# Patient Record
Sex: FEMALE | Race: WHITE | HISPANIC OR LATINO | Employment: OTHER | URBAN - METROPOLITAN AREA
[De-identification: names, ages, dates, MRNs, and addresses within clinical notes are randomized per-mention and may not be internally consistent; named-entity substitution may affect disease eponyms.]

---

## 2017-01-09 ENCOUNTER — NUTRITION (OUTPATIENT)
Dept: DIABETES | Facility: CLINIC | Age: 62
End: 2017-01-09
Payer: COMMERCIAL

## 2017-01-09 VITALS — HEIGHT: 65 IN | WEIGHT: 161.38 LBS | BODY MASS INDEX: 26.89 KG/M2

## 2017-01-09 PROCEDURE — G0108 DIAB MANAGE TRN  PER INDIV: HCPCS | Mod: S$GLB,,, | Performed by: DIETITIAN, REGISTERED

## 2017-01-09 PROCEDURE — 99999 PR PBB SHADOW E&M-EST. PATIENT-LVL III: CPT | Mod: PBBFAC,,, | Performed by: DIETITIAN, REGISTERED

## 2017-01-09 NOTE — PROGRESS NOTES
"PCP:  Sakina Cooper DO  REFERRING PROVIDER:  Sakina Cooper DO    HISTORY OF PRESENT ILLNESS:  61 y.o. female patient is in clinic today for diabetes fu. Previously seen [facility]. Patient currently takes victoza 1.2 mg daily, levemir 30 units twice daily, amaryl 2 mg 1 tab for diabetes.      Hemoglobin A1C   Date Value Ref Range Status   12/08/2016 7.4 (H) 4.5 - 6.2 % Final     Comment:     According to ADA guidelines, hemoglobin A1C <7.0% represents  optimal control in non-pregnant diabetic patients.  Different  metrics may apply to specific populations.   Standards of Medical Care in Diabetes - 2016.  For the purpose of screening for the presence of diabetes:  <5.7%     Consistent with the absence of diabetes  5.7-6.4%  Consistent with increasing risk for diabetes   (prediabetes)  >or=6.5%  Consistent with diabetes  Currently no consensus exists for use of hemoglobin A1C  for diagnosis of diabetes for children.     , ADA recommends less than 7.0.      Per review glucometer, fasting BGs are 112-203; rare ac testing 101, 146. Patient denies polyuria, polydipsia, polyphagia, blurred vision, nausea, vomiting, diarrhea, hypoglycemia.        VITAL SIGNS:  Height: 5' 5" (165.1 cm)   Weight: 73.2 kg (161 lb 6 oz)   IBW: 110 lbs +/-10%    ALLERGIES & MEDICATIONS: Reviewed and Reconciled  MEDICAL/SURGICAL & FAMILY HISTORY: Reviewed and Reconciled    LABORATORY: Reviewed Diabetes Management Flowsheet and Results Review.    HEALTH MAINTENANCE: Reviewed and Reconciled  Eye exam:  10/16  Dental exam: Recommend regular exams; denies gums bleeding.  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 Patient is working to reduce carbohydrates. She appears to be using lower fat meats. No excess sodium. Inadequate intakes of fruits, " non-starchy vegetables. Improved whole grains.    Bfst: Cheese sandwich (wheat)- coffee (splenda)   Lunch: frequently skips OR Soup (vegetable, noodle) - water  Dinner: Turkey sandwich (wheat), chips - water    PSYCHOSOCIAL: Stage of change - preparation/action. Barriers to change - social stressors.    EDUCATIONAL ASSESSMENT:  Patient is able to verbalize and/or demonstrate appropriate self care management skills:  Being Active   Taking medications  Problem solving  Healthy coping  Reducing risks  Patient needs improvement in self care management skills:    Healthy Eating   Monitoring    MNT ASSESSMENT:  1200 calories, 4-5 ounces protein daily  30 grams carb/meal, 5-15 grams carb/snack  increase fruit 2 serv/d, vegetables 2+ cups/d, whole grains 3+serv/d, lowfat dairy 3+serv/d  low-fat, low-sodium  150 min physical activity per week, moderate intensity, as tolerated      PLAN:  · Reviewed pathophysiology diabetes, complications and importance of annual dilated eye exams, regular dental exams, and daily foot self-exams.  · Encouraged daily self-monitoring of glucose, food and activity patterns, return records to clinic.     Reviewed glucose goals. Discussed prevention, identification and treatment of hyperglycemia and hypoglycemia and when to contact clinic. Pt agrees to increase glucose tests to twice daily - fasting, bedtime.  · Reviewed action of diabetes medications and to continue taking as prescribed. Call clinic 1 week or sooner for review.   Provided meal-planning instruction via foodlists, plate method, food models, food labels and/or ADA booklet. Reviewed micro/macronutrient effect on health management. Discussed carbohydrate counting and spacing techniques with emphasis on cardiovascular wellness, health strategies, functional foods. Reviewed principles of energy metabolism to assist weight and health management. Emphasis on regular meals and using MR shake to assist (Glucerna sample provided) w/  product list and criteria for selection provided.    Discussed importance of daily physical activity with review of benefits, methods, guidelines and precautions. Encouraged.   Discussed behavioral strategies for improving social and environmental support of lifestyle changes.   Discussed community resources for long term diabetes self-management support and progress.    Discussed role of stress on diabetes management. Reviewed stress management techniques and healthy coping strategies.   Reviewed ADA goals, progress.   GOALS: Self monitoring: daily food & activity journal. Meal plan-90% accuracy (eat 3 meals daily, using MR shake to assist). Physical activity-150 minutes per week.   Visit Time Spent:  30 minutes    Thank you for the opportunity to work with your patient.

## 2017-01-09 NOTE — MR AVS SNAPSHOT
[facility]  [address]  [place] [address]  Phone: [phone]  Fax: [phone]                  Dary Chaney   2017 11:00 AM   Nutrition    Description:  Female : 1955   Provider:  Helena Phillips RD, CDE   Department:  [facility] - Diabetes Management           Reason for Visit     Diabetes           Diagnoses this Visit        Comments    Uncontrolled type 2 diabetes mellitus without complication, without long-term current use of insulin    -  Primary            To Do List           Future Appointments        Provider Department Dept Phone    3/20/2017 8:40 AM LABORATORY, [address] [facility] [phone]    3/23/2017 10:40 AM Sakina Cooper DO [facility] [phone]      Goals (5 Years of Data)     None      Follow-Up and Disposition     Return in about 3 months (around 2017), or E11.65 Dinh Culver mid am.      [facility]     [facility] On Call Nurse Care Line -  Assistance  Registered nurses in the [facility] On [facility] Center provide clinical advisement, health education, appointment booking, and other advisory services.  Call for this free service at [phone].             Medications           Message regarding Medications     Verify the changes and/or additions to your medication regime listed below are the same as discussed with your clinician today.  If any of these changes or additions are incorrect, please notify your healthcare provider.             Verify that the below list of medications is an accurate representation of the medications you are currently taking.  If none reported, the list may be blank. If incorrect, please contact your healthcare provider. Carry this list with you in case of emergency.           Current Medications     blood sugar diagnostic Strp 1 strip by Misc.(Non-Drug; Combo Route) route 3 (three) times daily. One Touch Verio Flex Strips    fluticasone (FLONASE) 50  "mcg/actuation nasal spray USE TWO SPRAY(S) IN EACH NOSTRIL ONCE DAILY    glimepiride (AMARYL) 2 MG tablet Take 1 tablet (2 mg total) by mouth before breakfast.    insulin detemir (LEVEMIR FLEXTOUCH) 100 unit/mL (3 mL) SubQ InPn pen Inject 35 Units into the skin 2 (two) times daily.    lancets 33 gauge Misc 1 lancet by Misc.(Non-Drug; Combo Route) route 3 (three) times daily.    lidocaine (LIDODERM) 5 % Place 1 patch onto the skin daily as needed. Remove & Discard patch within 12 hours or as directed by MD    liraglutide 0.6 mg/0.1 mL, 18 mg/3 mL, subq PNIJ (VICTOZA 2-DEEPTI) 0.6 mg/0.1 mL (18 mg/3 mL) PnIj Inject 1.2 mg into the skin once daily.    losartan (COZAAR) 25 MG tablet Take 1 tablet (25 mg total) by mouth once daily.    PEN NEEDLE 31 X 5/16 " Ndle AS DIRECTED    simvastatin (ZOCOR) 40 MG tablet Take 1 tablet (40 mg total) by mouth nightly.    blood-glucose meter kit True Test Meter Use as instructed           Clinical Reference Information           Vital Signs - Last Recorded  Most recent update: 1/9/2017 10:59 AM by Dominique Gordon LPN    Ht Wt BMI          5' 5" (1.651 m) 73.2 kg (161 lb 6 oz) 26.85 kg/m2        Allergies as of 1/9/2017     Aspirin    Latex    Metformin    Valsartan      Immunizations Administered on Date of Encounter - 1/9/2017     None      "

## 2017-01-17 ENCOUNTER — OFFICE VISIT (OUTPATIENT)
Dept: ORTHOPEDICS | Facility: CLINIC | Age: 62
End: 2017-01-17
Payer: COMMERCIAL

## 2017-01-17 VITALS
DIASTOLIC BLOOD PRESSURE: 79 MMHG | BODY MASS INDEX: 28.88 KG/M2 | SYSTOLIC BLOOD PRESSURE: 127 MMHG | WEIGHT: 156.94 LBS | HEIGHT: 62 IN | HEART RATE: 81 BPM

## 2017-01-17 DIAGNOSIS — M23.92 DERANGEMENT, KNEE INTERNAL, LEFT: Primary | ICD-10-CM

## 2017-01-17 PROCEDURE — 1159F MED LIST DOCD IN RCRD: CPT | Mod: S$GLB,,, | Performed by: PHYSICIAN ASSISTANT

## 2017-01-17 PROCEDURE — 3078F DIAST BP <80 MM HG: CPT | Mod: S$GLB,,, | Performed by: PHYSICIAN ASSISTANT

## 2017-01-17 PROCEDURE — 3074F SYST BP LT 130 MM HG: CPT | Mod: S$GLB,,, | Performed by: PHYSICIAN ASSISTANT

## 2017-01-17 PROCEDURE — 99213 OFFICE O/P EST LOW 20 MIN: CPT | Mod: S$GLB,,, | Performed by: PHYSICIAN ASSISTANT

## 2017-01-17 PROCEDURE — 99999 PR PBB SHADOW E&M-EST. PATIENT-LVL III: CPT | Mod: PBBFAC,,, | Performed by: PHYSICIAN ASSISTANT
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
## 2017-01-23 ENCOUNTER — HOSPITAL ENCOUNTER (OUTPATIENT)
Dept: RADIOLOGY | Facility: HOSPITAL | Age: 62
Discharge: HOME | End: 2017-01-23
Attending: ORTHOPAEDIC SURGERY
Payer: COMMERCIAL

## 2017-01-23 DIAGNOSIS — M23.92 DERANGEMENT, KNEE INTERNAL, LEFT: ICD-10-CM

## 2017-01-23 PROCEDURE — 73721 MRI JNT OF LWR EXTRE W/O DYE: CPT | Mod: TC,PO,LT

## 2017-01-23 PROCEDURE — 73721 MRI JNT OF LWR EXTRE W/O DYE: CPT | Mod: 26,LT,, | Performed by: RADIOLOGY

## 2017-01-25 ENCOUNTER — TELEPHONE (OUTPATIENT)
Dept: ORTHOPEDICS | Facility: CLINIC | Age: 62
End: 2017-01-25

## 2017-01-25 NOTE — TELEPHONE ENCOUNTER
I spoke to MsBelinda Dary.  She has a torn Medical meniscus with an osteochondral decfect over the [facility].  Will refer to Dr. Tesfaye to discuss surgical intervention.

## 2017-01-27 ENCOUNTER — TELEPHONE (OUTPATIENT)
Dept: ORTHOPEDICS | Facility: CLINIC | Age: 62
End: 2017-01-27

## 2017-02-02 ENCOUNTER — TELEPHONE (OUTPATIENT)
Dept: ORTHOPEDICS | Facility: CLINIC | Age: 62
End: 2017-02-02

## 2017-02-02 NOTE — TELEPHONE ENCOUNTER
Unable to reach patient to reschedule her appointment for 2-6-2017 due to Dr. Tesfaye being in surgery. Left message requesting call back.

## 2017-02-03 RX ORDER — LIRAGLUTIDE 6 MG/ML
INJECTION SUBCUTANEOUS
Qty: 9 SYRINGE | Refills: 0 | Status: SHIPPED | OUTPATIENT
Start: 2017-02-03 | End: 2017-04-12 | Stop reason: SDUPTHER

## 2017-02-09 ENCOUNTER — TELEPHONE (OUTPATIENT)
Dept: ORTHOPEDICS | Facility: CLINIC | Age: 62
End: 2017-02-09

## 2017-02-09 NOTE — TELEPHONE ENCOUNTER
Pt wants to know if she can be referred to someone else due to pain not getting better. I informed her that I will check with provider and give her a call back.

## 2017-02-09 NOTE — TELEPHONE ENCOUNTER
Spoke to pt informed her I'm calling to schedule an appointment with orthopedic surgeon/his PA on [place]. Pt verbalized she understood. Appointment scheduled.

## 2017-02-09 NOTE — TELEPHONE ENCOUNTER
----- Message from Padma Elizabeth sent at 2/9/2017 10:23 AM CST -----  Contact: pt  Pt calling to speak to nurse...states that has questions/ concerns about recent appts....please adv/call pt back at [phone]///thx jw

## 2017-02-14 ENCOUNTER — OFFICE VISIT (OUTPATIENT)
Dept: ORTHOPEDICS | Facility: CLINIC | Age: 62
End: 2017-02-14
Payer: COMMERCIAL

## 2017-02-14 VITALS
HEART RATE: 81 BPM | DIASTOLIC BLOOD PRESSURE: 77 MMHG | WEIGHT: 156.5 LBS | SYSTOLIC BLOOD PRESSURE: 136 MMHG | BODY MASS INDEX: 28.8 KG/M2 | HEIGHT: 62 IN

## 2017-02-14 DIAGNOSIS — M23.92 KNEE INTERNAL DERANGEMENT, LEFT: Primary | ICD-10-CM

## 2017-02-14 PROCEDURE — 3078F DIAST BP <80 MM HG: CPT | Mod: S$GLB,,, | Performed by: PHYSICIAN ASSISTANT

## 2017-02-14 PROCEDURE — 3075F SYST BP GE 130 - 139MM HG: CPT | Mod: S$GLB,,, | Performed by: PHYSICIAN ASSISTANT

## 2017-02-14 PROCEDURE — 99999 PR PBB SHADOW E&M-EST. PATIENT-LVL IV: CPT | Mod: PBBFAC,,, | Performed by: PHYSICIAN ASSISTANT

## 2017-02-14 PROCEDURE — 99213 OFFICE O/P EST LOW 20 MIN: CPT | Mod: S$GLB,,, | Performed by: PHYSICIAN ASSISTANT

## 2017-02-14 NOTE — LETTER
February 16, 2017      Vira Julien PA-C  [address] [place] [address]           [facility]  [address] [place] [address]  Phone: [phone]  Fax: [phone]          Patient: Dary Chaney   MR Number: [id number]   YOB: 1955   Date of Visit: 2/14/2017       Dear Vira Julien:    Thank you for referring Dary Chaney to me for evaluation. Attached you will find relevant portions of my assessment and plan of care.    If you have questions, please do not hesitate to call me. I look forward to following Dary Chaney along with you.    Sincerely,    Branden Lama PA-C    Enclosure  CC:  No Recipients    If you would like to receive this communication electronically, please contact [email or web address] or [phone] to request more information on [employer] access.    For providers and/or their staff who would like to refer a patient to [facility], please contact us through our one-stop-shop provider referral line, [facility], at [phone].    If you feel you have received this communication in error or would no longer like to receive these types of communications, please e-mail [email or web address]

## 2017-02-14 NOTE — PATIENT INSTRUCTIONS
Knee Arthroscopy  Knee problems can often be diagnosed and treated with a technique called arthroscopy. This type of surgery is done using an instrument called an arthroscope (scope). Only a few small incisions are needed for this surgery. The procedure can be used to diagnose a knee problem. In many cases, treatment can also be done using arthroscopy.     Insertion of fluid, arthroscope, and instruments through small incisions (portals).         Patient undergoes procedure      The arthroscope  The scope allows the doctor to look directly into the knee joint. It is about the size of a pencil and contains a pathway for fluids. It also contains coated glass fibers that beam an intense, cool light into the knee joint. A camera is attached to the scope as well. It provides clear images of most areas in your knee joint. The doctor views these images on a monitor.  Preparing for the procedure  · Have lab or other testing done as advised.  · Tell your doctor about any medicines or supplements you take  · Do not eat or drink anything for 10 hours before the procedure.  · Once you arrive for surgery, you will be given an IV line in your arm or hand. This provides fluids and medicines.  · To keep you free of pain during the surgery, youll receive medicine called anesthesia. You may have:  ¨ General anesthesia. This puts you into a deep sleep during the surgery.  ¨ Regional anesthesia. This numbs the body from the waist down.  ¨ Local anesthesia. This numbs just the knee.  In addition to regional or local anesthesia, you may receive sedation. This medicine makes you relaxed and sleepy during the surgery.  The procedure  · A few small incisions (portals) are made in your knee.  · The scope is inserted through one of the portals.  · Sterile fluid is put into the knee joint. This makes it easier to see and work inside your joint.  · Using the scope, the doctor confirms the type and degree of knee damage. If possible, the  problem is treated at this time. This is done using surgical tools put through the other portals.  · When the surgery is done, all tools are removed. The incisions are closed with sutures, staples, surgical glue, or strips of surgical tape.  Risks and complications of arthroscopy  All surgeries have risks. The risks of arthroscopy include:  · Bleeding  · Infection  · Blood clots  · Swelling and stiffness of the knee  · Injury to normal tissue  · Continuing knee problems   Date Last Reviewed: 9/20/2015 © 2000-2016 [employer] [address] [place] [address], [place] [address]. All rights reserved. This information is not intended as a substitute for professional medical care. Always follow your healthcare professional's instructions.

## 2017-02-15 DIAGNOSIS — M25.562 CHRONIC PAIN OF LEFT KNEE: ICD-10-CM

## 2017-02-15 DIAGNOSIS — G89.29 CHRONIC PAIN OF LEFT KNEE: ICD-10-CM

## 2017-02-15 DIAGNOSIS — Z01.818 PRE-OP TESTING: Primary | ICD-10-CM

## 2017-02-17 NOTE — PROGRESS NOTES
CC:62 y/o female left knee pain    HPI:Pian for 6-7 months, failed treatment with injections, NSAIDS, rest and home therapy. Pain increase with prolonged standing, walking stairs, sharp, popping pain. Now affecting her normal activities.    PMH:    Past Medical History   Diagnosis Date    Arthritis     Cataract     Diabetes mellitus type II     Hyperlipidemia     Hypertension        PSH:    Past Surgical History   Procedure Laterality Date    Cholecystectomy      Spine surgery      Hysterectomy  1993     uterine prolapse       Family Hx:    Family History   Problem Relation Age of Onset    Diabetes Neg Hx     Heart disease Neg Hx        Allergy:    Review of patient's allergies indicates:   Allergen Reactions    Aspirin Nausea Only    Latex      Other reaction(s): Rash  Other reaction(s): whelps  Other reaction(s): Itching    Metformin      Other reaction(s): anxiety    Valsartan      Other reaction(s): disoriented       Medication:    Current Outpatient Prescriptions:     blood sugar diagnostic Strp, 1 strip by Misc.(Non-Drug; Combo Route) route 3 (three) times daily. One Touch Verio Flex Strips, Disp: 100 strip, Rfl: 11    fluticasone (FLONASE) 50 mcg/actuation nasal spray, USE TWO SPRAY(S) IN EACH NOSTRIL ONCE DAILY, Disp: 16 g, Rfl: 1    glimepiride (AMARYL) 2 MG tablet, Take 1 tablet (2 mg total) by mouth before breakfast., Disp: 90 tablet, Rfl: 1    insulin detemir (LEVEMIR FLEXTOUCH) 100 unit/mL (3 mL) SubQ InPn pen, Inject 35 Units into the skin 2 (two) times daily. (Patient taking differently: Inject 30 Units into the skin 2 (two) times daily. ), Disp: 5 Box, Rfl: 1    lancets 33 gauge Misc, 1 lancet by Misc.(Non-Drug; Combo Route) route 3 (three) times daily., Disp: 100 each, Rfl: 11    lidocaine (LIDODERM) 5 %, Place 1 patch onto the skin daily as needed. Remove & Discard patch within 12 hours or as directed by MD, Disp: 6 patch, Rfl: 3    losartan (COZAAR) 25 MG tablet, Take 1 tablet  "(25 mg total) by mouth once daily., Disp: 90 tablet, Rfl: 1    PEN NEEDLE 31 X 5/16 " Ndle, AS DIRECTED, Disp: 100 each, Rfl: 10    simvastatin (ZOCOR) 40 MG tablet, Take 1 tablet (40 mg total) by mouth nightly., Disp: 90 tablet, Rfl: 1    VICTOZA 3-DEEPTI 0.6 mg/0.1 mL (18 mg/3 mL) PnIj, INJECT 0.6 MG INTO THE SKIN ONCE DAILY, Disp: 9 Syringe, Rfl: 0    blood-glucose meter kit, True Test Meter Use as instructed, Disp: 1 each, Rfl: 0    Social History:    Social History     Social History    Marital status:      Spouse name: N/A    Number of children: 3    Years of education: N/A     Occupational History    Stay at Home           Social History Main Topics    Smoking status: Never Smoker    Smokeless tobacco: Never Used    Alcohol use No    Drug use: No    Sexual activity: Yes     Partners: Male     Birth control/ protection: Surgical     Other Topics Concern    Not on file     Social History Narrative    . Housewife.       Vitals:     Visit Vitals    /77    Pulse 81    Ht 5' 2" (1.575 m)    Wt 71 kg (156 lb 8.4 oz)    BMI 28.63 kg/m2        ROS:  GENERAL: No fever, chills, fatigability or weight loss.  SKIN: No rashes, itching or changes in color or texture of skin.  HEAD: No headaches or recent head trauma.  EYES: Visual acuity fine. No photophobia, ocular pain or diplopia.  EARS: Denies ear pain, discharge or vertigo.  NOSE: No loss of smell, no epistaxis or postnasal drip.  MOUTH & THROAT: No hoarseness or change in voice. No excessive gum bleeding.  NODES: Denies swollen glands.  CHEST: Denies IZQUIERDO, cyanosis, wheezing, cough and sputum production.  CARDIOVASCULAR: Denies chest pain, PND, orthopnea or reduced exercise tolerance.  ABDOMEN: Appetite fine. No weight loss. Denies diarrhea, abdominal pain, hematemesis or blood in stool.  URINARY: No flank pain, dysuria or hematuria.  PERIPHERAL VASCULAR: No claudication or cyanosis.  NEUROLOGIC: No history of " seizures, paralysis, alteration of gait or coordination.  MUSCULOSKELETAL: See HPI    PE:  APPEARANCE: Well nourished, well developed, in no acute distress.   HEAD: Normocephalic, atraumatic.  NEUROLOGIC: Cranial Nerves: II-XII grossly intact, also see MUSCULOSKELETAL  MUSCULOSKELETAL: Knee-left  abnormal    Gait-abnormal  Muscle Appearance:normal  Grooming:normal  Spine Alignment-normal  Muscle Atrophy-Positive  Deformities-Negative  Tenderness-Positive  Paresthesias-Negative  Range of Motion         Ext-abnormal         Flex-normal and abnormal  Muscle Strength-abnormal  Sensation-normal  Reflexes-normal  Crepitus-Positive                                Swelling-Negative  Effusion- Negative                                Edema-Negative  Lachman-Negative                               Erythema-Negative  [facility]'s-Negative                            Apley Grind-Positive  Patellar Comp-Positive                        Alignment-normal/symmetric  Patellar Apprehension-Positive            Synovial fullness-Positive  Passive Patellar Tilt-normal  Patellar Tracking-normal   Patellar Glide-normal  Q-Angle at 90 degrees-normal  Patellar Grind-normal  M-Frrr-Nqtrcnoz  Fatigue-Positive                                     HS Tightness-Positive  Tests on Exam-normal  Neurovascular Status-normal+2 DP and PT artery pulses  Skin-normal  Mental Status-normal                 Diagnosis:              1.left knee mensicus tear                   Diagnostic Studies  MRI-Yes, agree with the findings of Tears involving both the medial and lateral menisci ,Moderate-sized joint effusion.  Small focal full-thickness cartilaginous defect involving the weightbearing surface of the medial femoral condyle.  X-Ray-No  EMG/NCV-No  Arthrogram-No  Bone Scan-No  CT Scan-No  Doppler-No  ESR-No  CRP-No  CBC with Diff-No   Rheumatoid/Arthritis Panel-No      Plan:                                                 1. PT-no                                                  2.OT-no                                          3.NSAID-no                                        4. Narcotics-no                                     5. Wound care-N/A                                 6. Rest-no                                           7. Surgery-yes    Left knee ATS                                     8. JEFFRY Hose-no                                    9. Anticoagulation therapy-no               10. Elevation-no                                     11. Crutches-no                                    12. Walker-no             13. Cane no                        14. Referral-no                                     15.Injection-no                            16. Splint   /    Cast   /   Cast Shoe-No              17. RICE            18. Follow up-  All of the patients questions and concerns were answered by myself and Dr Cottrell. Patient wishes to proceed with a left knee ATS to reduces pain, increase function and quality of life.

## 2017-02-21 ENCOUNTER — TELEPHONE (OUTPATIENT)
Dept: ORTHOPEDICS | Facility: CLINIC | Age: 62
End: 2017-02-21

## 2017-02-21 NOTE — TELEPHONE ENCOUNTER
Returned pt phone call. Pt stated she wanted to cancel her surgery scheduled in April. Pt stated it was too far out so she found a different physician to do it earlier. Verified understanding

## 2017-02-21 NOTE — TELEPHONE ENCOUNTER
----- Message from Virgen Burks sent at 2/21/2017 10:04 AM CST -----  Contact: pt  Pt would like to cancel her surgery,please...[phone] (hiyr)

## 2017-02-21 NOTE — TELEPHONE ENCOUNTER
Contacted pt to offer her surgery date of 3/21/17.  Pt reports she has scheduled at another facility.

## 2017-03-14 RX ORDER — INSULIN DETEMIR 100 [IU]/ML
INJECTION, SOLUTION SUBCUTANEOUS
Refills: 0 | Status: CANCELLED | OUTPATIENT
Start: 2017-03-14

## 2017-03-30 ENCOUNTER — PATIENT OUTREACH (OUTPATIENT)
Dept: ADMINISTRATIVE | Facility: HOSPITAL | Age: 62
End: 2017-03-30
Payer: COMMERCIAL

## 2017-04-10 ENCOUNTER — HOSPITAL ENCOUNTER (OUTPATIENT)
Dept: RADIOLOGY | Facility: HOSPITAL | Age: 62
Discharge: HOME | End: 2017-04-10
Attending: INTERNAL MEDICINE
Payer: COMMERCIAL

## 2017-04-10 DIAGNOSIS — Z12.39 BREAST CANCER SCREENING: ICD-10-CM

## 2017-04-10 PROCEDURE — 77067 SCR MAMMO BI INCL CAD: CPT | Mod: TC

## 2017-04-10 PROCEDURE — 77067 SCR MAMMO BI INCL CAD: CPT | Mod: 26,,, | Performed by: RADIOLOGY

## 2017-04-12 ENCOUNTER — OFFICE VISIT (OUTPATIENT)
Dept: INTERNAL MEDICINE | Facility: CLINIC | Age: 62
End: 2017-04-12
Payer: COMMERCIAL

## 2017-04-12 VITALS
SYSTOLIC BLOOD PRESSURE: 130 MMHG | TEMPERATURE: 97 F | BODY MASS INDEX: 29.62 KG/M2 | HEART RATE: 84 BPM | DIASTOLIC BLOOD PRESSURE: 80 MMHG | WEIGHT: 160.94 LBS | HEIGHT: 62 IN | OXYGEN SATURATION: 98 %

## 2017-04-12 DIAGNOSIS — I10 HYPERTENSION GOAL BP (BLOOD PRESSURE) < 130/80: Primary | Chronic | ICD-10-CM

## 2017-04-12 DIAGNOSIS — M25.562 CHRONIC PAIN OF LEFT KNEE: ICD-10-CM

## 2017-04-12 DIAGNOSIS — G89.29 CHRONIC PAIN OF LEFT KNEE: ICD-10-CM

## 2017-04-12 DIAGNOSIS — E78.5 HYPERLIPIDEMIA LDL GOAL <100: Chronic | ICD-10-CM

## 2017-04-12 DIAGNOSIS — Z12.11 COLON CANCER SCREENING: ICD-10-CM

## 2017-04-12 PROCEDURE — 3045F PR MOST RECENT HEMOGLOBIN A1C LEVEL 7.0-9.0%: CPT | Mod: S$GLB,,, | Performed by: INTERNAL MEDICINE

## 2017-04-12 PROCEDURE — 99999 PR PBB SHADOW E&M-EST. PATIENT-LVL III: CPT | Mod: PBBFAC,,, | Performed by: INTERNAL MEDICINE

## 2017-04-12 PROCEDURE — 1160F RVW MEDS BY RX/DR IN RCRD: CPT | Mod: S$GLB,,, | Performed by: INTERNAL MEDICINE

## 2017-04-12 PROCEDURE — 99214 OFFICE O/P EST MOD 30 MIN: CPT | Mod: S$GLB,,, | Performed by: INTERNAL MEDICINE

## 2017-04-12 PROCEDURE — 3075F SYST BP GE 130 - 139MM HG: CPT | Mod: S$GLB,,, | Performed by: INTERNAL MEDICINE

## 2017-04-12 PROCEDURE — 3079F DIAST BP 80-89 MM HG: CPT | Mod: S$GLB,,, | Performed by: INTERNAL MEDICINE

## 2017-04-12 PROCEDURE — 4010F ACE/ARB THERAPY RXD/TAKEN: CPT | Mod: S$GLB,,, | Performed by: INTERNAL MEDICINE

## 2017-04-12 RX ORDER — LIDOCAINE 50 MG/G
1 PATCH TOPICAL DAILY PRN
Qty: 30 PATCH | Refills: 3 | Status: SHIPPED | OUTPATIENT
Start: 2017-04-12 | End: 2018-06-26 | Stop reason: SDUPTHER

## 2017-04-12 RX ORDER — LOSARTAN POTASSIUM 25 MG/1
25 TABLET ORAL DAILY
Qty: 90 TABLET | Refills: 1 | Status: SHIPPED | OUTPATIENT
Start: 2017-04-12 | End: 2017-09-11 | Stop reason: SDUPTHER

## 2017-04-12 RX ORDER — GLIMEPIRIDE 2 MG/1
2 TABLET ORAL
Qty: 90 TABLET | Refills: 1 | Status: SHIPPED | OUTPATIENT
Start: 2017-04-12 | End: 2017-09-11 | Stop reason: SDUPTHER

## 2017-04-12 RX ORDER — HYDROCODONE BITARTRATE AND ACETAMINOPHEN 7.5; 325 MG/1; MG/1
TABLET ORAL
COMMUNITY
Start: 2017-03-02 | End: 2017-05-12

## 2017-04-12 RX ORDER — SIMVASTATIN 40 MG/1
40 TABLET, FILM COATED ORAL NIGHTLY
Qty: 90 TABLET | Refills: 1 | Status: SHIPPED | OUTPATIENT
Start: 2017-04-12 | End: 2017-09-11 | Stop reason: SDUPTHER

## 2017-04-12 NOTE — PROGRESS NOTES
Subjective:       Patient ID: Dary Chaney is a 61 y.o. female.    Chief Complaint: Follow-up    HPI Comments: Dary Chaney  61 y.o. White female    Patient presents with:  Follow-up    HPI: Here today to follow up on chronic conditions.  HTN--stable on losartan.   HLD--compliant with simvastatin.                 CHOL                     192                 12/08/2016                HDL                      55                  12/08/2016                LDLCALC                  98.0                12/08/2016                         TRIG                     195 (H)             12/08/2016            Diabetes--compliant with glimepiride, Victoza and Levemir.                     HGBA1C                   7.4 (H)             12/08/2016            Left knee pain--chronic. She is s/p replacement. She is currently in rehab. She states lidocaine patches help.     Past Medical History:  Arthritis  Cataract  Diabetes mellitus type II  Hyperlipidemia  Hypertension      Current Outpatient Prescriptions:     blood sugar diagnostic Strp, 1 strip by Misc.(Non-Drug; Combo Route) route 3 (three) times daily. One Touch Verio Flex Strips, Disp: 100 strip, Rfl: 11    fluticasone (FLONASE) 50 mcg/actuation nasal spray, USE TWO SPRAY(S) IN EACH NOSTRIL ONCE DAILY, Disp: 16 g, Rfl: 1    glimepiride (AMARYL) 2 MG tablet, Take 1 tablet (2 mg total) by mouth before breakfast., Disp: 90 tablet, Rfl: 1    insulin detemir (LEVEMIR FLEXTOUCH) 100 unit/mL (3 mL) SubQ InPn pen, Inject 30 Units into the skin 2 (two) times daily., Disp: 5 Box, Rfl: 3    lancets 33 gauge Misc, 1 lancet by Misc.(Non-Drug; Combo Route) route 3 (three) times daily., Disp: 100 each, Rfl: 11    lidocaine (LIDODERM) 5 %, Place 1 patch onto the skin daily as needed. Remove & Discard patch within 12 hours or as directed by MD, Disp: 30 patch, Rfl: 3    liraglutide 0.6 mg/0.1 mL, 18 mg/3 mL, subq PNIJ (VICTOZA 3-DEEPTI) 0.6 mg/0.1 mL (18 mg/3 mL) PnIj, Inject 1.2 mg  "into the skin once daily., Disp: 9 Syringe, Rfl: 3    losartan (COZAAR) 25 MG tablet, Take 1 tablet (25 mg total) by mouth once daily., Disp: 90 tablet, Rfl: 1    PEN NEEDLE 31 X 5/16 " Ndle, AS DIRECTED, Disp: 100 each, Rfl: 10    simvastatin (ZOCOR) 40 MG tablet, Take 1 tablet (40 mg total) by mouth nightly., Disp: 90 tablet, Rfl: 1    blood-glucose meter kit, True Test Meter Use as instructed, Disp: 1 each, Rfl: 0    hydrocodone-acetaminophen 7.5-325mg (NORCO) 7.5-325 mg per tablet, , Disp: , Rfl:     Allergies:  Review of patient's allergies indicates:   -- Aspirin -- Nausea Only   -- Latex     --  Other reaction(s): Rash             Other reaction(s): whelps             Other reaction(s): Itching   -- Metformin     --  Other reaction(s): anxiety   -- Valsartan     --  Other reaction(s): disoriented    ROS:  Denies headache, dizziness, chest pain or shortness of breath    PHYSICAL EXAM:  VITAL SIGNS: Reviewed  GENERAL: Alert and oriented, no acute distress  HEART: Normal S1 and S2, regular rate and rhythm  LUNGS: Bilaterally clear to auscultation, respirations unlabored    ASSESSMENT/PLAN:    F/U in 3 months        Review of Systems   Constitutional: Negative for fever and unexpected weight change.   Respiratory: Negative for cough and shortness of breath.    Cardiovascular: Negative for chest pain and leg swelling.   Gastrointestinal: Positive for constipation. Negative for abdominal pain and diarrhea.   Genitourinary: Negative for dysuria.   Musculoskeletal: Positive for arthralgias and gait problem.   Neurological: Negative for dizziness and headaches.       Objective:      Physical Exam   Constitutional: She is oriented to person, place, and time. She appears well-developed and well-nourished. No distress.   Eyes: No scleral icterus.   Cardiovascular: Normal rate, regular rhythm and normal heart sounds.    Pulmonary/Chest: Effort normal and breath sounds normal. No respiratory distress.   Abdominal: " Soft. Bowel sounds are normal.   Musculoskeletal: She exhibits no edema.        Left knee: She exhibits decreased range of motion and swelling. She exhibits no ecchymosis and no erythema.   Left knee surgical incisions healing.    Neurological: She is alert and oriented to person, place, and time.   Skin: Skin is warm and dry.   Psychiatric: She has a normal mood and affect.   Vitals reviewed.      Assessment:       1. Hypertension goal BP (blood pressure) < 130/80    2. Hyperlipidemia LDL goal <100    3. Uncontrolled type 2 diabetes mellitus without complication, with long-term current use of insulin    4. Chronic pain of left knee    5. Colon cancer screening        Plan:       Dary was seen today for follow-up.    Diagnoses and all orders for this visit:    Hypertension goal BP (blood pressure) < 130/80  -     Continue losartan (COZAAR) 25 MG tablet; Take 1 tablet (25 mg total) by mouth once daily.    Hyperlipidemia LDL goal <100  -     Check lipid panel  -     simvastatin (ZOCOR) 40 MG tablet; Take 1 tablet (40 mg total) by mouth nightly.    Uncontrolled type 2 diabetes mellitus without complication, with long-term current use of insulin  -     Check A1C  -     liraglutide 0.6 mg/0.1 mL, 18 mg/3 mL, subq PNIJ (VICTOZA 3-DEEPTI) 0.6 mg/0.1 mL (18 mg/3 mL) PnIj; Inject 1.2 mg into the skin once daily.  -     glimepiride (AMARYL) 2 MG tablet; Take 1 tablet (2 mg total) by mouth before breakfast.  -     insulin detemir (LEVEMIR FLEXTOUCH) 100 unit/mL (3 mL) SubQ InPn pen; Inject 30 Units into the skin 2 (two) times daily.    Chronic pain of left knee  -     lidocaine (LIDODERM) 5 %; Place 1 patch onto the skin daily as needed. Remove & Discard patch within 12 hours or as directed by MD    Colon cancer screening  -     Case request GI: COLONOSCOPY    Recommend shingles vaccine.    Schedule labs.

## 2017-04-12 NOTE — MR AVS SNAPSHOT
[facility]  [address] [facility] [address]  Phone: [phone]  Fax: [phone]                  Dary Chaney   2017 10:20 AM   Office Visit    Description:  Female : 1955   Provider:  Sakina Cooper DO   Department:  [facility]           Reason for Visit     Follow-up           Diagnoses this Visit        Comments    Uncontrolled type 2 diabetes mellitus without complication, with long-term current use of insulin    -  Primary     Hypertension goal BP (blood pressure) < 130/80         Hyperlipidemia LDL goal <100         Chronic pain of left knee         Colon cancer screening                To Do List           Future Appointments        Provider Department Dept Phone    2017 11:50 AM LABORATORY, [facility] [phone]    2017 1:00 PM Crispin Cottrell Sr., MD [facility] [phone]      Your Future Surgeries/Procedures     2017   Surgery with Crispin Cottrell Sr., MD   [facility]    [address] [facility] [address] [phone]              Goals (5 Years of Data)     None       These Medications        Disp Refills Start End    liraglutide 0.6 mg/0.1 mL, 18 mg/3 mL, subq PNIJ (VICTOZA 3-DEEPTI) 0.6 mg/0.1 mL (18 mg/3 mL) PnIj 9 Syringe 3 2017     Inject 1.2 mg into the skin once daily. - Subcutaneous    Pharmacy: [facility] [address] [place] [address] [address] [facility] Ph #: [phone]       simvastatin (ZOCOR) 40 MG tablet 90 tablet 1 2017     Take 1 tablet (40 mg total) by mouth nightly. - Oral    Pharmacy: [facility] [address] [place] [address] [address] [facility] Ph #: [phone]       losartan (COZAAR) 25 MG tablet 90 tablet 1 2017     Take 1 tablet (25 mg total) by mouth once daily. - Oral    Pharmacy: [address] [facility] Ph #: [phone]        glimepiride (AMARYL) 2 MG tablet 90 tablet 1 4/12/2017 4/12/2018    Take 1 tablet (2 mg total) by mouth before breakfast. - Oral    Pharmacy: [facility] [address] [place] [address] [address] [place] [address] Ph #: [phone]       insulin detemir (LEVEMIR FLEXTOUCH) 100 unit/mL (3 mL) SubQ InPn pen 5 Box 3 4/12/2017     Inject 30 Units into the skin 2 (two) times daily. - Subcutaneous    Pharmacy: [address] [place] [address] Ph #: [phone]       lidocaine (LIDODERM) 5 % 30 patch 3 4/12/2017     Place 1 patch onto the skin daily as needed. Remove & Discard patch within 12 hours or as directed by MD - Transdermal    Pharmacy: [address] Ph #: [phone]         [facility]     [facility] On [facility] Nurse Care Line - 24/7 Assistance  Unless otherwise directed by your provider, please contact [facility], our nurse care line that is available for 24/7 assistance.     Registered nurses in the [facility] provide: appointment scheduling, clinical advisement, health education, and other advisory services.  Call: [phone] (toll free)               Medications           Message regarding Medications     Verify the changes and/or additions to your medication regime listed below are the same as discussed with your clinician today.  If any of these changes or additions are incorrect, please notify your healthcare provider.        CHANGE how you are taking these medications     Start Taking Instead of    liraglutide 0.6 mg/0.1 mL, 18 mg/3 mL, subq PNIJ (VICTOZA 3-DEEPTI) 0.6 mg/0.1 mL (18 mg/3 mL) PnIj VICTOZA 3-DEEPTI 0.6 mg/0.1 mL (18 mg/3 mL) PnIj    Dosage:  Inject 1.2 mg into the skin once daily. Dosage:  INJECT 0.6 MG INTO THE SKIN ONCE DAILY    Reason for Change:  Reorder     insulin detemir (LEVEMIR FLEXTOUCH) 100 unit/mL (3 mL) SubQ InPn pen insulin detemir (LEVEMIR FLEXTOUCH) 100 unit/mL (3 mL) SubQ InPn pen  "   Dosage:  Inject 30 Units into the skin 2 (two) times daily. Dosage:  Inject 35 Units into the skin 2 (two) times daily.    Reason for Change:  Reorder            Verify that the below list of medications is an accurate representation of the medications you are currently taking.  If none reported, the list may be blank. If incorrect, please contact your healthcare provider. Carry this list with you in case of emergency.           Current Medications     blood sugar diagnostic Strp 1 strip by Misc.(Non-Drug; Combo Route) route 3 (three) times daily. One Touch Verio Flex Strips    fluticasone (FLONASE) 50 mcg/actuation nasal spray USE TWO SPRAY(S) IN EACH NOSTRIL ONCE DAILY    glimepiride (AMARYL) 2 MG tablet Take 1 tablet (2 mg total) by mouth before breakfast.    insulin detemir (LEVEMIR FLEXTOUCH) 100 unit/mL (3 mL) SubQ InPn pen Inject 30 Units into the skin 2 (two) times daily.    lancets 33 gauge Misc 1 lancet by Misc.(Non-Drug; Combo Route) route 3 (three) times daily.    lidocaine (LIDODERM) 5 % Place 1 patch onto the skin daily as needed. Remove & Discard patch within 12 hours or as directed by MD    liraglutide 0.6 mg/0.1 mL, 18 mg/3 mL, subq PNIJ (VICTOZA 3-DEEPTI) 0.6 mg/0.1 mL (18 mg/3 mL) PnIj Inject 1.2 mg into the skin once daily.    losartan (COZAAR) 25 MG tablet Take 1 tablet (25 mg total) by mouth once daily.    PEN NEEDLE 31 X 5/16 " Ndle AS DIRECTED    simvastatin (ZOCOR) 40 MG tablet Take 1 tablet (40 mg total) by mouth nightly.    blood-glucose meter kit True Test Meter Use as instructed    hydrocodone-acetaminophen 7.5-325mg (NORCO) 7.5-325 mg per tablet            Clinical Reference Information           Your Vitals Were     BP Pulse Temp Height Weight SpO2    130/80 84 96.9 °F (36.1 °C) (Tympanic) 5' 2" (1.575 m) 73 kg (160 lb 15 oz) 98%    BMI                29.44 kg/m2          Blood Pressure          Most Recent Value    BP  130/80      Allergies as of 4/12/2017     Aspirin    Latex    " Metformin    Valsartan      Immunizations Administered on Date of Encounter - 4/12/2017     None      Orders Placed During Today's Visit      Normal Orders This Visit    Case request GI: COLONOSCOPY       Language Assistance Services     ATTENTION: Language assistance services are available, free of charge. Please call [phone].      ATENCIÓN: Si habla elma, tiene a martin disposición servicios gratuitos de asistencia lingüística. Llame al [phone].     CHÚ Ý: N?u b?n nói Ti?ng Vi?t, có các d?ch v? h? tr? ngôn ng? mi?n phí dành cho b?n. G?i s? [phone].         [facility] complies with applicable Federal civil rights laws and does not discriminate on the basis of race, color, national origin, age, disability, or sex.

## 2017-04-13 ENCOUNTER — LAB VISIT (OUTPATIENT)
Dept: LAB | Facility: HOSPITAL | Age: 62
End: 2017-04-13
Attending: INTERNAL MEDICINE
Payer: COMMERCIAL

## 2017-04-13 DIAGNOSIS — E78.5 HYPERLIPIDEMIA LDL GOAL <100: ICD-10-CM

## 2017-04-13 DIAGNOSIS — I10 HYPERTENSION GOAL BP (BLOOD PRESSURE) < 130/80: ICD-10-CM

## 2017-04-13 LAB
ALBUMIN SERPL BCP-MCNC: 3.7 G/DL
ALP SERPL-CCNC: 79 U/L
ALT SERPL W/O P-5'-P-CCNC: 23 U/L
ANION GAP SERPL CALC-SCNC: 9 MMOL/L
AST SERPL-CCNC: 15 U/L
BILIRUB SERPL-MCNC: 0.5 MG/DL
BUN SERPL-MCNC: 15 MG/DL
CALCIUM SERPL-MCNC: 9 MG/DL
CHLORIDE SERPL-SCNC: 105 MMOL/L
CHOLEST/HDLC SERPL: 3.2 {RATIO}
CO2 SERPL-SCNC: 27 MMOL/L
CREAT SERPL-MCNC: 0.7 MG/DL
EST. GFR  (AFRICAN AMERICAN): >60 ML/MIN/1.73 M^2
EST. GFR  (NON AFRICAN AMERICAN): >60 ML/MIN/1.73 M^2
GLUCOSE SERPL-MCNC: 204 MG/DL
HDL/CHOLESTEROL RATIO: 31.3 %
HDLC SERPL-MCNC: 182 MG/DL
HDLC SERPL-MCNC: 57 MG/DL
LDLC SERPL CALC-MCNC: 90.8 MG/DL
NONHDLC SERPL-MCNC: 125 MG/DL
POTASSIUM SERPL-SCNC: 4.3 MMOL/L
PROT SERPL-MCNC: 6.6 G/DL
SODIUM SERPL-SCNC: 141 MMOL/L
TRIGL SERPL-MCNC: 171 MG/DL

## 2017-04-13 PROCEDURE — 83036 HEMOGLOBIN GLYCOSYLATED A1C: CPT

## 2017-04-13 PROCEDURE — 80053 COMPREHEN METABOLIC PANEL: CPT

## 2017-04-13 PROCEDURE — 36415 COLL VENOUS BLD VENIPUNCTURE: CPT | Mod: PO

## 2017-04-13 PROCEDURE — 80061 LIPID PANEL: CPT

## 2017-04-16 LAB
ESTIMATED AVG GLUCOSE: 180 MG/DL
HBA1C MFR BLD HPLC: 7.9 %

## 2017-05-09 ENCOUNTER — OFFICE VISIT (OUTPATIENT)
Dept: URGENT CARE | Facility: CLINIC | Age: 62
End: 2017-05-09
Payer: COMMERCIAL

## 2017-05-09 VITALS
SYSTOLIC BLOOD PRESSURE: 132 MMHG | HEIGHT: 62 IN | WEIGHT: 161.63 LBS | HEART RATE: 88 BPM | DIASTOLIC BLOOD PRESSURE: 82 MMHG | OXYGEN SATURATION: 97 % | TEMPERATURE: 98 F | BODY MASS INDEX: 29.74 KG/M2

## 2017-05-09 DIAGNOSIS — J32.9 SINOBRONCHITIS: Primary | ICD-10-CM

## 2017-05-09 DIAGNOSIS — R05.9 COUGH: ICD-10-CM

## 2017-05-09 DIAGNOSIS — J40 SINOBRONCHITIS: Primary | ICD-10-CM

## 2017-05-09 DIAGNOSIS — R09.81 SINUS CONGESTION: ICD-10-CM

## 2017-05-09 PROCEDURE — 3079F DIAST BP 80-89 MM HG: CPT | Mod: S$GLB,,, | Performed by: NURSE PRACTITIONER

## 2017-05-09 PROCEDURE — 3075F SYST BP GE 130 - 139MM HG: CPT | Mod: S$GLB,,, | Performed by: NURSE PRACTITIONER

## 2017-05-09 PROCEDURE — 1160F RVW MEDS BY RX/DR IN RCRD: CPT | Mod: S$GLB,,, | Performed by: NURSE PRACTITIONER

## 2017-05-09 PROCEDURE — 99999 PR PBB SHADOW E&M-EST. PATIENT-LVL IV: CPT | Mod: PBBFAC,,, | Performed by: NURSE PRACTITIONER

## 2017-05-09 PROCEDURE — 99213 OFFICE O/P EST LOW 20 MIN: CPT | Mod: S$GLB,,, | Performed by: NURSE PRACTITIONER

## 2017-05-09 RX ORDER — AZITHROMYCIN 250 MG/1
250 TABLET, FILM COATED ORAL DAILY
Qty: 6 TABLET | Refills: 0 | Status: SHIPPED | OUTPATIENT
Start: 2017-05-09 | End: 2017-05-14

## 2017-05-09 RX ORDER — PROMETHAZINE HYDROCHLORIDE AND DEXTROMETHORPHAN HYDROBROMIDE 6.25; 15 MG/5ML; MG/5ML
5 SYRUP ORAL NIGHTLY PRN
Qty: 118 ML | Refills: 0 | Status: SHIPPED | OUTPATIENT
Start: 2017-05-09 | End: 2017-05-12

## 2017-05-09 RX ORDER — LORATADINE 10 MG/1
10 TABLET ORAL DAILY
Qty: 30 TABLET | Refills: 0 | Status: SHIPPED | OUTPATIENT
Start: 2017-05-09

## 2017-05-09 NOTE — PATIENT INSTRUCTIONS
Sinusitis (Antibiotic Treatment)    The sinuses are air-filled spaces within the bones of the face. They connect to the inside of the nose. Sinusitis is an inflammation of the tissue lining the sinus cavity. Sinus inflammation can occur during a cold. It can also be due to allergies to pollens and other particles in the air. Sinusitis can cause symptoms of sinus congestion and fullness. A sinus infection causes fever, headache and facial pain. There is often green or yellow drainage from the nose or into the back of the throat (post-nasal drip). You have been given antibiotics to treat this condition.  Home care:  · Take the full course of antibiotics as instructed. Do not stop taking them, even if you feel better.  · Drink plenty of water, hot tea, and other liquids. This may help thin mucus. It also may promote sinus drainage.  · Heat may help soothe painful areas of the face. Use a towel soaked in hot water. Or,  the shower and direct the hot spray onto your face. Using a vaporizer along with a menthol rub at night may also help.   · An expectorant containing guaifenesin may help thin the mucus and promote drainage from the sinuses.  · Over-the-counter decongestants may be used unless a similar medicine was prescribed. Nasal sprays work the fastest. Use one that contains phenylephrine or oxymetazoline. First blow the nose gently. Then use the spray. Do not use these medicines more often than directed on the label or symptoms may get worse. You may also use tablets containing pseudoephedrine. Avoid products that combine ingredients, because side effects may be increased. Read labels. You can also ask the pharmacist for help. (NOTE: Persons with high blood pressure should not use decongestants. They can raise blood pressure.)  · Over-the-counter antihistamines may help if allergies contributed to your sinusitis.    · Do not use nasal rinses or irrigation during an acute sinus infection, unless told to by  your health care provider. Rinsing may spread the infection to other sinuses.  · Use acetaminophen or ibuprofen to control pain, unless another pain medicine was prescribed. (If you have chronic liver or kidney disease or ever had a stomach ulcer, talk with your doctor before using these medicines. Aspirin should never be used in anyone under 18 years of age who is ill with a fever. It may cause severe liver damage.)  · Don't smoke. This can worsen symptoms.  Follow-up care  Follow up with your healthcare provider or our staff if you are not improving within the next week.  When to seek medical advice  Call your healthcare provider if any of these occur:  · Facial pain or headache becoming more severe  · Stiff neck  · Unusual drowsiness or confusion  · Swelling of the forehead or eyelids  · Vision problems, including blurred or double vision  · Fever of 100.4ºF (38ºC) or higher, or as directed by your healthcare provider  · Seizure  · Breathing problems  · Symptoms not resolving within 10 days  Date Last Reviewed: 4/13/2015  © 6476-8927 The "[employer]. [address] [place] [address], [place] [address]. All rights reserved. This information is not intended as a substitute for professional medical care. Always follow your healthcare professional's instructions.

## 2017-05-09 NOTE — PROGRESS NOTES
Subjective:       Patient ID: Dary Chaney is a 61 y.o. female.    Chief Complaint: No chief complaint on file.    HPI Comments: Pt is a 61 year old female to clinic today with complaints of cough, congestion, ST, and PND that began Friday.     Sinus Problem   This is a new problem. The current episode started in the past 7 days (Friday). The problem has been gradually worsening since onset. Maximum temperature: feels warm. Her pain is at a severity of 4/10. The pain is mild. Associated symptoms include congestion, coughing, diaphoresis, ear pain, headaches, sinus pressure and a sore throat. Pertinent negatives include no chills, hoarse voice, neck pain, shortness of breath, sneezing or swollen glands. Treatments tried: Diabetic tussin and flonase. The treatment provided no relief.     Review of Systems   Constitutional: Positive for diaphoresis and fever. Negative for chills and fatigue.   HENT: Positive for congestion, ear pain, postnasal drip, rhinorrhea, sinus pressure and sore throat. Negative for ear discharge, hoarse voice, sneezing and trouble swallowing.    Eyes: Negative for pain.   Respiratory: Positive for cough. Negative for chest tightness, shortness of breath and wheezing.    Cardiovascular: Negative for chest pain and palpitations.   Gastrointestinal: Negative for abdominal pain, diarrhea, nausea and vomiting.   Genitourinary: Negative for dysuria.   Musculoskeletal: Negative for back pain, myalgias and neck pain.   Skin: Negative for rash.   Neurological: Positive for headaches. Negative for dizziness and light-headedness.       Objective:      Physical Exam   Constitutional: She is oriented to person, place, and time. She appears well-developed and well-nourished. No distress.   HENT:   Head: Normocephalic.   Right Ear: External ear and ear canal normal. No tenderness. Tympanic membrane is not bulging. A middle ear effusion is present.   Left Ear: Tympanic membrane, external ear and ear canal  normal. No tenderness. Tympanic membrane is not bulging.   Nose: Mucosal edema present. No rhinorrhea. Right sinus exhibits maxillary sinus tenderness and frontal sinus tenderness. Left sinus exhibits maxillary sinus tenderness and frontal sinus tenderness.   Mouth/Throat: Uvula is midline, oropharynx is clear and moist and mucous membranes are normal. No oropharyngeal exudate, posterior oropharyngeal edema or posterior oropharyngeal erythema.   Eyes: Conjunctivae and EOM are normal. Pupils are equal, round, and reactive to light.   Neck: Normal range of motion. Neck supple.   Cardiovascular: Normal rate, regular rhythm, S1 normal, S2 normal and normal heart sounds.  Exam reveals no gallop and no friction rub.    No murmur heard.  Pulmonary/Chest: Effort normal and breath sounds normal. No accessory muscle usage. No apnea, no tachypnea and no bradypnea. No respiratory distress. She has no decreased breath sounds. She has no wheezes. She has no rhonchi. She has no rales.   Lymphadenopathy:        Head (right side): No submental, no submandibular and no tonsillar adenopathy present.        Head (left side): No submental, no submandibular and no tonsillar adenopathy present.     She has no cervical adenopathy.   Neurological: She is alert and oriented to person, place, and time.   Skin: Skin is warm and dry. No rash noted. She is not diaphoretic.   Psychiatric: She has a normal mood and affect. Her speech is normal and behavior is normal.   Nursing note and vitals reviewed.      Assessment:       1. Sinobronchitis    2. Cough    3. Sinus congestion        Plan:   Sinobronchitis  -     loratadine (CLARITIN) 10 mg tablet; Take 1 tablet (10 mg total) by mouth once daily.  Dispense: 30 tablet; Refill: 0  -     promethazine-dextromethorphan (PROMETHAZINE-DM) 6.25-15 mg/5 mL Syrp; Take 5 mLs by mouth nightly as needed.  Dispense: 118 mL; Refill: 0  -     azithromycin (Z-DEEPTI) 250 MG tablet; Take 1 tablet (250 mg total) by  mouth once daily. Take 2 tablets by mouth on day 1, then one tablet daily on days 2-5.  Dispense: 6 tablet; Refill: 0    Cough  -     promethazine-dextromethorphan (PROMETHAZINE-DM) 6.25-15 mg/5 mL Syrp; Take 5 mLs by mouth nightly as needed.  Dispense: 118 mL; Refill: 0    Sinus congestion  -     loratadine (CLARITIN) 10 mg tablet; Take 1 tablet (10 mg total) by mouth once daily.  Dispense: 30 tablet; Refill: 0        · Rest and increase fluids.   · May apply warm compresses as needed.   · Saline nasal spray or saline irrigation (Neti pot) to loosen nasal congestion.  · Flonase or Nasacort to reduce inflammation in the sinus cavities.  · Take antibiotics exactly as prescribed. Make sure to complete the entire course of antibiotics even if you start feeling better. This will prevent recurrence of your infection and bacterial resistance.   · Do not drive, drink alcohol, or take any other sedating medications or substances while taking cough syrup.   · Follow up with your primary care provider or with ENT if not improved within a few days or sooner for any new or worsening symptoms.   · Go to the ER for any fever that does not improve with Tylenol/Ibuprofen, neck stiffness, rash, severe headache, vision changes, shortness of breath, chest pain, severe facial pain or swelling, or for any other new and concerning symptoms.

## 2017-05-09 NOTE — MR AVS SNAPSHOT
[facility]  [address] [place] [address]  Phone: [phone]  Fax: [phone]                  Dary Chaney   2017 12:30 PM   Office Visit    Description:  Female : 1955   Provider:  Ki Hart NP   Department:  [facility]           Diagnoses this Visit        Comments    Sinobronchitis    -  Primary     Cough         Sinus congestion                To Do List           Future Appointments        Provider Department Dept Phone    2017 8:50 AM GULSHAN, [address] [facility] [phone]    8/3/2017 10:20 AM Sakina Cooper DO [facility] [phone]      Goals (5 Years of Data)     None      Follow-Up and Disposition     Return if symptoms worsen or fail to improve.       These Medications        Disp Refills Start End    loratadine (CLARITIN) 10 mg tablet 30 tablet 0 2017    Take 1 tablet (10 mg total) by mouth once daily. - Oral    Pharmacy: [facility] [address] [place] [address] Ph #: [phone]       promethazine-dextromethorphan (PROMETHAZINE-DM) 6.25-15 mg/5 mL Syrp 118 mL 0 2017    Take 5 mLs by mouth nightly as needed. - Oral    Pharmacy: [facility] [address] [place] [address] [address] [facility] Ph #: [phone]       azithromycin (Z-DEEPTI) 250 MG tablet 6 tablet 0 2017    Take 1 tablet (250 mg total) by mouth once daily. Take 2 tablets by mouth on day 1, then one tablet daily on days 2-5. - Oral    Pharmacy: [facility] [address] [place] [address] [address] [facility] Ph #: [phone]         [facility]     [facility] -  Assistance  Unless otherwise directed by your provider, please contact [facility], our nurse care line that is available for  assistance.     Registered nurses in the [facility] provide: appointment scheduling, clinical advisement, health education, and  other advisory services.  Call: [phone] (toll free)               Medications           Message regarding Medications     Verify the changes and/or additions to your medication regime listed below are the same as discussed with your clinician today.  If any of these changes or additions are incorrect, please notify your healthcare provider.        START taking these NEW medications        Refills    loratadine (CLARITIN) 10 mg tablet 0    Sig: Take 1 tablet (10 mg total) by mouth once daily.    Class: Normal    Route: Oral    promethazine-dextromethorphan (PROMETHAZINE-DM) 6.25-15 mg/5 mL Syrp 0    Sig: Take 5 mLs by mouth nightly as needed.    Class: Normal    Route: Oral    azithromycin (Z-DEEPTI) 250 MG tablet 0    Sig: Take 1 tablet (250 mg total) by mouth once daily. Take 2 tablets by mouth on day 1, then one tablet daily on days 2-5.    Class: Normal    Route: Oral           Verify that the below list of medications is an accurate representation of the medications you are currently taking.  If none reported, the list may be blank. If incorrect, please contact your healthcare provider. Carry this list with you in case of emergency.           Current Medications     blood sugar diagnostic Strp 1 strip by Misc.(Non-Drug; Combo Route) route 3 (three) times daily. One Touch Verio Flex Strips    fluticasone (FLONASE) 50 mcg/actuation nasal spray USE TWO SPRAY(S) IN EACH NOSTRIL ONCE DAILY    glimepiride (AMARYL) 2 MG tablet Take 1 tablet (2 mg total) by mouth before breakfast.    hydrocodone-acetaminophen 7.5-325mg (NORCO) 7.5-325 mg per tablet     insulin detemir (LEVEMIR FLEXTOUCH) 100 unit/mL (3 mL) SubQ InPn pen Inject 30 Units into the skin 2 (two) times daily.    lancets 33 gauge Misc 1 lancet by Misc.(Non-Drug; Combo Route) route 3 (three) times daily.    lidocaine (LIDODERM) 5 % Place 1 patch onto the skin daily as needed. Remove & Discard patch within 12 hours or as directed by MD    liraglutide 0.6  "mg/0.1 mL, 18 mg/3 mL, subq PNIJ (VICTOZA 3-DEEPTI) 0.6 mg/0.1 mL (18 mg/3 mL) PnIj Inject 1.2 mg into the skin once daily.    losartan (COZAAR) 25 MG tablet Take 1 tablet (25 mg total) by mouth once daily.    PEN NEEDLE 31 X 5/16 " Ndle AS DIRECTED    simvastatin (ZOCOR) 40 MG tablet Take 1 tablet (40 mg total) by mouth nightly.    azithromycin (Z-DEEPTI) 250 MG tablet Take 1 tablet (250 mg total) by mouth once daily. Take 2 tablets by mouth on day 1, then one tablet daily on days 2-5.    blood-glucose meter kit True Test Meter Use as instructed    loratadine (CLARITIN) 10 mg tablet Take 1 tablet (10 mg total) by mouth once daily.    promethazine-dextromethorphan (PROMETHAZINE-DM) 6.25-15 mg/5 mL Syrp Take 5 mLs by mouth nightly as needed.           Clinical Reference Information           Your Vitals Were     BP Pulse Temp Height    132/82 (BP Location: Right arm, Patient Position: Sitting, BP Method: Manual) 88 97.9 °F (36.6 °C) (Tympanic) 5' 2" (1.575 m)    Weight SpO2 BMI    73.3 kg (161 lb 9.6 oz) 97% 29.56 kg/m2      Blood Pressure          Most Recent Value    BP  132/82      Allergies as of 5/9/2017     Aspirin    Citrus And Derivatives    Latex    Metformin    Valsartan      Immunizations Administered on Date of Encounter - 5/9/2017     None      Instructions      Sinusitis (Antibiotic Treatment)    The sinuses are air-filled spaces within the bones of the face. They connect to the inside of the nose. Sinusitis is an inflammation of the tissue lining the sinus cavity. Sinus inflammation can occur during a cold. It can also be due to allergies to pollens and other particles in the air. Sinusitis can cause symptoms of sinus congestion and fullness. A sinus infection causes fever, headache and facial pain. There is often green or yellow drainage from the nose or into the back of the throat (post-nasal drip). You have been given antibiotics to treat this condition.  Home care:  · Take the full course of antibiotics " as instructed. Do not stop taking them, even if you feel better.  · Drink plenty of water, hot tea, and other liquids. This may help thin mucus. It also may promote sinus drainage.  · Heat may help soothe painful areas of the face. Use a towel soaked in hot water. Or,  the shower and direct the hot spray onto your face. Using a vaporizer along with a menthol rub at night may also help.   · An expectorant containing guaifenesin may help thin the mucus and promote drainage from the sinuses.  · Over-the-counter decongestants may be used unless a similar medicine was prescribed. Nasal sprays work the fastest. Use one that contains phenylephrine or oxymetazoline. First blow the nose gently. Then use the spray. Do not use these medicines more often than directed on the label or symptoms may get worse. You may also use tablets containing pseudoephedrine. Avoid products that combine ingredients, because side effects may be increased. Read labels. You can also ask the pharmacist for help. (NOTE: Persons with high blood pressure should not use decongestants. They can raise blood pressure.)  · Over-the-counter antihistamines may help if allergies contributed to your sinusitis.    · Do not use nasal rinses or irrigation during an acute sinus infection, unless told to by your health care provider. Rinsing may spread the infection to other sinuses.  · Use acetaminophen or ibuprofen to control pain, unless another pain medicine was prescribed. (If you have chronic liver or kidney disease or ever had a stomach ulcer, talk with your doctor before using these medicines. Aspirin should never be used in anyone under 18 years of age who is ill with a fever. It may cause severe liver damage.)  · Don't smoke. This can worsen symptoms.  Follow-up care  Follow up with your healthcare provider or our staff if you are not improving within the next week.  When to seek medical advice  Call your healthcare provider if any of these  occur:  · Facial pain or headache becoming more severe  · Stiff neck  · Unusual drowsiness or confusion  · Swelling of the forehead or eyelids  · Vision problems, including blurred or double vision  · Fever of 100.4ºF (38ºC) or higher, or as directed by your healthcare provider  · Seizure  · Breathing problems  · Symptoms not resolving within 10 days  Date Last Reviewed: 4/13/2015  © 1273-1472 [employer]. [address] [place] [address], [place] [address]. All rights reserved. This information is not intended as a substitute for professional medical care. Always follow your healthcare professional's instructions.             Language Assistance Services     ATTENTION: Language assistance services are available, free of charge. Please call [phone].      ATENCIÓN: Si federicola elma, tiene a martin disposición servicios gratuitos de asistencia lingüística. Llame al [phone].     CHÚ Ý: N?u b?n nói Ti?ng Vi?t, có các d?ch v? h? tr? ngôn ng? mi?n phí dành cho b?n. G?i s? [phone].         [facility] complies with applicable Federal civil rights laws and does not discriminate on the basis of race, color, national origin, age, disability, or sex.

## 2017-05-12 ENCOUNTER — OFFICE VISIT (OUTPATIENT)
Dept: INTERNAL MEDICINE | Facility: CLINIC | Age: 62
End: 2017-05-12
Payer: COMMERCIAL

## 2017-05-12 VITALS
DIASTOLIC BLOOD PRESSURE: 78 MMHG | BODY MASS INDEX: 29.7 KG/M2 | WEIGHT: 161.38 LBS | HEIGHT: 62 IN | TEMPERATURE: 96 F | SYSTOLIC BLOOD PRESSURE: 132 MMHG | HEART RATE: 68 BPM

## 2017-05-12 DIAGNOSIS — J01.90 ACUTE BACTERIAL SINUSITIS: Primary | ICD-10-CM

## 2017-05-12 DIAGNOSIS — B96.89 ACUTE BACTERIAL SINUSITIS: Primary | ICD-10-CM

## 2017-05-12 DIAGNOSIS — R06.2 WHEEZING: ICD-10-CM

## 2017-05-12 PROCEDURE — 3078F DIAST BP <80 MM HG: CPT | Mod: S$GLB,,, | Performed by: INTERNAL MEDICINE

## 2017-05-12 PROCEDURE — 3075F SYST BP GE 130 - 139MM HG: CPT | Mod: S$GLB,,, | Performed by: INTERNAL MEDICINE

## 2017-05-12 PROCEDURE — 99213 OFFICE O/P EST LOW 20 MIN: CPT | Mod: S$GLB,,, | Performed by: INTERNAL MEDICINE

## 2017-05-12 PROCEDURE — 1160F RVW MEDS BY RX/DR IN RCRD: CPT | Mod: S$GLB,,, | Performed by: INTERNAL MEDICINE

## 2017-05-12 PROCEDURE — 99999 PR PBB SHADOW E&M-EST. PATIENT-LVL III: CPT | Mod: PBBFAC,,, | Performed by: INTERNAL MEDICINE

## 2017-05-12 RX ORDER — LEVOFLOXACIN 500 MG/1
500 TABLET, FILM COATED ORAL DAILY
Qty: 10 TABLET | Refills: 0 | Status: SHIPPED | OUTPATIENT
Start: 2017-05-12 | End: 2017-05-22

## 2017-05-12 RX ORDER — HYDROCODONE POLISTIREX AND CHLORPHENIRAMINE POLISTIREX 10; 8 MG/5ML; MG/5ML
5 SUSPENSION, EXTENDED RELEASE ORAL EVERY 12 HOURS PRN
Qty: 115 ML | Refills: 0 | Status: SHIPPED | OUTPATIENT
Start: 2017-05-12 | End: 2017-09-21

## 2017-05-12 RX ORDER — ALBUTEROL SULFATE 0.83 MG/ML
2.5 SOLUTION RESPIRATORY (INHALATION) EVERY 6 HOURS PRN
Qty: 120 ML | Refills: 0 | Status: SHIPPED | OUTPATIENT
Start: 2017-05-12 | End: 2018-06-26 | Stop reason: SDUPTHER

## 2017-05-12 NOTE — MR AVS SNAPSHOT
[facility]  [address]  [place] [address]  Phone: [phone]  Fax: [phone]                  Dary Chaney   2017 1:20 PM   Office Visit    Description:  Female : 1955   Provider:  Sakina Cooper DO   [facility]           Reason for Visit     Nasal Congestion           Diagnoses this Visit        Comments    Acute bacterial sinusitis    -  Primary     Wheezing                To Do List           Future Appointments        Provider Department Dept Phone    2017 8:50 AM LABORATORY, [address] [facility] [phone]    8/3/2017 10:20 AM Sakina Cooper DO [facility] [phone]      Goals (5 Years of Data)     None       These Medications        Disp Refills Start End    hydrocodone-chlorpheniramine (TUSSIONEX) 10-8 mg/5 mL suspension 115 mL 0 2017     Take 5 mLs by mouth every 12 (twelve) hours as needed. - Oral    Pharmacy: [facility] [address] [place] [address] Ph #: [phone]       levoFLOXacin (LEVAQUIN) 500 MG tablet 10 tablet 0 2017    Take 1 tablet (500 mg total) by mouth once daily. - Oral    Pharmacy: [facility] [address] [place] [address] [address] [facility] Ph #: [phone]       albuterol (PROVENTIL) 2.5 mg /3 mL (0.083 %) nebulizer solution 120 mL 0 2017    Take 3 mLs (2.5 mg total) by nebulization every 6 (six) hours as needed for Wheezing. - Nebulization    Pharmacy: [facility] [address] [place] [address] Ph #: [phone]         [facility]     [facility] Care [facility] -  Assistance  Unless otherwise directed by your provider, please contact [facility], our nurse care line that is available for  assistance.     Registered nurses in the [facility] provide: appointment scheduling, clinical advisement, health education, and other  advisory services.  Call: [phone] (toll free)               Medications           Message regarding Medications     Verify the changes and/or additions to your medication regime listed below are the same as discussed with your clinician today.  If any of these changes or additions are incorrect, please notify your healthcare provider.        START taking these NEW medications        Refills    hydrocodone-chlorpheniramine (TUSSIONEX) 10-8 mg/5 mL suspension 0    Sig: Take 5 mLs by mouth every 12 (twelve) hours as needed.    Class: Normal    Route: Oral    levoFLOXacin (LEVAQUIN) 500 MG tablet 0    Sig: Take 1 tablet (500 mg total) by mouth once daily.    Class: Normal    Route: Oral    albuterol (PROVENTIL) 2.5 mg /3 mL (0.083 %) nebulizer solution 0    Sig: Take 3 mLs (2.5 mg total) by nebulization every 6 (six) hours as needed for Wheezing.    Class: Normal    Route: Nebulization      STOP taking these medications     albuterol (PROAIR HFA) 90 mcg/actuation inhaler Inhale 2 puffs into the lungs every 6 (six) hours as needed for Wheezing.    hydrocodone-acetaminophen 7.5-325mg (NORCO) 7.5-325 mg per tablet     promethazine-dextromethorphan (PROMETHAZINE-DM) 6.25-15 mg/5 mL Syrp Take 5 mLs by mouth nightly as needed.           Verify that the below list of medications is an accurate representation of the medications you are currently taking.  If none reported, the list may be blank. If incorrect, please contact your healthcare provider. Carry this list with you in case of emergency.           Current Medications     azithromycin (Z-DEEPTI) 250 MG tablet Take 1 tablet (250 mg total) by mouth once daily. Take 2 tablets by mouth on day 1, then one tablet daily on days 2-5.    blood sugar diagnostic Strp 1 strip by Misc.(Non-Drug; Combo Route) route 3 (three) times daily. One Touch Verio Flex Strips    fluticasone (FLONASE) 50 mcg/actuation nasal spray USE TWO SPRAY(S) IN EACH NOSTRIL ONCE DAILY    glimepiride  "(AMARYL) 2 MG tablet Take 1 tablet (2 mg total) by mouth before breakfast.    insulin detemir (LEVEMIR FLEXTOUCH) 100 unit/mL (3 mL) SubQ InPn pen Inject 30 Units into the skin 2 (two) times daily.    lancets 33 gauge Misc 1 lancet by Misc.(Non-Drug; Combo Route) route 3 (three) times daily.    lidocaine (LIDODERM) 5 % Place 1 patch onto the skin daily as needed. Remove & Discard patch within 12 hours or as directed by MD    liraglutide 0.6 mg/0.1 mL, 18 mg/3 mL, subq PNIJ (VICTOZA 3-DEEPTI) 0.6 mg/0.1 mL (18 mg/3 mL) PnIj Inject 1.2 mg into the skin once daily.    losartan (COZAAR) 25 MG tablet Take 1 tablet (25 mg total) by mouth once daily.    PEN NEEDLE 31 X 5/16 " Ndle AS DIRECTED    simvastatin (ZOCOR) 40 MG tablet Take 1 tablet (40 mg total) by mouth nightly.    albuterol (PROVENTIL) 2.5 mg /3 mL (0.083 %) nebulizer solution Take 3 mLs (2.5 mg total) by nebulization every 6 (six) hours as needed for Wheezing.    blood-glucose meter kit True Test Meter Use as instructed    hydrocodone-chlorpheniramine (TUSSIONEX) 10-8 mg/5 mL suspension Take 5 mLs by mouth every 12 (twelve) hours as needed.    levoFLOXacin (LEVAQUIN) 500 MG tablet Take 1 tablet (500 mg total) by mouth once daily.    loratadine (CLARITIN) 10 mg tablet Take 1 tablet (10 mg total) by mouth once daily.           Clinical Reference Information           Your Vitals Were     BP Pulse Temp Height Weight BMI    132/78 (BP Location: Left leg, Patient Position: Sitting, BP Method: Manual) 68 96.2 °F (35.7 °C) (Tympanic) 5' 2" (1.575 m) 73.2 kg (161 lb 6 oz) 29.52 kg/m2      Blood Pressure          Most Recent Value    BP  132/78      Allergies as of 5/12/2017     Aspirin    Citrus And Derivatives    Latex    Metformin    Valsartan      Immunizations Administered on Date of Encounter - 5/12/2017     None      Orders Placed During Today's Visit      Normal Orders This Visit    NEBULIZER FOR HOME USE       Instructions      Sinusitis (Antibiotic " Treatment)    The sinuses are air-filled spaces within the bones of the face. They connect to the inside of the nose. Sinusitis is an inflammation of the tissue lining the sinus cavity. Sinus inflammation can occur during a cold. It can also be due to allergies to pollens and other particles in the air. Sinusitis can cause symptoms of sinus congestion and fullness. A sinus infection causes fever, headache and facial pain. There is often green or yellow drainage from the nose or into the back of the throat (post-nasal drip). You have been given antibiotics to treat this condition.  Home care:  · Take the full course of antibiotics as instructed. Do not stop taking them, even if you feel better.  · Drink plenty of water, hot tea, and other liquids. This may help thin mucus. It also may promote sinus drainage.  · Heat may help soothe painful areas of the face. Use a towel soaked in hot water. Or,  the shower and direct the hot spray onto your face. Using a vaporizer along with a menthol rub at night may also help.   · An expectorant containing guaifenesin may help thin the mucus and promote drainage from the sinuses.  · Over-the-counter decongestants may be used unless a similar medicine was prescribed. Nasal sprays work the fastest. Use one that contains phenylephrine or oxymetazoline. First blow the nose gently. Then use the spray. Do not use these medicines more often than directed on the label or symptoms may get worse. You may also use tablets containing pseudoephedrine. Avoid products that combine ingredients, because side effects may be increased. Read labels. You can also ask the pharmacist for help. (NOTE: Persons with high blood pressure should not use decongestants. They can raise blood pressure.)  · Over-the-counter antihistamines may help if allergies contributed to your sinusitis.    · Do not use nasal rinses or irrigation during an acute sinus infection, unless told to by your health care  provider. Rinsing may spread the infection to other sinuses.  · Use acetaminophen or ibuprofen to control pain, unless another pain medicine was prescribed. (If you have chronic liver or kidney disease or ever had a stomach ulcer, talk with your doctor before using these medicines. Aspirin should never be used in anyone under 18 years of age who is ill with a fever. It may cause severe liver damage.)  · Don't smoke. This can worsen symptoms.  Follow-up care  Follow up with your healthcare provider or our staff if you are not improving within the next week.  When to seek medical advice  Call your healthcare provider if any of these occur:  · Facial pain or headache becoming more severe  · Stiff neck  · Unusual drowsiness or confusion  · Swelling of the forehead or eyelids  · Vision problems, including blurred or double vision  · Fever of 100.4ºF (38ºC) or higher, or as directed by your healthcare provider  · Seizure  · Breathing problems  · Symptoms not resolving within 10 days  Date Last Reviewed: 4/13/2015  © 4908-8272 [employer]. [address] [place] [address]. All rights reserved. This information is not intended as a substitute for professional medical care. Always follow your healthcare professional's instructions.             Language Assistance Services     ATTENTION: Language assistance services are available, free of charge. Please call [phone].      ATENCIÓN: Si charlie elma, tiene a martin disposición servicios gratuitos de asistencia lingüística. Llame al [phone].     [facility] Ý: N?u b?n nói Ti?ng Vi?t, có các d?ch v? h? tr? ngôn ng? mi?n phí dành cho b?n. G?i s? [phone].         [facility] complies with applicable Federal civil rights laws and does not discriminate on the basis of race, color, national origin, age, disability, or sex.

## 2017-05-12 NOTE — PATIENT INSTRUCTIONS
Sinusitis (Antibiotic Treatment)    The sinuses are air-filled spaces within the bones of the face. They connect to the inside of the nose. Sinusitis is an inflammation of the tissue lining the sinus cavity. Sinus inflammation can occur during a cold. It can also be due to allergies to pollens and other particles in the air. Sinusitis can cause symptoms of sinus congestion and fullness. A sinus infection causes fever, headache and facial pain. There is often green or yellow drainage from the nose or into the back of the throat (post-nasal drip). You have been given antibiotics to treat this condition.  Home care:  · Take the full course of antibiotics as instructed. Do not stop taking them, even if you feel better.  · Drink plenty of water, hot tea, and other liquids. This may help thin mucus. It also may promote sinus drainage.  · Heat may help soothe painful areas of the face. Use a towel soaked in hot water. Or,  the shower and direct the hot spray onto your face. Using a vaporizer along with a menthol rub at night may also help.   · An expectorant containing guaifenesin may help thin the mucus and promote drainage from the sinuses.  · Over-the-counter decongestants may be used unless a similar medicine was prescribed. Nasal sprays work the fastest. Use one that contains phenylephrine or oxymetazoline. First blow the nose gently. Then use the spray. Do not use these medicines more often than directed on the label or symptoms may get worse. You may also use tablets containing pseudoephedrine. Avoid products that combine ingredients, because side effects may be increased. Read labels. You can also ask the pharmacist for help. (NOTE: Persons with high blood pressure should not use decongestants. They can raise blood pressure.)  · Over-the-counter antihistamines may help if allergies contributed to your sinusitis.    · Do not use nasal rinses or irrigation during an acute sinus infection, unless told to by  your health care provider. Rinsing may spread the infection to other sinuses.  · Use acetaminophen or ibuprofen to control pain, unless another pain medicine was prescribed. (If you have chronic liver or kidney disease or ever had a stomach ulcer, talk with your doctor before using these medicines. Aspirin should never be used in anyone under 18 years of age who is ill with a fever. It may cause severe liver damage.)  · Don't smoke. This can worsen symptoms.  Follow-up care  Follow up with your healthcare provider or our staff if you are not improving within the next week.  When to seek medical advice  Call your healthcare provider if any of these occur:  · Facial pain or headache becoming more severe  · Stiff neck  · Unusual drowsiness or confusion  · Swelling of the forehead or eyelids  · Vision problems, including blurred or double vision  · Fever of 100.4ºF (38ºC) or higher, or as directed by your healthcare provider  · Seizure  · Breathing problems  · Symptoms not resolving within 10 days  Date Last Reviewed: 4/13/2015  © 7984-0455 [employer], [employer]. [address] [place] [address], [place] [address]. All rights reserved. This information is not intended as a substitute for professional medical care. Always follow your healthcare professional's instructions.
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
## 2017-07-25 ENCOUNTER — OFFICE VISIT (OUTPATIENT)
Dept: URGENT CARE | Facility: CLINIC | Age: 62
End: 2017-07-25
Payer: COMMERCIAL

## 2017-07-25 VITALS
HEIGHT: 62 IN | HEART RATE: 72 BPM | DIASTOLIC BLOOD PRESSURE: 72 MMHG | OXYGEN SATURATION: 98 % | BODY MASS INDEX: 29.43 KG/M2 | TEMPERATURE: 98 F | SYSTOLIC BLOOD PRESSURE: 124 MMHG | WEIGHT: 159.94 LBS

## 2017-07-25 DIAGNOSIS — N39.0 URINARY TRACT INFECTION WITHOUT HEMATURIA, SITE UNSPECIFIED: Primary | ICD-10-CM

## 2017-07-25 LAB
BILIRUB SERPL-MCNC: ABNORMAL MG/DL
BLOOD URINE, POC: ABNORMAL
COLOR, POC UA: YELLOW
GLUCOSE UR QL STRIP: ABNORMAL
KETONES UR QL STRIP: ABNORMAL
LEUKOCYTE ESTERASE URINE, POC: ABNORMAL
NITRITE, POC UA: ABNORMAL
PH, POC UA: 5
PROTEIN, POC: ABNORMAL
SPECIFIC GRAVITY, POC UA: 1.01
UROBILINOGEN, POC UA: ABNORMAL

## 2017-07-25 PROCEDURE — 87086 URINE CULTURE/COLONY COUNT: CPT

## 2017-07-25 PROCEDURE — 99213 OFFICE O/P EST LOW 20 MIN: CPT | Mod: S$GLB,,, | Performed by: NURSE PRACTITIONER

## 2017-07-25 PROCEDURE — 87077 CULTURE AEROBIC IDENTIFY: CPT

## 2017-07-25 PROCEDURE — 87088 URINE BACTERIA CULTURE: CPT

## 2017-07-25 PROCEDURE — 87186 SC STD MICRODIL/AGAR DIL: CPT

## 2017-07-25 PROCEDURE — 99999 PR PBB SHADOW E&M-EST. PATIENT-LVL V: CPT | Mod: PBBFAC,,, | Performed by: NURSE PRACTITIONER

## 2017-07-25 RX ORDER — PHENAZOPYRIDINE HYDROCHLORIDE 100 MG/1
100 TABLET, FILM COATED ORAL 3 TIMES DAILY PRN
Qty: 6 TABLET | Refills: 0 | Status: SHIPPED | OUTPATIENT
Start: 2017-07-25 | End: 2017-07-27

## 2017-07-25 RX ORDER — NITROFURANTOIN 25; 75 MG/1; MG/1
100 CAPSULE ORAL 2 TIMES DAILY
Qty: 14 CAPSULE | Refills: 0 | Status: SHIPPED | OUTPATIENT
Start: 2017-07-25 | End: 2017-07-31

## 2017-07-25 NOTE — PROGRESS NOTES
"Subjective:      Patient ID: Dary Chaney is a 61 y.o. female.    Chief Complaint: Urinary Tract Infection ("lower abd pain, burning when urinating")    Urinary Tract Infection    This is a new problem. The current episode started yesterday. The problem occurs every urination. The problem has been gradually worsening. The quality of the pain is described as burning. The pain is moderate. There has been no fever. Associated symptoms include flank pain (mild left side pain), frequency and nausea (mild). Pertinent negatives include no chills, discharge, hematuria, hesitancy, vomiting, constipation or rash. She has tried increased fluids for the symptoms. The treatment provided no relief. Her past medical history is significant for diabetes mellitus and kidney stones.     Review of Systems   Constitutional: Negative.  Negative for chills and fever.   Respiratory: Negative.    Cardiovascular: Negative.    Gastrointestinal: Positive for nausea (mild). Negative for constipation and vomiting.   Genitourinary: Positive for flank pain (mild left side pain) and frequency. Negative for hematuria and hesitancy.   Skin: Negative for rash.   Neurological: Negative.    Hematological: Negative.        Objective:   /72 (BP Location: Right arm, Patient Position: Sitting, BP Method: Manual)   Pulse 72   Temp 97.8 °F (36.6 °C) (Tympanic)   Ht 5' 2" (1.575 m)   Wt 72.6 kg (159 lb 15.1 oz)   SpO2 98%   BMI 29.25 kg/m²   Physical Exam   Constitutional: She is oriented to person, place, and time. She appears well-developed and well-nourished. No distress.   HENT:   Head: Normocephalic and atraumatic.   Neck: Normal range of motion. Neck supple.   Cardiovascular: Normal rate.    Pulmonary/Chest: Effort normal. No respiratory distress.   Abdominal: Soft. Normal appearance and bowel sounds are normal. She exhibits no distension. There is no hepatosplenomegaly. There is no tenderness. There is no rebound, no guarding, no CVA " tenderness, no tenderness at McBurney's point and negative Brink's sign.   Musculoskeletal: Normal range of motion.   Neurological: She is alert and oriented to person, place, and time.   Skin: Skin is warm and dry. No rash noted. She is not diaphoretic.   Nursing note and vitals reviewed.    Assessment:      1. Urinary tract infection without hematuria, site unspecified       Plan:   Urinary tract infection without hematuria, site unspecified  -     nitrofurantoin, macrocrystal-monohydrate, (MACROBID) 100 MG capsule; Take 1 capsule (100 mg total) by mouth 2 (two) times daily.  Dispense: 14 capsule; Refill: 0  -     POCT urinalysis, dipstick or tablet reag  -     Urine culture  -     phenazopyridine (PYRIDIUM) 100 MG tablet; Take 1 tablet (100 mg total) by mouth 3 (three) times daily as needed for Pain.  Dispense: 6 tablet; Refill: 0    Instructions, follow up, and supportive care as per AVS.  Follow up with PCP if not improved or for any new or worsening symptoms.

## 2017-07-25 NOTE — PATIENT INSTRUCTIONS
· Increase fluids (avoid caffeine or sugary beverages as these will irritate the bladder).   · Take your antibiotics exactly as prescribed and make sure to complete entire course even if you begin to feel better. This will prevent recurrence of infection and antibiotic resistance.   · We have prescribed an antibiotic that covers most bacteria that cause urinary tract infections, however, if your urine culture shows that you have any bacterial resistance to the antibiotic you were prescribed or an unusual bacterial strain, we will notify you and make any necessary changes. Urine cultures usually result in about three days.   · Please follow up with your primary care provider if your symptoms do not improve within 2-3 days or sooner for any new or worsening symptoms.  · For any severe pain, bright red blood in urine, difficulty urinating, fever that does not improve with Tylenol or Ibuprofen, inability to urinate, incontinence of urine or bowels, or for any other urgent concerns, please go to the ER for immediate evaluation.     Urinary Tract Infections in Women    Urinary tract infections (UTIs) are most often caused by bacteria (germs). These bacteria enter the urinary tract. The bacteria may come from outside the body. Or they may travel from the skin outside the rectum or vagina into the urethra. Female anatomy makes it easier for bacteria from the bowel to enter a womans urinary tract, which is the most common source of UTI. This means women develop UTIs more often than men. Pain in or around the urinary tract is a common UTI symptom. But the only way to know for sure if you have a UTI for the health care provider to test your urine. The two tests that may be done are the urinalysis and urine culture.  Types of UTIs  · Cystitis: A bladder infection (cystitis) is the most common UTI in women. You may have urgent or frequent urination. You may also have pain, burning when you urinate, and bloody  urine.  · Urethritis: This is an inflamed urethra, which is the tube that carries urine from the bladder to outside the body. You may have lower stomach or back pain. You may also have urgent or frequent urination.  · Pyelonephritis: This is a kidney infection. If not treated, it can be serious and damage your kidneys. In severe cases, you may be hospitalized. You may have a fever and lower back pain.  Medications to treat a UTI  Most UTIs are treated with antibiotics. These kill the bacteria. The length of time you need to take them depends on the type of infection. It may be as short as 3 days. If you have repeated UTIs, a low-dose antibiotic may be needed for several months. Take antibiotics exactly as directed. Dont stop taking them until all of the medication is gone. If you stop taking the antibiotic too soon, the infection may not go away, and you may develop a resistance to the antibiotic. This can make it much harder to treat.  Lifestyle changes to treat and prevent UTIs  The lifestyle changes below will help get rid of your UTI. They may also help prevent future UTIs.  · Drink plenty of fluids. This includes water, juice, or other caffeine-free drinks. Fluids help flush bacteria out of your body.  · Empty your bladder. Always empty your bladder when you feel the urge to urinate. And always urinate before going to sleep. Urine that stays in your bladder can lead to infection. Try to urinate before and after sex as well.  · Practice good personal hygiene. Wipe yourself from front to back after using the toilet. This helps keep bacteria from getting into the urethra.  · Use condoms during sex. These help prevent UTIs caused by sexually transmitted bacteria. Also, avoid using spermicides during sex. These can increase the risk of UTIs. Choose other forms of birth control instead. For women who tend to get UTIs after sex, a low-dose of a preventive antibiotic may be used. Be sure to discuss this option with  your health care provider.  · Follow up with your health care provider as directed. He or she may test to make sure the infection has cleared. If necessary, additional treatment may be started.  Date Last Reviewed: 9/8/2014  © 3231-2846 [employer]. [address] [place] [address], [place] [address]. All rights reserved. This information is not intended as a substitute for professional medical care. Always follow your healthcare professional's instructions.

## 2017-07-29 LAB — BACTERIA UR CULT: NORMAL

## 2017-07-31 ENCOUNTER — TELEPHONE (OUTPATIENT)
Dept: URGENT CARE | Facility: CLINIC | Age: 62
End: 2017-07-31

## 2017-07-31 RX ORDER — CIPROFLOXACIN 500 MG/1
500 TABLET ORAL 2 TIMES DAILY
Qty: 14 TABLET | Refills: 0 | Status: SHIPPED | OUTPATIENT
Start: 2017-07-31 | End: 2017-08-07

## 2017-07-31 NOTE — TELEPHONE ENCOUNTER
Thank you for letting me know. I sent a new antibiotic to the [facility]. She should take this twice daily for 7 days. Follow up with PCP in about one week, sooner if not improving or any new/worsening symptoms.

## 2017-08-10 ENCOUNTER — LAB VISIT (OUTPATIENT)
Dept: LAB | Facility: HOSPITAL | Age: 62
End: 2017-08-10
Attending: INTERNAL MEDICINE
Payer: COMMERCIAL

## 2017-08-10 DIAGNOSIS — E78.5 HYPERLIPIDEMIA LDL GOAL <100: ICD-10-CM

## 2017-08-10 DIAGNOSIS — I10 HYPERTENSION GOAL BP (BLOOD PRESSURE) < 130/80: ICD-10-CM

## 2017-08-10 LAB
ALBUMIN SERPL BCP-MCNC: 3.8 G/DL
ALP SERPL-CCNC: 99 U/L
ALT SERPL W/O P-5'-P-CCNC: 28 U/L
ANION GAP SERPL CALC-SCNC: 8 MMOL/L
AST SERPL-CCNC: 15 U/L
BILIRUB SERPL-MCNC: 0.4 MG/DL
BUN SERPL-MCNC: 21 MG/DL
CALCIUM SERPL-MCNC: 9.5 MG/DL
CHLORIDE SERPL-SCNC: 104 MMOL/L
CHOLEST/HDLC SERPL: 2.9 {RATIO}
CO2 SERPL-SCNC: 29 MMOL/L
CREAT SERPL-MCNC: 0.8 MG/DL
EST. GFR  (AFRICAN AMERICAN): >60 ML/MIN/1.73 M^2
EST. GFR  (NON AFRICAN AMERICAN): >60 ML/MIN/1.73 M^2
GLUCOSE SERPL-MCNC: 190 MG/DL
HDL/CHOLESTEROL RATIO: 34.7 %
HDLC SERPL-MCNC: 173 MG/DL
HDLC SERPL-MCNC: 60 MG/DL
LDLC SERPL CALC-MCNC: 80 MG/DL
NONHDLC SERPL-MCNC: 113 MG/DL
POTASSIUM SERPL-SCNC: 4.6 MMOL/L
PROT SERPL-MCNC: 7.2 G/DL
SODIUM SERPL-SCNC: 141 MMOL/L
TRIGL SERPL-MCNC: 165 MG/DL

## 2017-08-10 PROCEDURE — 83036 HEMOGLOBIN GLYCOSYLATED A1C: CPT

## 2017-08-10 PROCEDURE — 36415 COLL VENOUS BLD VENIPUNCTURE: CPT | Mod: PO

## 2017-08-10 PROCEDURE — 80061 LIPID PANEL: CPT

## 2017-08-10 PROCEDURE — 80053 COMPREHEN METABOLIC PANEL: CPT

## 2017-08-11 LAB
ESTIMATED AVG GLUCOSE: 197 MG/DL
HBA1C MFR BLD HPLC: 8.5 %

## 2017-09-06 RX ORDER — INSULIN DETEMIR 100 [IU]/ML
INJECTION, SOLUTION SUBCUTANEOUS
Qty: 1 BOX | Refills: 3 | Status: SHIPPED | OUTPATIENT
Start: 2017-09-06 | End: 2017-09-11 | Stop reason: SDUPTHER

## 2017-09-11 ENCOUNTER — OFFICE VISIT (OUTPATIENT)
Dept: INTERNAL MEDICINE | Facility: CLINIC | Age: 62
End: 2017-09-11
Payer: COMMERCIAL

## 2017-09-11 VITALS
HEIGHT: 62 IN | WEIGHT: 141.75 LBS | BODY MASS INDEX: 26.09 KG/M2 | HEART RATE: 91 BPM | TEMPERATURE: 96 F | DIASTOLIC BLOOD PRESSURE: 80 MMHG | OXYGEN SATURATION: 96 % | SYSTOLIC BLOOD PRESSURE: 120 MMHG

## 2017-09-11 DIAGNOSIS — G89.29 CHRONIC PAIN OF LEFT KNEE: ICD-10-CM

## 2017-09-11 DIAGNOSIS — E78.5 HYPERLIPIDEMIA LDL GOAL <70: Chronic | ICD-10-CM

## 2017-09-11 DIAGNOSIS — M25.562 CHRONIC PAIN OF LEFT KNEE: ICD-10-CM

## 2017-09-11 DIAGNOSIS — I10 HYPERTENSION GOAL BP (BLOOD PRESSURE) < 130/80: Chronic | ICD-10-CM

## 2017-09-11 PROCEDURE — 3074F SYST BP LT 130 MM HG: CPT | Mod: S$GLB,,, | Performed by: INTERNAL MEDICINE

## 2017-09-11 PROCEDURE — 99999 PR PBB SHADOW E&M-EST. PATIENT-LVL III: CPT | Mod: PBBFAC,,, | Performed by: INTERNAL MEDICINE

## 2017-09-11 PROCEDURE — 3079F DIAST BP 80-89 MM HG: CPT | Mod: S$GLB,,, | Performed by: INTERNAL MEDICINE

## 2017-09-11 PROCEDURE — 99214 OFFICE O/P EST MOD 30 MIN: CPT | Mod: S$GLB,,, | Performed by: INTERNAL MEDICINE

## 2017-09-11 PROCEDURE — 3008F BODY MASS INDEX DOCD: CPT | Mod: S$GLB,,, | Performed by: INTERNAL MEDICINE

## 2017-09-11 PROCEDURE — 4010F ACE/ARB THERAPY RXD/TAKEN: CPT | Mod: S$GLB,,, | Performed by: INTERNAL MEDICINE

## 2017-09-11 PROCEDURE — 3045F PR MOST RECENT HEMOGLOBIN A1C LEVEL 7.0-9.0%: CPT | Mod: S$GLB,,, | Performed by: INTERNAL MEDICINE

## 2017-09-11 RX ORDER — SIMVASTATIN 40 MG/1
40 TABLET, FILM COATED ORAL NIGHTLY
Qty: 90 TABLET | Refills: 1 | Status: SHIPPED | OUTPATIENT
Start: 2017-09-11 | End: 2018-03-19 | Stop reason: SDUPTHER

## 2017-09-11 RX ORDER — GLIMEPIRIDE 2 MG/1
2 TABLET ORAL
Qty: 90 TABLET | Refills: 1 | Status: SHIPPED | OUTPATIENT
Start: 2017-09-11 | End: 2017-12-11 | Stop reason: SDUPTHER

## 2017-09-11 RX ORDER — LOSARTAN POTASSIUM 25 MG/1
25 TABLET ORAL DAILY
Qty: 90 TABLET | Refills: 1 | Status: SHIPPED | OUTPATIENT
Start: 2017-09-11 | End: 2018-03-19 | Stop reason: SDUPTHER

## 2017-09-12 NOTE — PROGRESS NOTES
Subjective:       Patient ID: Dary Chaney is a 61 y.o. female.    Chief Complaint: Follow-up    Dary Chaney  61 y.o. White female    Patient presents with:  Follow-up    HPI: Here today to follow up on chronic conditions.  Diabetes--uncontrolled. She has been under a lot of stress lately. She is also having a lot of pain in her left knee. She has not seen the dietician in a while. She has been compliant with Levemir 30 units BID, Victoza and glimepiride.               HGBA1C                   8.5 (H)             08/10/2017            HTN--stable on losartan. She denies symptoms.   HLD--compliant with simvastatin.                        CHOL                     173                 08/10/2017                 HDL                      60                  08/10/2017                 LDLCALC                  80.0                08/10/2017                 TRIG                     165 (H)             08/10/2017            Left knee pain--chronic. She is under the care of orthopedics. She made need a knee replacement.        Past Medical History:  Arthritis  Cataract  Diabetes mellitus type II  Hyperlipidemia  Hypertension      Current Outpatient Prescriptions:     albuterol (PROVENTIL) 2.5 mg /3 mL (0.083 %) nebulizer solution, Take 3 mLs (2.5 mg total) by nebulization every 6 (six) hours as needed for Wheezing., Disp: 120 mL, Rfl: 0    blood sugar diagnostic Strp, 1 strip by Misc.(Non-Drug; Combo Route) route 3 (three) times daily. One Touch Verio Flex Strips, Disp: 100 strip, Rfl: 11    fluticasone (FLONASE) 50 mcg/actuation nasal spray, USE TWO SPRAY(S) IN EACH NOSTRIL ONCE DAILY, Disp: 16 g, Rfl: 1    glimepiride (AMARYL) 2 MG tablet, Take 1 tablet (2 mg total) by mouth before breakfast., Disp: 90 tablet, Rfl: 1    hydrocodone-chlorpheniramine (TUSSIONEX) 10-8 mg/5 mL suspension, Take 5 mLs by mouth every 12 (twelve) hours as needed., Disp: 115 mL, Rfl: 0    insulin detemir (LEVEMIR FLEXTOUCH) 100 unit/mL  "(3 mL) SubQ InPn pen, Inject 30 units SQ twice daily., Disp: 1 Box, Rfl: 3    lancets 33 gauge Misc, 1 lancet by Misc.(Non-Drug; Combo Route) route 3 (three) times daily., Disp: 100 each, Rfl: 11    lidocaine (LIDODERM) 5 %, Place 1 patch onto the skin daily as needed. Remove & Discard patch within 12 hours or as directed by MD, Disp: 30 patch, Rfl: 3    liraglutide 0.6 mg/0.1 mL, 18 mg/3 mL, subq PNIJ (VICTOZA 3-DEEPTI) 0.6 mg/0.1 mL (18 mg/3 mL) PnIj, Inject 1.2 mg into the skin once daily., Disp: 9 Syringe, Rfl: 3    losartan (COZAAR) 25 MG tablet, Take 1 tablet (25 mg total) by mouth once daily., Disp: 90 tablet, Rfl: 1    PEN NEEDLE 31 X 5/16 " Ndle, AS DIRECTED, Disp: 100 each, Rfl: 10    simvastatin (ZOCOR) 40 MG tablet, Take 1 tablet (40 mg total) by mouth nightly., Disp: 90 tablet, Rfl: 1    blood-glucose meter kit, True Test Meter Use as instructed, Disp: 1 each, Rfl: 0    loratadine (CLARITIN) 10 mg tablet, Take 1 tablet (10 mg total) by mouth once daily., Disp: 30 tablet, Rfl: 0    Allergies:  Review of patient's allergies indicates:   -- Aspirin -- Nausea Only   -- Citrus and derivatives    -- Latex     --  Other reaction(s): Rash             Other reaction(s): whelps             Other reaction(s): Itching   -- Metformin     --  Other reaction(s): anxiety   -- Valsartan     --  Other reaction(s): disoriented        Review of Systems   Constitutional: Negative for fever and unexpected weight change.   Respiratory: Negative for cough and shortness of breath.    Cardiovascular: Negative for chest pain.   Gastrointestinal: Negative for abdominal pain.   Genitourinary: Negative for dysuria.   Musculoskeletal: Positive for arthralgias and gait problem.   Neurological: Negative for dizziness, numbness and headaches.       Objective:      Physical Exam   Constitutional: She is oriented to person, place, and time. She appears well-developed and well-nourished. No distress.   Eyes: No scleral icterus. "   Neck: No tracheal deviation present.   Cardiovascular: Normal rate, regular rhythm, normal heart sounds and intact distal pulses.    Pulses:       Dorsalis pedis pulses are 2+ on the right side, and 2+ on the left side.   Pulmonary/Chest: Effort normal and breath sounds normal. No respiratory distress.   Abdominal: Soft. Bowel sounds are normal.   Musculoskeletal: She exhibits no edema.   Feet:   Right Foot:   Protective Sensation: 4 sites tested. 4 sites sensed.   Skin Integrity: Positive for callus. Negative for ulcer.   Left Foot:   Protective Sensation: 4 sites tested. 4 sites sensed.   Skin Integrity: Positive for callus. Negative for ulcer.   Neurological: She is alert and oriented to person, place, and time.   Skin: Skin is warm and dry.   Psychiatric: She has a normal mood and affect.   Vitals reviewed.      Assessment:       1. Uncontrolled type 2 diabetes mellitus without complication, with long-term current use of insulin    2. Hypertension goal BP (blood pressure) < 130/80    3. Hyperlipidemia LDL goal <70    4. Chronic pain of left knee        Plan:       Dary was seen today for follow-up.    Diagnoses and all orders for this visit:    Uncontrolled type 2 diabetes mellitus without complication, with long-term current use of insulin  -     Microalbumin/creatinine urine ratio; Future  -     Increase insulin detemir (LEVEMIR FLEXTOUCH) 100 unit/mL (3 mL) SubQ InPn pen; Inject 35 units SQ twice daily.  -     Continue liraglutide 0.6 mg/0.1 mL, 18 mg/3 mL, subq PNIJ (VICTOZA 3-DEEPTI) 0.6 mg/0.1 mL (18 mg/3 mL) PnIj; Inject 1.2 mg into the skin once daily.  -     Continue glimepiride (AMARYL) 2 MG tablet; Take 1 tablet (2 mg total) by mouth before breakfast.  -     Recommend follow up with the dietician     Hypertension goal BP (blood pressure) < 130/80  -     Continue losartan (COZAAR) 25 MG tablet; Take 1 tablet (25 mg total) by mouth once daily.    Hyperlipidemia LDL goal <70  -     Continue simvastatin  (ZOCOR) 40 MG tablet; Take 1 tablet (40 mg total) by mouth nightly.    Chronic pain of left knee  -     F/U with orthopedics    Labs and f/u in 3 months

## 2017-09-21 ENCOUNTER — OFFICE VISIT (OUTPATIENT)
Dept: OBSTETRICS AND GYNECOLOGY | Facility: CLINIC | Age: 62
End: 2017-09-21
Payer: COMMERCIAL

## 2017-09-21 VITALS — BODY MASS INDEX: 29.53 KG/M2 | WEIGHT: 160.5 LBS | HEIGHT: 62 IN

## 2017-09-21 DIAGNOSIS — R10.9 ABDOMINAL PRESSURE: ICD-10-CM

## 2017-09-21 DIAGNOSIS — Z01.419 WELL WOMAN EXAM WITH ROUTINE GYNECOLOGICAL EXAM: Primary | ICD-10-CM

## 2017-09-21 PROCEDURE — 99999 PR PBB SHADOW E&M-EST. PATIENT-LVL II: CPT | Mod: PBBFAC,,, | Performed by: OBSTETRICS & GYNECOLOGY

## 2017-09-21 PROCEDURE — 81002 URINALYSIS NONAUTO W/O SCOPE: CPT | Mod: S$GLB,,, | Performed by: OBSTETRICS & GYNECOLOGY

## 2017-09-21 PROCEDURE — 99386 PREV VISIT NEW AGE 40-64: CPT | Mod: 25,S$GLB,, | Performed by: OBSTETRICS & GYNECOLOGY

## 2017-09-21 NOTE — PROGRESS NOTES
Subjective:       Patient ID: Dary Chaney is a 61 y.o. female.    Chief Complaint:  Well Woman      History of Present Illness  HPI  Annual Exam-Postmenopausal  Patient presents for annual exam. The patient complains of lower abdominal pressure/discomfort that comes and goes.  Symptoms have been present for many years, but have worsened in past year.  Pt reports having been diagnosed with UTI twice this year and believes it may be same. The patient is not sexually active. GYN screening history: last pap: patient does not recall when last pap was and last mammogram: approximate date  and was normal. The patient is not taking hormone replacement therapy. Patient denies post-menopausal vaginal bleeding. The patient wears seatbelts: yes. The patient participates in regular exercise: yes. Has the patient ever been transfused or tattooed?: no. The patient reports that there is not domestic violence in her life.  Pt had hysterectomy with removal of one ovary (does not recall side) for benign indications.  Pt also had vault suspension surgery at the time.  Denies history of Gyn malignancy.          GYN & OB HistoryNo LMP recorded. Patient has had a hysterectomy.   Date of Last Pap: No result found    OB History    Para Term  AB Living   3 3       3   SAB TAB Ectopic Multiple Live Births                  # Outcome Date GA Lbr John/2nd Weight Sex Delivery Anes PTL Lv   3 Para            2 Para            1 Para                   Review of Systems  Review of Systems   Constitutional: Negative for activity change, appetite change, fatigue, fever and unexpected weight change.   Respiratory: Negative for shortness of breath.    Cardiovascular: Negative for chest pain, palpitations and leg swelling.   Gastrointestinal: Positive for abdominal pain. Negative for constipation, diarrhea, nausea and vomiting.   Genitourinary: Negative for dysuria, genital sores, hematuria, pelvic pain, vaginal bleeding, vaginal  discharge, vaginal pain, urinary incontinence and vaginal odor.   Musculoskeletal: Positive for back pain.   Neurological: Negative for syncope and headaches.   Breast: Negative for breast mass, breast pain, nipple discharge and skin changes          Objective:    Physical Exam:   Constitutional: She is oriented to person, place, and time. She appears well-developed and well-nourished. No distress.    HENT:   Head: Normocephalic and atraumatic.    Eyes: EOM are normal. Pupils are equal, round, and reactive to light.    Neck: Normal range of motion. Neck supple.    Cardiovascular: Normal rate, regular rhythm and normal heart sounds.     Pulmonary/Chest: Effort normal and breath sounds normal.        Abdominal: Soft. Bowel sounds are normal. She exhibits no distension. There is no tenderness.     Genitourinary: Vagina normal. Pelvic exam was performed with patient supine. There is no rash, tenderness, lesion or injury on the right labia. There is no rash, tenderness, lesion or injury on the left labia. Uterus is absent. Right adnexum displays no mass, no tenderness and no fullness. Left adnexum displays no mass, no tenderness and no fullness. No erythema, tenderness, bleeding or unspecified prolapse of vaginal walls in the vagina. No foreign body in the vagina. No signs of injury around the vagina. No vaginal discharge found. Vaginal cuff normal.Cervix exhibits absence.   Genitourinary Comments: UA today Negative           Musculoskeletal: Normal range of motion and moves all extremeties. She exhibits no edema or tenderness.       Neurological: She is alert and oriented to person, place, and time.    Skin: Skin is warm and dry.    Psychiatric: She has a normal mood and affect. Her behavior is normal. Thought content normal.          Assessment:        1. Well woman exam with routine gynecological exam    2. Abdominal pressure             Plan:      Well woman exam with routine gynecological exam  -     Pt was  counseled on cervical/vaginal screening guidelines and recommendations.  As per current recommendations, pt no longer requires routine pap screening or routine screening pelvic examinations.  If pt still desires screening Gyn exams, would recommend minimum 2 year interval.  Pt may follow with PCP for routine health maintenance needs.  -     Pt was advised on current breast cancer screening recommendations.  Pt states she has screening MMG through PCP.    Abdominal pressure  -     POCT URINE DIPSTICK WITHOUT MICROSCOPE  -     Clinical presentation is non-specific and exam today is unremarkable.  No Gyn etiology identified.  Suspect GI etiology.  Pt is due for colonoscopy and was advised to proceed with this study (pt recently rescheduled due to conflict with her knee surgery).  Follow up with PCP/GI.        Return in about 2 years (around 9/21/2019).

## 2017-10-09 RX ORDER — BLOOD-GLUCOSE METER
EACH MISCELLANEOUS
Qty: 100 STRIP | Refills: 11 | Status: SHIPPED | OUTPATIENT
Start: 2017-10-09 | End: 2017-10-17 | Stop reason: SDUPTHER

## 2017-10-17 ENCOUNTER — OFFICE VISIT (OUTPATIENT)
Dept: OPHTHALMOLOGY | Facility: CLINIC | Age: 62
End: 2017-10-17
Payer: COMMERCIAL

## 2017-10-17 DIAGNOSIS — Z13.5 GLAUCOMA SCREENING: ICD-10-CM

## 2017-10-17 DIAGNOSIS — H25.013 CORTICAL SENILE CATARACT OF BOTH EYES: ICD-10-CM

## 2017-10-17 DIAGNOSIS — E11.9 TYPE 2 DIABETES MELLITUS WITHOUT RETINOPATHY: Primary | ICD-10-CM

## 2017-10-17 PROCEDURE — 99999 PR PBB SHADOW E&M-EST. PATIENT-LVL III: CPT | Mod: PBBFAC,,, | Performed by: OPTOMETRIST

## 2017-10-17 PROCEDURE — 92014 COMPRE OPH EXAM EST PT 1/>: CPT | Mod: S$GLB,,, | Performed by: OPTOMETRIST

## 2017-10-17 NOTE — PROGRESS NOTES
HPI     Diabetic Eye Exam    Additional comments: pt states here for yearly DFE no changes BS has been   up and down only need otc readers           Comments   [facility] 10/17/2016  No vision complaints.  Using over the counter readers.  Not using distance   prescription.  Reports problems with blood sugar since her knee surgery in April.       Last edited by Sara Carlos, OD on 10/17/2017  3:57 PM. (History)            Assessment /Plan     For exam results, see Encounter Report.    Type 2 diabetes mellitus without retinopathy    Cortical senile cataract of both eyes    Glaucoma screening      Cataract(s) not yet affecting activities of daily living, therefore, surgery consult is not yet indicated.  No diabetic retinopathy both eyes.  Glaucoma screening negative both eyes.  Updated glasses prescription.  Return to clinic 1 yr.

## 2017-10-25 NOTE — TELEPHONE ENCOUNTER
Called and spoke with pt's pharmacy. They stated that pt's insurance will not cover her one touch strips. They also stated that pt needs to call insurance company to see what meds they will cover.    Called and spoke with pt. Informed pt that they need to call their insurance company to see what they will cover. Pt stated ok.

## 2017-10-30 NOTE — TELEPHONE ENCOUNTER
----- Message from Ayla Chaney sent at 10/30/2017  3:44 PM CDT -----  Contact: Patient   Patient stated that her insurance will cover Contour, and Breeze 2 for testing strips and meter. Please call her at [phone].    Thanks  td

## 2017-10-30 NOTE — TELEPHONE ENCOUNTER
Called and spoke with pt, states that her insurance will only cover the breeze 2 meter and strips or contour, please advise

## 2017-10-31 RX ORDER — INSULIN PUMP SYRINGE, 3 ML
EACH MISCELLANEOUS
Qty: 1 EACH | Refills: 0 | Status: SHIPPED | OUTPATIENT
Start: 2017-10-31 | End: 2017-12-07 | Stop reason: SDUPTHER

## 2017-12-06 ENCOUNTER — TELEPHONE (OUTPATIENT)
Dept: INTERNAL MEDICINE | Facility: CLINIC | Age: 62
End: 2017-12-06

## 2017-12-06 NOTE — TELEPHONE ENCOUNTER
----- Message from Kathi Lama sent at 12/5/2017  9:24 AM CST -----  Contact: pt  Please call pt @ [phone] regarding orders for Lab/Urine specimen, pt would like to come in tomorrow, and [facility].

## 2017-12-06 NOTE — TELEPHONE ENCOUNTER
Called and spoke with pt. Pt stated she wanted labs and urine done at [facility]. She also stated that can she do them in the morning. I stated yes ma'am you can I have them scheduled, and she stated she had called the insurance company about a glucose meter she said they gave her one, but she needs the strips. She states that she will call back and let us know because she is not at home.

## 2017-12-07 ENCOUNTER — LAB VISIT (OUTPATIENT)
Dept: LAB | Facility: HOSPITAL | Age: 62
End: 2017-12-07
Attending: INTERNAL MEDICINE
Payer: COMMERCIAL

## 2017-12-07 DIAGNOSIS — I10 HYPERTENSION GOAL BP (BLOOD PRESSURE) < 130/80: ICD-10-CM

## 2017-12-07 DIAGNOSIS — E78.5 HYPERLIPIDEMIA LDL GOAL <100: ICD-10-CM

## 2017-12-07 LAB
ALBUMIN SERPL BCP-MCNC: 3.7 G/DL
ALP SERPL-CCNC: 96 U/L
ALT SERPL W/O P-5'-P-CCNC: 25 U/L
ANION GAP SERPL CALC-SCNC: 7 MMOL/L
AST SERPL-CCNC: 14 U/L
BILIRUB SERPL-MCNC: 0.5 MG/DL
BUN SERPL-MCNC: 16 MG/DL
CALCIUM SERPL-MCNC: 9.3 MG/DL
CHLORIDE SERPL-SCNC: 106 MMOL/L
CHOLEST SERPL-MCNC: 190 MG/DL
CHOLEST/HDLC SERPL: 3.3 {RATIO}
CO2 SERPL-SCNC: 29 MMOL/L
CREAT SERPL-MCNC: 0.8 MG/DL
EST. GFR  (AFRICAN AMERICAN): >60 ML/MIN/1.73 M^2
EST. GFR  (NON AFRICAN AMERICAN): >60 ML/MIN/1.73 M^2
ESTIMATED AVG GLUCOSE: 206 MG/DL
GLUCOSE SERPL-MCNC: 184 MG/DL
HBA1C MFR BLD HPLC: 8.8 %
HDLC SERPL-MCNC: 58 MG/DL
HDLC SERPL: 30.5 %
LDLC SERPL CALC-MCNC: 98.6 MG/DL
NONHDLC SERPL-MCNC: 132 MG/DL
POTASSIUM SERPL-SCNC: 4.3 MMOL/L
PROT SERPL-MCNC: 6.9 G/DL
SODIUM SERPL-SCNC: 142 MMOL/L
TRIGL SERPL-MCNC: 167 MG/DL

## 2017-12-07 PROCEDURE — 80061 LIPID PANEL: CPT

## 2017-12-07 PROCEDURE — 83036 HEMOGLOBIN GLYCOSYLATED A1C: CPT

## 2017-12-07 PROCEDURE — 80053 COMPREHEN METABOLIC PANEL: CPT

## 2017-12-07 PROCEDURE — 36415 COLL VENOUS BLD VENIPUNCTURE: CPT | Mod: PO

## 2017-12-07 RX ORDER — INSULIN PUMP SYRINGE, 3 ML
EACH MISCELLANEOUS
Qty: 1 EACH | Refills: 0 | Status: SHIPPED | OUTPATIENT
Start: 2017-12-07 | End: 2019-11-14 | Stop reason: SDUPTHER

## 2017-12-07 NOTE — TELEPHONE ENCOUNTER
----- Message from Caroline Chaney sent at 12/6/2017  2:10 PM CST -----  Contact: pt   Call pt regarding the name of the script that the doctor was suppose to call in for her. Pt  states its called [facility].   [phone] (home)     ..    [facility] - [place], [place] - [address] [place] [id number]  Phone: [phone] Fax: [phone]

## 2017-12-11 ENCOUNTER — OFFICE VISIT (OUTPATIENT)
Dept: INTERNAL MEDICINE | Facility: CLINIC | Age: 62
End: 2017-12-11
Payer: COMMERCIAL

## 2017-12-11 VITALS
WEIGHT: 162.69 LBS | OXYGEN SATURATION: 97 % | SYSTOLIC BLOOD PRESSURE: 118 MMHG | DIASTOLIC BLOOD PRESSURE: 80 MMHG | HEIGHT: 62 IN | BODY MASS INDEX: 29.94 KG/M2 | HEART RATE: 72 BPM | TEMPERATURE: 97 F

## 2017-12-11 DIAGNOSIS — Z23 IMMUNIZATION DUE: ICD-10-CM

## 2017-12-11 DIAGNOSIS — G89.29 CHRONIC PAIN OF LEFT KNEE: ICD-10-CM

## 2017-12-11 DIAGNOSIS — M25.562 CHRONIC PAIN OF LEFT KNEE: ICD-10-CM

## 2017-12-11 DIAGNOSIS — I10 HYPERTENSION GOAL BP (BLOOD PRESSURE) < 130/80: Chronic | ICD-10-CM

## 2017-12-11 DIAGNOSIS — Z12.11 COLON CANCER SCREENING: ICD-10-CM

## 2017-12-11 DIAGNOSIS — E78.5 HYPERLIPIDEMIA LDL GOAL <70: Chronic | ICD-10-CM

## 2017-12-11 DIAGNOSIS — M17.12 ARTHRITIS OF LEFT KNEE: ICD-10-CM

## 2017-12-11 PROCEDURE — 99999 PR PBB SHADOW E&M-EST. PATIENT-LVL IV: CPT | Mod: PBBFAC,,, | Performed by: INTERNAL MEDICINE

## 2017-12-11 PROCEDURE — 90686 IIV4 VACC NO PRSV 0.5 ML IM: CPT | Mod: S$GLB,,, | Performed by: INTERNAL MEDICINE

## 2017-12-11 PROCEDURE — 90471 IMMUNIZATION ADMIN: CPT | Mod: S$GLB,,, | Performed by: INTERNAL MEDICINE

## 2017-12-11 PROCEDURE — 99214 OFFICE O/P EST MOD 30 MIN: CPT | Mod: 25,S$GLB,, | Performed by: INTERNAL MEDICINE

## 2017-12-11 RX ORDER — GLIMEPIRIDE 4 MG/1
4 TABLET ORAL
Qty: 90 TABLET | Refills: 1 | Status: SHIPPED | OUTPATIENT
Start: 2017-12-11 | End: 2018-03-12 | Stop reason: SDUPTHER

## 2017-12-11 RX ORDER — GLIMEPIRIDE 2 MG/1
4 TABLET ORAL
Qty: 90 TABLET | Refills: 1 | Status: SHIPPED | OUTPATIENT
Start: 2017-12-11 | End: 2017-12-11 | Stop reason: SDUPTHER

## 2017-12-11 NOTE — PROGRESS NOTES
Subjective:       Patient ID: Dary Chaney is a 62 y.o. female.    Chief Complaint: Follow-up    Dary Chaney  62 y.o. White female    Patient presents with:  Follow-up    HPI: Here today to follow up on chronic conditions.  Diabetes--uncontrolled. She has been compliant with glimepiride, Victoza and Levemir. She has not been able to check her glucoses lately because she is out of strips. She has an upcoming appointment with the dietician.                      HGBA1C                   8.8 (H)             12/07/2017            HTN--stable on losartan.   HLD--compliant with simvastatin.                   CHOL                     190                 12/07/2017                 HDL                      58                  12/07/2017                 LDLCALC                  98.6                12/07/2017                 TRIG                     167 (H)             12/07/2017            Left knee pain--chronic due to arthritis. She has been getting steroid joint injections and continues to have a lot of pain. She is seeing her orthopedist. She may need repeat surgery.       Past Medical History:  Arthritis  Cataract  Diabetes mellitus type II  Hyperlipidemia  Hypertension    Current Outpatient Prescriptions on File Prior to Visit:  albuterol (PROVENTIL) 2.5 mg /3 mL (0.083 %) nebulizer solution, Take 3 mLs (2.5 mg total) by nebulization every 6 (six) hours as needed for Wheezing., Disp: 120 mL, Rfl: 0  blood-glucose meter kit, Contour Next. Use as instructed, Disp: 1 each, Rfl: 0  fluticasone (FLONASE) 50 mcg/actuation nasal spray, USE TWO SPRAY(S) IN EACH NOSTRIL ONCE DAILY, Disp: 16 g, Rfl: 1  insulin detemir (LEVEMIR FLEXTOUCH) 100 unit/mL (3 mL) SubQ InPn pen, Inject 35 units SQ twice daily., Disp: 1 Box, Rfl: 3  lancets 33 gauge Misc, 1 lancet by Misc.(Non-Drug; Combo Route) route 3 (three) times daily., Disp: 100 each, Rfl: 11  lidocaine (LIDODERM) 5 %, Place 1 patch onto the skin daily as needed. Remove &  "Discard patch within 12 hours or as directed by MD, Disp: 30 patch, Rfl: 3  liraglutide 0.6 mg/0.1 mL, 18 mg/3 mL, subq PNIJ (VICTOZA 3-DEEPTI) 0.6 mg/0.1 mL (18 mg/3 mL) PnIj, Inject 1.2 mg into the skin once daily., Disp: 9 Syringe, Rfl: 3  loratadine (CLARITIN) 10 mg tablet, Take 1 tablet (10 mg total) by mouth once daily., Disp: 30 tablet, Rfl: 0  losartan (COZAAR) 25 MG tablet, Take 1 tablet (25 mg total) by mouth once daily., Disp: 90 tablet, Rfl: 1  PEN NEEDLE 31 X 5/16 " Ndle, AS DIRECTED, Disp: 100 each, Rfl: 10  simvastatin (ZOCOR) 40 MG tablet, Take 1 tablet (40 mg total) by mouth nightly., Disp: 90 tablet, Rfl: 1  blood sugar diagnostic (ONETOUCH VERIO) Strp, CONTOUR NEXT STRIPS. USE ONE STRIP TO CHECK GLUCOSE THREE TIMES DAILY, Disp: 100 strip, Rfl: 11  glimepiride (AMARYL) 2 MG tablet, Take 1 tablet (2 mg total) by mouth before breakfast., Disp: 90 tablet, Rfl: 1    Allergies:  Review of patient's allergies indicates:   -- Aspirin -- Nausea Only   -- Citrus and derivatives    -- Latex     --  Other reaction(s): Rash             Other reaction(s): whelps             Other reaction(s): Itching   -- Metformin     --  Other reaction(s): anxiety   -- Valsartan     --  Other reaction(s): disoriented          Review of Systems   Constitutional: Negative for fever and unexpected weight change.   Respiratory: Negative for cough and shortness of breath.    Cardiovascular: Negative for chest pain and leg swelling.   Gastrointestinal: Positive for constipation. Negative for abdominal pain and diarrhea.   Genitourinary: Negative for difficulty urinating.   Musculoskeletal: Positive for arthralgias.   Neurological: Negative for dizziness, numbness and headaches.       Objective:      Physical Exam   Constitutional: She is oriented to person, place, and time. She appears well-developed and well-nourished. No distress.   Eyes: No scleral icterus.   Neck: No tracheal deviation present.   Cardiovascular: Normal rate, " regular rhythm and normal heart sounds.    Pulmonary/Chest: Effort normal and breath sounds normal. No respiratory distress.   Abdominal: Soft. Bowel sounds are normal.   Musculoskeletal: She exhibits no edema.   Neurological: She is alert and oriented to person, place, and time.   Skin: Skin is warm and dry.   Psychiatric: She has a normal mood and affect.   Vitals reviewed.      Assessment:       1. Uncontrolled type 2 diabetes mellitus without complication, with long-term current use of insulin    2. Hypertension goal BP (blood pressure) < 130/80    3. Hyperlipidemia LDL goal <70    4. Chronic pain of left knee    5. Arthritis of left knee    6. Colon cancer screening    7. Immunization due        Plan:       Dary was seen today for follow-up.    Diagnoses and all orders for this visit:    Uncontrolled type 2 diabetes mellitus without complication, with long-term current use of insulin  -     glimepiride (AMARYL) 4 MG tablet; Take 1 tablets (4 mg total) by mouth daily with breakfast.  -     blood sugar diagnostic Strp; Contour Next. Glucose testing three times daily.  -     Ambulatory consult to Diabetic Education    Hypertension goal BP (blood pressure) < 130/80  -     Continue current management    Hyperlipidemia LDL goal <70  -     Continue current management    Chronic pain of left knee  -     F/U with orthopedics    Arthritis of left knee  -     F/U with orthopedics    Colon cancer screening  -     Case request GI: COLONOSCOPY    Immunization due  -     Influenza - Quadrivalent (3 years & older) (PF)    Labs and F/U in 3 months and as needed.

## 2017-12-12 ENCOUNTER — TELEPHONE (OUTPATIENT)
Dept: DIABETES | Facility: CLINIC | Age: 62
End: 2017-12-12

## 2017-12-13 ENCOUNTER — OFFICE VISIT (OUTPATIENT)
Dept: DIABETES | Facility: CLINIC | Age: 62
End: 2017-12-13
Payer: COMMERCIAL

## 2017-12-13 VITALS
SYSTOLIC BLOOD PRESSURE: 136 MMHG | HEIGHT: 62 IN | DIASTOLIC BLOOD PRESSURE: 76 MMHG | BODY MASS INDEX: 29.78 KG/M2 | WEIGHT: 161.81 LBS

## 2017-12-13 LAB — GLUCOSE SERPL-MCNC: 81 MG/DL (ref 70–110)

## 2017-12-13 PROCEDURE — 82948 REAGENT STRIP/BLOOD GLUCOSE: CPT | Mod: S$GLB,,, | Performed by: NURSE PRACTITIONER

## 2017-12-13 PROCEDURE — 99999 PR PBB SHADOW E&M-EST. PATIENT-LVL III: CPT | Mod: PBBFAC,,, | Performed by: NURSE PRACTITIONER

## 2017-12-13 PROCEDURE — 99215 OFFICE O/P EST HI 40 MIN: CPT | Mod: S$GLB,,, | Performed by: NURSE PRACTITIONER

## 2017-12-13 RX ORDER — INSULIN DEGLUDEC 200 U/ML
70 INJECTION, SOLUTION SUBCUTANEOUS DAILY
Qty: 4 SYRINGE | Refills: 1 | Status: SHIPPED | OUTPATIENT
Start: 2017-12-13 | End: 2018-02-08 | Stop reason: SDUPTHER

## 2017-12-13 NOTE — PROGRESS NOTES
"Subjective:         Patient ID: Dary Chaney is a 62 y.o. female.  Patient's current PCP is Sakina Cooper DO.   Social History     Social History    Marital status:      Spouse name: N/A    Number of children: 3    Years of education: N/A     Occupational History    Stay at Home           Social History Main Topics    Smoking status: Never Smoker    Smokeless tobacco: Never Used    Alcohol use No    Drug use: No    Sexual activity: Yes     Partners: Male     Birth control/ protection: Surgical     Other Topics Concern    Not on file     Social History Narrative    . Housewife.       Chief Complaint: Diabetes    HPI  Dary Chaney is a 62 y.o. White female presenting for new consultation for diabetes. Patient has been diagnosed with diabetes for approximately 10 years and has the following complications from diabetes: none. Blood glucose testing is not performed. She has her meter but has not received her strips yet.     She denies any recent hospital admissions, emergency room visits, hypoglycemia.      Height: 5' 2" (157.5 cm)  //  Weight: 73.4 kg (161 lb 13.1 oz), Body mass index is 29.6 kg/m².  Home Blood Glucose reading this AM: Unknown mg/dl fasting.  Her blood sugar in clinic today is:   Lab Results   Component Value Date    POCGLU 81 12/13/2017       Labs reviewed and are noted below.    Her most recent A1C is:   Lab Results   Component Value Date    HGBA1C 8.8 (H) 12/07/2017     No results found for: CPEPTIDE  No results found for: GLUTAMICACID  Lab Results   Component Value Date    WBC 5.05 05/13/2014    HGB 13.3 05/13/2014    HCT 40.0 05/13/2014     05/13/2014    CHOL 190 12/07/2017    TRIG 167 (H) 12/07/2017    HDL 58 12/07/2017    ALT 25 12/07/2017    AST 14 12/07/2017     12/07/2017    K 4.3 12/07/2017     12/07/2017    CREATININE 0.8 12/07/2017    BUN 16 12/07/2017    CO2 29 12/07/2017    TSH 0.855 04/30/2013    INR 1.0 06/21/2010    " GLUF 117 (H) 04/27/2010    HGBA1C 8.8 (H) 12/07/2017       CURRENT DM MEDICATIONS:   Current Outpatient Prescriptions   Medication Sig Dispense Refill    glimepiride (AMARYL) 4 MG tablet Take 1 tablet (4 mg total) by mouth daily with breakfast. (Patient taking differently: Take 4 mg by mouth 2 (two) times daily with meals. ) 90 tablet 1    insulin detemir (LEVEMIR FLEXTOUCH) 100 unit/mL (3 mL) SubQ InPn pen Inject 35 units SQ twice daily. 1 Box 3    liraglutide 0.6 mg/0.1 mL, 18 mg/3 mL, subq PNIJ (VICTOZA 3-DEEPTI) 0.6 mg/0.1 mL (18 mg/3 mL) PnIj Inject 1.2 mg into the skin once daily. 9 Syringe 3     No current facility-administered medications for this visit.        Health Maintenance   Topic Date Due    Zoster Vaccine  12/05/2014    Colonoscopy  03/13/2017    Hemoglobin A1c  06/07/2018    Foot Exam  09/11/2018    Eye Exam  10/17/2018    Lipid Panel  12/07/2018    Mammogram  04/11/2019    Pneumococcal PPSV23 (Medium Risk) (2) 08/10/2021    TETANUS VACCINE  08/26/2021    Hepatitis C Screening  Addressed    Influenza Vaccine  Addressed       STANDARDS OF CARE:  Current Ophthalmologist/Optometrist: Dr. Calros. Last exam 2017  Current Dentist: Yes. Last exam unknown  Current Podiatrist: No  ACE/ARB: Yes  Statin: Yes  She  has attended diabetes education in the past twice.     LIFESTYLE:  ACTIVITY LEVEL: Moderately Active  EXERCISE:  none  MEAL PLANNING: Patient reports number of meals per day to be 3 and number of snacks per day to be 2    BLOOD GLUCOSE TESTING: Patient reports testing on average a total of 0 times per day.      Review of Systems   Constitutional: Negative.  Negative for activity change, appetite change, chills, diaphoresis, fatigue, fever and unexpected weight change.   Eyes: Negative for visual disturbance.   Respiratory: Negative for apnea and shortness of breath.    Cardiovascular: Negative.  Negative for chest pain, palpitations and leg swelling.   Gastrointestinal: Negative for  abdominal distention, constipation, diarrhea, nausea and vomiting.   Endocrine: Positive for polydipsia. Negative for cold intolerance, heat intolerance, polyphagia and polyuria.   Genitourinary: Negative.  Negative for frequency.   Musculoskeletal: Positive for gait problem.        Knee pain   Skin: Negative.  Negative for color change, pallor, rash and wound.   Neurological: Negative for dizziness, seizures, syncope, light-headedness, numbness and headaches.   Psychiatric/Behavioral: Negative for confusion, hallucinations and suicidal ideas.         Objective:      Physical Exam   Constitutional: She is oriented to person, place, and time. She appears well-developed and well-nourished.   HENT:   Head: Normocephalic and atraumatic.   Eyes: EOM are normal. Pupils are equal, round, and reactive to light.   Neck: Normal range of motion. Neck supple.   Cardiovascular: Normal rate, regular rhythm and normal heart sounds.    Pulmonary/Chest: Effort normal and breath sounds normal.   Abdominal: Soft. Bowel sounds are normal.   Neurological: She is alert and oriented to person, place, and time.   Skin: Skin is warm and dry.   Psychiatric: She has a normal mood and affect. Her behavior is normal. Judgment and thought content normal.   Nursing note and vitals reviewed.      Assessment:       1. Uncontrolled type 2 diabetes mellitus without complication, with long-term current use of insulin        Plan:   Uncontrolled type 2 diabetes mellitus without complication, with long-term current use of insulin  -     C-peptide; Future; Expected date: 12/13/2017  -     Glutamic acid decarboxylase; Future; Expected date: 12/13/2017  -     POCT glucose  -     TSH; Future; Expected date: 12/13/2017  -     insulin degludec (TRESIBA FLEXTOUCH U-200) 200 unit/mL (3 mL) InPn; Inject 70 Units into the skin once daily.  Dispense: 4 Syringe; Refill: 1  -     dulaglutide (TRULICITY) 1.5 mg/0.5 mL PnIj; Inject 1.5 mg into the skin every 7 days.   Dispense: 4 Syringe; Refill: 2      - Condition uncontrolled on current regimen. DC Levemir and Victoza. Start Tresiba and Trulicity as noted. Decrease Glimepiride to once daily temporarily. DM education reviewed. Patient encouraged to carb count and exercise per recommendations. Labs and Referrals as noted. RV scheduled in 4 weeks. Patient instructed to send in log once weekly for my review.     Additional Plan Details:    1.) Patient was instructed to monitor blood glucose 2 - 3 x daily, fasting and ac dinner or at bedtime. Discussed ADA goal for fasting blood sugar, 80 - 130mg/dL; pp blood sugars below 180 mg/dl. Also, discussed prevention of hypoglycemia and the need to adjust goals to higher levels if persistent hypoglycemia.  Reminded to bring BG records or meter to each visit for review.  2.) Reviewed pathophysiology of Type 2 diabetes, complications related to the disease, importance of annual dilated eye exam and daily foot examination.  3.) We discussed the ADA recommendations, which are as follows:  Hemoglobin A1c below 7.0 %. All patients with diabetes should be on statins unless contraindicated.  ACE or ARB therapy if not contraindicated.    4.) Continue medications as prescribed.  My [facility] e-mail or phone review in one week with BG records for adjustment of medication.  5.) Meal planning teaching: Reviewed carb counting, portion control, importance of spacing meals throughout the day to prevent post prandial elevations.  6.) Discussed activity with related benefits, methods, and precautions. Recommended patient start or continue some form of exercise and increase as tolerated to 30 minutes per day to aid in control of BGs.  7.) A1C, TSH, Lipid Panel, CMP with eGFR and Micro/Creatinine are utd or were ordered per ADA protocol.  8.) Return to clinic in 1 month for follow up. The patient was explained the above plan and given opportunity to ask questions.  She understands, chooses and consents to this  plan and accepts all the risks, which include but are not limited to the risks mentioned above. She understands the alternative of having no testing, interventions or treatments at this time. She left content and without further questions.     A total of 60 minutes was spent in face to face time, of which over 50% was spent in counseling patient on disease process, complications, treatment, and side effects of medications.        Isha Boyer, NIKKI-C

## 2017-12-13 NOTE — PATIENT INSTRUCTIONS
PATIENT INSTRUCTIONS  - Follow up as scheduled.   - Carb Count: 30-45G/meal and 15G/snack  - Exercise: Goal is 150 minutes or more per week  - Bring meter and blood sugar log to each appointment.   STOP LEVEMIR, evening dose before starting tresiba take 17 units instead of 35.   STOP VICTOZA  Start Trulicity once a week  Start Tresiba at 70 units every day.   -Decrease your Glimepiride to once a day.     - You will take your blood sugar two times daily. Once will always be in the morning before you have any food, (known as your fasting blood sugar), and once should be two hours after EITHER your breakfast, lunch, or dinner, (known as your post-prandial blood sugar). Each day you should check a different meal, (ie. Monday-breakfast; Tuesday- lunch; Wednesday- supper, then repeat).     - Send me your blood sugars weekly if directed to do so. The easiest way to do this is through [facility]. Send in logs on Tuesdays, Wednesdays, or Thursdays.    - Blood Sugar Goals:  1. The goal for fasting blood sugars is 80 -130 mg/dl.   2. The goal for the 2 hour after meal blood sugars is below 180 mg/dl.  3. Blood sugars below 70 are considered LOW and you must eat or drink 15G of carbohydrates immediately, then recheck blood sugar after 15 minutes to ensure blood sugar has returned to normal range, (70 or above)                                                              Diabetes (General Information)  Diabetes is a long-term health problem. It means your body does not make enough insulin. Or it may mean that your body cannot use the insulin it makes. Insulin is a hormone in your body. It lets blood sugar (glucose) reach the cells in your body. All of your cells need glucose for fuel.  When you have diabetes, the glucose in your blood builds up because it cannot get into the cells. This buildup is called high blood sugar (hyperglycemia).  Your blood sugar level depends on several things. It depends on what kind of food you eat  and how much of it you eat. It also depends on how much exercise you get, and how much insulin you have in your body. Eating too much of the wrong kinds of food or not taking diabetes medicine on time can cause high blood sugar. Infections can cause high blood sugar even if you are taking medicines correctly.  These things can also cause low blood sugar:  · Missing meals  · Not eating enough food  · Taking too much diabetes medicine  Diabetes can cause serious problems over time if you do not get treated. These problems include heart disease, stroke, kidney failure, and blindness. They also include nerve pain or loss of feeling in your legs and feet, and gangrene of the feet. By keeping your blood sugar under control you can prevent or delay these problems.  Normal blood sugar levels are 80 to 100 before a meal and less than 180 in the 1 to 2 hours after a meal.  Home care  Follow these guidelines when caring for yourself at home:  · Follow the diet your healthcare provider gives you. Take insulin or other diabetes medicine exactly as told to.  · Watch your blood sugar as you are told to. Keep a log of your results. This will help your provider change your medicines to keep your blood sugar under control.  · Try to reach your ideal weight. You may be able to cut back on or not have to take diabetes medicine if you eat the right foods and get exercise.  · Do not smoke. Smoking worsens the effects of diabetes on your circulation. You are much more likely to have a heart attack if you have diabetes and you smoke.  · Take good care of your feet. If you have lost feeling in your feet, you may not see an injury or infection. Check your feet and between your toes at least once a week.  · Wear a medical alert bracelet or necklace, or carry a card in your wallet that says you have diabetes. This will help healthcare providers give you the right care if you get very ill and cannot tell them that you have diabetes.  Sick day  plan  If you get a cold, the flu, or a bacterial or viral infection, take these steps:  · Look at your diabetes sick plan and call your healthcare provider as you were told to. You may need to call your provider right away if:  ¨ Your blood sugar is above 240 while taking your diabetes medicine  ¨ Your urine ketone levels are above normal or high  ¨ You have been vomiting more than 6 hours  ¨ You have trouble breathing or your breath ha s a fruity smell  ¨ You have a high fever  ¨ You have a fever for several days and you are not getting better  ¨ You get light-headed and are sleepier than usual  · Keep taking your diabetes pills (oral medicine) even if you have been vomiting and are feeling sick. Call your provider right away because you may need insulin to lower your blood sugar until you recover from your illness.  · Keep taking your insulin even if you have been vomiting and are feeling sick. Call your provider right away to ask if you need to change your insulin dose. This will depend on your blood sugar results.  · Check your blood sugar every 2 to 4 hours, or at least 4 times a day.  · Check your ketones often. If you are vomiting and having diarrhea, watch them more often.  · Do not skip meals. Try to eat small meals on a regular schedule. Do this even if you do not feel like eating.  · Drink water or other liquids that do not have caffeine or calories. This will keep you from getting dehydrated. If you are nauseated or vomiting, takes small sips every 5 minutes. To prevent dehydration try to drink a cup (8 ounces) of fluids every hour while you are awake.  General care  Always bring a source of fast-acting sugar with you in case you have symptoms of low blood sugar (below 70). At the first sign of low blood sugar, eat or drink 15 to 20 grams of fast-acting sugar to raise your blood sugar. Examples are:  · 3 to 4 glucose tablets. You can buy these at most drugstores.  · 4 ounces (1/2 cup) of regular (not  diet) soft drinks  · 4 ounces (1/2 cup) of any fruit juice  · 8 ounces (1 cup) of milk  · 5 to 6 pieces of hard candy  · 1 tablespoon of honey  Check your blood sugar 15 minutes after treating yourself. If it is still below 70, take 15 to 20 more grams of fast-acting sugar. Test again in 15 minutes. If it returns to normal (70 or above), eat a snack or meal to keep your blood sugar in a safe range. If it stays low, call your doctor or go to an emergency room.  Follow-up care  Follow-up with your healthcare provider, or as advised. For more information about diabetes, visit the American Diabetes Association website at www.diabetes.org or call 633-044-2284.  When to seek medical advice  Call your healthcare provider right away if you have any of these symptoms of high blood sugar:  · Frequent urination  · Dizziness  · Drowsiness  · Thirst  · Headache  · Nausea or vomiting  · Abdominal pain  · Eyesight changes  · Fast breathing  · Confusion or loss of consciousness  Also call your provider right away if you have any of these signs of low blood sugar:  · Fatigue  · Headache  · Shakes  · Excess sweating  · Hunger  · Feeling anxious or restless  · Eyesight changes  · Drowsiness  · Weakness  · Confusion or loss of consciousness  Call 911  Call for emergency help right away if any of these occur:  · Chest pain or shortness of breath  · Dizziness or fainting  · Weakness of an arm or leg or one side of the face  · Trouble speaking or seeing   Date Last Reviewed: 6/1/2016  © 5283-2600 [employer]. [address] [place] [address], [place] [address]. All rights reserved. This information is not intended as a substitute for professional medical care. Always follow your healthcare professional's instructions.                                                                     Understanding Type 2 Diabetes  When your body is working normally, the food you eat is digested and used as fuel. This fuel supplies energy to the  bodys cells. When you have diabetes, the fuel cant enter the cells. Without treatment, diabetes can cause serious long-term health problems.     Your body breaks down the food you eat into glucose.          How the body gets energy  The digestive system breaks down food, resulting in a sugar called glucose. Some of this glucose is stored in the liver. But most of it enters the bloodstream and travels to the cells to be used as fuel. Glucose needs the help of a hormone called insulin to enter the cells. Insulin is made in the pancreas. It is released into the bloodstream in response to the presence of glucose in the blood. Think of insulin as a key. When insulin reaches a cell, it attaches to the cell wall. This signals the cell to create an opening that allows glucose to enter the cell.  When you have type 2 diabetes  Early in type 2 diabetes, your cells dont respond properly to insulin. Because of this, less glucose than normal moves into cells. This is called insulin resistance. In response, the pancreas makes more insulin. But eventually, the pancreas cant produce enough insulin to overcome insulin resistance. As less and less glucose enters cells, it builds up to a harmful level in the bloodstream. This is known as high blood sugar or hyperglycemia. The result is type 2 diabetes. The cells become starved for energy, which can leave you feeling tired and rundown.  Why high blood sugar is a problem  If high blood sugar is not controlled, blood vessels throughout the body become damaged. Prolonged high blood sugar affects organs, blood vessels, and nerves. As a result, the risks of damage to the heart, kidneys, eyes, and limbs increase. Diabetes also makes other problems, such as high blood pressure and high cholesterol, more dangerous. Over time, people with uncontrolled high blood sugar have an increase in risk of dying of, or being disabled by, heart attack or stroke.  Date Last Reviewed: 7/1/2016  ©  0127-8602 [employer]. [address] [place] [address], [place] [address]. All rights reserved. This information is not intended as a substitute for professional medical care. Always follow your healthcare professional's instructions.                                                            Using a Blood Sugar Log    You have diabetes. This means your body has trouble regulating a sugar called glucose. To help manage your diabetes, youll need to check your blood sugar level as directed by your healthcare provider. Keeping a log of your blood sugar levels will help you track your blood sugar readings. Its a simple and easy way to see how well you are controlling your diabetes.  Checking your blood sugar level  You can check your blood sugar level with a blood glucose meter. Youll first prick the side of your finger with a tiny lancet to draw a tiny drop of blood onto the test strip. Some glucose meters let you use another place on your body to test. But these other places should not be used in some cases as they may be inaccurate. Follow the instructions for your glucose meter. And talk with your healthcare provider before doing the test on other places.  The strip goes into the meter first, then a drop of blood is placed on the tip of the strip. The meter then shows a reading that tells you the level of your blood sugar. Your readings should be in your target range as often as possible. This means not too high or too low. Staying in this range helps lower your risk for complications. Your healthcare provider will help you figure out the target range that is best for you.  Tracking your readings  Every time you check your blood sugar, use your log to keep track of your readings. Your meter will also probably have a memory feature that your healthcare provider can check at your next visit. You may be advised by your healthcare provider to check your blood sugar in the morning, at bedtime, and before and  after meals. Be sure to write down all of your numbers. Also use your log to record things that might have affected your blood sugar. Some examples include being sick, certain medicines, being physically active, feeling stressed, or skipping meals.   Lessons learned from your readings  Tracking your blood sugar readings helps you see patterns. These patterns tell you how your actions affect your blood sugar. For instance, you may have higher numbers after eating certain foods or lower numbers after exercise. They just help you understand how to stay in your target range more often, so that your diabetes remains in good control.  Sharing your log with your healthcare team  Bring your blood sugar log and glucose meter with you to all of your healthcare appointments. This can help your healthcare team make changes to your treatment plan, if needed. This may involve making changes in what you eat, what medicines you take, or how much you exercise.  To learn more  The resources below can help you learn more:  · American Diabetes Association 481-073-4530 www.diabetes.org  · [employer] 572-430-2513 www.lighthouse.org  · [employer] 648-931-4610 www.nei.nih.gov  · H[employer] 578-773-5675 www.hormone.org  Date Last Reviewed: 5/1/2016  © 0090-8840 [employer]. [address] [place] [address], [place] [address]. All rights reserved. This information is not intended as a substitute for professional medical care. Always follow your healthcare professional's instructions.                                                                                            Diabetes: Exams and Tests    For your diabetes care, you may see your primary care provider or a specialist 2 to 4 times a year, or as directed. This page lists some of the regular exams and tests recommended for people with diabetes. To learn more, contact the American Diabetes Association (005-848-1727, www.diabetes.org).  Tests  and immunizations  These should be done at least as often as stated below:  · Blood pressure check: every healthcare provider visit  · A1C: at first, every 3 months; if controlled, then every 3 to 6 months   · Cholesterol and blood lipid tests: at least every 12 months.  · Urine tests for kidney function: every 12 months  · Flu shots: once a year  · Pneumonia shots: talk with your healthcare provider about which pneumonia vaccines are right for you  · Hepatitis B shots: as soon as possible if youre under 60, or as advised by your healthcare provider if youre older than 60  · Shingles vaccine after age 60, even if you have already had shingles   · Other tests or vaccines: as advised by your healthcare provider  · Individualized medical nutrition therapy: at least once, then as needed  · Stop smoking counseling, if you still smoke, at each visit   Regular exams  The following exams help keep you healthy:  · Foot exams. Nerve and blood vessel problems can affect your feet sooner than other parts of your body. Make sure that your healthcare provider checks your feet at every office visit.  · Eye exams. You can have problems with your eyes even if you dont have trouble seeing. An ophthalmologist (eye healthcare provider) or specially trained optometrist will give you a dilated eye exam at least once a year. If you see dark spots, see poorly in dim light, have eye pain or pressure, or notice any other problems, tell your healthcare provider right away.  · Dental exams. Gum disease (also called periodontal disease) and other mouth problems are common in people with diabetes. To help prevent these problems, see your dentist two or more times a year.  Ask your healthcare provider what other exams youll need on a regular basis.  Date Last Reviewed: 6/1/2016  © 1548-2040 [employer]. [address] [place] [address], [place] [address]. All rights reserved. This information is not intended as a substitute for  professional medical care. Always follow your healthcare professional's instructions.

## 2017-12-13 NOTE — LETTER
December 13, 2017      Sakina Cooper DO  [address] [place] [address] [address] [place] [id number]           [facility]  [address] [place] [address] [address] [place] [address]  Phone: [phone]  Fax: [phone]          Patient: Dary Chaney   MR Number: [id number]   YOB: 1955   Date of Visit: 12/13/2017       Dear Dr. Sakina Cooper:    Thank you for referring Dary Chaney to me for evaluation. Attached you will find relevant portions of my assessment and plan of care.    If you have questions, please do not hesitate to call me. I look forward to following Dary Chaney along with you.    Sincerely,    Isha Goff, NIKKI    Enclosure  CC:  No Recipients    If you would like to receive this communication electronically, please contact [email or web address] or [phone] to request more information on [facility] access.    For providers and/or their staff who would like to refer a patient to [facility], please contact us through our one-stop-shop provider referral line, Mamie Helton, at [phone].    If you feel you have received this communication in error or would no longer like to receive these types of communications, please e-mail [email or web address]

## 2017-12-14 ENCOUNTER — LAB VISIT (OUTPATIENT)
Dept: LAB | Facility: HOSPITAL | Age: 62
End: 2017-12-14
Attending: NURSE PRACTITIONER
Payer: COMMERCIAL

## 2017-12-14 ENCOUNTER — TELEPHONE (OUTPATIENT)
Dept: DIABETES | Facility: CLINIC | Age: 62
End: 2017-12-14

## 2017-12-14 LAB
C PEPTIDE SERPL-MCNC: 1.1 NG/ML
TSH SERPL DL<=0.005 MIU/L-ACNC: 0.87 UIU/ML

## 2017-12-14 PROCEDURE — 86341 ISLET CELL ANTIBODY: CPT

## 2017-12-14 PROCEDURE — 36415 COLL VENOUS BLD VENIPUNCTURE: CPT | Mod: PO

## 2017-12-14 PROCEDURE — 84681 ASSAY OF C-PEPTIDE: CPT

## 2017-12-14 PROCEDURE — 84443 ASSAY THYROID STIM HORMONE: CPT

## 2017-12-14 NOTE — TELEPHONE ENCOUNTER
----- Message from Erica Dias sent at 12/14/2017 10:38 AM CST -----  Rosendo with [facility] at [phone]//states is calling regarding script for med//Tresiba//can they change quantity for 4 to 9 so they can give 1 box//please call//tammy/elvie

## 2017-12-14 NOTE — TELEPHONE ENCOUNTER
Returned call to pharmacist. Informed her to fill for whatever her discount card would approve;it may only be for 3 pens. She stated she will run it through the discount card.

## 2017-12-19 LAB — GAD65 AB SER-SCNC: 0 NMOL/L

## 2018-01-15 ENCOUNTER — OFFICE VISIT (OUTPATIENT)
Dept: OBSTETRICS AND GYNECOLOGY | Facility: CLINIC | Age: 63
End: 2018-01-15
Payer: COMMERCIAL

## 2018-01-15 VITALS
SYSTOLIC BLOOD PRESSURE: 130 MMHG | DIASTOLIC BLOOD PRESSURE: 72 MMHG | BODY MASS INDEX: 30.18 KG/M2 | HEIGHT: 62 IN | WEIGHT: 164 LBS

## 2018-01-15 DIAGNOSIS — R10.10 PAIN OF UPPER ABDOMEN: Primary | ICD-10-CM

## 2018-01-15 DIAGNOSIS — R10.2 PELVIC PAIN: ICD-10-CM

## 2018-01-15 PROCEDURE — 87086 URINE CULTURE/COLONY COUNT: CPT

## 2018-01-15 PROCEDURE — 99213 OFFICE O/P EST LOW 20 MIN: CPT | Mod: S$GLB,,, | Performed by: NURSE PRACTITIONER

## 2018-01-15 PROCEDURE — 99999 PR PBB SHADOW E&M-EST. PATIENT-LVL III: CPT | Mod: PBBFAC,,, | Performed by: NURSE PRACTITIONER

## 2018-01-15 RX ORDER — NITROFURANTOIN 25; 75 MG/1; MG/1
1 CAPSULE ORAL 2 TIMES DAILY
COMMUNITY
Start: 2018-01-13 | End: 2018-01-22 | Stop reason: ALTCHOICE

## 2018-01-15 NOTE — PROGRESS NOTES
"CC: Abdominal pain    Dary Chaney is a 62 y.o. female  presents for a abdominal pain. Patient states that she has had abdominal pain and urinary frequency for 2 weeks. Was given Macrobid at [facility], no resolve. S/P Hysterectomy. Urine in clinic glucose 1000.No N/V/D.     Past Medical History:   Diagnosis Date    Arthritis     Cataract     Diabetes mellitus type II     Hyperlipidemia     Hypertension     UTI (urinary tract infection)      Past Surgical History:   Procedure Laterality Date    CHOLECYSTECTOMY      CHOLECYSTECTOMY      HYSTERECTOMY      uterine prolapse    KNEE SURGERY  2017    SPINE SURGERY       Family History   Problem Relation Age of Onset    Diabetes Brother     Heart disease Neg Hx      Social History   Substance Use Topics    Smoking status: Never Smoker    Smokeless tobacco: Never Used    Alcohol use No     OB History      Para Term  AB Living    3 3 3     3    SAB TAB Ectopic Multiple Live Births            3          /72 (BP Location: Left arm, Patient Position: Sitting, BP Method: Medium (Manual))   Ht 5' 2" (1.575 m)   Wt 74.4 kg (164 lb 0.4 oz)   BMI 30.00 kg/m²     ROS:  GENERAL: Denies weight gain or weight loss. Feeling well overall.   ABDOMEN:HPI  URINARY: HPI  REPRODUCTIVE: See HPI.     PE:   APPEARANCE: Well nourished, well developed, in no acute distress.  AFFECT: WNL, alert and oriented x 3.  CHEST: Good respiratory effort.   ABDOMEN: Soft. No tenderness or masses.  PELVIC: Vagina atrophic without lesions or discharge. No significant cystocele or rectocele. Bimanual exam shows uterus and cervix to be surgically absent. Adnexa without masses or tenderness.      1. Pain of upper abdomen  Urine culture    US Abdomen Complete    PLAN:  Exam unremarkable  Urine sent for cx  Abdominal ultrasound  Patient was counseled today on A.C.S. Pap guidelines and recommendations for yearly pelvic exams, mammograms and monthly self " breast exams; to see her PCP for other health maintenance.

## 2018-01-16 ENCOUNTER — HOSPITAL ENCOUNTER (OUTPATIENT)
Dept: RADIOLOGY | Facility: HOSPITAL | Age: 63
Discharge: HOME | End: 2018-01-16
Attending: NURSE PRACTITIONER
Payer: COMMERCIAL

## 2018-01-16 DIAGNOSIS — R10.10 PAIN OF UPPER ABDOMEN: ICD-10-CM

## 2018-01-16 DIAGNOSIS — R10.2 PELVIC PAIN: Primary | ICD-10-CM

## 2018-01-16 DIAGNOSIS — R10.2 PELVIC PAIN: ICD-10-CM

## 2018-01-16 PROCEDURE — 76700 US EXAM ABDOM COMPLETE: CPT | Mod: TC

## 2018-01-16 PROCEDURE — 76700 US EXAM ABDOM COMPLETE: CPT | Mod: 26,,, | Performed by: RADIOLOGY

## 2018-01-17 LAB — BACTERIA UR CULT: NO GROWTH

## 2018-01-18 ENCOUNTER — TELEPHONE (OUTPATIENT)
Dept: OBSTETRICS AND GYNECOLOGY | Facility: CLINIC | Age: 63
End: 2018-01-18

## 2018-01-18 DIAGNOSIS — R16.0 HEPATOMEGALY: Primary | ICD-10-CM

## 2018-01-18 DIAGNOSIS — R93.429 ABNORMAL ULTRASOUND OF KIDNEY: ICD-10-CM

## 2018-01-18 NOTE — TELEPHONE ENCOUNTER
----- Message from Preethi Sheridan NP sent at 1/18/2018  1:29 PM CST -----  Please call patient and inform her that urine culture was negative.

## 2018-01-19 ENCOUNTER — TELEPHONE (OUTPATIENT)
Dept: OBSTETRICS AND GYNECOLOGY | Facility: CLINIC | Age: 63
End: 2018-01-19

## 2018-01-19 NOTE — TELEPHONE ENCOUNTER
----- Message from Preethi Sheridan NP sent at 1/18/2018  3:11 PM CST -----  Please call patient and set up appt for urology and GI. I explained the ultrasound to her.

## 2018-01-19 NOTE — TELEPHONE ENCOUNTER
Spoke with pt  Scheduled for Urology appt 2/6/18 3 PM and GI on 01/22/18 10 AM. Patient verbalized understanding

## 2018-01-22 ENCOUNTER — PATIENT MESSAGE (OUTPATIENT)
Dept: GASTROENTEROLOGY | Facility: CLINIC | Age: 63
End: 2018-01-22

## 2018-01-22 ENCOUNTER — INITIAL CONSULT (OUTPATIENT)
Dept: GASTROENTEROLOGY | Facility: CLINIC | Age: 63
End: 2018-01-22
Payer: COMMERCIAL

## 2018-01-22 VITALS
DIASTOLIC BLOOD PRESSURE: 70 MMHG | WEIGHT: 165.13 LBS | HEIGHT: 62 IN | SYSTOLIC BLOOD PRESSURE: 140 MMHG | BODY MASS INDEX: 30.39 KG/M2 | HEART RATE: 62 BPM

## 2018-01-22 DIAGNOSIS — Z86.010 HISTORY OF COLON POLYPS: ICD-10-CM

## 2018-01-22 DIAGNOSIS — K76.0 FATTY LIVER: Primary | ICD-10-CM

## 2018-01-22 PROCEDURE — 99999 PR PBB SHADOW E&M-EST. PATIENT-LVL III: CPT | Mod: PBBFAC,,, | Performed by: INTERNAL MEDICINE

## 2018-01-22 PROCEDURE — 99204 OFFICE O/P NEW MOD 45 MIN: CPT | Mod: S$GLB,,, | Performed by: INTERNAL MEDICINE

## 2018-01-22 RX ORDER — SODIUM, POTASSIUM,MAG SULFATES 17.5-3.13G
SOLUTION, RECONSTITUTED, ORAL ORAL
Qty: 1 BOTTLE | Refills: 0 | Status: ON HOLD | OUTPATIENT
Start: 2018-01-22 | End: 2018-03-01 | Stop reason: HOSPADM

## 2018-01-22 NOTE — PROGRESS NOTES
[facility]  Gastroenterology Note    Patient referred at the request of:  Preethi Sheridan, NIKKI  [address] [address] [place] [address]    Subjective:       Patient ID: Dary Chaney is a 62 y.o. female.    Chief Complaint: Hepatomegaly    Liver Dysfunction/Hepatomegaly  Patient comes to see us after she had an ultrasound for lower abdominal pain and it was described that her liver was enlarged. Patient is a diabetic and her hepatomegaly has not caused any other problems and her liver enzymes are normal. Her findings has been associated with no obvious evidence for illness. The problem was first noted  several months ago. A positive history was noted for: metabolic syndrome, obesity, and exposure to multiple medications. Patient denies blood transfusion, use of illicit drugs, unprotected  sex, family history  of chronic liver disease, en utero exposure to hepatotrophic virus and family history of genetic liver disease (hemochromatosis, CF, galactosemia), alpha 1 antitrypsin deficiency, autoimmune liver disease, CMV, congenital disorders, cystic fibrosis, EBV (Jayshree Barr Virus), glycogen or lipid storage disease, hemolytic anemia, hepatitis A, hepatitis B, hepatitis C, mitochondrial diseases,  jaundice, Marcello's disease, or exposure to carbamazepine, phenytoin, valproate, ethanol, street drugs. Mrs. Chaney is originally from [place].         Past Medical History:   Diagnosis Date    Arthritis     Cataract     Colon polyp     Diabetes mellitus type II     Hyperlipidemia     Hypertension     UTI (urinary tract infection)      Past Surgical History:   Procedure Laterality Date    CHOLECYSTECTOMY      CHOLECYSTECTOMY      COLONOSCOPY      HYSTERECTOMY      uterine prolapse    KNEE SURGERY  2017    SPINE SURGERY       Current Outpatient Prescriptions on File Prior to Visit   Medication Sig Dispense Refill    albuterol (PROVENTIL) 2.5 mg /3 mL (0.083 %) nebulizer solution Take  "3 mLs (2.5 mg total) by nebulization every 6 (six) hours as needed for Wheezing. 120 mL 0    blood sugar diagnostic Strp Contour Next. Glucose testing three times daily. 100 strip 3    blood-glucose meter kit Contour Next. Use as instructed 1 each 0    dulaglutide (TRULICITY) 1.5 mg/0.5 mL PnIj Inject 1.5 mg into the skin every 7 days. 4 Syringe 2    fluticasone (FLONASE) 50 mcg/actuation nasal spray USE TWO SPRAY(S) IN EACH NOSTRIL ONCE DAILY 16 g 1    glimepiride (AMARYL) 4 MG tablet Take 1 tablet (4 mg total) by mouth daily with breakfast. (Patient taking differently: Take 4 mg by mouth 2 (two) times daily with meals. ) 90 tablet 1    insulin degludec (TRESIBA FLEXTOUCH U-200) 200 unit/mL (3 mL) InPn Inject 70 Units into the skin once daily. 4 Syringe 1    lancets 33 gauge Misc 1 lancet by Misc.(Non-Drug; Combo Route) route 3 (three) times daily. 100 each 11    lidocaine (LIDODERM) 5 % Place 1 patch onto the skin daily as needed. Remove & Discard patch within 12 hours or as directed by MD 30 patch 3    losartan (COZAAR) 25 MG tablet Take 1 tablet (25 mg total) by mouth once daily. 90 tablet 1    PEN NEEDLE 31 X 5/16 " Ndle AS DIRECTED 100 each 10    simvastatin (ZOCOR) 40 MG tablet Take 1 tablet (40 mg total) by mouth nightly. 90 tablet 1    loratadine (CLARITIN) 10 mg tablet Take 1 tablet (10 mg total) by mouth once daily. 30 tablet 0    [DISCONTINUED] nitrofurantoin, macrocrystal-monohydrate, (MACROBID) 100 MG capsule Take 1 capsule by mouth 2 (two) times daily.       No current facility-administered medications on file prior to visit.      Review of patient's allergies indicates:   Allergen Reactions    Citrus and derivatives Swelling, Other (See Comments) and Edema     Redness      Latex Other (See Comments)     Other reaction(s): Rash  Other reaction(s): welts  Other reaction(s): Itching    Valsartan Other (See Comments)     Other reaction(s): disoriented    Aspirin Nausea Only    Metformin " Anxiety     Other reaction(s): anxiety     Social History     Social History    Marital status:      Spouse name: N/A    Number of children: 3    Years of education: N/A     Occupational History    Stay at Home           Social History Main Topics    Smoking status: Never Smoker    Smokeless tobacco: Never Used    Alcohol use No    Drug use: No    Sexual activity: Yes     Partners: Male     Birth control/ protection: Surgical     Other Topics Concern    Not on file     Social History Narrative    . Housewife.     Family History   Problem Relation Age of Onset    Diabetes Brother     Heart disease Neg Hx        Review of Systems   Constitutional: Negative for activity change, chills, diaphoresis, fatigue and fever.   HENT: Negative for ear pain, facial swelling, nosebleeds, rhinorrhea, sneezing, sore throat and trouble swallowing.    Eyes: Negative for photophobia, pain and visual disturbance.   Respiratory: Negative for apnea, cough, chest tightness, shortness of breath and wheezing.    Gastrointestinal: Positive for abdominal pain. Negative for blood in stool, constipation, nausea, rectal pain and vomiting.   Genitourinary: Negative for decreased urine volume, difficulty urinating, dysuria, flank pain and hematuria.   Musculoskeletal: Negative for arthralgias, back pain, gait problem, neck pain and neck stiffness.   Skin: Negative for pallor and rash.   Neurological: Negative for seizures, syncope, speech difficulty, weakness and headaches.   Hematological: Negative for adenopathy. Does not bruise/bleed easily.   Psychiatric/Behavioral: Negative for behavioral problems, confusion and hallucinations.       Objective:       Vitals:    01/22/18 0956   BP: (!) 140/70   Pulse: 62       Physical Exam   Constitutional: She is oriented to person, place, and time. She appears well-developed and well-nourished.   HENT:   Head: Normocephalic and atraumatic.   Eyes: EOM are normal.  Pupils are equal, round, and reactive to light.   Neck: Normal range of motion. Neck supple. No thyromegaly present.   Cardiovascular: Normal rate, regular rhythm, normal heart sounds and intact distal pulses.    No murmur heard.  Pulmonary/Chest: Effort normal and breath sounds normal. No respiratory distress. She has no wheezes. She has no rales.   Abdominal: Soft. Bowel sounds are normal. She exhibits no distension and no mass. There is no tenderness.   Musculoskeletal: Normal range of motion. She exhibits no edema or tenderness.   Lymphadenopathy:     She has no cervical adenopathy.   Neurological: She is alert and oriented to person, place, and time. She has normal reflexes. No cranial nerve deficit.   Skin: Skin is warm and dry. No erythema.   Psychiatric: She has a normal mood and affect. Her behavior is normal.       Lab Summary Latest Ref Rng & Units 12/7/2017   Hemoglobin - (None)   Hematocrit - (None)   White count - (None)   Platelet count - (None)   Sodium 136 - 145 mmol/L 142   Potassium 3.5 - 5.1 mmol/L 4.3   Calcium 8.7 - 10.5 mg/dL 9.3   Phosphorus - (None)   Creatinine 0.5 - 1.4 mg/dL 0.8   AST 10 - 40 U/L 14   Alk Phos 55 - 135 U/L 96   Bilirubin 0.1 - 1.0 mg/dL 0.5   Glucose 70 - 110 mg/dL 184(H)   Cholesterol 120 - 199 mg/dL 190   HDL cholesterol 40 - 75 mg/dL 58   Triglycerides 30 - 150 mg/dL 167(H)   LDL calc - (None)   Total protein 6.0 - 8.4 g/dL 6.9   Albumin 3.5 - 5.2 g/dL 3.7   A1C - (None)   PSA Screen - (None)   Some recent data might be hidden       Assessment:       1. Fatty liver    2. History of colon polyps        Plan:       Patients with NAFLD are at increased risk for cardiovascular disease and often have multiple cardiovascular disease risk factors. Management of patients with NAFLD includes optimization of blood glucose control in patients with diabetes and treatment of hyperlipidemia. Statin therapy has been shown to be safe in patients with NAFLD.  In general, we do not  suggest using pharmacologic agents, like Vitamin E or Pioglitazone, solely for the treatment of NAFLD. However, we do suggest vitamin E at a dose of 400 international units/day for the subset of patients with advanced fibrosis on biopsy who do not have diabetes or coronary artery disease.    The management of Fatty Liver consists of:  -- Gradual Weight loss. A 20% weight reduction translates to improved symptoms.  -- Exercise 4 times a week for 30 minutes will mobilize some of the fat away from your liver and make your insulin more effective.  -- A low carb high protein diet, rich in vegetables and increased water consumption, staying away from carbonated, sugary drinks.   -- Avoid processed foods.   These are the only effective measures to manage fatty liver. If after a year of adhering to these measures and effectively reducing your Body Mass Index plus decreasing the insulin resistance, you continue to have symptoms, we may need to start medications to treat Fatty liver. These medications are used to treat diabetes and work well for insulin resistance. Unfortunately, these medications can have side effects.       Goals      Reduce calorie intake to 2000 calories per day              Metabolic Syndrome: Losing Excess Weight    Metabolic syndrome is a set of five health factors that can lead to serious health problems. The factors greatly increase your risk for diabetes, heart attack, or stroke. Extra weight with a large waist is one of the factors for metabolic syndrome. Being overweight or obese means that you weigh too much for what is healthy for your height. A large waist size is 40 inches or more for men, and 35 inches or more for women.  But you can take steps to lose weight and lower your risk for serious health problems.  Benefits of weight loss  Even with a small weight loss, you may have more energy and feel better. Losing even a small amount of weight can affect your blood pressure, triglycerides, HDL  cholesterol, and blood sugar. You may be able to take less medicine for blood pressure, cholesterol, or blood sugar. Or you may be able to stop taking medicine. As you lose weight, your risk for diabetes, heart attack, and stroke will get lower.  Getting started  The best way to lose weight is to do it gradually. For example, lose 1/2 to 1 pound a week. You will need to be more active and eat healthier foods. Make sure to:  · Exercise every day. Talk with your healthcare provider to make sure it is safe for you to exercise. Make sure you start slowly. Begin with 10 to 15 minutes of activity. Try to exercise or be active for at least 30 minutes most days of the week. You can exercise all at once or break it up into 10- or 15-minute sessions. And think about other ways you can be more active throughout the day.  · Eat healthy foods. Most successful dieters make changes in what, when, and how much they eat. The best way to lose weight is to eat fewer calories. You should make sure you check your portion sizes, eat breakfast, plan your meals and snacks, and eat slowly.  Working with your healthcare provider  Talk with your healthcare provider. He or she can guide you through the process of losing weight. As you begin to make changes, your healthcare provider will:  · Check your weight loss progress  · Check your blood pressure and blood test results  · Talk with you about your results  · Make suggestions about diet and exercise  · Recommend other experts or programs  · Make changes to your medicines and help with any side effects  Getting additional support  It can be hard to make healthy lifestyle changes. It may take some time to create new habits.  Your healthcare provider may suggest other experts or programs to help you, such as:  · Health . A health  gives ongoing support and makes suggestions to help you with healthy lifestyle changes, like weight loss.  · Weight loss programs. There are many safe  weight loss programs. Some are free or low-cost.  · Dietitian. He or she can help you make changes to your diet.  · Exercise specialist. He or she can help you with an exercise plan.  · Occupational therapist. He or she can help you make lifestyle changes to help you lose weight more effectively, particularly if you already have health issues or complications.   · Counselor. A counselor can help you deal with your feelings and emotions. There are psychiatrists, psychologists, and social workers who specialize in weight problems.  · Bariatric or obesity specialist. These healthcare providers are experts in obesity. They can help with diet, exercise, behavioral therapy or counseling, medicines for weight loss, and very low-calorie diets.  · Bariatric surgeon. Weight loss surgery may be a choice. But it is only advised for people who are over a certain weight, who have health problems because of their weight, and who have not been able to lose weight with other treatments.  Keeping the weight off  After losing weight, keeping it off can be even harder. Dont give up. Make sure to:  · Keep exercising. That means at least 40 to 60 minutes most days of the week.  · Keep eating healthy foods. Continue eating foods that are healthy and avoiding those that arent.  · Stay motivated. Watch your health improve. If you eat something unhealthy or skip exercising, dont give up. Simply make your next choice a healthy one.  Date Last Reviewed: 7/1/2016  © 0974-9745 [employer]. [address] [place] [address]. All rights reserved. This information is not intended as a substitute for professional medical care. Always follow your healthcare professional's instructions.                Fatty liver    History of colon polyps  -     sodium,potassium,mag sulfates (SUPREP BOWEL PREP KIT) 17.5-3.13-1.6 gram SolR; As directed  Dispense: 1 Bottle; Refill: 0    Patient has an order for a repeat colonoscopy. Her last  exam was in 2012.         Risks, benefits and alternative options were discussed with the patient. Risks including but not limited to infection, bleeding, heart or respiratory problems and perforation that may require surgery were all explained to the patient. The patient had a chance for questions if there were doubts or concerns about the test. The referring provider will be notified that our consultation is complete and available through the patient's records.    Thanks for letting us participate in the care of this nice patient,      Ronal Napoles

## 2018-01-22 NOTE — PATIENT INSTRUCTIONS
Goals      Reduce calorie intake to 2000 calories per day              Metabolic Syndrome: Losing Excess Weight    Metabolic syndrome is a set of five health factors that can lead to serious health problems. The factors greatly increase your risk for diabetes, heart attack, or stroke. Extra weight with a large waist is one of the factors for metabolic syndrome. Being overweight or obese means that you weigh too much for what is healthy for your height. A large waist size is 40 inches or more for men, and 35 inches or more for women.  But you can take steps to lose weight and lower your risk for serious health problems.  Benefits of weight loss  Even with a small weight loss, you may have more energy and feel better. Losing even a small amount of weight can affect your blood pressure, triglycerides, HDL cholesterol, and blood sugar. You may be able to take less medicine for blood pressure, cholesterol, or blood sugar. Or you may be able to stop taking medicine. As you lose weight, your risk for diabetes, heart attack, and stroke will get lower.  Getting started  The best way to lose weight is to do it gradually. For example, lose 1/2 to 1 pound a week. You will need to be more active and eat healthier foods. Make sure to:  · Exercise every day. Talk with your healthcare provider to make sure it is safe for you to exercise. Make sure you start slowly. Begin with 10 to 15 minutes of activity. Try to exercise or be active for at least 30 minutes most days of the week. You can exercise all at once or break it up into 10- or 15-minute sessions. And think about other ways you can be more active throughout the day.  · Eat healthy foods. Most successful dieters make changes in what, when, and how much they eat. The best way to lose weight is to eat fewer calories. You should make sure you check your portion sizes, eat breakfast, plan your meals and snacks, and eat slowly.  Working with your healthcare provider  Talk with  your healthcare provider. He or she can guide you through the process of losing weight. As you begin to make changes, your healthcare provider will:  · Check your weight loss progress  · Check your blood pressure and blood test results  · Talk with you about your results  · Make suggestions about diet and exercise  · Recommend other experts or programs  · Make changes to your medicines and help with any side effects  Getting additional support  It can be hard to make healthy lifestyle changes. It may take some time to create new habits.  Your healthcare provider may suggest other experts or programs to help you, such as:  · Health . A health  gives ongoing support and makes suggestions to help you with healthy lifestyle changes, like weight loss.  · Weight loss programs. There are many safe weight loss programs. Some are free or low-cost.  · Dietitian. He or she can help you make changes to your diet.  · Exercise specialist. He or she can help you with an exercise plan.  · Occupational therapist. He or she can help you make lifestyle changes to help you lose weight more effectively, particularly if you already have health issues or complications.   · Counselor. A counselor can help you deal with your feelings and emotions. There are psychiatrists, psychologists, and social workers who specialize in weight problems.  · Bariatric or obesity specialist. These healthcare providers are experts in obesity. They can help with diet, exercise, behavioral therapy or counseling, medicines for weight loss, and very low-calorie diets.  · Bariatric surgeon. Weight loss surgery may be a choice. But it is only advised for people who are over a certain weight, who have health problems because of their weight, and who have not been able to lose weight with other treatments.  Keeping the weight off  After losing weight, keeping it off can be even harder. Dont give up. Make sure to:  · Keep exercising. That means at least 40  to 60 minutes most days of the week.  · Keep eating healthy foods. Continue eating foods that are healthy and avoiding those that arent.  · Stay motivated. Watch your health improve. If you eat something unhealthy or skip exercising, dont give up. Simply make your next choice a healthy one.  Date Last Reviewed: 7/1/2016 © 2000-2017 [employer]. [address] [place] [address], [place] [address]. All rights reserved. This information is not intended as a substitute for professional medical care. Always follow your healthcare professional's instructions.                Fatty liver    History of colon polyps  -     sodium,potassium,mag sulfates (SUPREP BOWEL PREP KIT) 17.5-3.13-1.6 gram SolR; As directed  Dispense: 1 Bottle; Refill: 0      Thanks for trusting us with your healthcare needs and using [facility]. If you want to ask us a question, you can do so by replying to this message or by calling [phone].    Sincerely,    Ronal Napoles M.D.      To rate your experience with Dr. Napoles please click on the link below:    [email or web address]          Nonalcoholic Fatty Liver Disease (NAFLD)  Nonalcoholic fatty liver disease (NAFLD) is a common disease of the liver. It occurs when you have too much fat in the liver. If NAFLD is severe, it can cause liver damage that seems like the damage caused by drinking too much alcohol. But NAFLD is not caused by drinking alcohol. This sheet tells you more about NAFLD and how it can be managed.    How the liver works   The liver is an organ in the upper right side of the belly (abdomen). It has many important jobs. These include:  · Breaking down (metabolizing) proteins, carbohydrates, and fats  · Making a substance called bile that helps break down fats  · Storing and releasing sugar (glucose) into the blood to give the body energy  · Removing toxins from the blood  · Helping with blood clotting  Understanding NAFLD  A  healthy liver may contain some fat. But if too much fat builds up in the liver, this causes NAFLD. NAFLD can be mild, causing fatty liver. Or it can be more severe and show inflammation, as well as the fat. This can cause non-alcoholic steatohepatitis (MONZON).  · Fatty liver. With fatty liver, the liver simply has more fat than normal. This extra fat usually does not harm the liver.  · MONZON. With MONZON, the fatty liver becomes inflamed over time. MONZON is serious because it can lead to scarring of the liver (fibrosis). Over time, the scarring may lead to cirrhosis of the liver. This can eventually cause liver failure or liver cancer.  Causes and risk factors of NAFLD  Doctors don't know what causes NAFLD. But certain things make the problem more likely to happen. These include:  · Obesity  · Prediabetes or diabetes  · High levels of fat found in the blood (cholesterol and triglycerides)  · Being exposed to certain medicines   Symptoms of NAFLD  Most people with NAFLD have no symptoms. If symptoms do occur, they can include:  · Tiredness  · Weakness  · Weight loss  · Loss of appetite  · Nausea and vomiting  · Belly pain and cramping  · Yellowing of the skin and eyes (jaundice), as well as dark urine, or light-colored stools  · Swelling in the belly or legs  Diagnosing NAFLD  Your healthcare provider may think you have NAFLD if routine blood tests show high levels of liver enzymes. This may mean you have a liver problem. You may need one or more imaging tests, such as an ultrasound, CT, or MRI. You may need more blood tests to look for other causes of liver disease. You may also need a liver biopsy. During this test, a hollow needle is used to remove a tiny tissue sample from your liver. This tissue is then checked in a lab. This test can find signs of damage to liver tissue. It can also help figure out the cause of the damage and tell the difference between fatty liver and MONZON.  Treating NAFLD  Treatment for NAFLD  varies for each person. The best early treatment is to treat any underlying conditions causing metabolic syndrome. This is the name for a group of conditions that includes:  · High blood pressure  · High levels of cholesterol and triglycerides  · Being overweight or obese  · Diabetes  Your healthcare provider will monitor your health and treat any symptoms or underlying health problems you have. Your provider will also work with you to control your risk factors. This will make liver damage less likely. In fact, treating those underlying conditions can often improve liver disease. You may need to take certain medicines, but no medicine will cure NAFLD. This is why treating the underlying conditions is most important. Your plan may include:  · Losing extra weight  · Getting regular exercise  · Controlling diabetes and high cholesterol or triglyceride levels  · Taking medicines and vitamins as prescribed by your provider  · Quitting smoking  · Not drinking alcohol  · Eating a healthy and balanced diet  Living with NAFLD  If NAFLD is caught early, it can be managed with treatment. Your healthcare provider will discuss further treatment choices with you as needed.  Be sure to ask your provider about recommended vaccines. These include vaccines for viruses that can cause liver disease.  Date Last Reviewed: 12/1/2016  © 0454-5765 [employer]. [address] [place] [address]. All rights reserved. This information is not intended as a substitute for professional medical care. Always follow your healthcare professional's instructions.

## 2018-01-22 NOTE — LETTER
January 22, 2018      Preethi Sheridan NP  [address] [place] [address] [address] [place] [address]           [facility]  [address] [place] [address] [place] [address]  Phone: [phone]  Fax: [phone]          Patient: Dary Chaney   MR Number: [id number]   YOB: 1955   Date of Visit: 1/22/2018       Dear Preethi Sheridan:    Thank you for referring Dary Chaney to me for evaluation. Attached you will find relevant portions of my assessment and plan of care.    If you have questions, please do not hesitate to call me. I look forward to following Dary Chaney along with you.    Sincerely,    Ronal Napoles MD    Enclosure  CC:  No Recipients    If you would like to receive this communication electronically, please contact [email or web address] or [phone] to request more information on "[email or web address] Link access.    For providers and/or their staff who would like to refer a patient to [facility], please contact us through our one-stop-shop provider referral line, [facility] , at [phone].    If you feel you have received this communication in error or would no longer like to receive these types of communications, please e-mail [email or web address]

## 2018-02-08 RX ORDER — INSULIN DEGLUDEC 200 U/ML
70 INJECTION, SOLUTION SUBCUTANEOUS DAILY
Qty: 10.5 SYRINGE | Refills: 1 | Status: SHIPPED | OUTPATIENT
Start: 2018-02-08 | End: 2018-03-12 | Stop reason: SDUPTHER

## 2018-02-14 ENCOUNTER — TELEPHONE (OUTPATIENT)
Dept: DIABETES | Facility: CLINIC | Age: 63
End: 2018-02-14

## 2018-03-01 ENCOUNTER — HOSPITAL ENCOUNTER (OUTPATIENT)
Facility: HOSPITAL | Age: 63
Discharge: HOME | End: 2018-03-01
Attending: INTERNAL MEDICINE | Admitting: INTERNAL MEDICINE
Payer: COMMERCIAL

## 2018-03-01 ENCOUNTER — SURGERY (OUTPATIENT)
Age: 63
End: 2018-03-01

## 2018-03-01 ENCOUNTER — ANESTHESIA EVENT (OUTPATIENT)
Dept: ENDOSCOPY | Facility: HOSPITAL | Age: 63
End: 2018-03-01
Payer: COMMERCIAL

## 2018-03-01 ENCOUNTER — ANESTHESIA (OUTPATIENT)
Dept: ENDOSCOPY | Facility: HOSPITAL | Age: 63
End: 2018-03-01
Payer: COMMERCIAL

## 2018-03-01 DIAGNOSIS — Z12.11 SCREEN FOR COLON CANCER: ICD-10-CM

## 2018-03-01 LAB — POCT GLUCOSE: 77 MG/DL (ref 70–110)

## 2018-03-01 PROCEDURE — 88305 TISSUE EXAM BY PATHOLOGIST: CPT | Performed by: PATHOLOGY

## 2018-03-01 PROCEDURE — 37000009 HC ANESTHESIA EA ADD 15 MINS: Performed by: INTERNAL MEDICINE

## 2018-03-01 PROCEDURE — 37000008 HC ANESTHESIA 1ST 15 MINUTES: Performed by: INTERNAL MEDICINE

## 2018-03-01 PROCEDURE — 25000003 PHARM REV CODE 250: Performed by: INTERNAL MEDICINE

## 2018-03-01 PROCEDURE — 25000003 PHARM REV CODE 250: Performed by: NURSE ANESTHETIST, CERTIFIED REGISTERED

## 2018-03-01 PROCEDURE — 88305 TISSUE EXAM BY PATHOLOGIST: CPT | Mod: 26,,, | Performed by: PATHOLOGY

## 2018-03-01 PROCEDURE — 27201012 HC FORCEPS, HOT/COLD, DISP: Performed by: INTERNAL MEDICINE

## 2018-03-01 PROCEDURE — 45380 COLONOSCOPY AND BIOPSY: CPT | Performed by: INTERNAL MEDICINE

## 2018-03-01 PROCEDURE — 63600175 PHARM REV CODE 636 W HCPCS: Performed by: INTERNAL MEDICINE

## 2018-03-01 PROCEDURE — 45380 COLONOSCOPY AND BIOPSY: CPT | Mod: 33,,, | Performed by: INTERNAL MEDICINE

## 2018-03-01 PROCEDURE — 63600175 PHARM REV CODE 636 W HCPCS: Performed by: NURSE ANESTHETIST, CERTIFIED REGISTERED

## 2018-03-01 PROCEDURE — 82962 GLUCOSE BLOOD TEST: CPT | Performed by: INTERNAL MEDICINE

## 2018-03-01 RX ORDER — SODIUM CHLORIDE, SODIUM LACTATE, POTASSIUM CHLORIDE, CALCIUM CHLORIDE 600; 310; 30; 20 MG/100ML; MG/100ML; MG/100ML; MG/100ML
INJECTION, SOLUTION INTRAVENOUS CONTINUOUS PRN
Status: DISCONTINUED | OUTPATIENT
Start: 2018-03-01 | End: 2018-03-01

## 2018-03-01 RX ORDER — LIDOCAINE HCL/PF 100 MG/5ML
SYRINGE (ML) INTRAVENOUS
Status: DISCONTINUED | OUTPATIENT
Start: 2018-03-01 | End: 2018-03-01

## 2018-03-01 RX ORDER — PROPOFOL 10 MG/ML
INJECTION, EMULSION INTRAVENOUS
Status: DISCONTINUED | OUTPATIENT
Start: 2018-03-01 | End: 2018-03-01

## 2018-03-01 RX ORDER — SODIUM CHLORIDE, SODIUM LACTATE, POTASSIUM CHLORIDE, CALCIUM CHLORIDE 600; 310; 30; 20 MG/100ML; MG/100ML; MG/100ML; MG/100ML
INJECTION, SOLUTION INTRAVENOUS CONTINUOUS
Status: DISCONTINUED | OUTPATIENT
Start: 2018-03-01 | End: 2018-03-01 | Stop reason: HOSPADM

## 2018-03-01 RX ORDER — ONDANSETRON 2 MG/ML
4 INJECTION INTRAMUSCULAR; INTRAVENOUS ONCE
Status: COMPLETED | OUTPATIENT
Start: 2018-03-01 | End: 2018-03-01

## 2018-03-01 RX ADMIN — PROPOFOL 30 MG: 10 INJECTION, EMULSION INTRAVENOUS at 08:03

## 2018-03-01 RX ADMIN — PROPOFOL 30 MG: 10 INJECTION, EMULSION INTRAVENOUS at 09:03

## 2018-03-01 RX ADMIN — ONDANSETRON 4 MG: 2 INJECTION INTRAMUSCULAR; INTRAVENOUS at 08:03

## 2018-03-01 RX ADMIN — LIDOCAINE HYDROCHLORIDE 100 MG: 20 INJECTION, SOLUTION INTRAVENOUS at 08:03

## 2018-03-01 RX ADMIN — PROPOFOL 140 MG: 10 INJECTION, EMULSION INTRAVENOUS at 08:03

## 2018-03-01 RX ADMIN — SODIUM CHLORIDE, SODIUM LACTATE, POTASSIUM CHLORIDE, AND CALCIUM CHLORIDE: .6; .31; .03; .02 INJECTION, SOLUTION INTRAVENOUS at 08:03

## 2018-03-01 RX ADMIN — SODIUM CHLORIDE, SODIUM LACTATE, POTASSIUM CHLORIDE, AND CALCIUM CHLORIDE: 600; 310; 30; 20 INJECTION, SOLUTION INTRAVENOUS at 08:03

## 2018-03-01 NOTE — ANESTHESIA PREPROCEDURE EVALUATION
03/01/2018  Dary Chaney is a 62 y.o., female.    Anesthesia Evaluation    I have reviewed the Patient Summary Reports.    I have reviewed the Nursing Notes.   I have reviewed the Medications.     Review of Systems  Anesthesia Hx:  No problems with previous Anesthesia    Social:  Non-Smoker, Social Alcohol Use    Hematology/Oncology:  Hematology Normal   Oncology Normal     EENT/Dental:   chronic allergic rhinitis   Cardiovascular:   Hypertension, well controlled hyperlipidemia    Pulmonary:   snore   Renal/:   Chronic Renal Disease    Hepatic/GI:   Bowel Prep. 0330 last drink of fluid.   Musculoskeletal:   Arthritis     Neurological:  Neurology Normal    Endocrine:   Diabetes, well controlled, type 2    Dermatological:  Skin Normal    Psych:  Psychiatric Normal           Physical Exam  General:  Well nourished, Obesity    Airway/Jaw/Neck:  Airway Findings: Mallampati: III                Anesthesia Plan  Type of Anesthesia, risks & benefits discussed:  Anesthesia Type:  MAC  Patient's Preference:   Intra-op Monitoring Plan:   Intra-op Monitoring Plan Comments:   Post Op Pain Control Plan:   Post Op Pain Control Plan Comments:   Induction:   IV  Beta Blocker:  Patient is not currently on a Beta-Blocker (No further documentation required).       Informed Consent: Patient understands risks and agrees with Anesthesia plan.  Questions answered. Anesthesia consent signed with patient.  ASA Score: 3     Day of Surgery Review of History & Physical: I have interviewed and examined the patient. I have reviewed the patient's H&P dated: 03/01/18. There are no significant changes.  H&P update referred to the provider.         Ready For Surgery From Anesthesia Perspective.

## 2018-03-01 NOTE — ANESTHESIA POSTPROCEDURE EVALUATION
"Anesthesia Post Evaluation    Patient: Dary Chaney    Procedure(s) Performed: Procedure(s) (LRB):  COLONOSCOPY (N/A)    Final Anesthesia Type: MAC  Patient location during evaluation: PACU  Patient participation: Yes- Able to Participate  Level of consciousness: awake and alert and oriented  Post-procedure vital signs: reviewed and stable  Pain management: adequate  Airway patency: patent  PONV status at discharge: No PONV  Anesthetic complications: no      Cardiovascular status: blood pressure returned to baseline  Respiratory status: unassisted, room air and spontaneous ventilation  Hydration status: euvolemic  Follow-up not needed.        Visit Vitals  BP (!) 113/53 (BP Location: Left arm, Patient Position: Lying)   Pulse 70   Temp 36.9 °C (98.4 °F) (Oral)   Resp 16   Ht 5' 2" (1.575 m)   Wt 74.4 kg (164 lb)   SpO2 96%   BMI 30.00 kg/m²       Pain/Marla Score: Marla Score: 9 (3/1/2018  9:19 AM)      "

## 2018-03-01 NOTE — H&P
Short Stay Endoscopy History and Physical    PCP - Sakina Cooper, DO    Procedure - Colonoscopy  ASA - II  Mallampati - per anesthesia  History of Anesthesia problems - no  Family history Anesthesia problems -  no     HPI:  This is a 62 y.o. female here for evaluation of :  Screening for colon cancer    Average Risk Screening:Yes  Family history of colon cancer: No  History of polyps: No  Anemia: No  Blood in stools: No  Diarrhea: No  Abdominal Pain: No    Review of Systems:  CONSTITUTIONAL: Denies weight change,  fatigue, fevers, chills, night sweats.  CARDIOVASCULAR: Denies chest pain, shortness of breath, orthopnea and edema.  RESPIRATORY: Denies cough, hemoptysis, dyspnea, and wheezing.  GI: See HPI.    Medical History:  Past Medical History:   Diagnosis Date    Arthritis     Cataract     Colon polyp     Diabetes mellitus type II     Hyperlipidemia     Hypertension     UTI (urinary tract infection)        Surgical History:   Past Surgical History:   Procedure Laterality Date    CHOLECYSTECTOMY      CHOLECYSTECTOMY      COLONOSCOPY  2012    HYSTERECTOMY  1993    uterine prolapse    KNEE SURGERY  03/2017    SPINE SURGERY         Family History:   Family History   Problem Relation Age of Onset    Diabetes Brother     Heart disease Neg Hx        Social History:   Social History   Substance Use Topics    Smoking status: Never Smoker    Smokeless tobacco: Never Used    Alcohol use No       Allergies: Reviewed.    Medications:  No current facility-administered medications on file prior to encounter.      Current Outpatient Prescriptions on File Prior to Encounter   Medication Sig Dispense Refill    lidocaine (LIDODERM) 5 % Place 1 patch onto the skin daily as needed. Remove & Discard patch within 12 hours or as directed by MD 30 patch 3    losartan (COZAAR) 25 MG tablet Take 1 tablet (25 mg total) by mouth once daily. 90 tablet 1    simvastatin (ZOCOR) 40 MG tablet Take 1 tablet (40 mg total)  "by mouth nightly. 90 tablet 1    albuterol (PROVENTIL) 2.5 mg /3 mL (0.083 %) nebulizer solution Take 3 mLs (2.5 mg total) by nebulization every 6 (six) hours as needed for Wheezing. 120 mL 0    blood sugar diagnostic Strp Contour Next. Glucose testing three times daily. 100 strip 3    blood-glucose meter kit Contour Next. Use as instructed 1 each 0    fluticasone (FLONASE) 50 mcg/actuation nasal spray USE TWO SPRAY(S) IN EACH NOSTRIL ONCE DAILY 16 g 1    glimepiride (AMARYL) 4 MG tablet Take 1 tablet (4 mg total) by mouth daily with breakfast. (Patient taking differently: Take 4 mg by mouth 2 (two) times daily with meals. ) 90 tablet 1    lancets 33 gauge Misc 1 lancet by Misc.(Non-Drug; Combo Route) route 3 (three) times daily. 100 each 11    loratadine (CLARITIN) 10 mg tablet Take 1 tablet (10 mg total) by mouth once daily. 30 tablet 0    PEN NEEDLE 31 X 5/16 " Ndle AS DIRECTED 100 each 10       Physical Exam:  Vital Signs:   Vitals:    03/01/18 0809   BP: 137/78   Pulse: 73   Resp: 18   Temp: 98.5 °F (36.9 °C)     General Appearance: Well appearing in no acute distress  ENT: OP clear  Chest: CTA B  CV: RRR, no m/r/g  Abd: s/nt/nd/nabs  Ext: no edema    Labs:  Lab Results   Component Value Date    WBC 5.05 05/13/2014    HGB 13.3 05/13/2014    HCT 40.0 05/13/2014    MCV 80 (L) 05/13/2014     05/13/2014     Lab Results   Component Value Date    INR 1.0 06/21/2010     No results found for: IRON, TIBC, FERRITIN, SATURATEDIRO      IMPRESSION:  Patient Active Problem List   Diagnosis    Central obesity    Hypertension goal BP (blood pressure) < 130/80    Hyperlipidemia LDL goal <70    Seasonal allergic rhinitis    Acquired cyst of kidney    Intervertebral lumbar disc disorder with myelopathy, lumbar region    Cataract    Uncontrolled diabetes mellitus type 2 without complications    Type 2 diabetes mellitus without retinopathy    Screen for colon cancer       Colon cancer screening    Plan:  I " have explained the risks and benefits of colonoscopy to the patient including but not limited to bleeding, perforation, infection, and death. The patient wishes to proceed with colonoscopy.

## 2018-03-01 NOTE — DISCHARGE INSTRUCTIONS
Diverticulosis    Diverticulosis means that small pouches have formed in the wall of your large intestine (colon). Most often, this problem causes no symptoms and is common as people age. But the pouches in the colon are at risk of becoming infected. When this happens, the condition is called diverticulitis. Although most people with diverticulosis never develop diverticulitis, it is still not uncommon. Rectal bleeding can also occur and in less common situations, a type of colon inflammation called colitis.  While most people do not have symptoms, some people with diverticulosis may have:  · Abdominal cramps and pain  · Bloating  · Constipation  · Change in bowel habits  Causes  The exact cause of diverticulosis (and diverticulitis) has not been proved, but a few things are associated with the condition:  · Low-fiber diet  · Constipation  · Lack of exercise  Your healthcare provider will talk with you about how to manage your condition. Diet changes may be all that are needed to help control diverticulosis and prevent progression to diverticulitis. If you develop diverticulitis, you will likely need other treatments.  Home care  You may be told to take fiber supplements daily. Fiber adds bulk to the stool so that it passes through the colon more easily. Stool softeners may be recommended. You may also be given medications for pain relief. Be sure to take all medications as directed.  In the past, people were told to avoid corn, nuts, and seeds. This is no longer necessary.  Follow these guidelines when caring for yourself at home:  · Eat unprocessed foods that are high in fiber. Whole grains, fruits, and vegetables are good choices.  · Drink 6 to 8 glasses of water every day unless your healthcare provider has you limit how much fluid you should have.  · Watch for changes in your bowel movements. Tell your provider if you notice any changes.  · Begin an exercise program. Ask your provider how to get started.  Generally, walking is the best.  · Get plenty of rest and sleep.  Follow-up care  Follow up with your healthcare provider, or as advised. Regular visits may be needed to check on your health. Sometimes special procedures such as colonoscopy, are needed after an episode of diverticulitis or blooding. Be sure to keep all your appointments.  If a stool sample was taken, or cultures were done, you should be told if they are positive, or if your treatment needs to be changed. You can call as directed for the results.  If X-rays were done, a radiologist will look at them. You will be told if there is a change in your treatment.  If antibiotics were prescribed, be sure to finish them all.  When to seek medical advice  Call your healthcare provider right away if any of these occur:  · Fever of 100.4°F (38°C) or higher, or as directed by your healthcare provider  · Severe cramps in the lower left side of the abdomen or pain that is getting worse  · Tenderness in the lower left side of the abdomen or worsening pain throughout the abdomen  · Diarrhea or constipation that doesn't get better within 24 hours  · Nausea and vomiting  · Bleeding from the rectum  Call 911  Call emergency services if any of the following occur:  · Trouble breathing  · Confusion  · Very drowsy or trouble awakening  · Fainting or loss of consciousness  · Rapid heart rate  · Chest pain  Date Last Reviewed: 12/30/2015 © 2000-2017 [employer]. [address] [place] [address]. All rights reserved. This information is not intended as a substitute for professional medical care. Always follow your healthcare professional's instructions.        Understanding Colon and Rectal Polyps    The colon (also called the large intestine) is a muscular tube that forms the last part of the digestive tract. It absorbs water and stores food waste. The colon is about 4 to 6 feet long. The rectum is the last 6 inches of the colon. The colon and rectum  have a smooth lining composed of millions of cells. Changes in these cells can lead to growths in the colon that can become cancerous and should be removed. Multiple tests are available to screen for colon cancer, but the colonoscopy is the most recommended test. During colonoscopy, these polyps can be removed. How often you need this test depends on many things including your condition, your family history, symptoms, and what the findings were at the previous colonoscopy.   When the colon lining changes  Changes that happen in the cells that line the colon or rectum can lead to growths called polyps. Over a period of years, polyps can turn cancerous. Removing polyps early may prevent cancer from ever forming.  Polyps  Polyps are fleshy clumps of tissue that form on the lining of the colon or rectum. Small polyps are usually benign (not cancerous). However, over time, cells in a polyp can change and become cancerous. Certain types of polyps known as adenomatous polyps are premalignant. The risk for invasive cancer increases with the size of the polyp and certain cell and gene features. This means that they can become cancerous if they're not removed. Hyperplastic polyps are benign. They can grow quite large and not turn cancerous.   Cancer  Almost all colorectal cancers start when polyp cells begin growing abnormally. As a cancerous tumor grows, it may involve more and more of the colon or rectum. In time, cancer can also grow beyond the colon or rectum and spread to nearby organs or to glands called lymph nodes. The cells can also travel to other parts of the body. This is known as metastasis. The earlier a cancerous tumor is removed, the better the chance of preventing its spread.    Date Last Reviewed: 8/1/2016  © 8090-9912 [employer]. [address] [place] [address], [place] [address]. All rights reserved. This information is not intended as a substitute for professional medical care. Always follow your  healthcare professional's instructions.

## 2018-03-01 NOTE — TRANSFER OF CARE
"Anesthesia Transfer of Care Note    Patient: Dary Chaney    Procedure(s) Performed: Procedure(s) (LRB):  COLONOSCOPY (N/A)    Patient location: PACU    Anesthesia Type: MAC    Transport from OR: Transported from OR on room air with adequate spontaneous ventilation    Post pain: adequate analgesia    Post assessment: no apparent anesthetic complications    Post vital signs: stable    Level of consciousness: sedated    Nausea/Vomiting: no nausea/vomiting    Complications: none    Transfer of care protocol was followed      Last vitals:   Visit Vitals  BP (!) 113/53 (BP Location: Left arm, Patient Position: Lying)   Pulse 70   Temp 36.9 °C (98.4 °F) (Oral)   Resp 16   Ht 5' 2" (1.575 m)   Wt 74.4 kg (164 lb)   SpO2 96%   BMI 30.00 kg/m²     "

## 2018-03-01 NOTE — ANESTHESIA RELEASE NOTE
"Anesthesia Release from PACU Note    Patient: Dray Chaney    Procedure(s) Performed: Procedure(s) (LRB):  COLONOSCOPY (N/A)    Anesthesia type: MAC    Post pain: Adequate analgesia    Post assessment: no apparent anesthetic complications, tolerated procedure well and no evidence of recall    Last Vitals:   Visit Vitals  BP (!) 113/53 (BP Location: Left arm, Patient Position: Lying)   Pulse 70   Temp 36.9 °C (98.4 °F) (Oral)   Resp 16   Ht 5' 2" (1.575 m)   Wt 74.4 kg (164 lb)   SpO2 96%   BMI 30.00 kg/m²       Post vital signs: stable    Level of consciousness: awake    Nausea/Vomiting: no nausea/no vomiting    Complications: none    Airway Patency: patent    Respiratory: unassisted, spontaneous ventilation, room air    Cardiovascular: stable    Hydration: euvolemic  "

## 2018-03-02 VITALS
WEIGHT: 164 LBS | RESPIRATION RATE: 18 BRPM | TEMPERATURE: 98 F | BODY MASS INDEX: 30.18 KG/M2 | OXYGEN SATURATION: 90 % | DIASTOLIC BLOOD PRESSURE: 73 MMHG | HEART RATE: 70 BPM | HEIGHT: 62 IN | SYSTOLIC BLOOD PRESSURE: 115 MMHG

## 2018-03-09 ENCOUNTER — TELEPHONE (OUTPATIENT)
Dept: DIABETES | Facility: CLINIC | Age: 63
End: 2018-03-09

## 2018-03-12 ENCOUNTER — OFFICE VISIT (OUTPATIENT)
Dept: DIABETES | Facility: CLINIC | Age: 63
End: 2018-03-12
Payer: COMMERCIAL

## 2018-03-12 ENCOUNTER — LAB VISIT (OUTPATIENT)
Dept: LAB | Facility: HOSPITAL | Age: 63
End: 2018-03-12
Attending: INTERNAL MEDICINE
Payer: COMMERCIAL

## 2018-03-12 VITALS
HEIGHT: 62 IN | WEIGHT: 171.06 LBS | DIASTOLIC BLOOD PRESSURE: 80 MMHG | BODY MASS INDEX: 31.48 KG/M2 | SYSTOLIC BLOOD PRESSURE: 136 MMHG

## 2018-03-12 DIAGNOSIS — E11.69 TYPE 2 DIABETES MELLITUS WITH OTHER SPECIFIED COMPLICATION, WITH LONG-TERM CURRENT USE OF INSULIN: Primary | ICD-10-CM

## 2018-03-12 DIAGNOSIS — E78.5 HYPERLIPIDEMIA LDL GOAL <100: ICD-10-CM

## 2018-03-12 DIAGNOSIS — I10 HYPERTENSION GOAL BP (BLOOD PRESSURE) < 130/80: ICD-10-CM

## 2018-03-12 DIAGNOSIS — Z79.4 TYPE 2 DIABETES MELLITUS WITH OTHER SPECIFIED COMPLICATION, WITH LONG-TERM CURRENT USE OF INSULIN: Primary | ICD-10-CM

## 2018-03-12 LAB
ALBUMIN SERPL BCP-MCNC: 3.9 G/DL
ALP SERPL-CCNC: 76 U/L
ALT SERPL W/O P-5'-P-CCNC: 23 U/L
ANION GAP SERPL CALC-SCNC: 4 MMOL/L
AST SERPL-CCNC: 18 U/L
BILIRUB SERPL-MCNC: 0.4 MG/DL
BUN SERPL-MCNC: 20 MG/DL
CALCIUM SERPL-MCNC: 9.3 MG/DL
CHLORIDE SERPL-SCNC: 108 MMOL/L
CHOLEST SERPL-MCNC: 176 MG/DL
CHOLEST/HDLC SERPL: 2.5 {RATIO}
CO2 SERPL-SCNC: 31 MMOL/L
CREAT SERPL-MCNC: 0.7 MG/DL
EST. GFR  (AFRICAN AMERICAN): >60 ML/MIN/1.73 M^2
EST. GFR  (NON AFRICAN AMERICAN): >60 ML/MIN/1.73 M^2
ESTIMATED AVG GLUCOSE: 154 MG/DL
GLUCOSE SERPL-MCNC: 182 MG/DL (ref 70–110)
GLUCOSE SERPL-MCNC: 86 MG/DL
HBA1C MFR BLD HPLC: 7 %
HDLC SERPL-MCNC: 70 MG/DL
HDLC SERPL: 39.8 %
LDLC SERPL CALC-MCNC: 77.6 MG/DL
NONHDLC SERPL-MCNC: 106 MG/DL
POTASSIUM SERPL-SCNC: 4.5 MMOL/L
PROT SERPL-MCNC: 6.7 G/DL
SODIUM SERPL-SCNC: 143 MMOL/L
TRIGL SERPL-MCNC: 142 MG/DL

## 2018-03-12 PROCEDURE — 3079F DIAST BP 80-89 MM HG: CPT | Mod: CPTII,S$GLB,, | Performed by: NURSE PRACTITIONER

## 2018-03-12 PROCEDURE — 83036 HEMOGLOBIN GLYCOSYLATED A1C: CPT

## 2018-03-12 PROCEDURE — 99215 OFFICE O/P EST HI 40 MIN: CPT | Mod: S$GLB,,, | Performed by: NURSE PRACTITIONER

## 2018-03-12 PROCEDURE — 82948 REAGENT STRIP/BLOOD GLUCOSE: CPT | Mod: S$GLB,,, | Performed by: NURSE PRACTITIONER

## 2018-03-12 PROCEDURE — 80053 COMPREHEN METABOLIC PANEL: CPT

## 2018-03-12 PROCEDURE — 36415 COLL VENOUS BLD VENIPUNCTURE: CPT | Mod: PO

## 2018-03-12 PROCEDURE — 3075F SYST BP GE 130 - 139MM HG: CPT | Mod: CPTII,S$GLB,, | Performed by: NURSE PRACTITIONER

## 2018-03-12 PROCEDURE — 80061 LIPID PANEL: CPT

## 2018-03-12 PROCEDURE — 99999 PR PBB SHADOW E&M-EST. PATIENT-LVL III: CPT | Mod: PBBFAC,,, | Performed by: NURSE PRACTITIONER

## 2018-03-12 RX ORDER — INSULIN DEGLUDEC 200 U/ML
70 INJECTION, SOLUTION SUBCUTANEOUS DAILY
Qty: 10.5 SYRINGE | Refills: 1 | Status: SHIPPED | OUTPATIENT
Start: 2018-03-12 | End: 2018-05-16 | Stop reason: SDUPTHER

## 2018-03-12 RX ORDER — LANCETS 33 GAUGE
1 EACH MISCELLANEOUS 3 TIMES DAILY
Qty: 300 EACH | Refills: 3 | Status: SHIPPED | OUTPATIENT
Start: 2018-03-12 | End: 2020-03-26 | Stop reason: SDUPTHER

## 2018-03-12 RX ORDER — GLIMEPIRIDE 4 MG/1
4 TABLET ORAL
Qty: 90 TABLET | Refills: 1 | Status: SHIPPED | OUTPATIENT
Start: 2018-03-12 | End: 2018-06-26 | Stop reason: SDUPTHER

## 2018-03-12 NOTE — PROGRESS NOTES
"Subjective:         Patient ID: Dary Chaney is a 62 y.o. female.  Patient's current PCP is Sakina Cooper DO.   Social History     Social History    Marital status:      Spouse name: N/A    Number of children: 3    Years of education: N/A     Occupational History    Stay at Home           Social History Main Topics    Smoking status: Never Smoker    Smokeless tobacco: Never Used    Alcohol use No    Drug use: No    Sexual activity: Yes     Partners: Male     Birth control/ protection: Surgical     Other Topics Concern    Not on file     Social History Narrative    . Housewife.       Chief Complaint: Diabetes Mellitus    HPI  Dary Chaney is a 62 y.o. White female presenting for follow up of diabetes. Patient has been diagnosed with diabetes for approximately 10 years and has the following complications from diabetes: none. Patient did not follow up as directed nor send in blood glucose levels as requested. Blood glucose testing is performed regularly. Patient reports fastings , PP less than 140.   She denies any recent hospital admissions, emergency room visits, hypoglycemia.      Height: 5' 2" (157.5 cm)  //  Weight: 77.6 kg (171 lb 1.2 oz), Body mass index is 31.29 kg/m².  Home Blood Glucose reading this AM: Unknown mg/dl fasting.  Her blood sugar in clinic today is:   Lab Results   Component Value Date    POCGLU 182 (A) 03/12/2018       Labs reviewed and are noted below.    Her most recent A1C is:   Lab Results   Component Value Date    HGBA1C 8.8 (H) 12/07/2017     Lab Results   Component Value Date    CPEPTIDE 1.10 12/14/2017     Lab Results   Component Value Date    GLUTAMICACID 0.00 12/14/2017     Lab Results   Component Value Date    WBC 5.05 05/13/2014    HGB 13.3 05/13/2014    HCT 40.0 05/13/2014     05/13/2014    CHOL 190 12/07/2017    TRIG 167 (H) 12/07/2017    HDL 58 12/07/2017    ALT 25 12/07/2017    AST 14 12/07/2017     12/07/2017 "    K 4.3 12/07/2017     12/07/2017    CREATININE 0.8 12/07/2017    BUN 16 12/07/2017    CO2 29 12/07/2017    TSH 0.870 12/14/2017    INR 1.0 06/21/2010    GLUF 117 (H) 04/27/2010    HGBA1C 8.8 (H) 12/07/2017       CURRENT DM MEDICATIONS:   Current Outpatient Prescriptions   Medication Sig Dispense Refill    glimepiride (AMARYL) 4 MG tablet Take 1 tablet (4 mg total) by mouth daily with breakfast. (Patient taking differently: Take 4 mg by mouth 2 (two) times daily with meals. ) 90 tablet 1    Tresiba Inject 70 units SQ  1 Box 3    Trulicity 1.5mg q week 9 Syringe 3     No current facility-administered medications for this visit.        Health Maintenance   Topic Date Due    Zoster Vaccine  12/05/2014    Hemoglobin A1c  06/07/2018    Foot Exam  09/11/2018    Eye Exam  10/17/2018    Lipid Panel  12/07/2018    Mammogram  04/11/2019    Pneumococcal PPSV23 (Medium Risk) (2) 08/10/2021    TETANUS VACCINE  08/26/2021    Colonoscopy  03/01/2028    Hepatitis C Screening  Addressed    Influenza Vaccine  Addressed       STANDARDS OF CARE:  Current Ophthalmologist/Optometrist: Dr. Carlos. Last exam 2017  Current Dentist: Yes. Last exam unknown  Current Podiatrist: No  ACE/ARB: Yes  Statin: Yes  She  has attended diabetes education in the past twice.     LIFESTYLE:  ACTIVITY LEVEL: Moderately Active  EXERCISE:  none  MEAL PLANNING: Patient reports number of meals per day to be 3 and number of snacks per day to be 2    BLOOD GLUCOSE TESTING: Patient reports testing on average a total of 0 times per day.      Review of Systems   Constitutional: Negative.  Negative for activity change, appetite change, chills, diaphoresis, fatigue, fever and unexpected weight change.   Eyes: Negative for visual disturbance.   Respiratory: Negative for apnea and shortness of breath.    Cardiovascular: Negative.  Negative for chest pain, palpitations and leg swelling.   Gastrointestinal: Negative for abdominal distention,  constipation, diarrhea, nausea and vomiting.   Endocrine: Negative for cold intolerance, heat intolerance, polydipsia, polyphagia and polyuria.   Genitourinary: Negative.  Negative for frequency.   Musculoskeletal: Positive for gait problem.        Knee pain   Skin: Negative.  Negative for color change, pallor, rash and wound.   Neurological: Negative for dizziness, seizures, syncope, light-headedness, numbness and headaches.   Psychiatric/Behavioral: Negative for confusion, hallucinations and suicidal ideas.         Objective:      Physical Exam   Constitutional: She is oriented to person, place, and time. She appears well-developed and well-nourished.   HENT:   Head: Normocephalic and atraumatic.   Eyes: EOM are normal. Pupils are equal, round, and reactive to light.   Neck: Normal range of motion. Neck supple.   Cardiovascular: Normal rate, regular rhythm and normal heart sounds.    Pulmonary/Chest: Effort normal and breath sounds normal.   Abdominal: Soft. Bowel sounds are normal.   Neurological: She is alert and oriented to person, place, and time.   Skin: Skin is warm and dry.   Psychiatric: She has a normal mood and affect. Her behavior is normal. Judgment and thought content normal.   Nursing note and vitals reviewed.      Assessment:       1. Type 2 diabetes mellitus with other specified complication, with long-term current use of insulin        Plan:   Type 2 diabetes mellitus with other specified complication, with long-term current use of insulin  -     POCT glucose  -     dulaglutide (TRULICITY) 1.5 mg/0.5 mL PnIj; Inject 1.5 mg into the skin every 7 days.  Dispense: 12 Syringe; Refill: 1  -     insulin degludec (TRESIBA FLEXTOUCH U-200) 200 unit/mL (3 mL) InPn; Inject 70 Units into the skin once daily.  Dispense: 10.5 Syringe; Refill: 1  -     lancets 33 gauge Misc; 1 lancet by Misc.(Non-Drug; Combo Route) route 3 (three) times daily.  Dispense: 300 each; Refill: 3  -     glimepiride (AMARYL) 4 MG  tablet; Take 1 tablet (4 mg total) by mouth daily with breakfast.  Dispense: 90 tablet; Refill: 1  -     blood sugar diagnostic Strp; Contour Next. Glucose testing three times daily.  Dispense: 300 strip; Refill: 3    - Condition controlled, with some fasting hypoglycemia. Patient should decrease Glimeperide, as she has been taking double prescribed dosage. DM education reviewed. Patient encouraged to carb count and exercise per recommendations. Labs and Referrals as noted. RV scheduled in 12 weeks. Patient instructed to send in log once weekly for my review.     Additional Plan Details:    1.) Patient was instructed to monitor blood glucose 2 - 3 x daily, fasting and ac dinner or at bedtime. Discussed ADA goal for fasting blood sugar, 80 - 130mg/dL; pp blood sugars below 180 mg/dl. Also, discussed prevention of hypoglycemia and the need to adjust goals to higher levels if persistent hypoglycemia.  Reminded to bring BG records or meter to each visit for review.  2.) Reviewed pathophysiology of Type 2 diabetes, complications related to the disease, importance of annual dilated eye exam and daily foot examination.  3.) We discussed the ADA recommendations, which are as follows:  Hemoglobin A1c below 7.0 %. All patients with diabetes should be on statins unless contraindicated.  ACE or ARB therapy if not contraindicated.    4.) Continue medications as prescribed.  My [facility] e-mail or phone review in one week with BG records for adjustment of medication.  5.) Meal planning teaching: Reviewed carb counting, portion control, importance of spacing meals throughout the day to prevent post prandial elevations.  6.) Discussed activity with related benefits, methods, and precautions. Recommended patient start or continue some form of exercise and increase as tolerated to 30 minutes per day to aid in control of BGs.  7.) A1C, TSH, Lipid Panel, CMP with eGFR and Micro/Creatinine are utd or were ordered per ADA protocol.  8.)  Return to clinic in 3 months for follow up. The patient was explained the above plan and given opportunity to ask questions.  She understands, chooses and consents to this plan and accepts all the risks, which include but are not limited to the risks mentioned above. She understands the alternative of having no testing, interventions or treatments at this time. She left content and without further questions.     A total of 45 minutes was spent in face to face time, of which over 50% was spent in counseling patient on disease process, complications, treatment, and side effects of medications.        Isha Boyer NP-C

## 2018-03-12 NOTE — PATIENT INSTRUCTIONS
PATIENT INSTRUCTIONS  - Follow up as scheduled.   - Carb Count: 30-45G/meal and 15G/snack  - Exercise: Goal is 150 minutes or more per week  - Bring meter and blood sugar log to each appointment.   - Decrease Glimepiride to 4mg with breakfast.     - You will take your blood sugar two times daily. Once will always be in the morning before you have any food, (known as your fasting blood sugar), and once should be two hours after EITHER your breakfast, lunch, or dinner, (known as your post-prandial blood sugar). Each day you should check a different meal, (ie. Monday-breakfast; Tuesday- lunch; Wednesday- supper, then repeat).     - Send me your blood sugars weekly if directed to do so. The easiest way to do this is through [facility]. Send in logs on Tuesdays, Wednesdays, or Thursdays.    - Blood Sugar Goals:  1. The goal for fasting blood sugars is 80 -130 mg/dl.   2. The goal for the 2 hour after meal blood sugars is below 180 mg/dl.  3. Blood sugars below 70 are considered LOW and you must eat or drink 15G of carbohydrates immediately, then recheck blood sugar after 15 minutes to ensure blood sugar has returned to normal range, (70 or above)                                                              Diabetes (General Information)  Diabetes is a long-term health problem. It means your body does not make enough insulin. Or it may mean that your body cannot use the insulin it makes. Insulin is a hormone in your body. It lets blood sugar (glucose) reach the cells in your body. All of your cells need glucose for fuel.  When you have diabetes, the glucose in your blood builds up because it cannot get into the cells. This buildup is called high blood sugar (hyperglycemia).  Your blood sugar level depends on several things. It depends on what kind of food you eat and how much of it you eat. It also depends on how much exercise you get, and how much insulin you have in your body. Eating too much of the wrong kinds of food  or not taking diabetes medicine on time can cause high blood sugar. Infections can cause high blood sugar even if you are taking medicines correctly.  These things can also cause low blood sugar:  · Missing meals  · Not eating enough food  · Taking too much diabetes medicine  Diabetes can cause serious problems over time if you do not get treated. These problems include heart disease, stroke, kidney failure, and blindness. They also include nerve pain or loss of feeling in your legs and feet, and gangrene of the feet. By keeping your blood sugar under control you can prevent or delay these problems.  Normal blood sugar levels are 80 to 100 before a meal and less than 180 in the 1 to 2 hours after a meal.  Home care  Follow these guidelines when caring for yourself at home:  · Follow the diet your healthcare provider gives you. Take insulin or other diabetes medicine exactly as told to.  · Watch your blood sugar as you are told to. Keep a log of your results. This will help your provider change your medicines to keep your blood sugar under control.  · Try to reach your ideal weight. You may be able to cut back on or not have to take diabetes medicine if you eat the right foods and get exercise.  · Do not smoke. Smoking worsens the effects of diabetes on your circulation. You are much more likely to have a heart attack if you have diabetes and you smoke.  · Take good care of your feet. If you have lost feeling in your feet, you may not see an injury or infection. Check your feet and between your toes at least once a week.  · Wear a medical alert bracelet or necklace, or carry a card in your wallet that says you have diabetes. This will help healthcare providers give you the right care if you get very ill and cannot tell them that you have diabetes.  Sick day plan  If you get a cold, the flu, or a bacterial or viral infection, take these steps:  · Look at your diabetes sick plan and call your healthcare provider as you  were told to. You may need to call your provider right away if:  ¨ Your blood sugar is above 240 while taking your diabetes medicine  ¨ Your urine ketone levels are above normal or high  ¨ You have been vomiting more than 6 hours  ¨ You have trouble breathing or your breath ha s a fruity smell  ¨ You have a high fever  ¨ You have a fever for several days and you are not getting better  ¨ You get light-headed and are sleepier than usual  · Keep taking your diabetes pills (oral medicine) even if you have been vomiting and are feeling sick. Call your provider right away because you may need insulin to lower your blood sugar until you recover from your illness.  · Keep taking your insulin even if you have been vomiting and are feeling sick. Call your provider right away to ask if you need to change your insulin dose. This will depend on your blood sugar results.  · Check your blood sugar every 2 to 4 hours, or at least 4 times a day.  · Check your ketones often. If you are vomiting and having diarrhea, watch them more often.  · Do not skip meals. Try to eat small meals on a regular schedule. Do this even if you do not feel like eating.  · Drink water or other liquids that do not have caffeine or calories. This will keep you from getting dehydrated. If you are nauseated or vomiting, takes small sips every 5 minutes. To prevent dehydration try to drink a cup (8 ounces) of fluids every hour while you are awake.  General care  Always bring a source of fast-acting sugar with you in case you have symptoms of low blood sugar (below 70). At the first sign of low blood sugar, eat or drink 15 to 20 grams of fast-acting sugar to raise your blood sugar. Examples are:  · 3 to 4 glucose tablets. You can buy these at most drugstores.  · 4 ounces (1/2 cup) of regular (not diet) soft drinks  · 4 ounces (1/2 cup) of any fruit juice  · 8 ounces (1 cup) of milk  · 5 to 6 pieces of hard candy  · 1 tablespoon of honey  Check your blood sugar  15 minutes after treating yourself. If it is still below 70, take 15 to 20 more grams of fast-acting sugar. Test again in 15 minutes. If it returns to normal (70 or above), eat a snack or meal to keep your blood sugar in a safe range. If it stays low, call your doctor or go to an emergency room.  Follow-up care  Follow-up with your healthcare provider, or as advised. For more information about diabetes, visit the American Diabetes Association website at www.diabetes.org or call 084-613-6428.  When to seek medical advice  Call your healthcare provider right away if you have any of these symptoms of high blood sugar:  · Frequent urination  · Dizziness  · Drowsiness  · Thirst  · Headache  · Nausea or vomiting  · Abdominal pain  · Eyesight changes  · Fast breathing  · Confusion or loss of consciousness  Also call your provider right away if you have any of these signs of low blood sugar:  · Fatigue  · Headache  · Shakes  · Excess sweating  · Hunger  · Feeling anxious or restless  · Eyesight changes  · Drowsiness  · Weakness  · Confusion or loss of consciousness  Call 911  Call for emergency help right away if any of these occur:  · Chest pain or shortness of breath  · Dizziness or fainting  · Weakness of an arm or leg or one side of the face  · Trouble speaking or seeing   Date Last Reviewed: 6/1/2016  © 1468-6685 [employer]. [address] [place] [address], [place] [address]. All rights reserved. This information is not intended as a substitute for professional medical care. Always follow your healthcare professional's instructions.                                                                     Understanding Type 2 Diabetes  When your body is working normally, the food you eat is digested and used as fuel. This fuel supplies energy to the bodys cells. When you have diabetes, the fuel cant enter the cells. Without treatment, diabetes can cause serious long-term health problems.     Your body breaks down the  food you eat into glucose.          How the body gets energy  The digestive system breaks down food, resulting in a sugar called glucose. Some of this glucose is stored in the liver. But most of it enters the bloodstream and travels to the cells to be used as fuel. Glucose needs the help of a hormone called insulin to enter the cells. Insulin is made in the pancreas. It is released into the bloodstream in response to the presence of glucose in the blood. Think of insulin as a key. When insulin reaches a cell, it attaches to the cell wall. This signals the cell to create an opening that allows glucose to enter the cell.  When you have type 2 diabetes  Early in type 2 diabetes, your cells dont respond properly to insulin. Because of this, less glucose than normal moves into cells. This is called insulin resistance. In response, the pancreas makes more insulin. But eventually, the pancreas cant produce enough insulin to overcome insulin resistance. As less and less glucose enters cells, it builds up to a harmful level in the bloodstream. This is known as high blood sugar or hyperglycemia. The result is type 2 diabetes. The cells become starved for energy, which can leave you feeling tired and rundown.  Why high blood sugar is a problem  If high blood sugar is not controlled, blood vessels throughout the body become damaged. Prolonged high blood sugar affects organs, blood vessels, and nerves. As a result, the risks of damage to the heart, kidneys, eyes, and limbs increase. Diabetes also makes other problems, such as high blood pressure and high cholesterol, more dangerous. Over time, people with uncontrolled high blood sugar have an increase in risk of dying of, or being disabled by, heart attack or stroke.  Date Last Reviewed: 7/1/2016 © 2000-2016 [employer]. [address] [place] [address], [place] [address]. All rights reserved. This information is not intended as a substitute for professional medical care.  Always follow your healthcare professional's instructions.                                                            Using a Blood Sugar Log    You have diabetes. This means your body has trouble regulating a sugar called glucose. To help manage your diabetes, youll need to check your blood sugar level as directed by your healthcare provider. Keeping a log of your blood sugar levels will help you track your blood sugar readings. Its a simple and easy way to see how well you are controlling your diabetes.  Checking your blood sugar level  You can check your blood sugar level with a blood glucose meter. Youll first prick the side of your finger with a tiny lancet to draw a tiny drop of blood onto the test strip. Some glucose meters let you use another place on your body to test. But these other places should not be used in some cases as they may be inaccurate. Follow the instructions for your glucose meter. And talk with your healthcare provider before doing the test on other places.  The strip goes into the meter first, then a drop of blood is placed on the tip of the strip. The meter then shows a reading that tells you the level of your blood sugar. Your readings should be in your target range as often as possible. This means not too high or too low. Staying in this range helps lower your risk for complications. Your healthcare provider will help you figure out the target range that is best for you.  Tracking your readings  Every time you check your blood sugar, use your log to keep track of your readings. Your meter will also probably have a memory feature that your healthcare provider can check at your next visit. You may be advised by your healthcare provider to check your blood sugar in the morning, at bedtime, and before and after meals. Be sure to write down all of your numbers. Also use your log to record things that might have affected your blood sugar. Some examples include being sick, certain  medicines, being physically active, feeling stressed, or skipping meals.   Lessons learned from your readings  Tracking your blood sugar readings helps you see patterns. These patterns tell you how your actions affect your blood sugar. For instance, you may have higher numbers after eating certain foods or lower numbers after exercise. They just help you understand how to stay in your target range more often, so that your diabetes remains in good control.  Sharing your log with your healthcare team  Bring your blood sugar log and glucose meter with you to all of your healthcare appointments. This can help your healthcare team make changes to your treatment plan, if needed. This may involve making changes in what you eat, what medicines you take, or how much you exercise.  To learn more  The resources below can help you learn more:  · American Diabetes Association 565-763-5429 www.diabetes.org  · [employer] 759-386-8924 www.lighthouse.org  · [employer] [place] 300-735-5418 www.nei.nih.gov  · [employer] 641-226-9050 www.hormone.org  Date Last Reviewed: 5/1/2016  © 1679-8699 [employer]. [address] [place] [address]. All rights reserved. This information is not intended as a substitute for professional medical care. Always follow your healthcare professional's instructions.                                                                                            Diabetes: Exams and Tests    For your diabetes care, you may see your primary care provider or a specialist 2 to 4 times a year, or as directed. This page lists some of the regular exams and tests recommended for people with diabetes. To learn more, contact the American Diabetes Association (364-969-6990, www.diabetes.org).  Tests and immunizations  These should be done at least as often as stated below:  · Blood pressure check: every healthcare provider visit  · A1C: at first, every 3 months; if  controlled, then every 3 to 6 months   · Cholesterol and blood lipid tests: at least every 12 months.  · Urine tests for kidney function: every 12 months  · Flu shots: once a year  · Pneumonia shots: talk with your healthcare provider about which pneumonia vaccines are right for you  · Hepatitis B shots: as soon as possible if youre under 60, or as advised by your healthcare provider if youre older than 60  · Shingles vaccine after age 60, even if you have already had shingles   · Other tests or vaccines: as advised by your healthcare provider  · Individualized medical nutrition therapy: at least once, then as needed  · Stop smoking counseling, if you still smoke, at each visit   Regular exams  The following exams help keep you healthy:  · Foot exams. Nerve and blood vessel problems can affect your feet sooner than other parts of your body. Make sure that your healthcare provider checks your feet at every office visit.  · Eye exams. You can have problems with your eyes even if you dont have trouble seeing. An ophthalmologist (eye healthcare provider) or specially trained optometrist will give you a dilated eye exam at least once a year. If you see dark spots, see poorly in dim light, have eye pain or pressure, or notice any other problems, tell your healthcare provider right away.  · Dental exams. Gum disease (also called periodontal disease) and other mouth problems are common in people with diabetes. To help prevent these problems, see your dentist two or more times a year.  Ask your healthcare provider what other exams youll need on a regular basis.  Date Last Reviewed: 6/1/2016  © 2623-0671 [employer]. [address] [place] [address], [place] [address]. All rights reserved. This information is not intended as a substitute for professional medical care. Always follow your healthcare professional's instructions.

## 2018-03-19 ENCOUNTER — OFFICE VISIT (OUTPATIENT)
Dept: INTERNAL MEDICINE | Facility: CLINIC | Age: 63
End: 2018-03-19
Payer: COMMERCIAL

## 2018-03-19 VITALS
OXYGEN SATURATION: 92 % | BODY MASS INDEX: 31.03 KG/M2 | DIASTOLIC BLOOD PRESSURE: 80 MMHG | WEIGHT: 168.63 LBS | HEIGHT: 62 IN | SYSTOLIC BLOOD PRESSURE: 142 MMHG | TEMPERATURE: 97 F | HEART RATE: 70 BPM

## 2018-03-19 DIAGNOSIS — N28.9 KIDNEY LESION: ICD-10-CM

## 2018-03-19 DIAGNOSIS — R93.429 ABNORMAL ULTRASOUND OF KIDNEY: ICD-10-CM

## 2018-03-19 DIAGNOSIS — I10 HYPERTENSION GOAL BP (BLOOD PRESSURE) < 130/80: Primary | Chronic | ICD-10-CM

## 2018-03-19 DIAGNOSIS — E78.5 HYPERLIPIDEMIA LDL GOAL <70: Chronic | ICD-10-CM

## 2018-03-19 DIAGNOSIS — E66.9 OBESITY (BMI 30.0-34.9): ICD-10-CM

## 2018-03-19 PROCEDURE — 3045F PR MOST RECENT HEMOGLOBIN A1C LEVEL 7.0-9.0%: CPT | Mod: CPTII,S$GLB,, | Performed by: INTERNAL MEDICINE

## 2018-03-19 PROCEDURE — 99999 PR PBB SHADOW E&M-EST. PATIENT-LVL IV: CPT | Mod: PBBFAC,,, | Performed by: INTERNAL MEDICINE

## 2018-03-19 PROCEDURE — 99214 OFFICE O/P EST MOD 30 MIN: CPT | Mod: S$GLB,,, | Performed by: INTERNAL MEDICINE

## 2018-03-19 PROCEDURE — 3079F DIAST BP 80-89 MM HG: CPT | Mod: CPTII,S$GLB,, | Performed by: INTERNAL MEDICINE

## 2018-03-19 PROCEDURE — 3077F SYST BP >= 140 MM HG: CPT | Mod: CPTII,S$GLB,, | Performed by: INTERNAL MEDICINE

## 2018-03-19 RX ORDER — LOSARTAN POTASSIUM 25 MG/1
25 TABLET ORAL DAILY
Qty: 90 TABLET | Refills: 1 | Status: SHIPPED | OUTPATIENT
Start: 2018-03-19 | End: 2018-06-26 | Stop reason: SDUPTHER

## 2018-03-19 RX ORDER — SIMVASTATIN 40 MG/1
40 TABLET, FILM COATED ORAL NIGHTLY
Qty: 90 TABLET | Refills: 1 | Status: SHIPPED | OUTPATIENT
Start: 2018-03-19 | End: 2018-06-26 | Stop reason: SDUPTHER

## 2018-03-19 NOTE — PROGRESS NOTES
Subjective:       Patient ID: Dary Chaney is a 62 y.o. female.    Chief Complaint: Follow-up (3 month)    Dary Chaney  62 y.o. White female    Patient presents with:  Follow-up: 3 month    HPI: Here today to follow up on chronic conditions.  HTN--she is out of her b/p medication. Her b/p is slightly elevated and usually controlled. She denies symptoms.   Diabetes--improved. She has been seeing diabetes management. She reports compliance with her medication.                       HGBA1C                   7.0 (H)             03/12/2018            HLD--compliant with simvastatin.                     CHOL                     176                 03/12/2018                 HDL                      70                  03/12/2018                 LDLCALC                  77.6                03/12/2018                TRIG                     142                 03/12/2018            She recently had an abdominal ultrasound which showed bilateral kidney lesions. At the time of the ultrasound she reports having back pain and lower abdominal pain. She has not seen urology.      Past Medical History:  Arthritis  Cataract  Colon polyp  Diabetes mellitus type II  Hyperlipidemia  Hypertension  UTI (urinary tract infection)    Current Outpatient Prescriptions on File Prior to Visit:  albuterol (PROVENTIL) 2.5 mg /3 mL (0.083 %) nebulizer solution, Take 3 mLs (2.5 mg total) by nebulization every 6 (six) hours as needed for Wheezing., Disp: 120 mL, Rfl: 0  blood sugar diagnostic Strp, Contour Next. Glucose testing three times daily., Disp: 300 strip, Rfl: 3  blood-glucose meter kit, Contour Next. Use as instructed, Disp: 1 each, Rfl: 0  dulaglutide (TRULICITY) 1.5 mg/0.5 mL PnIj, Inject 1.5 mg into the skin every 7 days., Disp: 12 Syringe, Rfl: 1  fluticasone (FLONASE) 50 mcg/actuation nasal spray, USE TWO SPRAY(S) IN EACH NOSTRIL ONCE DAILY, Disp: 16 g, Rfl: 1  glimepiride (AMARYL) 4 MG tablet, Take 1 tablet (4 mg total) by  "mouth daily with breakfast., Disp: 90 tablet, Rfl: 1  insulin degludec (TRESIBA FLEXTOUCH U-200) 200 unit/mL (3 mL) InPn, Inject 70 Units into the skin once daily., Disp: 10.5 Syringe, Rfl: 1  lancets 33 gauge Misc, 1 lancet by Misc.(Non-Drug; Combo Route) route 3 (three) times daily., Disp: 300 each, Rfl: 3  lidocaine (LIDODERM) 5 %, Place 1 patch onto the skin daily as needed. Remove & Discard patch within 12 hours or as directed by MD, Disp: 30 patch, Rfl: 3  PEN NEEDLE 31 X 5/16 " Ndle, AS DIRECTED, Disp: 100 each, Rfl: 10  losartan (COZAAR) 25 MG tablet, Take 1 tablet (25 mg total) by mouth once daily., Disp: 90 tablet, Rfl: 1  simvastatin (ZOCOR) 40 MG tablet, Take 1 tablet (40 mg total) by mouth nightly., Disp: 90 tablet, Rfl: 1  loratadine (CLARITIN) 10 mg tablet, Take 1 tablet (10 mg total) by mouth once daily., Disp: 30 tablet, Rfl: 0    Allergies:  Review of patient's allergies indicates:   -- Citrus and derivatives -- Swelling, Other (See Comments) and                            Edema    --  Redness   -- Latex -- Other (See Comments)    --  Other reaction(s): Rash             Other reaction(s): welts             Other reaction(s): Itching   -- Valsartan -- Other (See Comments)    --  Other reaction(s): disoriented   -- Aspirin -- Nausea Only    --  Other reaction(s): Nausea Only   -- Metformin -- Anxiety    --  Other reaction(s): anxiety          Review of Systems   Constitutional: Negative for fever and unexpected weight change.   Respiratory: Negative for shortness of breath.    Cardiovascular: Negative for chest pain and leg swelling.   Gastrointestinal: Positive for abdominal pain (improved). Negative for constipation and diarrhea.   Genitourinary: Negative for difficulty urinating.   Musculoskeletal: Positive for arthralgias and back pain (improved).   Neurological: Negative for dizziness, syncope and headaches.       Objective:      Physical Exam   Constitutional: She is oriented to person, place, " and time. She appears well-developed and well-nourished. No distress.   Eyes: No scleral icterus.   Cardiovascular: Normal rate, regular rhythm and normal heart sounds.    Pulmonary/Chest: Effort normal and breath sounds normal. No respiratory distress.   Abdominal: Soft. Bowel sounds are normal.   Neurological: She is alert and oriented to person, place, and time.   Skin: Skin is warm and dry.   Psychiatric: She has a normal mood and affect.   Vitals reviewed.      Assessment:       1. Hypertension goal BP (blood pressure) < 130/80    2. Uncontrolled type 2 diabetes mellitus without complication, with long-term current use of insulin    3. Hyperlipidemia LDL goal <70    4. Abnormal ultrasound of kidney    5. Kidney lesion    6. Obesity (BMI 30.0-34.9)        Plan:       Dary was seen today for follow-up.    Diagnoses and all orders for this visit:    Hypertension goal BP (blood pressure) < 130/80  -     Refill losartan (COZAAR) 25 MG tablet; Take 1 tablet (25 mg total) by mouth once daily.    Uncontrolled type 2 diabetes mellitus without complication, with long-term current use of insulin  -     Continue current management    Hyperlipidemia LDL goal <70  -     Refill simvastatin (ZOCOR) 40 MG tablet; Take 1 tablet (40 mg total) by mouth nightly.    Abnormal ultrasound of kidney  -     Ambulatory consult to Urology    Kidney lesion  -     Ambulatory consult to Urology    Obesity (BMI 30.0-34.9)  -     Lifestyle modifications discussed    Labs and F/U in 3 months

## 2018-03-22 NOTE — PROVATION PATIENT INSTRUCTIONS
Discharge Summary/Instructions after an Endoscopic Procedure  Patient Name: Dary Chaney  Patient MRN: [id number]  Patient YOB: 1955 Thursday, March 01, 2018 Phillip Brower MD  RESTRICTIONS:  During your procedure today, you received medications for sedation.  These   medications may affect your judgment, balance and coordination.  Therefore,   for 24 hours, you have the following restrictions:   - DO NOT drive a car, operate machinery, make legal/financial decisions,   sign important papers or drink alcohol.    ACTIVITY:  The following day: return to full activity including work, except no heavy   lifting, straining or running for 3 days if polyps were removed.  DIET:  Eat and drink normally unless instructed otherwise.     TREATMENT FOR COMMON SIDE EFFECTS:  - Mild abdominal pain, nausea, belching, bloating or excessive gas:  rest,   eat lightly and use a heating pad.  - Sore Throat: treat with throat lozenges and/or gargle with warm salt   water.  - Because air was used during the procedure, expelling large amounts of air   from your rectum or belching is normal.  - If a bowel prep was taken, you may not have a bowel movement for 1-3 days.    This is normal.  SYMPTOMS TO WATCH FOR AND REPORT TO YOUR PHYSICIAN:  1. Abdominal pain or bloating, other than gas cramps.  2. Chest pain.  3. Back pain.  4. Signs of infection such as: chills or fever occurring within 24 hours   after the procedure.  5. Rectal bleeding, which would show as bright red, maroon, or black stools.   (A tablespoon of blood from the rectum is not serious, especially if   hemorrhoids are present.)  6. Vomiting.  7. Weakness or dizziness.  GO DIRECTLY TO THE NEAREST EMERGENCY ROOM IF YOU HAVE ANY OF THE FOLLOWING:      Difficulty breathing              Chills and/or fever over 101 F   Persistent vomiting and/or vomiting blood   Severe abdominal pain   Severe chest pain   Black, tarry stools   Bleeding- more than one  tablespoon   Any other symptom or condition that you feel may need urgent attention  Your doctor recommends these additional instructions:  If any biopsies were taken, your doctors clinic will contact you in 1 to 2   weeks with any results.  You have a contact number available for emergencies.  The signs and symptoms   of potential delayed complications were discussed with you.  You may return   to normal activities tomorrow.  Written discharge instructions were   provided to you.   Resume your previous diet.   Continue your present medications.   We are waiting for your pathology results.   Your physician has recommended a repeat colonoscopy in five to 10 years for   surveillance based on pathology results.   Return to your primary care physician as previously scheduled.  For questions, problems or results please call your physician Phillip Brower MD at Work:  [phone]  If you have any questions about the above instructions, call the GI   department at [phone] or call the endoscopy unit at [phone]   from 7am until 3 pm.  [facility] [place], EMERGENCY ROOM PHONE NUMBER:   [phone]  IF A COMPLICATION OR EMERGENCY SITUATION ARISES AND YOU ARE UNABLE TO REACH   YOUR PHYSICIAN - GO DIRECTLY TO THE EMERGENCY ROOM.  I have read or have had read to me these discharge instructions for my   procedure and have received a written copy.  I understand these   instructions and will follow-up with my physician if I have any questions.     __________________________________       _____________________________________  Nurse Signature                                          Patient/Designated   Responsible Party Signature  Phillip Brower MD  3/1/2018 9:16:41 AM  This report has been verified and signed electronically.

## 2018-05-16 DIAGNOSIS — E11.69 TYPE 2 DIABETES MELLITUS WITH OTHER SPECIFIED COMPLICATION, WITH LONG-TERM CURRENT USE OF INSULIN: ICD-10-CM

## 2018-05-16 DIAGNOSIS — Z79.4 TYPE 2 DIABETES MELLITUS WITH OTHER SPECIFIED COMPLICATION, WITH LONG-TERM CURRENT USE OF INSULIN: ICD-10-CM

## 2018-05-16 RX ORDER — INSULIN DEGLUDEC 200 U/ML
70 INJECTION, SOLUTION SUBCUTANEOUS DAILY
Qty: 10 SYRINGE | Refills: 0 | Status: SHIPPED | OUTPATIENT
Start: 2018-05-16 | End: 2018-06-14 | Stop reason: SDUPTHER

## 2018-06-12 ENCOUNTER — LAB VISIT (OUTPATIENT)
Dept: LAB | Facility: HOSPITAL | Age: 63
End: 2018-06-12
Attending: INTERNAL MEDICINE
Payer: COMMERCIAL

## 2018-06-12 DIAGNOSIS — E78.5 HYPERLIPIDEMIA LDL GOAL <100: ICD-10-CM

## 2018-06-12 DIAGNOSIS — I10 HYPERTENSION GOAL BP (BLOOD PRESSURE) < 130/80: ICD-10-CM

## 2018-06-12 LAB
ALBUMIN SERPL BCP-MCNC: 3.7 G/DL
ALP SERPL-CCNC: 92 U/L
ALT SERPL W/O P-5'-P-CCNC: 21 U/L
ANION GAP SERPL CALC-SCNC: 7 MMOL/L
AST SERPL-CCNC: 12 U/L
BILIRUB SERPL-MCNC: 0.3 MG/DL
BUN SERPL-MCNC: 22 MG/DL
CALCIUM SERPL-MCNC: 9.2 MG/DL
CHLORIDE SERPL-SCNC: 106 MMOL/L
CHOLEST SERPL-MCNC: 179 MG/DL
CHOLEST/HDLC SERPL: 3 {RATIO}
CO2 SERPL-SCNC: 29 MMOL/L
CREAT SERPL-MCNC: 0.8 MG/DL
EST. GFR  (AFRICAN AMERICAN): >60 ML/MIN/1.73 M^2
EST. GFR  (NON AFRICAN AMERICAN): >60 ML/MIN/1.73 M^2
ESTIMATED AVG GLUCOSE: 163 MG/DL
GLUCOSE SERPL-MCNC: 144 MG/DL
HBA1C MFR BLD HPLC: 7.3 %
HDLC SERPL-MCNC: 60 MG/DL
HDLC SERPL: 33.5 %
LDLC SERPL CALC-MCNC: 84 MG/DL
NONHDLC SERPL-MCNC: 119 MG/DL
POTASSIUM SERPL-SCNC: 4.7 MMOL/L
PROT SERPL-MCNC: 6.8 G/DL
SODIUM SERPL-SCNC: 142 MMOL/L
TRIGL SERPL-MCNC: 175 MG/DL

## 2018-06-12 PROCEDURE — 80061 LIPID PANEL: CPT

## 2018-06-12 PROCEDURE — 83036 HEMOGLOBIN GLYCOSYLATED A1C: CPT

## 2018-06-12 PROCEDURE — 36415 COLL VENOUS BLD VENIPUNCTURE: CPT | Mod: PO

## 2018-06-12 PROCEDURE — 80053 COMPREHEN METABOLIC PANEL: CPT

## 2018-06-14 DIAGNOSIS — Z79.4 TYPE 2 DIABETES MELLITUS WITH OTHER SPECIFIED COMPLICATION, WITH LONG-TERM CURRENT USE OF INSULIN: ICD-10-CM

## 2018-06-14 DIAGNOSIS — E11.69 TYPE 2 DIABETES MELLITUS WITH OTHER SPECIFIED COMPLICATION, WITH LONG-TERM CURRENT USE OF INSULIN: ICD-10-CM

## 2018-06-14 RX ORDER — INSULIN DEGLUDEC 200 U/ML
INJECTION, SOLUTION SUBCUTANEOUS
Qty: 9 SYRINGE | Refills: 0 | Status: SHIPPED | OUTPATIENT
Start: 2018-06-14 | End: 2018-07-03 | Stop reason: DRUGHIGH

## 2018-06-18 ENCOUNTER — PATIENT OUTREACH (OUTPATIENT)
Dept: ADMINISTRATIVE | Facility: HOSPITAL | Age: 63
End: 2018-06-18

## 2018-06-19 ENCOUNTER — OFFICE VISIT (OUTPATIENT)
Dept: DIABETES | Facility: CLINIC | Age: 63
End: 2018-06-19
Payer: COMMERCIAL

## 2018-06-19 VITALS
HEIGHT: 62 IN | SYSTOLIC BLOOD PRESSURE: 128 MMHG | WEIGHT: 169.75 LBS | DIASTOLIC BLOOD PRESSURE: 82 MMHG | BODY MASS INDEX: 31.24 KG/M2

## 2018-06-19 DIAGNOSIS — E11.69 TYPE 2 DIABETES MELLITUS WITH OTHER SPECIFIED COMPLICATION, WITH LONG-TERM CURRENT USE OF INSULIN: Primary | ICD-10-CM

## 2018-06-19 DIAGNOSIS — Z79.4 TYPE 2 DIABETES MELLITUS WITH OTHER SPECIFIED COMPLICATION, WITH LONG-TERM CURRENT USE OF INSULIN: Primary | ICD-10-CM

## 2018-06-19 LAB — GLUCOSE SERPL-MCNC: 141 MG/DL (ref 70–110)

## 2018-06-19 PROCEDURE — 3074F SYST BP LT 130 MM HG: CPT | Mod: CPTII,S$GLB,, | Performed by: NURSE PRACTITIONER

## 2018-06-19 PROCEDURE — 95251 CONT GLUC MNTR ANALYSIS I&R: CPT | Mod: S$GLB,,, | Performed by: NURSE PRACTITIONER

## 2018-06-19 PROCEDURE — 99999 PR PBB SHADOW E&M-EST. PATIENT-LVL III: CPT | Mod: PBBFAC,,, | Performed by: NURSE PRACTITIONER

## 2018-06-19 PROCEDURE — 3045F PR MOST RECENT HEMOGLOBIN A1C LEVEL 7.0-9.0%: CPT | Mod: CPTII,S$GLB,, | Performed by: NURSE PRACTITIONER

## 2018-06-19 PROCEDURE — 3008F BODY MASS INDEX DOCD: CPT | Mod: CPTII,S$GLB,, | Performed by: NURSE PRACTITIONER

## 2018-06-19 PROCEDURE — 99215 OFFICE O/P EST HI 40 MIN: CPT | Mod: S$GLB,,, | Performed by: NURSE PRACTITIONER

## 2018-06-19 PROCEDURE — 3079F DIAST BP 80-89 MM HG: CPT | Mod: CPTII,S$GLB,, | Performed by: NURSE PRACTITIONER

## 2018-06-19 NOTE — PROGRESS NOTES
"Subjective:         Patient ID: Dary Chaney is a 62 y.o. female.  Patient's current PCP is Sakina Cooper DO.   Social History     Social History    Marital status:      Spouse name: N/A    Number of children: 3    Years of education: N/A     Occupational History    Stay at Home           Social History Main Topics    Smoking status: Never Smoker    Smokeless tobacco: Never Used    Alcohol use No    Drug use: No    Sexual activity: Yes     Partners: Male     Birth control/ protection: Surgical     Other Topics Concern    Not on file     Social History Narrative    . Housewife.       Chief Complaint: Follow-up    HPI  Dary Chnaey is a 62 y.o. White female presenting for follow up of diabetes. Patient has been diagnosed with diabetes for approximately 10 years and has the following complications from diabetes: none. Patient did not follow up as directed nor send in blood glucose levels as requested. Blood glucose testing is performed regularly. Patient reports fastings , PP less than 140. Condition is controlled, but A1C did slightly increase.    She denies any recent hospital admissions, emergency room visits, hypoglycemia.      Height: 5' 2" (157.5 cm)  //  Weight: 77 kg (169 lb 12.1 oz), Body mass index is 31.05 kg/m².  Home Blood Glucose reading this AM: 91 mg/dl fasting.  Her blood sugar in clinic today is:   Lab Results   Component Value Date    POCGLU 141 (A) 06/19/2018       Labs reviewed and are noted below.    Her most recent A1C is:   Lab Results   Component Value Date    HGBA1C 7.3 (H) 06/12/2018     Lab Results   Component Value Date    CPEPTIDE 1.10 12/14/2017     Lab Results   Component Value Date    GLUTAMICACID 0.00 12/14/2017     Lab Results   Component Value Date    WBC 5.05 05/13/2014    HGB 13.3 05/13/2014    HCT 40.0 05/13/2014     05/13/2014    CHOL 179 06/12/2018    TRIG 175 (H) 06/12/2018    HDL 60 06/12/2018    ALT 21 06/12/2018 "    AST 12 06/12/2018     06/12/2018    K 4.7 06/12/2018     06/12/2018    CREATININE 0.8 06/12/2018    BUN 22 06/12/2018    CO2 29 06/12/2018    TSH 0.870 12/14/2017    INR 1.0 06/21/2010    GLUF 117 (H) 04/27/2010    HGBA1C 7.3 (H) 06/12/2018       CURRENT DM MEDICATIONS:   Current Outpatient Prescriptions   Medication Sig Dispense Refill    glimepiride (AMARYL) 4 MG tablet Take 1 tablet (4 mg total) by mouth daily with breakfast.  90 tablet 1    Tresiba Inject 70 units SQ  1 Box 3    Trulicity 1.5mg q week 9 Syringe 3     No current facility-administered medications for this visit.        Health Maintenance   Topic Date Due    Zoster Vaccine  12/05/2014    Influenza Vaccine  08/01/2018    Foot Exam  09/11/2018    Eye Exam  10/17/2018    Hemoglobin A1c  12/12/2018    Mammogram  04/11/2019    Lipid Panel  06/12/2019    Pneumococcal PPSV23 (Medium Risk) (2) 08/10/2021    TETANUS VACCINE  08/26/2021    Colonoscopy  03/01/2028    Hepatitis C Screening  Completed       STANDARDS OF CARE:  Current Ophthalmologist/Optometrist: Dr. Carlos. Last exam 2017  Current Dentist: Yes. Last exam unknown  Current Podiatrist: No  ACE/ARB: Yes  Statin: Yes  She  has attended diabetes education in the past twice.     LIFESTYLE:  ACTIVITY LEVEL: Moderately Active  EXERCISE:  none  MEAL PLANNING: Patient reports number of meals per day to be 3 and number of snacks per day to be 2    BLOOD GLUCOSE TESTING: Patient reports testing on average a total of 0 times per day.      Review of Systems   Constitutional: Negative.  Negative for activity change, appetite change, chills, diaphoresis, fatigue, fever and unexpected weight change.   Eyes: Negative for visual disturbance.   Respiratory: Negative for apnea and shortness of breath.    Cardiovascular: Negative.  Negative for chest pain, palpitations and leg swelling.   Gastrointestinal: Negative for abdominal distention, constipation, diarrhea, nausea and vomiting.    Endocrine: Negative for cold intolerance, heat intolerance, polydipsia, polyphagia and polyuria.   Genitourinary: Negative.  Negative for frequency.   Musculoskeletal: Positive for gait problem.        Knee pain   Skin: Negative.  Negative for color change, pallor, rash and wound.   Neurological: Negative for dizziness, seizures, syncope, light-headedness, numbness and headaches.   Psychiatric/Behavioral: Negative for confusion, hallucinations and suicidal ideas.         Objective:      Physical Exam   Constitutional: She is oriented to person, place, and time. She appears well-developed and well-nourished.   HENT:   Head: Normocephalic and atraumatic.   Eyes: EOM are normal. Pupils are equal, round, and reactive to light.   Neck: Normal range of motion. Neck supple.   Cardiovascular: Normal rate, regular rhythm and normal heart sounds.    Pulmonary/Chest: Effort normal and breath sounds normal.   Abdominal: Soft. Bowel sounds are normal.   Neurological: She is alert and oriented to person, place, and time.   Skin: Skin is warm and dry.   Psychiatric: She has a normal mood and affect. Her behavior is normal. Judgment and thought content normal.   Nursing note and vitals reviewed.      Assessment:       1. Type 2 diabetes mellitus with other specified complication, with long-term current use of insulin        Plan:   Type 2 diabetes mellitus with other specified complication, with long-term current use of insulin  -     POCT glucose  -     GLUCOSE MONITORING CONTINUOUS MIN 72 HOURS; Future    - Condition controlled based on log. It is curious that the A1C increased. CGMS for further review to make sure there is nothing that we are missing. DM education reviewed. Patient encouraged to carb count and exercise per recommendations. Labs and Referrals as noted. RV scheduled in 3 months with me, 2 week nurse visit for cgms removal. It does not appear that we need any medication changes, but I will verify with CGMS.  Patient instructed to send in log once weekly for my review.     Additional Plan Details:    1.) Patient was instructed to monitor blood glucose 2 - 3 x daily, fasting and ac dinner or at bedtime. Discussed ADA goal for fasting blood sugar, 80 - 130mg/dL; pp blood sugars below 180 mg/dl. Also, discussed prevention of hypoglycemia and the need to adjust goals to higher levels if persistent hypoglycemia.  Reminded to bring BG records or meter to each visit for review.  2.) Reviewed pathophysiology of Type 2 diabetes, complications related to the disease, importance of annual dilated eye exam and daily foot examination.  3.) We discussed the ADA recommendations, which are as follows:  Hemoglobin A1c below 7.0 %. All patients with diabetes should be on statins unless contraindicated.  ACE or ARB therapy if not contraindicated.    4.) Continue medications as prescribed.  My [facility] e-mail or phone review in one week with BG records for adjustment of medication.  5.) Meal planning teaching: Reviewed carb counting, portion control, importance of spacing meals throughout the day to prevent post prandial elevations.  6.) Discussed activity with related benefits, methods, and precautions. Recommended patient start or continue some form of exercise and increase as tolerated to 30 minutes per day to aid in control of BGs.  7.) A1C, TSH, Lipid Panel, CMP with eGFR and Micro/Creatinine are utd or were ordered per ADA protocol.  8.) Return to clinic in 3 months for follow up. The patient was explained the above plan and given opportunity to ask questions.  She understands, chooses and consents to this plan and accepts all the risks, which include but are not limited to the risks mentioned above. She understands the alternative of having no testing, interventions or treatments at this time. She left content and without further questions.     A total of 45 minutes was spent in face to face time, of which over 50% was spent in  counseling patient on disease process, complications, treatment, and side effects of medications.        JAIME SinghC

## 2018-06-19 NOTE — PATIENT INSTRUCTIONS
PATIENT INSTRUCTIONS  - Follow up as scheduled.   - Carb Count: 30-45G/meal and 15G/snack  - Exercise: Goal is 150 minutes or more per week  - Bring meter and blood sugar log to each appointment.   - Continuous Glucose Monitor for 14 days    - You will take your blood sugar two times daily. Once will always be in the morning before you have any food, (known as your fasting blood sugar), and once should be two hours after EITHER your breakfast, lunch, or dinner, (known as your post-prandial blood sugar). Each day you should check a different meal, (ie. Monday-breakfast; Tuesday- lunch; Wednesday- supper, then repeat).     - Send me your blood sugars weekly if directed to do so. The easiest way to do this is through [facility]. Send in logs on Tuesdays, Wednesdays, or Thursdays.    - Blood Sugar Goals:  1. The goal for fasting blood sugars is 80 -130 mg/dl.   2. The goal for the 2 hour after meal blood sugars is below 180 mg/dl.  3. Blood sugars below 70 are considered LOW and you must eat or drink 15G of carbohydrates immediately, then recheck blood sugar after 15 minutes to ensure blood sugar has returned to normal range, (70 or above)                                                              Diabetes (General Information)  Diabetes is a long-term health problem. It means your body does not make enough insulin. Or it may mean that your body cannot use the insulin it makes. Insulin is a hormone in your body. It lets blood sugar (glucose) reach the cells in your body. All of your cells need glucose for fuel.  When you have diabetes, the glucose in your blood builds up because it cannot get into the cells. This buildup is called high blood sugar (hyperglycemia).  Your blood sugar level depends on several things. It depends on what kind of food you eat and how much of it you eat. It also depends on how much exercise you get, and how much insulin you have in your body. Eating too much of the wrong kinds of food or not  taking diabetes medicine on time can cause high blood sugar. Infections can cause high blood sugar even if you are taking medicines correctly.  These things can also cause low blood sugar:  · Missing meals  · Not eating enough food  · Taking too much diabetes medicine  Diabetes can cause serious problems over time if you do not get treated. These problems include heart disease, stroke, kidney failure, and blindness. They also include nerve pain or loss of feeling in your legs and feet, and gangrene of the feet. By keeping your blood sugar under control you can prevent or delay these problems.  Normal blood sugar levels are 80 to 100 before a meal and less than 180 in the 1 to 2 hours after a meal.  Home care  Follow these guidelines when caring for yourself at home:  · Follow the diet your healthcare provider gives you. Take insulin or other diabetes medicine exactly as told to.  · Watch your blood sugar as you are told to. Keep a log of your results. This will help your provider change your medicines to keep your blood sugar under control.  · Try to reach your ideal weight. You may be able to cut back on or not have to take diabetes medicine if you eat the right foods and get exercise.  · Do not smoke. Smoking worsens the effects of diabetes on your circulation. You are much more likely to have a heart attack if you have diabetes and you smoke.  · Take good care of your feet. If you have lost feeling in your feet, you may not see an injury or infection. Check your feet and between your toes at least once a week.  · Wear a medical alert bracelet or necklace, or carry a card in your wallet that says you have diabetes. This will help healthcare providers give you the right care if you get very ill and cannot tell them that you have diabetes.  Sick day plan  If you get a cold, the flu, or a bacterial or viral infection, take these steps:  · Look at your diabetes sick plan and call your healthcare provider as you were  told to. You may need to call your provider right away if:  ¨ Your blood sugar is above 240 while taking your diabetes medicine  ¨ Your urine ketone levels are above normal or high  ¨ You have been vomiting more than 6 hours  ¨ You have trouble breathing or your breath ha s a fruity smell  ¨ You have a high fever  ¨ You have a fever for several days and you are not getting better  ¨ You get light-headed and are sleepier than usual  · Keep taking your diabetes pills (oral medicine) even if you have been vomiting and are feeling sick. Call your provider right away because you may need insulin to lower your blood sugar until you recover from your illness.  · Keep taking your insulin even if you have been vomiting and are feeling sick. Call your provider right away to ask if you need to change your insulin dose. This will depend on your blood sugar results.  · Check your blood sugar every 2 to 4 hours, or at least 4 times a day.  · Check your ketones often. If you are vomiting and having diarrhea, watch them more often.  · Do not skip meals. Try to eat small meals on a regular schedule. Do this even if you do not feel like eating.  · Drink water or other liquids that do not have caffeine or calories. This will keep you from getting dehydrated. If you are nauseated or vomiting, takes small sips every 5 minutes. To prevent dehydration try to drink a cup (8 ounces) of fluids every hour while you are awake.  General care  Always bring a source of fast-acting sugar with you in case you have symptoms of low blood sugar (below 70). At the first sign of low blood sugar, eat or drink 15 to 20 grams of fast-acting sugar to raise your blood sugar. Examples are:  · 3 to 4 glucose tablets. You can buy these at most drugstores.  · 4 ounces (1/2 cup) of regular (not diet) soft drinks  · 4 ounces (1/2 cup) of any fruit juice  · 8 ounces (1 cup) of milk  · 5 to 6 pieces of hard candy  · 1 tablespoon of honey  Check your blood sugar 15  minutes after treating yourself. If it is still below 70, take 15 to 20 more grams of fast-acting sugar. Test again in 15 minutes. If it returns to normal (70 or above), eat a snack or meal to keep your blood sugar in a safe range. If it stays low, call your doctor or go to an emergency room.  Follow-up care  Follow-up with your healthcare provider, or as advised. For more information about diabetes, visit the American Diabetes Association website at www.diabetes.org or call 756-947-3256.  When to seek medical advice  Call your healthcare provider right away if you have any of these symptoms of high blood sugar:  · Frequent urination  · Dizziness  · Drowsiness  · Thirst  · Headache  · Nausea or vomiting  · Abdominal pain  · Eyesight changes  · Fast breathing  · Confusion or loss of consciousness  Also call your provider right away if you have any of these signs of low blood sugar:  · Fatigue  · Headache  · Shakes  · Excess sweating  · Hunger  · Feeling anxious or restless  · Eyesight changes  · Drowsiness  · Weakness  · Confusion or loss of consciousness  Call 911  Call for emergency help right away if any of these occur:  · Chest pain or shortness of breath  · Dizziness or fainting  · Weakness of an arm or leg or one side of the face  · Trouble speaking or seeing   Date Last Reviewed: 6/1/2016 © 2000-2016 [employer]. [address] [place] [address], [place] [address]. All rights reserved. This information is not intended as a substitute for professional medical care. Always follow your healthcare professional's instructions.                                                                     Understanding Type 2 Diabetes  When your body is working normally, the food you eat is digested and used as fuel. This fuel supplies energy to the bodys cells. When you have diabetes, the fuel cant enter the cells. Without treatment, diabetes can cause serious long-term health problems.     Your body breaks down the  food you eat into glucose.          How the body gets energy  The digestive system breaks down food, resulting in a sugar called glucose. Some of this glucose is stored in the liver. But most of it enters the bloodstream and travels to the cells to be used as fuel. Glucose needs the help of a hormone called insulin to enter the cells. Insulin is made in the pancreas. It is released into the bloodstream in response to the presence of glucose in the blood. Think of insulin as a key. When insulin reaches a cell, it attaches to the cell wall. This signals the cell to create an opening that allows glucose to enter the cell.  When you have type 2 diabetes  Early in type 2 diabetes, your cells dont respond properly to insulin. Because of this, less glucose than normal moves into cells. This is called insulin resistance. In response, the pancreas makes more insulin. But eventually, the pancreas cant produce enough insulin to overcome insulin resistance. As less and less glucose enters cells, it builds up to a harmful level in the bloodstream. This is known as high blood sugar or hyperglycemia. The result is type 2 diabetes. The cells become starved for energy, which can leave you feeling tired and rundown.  Why high blood sugar is a problem  If high blood sugar is not controlled, blood vessels throughout the body become damaged. Prolonged high blood sugar affects organs, blood vessels, and nerves. As a result, the risks of damage to the heart, kidneys, eyes, and limbs increase. Diabetes also makes other problems, such as high blood pressure and high cholesterol, more dangerous. Over time, people with uncontrolled high blood sugar have an increase in risk of dying of, or being disabled by, heart attack or stroke.  Date Last Reviewed: 7/1/2016 © 2000-2016 [employer]. [address] [place] [address], [place] [address]. All rights reserved. This information is not intended as a substitute for professional medical care.  Always follow your healthcare professional's instructions.                                                            Using a Blood Sugar Log    You have diabetes. This means your body has trouble regulating a sugar called glucose. To help manage your diabetes, youll need to check your blood sugar level as directed by your healthcare provider. Keeping a log of your blood sugar levels will help you track your blood sugar readings. Its a simple and easy way to see how well you are controlling your diabetes.  Checking your blood sugar level  You can check your blood sugar level with a blood glucose meter. Youll first prick the side of your finger with a tiny lancet to draw a tiny drop of blood onto the test strip. Some glucose meters let you use another place on your body to test. But these other places should not be used in some cases as they may be inaccurate. Follow the instructions for your glucose meter. And talk with your healthcare provider before doing the test on other places.  The strip goes into the meter first, then a drop of blood is placed on the tip of the strip. The meter then shows a reading that tells you the level of your blood sugar. Your readings should be in your target range as often as possible. This means not too high or too low. Staying in this range helps lower your risk for complications. Your healthcare provider will help you figure out the target range that is best for you.  Tracking your readings  Every time you check your blood sugar, use your log to keep track of your readings. Your meter will also probably have a memory feature that your healthcare provider can check at your next visit. You may be advised by your healthcare provider to check your blood sugar in the morning, at bedtime, and before and after meals. Be sure to write down all of your numbers. Also use your log to record things that might have affected your blood sugar. Some examples include being sick, certain  medicines, being physically active, feeling stressed, or skipping meals.   Lessons learned from your readings  Tracking your blood sugar readings helps you see patterns. These patterns tell you how your actions affect your blood sugar. For instance, you may have higher numbers after eating certain foods or lower numbers after exercise. They just help you understand how to stay in your target range more often, so that your diabetes remains in good control.  Sharing your log with your healthcare team  Bring your blood sugar log and glucose meter with you to all of your healthcare appointments. This can help your healthcare team make changes to your treatment plan, if needed. This may involve making changes in what you eat, what medicines you take, or how much you exercise.  To learn more  The resources below can help you learn more:  · American Diabetes Association 250-291-1545 www.diabetes.org  · [employer] 930-279-3133 www.lighthouse.org  · [employer] 466-179-6696 www.nei.nih.gov  · [employer] 082-259-9157 www.hormone.org  Date Last Reviewed: 5/1/2016  © 4071-0444 [employer]. [address] [place] [address]. All rights reserved. This information is not intended as a substitute for professional medical care. Always follow your healthcare professional's instructions.                                                                                            Diabetes: Exams and Tests    For your diabetes care, you may see your primary care provider or a specialist 2 to 4 times a year, or as directed. This page lists some of the regular exams and tests recommended for people with diabetes. To learn more, contact the American Diabetes Association (421-508-9827, www.diabetes.org).  Tests and immunizations  These should be done at least as often as stated below:  · Blood pressure check: every healthcare provider visit  · A1C: at first, every 3 months; if  controlled, then every 3 to 6 months   · Cholesterol and blood lipid tests: at least every 12 months.  · Urine tests for kidney function: every 12 months  · Flu shots: once a year  · Pneumonia shots: talk with your healthcare provider about which pneumonia vaccines are right for you  · Hepatitis B shots: as soon as possible if youre under 60, or as advised by your healthcare provider if youre older than 60  · Shingles vaccine after age 60, even if you have already had shingles   · Other tests or vaccines: as advised by your healthcare provider  · Individualized medical nutrition therapy: at least once, then as needed  · Stop smoking counseling, if you still smoke, at each visit   Regular exams  The following exams help keep you healthy:  · Foot exams. Nerve and blood vessel problems can affect your feet sooner than other parts of your body. Make sure that your healthcare provider checks your feet at every office visit.  · Eye exams. You can have problems with your eyes even if you dont have trouble seeing. An ophthalmologist (eye healthcare provider) or specially trained optometrist will give you a dilated eye exam at least once a year. If you see dark spots, see poorly in dim light, have eye pain or pressure, or notice any other problems, tell your healthcare provider right away.  · Dental exams. Gum disease (also called periodontal disease) and other mouth problems are common in people with diabetes. To help prevent these problems, see your dentist two or more times a year.  Ask your healthcare provider what other exams youll need on a regular basis.  Date Last Reviewed: 6/1/2016  © 0658-6364 [employer]. [address] [place] [address], [place] [address]. All rights reserved. This information is not intended as a substitute for professional medical care. Always follow your healthcare professional's instructions.

## 2018-06-26 ENCOUNTER — OFFICE VISIT (OUTPATIENT)
Dept: INTERNAL MEDICINE | Facility: CLINIC | Age: 63
End: 2018-06-26
Payer: COMMERCIAL

## 2018-06-26 VITALS
HEART RATE: 86 BPM | HEIGHT: 62 IN | OXYGEN SATURATION: 97 % | SYSTOLIC BLOOD PRESSURE: 138 MMHG | DIASTOLIC BLOOD PRESSURE: 76 MMHG | BODY MASS INDEX: 31.48 KG/M2 | TEMPERATURE: 97 F | WEIGHT: 171.06 LBS

## 2018-06-26 DIAGNOSIS — R13.10 PAIN WITH SWALLOWING: ICD-10-CM

## 2018-06-26 DIAGNOSIS — I10 HYPERTENSION GOAL BP (BLOOD PRESSURE) < 130/80: Primary | ICD-10-CM

## 2018-06-26 DIAGNOSIS — E78.5 HYPERLIPIDEMIA LDL GOAL <70: ICD-10-CM

## 2018-06-26 DIAGNOSIS — Z12.31 ENCOUNTER FOR SCREENING MAMMOGRAM FOR MALIGNANT NEOPLASM OF BREAST: ICD-10-CM

## 2018-06-26 DIAGNOSIS — M25.562 CHRONIC PAIN OF LEFT KNEE: ICD-10-CM

## 2018-06-26 DIAGNOSIS — G89.29 CHRONIC PAIN OF LEFT KNEE: ICD-10-CM

## 2018-06-26 DIAGNOSIS — R06.2 WHEEZING: ICD-10-CM

## 2018-06-26 DIAGNOSIS — R13.19 ESOPHAGEAL DYSPHAGIA: ICD-10-CM

## 2018-06-26 PROCEDURE — 99999 PR PBB SHADOW E&M-EST. PATIENT-LVL III: CPT | Mod: PBBFAC,,, | Performed by: INTERNAL MEDICINE

## 2018-06-26 PROCEDURE — 3075F SYST BP GE 130 - 139MM HG: CPT | Mod: CPTII,S$GLB,, | Performed by: INTERNAL MEDICINE

## 2018-06-26 PROCEDURE — 99214 OFFICE O/P EST MOD 30 MIN: CPT | Mod: S$GLB,,, | Performed by: INTERNAL MEDICINE

## 2018-06-26 PROCEDURE — 3078F DIAST BP <80 MM HG: CPT | Mod: CPTII,S$GLB,, | Performed by: INTERNAL MEDICINE

## 2018-06-26 PROCEDURE — 3045F PR MOST RECENT HEMOGLOBIN A1C LEVEL 7.0-9.0%: CPT | Mod: CPTII,S$GLB,, | Performed by: INTERNAL MEDICINE

## 2018-06-26 PROCEDURE — 3008F BODY MASS INDEX DOCD: CPT | Mod: CPTII,S$GLB,, | Performed by: INTERNAL MEDICINE

## 2018-06-26 RX ORDER — GLIMEPIRIDE 4 MG/1
4 TABLET ORAL
Qty: 90 TABLET | Refills: 1 | Status: SHIPPED | OUTPATIENT
Start: 2018-06-26 | End: 2018-07-03 | Stop reason: DRUGHIGH

## 2018-06-26 RX ORDER — LIDOCAINE 50 MG/G
1 PATCH TOPICAL DAILY PRN
Qty: 30 PATCH | Refills: 3 | Status: SHIPPED | OUTPATIENT
Start: 2018-06-26 | End: 2018-07-17 | Stop reason: SDUPTHER

## 2018-06-26 RX ORDER — SIMVASTATIN 40 MG/1
40 TABLET, FILM COATED ORAL NIGHTLY
Qty: 90 TABLET | Refills: 1 | Status: SHIPPED | OUTPATIENT
Start: 2018-06-26 | End: 2018-11-30 | Stop reason: SDUPTHER

## 2018-06-26 RX ORDER — LOSARTAN POTASSIUM 25 MG/1
25 TABLET ORAL DAILY
Qty: 90 TABLET | Refills: 1 | Status: SHIPPED | OUTPATIENT
Start: 2018-06-26 | End: 2018-11-30 | Stop reason: SDUPTHER

## 2018-06-26 RX ORDER — ALBUTEROL SULFATE 0.83 MG/ML
2.5 SOLUTION RESPIRATORY (INHALATION) EVERY 6 HOURS PRN
Qty: 120 ML | Refills: 0 | Status: SHIPPED | OUTPATIENT
Start: 2018-06-26 | End: 2018-11-30 | Stop reason: SDUPTHER

## 2018-06-26 NOTE — PROGRESS NOTES
"Subjective:       Patient ID: Dary Chaney is a 62 y.o. female.    Chief Complaint: Follow-up    Dary Chaney  62 y.o. White female    Patient presents with:  Follow-up    HPI: Here today to follow up on chronic conditions.  HTN--stable on losartan.   Diabetes--not at goal. She is being managed by diabetes management. She is wearing a continuous glucose monitor.                 HGBA1C                   7.3 (H)             06/12/2018            HLD--compliant with simvastatin.                       CHOL                     179                 06/12/2018                 HDL                      60                  06/12/2018                       LDLCALC                  84.0                06/12/2018                 TRIG                     175 (H)             06/12/2018            She has not been able to exercise much due to chronic left knee pain. She needs surgery but it has not been scheduled. She is using lidocaine patches for now. She needs refills.   She has been having difficulty and pain with swallowing for the past few months. She denies weight loss.   She needs a refill on albuterol. She wheezes on occasion. She denies having a fever, cough or shortness of breath.     Past Medical History:  Arthritis  Cataract  Colon polyp  Diabetes mellitus type II  Hyperlipidemia  Hypertension  UTI (urinary tract infection)    Current Outpatient Prescriptions on File Prior to Visit:  blood sugar diagnostic Strp, Contour Next. Glucose testing three times daily., Disp: 300 strip, Rfl: 3  blood-glucose meter kit, Contour Next. Use as instructed, Disp: 1 each, Rfl: 0  fluticasone (FLONASE) 50 mcg/actuation nasal spray, USE TWO SPRAY(S) IN EACH NOSTRIL ONCE DAILY, Disp: 16 g, Rfl: 1  lancets 33 gauge Misc, 1 lancet by Misc.(Non-Drug; Combo Route) route 3 (three) times daily., Disp: 300 each, Rfl: 3  PEN NEEDLE 31 X 5/16 " Ndle, AS DIRECTED, Disp: 100 each, Rfl: 10  TRESIBA FLEXTOUCH U-200 200 unit/mL (3 mL) InPn, " INJECT 70 UNITS SUBCUTANEOUSLY ONCE DAILY, Disp: 9 Syringe, Rfl: 0  dulaglutide (TRULICITY) 1.5 mg/0.5 mL PnIj, Inject 1.5 mg into the skin every 7 days., Disp: 12 Syringe, Rfl: 1  glimepiride (AMARYL) 4 MG tablet, Take 1 tablet (4 mg total) by mouth daily with breakfast., Disp: 90 tablet, Rfl: 1  lidocaine (LIDODERM) 5 %, Place 1 patch onto the skin daily as needed. Remove & Discard patch within 12 hours or as directed by MD, Disp: 30 patch, Rfl: 3  losartan (COZAAR) 25 MG tablet, Take 1 tablet (25 mg total) by mouth once daily., Disp: 90 tablet, Rfl: 1  simvastatin (ZOCOR) 40 MG tablet, Take 1 tablet (40 mg total) by mouth nightly., Disp: 90 tablet, Rfl: 1  loratadine (CLARITIN) 10 mg tablet, Take 1 tablet (10 mg total) by mouth once daily., Disp: 30 tablet, Rfl: 0  albuterol (PROVENTIL) 2.5 mg /3 mL (0.083 %) nebulizer solution, Take 3 mLs (2.5 mg total) by nebulization every 6 (six) hours as needed for Wheezing., Disp: 120 mL, Rfl: 0    Allergies:  Review of patient's allergies indicates:   -- Citrus and derivatives -- Swelling, Other (See Comments) and                            Edema    --  Redness   -- Latex -- Other (See Comments)    --  Other reaction(s): Rash             Other reaction(s): welts             Other reaction(s): Itching   -- Valsartan -- Other (See Comments)    --  Other reaction(s): disoriented   -- Aspirin -- Nausea Only    --  Other reaction(s): Nausea Only   -- Metformin -- Anxiety    --  Other reaction(s): anxiety        Review of Systems   Constitutional: Negative for fever and unexpected weight change.   HENT: Positive for trouble swallowing.    Respiratory: Positive for wheezing. Negative for cough and shortness of breath.    Cardiovascular: Negative for chest pain and leg swelling.   Gastrointestinal: Negative for abdominal pain.   Genitourinary: Negative for difficulty urinating.   Musculoskeletal: Positive for arthralgias and gait problem.   Neurological: Negative for dizziness and  headaches.       Objective:      Physical Exam   Constitutional: She is oriented to person, place, and time. She appears well-developed and well-nourished. No distress.   Eyes: No scleral icterus.   Neck: No tracheal deviation present.   Cardiovascular: Normal rate, regular rhythm and normal heart sounds.    Pulmonary/Chest: Effort normal and breath sounds normal. No respiratory distress. She has no wheezes. She has no rales.   Abdominal: Soft. Bowel sounds are normal.   Musculoskeletal: She exhibits no edema.   Neurological: She is alert and oriented to person, place, and time. Gait abnormal.   Skin: Skin is warm and dry.   Psychiatric: She has a normal mood and affect.   Vitals reviewed.      Assessment:       1. Hypertension goal BP (blood pressure) < 130/80    2. Uncontrolled type 2 diabetes mellitus without complication, with long-term current use of insulin    3. Hyperlipidemia LDL goal <70    4. Chronic pain of left knee    5. Esophageal dysphagia    6. Pain with swallowing    7. Wheezing    8. Encounter for screening mammogram for malignant neoplasm of breast        Plan:       Dary was seen today for follow-up.    Diagnoses and all orders for this visit:    Hypertension goal BP (blood pressure) < 130/80  -     Refill losartan (COZAAR) 25 MG tablet; Take 1 tablet (25 mg total) by mouth once daily.    Uncontrolled type 2 diabetes mellitus without complication, with long-term current use of insulin  -     Refill dulaglutide (TRULICITY) 1.5 mg/0.5 mL PnIj; Inject 1.5 mg into the skin every 7 days.  -     Refill glimepiride (AMARYL) 4 MG tablet; Take 1 tablet (4 mg total) by mouth daily with breakfast  -     F/U with diabetes management     Hyperlipidemia LDL goal <70  -     Refill simvastatin (ZOCOR) 40 MG tablet; Take 1 tablet (40 mg total) by mouth nightly.    Chronic pain of left knee  -     lidocaine (LIDODERM) 5 %; Place 1 patch onto the skin daily as needed. Remove & Discard patch within 12 hours or as  directed by MD    Esophageal dysphagia  -     Case request GI: ESOPHAGOGASTRODUODENOSCOPY (EGD)    Pain with swallowing  -     Case request GI: ESOPHAGOGASTRODUODENOSCOPY (EGD)    Wheezing  -     albuterol (PROVENTIL) 2.5 mg /3 mL (0.083 %) nebulizer solution; Take 3 mLs (2.5 mg total) by nebulization every 6 (six) hours as needed for Wheezing.    Encounter for screening mammogram for malignant neoplasm of breast  -     Mammo Digital Screening Bilat with CAD; Future    Labs and F/U with JOE Dumont in 3 months.

## 2018-07-03 ENCOUNTER — CLINICAL SUPPORT (OUTPATIENT)
Dept: DIABETES | Facility: CLINIC | Age: 63
End: 2018-07-03
Payer: COMMERCIAL

## 2018-07-03 DIAGNOSIS — E11.69 TYPE 2 DIABETES MELLITUS WITH OTHER SPECIFIED COMPLICATION, WITH LONG-TERM CURRENT USE OF INSULIN: ICD-10-CM

## 2018-07-03 DIAGNOSIS — Z79.4 TYPE 2 DIABETES MELLITUS WITH OTHER SPECIFIED COMPLICATION, WITH LONG-TERM CURRENT USE OF INSULIN: ICD-10-CM

## 2018-07-03 PROCEDURE — 95251 CONT GLUC MNTR ANALYSIS I&R: CPT | Mod: S$GLB,,, | Performed by: NURSE PRACTITIONER

## 2018-07-03 RX ORDER — GLIMEPIRIDE 4 MG/1
6 TABLET ORAL
Qty: 180 TABLET | Refills: 1
Start: 2018-07-03 | End: 2018-11-29 | Stop reason: SDUPTHER

## 2018-07-03 RX ORDER — INSULIN DEGLUDEC 200 U/ML
74 INJECTION, SOLUTION SUBCUTANEOUS DAILY
Qty: 9 SYRINGE | Refills: 0
Start: 2018-07-03 | End: 2018-08-23

## 2018-07-03 NOTE — PATIENT INSTRUCTIONS
Increase Glimepiride to 6mg in am with breakfast (1.5 tablets)  Increase Tresiba to 74 units/day  Follow up in 1 month

## 2018-07-03 NOTE — PROGRESS NOTES
Return of Freestyle Nelli Pro Sensor  Patient returned to clinic today to return Glucose Sensor  used in CM[facility] procedure.   The CGMS Sensor will be scanned and downloaded. All reports will be imported into the patient's electronic medical record.   Endocrine Nurse Practitioner will complete data interpretation and make recommendations.

## 2018-07-03 NOTE — PROGRESS NOTES
Interpretation of CGMS as follows:  Standard Deviation: 65  Average Blood Glucose: 162  Time in Glucose Target Range: 60%  Time Above Glucose Target Range: 36%  Time Below Glucose Target Range: 4%        Blood glucose levels are uncontrolled. Changes to regimen are recommended.     Results discussed with patient.

## 2018-07-11 ENCOUNTER — OFFICE VISIT (OUTPATIENT)
Dept: UROLOGY | Facility: CLINIC | Age: 63
End: 2018-07-11
Payer: COMMERCIAL

## 2018-07-11 VITALS
TEMPERATURE: 98 F | HEART RATE: 79 BPM | SYSTOLIC BLOOD PRESSURE: 145 MMHG | BODY MASS INDEX: 31.4 KG/M2 | HEIGHT: 62 IN | OXYGEN SATURATION: 93 % | DIASTOLIC BLOOD PRESSURE: 84 MMHG | WEIGHT: 170.63 LBS

## 2018-07-11 DIAGNOSIS — N28.89 BILATERAL RENAL MASSES: Primary | ICD-10-CM

## 2018-07-11 DIAGNOSIS — N28.1 BILATERAL RENAL CYSTS: ICD-10-CM

## 2018-07-11 PROCEDURE — 99205 OFFICE O/P NEW HI 60 MIN: CPT | Mod: S$GLB,,, | Performed by: UROLOGY

## 2018-07-11 PROCEDURE — 3079F DIAST BP 80-89 MM HG: CPT | Mod: CPTII,S$GLB,, | Performed by: UROLOGY

## 2018-07-11 PROCEDURE — 3008F BODY MASS INDEX DOCD: CPT | Mod: CPTII,S$GLB,, | Performed by: UROLOGY

## 2018-07-11 PROCEDURE — 99999 PR PBB SHADOW E&M-EST. PATIENT-LVL III: CPT | Mod: PBBFAC,,, | Performed by: UROLOGY

## 2018-07-11 PROCEDURE — 3077F SYST BP >= 140 MM HG: CPT | Mod: CPTII,S$GLB,, | Performed by: UROLOGY

## 2018-07-11 NOTE — PATIENT INSTRUCTIONS
What Is Kidney (Renal) Cancer?    Cancer occurs when cells in the body mutate or change and start multiplying out of control. These cells can form lumps of tissue called tumors. Cancer that starts in the cells that make up the kidney is called kidney or renal cancer.  Understanding the kidneys  The kidneys are bean-shaped organs about the size of a bar of soap. They are found in the low back area, one on each side of the spine. The kidneys help keep the body alive by filtering waste and excess fluid from the blood. The kidneys send this liquid and waste (urine) to the bladder through the ureters. Urine then leaves the body through the urethra.  When kidney cancer forms  Kidney cancer forms when cells in the kidney change and multiply abnormally. The cancer can interfere with the working of the kidneys. Kidney cancer may spread beyond the kidneys to other parts of the body. This spread is called metastasis. The more cancer spreads, the harder it is to treat.  Treatment choices for kidney cancer  You and your healthcare provider will discuss a treatment plan that's best for your needs. Treatment choices may include the following.  Surgery  Surgery is the most common treatment for kidney cancer. Your healthcare provider may advise surgery to treat your kidney cancer if any of these apply to you:  · You are healthy enough to have surgery. Your healthcare provider will only advise surgery if he or she expects you to be able to recover from it.  · The tumor is small. In this case, your healthcare provider may do a partial nephrectomy. This surgery allows you to keep some kidney function. Only the part of the kidney that contains the tumor is taken out. In some cases, your provider may advise this surgery if the tumor is larger but you have cancer in both kidneys or if you have only one kidney. The benefit is that you keep part of the kidney. The risk is that there is a chance some cancer cells will be left  behind.  · The tumor is larger, but is only in your kidney. Your healthcare provider will advise the type of surgery you need based on the size of the tumor and where it is. Your provider may advise a simple nephrectomy. This is surgery to take out the entire kidney. Or your provider may advise a radical nephrectomy. This is surgery to take out the whole kidney and the adrenal gland. The adrenal gland is attached to the top of the kidney. Much of the nearby fatty tissue is also taken out. Nearby lymph nodes will also likely be removed. That's because cancer may travel to the nodes first. Taking out the lymph nodes may help prevent the spread of cancer to other parts of your body. And looking at these lymph nodes helps your provider figure out the stage of the cancer. This is important in deciding whether you need other treatments after surgery.  · You have kidney cancer that has spread to other areas and your doctors believe that removing the original kidney tumor will be a useful and effective addition to treatment. Your healthcare provider may suggest a radical nephrectomy and removal of nearby lymph nodes. The tumor or tumors in other parts of the body may also be removed or it may be recommended that you get anti-cancer drug therapy. Even in cases where surgery wont cure the cancer, surgery can sometimes help ease symptoms such as pressure, pain, or bleeding.  · You have symptoms. You may have pain, pressure, or bleeding from tumors that have spread. Your healthcare provider may suggest surgery to remove the tumors. This is done to ease symptoms. Because it doesn't cure the cancer, it is called palliative therapy.  Biologic therapy (immunotherapy)  This treatment strengthens your immune system to help fight cancer. It uses medicines that work like the chemicals that your bodys immune system makes. They help your immune system fight the cancer.  Chemotherapy  Chemotherapy uses medicines to kill cancer cells.  Regular chemotherapy medicines don't usually work well against kidney cancer. Chemotherapy has mostly been replaced by biologic therapy in kidney cancer treatment. But chemotherapy medicines are still sometimes used in rare cases where biologic therapy has not worked.  Radiation therapy  Radiation therapy uses directed rays of energy to kill cancer cells. Radiation therapy doesn't work as well as other methods for treating kidney cancer. Still, it may be used to treat someone who's not healthy enough to have surgery, or a person with kidney cancer that's not responding to other treatments. It may also be used to treat kidney cancer that has spread to the brain or bones. It may be used to treat pain caused by cancer that has spread to the bone (bone metastasis). It can also help stabilize a bone that has been weakened by metastasis and may be at risk for breaking.  Targeted therapies  These therapies use medicines to focus on or prevent changes in the cancer cells. The changes may be either in the cell's molecules or genes. These medicines help keep the cells from growing out of control and spreading to other parts of the body.  Date Last Reviewed: 2/1/2017 © 2000-2017 [employer]. [address] [place] [address], [place] [address]. All rights reserved. This information is not intended as a substitute for professional medical care. Always follow your healthcare professional's instructions.

## 2018-07-11 NOTE — PROGRESS NOTES
Subjective:       Patient ID: Dary Chaney is a 62 y.o. female.    Chief Complaint: No chief complaint on file.      HPI: Dary Chaney is a 62 y.o. White female who presents today for evaluation and management of bilateral renal masses.    The masses were found incidentally during evaluation for non-specific abdominal pain and bloating. The masses were seen on an abdominal ultrasound.    The patient denies gross hematuria and/or constitutional symptoms attributable to her recently discovered renal mass. The patient denies a personal and family history of kidney cancer. The patient denies any personal history of other cancers.    The patient does describe some lower abdominal pressure and pain. She has no voiding complaints at present. The patient has never smoked. Her eGFR is greater than 60 ml/min.    The patient presents today to discuss how best to evaluate her bilateral renal masses.    Review of patient's allergies indicates:   Allergen Reactions    Citrus and derivatives Swelling, Other (See Comments) and Edema     Redness      Latex Other (See Comments)     Other reaction(s): Rash  Other reaction(s): welts  Other reaction(s): Itching    Valsartan Other (See Comments)     Other reaction(s): disoriented    Aspirin Nausea Only     Other reaction(s): Nausea Only    Metformin Anxiety     Other reaction(s): anxiety       Current Outpatient Prescriptions   Medication Sig Dispense Refill    dulaglutide (TRULICITY) 1.5 mg/0.5 mL PnIj Inject 1.5 mg into the skin every 7 days. 12 Syringe 1    glimepiride (AMARYL) 4 MG tablet Take 1.5 tablets (6 mg total) by mouth daily with breakfast. 180 tablet 1    insulin degludec (TRESIBA FLEXTOUCH U-200) 200 unit/mL (3 mL) InPn Inject 74 Units into the skin once daily. 9 Syringe 0    losartan (COZAAR) 25 MG tablet Take 1 tablet (25 mg total) by mouth once daily. 90 tablet 1    simvastatin (ZOCOR) 40 MG tablet Take 1 tablet (40 mg total) by mouth nightly. 90 tablet 1  "   albuterol (PROVENTIL) 2.5 mg /3 mL (0.083 %) nebulizer solution Take 3 mLs (2.5 mg total) by nebulization every 6 (six) hours as needed for Wheezing. 120 mL 0    blood sugar diagnostic Strp Contour Next. Glucose testing three times daily. 300 strip 3    blood-glucose meter kit Contour Next. Use as instructed 1 each 0    fluticasone (FLONASE) 50 mcg/actuation nasal spray USE TWO SPRAY(S) IN EACH NOSTRIL ONCE DAILY 16 g 1    lancets 33 gauge Misc 1 lancet by Misc.(Non-Drug; Combo Route) route 3 (three) times daily. 300 each 3    lidocaine (LIDODERM) 5 % Place 1 patch onto the skin daily as needed. Remove & Discard patch within 12 hours or as directed by MD 30 patch 3    loratadine (CLARITIN) 10 mg tablet Take 1 tablet (10 mg total) by mouth once daily. 30 tablet 0    PEN NEEDLE 31 X 5/16 " Ndle AS DIRECTED 100 each 10     No current facility-administered medications for this visit.        Past Medical History:   Diagnosis Date    Arthritis     Cataract     Colon polyp     Diabetes mellitus type II     Hyperlipidemia     Hypertension     UTI (urinary tract infection)        Past Surgical History:   Procedure Laterality Date    CHOLECYSTECTOMY      CHOLECYSTECTOMY      COLONOSCOPY  2012    COLONOSCOPY N/A 3/1/2018    Procedure: COLONOSCOPY;  Surgeon: Phillip Brower MD;  [facility];  Service: Endoscopy;  Laterality: N/A;    HYSTERECTOMY  1993    uterine prolapse    KNEE SURGERY  03/2017    SPINE SURGERY         Family History   Problem Relation Age of Onset    Diabetes Brother     Heart disease Neg Hx        Review of Systems    Review of Systems   Constitutional: Negative for fever, chills, activity change, appetite change and unexpected weight change.   HENT: Negative for congestion, nosebleeds, sneezing, sore throat and trouble swallowing.    Eyes: Negative for pain, discharge, redness and visual disturbance.   Respiratory: Negative for cough, choking, chest tightness and " shortness of breath.    Cardiovascular: Negative for chest pain, palpitations and leg swelling.   Gastrointestinal: Negative for nausea, vomiting, abdominal pain, diarrhea, blood in stool, abdominal distention and anal bleeding.  Genitourinary: As documented per HPI   Endocrine: Negative for cold intolerance, heat intolerance, polydipsia, polyphagia and polyuria.   Musculoskeletal: Negative for myalgias, gait problem, neck pain and neck stiffness.   Skin: Negative for color change, pallor, rash and wound.   Allergic/Immunologic: Negative for immunocompromised state.   Neurological: Negative for seizures, facial asymmetry, speech difficulty, weakness and light-headedness.   Hematological: Negative for adenopathy. Does not bruise/bleed easily.   Psychiatric/Behavioral: Negative for hallucinations, behavioral problems, self-injury and agitation. The patient is not hyperactive.      All other systems were reviewed and were negative.    Objective:     Vitals:    07/11/18 0859   BP: (!) 145/84   Pulse: 79   Temp: 97.8 °F (36.6 °C)     Physical Exam   Vitals reviewed.  Constitutional: She is oriented to person, place, and time. She appears well-developed and well-nourished. No distress.   HENT:   Head: Normocephalic and atraumatic.   Right Ear: External ear normal.   Left Ear: External ear normal.   Nose: Nose normal.   Eyes: EOM are normal. Pupils are equal, round, and reactive to light. Right eye exhibits no discharge. Left eye exhibits no discharge.   Neck: Normal range of motion. Neck supple. No tracheal deviation present. No thyroid enlargement or tenderness.  Cardiovascular: Normal heart sounds and intact distal pulses. No signs of peripheral edema.   Pulmonary/Chest: Effort normal and breath sounds normal. No respiratory distress. She has no wheezes.   Abdominal: Soft. Bowel sounds are normal. She exhibits no distension. There is no tenderness. There is no rebound. No hepatic or splenic enlargement or  tenderness.  Musculoskeletal: Normal range of motion. She exhibits no edema.   Neurological: She is alert and oriented to person, place, and time. Coordination normal.   Skin: Skin is warm and dry. She is not diaphoretic.   Lymphatic: No palpable lymph nodes.  Psychiatric: She has a normal mood and affect. Her behavior is normal. Judgment and thought content normal.     Lab Results   Component Value Date    CREATININE 0.8 06/12/2018     Lab Results   Component Value Date    EGFRNONAA >60.0 06/12/2018     Lab Results   Component Value Date    ESTGFRAFRICA >60.0 06/12/2018     I have personally reviewed all the patient's imaging studies.    Abdominal ultrasound (1/16/18): There is a rounded echogenic cortical based lesion at the mid to lower pole of the right kidney measuring up to 1.4 cm without definite correlate seen on prior exams.  Exophytic heterogeneous lesion noted at the superior pole of the left kidney measuring up to 18 mm with possible internal calcification which was not definitely seen on prior ultrasound.  Simple appearing left renal cyst do not appear significantly changed measuring up to 2.8 cm at the interpolar region and 1.3 cm at the upper pole.    Assessment:       1. Bilateral renal masses    2. Bilateral renal cysts        Plan:     Diagnoses and all orders for this visit:    Bilateral renal masses  -     CT Urogram Abd Pelvis W WO; Future    Bilateral renal cysts  -     CT Urogram Abd Pelvis W WO; Future    The patient has bilateral cT1a renal masses that are poorly characterized on present imaging.    I spent a long time discussing with the patient and her family the nature and natural history of renal masses. >80% of enhancing renal masses represent a renal cell carcinoma, and surgical excision of these masses is the primary and preferred treatment strategy. Fortunately, for small renal masses (< 4 cm), surgical excision is curative with no need for further treatments. When amenable, a  nephron-sparing procedure is optimal followed by a minimally-invasive approach. These treatment principles result in outstanding oncologic outcomes for patients with T1 renal masses (> 90%). I did mention to the patient that although partial nephrectomies do maximize long-term renal function, they are associated with a slightly increased risk of henry-procedural complications (bleeding and urinary fistulas). However, these short-term complications are far outweighed by the benefits of renal preservation. In general, active surveillance is usually recommended for older and/or more co-morbid patients who have a higher surgical risk. Fortunately, there is no long-term data to suggest that the risk of metastasis with a mass less than 4 cm while on active surveillance approaches 0%.    For this patient, I have recommended a dedicated CT urogram to better evaluate her bilateral lesions. This study will determine if the masses require intervention.    I answered all her and her family's questions.    At the conclusion of our visit, the patient was amenable to proceeding as outlined above. I will see the patient back after her imaging study.    I encouraged her and/or any of her family members to call and/or email me with questions/concerns.    I spent 60 minutes with the patient of which more than half was spent in coordinating the patient's care as well as in direct consultation with the patient in regards to our treatment and plan.

## 2018-07-11 NOTE — LETTER
July 11, 2018      Sakina Cooper DO  [address] [place] [address]           [facility] - Urology Oncology  [address] [place] [address]  Phone: [phone]  Fax: [phone]          Patient: Dary Chaney   MR Number: [id number]   YOB: 1955   Date of Visit: 7/11/2018       Dear Provider Notinsystem:    Thank you for referring Dary Chaney to me for evaluation. Attached you will find relevant portions of my assessment and plan of care.    If you have questions, please do not hesitate to call me. I look forward to following Dary Chaney along with you.    Sincerely,    Tommy Bradshaw MD    Enclosure  CC:  No Recipients    If you would like to receive this communication electronically, please contact [email or web address] or [phone] to request more information on [facility] access.    For providers and/or their staff who would like to refer a patient to [facility], please contact us through our one-stop-shop provider referral line, Mamie Helton, at [phone].    If you feel you have received this communication in error or would no longer like to receive these types of communications, please e-mail [email or web address]

## 2018-07-12 ENCOUNTER — TELEPHONE (OUTPATIENT)
Dept: UROLOGY | Facility: CLINIC | Age: 63
End: 2018-07-12

## 2018-07-13 ENCOUNTER — TELEPHONE (OUTPATIENT)
Dept: INTERNAL MEDICINE | Facility: CLINIC | Age: 63
End: 2018-07-13

## 2018-07-13 NOTE — TELEPHONE ENCOUNTER
I spoke with pharmacy [facility], she stated she wanted to know the status on a prior authorization for a prescription lidocaine patches, I told her we didn't receive it, I asked her can she fax one over and she stated yes.

## 2018-07-13 NOTE — TELEPHONE ENCOUNTER
----- Message from Ayla Chanye sent at 7/13/2018 10:06 AM CDT -----  Contact: [facility]  Pharmacy is calling to check on an prior authorization for Rx lidocaine patches, Please call her to [phone].    [address] [place] - [address] [place]  [address] [address] [place] [address]  Phone: [phone] Fax: [phone]    Thanks  Td

## 2018-07-16 ENCOUNTER — TELEPHONE (OUTPATIENT)
Dept: UROLOGY | Facility: CLINIC | Age: 63
End: 2018-07-16

## 2018-07-16 NOTE — TELEPHONE ENCOUNTER
Called pt and cancelled her CT appt at [facility] due to her request because pt said that her insurance company is requiring that she get it done at a [facility] facility. I then faxed her CT order to the number she gave me and mailed the copy of the order to her home address. Pt was very thankful and verbalized understanding to all.

## 2018-07-16 NOTE — TELEPHONE ENCOUNTER
----- Message from Narendra Bonilla sent at 7/16/2018  9:57 AM CDT -----  Pt is requesting a call form nurse to discuss an apt.          Please call pt back at [phone]

## 2018-07-17 DIAGNOSIS — G89.29 CHRONIC PAIN OF LEFT KNEE: ICD-10-CM

## 2018-07-17 DIAGNOSIS — M25.562 CHRONIC PAIN OF LEFT KNEE: ICD-10-CM

## 2018-07-17 NOTE — TELEPHONE ENCOUNTER
Received fax from [facility] # [id number]  Requesting refill on Lidocaine patch 5% qut 30- use 1 patch externally once daily as needed, remove and discard patch within 12 hours or as directed by doctor.

## 2018-07-18 RX ORDER — LIDOCAINE 50 MG/G
1 PATCH TOPICAL DAILY PRN
Qty: 30 PATCH | Refills: 3 | Status: SHIPPED | OUTPATIENT
Start: 2018-07-18 | End: 2018-07-24 | Stop reason: SDUPTHER

## 2018-07-19 ENCOUNTER — HOSPITAL ENCOUNTER (OUTPATIENT)
Dept: RADIOLOGY | Facility: HOSPITAL | Age: 63
Discharge: HOME | End: 2018-07-19
Attending: INTERNAL MEDICINE
Payer: COMMERCIAL

## 2018-07-19 VITALS — WEIGHT: 170 LBS | BODY MASS INDEX: 31.28 KG/M2 | HEIGHT: 62 IN

## 2018-07-19 DIAGNOSIS — Z12.31 ENCOUNTER FOR SCREENING MAMMOGRAM FOR MALIGNANT NEOPLASM OF BREAST: ICD-10-CM

## 2018-07-19 PROCEDURE — 77067 SCR MAMMO BI INCL CAD: CPT | Mod: 26,,, | Performed by: RADIOLOGY

## 2018-07-19 PROCEDURE — 77067 SCR MAMMO BI INCL CAD: CPT | Mod: TC

## 2018-07-24 DIAGNOSIS — M25.562 CHRONIC PAIN OF LEFT KNEE: ICD-10-CM

## 2018-07-24 DIAGNOSIS — G89.29 CHRONIC PAIN OF LEFT KNEE: ICD-10-CM

## 2018-07-24 RX ORDER — LIDOCAINE 50 MG/G
1 PATCH TOPICAL DAILY PRN
Qty: 30 PATCH | Refills: 3 | Status: SHIPPED | OUTPATIENT
Start: 2018-07-24 | End: 2020-12-23

## 2018-07-25 ENCOUNTER — OFFICE VISIT (OUTPATIENT)
Dept: UROLOGY | Facility: CLINIC | Age: 63
End: 2018-07-25
Payer: COMMERCIAL

## 2018-07-25 VITALS
WEIGHT: 169.75 LBS | SYSTOLIC BLOOD PRESSURE: 155 MMHG | HEIGHT: 62 IN | BODY MASS INDEX: 31.24 KG/M2 | HEART RATE: 76 BPM | DIASTOLIC BLOOD PRESSURE: 86 MMHG

## 2018-07-25 DIAGNOSIS — N28.89 RIGHT RENAL MASS: ICD-10-CM

## 2018-07-25 DIAGNOSIS — N28.1 RENAL CYST, RIGHT: ICD-10-CM

## 2018-07-25 DIAGNOSIS — N28.89 LEFT RENAL MASS: Primary | ICD-10-CM

## 2018-07-25 PROCEDURE — 3077F SYST BP >= 140 MM HG: CPT | Mod: CPTII,S$GLB,, | Performed by: UROLOGY

## 2018-07-25 PROCEDURE — 99999 PR PBB SHADOW E&M-EST. PATIENT-LVL III: CPT | Mod: PBBFAC,,, | Performed by: UROLOGY

## 2018-07-25 PROCEDURE — 3079F DIAST BP 80-89 MM HG: CPT | Mod: CPTII,S$GLB,, | Performed by: UROLOGY

## 2018-07-25 PROCEDURE — 99215 OFFICE O/P EST HI 40 MIN: CPT | Mod: S$GLB,,, | Performed by: UROLOGY

## 2018-07-25 PROCEDURE — 3008F BODY MASS INDEX DOCD: CPT | Mod: CPTII,S$GLB,, | Performed by: UROLOGY

## 2018-07-25 NOTE — PROGRESS NOTES
Subjective:       Patient ID: Dary Chaney is a 62 y.o. female.    Chief Complaint: No chief complaint on file.      HPI: Dary Chaney is a 62 y.o. White female who returns today in follow-up for evaluation and management of bilateral renal masses.    The masses were found incidentally during evaluation for non-specific abdominal pain and bloating. The masses were seen on an abdominal ultrasound.    The patient denies gross hematuria and/or constitutional symptoms attributable to her recently discovered renal mass. The patient denies a personal and family history of kidney cancer. The patient denies any personal history of other cancers.    The patient does describe some lower abdominal pressure and pain. She has no voiding complaints at present. The patient has never smoked. Her eGFR is greater than 60 ml/min.    The patient returns today to review dedicated imaging of her kidneys to definitively characterize her bilateral renal masses.    Review of patient's allergies indicates:   Allergen Reactions    Citrus and derivatives Swelling, Other (See Comments) and Edema     Redness      Latex Other (See Comments)     Other reaction(s): Rash  Other reaction(s): welts  Other reaction(s): Itching    Valsartan Other (See Comments)     Other reaction(s): disoriented    Aspirin Nausea Only     Other reaction(s): Nausea Only    Metformin Anxiety     Other reaction(s): anxiety       Current Outpatient Prescriptions   Medication Sig Dispense Refill    albuterol (PROVENTIL) 2.5 mg /3 mL (0.083 %) nebulizer solution Take 3 mLs (2.5 mg total) by nebulization every 6 (six) hours as needed for Wheezing. 120 mL 0    blood sugar diagnostic Strp Contour Next. Glucose testing three times daily. 300 strip 3    blood-glucose meter kit Contour Next. Use as instructed 1 each 0    dulaglutide (TRULICITY) 1.5 mg/0.5 mL PnIj Inject 1.5 mg into the skin every 7 days. 12 Syringe 1    fluticasone (FLONASE) 50 mcg/actuation nasal  "spray USE TWO SPRAY(S) IN EACH NOSTRIL ONCE DAILY 16 g 1    glimepiride (AMARYL) 4 MG tablet Take 1.5 tablets (6 mg total) by mouth daily with breakfast. 180 tablet 1    insulin degludec (TRESIBA FLEXTOUCH U-200) 200 unit/mL (3 mL) InPn Inject 74 Units into the skin once daily. 9 Syringe 0    lancets 33 gauge Misc 1 lancet by Misc.(Non-Drug; Combo Route) route 3 (three) times daily. 300 each 3    lidocaine (LIDODERM) 5 % Place 1 patch onto the skin daily as needed. Remove & Discard patch within 12 hours or as directed by MD 30 patch 3    loratadine (CLARITIN) 10 mg tablet Take 1 tablet (10 mg total) by mouth once daily. 30 tablet 0    losartan (COZAAR) 25 MG tablet Take 1 tablet (25 mg total) by mouth once daily. 90 tablet 1    PEN NEEDLE 31 X 5/16 " Ndle AS DIRECTED 100 each 10    simvastatin (ZOCOR) 40 MG tablet Take 1 tablet (40 mg total) by mouth nightly. 90 tablet 1     No current facility-administered medications for this visit.        Past Medical History:   Diagnosis Date    Arthritis     Cataract     Colon polyp     Diabetes mellitus type II     Hyperlipidemia     Hypertension     UTI (urinary tract infection)        Past Surgical History:   Procedure Laterality Date    CHOLECYSTECTOMY      CHOLECYSTECTOMY      COLONOSCOPY  2012    COLONOSCOPY N/A 3/1/2018    Procedure: COLONOSCOPY;  Surgeon: Phillip Brower MD;  Location: [facility];  Service: Endoscopy;  Laterality: N/A;    HYSTERECTOMY  1993    uterine prolapse    KNEE SURGERY  03/2017    SPINE SURGERY         Family History   Problem Relation Age of Onset    Diabetes Brother     Heart disease Neg Hx        Review of Systems    Review of Systems   Constitutional: Negative for fever, chills, activity change, appetite change and unexpected weight change.   HENT: Negative for congestion, nosebleeds, sneezing, sore throat and trouble swallowing.    Eyes: Negative for pain, discharge, redness and visual disturbance.   Respiratory: " Negative for cough, choking, chest tightness and shortness of breath.    Cardiovascular: Negative for chest pain, palpitations and leg swelling.   Gastrointestinal: Negative for nausea, vomiting, abdominal pain, diarrhea, blood in stool, abdominal distention and anal bleeding.  Genitourinary: As documented per HPI   Endocrine: Negative for cold intolerance, heat intolerance, polydipsia, polyphagia and polyuria.   Musculoskeletal: Negative for myalgias, gait problem, neck pain and neck stiffness.   Skin: Negative for color change, pallor, rash and wound.   Allergic/Immunologic: Negative for immunocompromised state.   Neurological: Negative for seizures, facial asymmetry, speech difficulty, weakness and light-headedness.   Hematological: Negative for adenopathy. Does not bruise/bleed easily.   Psychiatric/Behavioral: Negative for hallucinations, behavioral problems, self-injury and agitation. The patient is not hyperactive.      All other systems were reviewed and were negative.    Objective:     Vitals:    07/25/18 0903   BP: (!) 155/86   Pulse: 76     Physical Exam   Vitals reviewed.  Constitutional: She is oriented to person, place, and time. She appears well-developed and well-nourished. No distress.   HENT:   Head: Normocephalic and atraumatic.   Right Ear: External ear normal.   Left Ear: External ear normal.   Nose: Nose normal.   Eyes: EOM are normal. Pupils are equal, round, and reactive to light. Right eye exhibits no discharge. Left eye exhibits no discharge.   Neck: Normal range of motion. Neck supple. No tracheal deviation present. No thyroid enlargement or tenderness.  Cardiovascular: Normal heart sounds and intact distal pulses. No signs of peripheral edema.   Pulmonary/Chest: Effort normal and breath sounds normal. No respiratory distress. She has no wheezes.   Abdominal: Soft. Bowel sounds are normal. She exhibits no distension. There is no tenderness. There is no rebound. No hepatic or splenic  enlargement or tenderness.  Musculoskeletal: Normal range of motion. She exhibits no edema.   Neurological: She is alert and oriented to person, place, and time. Coordination normal.   Skin: Skin is warm and dry. She is not diaphoretic.   Lymphatic: No palpable lymph nodes.  Psychiatric: She has a normal mood and affect. Her behavior is normal. Judgment and thought content normal.     Lab Results   Component Value Date    CREATININE 0.8 06/12/2018     Lab Results   Component Value Date    EGFRNONAA >60.0 06/12/2018     Lab Results   Component Value Date    ESTGFRAFRICA >60.0 06/12/2018     I have personally reviewed all the patient's imaging studies.    Abdominal ultrasound (1/16/18): There is a rounded echogenic cortical based lesion at the mid to lower pole of the right kidney measuring up to 1.4 cm without definite correlate seen on prior exams.  Exophytic heterogeneous lesion noted at the superior pole of the left kidney measuring up to 18 mm with possible internal calcification which was not definitely seen on prior ultrasound.  Simple appearing left renal cyst do not appear significantly changed measuring up to 2.8 cm at the interpolar region and 1.3 cm at the upper pole.    CT abdomen/pelvis without and with contrast (7/23/18): Approximately 2.0 cm enhancing right renal mass consistent with a renal cell carcinoma. Two left-sided lesions consistent with a hyperdense cyst and a Bosniak IIF lesion.    Assessment:       1. Left renal mass    2. Right renal mass    3. Renal cyst, right        Plan:     Diagnoses and all orders for this visit:    Left renal mass    Right renal mass    Renal cyst, right    The patient has an enhancing cT1a right renal mass and two left-sided lesions that are likely a hyperdense cyst and a minimally complex cyst.    I spent a long time discussing again with the patient and her family the nature and natural history of renal masses. >80% of enhancing renal masses represent a renal cell  carcinoma, and surgical excision of these masses is the primary and preferred treatment strategy. Fortunately, for small renal masses (< 4 cm), surgical excision is curative with no need for further treatments. When amenable, a nephron-sparing procedure is optimal followed by a minimally-invasive approach. These treatment principles result in outstanding oncologic outcomes for patients with T1 renal masses (> 90%). I did mention to the patient that although partial nephrectomies do maximize long-term renal function, they are associated with a slightly increased risk of henry-procedural complications (bleeding and urinary fistulas). However, these short-term complications are far outweighed by the benefits of renal preservation. In general, active surveillance is usually recommended for older and/or more co-morbid patients who have a higher surgical risk. Fortunately, there is no long-term data to suggest that the risk of metastasis with a mass less than 4 cm while on active surveillance approaches 0%.    For this patient, we discussed active surveillance versus excision of her right renal mass. The patien would like to think about is some more and talk to her family. She will return in a couple of weeks to talk some more.    I answered all her questions.    I encouraged her and/or any of her family members to call and/or email me with questions/concerns.    I spent 45 minutes with the patient of which more than half was spent in coordinating the patient's care as well as in direct consultation with the patient in regards to our treatment and plan.

## 2018-07-25 NOTE — PATIENT INSTRUCTIONS
What Is Kidney (Renal) Cancer?    Cancer occurs when cells in the body mutate or change and start multiplying out of control. These cells can form lumps of tissue called tumors. Cancer that starts in the cells that make up the kidney is called kidney or renal cancer.  Understanding the kidneys  The kidneys are bean-shaped organs about the size of a bar of soap. They are found in the low back area, one on each side of the spine. The kidneys help keep the body alive by filtering waste and excess fluid from the blood. The kidneys send this liquid and waste (urine) to the bladder through the ureters. Urine then leaves the body through the urethra.  When kidney cancer forms  Kidney cancer forms when cells in the kidney change and multiply abnormally. The cancer can interfere with the working of the kidneys. Kidney cancer may spread beyond the kidneys to other parts of the body. This spread is called metastasis. The more cancer spreads, the harder it is to treat.  Treatment choices for kidney cancer  You and your healthcare provider will discuss a treatment plan that's best for your needs. Treatment choices may include the following.  Surgery  Surgery is the most common treatment for kidney cancer. Your healthcare provider may advise surgery to treat your kidney cancer if any of these apply to you:  · You are healthy enough to have surgery. Your healthcare provider will only advise surgery if he or she expects you to be able to recover from it.  · The tumor is small. In this case, your healthcare provider may do a partial nephrectomy. This surgery allows you to keep some kidney function. Only the part of the kidney that contains the tumor is taken out. In some cases, your provider may advise this surgery if the tumor is larger but you have cancer in both kidneys or if you have only one kidney. The benefit is that you keep part of the kidney. The risk is that there is a chance some cancer cells will be left  behind.  · The tumor is larger, but is only in your kidney. Your healthcare provider will advise the type of surgery you need based on the size of the tumor and where it is. Your provider may advise a simple nephrectomy. This is surgery to take out the entire kidney. Or your provider may advise a radical nephrectomy. This is surgery to take out the whole kidney and the adrenal gland. The adrenal gland is attached to the top of the kidney. Much of the nearby fatty tissue is also taken out. Nearby lymph nodes will also likely be removed. That's because cancer may travel to the nodes first. Taking out the lymph nodes may help prevent the spread of cancer to other parts of your body. And looking at these lymph nodes helps your provider figure out the stage of the cancer. This is important in deciding whether you need other treatments after surgery.  · You have kidney cancer that has spread to other areas and your doctors believe that removing the original kidney tumor will be a useful and effective addition to treatment. Your healthcare provider may suggest a radical nephrectomy and removal of nearby lymph nodes. The tumor or tumors in other parts of the body may also be removed or it may be recommended that you get anti-cancer drug therapy. Even in cases where surgery wont cure the cancer, surgery can sometimes help ease symptoms such as pressure, pain, or bleeding.  · You have symptoms. You may have pain, pressure, or bleeding from tumors that have spread. Your healthcare provider may suggest surgery to remove the tumors. This is done to ease symptoms. Because it doesn't cure the cancer, it is called palliative therapy.  Biologic therapy (immunotherapy)  This treatment strengthens your immune system to help fight cancer. It uses medicines that work like the chemicals that your bodys immune system makes. They help your immune system fight the cancer.  Chemotherapy  Chemotherapy uses medicines to kill cancer cells.  Regular chemotherapy medicines don't usually work well against kidney cancer. Chemotherapy has mostly been replaced by biologic therapy in kidney cancer treatment. But chemotherapy medicines are still sometimes used in rare cases where biologic therapy has not worked.  Radiation therapy  Radiation therapy uses directed rays of energy to kill cancer cells. Radiation therapy doesn't work as well as other methods for treating kidney cancer. Still, it may be used to treat someone who's not healthy enough to have surgery, or a person with kidney cancer that's not responding to other treatments. It may also be used to treat kidney cancer that has spread to the brain or bones. It may be used to treat pain caused by cancer that has spread to the bone (bone metastasis). It can also help stabilize a bone that has been weakened by metastasis and may be at risk for breaking.  Targeted therapies  These therapies use medicines to focus on or prevent changes in the cancer cells. The changes may be either in the cell's molecules or genes. These medicines help keep the cells from growing out of control and spreading to other parts of the body.  Date Last Reviewed: 2/1/2017 © 2000-2017 [employer]. [address] [place] [address], [place] [address]. All rights reserved. This information is not intended as a substitute for professional medical care. Always follow your healthcare professional's instructions.

## 2018-08-07 ENCOUNTER — TELEPHONE (OUTPATIENT)
Dept: DIABETES | Facility: CLINIC | Age: 63
End: 2018-08-07

## 2018-08-07 NOTE — TELEPHONE ENCOUNTER
----- Message from Camila Wilson sent at 8/7/2018  8:11 AM CDT -----  Contact: pt   Pt is returning the nurse call.                                                                 [phone] (home)

## 2018-08-08 ENCOUNTER — OFFICE VISIT (OUTPATIENT)
Dept: UROLOGY | Facility: CLINIC | Age: 63
End: 2018-08-08
Payer: COMMERCIAL

## 2018-08-08 VITALS
HEART RATE: 80 BPM | HEIGHT: 62 IN | BODY MASS INDEX: 31.6 KG/M2 | WEIGHT: 171.75 LBS | SYSTOLIC BLOOD PRESSURE: 163 MMHG | TEMPERATURE: 97 F | DIASTOLIC BLOOD PRESSURE: 90 MMHG

## 2018-08-08 DIAGNOSIS — N28.89 RIGHT RENAL MASS: Primary | ICD-10-CM

## 2018-08-08 DIAGNOSIS — N28.1 RENAL CYST, LEFT: ICD-10-CM

## 2018-08-08 PROCEDURE — 3008F BODY MASS INDEX DOCD: CPT | Mod: CPTII,S$GLB,, | Performed by: UROLOGY

## 2018-08-08 PROCEDURE — 3077F SYST BP >= 140 MM HG: CPT | Mod: CPTII,S$GLB,, | Performed by: UROLOGY

## 2018-08-08 PROCEDURE — 3080F DIAST BP >= 90 MM HG: CPT | Mod: CPTII,S$GLB,, | Performed by: UROLOGY

## 2018-08-08 PROCEDURE — 99999 PR PBB SHADOW E&M-EST. PATIENT-LVL IV: CPT | Mod: PBBFAC,,, | Performed by: UROLOGY

## 2018-08-08 PROCEDURE — 99215 OFFICE O/P EST HI 40 MIN: CPT | Mod: S$GLB,,, | Performed by: UROLOGY

## 2018-08-08 NOTE — PROGRESS NOTES
Subjective:       Patient ID: Dary Chaney is a 62 y.o. female.    Chief Complaint: No chief complaint on file.      HPI: Dary Chaney is a 62 y.o. White female who returns today in follow-up for evaluation and management of bilateral renal masses.    The masses were found incidentally during evaluation for non-specific abdominal pain and bloating. The masses were seen on an abdominal ultrasound.    The patient denies gross hematuria and/or constitutional symptoms attributable to her recently discovered renal mass. The patient denies a personal and family history of kidney cancer. The patient denies any personal history of other cancers.    The patient does describe some lower abdominal pressure and pain. She has no voiding complaints at present. The patient has never smoked. Her eGFR is greater than 60 ml/min.    The patient returns today with her family to discuss management of her enhancing small right renal mass.    She is doing well with no complaints.    Review of patient's allergies indicates:   Allergen Reactions    Citrus and derivatives Swelling, Other (See Comments) and Edema     Redness      Latex Other (See Comments)     Other reaction(s): Rash  Other reaction(s): welts  Other reaction(s): Itching    Valsartan Other (See Comments)     Other reaction(s): disoriented    Aspirin Nausea Only     Other reaction(s): Nausea Only    Metformin Anxiety     Other reaction(s): anxiety       Current Outpatient Prescriptions   Medication Sig Dispense Refill    albuterol (PROVENTIL) 2.5 mg /3 mL (0.083 %) nebulizer solution Take 3 mLs (2.5 mg total) by nebulization every 6 (six) hours as needed for Wheezing. 120 mL 0    blood sugar diagnostic Strp Contour Next. Glucose testing three times daily. 300 strip 3    blood-glucose meter kit Contour Next. Use as instructed 1 each 0    dulaglutide (TRULICITY) 1.5 mg/0.5 mL PnIj Inject 1.5 mg into the skin every 7 days. 12 Syringe 1    fluticasone (FLONASE) 50  "mcg/actuation nasal spray USE TWO SPRAY(S) IN EACH NOSTRIL ONCE DAILY 16 g 1    glimepiride (AMARYL) 4 MG tablet Take 1.5 tablets (6 mg total) by mouth daily with breakfast. 180 tablet 1    insulin degludec (TRESIBA FLEXTOUCH U-200) 200 unit/mL (3 mL) InPn Inject 74 Units into the skin once daily. 9 Syringe 0    lancets 33 gauge Misc 1 lancet by Misc.(Non-Drug; Combo Route) route 3 (three) times daily. 300 each 3    lidocaine (LIDODERM) 5 % Place 1 patch onto the skin daily as needed. Remove & Discard patch within 12 hours or as directed by MD 30 patch 3    losartan (COZAAR) 25 MG tablet Take 1 tablet (25 mg total) by mouth once daily. 90 tablet 1    PEN NEEDLE 31 X 5/16 " Ndle AS DIRECTED 100 each 10    simvastatin (ZOCOR) 40 MG tablet Take 1 tablet (40 mg total) by mouth nightly. 90 tablet 1    loratadine (CLARITIN) 10 mg tablet Take 1 tablet (10 mg total) by mouth once daily. 30 tablet 0     No current facility-administered medications for this visit.        Past Medical History:   Diagnosis Date    Arthritis     Cataract     Colon polyp     Diabetes mellitus type II     Hyperlipidemia     Hypertension     UTI (urinary tract infection)        Past Surgical History:   Procedure Laterality Date    CHOLECYSTECTOMY      CHOLECYSTECTOMY      COLONOSCOPY  2012    COLONOSCOPY N/A 3/1/2018    Procedure: COLONOSCOPY;  Surgeon: Phillip Brower MD;  Location: [facility];  Service: Endoscopy;  Laterality: N/A;    HYSTERECTOMY  1993    uterine prolapse    KNEE SURGERY  03/2017    SPINE SURGERY         Family History   Problem Relation Age of Onset    Diabetes Brother     Heart disease Neg Hx        Review of Systems    Review of Systems   Constitutional: Negative for fever, chills, activity change, appetite change and unexpected weight change.   HENT: Negative for congestion, nosebleeds, sneezing, sore throat and trouble swallowing.    Eyes: Negative for pain, discharge, redness and visual " disturbance.   Respiratory: Negative for cough, choking, chest tightness and shortness of breath.    Cardiovascular: Negative for chest pain, palpitations and leg swelling.   Gastrointestinal: Negative for nausea, vomiting, abdominal pain, diarrhea, blood in stool, abdominal distention and anal bleeding.  Genitourinary: As documented per HPI   Endocrine: Negative for cold intolerance, heat intolerance, polydipsia, polyphagia and polyuria.   Musculoskeletal: Negative for myalgias, gait problem, neck pain and neck stiffness.   Skin: Negative for color change, pallor, rash and wound.   Allergic/Immunologic: Negative for immunocompromised state.   Neurological: Negative for seizures, facial asymmetry, speech difficulty, weakness and light-headedness.   Hematological: Negative for adenopathy. Does not bruise/bleed easily.   Psychiatric/Behavioral: Negative for hallucinations, behavioral problems, self-injury and agitation. The patient is not hyperactive.      All other systems were reviewed and were negative.    Objective:     Vitals:    08/08/18 0910   BP: (!) 163/90   Pulse: 80   Temp: 97 °F (36.1 °C)     Physical Exam   Vitals reviewed.  Constitutional: She is oriented to person, place, and time. She appears well-developed and well-nourished. No distress.   HENT:   Head: Normocephalic and atraumatic.   Right Ear: External ear normal.   Left Ear: External ear normal.   Nose: Nose normal.   Eyes: EOM are normal. Pupils are equal, round, and reactive to light. Right eye exhibits no discharge. Left eye exhibits no discharge.   Neck: Normal range of motion. Neck supple. No tracheal deviation present. No thyroid enlargement or tenderness.  Cardiovascular: Normal heart sounds and intact distal pulses. No signs of peripheral edema.   Pulmonary/Chest: Effort normal and breath sounds normal. No respiratory distress. She has no wheezes.   Abdominal: Soft. Bowel sounds are normal. She exhibits no distension. There is no  tenderness. There is no rebound. No hepatic or splenic enlargement or tenderness.  Musculoskeletal: Normal range of motion. She exhibits no edema.   Neurological: She is alert and oriented to person, place, and time. Coordination normal.   Skin: Skin is warm and dry. She is not diaphoretic.   Lymphatic: No palpable lymph nodes.  Psychiatric: She has a normal mood and affect. Her behavior is normal. Judgment and thought content normal.     Lab Results   Component Value Date    CREATININE 0.8 06/12/2018     Lab Results   Component Value Date    EGFRNONAA >60.0 06/12/2018     Lab Results   Component Value Date    ESTGFRAFRICA >60.0 06/12/2018     I have personally reviewed all the patient's imaging studies.    Abdominal ultrasound (1/16/18): There is a rounded echogenic cortical based lesion at the mid to lower pole of the right kidney measuring up to 1.4 cm without definite correlate seen on prior exams.  Exophytic heterogeneous lesion noted at the superior pole of the left kidney measuring up to 18 mm with possible internal calcification which was not definitely seen on prior ultrasound.  Simple appearing left renal cyst do not appear significantly changed measuring up to 2.8 cm at the interpolar region and 1.3 cm at the upper pole.    CT abdomen/pelvis without and with contrast (7/23/18): Approximately 2.0 cm enhancing right renal mass consistent with a renal cell carcinoma. Two left-sided lesions consistent with a hyperdense cyst and a Bosniak IIF lesion.    Assessment:       1. Right renal mass    2. Renal cyst, left        Plan:     Diagnoses and all orders for this visit:    Right renal mass  -     Diet NPO; Standing  -     Case Request Operating Room: ROBOTIC NEPHRECTOMY, PARTIAL  -     Place in Outpatient; Standing    Renal cyst, left    Other orders  -     IP VTE HIGH RISK PATIENT; Standing    The patient has an enhancing cT1a right renal mass and two left-sided lesions that are likely a hyperdense cyst and a  minimally complex cyst.    I spent a long time discussing again with the patient and her family the nature and natural history of renal masses. >80% of enhancing renal masses represent a renal cell carcinoma, and surgical excision of these masses is the primary and preferred treatment strategy. Fortunately, for small renal masses (< 4 cm), surgical excision is curative with no need for further treatments. When amenable, a nephron-sparing procedure is optimal followed by a minimally-invasive approach. These treatment principles result in outstanding oncologic outcomes for patients with T1 renal masses (> 90%). I did mention to the patient that although partial nephrectomies do maximize long-term renal function, they are associated with a slightly increased risk of henry-procedural complications (bleeding and urinary fistulas). However, these short-term complications are far outweighed by the benefits of renal preservation. In general, active surveillance is usually recommended for older and/or more co-morbid patients who have a higher surgical risk. Fortunately, there is no long-term data to suggest that the risk of metastasis with a mass less than 4 cm while on active surveillance approaches 0%.    For this patient, we have decided to proceed with a robotic right partial nephrectomy. I described the surgery, associated hospital stay, and overall recovery.    I answered all her questions.    At the conclusion of our visit, she and her family were amenable to proceeding as outlined above.    I encouraged her and/or any of her family members to call and/or email me with questions/concerns.    I spent 40 minutes with the patient of which more than half was spent in coordinating the patient's care as well as in direct consultation with the patient in regards to our treatment and plan.

## 2018-08-08 NOTE — PATIENT INSTRUCTIONS
What Is Kidney (Renal) Cancer?    Cancer occurs when cells in the body mutate or change and start multiplying out of control. These cells can form lumps of tissue called tumors. Cancer that starts in the cells that make up the kidney is called kidney or renal cancer.  Understanding the kidneys  The kidneys are bean-shaped organs about the size of a bar of soap. They are found in the low back area, one on each side of the spine. The kidneys help keep the body alive by filtering waste and excess fluid from the blood. The kidneys send this liquid and waste (urine) to the bladder through the ureters. Urine then leaves the body through the urethra.  When kidney cancer forms  Kidney cancer forms when cells in the kidney change and multiply abnormally. The cancer can interfere with the working of the kidneys. Kidney cancer may spread beyond the kidneys to other parts of the body. This spread is called metastasis. The more cancer spreads, the harder it is to treat.  Treatment choices for kidney cancer  You and your healthcare provider will discuss a treatment plan that's best for your needs. Treatment choices may include the following.  Surgery  Surgery is the most common treatment for kidney cancer. Your healthcare provider may advise surgery to treat your kidney cancer if any of these apply to you:  · You are healthy enough to have surgery. Your healthcare provider will only advise surgery if he or she expects you to be able to recover from it.  · The tumor is small. In this case, your healthcare provider may do a partial nephrectomy. This surgery allows you to keep some kidney function. Only the part of the kidney that contains the tumor is taken out. In some cases, your provider may advise this surgery if the tumor is larger but you have cancer in both kidneys or if you have only one kidney. The benefit is that you keep part of the kidney. The risk is that there is a chance some cancer cells will be left  behind.  · The tumor is larger, but is only in your kidney. Your healthcare provider will advise the type of surgery you need based on the size of the tumor and where it is. Your provider may advise a simple nephrectomy. This is surgery to take out the entire kidney. Or your provider may advise a radical nephrectomy. This is surgery to take out the whole kidney and the adrenal gland. The adrenal gland is attached to the top of the kidney. Much of the nearby fatty tissue is also taken out. Nearby lymph nodes will also likely be removed. That's because cancer may travel to the nodes first. Taking out the lymph nodes may help prevent the spread of cancer to other parts of your body. And looking at these lymph nodes helps your provider figure out the stage of the cancer. This is important in deciding whether you need other treatments after surgery.  · You have kidney cancer that has spread to other areas and your doctors believe that removing the original kidney tumor will be a useful and effective addition to treatment. Your healthcare provider may suggest a radical nephrectomy and removal of nearby lymph nodes. The tumor or tumors in other parts of the body may also be removed or it may be recommended that you get anti-cancer drug therapy. Even in cases where surgery wont cure the cancer, surgery can sometimes help ease symptoms such as pressure, pain, or bleeding.  · You have symptoms. You may have pain, pressure, or bleeding from tumors that have spread. Your healthcare provider may suggest surgery to remove the tumors. This is done to ease symptoms. Because it doesn't cure the cancer, it is called palliative therapy.  Biologic therapy (immunotherapy)  This treatment strengthens your immune system to help fight cancer. It uses medicines that work like the chemicals that your bodys immune system makes. They help your immune system fight the cancer.  Chemotherapy  Chemotherapy uses medicines to kill cancer cells.  Regular chemotherapy medicines don't usually work well against kidney cancer. Chemotherapy has mostly been replaced by biologic therapy in kidney cancer treatment. But chemotherapy medicines are still sometimes used in rare cases where biologic therapy has not worked.  Radiation therapy  Radiation therapy uses directed rays of energy to kill cancer cells. Radiation therapy doesn't work as well as other methods for treating kidney cancer. Still, it may be used to treat someone who's not healthy enough to have surgery, or a person with kidney cancer that's not responding to other treatments. It may also be used to treat kidney cancer that has spread to the brain or bones. It may be used to treat pain caused by cancer that has spread to the bone (bone metastasis). It can also help stabilize a bone that has been weakened by metastasis and may be at risk for breaking.  Targeted therapies  These therapies use medicines to focus on or prevent changes in the cancer cells. The changes may be either in the cell's molecules or genes. These medicines help keep the cells from growing out of control and spreading to other parts of the body.  Date Last Reviewed: 2/1/2017 © 2000-2017 [employer]. [address] [place] [address], [place] [address]. All rights reserved. This information is not intended as a substitute for professional medical care. Always follow your healthcare professional's instructions.

## 2018-08-09 DIAGNOSIS — Z79.4 TYPE 2 DIABETES MELLITUS WITH OTHER SPECIFIED COMPLICATION, WITH LONG-TERM CURRENT USE OF INSULIN: ICD-10-CM

## 2018-08-09 DIAGNOSIS — E11.69 TYPE 2 DIABETES MELLITUS WITH OTHER SPECIFIED COMPLICATION, WITH LONG-TERM CURRENT USE OF INSULIN: ICD-10-CM

## 2018-08-09 RX ORDER — INSULIN DEGLUDEC 200 U/ML
INJECTION, SOLUTION SUBCUTANEOUS
Qty: 9 SYRINGE | Refills: 1 | Status: SHIPPED | OUTPATIENT
Start: 2018-08-09 | End: 2018-08-23

## 2018-08-10 RX ORDER — INSULIN DEGLUDEC 200 U/ML
74 INJECTION, SOLUTION SUBCUTANEOUS DAILY
Qty: 9 SYRINGE | Refills: 0 | Status: CANCELLED
Start: 2018-08-10

## 2018-08-21 ENCOUNTER — ANESTHESIA EVENT (OUTPATIENT)
Dept: ENDOSCOPY | Facility: HOSPITAL | Age: 63
End: 2018-08-21
Payer: COMMERCIAL

## 2018-08-21 ENCOUNTER — ANESTHESIA (OUTPATIENT)
Dept: ENDOSCOPY | Facility: HOSPITAL | Age: 63
End: 2018-08-21
Payer: COMMERCIAL

## 2018-08-21 ENCOUNTER — HOSPITAL ENCOUNTER (OUTPATIENT)
Facility: HOSPITAL | Age: 63
Discharge: HOME | End: 2018-08-21
Attending: FAMILY MEDICINE | Admitting: FAMILY MEDICINE
Payer: COMMERCIAL

## 2018-08-21 DIAGNOSIS — R13.10 DYSPHAGIA: ICD-10-CM

## 2018-08-21 DIAGNOSIS — R13.19 ESOPHAGEAL DYSPHAGIA: Primary | ICD-10-CM

## 2018-08-21 DIAGNOSIS — K22.10 ULCER OF ESOPHAGUS WITHOUT BLEEDING: ICD-10-CM

## 2018-08-21 DIAGNOSIS — K22.2 ESOPHAGEAL RING, ACQUIRED: ICD-10-CM

## 2018-08-21 DIAGNOSIS — K44.9 HIATAL HERNIA: ICD-10-CM

## 2018-08-21 LAB — POCT GLUCOSE: 90 MG/DL (ref 70–110)

## 2018-08-21 PROCEDURE — 25000003 PHARM REV CODE 250: Performed by: FAMILY MEDICINE

## 2018-08-21 PROCEDURE — 37000008 HC ANESTHESIA 1ST 15 MINUTES: Performed by: FAMILY MEDICINE

## 2018-08-21 PROCEDURE — 88305 TISSUE EXAM BY PATHOLOGIST: CPT | Performed by: PATHOLOGY

## 2018-08-21 PROCEDURE — 63600175 PHARM REV CODE 636 W HCPCS: Performed by: NURSE ANESTHETIST, CERTIFIED REGISTERED

## 2018-08-21 PROCEDURE — 37000009 HC ANESTHESIA EA ADD 15 MINS: Performed by: FAMILY MEDICINE

## 2018-08-21 PROCEDURE — 82962 GLUCOSE BLOOD TEST: CPT | Performed by: FAMILY MEDICINE

## 2018-08-21 PROCEDURE — 43239 EGD BIOPSY SINGLE/MULTIPLE: CPT | Performed by: FAMILY MEDICINE

## 2018-08-21 PROCEDURE — 88305 TISSUE EXAM BY PATHOLOGIST: CPT | Mod: 26,,, | Performed by: PATHOLOGY

## 2018-08-21 PROCEDURE — 27201012 HC FORCEPS, HOT/COLD, DISP: Performed by: FAMILY MEDICINE

## 2018-08-21 PROCEDURE — 43239 EGD BIOPSY SINGLE/MULTIPLE: CPT | Mod: ,,, | Performed by: FAMILY MEDICINE

## 2018-08-21 RX ORDER — PROPOFOL 10 MG/ML
INJECTION, EMULSION INTRAVENOUS
Status: DISCONTINUED | OUTPATIENT
Start: 2018-08-21 | End: 2018-08-21

## 2018-08-21 RX ORDER — LIDOCAINE HCL/PF 100 MG/5ML
SYRINGE (ML) INTRAVENOUS
Status: DISCONTINUED | OUTPATIENT
Start: 2018-08-21 | End: 2018-08-21

## 2018-08-21 RX ORDER — OMEPRAZOLE 40 MG/1
40 CAPSULE, DELAYED RELEASE ORAL DAILY
Qty: 30 CAPSULE | Refills: 1 | Status: SHIPPED | OUTPATIENT
Start: 2018-08-21 | End: 2018-10-20

## 2018-08-21 RX ORDER — SODIUM CHLORIDE, SODIUM LACTATE, POTASSIUM CHLORIDE, CALCIUM CHLORIDE 600; 310; 30; 20 MG/100ML; MG/100ML; MG/100ML; MG/100ML
INJECTION, SOLUTION INTRAVENOUS CONTINUOUS
Status: DISCONTINUED | OUTPATIENT
Start: 2018-08-21 | End: 2018-08-21 | Stop reason: HOSPADM

## 2018-08-21 RX ORDER — OMEPRAZOLE 40 MG/1
40 CAPSULE, DELAYED RELEASE ORAL DAILY
Qty: 30 CAPSULE | Refills: 1 | Status: SHIPPED | OUTPATIENT
Start: 2018-08-21 | End: 2018-12-31 | Stop reason: SDUPTHER

## 2018-08-21 RX ORDER — SODIUM CHLORIDE 0.9 % (FLUSH) 0.9 %
3 SYRINGE (ML) INJECTION
Status: CANCELLED | OUTPATIENT
Start: 2018-08-21

## 2018-08-21 RX ADMIN — SODIUM CHLORIDE, SODIUM LACTATE, POTASSIUM CHLORIDE, AND CALCIUM CHLORIDE: 600; 310; 30; 20 INJECTION, SOLUTION INTRAVENOUS at 09:08

## 2018-08-21 RX ADMIN — SODIUM CHLORIDE, SODIUM LACTATE, POTASSIUM CHLORIDE, AND CALCIUM CHLORIDE: 600; 310; 30; 20 INJECTION, SOLUTION INTRAVENOUS at 11:08

## 2018-08-21 RX ADMIN — PROPOFOL 140 MG: 10 INJECTION, EMULSION INTRAVENOUS at 11:08

## 2018-08-21 RX ADMIN — LIDOCAINE HYDROCHLORIDE 100 MG: 20 INJECTION, SOLUTION INTRAVENOUS at 11:08

## 2018-08-21 NOTE — ANESTHESIA PREPROCEDURE EVALUATION
08/21/2018  Dary Chaney is a 62 y.o., female.    Anesthesia Evaluation    I have reviewed the Patient Summary Reports.     I have reviewed the Medications.     Review of Systems  Anesthesia Hx:  No problems with previous Anesthesia  History of prior surgery of interest to airway management or planning: Previous anesthesia: General Denies Family Hx of Anesthesia complications.   Denies Personal Hx of Anesthesia complications.   Social:  Non-Smoker    Hematology/Oncology:  Hematology Normal      Current/Recent Cancer. right Oncology Comments: Right renal mass that will be addressed surgically.    EENT/Dental:   Reports feels like when eating or drinking it is scraping throat   Cardiovascular:   Hypertension States recently has been having bp.    Pulmonary:  Pulmonary Normal    Renal/:   Chronic Renal Disease Right renal mass and left cysts   Hepatic/GI:   Bowel Prep.    Musculoskeletal:  Musculoskeletal Normal    Neurological:  Neurology Normal    Endocrine:   Diabetes, type 2, using insulin    Dermatological:  Skin Normal    Psych:  Psychiatric Normal           Physical Exam  General:  Well nourished    Airway/Jaw/Neck:  Airway Findings: Mouth Opening: Normal Tongue: Normal  General Airway Assessment: Adult  Mallampati: II  Improves to I with phonation.  TM Distance: Normal, at least 6 cm  Jaw/Neck Findings:  Neck ROM: Normal ROM      Dental:  Dental Findings: In tact, molar caps        Mental Status:  Mental Status Findings:  Cooperative, Alert and Oriented         Anesthesia Plan  Type of Anesthesia, risks & benefits discussed:  Anesthesia Type:  MAC  Patient's Preference:   Intra-op Monitoring Plan: standard ASA monitors  Intra-op Monitoring Plan Comments:   Post Op Pain Control Plan:   Post Op Pain Control Plan Comments:   Induction:   IV  Beta Blocker:  Patient is not currently on a Beta-Blocker (No  further documentation required).       Informed Consent: Patient understands risks and agrees with Anesthesia plan.  Questions answered. Anesthesia consent signed with patient.  ASA Score: 3     Day of Surgery Review of History & Physical: I have interviewed and examined the patient. I have reviewed the patient's H&P dated: 8/21/18. There are no significant changes.  H&P update referred to the provider.         Ready For Surgery From Anesthesia Perspective.

## 2018-08-21 NOTE — ANESTHESIA POSTPROCEDURE EVALUATION
"Anesthesia Post Evaluation    Patient: Dary Chaney    Procedure(s) Performed: Procedure(s) (LRB):  ESOPHAGOGASTRODUODENOSCOPY (EGD) (N/A)    Final Anesthesia Type: MAC  Patient location during evaluation: PACU  Patient participation: Yes- Able to Participate  Level of consciousness: awake and alert and oriented  Post-procedure vital signs: reviewed and stable  Pain management: adequate  Airway patency: patent  PONV status at discharge: No PONV  Anesthetic complications: no      Cardiovascular status: blood pressure returned to baseline  Respiratory status: unassisted, room air and spontaneous ventilation  Hydration status: euvolemic  Follow-up not needed.        Visit Vitals  /72 (BP Location: Right leg, Patient Position: Lying)   Pulse 68   Temp 36.7 °C (98.1 °F) (Oral)   Resp 16   Ht 5' 2" (1.575 m)   Wt 77.6 kg (171 lb)   SpO2 96%   Breastfeeding? No   BMI 31.28 kg/m²       Pain/Marla Score: Pain Assessment Performed: Yes (8/21/2018 11:47 AM)  Presence of Pain: denies (8/21/2018 11:47 AM)  Marla Score: 10 (8/21/2018 11:47 AM)        "

## 2018-08-21 NOTE — TRANSFER OF CARE
"Anesthesia Transfer of Care Note    Patient: Dary Chaney    Procedure(s) Performed: Procedure(s) (LRB):  ESOPHAGOGASTRODUODENOSCOPY (EGD) (N/A)    Patient location: PACU    Anesthesia Type: MAC    Transport from OR: Transported from OR on room air with adequate spontaneous ventilation    Post pain: adequate analgesia    Post assessment: no apparent anesthetic complications    Post vital signs: stable    Level of consciousness: awake    Nausea/Vomiting: no nausea/vomiting    Complications: none    Transfer of care protocol was followed      Last vitals:   Visit Vitals  /72 (BP Location: Right leg, Patient Position: Lying)   Pulse 68   Temp 36.7 °C (98.1 °F) (Oral)   Resp 16   Ht 5' 2" (1.575 m)   Wt 77.6 kg (171 lb)   SpO2 96%   Breastfeeding? No   BMI 31.28 kg/m²     "

## 2018-08-21 NOTE — H&P
Short Stay Endoscopy History and Physical    PCP - Sakina Cooper, DO    Procedure - EGD  ASA - 2  Mallampati - per anesthesia  History of Anesthesia problems - no  Family history Anesthesia problems -  no     HPI:  This is a 62 y.o. female here for evaluation of :   Active Hospital Problems    Diagnosis  POA    *Esophageal dysphagia [R13.10]  Yes    Dysphagia [R13.10]  Yes      Resolved Hospital Problems   No resolved problems to display.         Reflux - no  Dysphagia - yes-she is not having food stick, she states.  It just hurts going down. She has not had emesis.  Abdominal pain - no  Diarrhea - no  Anemia - no  GI bleeding - no    ROS:  CONSTITUTIONAL: Denies weight change,  fatigue, fevers, chills, night sweats.  CARDIOVASCULAR: Denies chest pain, shortness of breath, orthopnea and edema.  RESPIRATORY: Denies cough, hemoptysis, dyspnea, and wheezing.  GI: See HPI.    Medical History:   Past Medical History:   Diagnosis Date    Arthritis     Cataract     Colon polyp     Diabetes mellitus type II     Hyperlipidemia     Hypertension     UTI (urinary tract infection)        Surgical History:   Past Surgical History:   Procedure Laterality Date    CHOLECYSTECTOMY      CHOLECYSTECTOMY      COLONOSCOPY  2012    HYSTERECTOMY  1993    uterine prolapse    KNEE SURGERY  03/2017    SPINE SURGERY         Family History:  Family History   Problem Relation Age of Onset    Diabetes Brother     Heart disease Neg Hx        Social History:   Social History     Tobacco Use    Smoking status: Never Smoker    Smokeless tobacco: Never Used   Substance Use Topics    Alcohol use: No    Drug use: No       Allergy  Review of patient's allergies indicates:   Allergen Reactions    Citrus and derivatives Swelling, Other (See Comments) and Edema     Redness      Latex Other (See Comments)     Other reaction(s): Rash  Other reaction(s): welts  Other reaction(s): Itching    Valsartan Other (See Comments)     Other  "reaction(s): disoriented    Aspirin Nausea Only     Other reaction(s): Nausea Only    Metformin Anxiety     Other reaction(s): anxiety       Medications:   No current facility-administered medications on file prior to encounter.      Current Outpatient Medications on File Prior to Encounter   Medication Sig Dispense Refill    albuterol (PROVENTIL) 2.5 mg /3 mL (0.083 %) nebulizer solution Take 3 mLs (2.5 mg total) by nebulization every 6 (six) hours as needed for Wheezing. 120 mL 0    blood sugar diagnostic Strp Contour Next. Glucose testing three times daily. 300 strip 3    blood-glucose meter kit Contour Next. Use as instructed 1 each 0    dulaglutide (TRULICITY) 1.5 mg/0.5 mL PnIj Inject 1.5 mg into the skin every 7 days. 12 Syringe 1    fluticasone (FLONASE) 50 mcg/actuation nasal spray USE TWO SPRAY(S) IN EACH NOSTRIL ONCE DAILY 16 g 1    lancets 33 gauge Misc 1 lancet by Misc.(Non-Drug; Combo Route) route 3 (three) times daily. 300 each 3    losartan (COZAAR) 25 MG tablet Take 1 tablet (25 mg total) by mouth once daily. 90 tablet 1    PEN NEEDLE 31 X 5/16 " Ndle AS DIRECTED 100 each 10    simvastatin (ZOCOR) 40 MG tablet Take 1 tablet (40 mg total) by mouth nightly. 90 tablet 1    loratadine (CLARITIN) 10 mg tablet Take 1 tablet (10 mg total) by mouth once daily. 30 tablet 0       Physical Exam:  Vital Signs:   Vitals:    08/21/18 0916   BP: (!) 147/82   Pulse: 74   Resp: (!) 21   Temp: 98 °F (36.7 °C)     General Appearance: Well appearing in no acute distress  ENT: OP clear  Chest: CTA B  CV: RRR, no m/r/g  Abd: s/nt/nd/nabs  Ext: no edema    Labs:  Reviewed    IMP:  Active Hospital Problems    Diagnosis  POA    *Esophageal dysphagia [R13.10]  Yes    Dysphagia [R13.10]  Yes      Resolved Hospital Problems   No resolved problems to display.         Plan:  I have explained the risks and benefits of endoscopy procedures to the patient including but not limited to bleeding, perforation, infection, " and death. The patient wishes to proceed.

## 2018-08-21 NOTE — DISCHARGE INSTRUCTIONS
What Is a Hiatal Hernia?    Hiatal hernia is when the area where the stomach and esophagus meet bulges up through the diaphragm into the chest cavity. In some cases, part of the stomach may bulge above the diaphragm. Stomach acid may move up into the esophagus and cause symptoms. The symptoms are often blamed on gastroesophageal reflux disease (GERD). You may only know about the hernia when it shows up on an X-ray taken for other reasons.   What you may feel  The hiatus is a normal hole in the diaphragm. The esophagus passes through this hole and leads to the stomach. In some cases, part of the stomach may bulge above the diaphragm. This bulge is called a hernia. Stomach acid may move up into the esophagus and cause symptoms.  When you eat, the muscle at the hiatus relaxes to allow food to pass into the stomach. It tightens again to keep food and digestive acids in the stomach.  Many people with hiatal hernias have mild symptoms. You may notice the following GERD symptoms:  · Heartburn or other chest discomfort  · A feeling of chest fullness after a meal  · Frequent burping  · Acid taste in the mouth  · Trouble swallowing  Treating symptoms  If you have been diagnosed with hiatal hernia, these suggestions may help improve symptoms:  · Lose excess weight. Extra weight puts pressure on the stomach and esophagus.  · Dont lie down after eating. Sit up for at least an hour after eating. Lying down after eating can increase symptoms.  · Avoid certain foods and drinks. These include fatty foods, chocolate, coffee, mint, and other foods that cause symptoms for you.  · Dont smoke or drink alcohol. These can worsen symptoms.  · Look at your medicines. Discuss your medicines with your healthcare provider. Many medicines can cause symptoms.  · Consider an antacid medicine. Ask your healthcare provider about over-the-counter and prescription medicines that may help.  · Ask about surgery, if needed. Surgery is a treatment  choice for some people. Your healthcare provider can determine if surgery is an option for you.    Date Last Reviewed: 10/1/2016  © 1514-6798 [employer]. [address] [place] [address], [place] [address]. All rights reserved. This information is not intended as a substitute for professional medical care. Always follow your healthcare professional's instructions.

## 2018-08-21 NOTE — ANESTHESIA RELEASE NOTE
"Anesthesia Release from PACU Note    Patient: Dary Chaney    Procedure(s) Performed: Procedure(s) (LRB):  ESOPHAGOGASTRODUODENOSCOPY (EGD) (N/A)    Anesthesia type: MAC    Post pain: Adequate analgesia    Post assessment: no apparent anesthetic complications, tolerated procedure well and no evidence of recall    Last Vitals:   Visit Vitals  /72 (BP Location: Right leg, Patient Position: Lying)   Pulse 68   Temp 36.7 °C (98.1 °F) (Oral)   Resp 16   Ht 5' 2" (1.575 m)   Wt 77.6 kg (171 lb)   SpO2 96%   Breastfeeding? No   BMI 31.28 kg/m²       Post vital signs: stable    Level of consciousness: awake, alert  and oriented    Nausea/Vomiting: no nausea/no vomiting    Complications: none    Airway Patency: patent    Respiratory: unassisted, spontaneous ventilation, room air    Cardiovascular: stable    Hydration: euvolemic  "

## 2018-08-21 NOTE — [employer] PATIENT INSTRUCTIONS
Discharge Summary/Instructions after an Endoscopic Procedure  Patient Name: Dary Chaney  Patient MRN: [id number]  Patient YOB: 1955 Tuesday, August 21, 2018 Maco Wynn MD  RESTRICTIONS:  During your procedure today, you received medications for sedation.  These   medications may affect your judgment, balance and coordination.  Therefore,   for 24 hours, you have the following restrictions:   - DO NOT drive a car, operate machinery, make legal/financial decisions,   sign important papers or drink alcohol.    ACTIVITY:  Today: no heavy lifting, straining or running due to procedural   sedation/anesthesia.  The following day: return to full activity including work.  DIET:  Eat and drink normally unless instructed otherwise.     TREATMENT FOR COMMON SIDE EFFECTS:  - Mild abdominal pain, nausea, belching, bloating or excessive gas:  rest,   eat lightly and use a heating pad.  - Sore Throat: treat with throat lozenges and/or gargle with warm salt   water.  - Because air was used during the procedure, expelling large amounts of air   from your rectum or belching is normal.  - If a bowel prep was taken, you may not have a bowel movement for 1-3 days.    This is normal.  SYMPTOMS TO WATCH FOR AND REPORT TO YOUR PHYSICIAN:  1. Abdominal pain or bloating, other than gas cramps.  2. Chest pain.  3. Back pain.  4. Signs of infection such as: chills or fever occurring within 24 hours   after the procedure.  5. Rectal bleeding, which would show as bright red, maroon, or black stools.   (A tablespoon of blood from the rectum is not serious, especially if   hemorrhoids are present.)  6. Vomiting.  7. Weakness or dizziness.  GO DIRECTLY TO THE NEAREST EMERGENCY ROOM IF YOU HAVE ANY OF THE FOLLOWING:      Difficulty breathing              Chills and/or fever over 101 F   Persistent vomiting and/or vomiting blood   Severe abdominal pain   Severe chest pain   Black, tarry stools   Bleeding- more than one  tablespoon   Any other symptom or condition that you feel may need urgent attention  Your doctor recommends these additional instructions:  If any biopsies were taken, your doctors clinic will contact you in 1 to 2   weeks with any results.  - Patient has a contact number available for emergencies.  The signs and   symptoms of potential delayed complications were discussed with the   patient.  Return to normal activities tomorrow.  Written discharge   instructions were provided to the patient.   - The patient should eat a bland diet and avoid caffeine, carbonation,   alcohol, nicotine, aspirin and NSAID's including ibuprofen and advil.    - Use Prilosec (omeprazole) 40 mg PO daily for 2 months.   - Repeat upper endoscopy in 2 months for surveillance.   - Telephone my office for pathology results in 1 week.   - Discharge patient to home (via wheelchair).  For questions, problems or results please call your physician Maco Wynn MD at Work:  [phone]  If you have any questions about the above instructions, call the GI   department at [phone] or call the endoscopy unit at [phone]   from 7am until 3 pm.  [facility], EMERGENCY ROOM PHONE NUMBER:   [phone]  IF A COMPLICATION OR EMERGENCY SITUATION ARISES AND YOU ARE UNABLE TO REACH   YOUR PHYSICIAN - GO DIRECTLY TO THE EMERGENCY ROOM.  I have read or have had read to me these discharge instructions for my   procedure and have received a written copy.  I understand these   instructions and will follow-up with my physician if I have any questions.     __________________________________       _____________________________________  Nurse Signature                                          Patient/Designated   Responsible Party Signature  Maco Wynn MD  8/21/2018 11:48:07 AM  This report has been verified and signed electronically.  [employer]

## 2018-08-22 ENCOUNTER — TELEPHONE (OUTPATIENT)
Dept: INTERNAL MEDICINE | Facility: CLINIC | Age: 63
End: 2018-08-22

## 2018-08-22 VITALS
HEART RATE: 60 BPM | HEIGHT: 62 IN | WEIGHT: 171 LBS | TEMPERATURE: 98 F | DIASTOLIC BLOOD PRESSURE: 75 MMHG | BODY MASS INDEX: 31.47 KG/M2 | RESPIRATION RATE: 17 BRPM | SYSTOLIC BLOOD PRESSURE: 166 MMHG | OXYGEN SATURATION: 96 %

## 2018-08-22 NOTE — TELEPHONE ENCOUNTER
[facility]. Pt to call [facility] and opt out of mail order so she can pickup at [facility]

## 2018-08-22 NOTE — TELEPHONE ENCOUNTER
----- Message from Erica Dias sent at 8/22/2018 11:41 AM CDT -----  Pt at [phone]//states she is having a problem getting her meds refilled//possibly 2 meds//her scripts have [facility] to [facility]//she has never used that pharmacy//she uses//[facility] [address] in [place]//please call to discuss//bryon/elvie

## 2018-08-23 ENCOUNTER — OFFICE VISIT (OUTPATIENT)
Dept: DIABETES | Facility: CLINIC | Age: 63
End: 2018-08-23
Payer: COMMERCIAL

## 2018-08-23 VITALS
BODY MASS INDEX: 31.32 KG/M2 | HEIGHT: 62 IN | SYSTOLIC BLOOD PRESSURE: 152 MMHG | DIASTOLIC BLOOD PRESSURE: 84 MMHG | WEIGHT: 170.19 LBS

## 2018-08-23 DIAGNOSIS — E66.9 OBESITY (BMI 30-39.9): ICD-10-CM

## 2018-08-23 DIAGNOSIS — I10 HYPERTENSION GOAL BP (BLOOD PRESSURE) < 130/80: Chronic | ICD-10-CM

## 2018-08-23 DIAGNOSIS — E78.5 HYPERLIPIDEMIA LDL GOAL <70: Chronic | ICD-10-CM

## 2018-08-23 DIAGNOSIS — Z79.4 TYPE 2 DIABETES MELLITUS WITH OTHER SPECIFIED COMPLICATION, WITH LONG-TERM CURRENT USE OF INSULIN: Primary | ICD-10-CM

## 2018-08-23 DIAGNOSIS — E11.69 TYPE 2 DIABETES MELLITUS WITH OTHER SPECIFIED COMPLICATION, WITH LONG-TERM CURRENT USE OF INSULIN: Primary | ICD-10-CM

## 2018-08-23 LAB — GLUCOSE SERPL-MCNC: 123 MG/DL (ref 70–110)

## 2018-08-23 PROCEDURE — 3045F PR MOST RECENT HEMOGLOBIN A1C LEVEL 7.0-9.0%: CPT | Mod: CPTII,S$GLB,, | Performed by: NURSE PRACTITIONER

## 2018-08-23 PROCEDURE — 99999 PR PBB SHADOW E&M-EST. PATIENT-LVL III: CPT | Mod: PBBFAC,,, | Performed by: NURSE PRACTITIONER

## 2018-08-23 PROCEDURE — 82948 REAGENT STRIP/BLOOD GLUCOSE: CPT | Mod: S$GLB,,, | Performed by: NURSE PRACTITIONER

## 2018-08-23 PROCEDURE — 3079F DIAST BP 80-89 MM HG: CPT | Mod: CPTII,S$GLB,, | Performed by: NURSE PRACTITIONER

## 2018-08-23 PROCEDURE — 3008F BODY MASS INDEX DOCD: CPT | Mod: CPTII,S$GLB,, | Performed by: NURSE PRACTITIONER

## 2018-08-23 PROCEDURE — 99215 OFFICE O/P EST HI 40 MIN: CPT | Mod: S$GLB,,, | Performed by: NURSE PRACTITIONER

## 2018-08-23 PROCEDURE — 3077F SYST BP >= 140 MM HG: CPT | Mod: CPTII,S$GLB,, | Performed by: NURSE PRACTITIONER

## 2018-08-23 RX ORDER — INSULIN DEGLUDEC 200 U/ML
76 INJECTION, SOLUTION SUBCUTANEOUS DAILY
Qty: 9 SYRINGE | Refills: 1 | Status: SHIPPED | OUTPATIENT
Start: 2018-08-23 | End: 2018-10-22 | Stop reason: SDUPTHER

## 2018-08-23 NOTE — PROGRESS NOTES
Subjective:         Patient ID: Dary Chaney is a 62 y.o. female.  Patient's current PCP is Sakina Cooper DO.   Social History     Socioeconomic History    Marital status:      Spouse name: Not on file    Number of children: 3    Years of education: Not on file    Highest education level: Not on file   Social Needs    Financial resource strain: Not on file    Food insecurity - worry: Not on file    Food insecurity - inability: Not on file    Transportation needs - medical: Not on file    Transportation needs - non-medical: Not on file   Occupational History    Occupation: Stay at Home    Occupation:    Tobacco Use    Smoking status: Never Smoker    Smokeless tobacco: Never Used   Substance and Sexual Activity    Alcohol use: No    Drug use: No    Sexual activity: Yes     Partners: Male     Birth control/protection: Surgical   Other Topics Concern    Not on file   Social History Narrative    . Housewife.       Chief Complaint: No chief complaint on file.    HPI  Dary Chaney is a 62 y.o. White female presenting for 1 month follow up of diabetes. Patient has been diagnosed with diabetes for approximately 10 years and has the following complications from diabetes: hypertension, hyperlipidemia, obesity. At last visit, medications were adjusted for uncontrolled blood glucoses. Blood glucose testing is performed regularly. Patient reports fastings 120-150, PP less than 200. Patient reports that she has recently been diagnosed with renal cell carcinoma, for which surgery is scheduled. Her diet has not been as good since the diagnosis due to stress. Condition is not currently controlled.      She denies any recent hospital admissions, emergency room visits, hypoglycemia.         //   , Body mass index is 31.13 kg/m².  Home Blood Glucose reading this AM: 91 mg/dl fasting.  Her blood sugar in clinic today is:   Lab Results   Component Value Date    POCGLU 123 (A)  08/23/2018       Labs reviewed and are noted below.    Her most recent A1C is:   Lab Results   Component Value Date    HGBA1C 7.3 (H) 06/12/2018     Lab Results   Component Value Date    CPEPTIDE 1.10 12/14/2017     Lab Results   Component Value Date    GLUTAMICACID 0.00 12/14/2017     Lab Results   Component Value Date    WBC 5.05 05/13/2014    HGB 13.3 05/13/2014    HCT 40.0 05/13/2014     05/13/2014    CHOL 179 06/12/2018    TRIG 175 (H) 06/12/2018    HDL 60 06/12/2018    ALT 21 06/12/2018    AST 12 06/12/2018     06/12/2018    K 4.7 06/12/2018     06/12/2018    CREATININE 0.8 06/12/2018    BUN 22 06/12/2018    CO2 29 06/12/2018    TSH 0.870 12/14/2017    INR 1.0 06/21/2010    GLUF 117 (H) 04/27/2010    HGBA1C 7.3 (H) 06/12/2018       CURRENT DM MEDICATIONS:   Current Outpatient Prescriptions   Medication Sig Dispense Refill    glimepiride (AMARYL) 4 MG tablet Take 1.5 tablet (6 mg total) by mouth daily with breakfast.  90 tablet 1    Tresiba Inject 74 units SQ     PATIENT ONLY TAKING 72 UNITS 1 Box 3    Trulicity 1.5mg q week 9 Syringe 3     No current facility-administered medications for this visit.        Health Maintenance   Topic Date Due    Zoster Vaccine  12/05/2014    Influenza Vaccine  08/01/2018    Foot Exam  09/11/2018    Eye Exam  10/17/2018    Hemoglobin A1c  12/12/2018    Lipid Panel  06/12/2019    Mammogram  07/19/2020    Pneumococcal PPSV23 (Medium Risk) (2) 08/10/2021    TETANUS VACCINE  08/26/2021    Colonoscopy  03/01/2028    Hepatitis C Screening  Completed       STANDARDS OF CARE:  Current Ophthalmologist/Optometrist: Dr. Carlos. Last exam 2017  Current Podiatrist: No  ACE/ARB: Yes  Statin: Yes  She  has attended diabetes education in the past     LIFESTYLE:  ACTIVITY LEVEL: Moderately Active  EXERCISE:  none  MEAL PLANNING: Patient reports number of meals per day to be 3 and number of snacks per day to be 2    BLOOD GLUCOSE TESTING: Patient reports testing  on average a total of 0 times per day.      Review of Systems   Constitutional: Negative.  Negative for activity change, appetite change, chills, diaphoresis, fatigue, fever and unexpected weight change.   Eyes: Negative for visual disturbance.   Respiratory: Negative for apnea and shortness of breath.    Cardiovascular: Negative.  Negative for chest pain, palpitations and leg swelling.   Gastrointestinal: Negative for abdominal distention, constipation, diarrhea, nausea and vomiting.   Endocrine: Negative for cold intolerance, heat intolerance, polydipsia, polyphagia and polyuria.   Genitourinary: Negative.  Negative for frequency.   Skin: Negative.  Negative for color change, pallor, rash and wound.   Neurological: Negative for dizziness, seizures, syncope, light-headedness, numbness and headaches.   Psychiatric/Behavioral: Negative for confusion, hallucinations and suicidal ideas.         Objective:      Physical Exam   Constitutional: She is oriented to person, place, and time. She appears well-developed and well-nourished.   HENT:   Head: Normocephalic and atraumatic.   Neck: Normal range of motion. Neck supple.   Cardiovascular: Normal rate.   Pulmonary/Chest: Effort normal.   Neurological: She is alert and oriented to person, place, and time.   Skin: Skin is warm and dry.   Psychiatric: She has a normal mood and affect. Her behavior is normal. Judgment and thought content normal.   Nursing note and vitals reviewed.      Assessment:       1. Type 2 diabetes mellitus with other specified complication, with long-term current use of insulin    2. Obesity (BMI 30-39.9)    3. Hyperlipidemia LDL goal <70    4. Hypertension goal BP (blood pressure) < 130/80        Plan:   Type 2 diabetes mellitus with other specified complication, with long-term current use of insulin  Obesity (BMI 30-39.9)    -     POCT glucose  -     TSH; Future; Expected date: 09/23/2018  -     Microalbumin/creatinine urine ratio; Future; Expected  date: 09/23/2018  -     Hemoglobin A1c; Future; Expected date: 09/23/2018  -     insulin degludec (TRESIBA FLEXTOUCH U-200) 200 unit/mL (3 mL) InPn; Inject 76 Units into the skin once daily.  Dispense: 9 Syringe; Refill: 1    - Condition UNControlled. Increase Tresiba as noted above. Otherwise, continue current regimen.  DM education reviewed. Patient encouraged to carb count and exercise per recommendations. Labs and Referrals as noted. Patient instructed to send in log once weekly for my review. RV scheduled in 3 months    Hyperlipidemia LDL goal <70    - LDL not at goal, consider intensifying statin therapy      Hypertension goal BP (blood pressure) < 130/80  -     Hypertension Digital Medicine (HDMP) Enrollment Order    - BP mildly elevated today in clinic, patient advised to begin testing daily and return to pcp if elevation continues    Additional Plan Details:    1.) Patient was instructed to monitor blood glucose 2 - 3 x daily, fasting and ac dinner or at bedtime. Discussed ADA goal for fasting blood sugar, 80 - 130mg/dL; pp blood sugars below 180 mg/dl. Also, discussed prevention of hypoglycemia and the need to adjust goals to higher levels if persistent hypoglycemia.  Reminded to bring BG records or meter to each visit for review.  2.) Reviewed pathophysiology of Type 2 diabetes, complications related to the disease, importance of annual dilated eye exam and daily foot examination.  3.) We discussed the ADA recommendations, which are as follows:  Hemoglobin A1c below 7.0 %. All patients with diabetes should be on statins unless contraindicated.  ACE or ARB therapy if not contraindicated.    4.) Continue medications as prescribed.  My [facility] e-mail or phone review in one week with BG records for adjustment of medication.  5.) Meal planning teaching: Reviewed carb counting, portion control, importance of spacing meals throughout the day to prevent post prandial elevations.  6.) Discussed activity with  related benefits, methods, and precautions. Recommended patient start or continue some form of exercise and increase as tolerated to 30 minutes per day to aid in control of BGs.  7.) A1C, TSH, Lipid Panel, CMP with eGFR and Micro/Creatinine are utd or were ordered per ADA protocol.  8.) Return to clinic in 3 months for follow up. The patient was explained the above plan and given opportunity to ask questions.  She understands, chooses and consents to this plan and accepts all the risks, which include but are not limited to the risks mentioned above. She understands the alternative of having no testing, interventions or treatments at this time. She left content and without further questions.     A total of 45 minutes was spent in face to face time, of which over 50% was spent in counseling patient on disease process, complications, treatment, and side effects of medications.        Isha Boyer, NIKKI-C

## 2018-09-05 ENCOUNTER — OFFICE VISIT (OUTPATIENT)
Dept: UROLOGY | Facility: CLINIC | Age: 63
End: 2018-09-05
Payer: COMMERCIAL

## 2018-09-05 DIAGNOSIS — N28.89 RIGHT RENAL MASS: Primary | ICD-10-CM

## 2018-09-05 PROCEDURE — 99499 UNLISTED E&M SERVICE: CPT | Mod: S$GLB,,, | Performed by: UROLOGY

## 2018-09-05 PROCEDURE — 99999 PR PBB SHADOW E&M-EST. PATIENT-LVL III: CPT | Mod: PBBFAC,,,

## 2018-09-05 RX ORDER — ACETAMINOPHEN 10 MG/ML
1000 INJECTION, SOLUTION INTRAVENOUS
Status: CANCELLED | OUTPATIENT
Start: 2018-09-05 | End: 2018-09-06

## 2018-09-05 RX ORDER — SODIUM CHLORIDE 9 MG/ML
INJECTION, SOLUTION INTRAVENOUS CONTINUOUS
Status: CANCELLED | OUTPATIENT
Start: 2018-09-05

## 2018-09-05 RX ORDER — LIDOCAINE HYDROCHLORIDE 10 MG/ML
1 INJECTION, SOLUTION EPIDURAL; INFILTRATION; INTRACAUDAL; PERINEURAL ONCE
Status: CANCELLED | OUTPATIENT
Start: 2018-09-05 | End: 2018-09-05

## 2018-09-05 NOTE — PROGRESS NOTES
Urology ([facility]) H&P  Staff: Tommy Bardshaw MD    Is this patient in a research study?  Yes    CC: Right renal mass    HPI:  Dary Chaney is a 62 y.o. female with pmh HTN, HLD, DM2, who presents with a cT1a 2cm right renal mass.    She is a Jehova's witness and is not ok with receiving blood or blood products. She is ok with using a cell saver during surgery      This was found incidentally on abdominal ultrasound during a workup for nonspecific abdominal pain.     She denies gross hematuria, night sweats, unintended weight loss or other constitutional symptoms.  No FH of kidney cancer.    She is a never smoker. She has no significant exposure history.   He baseline eGFR is >60, Cr is 0.8    RENAL nephrometry score:  R=1 E=2 N=1 A=x L= 1  5x    She has had a previous [place] saúl and a partial hysterectomy    ROS:  Neg except per HPI    Past Medical History:   Diagnosis Date    Arthritis     Cataract     Colon polyp     Diabetes mellitus type II     Hyperlipidemia     Hypertension     UTI (urinary tract infection)        Past Surgical History:   Procedure Laterality Date    CHOLECYSTECTOMY      CHOLECYSTECTOMY      COLONOSCOPY  2012    HYSTERECTOMY  1993    uterine prolapse    KNEE SURGERY  03/2017    SPINE SURGERY         Social History     Socioeconomic History    Marital status:      Spouse name: Not on file    Number of children: 3    Years of education: Not on file    Highest education level: Not on file   Social Needs    Financial resource strain: Not on file    Food insecurity - worry: Not on file    Food insecurity - inability: Not on file    Transportation needs - medical: Not on file    Transportation needs - non-medical: Not on file   Occupational History    Occupation: Stay at Home    Occupation:    Tobacco Use    Smoking status: Never Smoker    Smokeless tobacco: Never Used   Substance and Sexual Activity    Alcohol use: No    Drug use: No     Sexual activity: Yes     Partners: Male     Birth control/protection: Surgical   Other Topics Concern    Not on file   Social History Narrative    . Housewife.       Family History   Problem Relation Age of Onset    Diabetes Brother     Heart disease Neg Hx        Review of patient's allergies indicates:   Allergen Reactions    Citrus and derivatives Swelling, Other (See Comments) and Edema     Redness      Latex Other (See Comments)     Other reaction(s): Rash  Other reaction(s): welts  Other reaction(s): Itching    Valsartan Other (See Comments)     Other reaction(s): disoriented    Aspirin Nausea Only     Other reaction(s): Nausea Only    Metformin Anxiety     Other reaction(s): anxiety       Current Outpatient Medications on File Prior to Visit   Medication Sig Dispense Refill    albuterol (PROVENTIL) 2.5 mg /3 mL (0.083 %) nebulizer solution Take 3 mLs (2.5 mg total) by nebulization every 6 (six) hours as needed for Wheezing. 120 mL 0    blood sugar diagnostic Strp Contour Next. Glucose testing three times daily. 300 strip 3    blood-glucose meter kit Contour Next. Use as instructed 1 each 0    dulaglutide (TRULICITY) 1.5 mg/0.5 mL PnIj Inject 1.5 mg into the skin every 7 days. 12 Syringe 1    fluticasone (FLONASE) 50 mcg/actuation nasal spray USE TWO SPRAY(S) IN EACH NOSTRIL ONCE DAILY 16 g 1    glimepiride (AMARYL) 4 MG tablet Take 1.5 tablets (6 mg total) by mouth daily with breakfast. 180 tablet 1    insulin degludec (TRESIBA FLEXTOUCH U-200) 200 unit/mL (3 mL) InPn Inject 76 Units into the skin once daily. 9 Syringe 1    lancets 33 gauge Misc 1 lancet by Misc.(Non-Drug; Combo Route) route 3 (three) times daily. 300 each 3    lidocaine (LIDODERM) 5 % Place 1 patch onto the skin daily as needed. Remove & Discard patch within 12 hours or as directed by MD 30 patch 3    loratadine (CLARITIN) 10 mg tablet Take 1 tablet (10 mg total) by mouth once daily. 30 tablet 0    losartan (COZAAR)  "25 MG tablet Take 1 tablet (25 mg total) by mouth once daily. 90 tablet 1    omeprazole (PRILOSEC) 40 MG capsule Take 1 capsule (40 mg total) by mouth once daily. 30 capsule 1    omeprazole (PRILOSEC) 40 MG capsule Take 1 capsule (40 mg total) by mouth once daily. 30 capsule 1    PEN NEEDLE 31 X 5/16 " Ndle AS DIRECTED 100 each 10    simvastatin (ZOCOR) 40 MG tablet Take 1 tablet (40 mg total) by mouth nightly. 90 tablet 1     No current facility-administered medications on file prior to visit.        Anticoagulation:  No    Physical Exam:  General: No acute distress, well developed. AAOx3  Head: Normocephalic, Atraumatic  Eyes: Extra-occular movements intact, No discharge  Neck: supple, symmetrical, trachea midline  Lungs: normal respiratory effort, no respiratory distress, no wheezes  CV: regular rate, 2+ pulses  Abdomen: soft, non-tender, non-distended, no organomegaly small port sites from prior lap saúl, large pfannenstiel incision well healed  MSK: no edema, no deformities, normal ROM  Skin: skin color, texture, turgor normal.  Neurologic: no focal deficits, sensation intact    Labs:    Urine dipstick today - negative for all components    Lab Results   Component Value Date    WBC 5.05 05/13/2014    HGB 13.3 05/13/2014    HCT 40.0 05/13/2014    MCV 80 (L) 05/13/2014     05/13/2014       BMP  Lab Results   Component Value Date     06/12/2018    K 4.7 06/12/2018     06/12/2018    CO2 29 06/12/2018    BUN 22 06/12/2018    CREATININE 0.8 06/12/2018    CALCIUM 9.2 06/12/2018    ANIONGAP 7 (L) 06/12/2018    ESTGFRAFRICA >60.0 06/12/2018    EGFRNONAA >60.0 06/12/2018       Imaging:    CT: 2cm right enhancing renal mass, 2 left sided hyperdense renal cysts      Assessment: Dary Chaney is a 62 y.o. female with cT1a 2cm Right renal mass.     Plan:     1. To OR on 9/13/18 for robotic right partial nephrectomy  2. Consents signed   3. I have explained the risk, benefits, and alternatives of the " procedure in detail. The patient voices understanding and all questions have been answered. The patient agrees to proceed as planned.   4. The risks and benefits of participating in our clinical trial have been discussed and the patient has consented for the research study here at [facility].     Shant Douglas MD

## 2018-09-06 ENCOUNTER — ANESTHESIA EVENT (OUTPATIENT)
Dept: SURGERY | Facility: HOSPITAL | Age: 63
DRG: 657 | End: 2018-09-06
Payer: COMMERCIAL

## 2018-09-06 ENCOUNTER — TELEPHONE (OUTPATIENT)
Dept: PREADMISSION TESTING | Facility: HOSPITAL | Age: 63
End: 2018-09-06

## 2018-09-06 DIAGNOSIS — Z79.4 DIABETES MELLITUS DUE TO UNDERLYING CONDITION, CONTROLLED, WITHOUT COMPLICATION, WITH LONG-TERM CURRENT USE OF INSULIN: Primary | ICD-10-CM

## 2018-09-06 DIAGNOSIS — Z01.818 PREOPERATIVE TESTING: ICD-10-CM

## 2018-09-06 DIAGNOSIS — E08.9 DIABETES MELLITUS DUE TO UNDERLYING CONDITION, CONTROLLED, WITHOUT COMPLICATION, WITH LONG-TERM CURRENT USE OF INSULIN: Primary | ICD-10-CM

## 2018-09-06 NOTE — TELEPHONE ENCOUNTER
----- Message from Timothy Nevarez RN sent at 9/6/2018  9:37 AM CDT -----  Please call pt and schedule POC visit, Ravipati visit and lab appt.  Thanks,  timothy

## 2018-09-06 NOTE — PRE ADMISSION SCREENING
Anesthesia Assessment: Preoperative EQUATION    Planned Procedure: Procedure(s) (LRB):  ROBOTIC NEPHRECTOMY, PARTIAL (Right)  Requested Anesthesia Type:General  Surgeon: Tommy Bradshaw MD  Service: Urology  Known or anticipated Date of Surgery:9/13/2018    Surgeon notes: reviewed      Previous anesthesia records:MAC  8/2018 EGD    Last PCP note: within 3 months   Subspecialty notes: Endocrinology    Other important co-morbidities: per Epic: HTN/HLP, DM2, renal mass     Tests already available:  Available tests,  within 3 months . Care everywhere: 7/2/18 CBC, EKG. Epic: 6/12 A1c, CMP.             Instructions given. (See in Nurse's note)    Optimization:  Anesthesia Preop Clinic Assessment  Indicated-Dr Bradshaw pt, lives out of town-will call to schedule POC    Medical Opinion Indicated- Dr Bradshaw pt to see Alexis         Plan:    Testing:  BMP, A1C and T&S   Pre-anesthesia  visit       Visit focus: concerns in complex and/or prolonged anesthesia     Consultation:IM Perioperative Hospitalist     Patient  has previously scheduled Medical Appointment: none    Navigation: Tests Scheduled.              Consults scheduled.             Results will be tracked by Preop Clinic.

## 2018-09-06 NOTE — ANESTHESIA PREPROCEDURE EVALUATION
Mary Anne Nevarez RN   Registered Nurse      Pre Admission Screening   Signed                             []Hide copied text    []Hover for details      Anesthesia Assessment: Preoperative EQUATION     Planned Procedure: Procedure(s) (LRB):  ROBOTIC NEPHRECTOMY, PARTIAL (Right)  Requested Anesthesia Type:General  Surgeon: Tommy Bradshaw MD  Service: Urology  Known or anticipated Date of Surgery:9/13/2018     Surgeon notes: reviewed        Previous anesthesia records:MAC  8/2018 EGD     Last PCP note: within 3 months   Subspecialty notes: Endocrinology     Other important co-morbidities: per Epic: HTN/HLP, DM2, renal mass     Tests already available:  Available tests,  within 3 months . Care everywhere: 7/2/18 CBC, EKG. Epic: 6/12 A1c, CMP.                             Instructions given. (See in Nurse's note)     Optimization:  Anesthesia Preop Clinic Assessment  Indicated-Dr Bradshaw pt, lives out of town-will call to schedule POC    Medical Opinion Indicated- Dr Bradshaw pt to see Alexis                                        Plan:    Testing:  BMP, A1C and T&S (T & S not ordered-poss Jehovahs witness)   Pre-anesthesia  visit                                        Visit focus: concerns in complex and/or prolonged anesthesia, Jehovahs witness per chart (surgeon note)-please verify.                           Consultation:IM Perioperative Hospitalist                           Patient  has previously scheduled Medical Appointment: none     Navigation: Tests Scheduled.                         Consults scheduled.                        Results will be tracked by Preop Clinic.                                   Electronically signed by Mary Anne Nevarez RN at 9/6/2018  9:32 AM       Pre-admit on 9/13/2018            Detailed Report                                                                                                                         09/06/2018  Dary Chaney is a 62 y.o., female   with a pre-operative diagnosis  of Right renal mass [N28.89] who is scheduled for Procedure(s) (LRB):  XI ROBOTIC NEPHRECTOMY, PARTIAL (Right).     Requested anesthesia type: General  Surgeon: Tommy Bradshaw MD  Allergies:   Review of patient's allergies indicates:   Allergen Reactions    Citrus and derivatives Swelling, Other (See Comments) and Edema     Redness  Lip swelling   Itching       Latex Other (See Comments)     Other reaction(s): Rash  Other reaction(s): welts  Other reaction(s): Itching    Valsartan Other (See Comments)     Other reaction(s): disoriented    Aspirin Nausea Only     Other reaction(s): Nausea Only    Metformin Anxiety     Other reaction(s): anxiety     Vital Sign Range: Temp:  [36.7 °C (98.1 °F)] 36.7 °C (98.1 °F)  Pulse:  [80-85] 80  Resp:  [16-19] 19  SpO2:  [95 %-96 %] 96 %  BP: (131-149)/(69-72) 149/69  Chronic Medications:   Medications Prior to Admission   Medication Sig Dispense Refill Last Dose    dulaglutide (TRULICITY) 1.5 mg/0.5 mL PnIj Inject 1.5 mg into the skin every 7 days. (Patient taking differently: Inject 1.5 mg into the skin every 7 days. Takes on Monday) 12 Syringe 1 Past Week at Unknown time    glimepiride (AMARYL) 4 MG tablet Take 1.5 tablets (6 mg total) by mouth daily with breakfast. 180 tablet 1 Past Week at 0800    insulin degludec (TRESIBA FLEXTOUCH U-200) 200 unit/mL (3 mL) InPn Inject 76 Units into the skin once daily. (Patient taking differently: Inject 76 Units into the skin once daily. Pt. reports taking 72 units in the AM) 9 Syringe 1 9/13/2018 at 0430    losartan (COZAAR) 25 MG tablet Take 1 tablet (25 mg total) by mouth once daily. (Patient taking differently: Take 25 mg by mouth every morning. ) 90 tablet 1 Past Week at Unknown time    simvastatin (ZOCOR) 40 MG tablet Take 1 tablet (40 mg total) by mouth nightly. 90 tablet 1 Past Week at Unknown time    albuterol (PROVENTIL) 2.5 mg /3 mL (0.083 %) nebulizer solution Take 3 mLs (2.5 mg total) by nebulization every 6 (six)  "hours as needed for Wheezing. 120 mL 0 More than a month at Unknown time    blood sugar diagnostic Strp Contour Next. Glucose testing three times daily. 300 strip 3 Unknown at Unknown time    blood-glucose meter kit Contour Next. Use as instructed 1 each 0 Unknown at Unknown time    fluticasone (FLONASE) 50 mcg/actuation nasal spray USE TWO SPRAY(S) IN EACH NOSTRIL ONCE DAILY (Patient taking differently: USE TWO SPRAY(S) IN EACH NOSTRIL ONCE DAILY  prn) 16 g 1 More than a month at Unknown time    lancets 33 gauge Misc 1 lancet by Misc.(Non-Drug; Combo Route) route 3 (three) times daily. 300 each 3 Unknown at Unknown time    lidocaine (LIDODERM) 5 % Place 1 patch onto the skin daily as needed. Remove & Discard patch within 12 hours or as directed by MD 30 patch 3 More than a month at Unknown time    loratadine (CLARITIN) 10 mg tablet Take 1 tablet (10 mg total) by mouth once daily. (Patient taking differently: Take 10 mg by mouth daily as needed. ) 30 tablet 0 Taking    omeprazole (PRILOSEC) 40 MG capsule Take 1 capsule (40 mg total) by mouth once daily. 30 capsule 1 More than a month at Unknown time    omeprazole (PRILOSEC) 40 MG capsule Take 1 capsule (40 mg total) by mouth once daily. 30 capsule 1 More than a month at Unknown time    PEN NEEDLE 31 X 5/16 " Ndle AS DIRECTED 100 each 10 More than a month at Unknown time     Current Medications:   Current Facility-Administered Medications   Medication Dose Route Frequency Provider Last Rate Last Dose    0.9%  NaCl infusion   Intravenous Continuous Shant Douglas MD 10 mL/hr at 09/13/18 0552      ceFAZolin injection 2 g  2 g Intravenous On Call Procedure Shant Douglas MD        lidocaine (PF) 10 mg/ml (1%) injection 10 mg  1 mL Intradermal Once Yonis Huggins MD        sodium chloride 0.9% bolus 500 mL  500 mL Intravenous Once Yonis Huggins MD         Medical History:   Past Medical History:   Diagnosis Date    Arthritis     Cataract     Colon " polyp     Diabetes mellitus type II     Hyperlipidemia     Hypertension     UTI (urinary tract infection)      .    Anesthesia Evaluation    I have reviewed the Patient Summary Reports.    I have reviewed the Nursing Notes.   I have reviewed the Medications.   Steroids Taken In Past Year:     Review of Systems  Anesthesia Hx:  U[place]din History of prior surgery of interest to airway management or planning: Previous anesthesia: General EGD: 8/21/18 with general anesthesia.  Procedure performed at an [facility] Facility. Denies Family Hx of Anesthesia complications.   Denies Personal Hx of Anesthesia complications.   Social:  Denies Tobacco Use. Alcohol Use: Pt consumes occasional,    EENT/Dental:   Seasonal Allergic Rhinitis Throat Symptoms include Swallowing difficulty or pain  Denies Jaw Problems   Cardiovascular:   Hypertension, well controlled Past MI CAD   Denies CABG/stent.  Denies Dysrhythmias.   Denies Angina.          Functional Capacity good / => 4 METS, was able to walk from garage to POC: denies CP/SOB  Denies Coronary Artery Disease.  Varicose Veins  Hypertension , Recent typical clinic B/P of 131/72 @ POC visit    Pulmonary:  Chronic Obstructive Pulmonary Disease (COPD): Chronic Bronchitis  Possible Obstructive Sleep Apnea , (STOP/BANG) Symptoms S - Snoring (loud), P - Pressure being treated for high BP and A - Age > 50    Renal/:   Chronic Renal Disease renal calculi right renal mass Renal Symptoms/Infections/Stones:  Urinary Tract Infection (UTI) (Recurrent )   Hepatic/GI:  Esophageal / Stomach Disorders Esophageal Disorder (Esophageal ulcer)  Hernia, Hiatal Hernia Liver Disease, Fatty Liver    Musculoskeletal:  Joint Disease:  Arthritis bilateral hand   Neurological:   Neuromuscular Disease,  Denies CVA - Cerebrovasular Accident  Denies TIA - Transient Ischemic Attack    Endocrine:   Diabetes, type 2  Diabetes for 10 years , controlled by oral hypoglycemics, non-insulin injectables.  Typical AM glucose range:  , most recent HgA1c value was 7.6 on 9/12/18.  Denies Thyroid Disease  Metabolic Disorders, Hyperlipoproteinemia, Obesity / BMI > 30      Physical Exam  General:  Obesity    Airway/Jaw/Neck:  Airway Findings: Mouth Opening: Normal Tongue: Normal  General Airway Assessment: Adult, Good  Mallampati: II  Improves to II with phonation.  Jaw/Neck Findings:     Neck ROM: Normal ROM      Dental:  Dental Findings: In tact   Chest/Lungs:  Chest/Lungs Findings: Clear to auscultation, Normal Respiratory Rate     Heart/Vascular:  Heart Findings: Rate: Normal  Rhythm: Regular Rhythm  Sounds: Normal     Abdomen:  Abdomen Findings:  Normal, Soft, Nontender       Mental Status:  Mental Status Findings:  Cooperative, Alert and Oriented         Anesthesia Plan  Type of Anesthesia, risks & benefits discussed:  Anesthesia Type:  general  Patient's Preference: as indicated  Intra-op Monitoring Plan: standard ASA monitors  Intra-op Monitoring Plan Comments:   Post Op Pain Control Plan: multimodal analgesia and per primary service following discharge from PACU  Post Op Pain Control Plan Comments:   Induction:   IV  Beta Blocker:  Patient is not currently on a Beta-Blocker (No further documentation required).       Informed Consent: Patient understands risks and agrees with Anesthesia plan.  Questions answered. Anesthesia consent signed with patient.  ASA Score: 3     Day of Surgery Review of History & Physical:  There are no significant changes.  H&P update referred to the provider.     Anesthesia Plan Notes: Risks of dental and eye injury reviewed with patient, agrees to proceed. Reassurance given.        Ready For Surgery From Anesthesia Perspective.     The patient was seen by Perioperative Internal Medicine physician Dr. Piedra on 9/12/18 , please see recommendations.    Kylah Armendariz RN

## 2018-09-12 ENCOUNTER — TELEPHONE (OUTPATIENT)
Dept: UROLOGY | Facility: CLINIC | Age: 63
End: 2018-09-12

## 2018-09-12 ENCOUNTER — HOSPITAL ENCOUNTER (OUTPATIENT)
Dept: PREADMISSION TESTING | Facility: HOSPITAL | Age: 63
Discharge: HOME | DRG: 657 | End: 2018-09-12
Attending: ANESTHESIOLOGY
Payer: COMMERCIAL

## 2018-09-12 ENCOUNTER — INITIAL CONSULT (OUTPATIENT)
Dept: INTERNAL MEDICINE | Facility: CLINIC | Age: 63
End: 2018-09-12
Payer: COMMERCIAL

## 2018-09-12 VITALS
RESPIRATION RATE: 16 BRPM | TEMPERATURE: 98 F | BODY MASS INDEX: 31.65 KG/M2 | HEIGHT: 62 IN | HEART RATE: 85 BPM | SYSTOLIC BLOOD PRESSURE: 131 MMHG | DIASTOLIC BLOOD PRESSURE: 72 MMHG | WEIGHT: 172 LBS

## 2018-09-12 VITALS
BODY MASS INDEX: 31.65 KG/M2 | SYSTOLIC BLOOD PRESSURE: 131 MMHG | HEART RATE: 85 BPM | DIASTOLIC BLOOD PRESSURE: 72 MMHG | HEIGHT: 62 IN | WEIGHT: 172 LBS | OXYGEN SATURATION: 95 % | TEMPERATURE: 98 F

## 2018-09-12 DIAGNOSIS — I10 HYPERTENSION GOAL BP (BLOOD PRESSURE) < 130/80: Chronic | ICD-10-CM

## 2018-09-12 DIAGNOSIS — K22.10 ULCER OF ESOPHAGUS WITHOUT BLEEDING: ICD-10-CM

## 2018-09-12 DIAGNOSIS — M51.06 INTERVERTEBRAL LUMBAR DISC DISORDER WITH MYELOPATHY, LUMBAR REGION: ICD-10-CM

## 2018-09-12 DIAGNOSIS — Z98.890 HISTORY OF BACK SURGERY: ICD-10-CM

## 2018-09-12 DIAGNOSIS — R13.10 DYSPHAGIA, UNSPECIFIED TYPE: ICD-10-CM

## 2018-09-12 DIAGNOSIS — Z01.818 PREOP EXAMINATION: Primary | ICD-10-CM

## 2018-09-12 DIAGNOSIS — I83.93 VARICOSE VEINS OF BOTH LOWER EXTREMITIES: ICD-10-CM

## 2018-09-12 DIAGNOSIS — J30.2 SEASONAL ALLERGIC RHINITIS, UNSPECIFIED TRIGGER: ICD-10-CM

## 2018-09-12 DIAGNOSIS — K76.0 FATTY LIVER: ICD-10-CM

## 2018-09-12 DIAGNOSIS — N28.89 RIGHT RENAL MASS: ICD-10-CM

## 2018-09-12 DIAGNOSIS — E78.5 HYPERLIPIDEMIA LDL GOAL <70: Chronic | ICD-10-CM

## 2018-09-12 DIAGNOSIS — E66.9 OBESITY (BMI 30-39.9): ICD-10-CM

## 2018-09-12 DIAGNOSIS — R94.31 ABNORMAL EKG: ICD-10-CM

## 2018-09-12 DIAGNOSIS — E11.9 TYPE 2 DIABETES MELLITUS WITHOUT RETINOPATHY: ICD-10-CM

## 2018-09-12 PROCEDURE — 99244 OFF/OP CNSLTJ NEW/EST MOD 40: CPT | Mod: S$GLB,,, | Performed by: HOSPITALIST

## 2018-09-12 PROCEDURE — 99999 PR PBB SHADOW E&M-EST. PATIENT-LVL IV: CPT | Mod: PBBFAC,,, | Performed by: HOSPITALIST

## 2018-09-12 NOTE — OUTPATIENT SUBJECTIVE & OBJECTIVE
Outpatient Subjective & Objective     Chief complaint-Preoperative evaluation, Perioperative Medical management, complication reduction plan     Active cardiac conditions- none    Revised cardiac risk index predictors- insulin requiring diabetes mellitus    Functional capacity -Examples of physical activity, does catering,  can take 1 flight of stairs and dancing----- She can undertake all the above activities without  chest pain,chest tightness, Shortness of breath ,dizziness,lightheadedness making her exercise tolerance more  than 4 Mets.       Review of Systems   Constitutional: Negative for chills and fever.            Weight gain from reduced activity   HENT:        STOPBANG score  3/ 8    Occasional Loud Snoring   HTN  Age over 50     Suggested follow up       Eyes:        No new visual changes   Respiratory:        No cough , phlegm  No Hemoptysis   Cardiovascular:        As noted   Gastrointestinal:        No overt GI/ blood losses  Bowel movements- Regular    Endocrine:        Prednisone use > 20 mg daily for 3 weeks-None   Genitourinary: Negative for dysuria.        No urinary hesitancy   Known ,when has UTI   Does not feel like she has UTI   Musculoskeletal:        As above      Skin: Negative for rash.   Neurological: Negative for syncope.        No unilateral weakness   Hematological:        Current use of Anticoagulants  Current use of Antiplatelet agents  None   Psychiatric/Behavioral:        No Depression,Anxiety       No vascular stenting       Past Surgical History:   Procedure Laterality Date    CHOLECYSTECTOMY      CHOLECYSTECTOMY      COLONOSCOPY  2012    COLONOSCOPY N/A 3/1/2018    Procedure: COLONOSCOPY;  Surgeon: Phillip Brower MD;  Location: [facility];  Service: Endoscopy;  Laterality: N/A;    COLONOSCOPY N/A 3/1/2018    Performed by Phillip [facility]er MD at [facility] ENDO    ESOPHAGOGASTRODUODENOSCOPY N/A 8/21/2018    Procedure: ESOPHAGOGASTRODUODENOSCOPY (EGD);  Surgeon: Maco MEJIA  MD Tianna;  Location: [facility] ENDO;  Service: Endoscopy;  Laterality: N/A;    ESOPHAGOGASTRODUODENOSCOPY (EGD) N/A 8/21/2018    Performed by Maco Wynn MD at [facility] ENDO    HYSTERECTOMY  1993    uterine prolapse    KNEE SURGERY  03/2017    SPINE SURGERY         No anesthesia, bleeding, cardiac problems , PONV with previous surgeries/procedures.  Medications and Allergies reviewed in epic.   FH- No anesthesia,bleeding / venous thrombosis , early onset heart disease in family   Lives with    , who can help     Physical Exam   HENT:   Head: Normocephalic.       Physical Exam   HENT:   Head: Normocephalic.   .lrre  Constitutional- Vitals - Body mass index is 31.46 kg/m².,   Vitals:    09/12/18 1056   BP: 131/72   Pulse: 85   Temp: 98.1 °F (36.7 °C)     General appearance-Conscious,Coherent  Eyes- No conjunctival icterus,pupils  round  and reactive to light   ENT-Oral cavity- moist  , Hearing grossly normal   Neck- No thyromegaly ,Trachea -central, No jugular venous distension,   No Carotid Bruit   Cardiovascular -Heart Sounds- Normal  and  no murmur   , No gallop rhythm   Respiratory - Normal Respiratory Effort, Normal breath sounds, crepitations bases,  no wheeze  and  no forced expiratory wheeze    Peripheral pitting pedal edema-- none ,  varicose veins right lower extremity  and  varicose veins left lower extremity, no calf pain   Gastrointestinal -Soft abdomen, No palpable masses, Non Tender,Liver,Spleen not palpable. No-- free fluid and shifting dullness  Musculoskeletal- No finger Clubbing. Strength grossly normal   Lymphatic-No Palpable cervical, axillary,Inguinal lymphadenopathy   Psychiatric - normal effect,Orientation  Rt Dorsalis pedis pulses-palpable    Lt Dorsalis pedis pulses- palpable   Rt Posterior tibial pulses -palpable   Left posterior tibial pulses -palpable   Miscellaneous -  no renal bruit  Blood pressure 131/72, pulse 85, temperature 98.1 °F (36.7 °C), temperature source Oral,  "height 5' 2" (1.575 m), weight 78 kg (172 lb), SpO2 95 %.      Investigations  Lab and Imaging have been reviewed in epic.    Review of Medicine tests    EKG- I had independently reviewed the EKG from--7/2/2018   It was reported to be showing     Normal sinus rhythm  Inferior infarct, age undetermined  Abnormal ECG  No previous ECG available for comparison    2010 - Normal myocardial perfusion     Review of clinical lab tests:  Lab Results   Component Value Date    CREATININE 0.7 09/12/2018    HGB 13.3 05/13/2014     05/13/2014 7/2/2018 Hb 13.2, HCT 39.4            Review of old records- Was done and information gathered regards to events leading to surgery and health conditions of significance in the perioperative period.    Outpatient Subjective & Objective   "

## 2018-09-12 NOTE — LETTER
September 12, 2018      Sakina Cooper DO  [address] [place] [address]           [address] - Pre Op Consult  [address] [address]  Phone: [phone]          Patient: Dary Chaney   MR Number: [id number]   YOB: 1955   Date of Visit: 9/12/2018       Dear Dr. Rhonda G Leopold:    Thank you for referring Dary Chaney to me for evaluation. Attached you will find relevant portions of my assessment and plan of care.    If you have questions, please do not hesitate to call me. I look forward to following Dary Chaney along with you.    Sincerely,    Lisset Piedra MD    Enclosure  CC:  Rhonda G Leopold, MD Lisa Rushing, NP Daniel J. Canter, MD    If you would like to receive this communication electronically, please contact [email or web address] or [phone] to request more information on [employer] access.    For providers and/or their staff who would like to refer a patient to [facility], please contact us through our one-stop-shop provider referral line, [facility], at [phone].    If you feel you have received this communication in error or would no longer like to receive these types of communications, please e-mail [email or web address]

## 2018-09-12 NOTE — ASSESSMENT & PLAN NOTE
Type 2 Diabetes Mellitus  On treatment with oral agents, Insulin    Hemoglobin A1c- 7.6 - from 9/12/2018   Capillary glucose check-yes  Pre breakfast -  Pre lunch -101  I had educated that uncontrolled DM can cause post op complications,risk of infection, wound healing problem,increased length of stay in hospital and its associated complications.I suggest exercise as much as possible and follow diabetic diet    Diabetes Mellitus-I suggest monitoring the glucose in the perioperative period ( Before meals and bed time,if the patient is on oral feeds or every 6 hourly ,if the patient is NPO )  Blood glucose target in hospitalized patients is 140-180. Oral Hypoglycemic agents are generally avoided during the hospital stay . If glucose is consistently elevated ,I suggest using basal ,prandial Insulin regimen to control the glucose , as elevated glucose can be associated with adverse surgical out comes. Please consider involving Hospital Medicine or Endocrinology ,if any help is needed with Glucose control. Patient will be instructed based on the pre op clinic guidelines  about adjustment of diabetic treatment  considering the NPO status for Surgery

## 2018-09-12 NOTE — ASSESSMENT & PLAN NOTE
Not known to  have sleep apnea   reports loud  snoring , but no apnea  Encouraged weight loss  Less active due to Left knee pain . To Her understanding needs Left knee replacement   Gained 25-30 pounds

## 2018-09-12 NOTE — PROGRESS NOTES
[facility] - Pre Op Consult  Progress Note    Patient Name: Dary Chaney  MRN: [id number]  Date of Evaluation- 09/12/2018  PCP- Sakina Cooper, DO    Future cases for Dary Chaney [[id number]]     Case ID Status Date Time John Procedure Provider Location    [id number] [facility] 9/13/2018  7:00  XI ROBOTIC NEPHRECTOMY, PARTIAL Tommy Bradshaw MD [[id number]] [facility] OR 2ND FLR      Rt    HPI:  History of present illness- I had the pleasure of meeting this pleasant 62 y.o. lady in the pre op clinic prior to her elective Urological surgery. The patient is new to me . Dary was accompanied by audrey Diaz.    I have obtained the history by speaking to the patient and by reviewing the electronic health records.    Events leading up to surgery / History of presenting illness -    Right renal mass  Has a history of mid abdominal pains since Feb/ March 2018  ,on occassions  Random onset ( could not relate the onset on pain with food ) . No constipation  Was evaluated by Gynecologist   Had a CT scan leading to diagnosis of Rt renal mass   Have been having Recurrent UTI's almost on a monthly basis  UTI treatment helpspain  She has been troubled with moderate abdominal  pain sinceFeb/ March 2018  . Pain  and decreases with UTI treatment .    Relevant health conditions of significance for the perioperative period/ History of presenting illness -    Subjectively describes health as good     Patient Active Problem List    Diagnosis Date Noted    Right renal mass 09/12/2018    Patient is Mormonism 09/12/2018         Dysphagia 08/21/2018                        Type 2 diabetes mellitus without retinopathy 10/17/2017    Obesity (BMI 30-39.9) 01/21/2016              Hypertension goal BP (blood pressure) < 130/80 10/10/2013    Hyperlipidemia LDL goal <70 10/10/2013    Seasonal allergic rhinitis 10/10/2013    Type 2 diabetes mellitus with other specified complication 10/10/2013               Not known to have heart  disease       Subjective/ Objective:          Chief complaint-Preoperative evaluation, Perioperative Medical management, complication reduction plan     Active cardiac conditions- none    Revised cardiac risk index predictors- insulin requiring diabetes mellitus    Functional capacity -Examples of physical activity, does catering,  can take 1 flight of stairs and dancing----- She can undertake all the above activities without  chest pain,chest tightness, Shortness of breath ,dizziness,lightheadedness making her exercise tolerance more  than 4 Mets.       Review of Systems   Constitutional: Negative for chills and fever.            Weight gain from reduced activity   HENT:        STOPBANG score  3/ 8    Occasional Loud Snoring   HTN  Age over 50     Suggested follow up       Eyes:        No new visual changes   Respiratory:        No cough , phlegm  No Hemoptysis   Cardiovascular:        As noted   Gastrointestinal:        No overt GI/ blood losses  Bowel movements- Regular    Endocrine:        Prednisone use > 20 mg daily for 3 weeks-None   Genitourinary: Negative for dysuria.        No urinary hesitancy   Known ,when has UTI   Does not feel like she has UTI   Musculoskeletal:        As above      Skin: Negative for rash.   Neurological: Negative for syncope.        No unilateral weakness   Hematological:        Current use of Anticoagulants  Current use of Antiplatelet agents  None   Psychiatric/Behavioral:        No Depression,Anxiety       No vascular stenting       Past Surgical History:   Procedure Laterality Date    CHOLECYSTECTOMY      CHOLECYSTECTOMY      COLONOSCOPY  2012    COLONOSCOPY N/A 3/1/2018    Procedure: COLONOSCOPY;  Surgeon: Phillip Brower MD;  Location: [facility];  Service: Endoscopy;  Laterality: N/A;    COLONOSCOPY N/A 3/1/2018    Performed by Phillip Brower MD at [facility] ENDO    ESOPHAGOGASTRODUODENOSCOPY N/A 8/21/2018    Procedure: ESOPHAGOGASTRODUODENOSCOPY (EGD);  Surgeon:  Maco Wynn MD;  Location: [facility];  Service: Endoscopy;  Laterality: N/A;    ESOPHAGOGASTRODUODENOSCOPY (EGD) N/A 8/21/2018    Performed by Maco Wynn MD at [facility] ENDO    HYSTERECTOMY  1993    uterine prolapse    KNEE SURGERY  03/2017    SPINE SURGERY         No anesthesia, bleeding, cardiac problems , PONV with previous surgeries/procedures.  Medications and Allergies reviewed in epic.   FH- No anesthesia,bleeding / venous thrombosis , early onset heart disease in family   Lives with    , who can help     Physical Exam   HENT:   Head: Normocephalic.       Physical Exam   HENT:   Head: Normocephalic.   .lrre  Constitutional- Vitals - Body mass index is 31.46 kg/m².,   Vitals:    09/12/18 1056   BP: 131/72   Pulse: 85   Temp: 98.1 °F (36.7 °C)     General appearance-Conscious,Coherent  Eyes- No conjunctival icterus,pupils  round  and reactive to light   ENT-Oral cavity- moist  , Hearing grossly normal   Neck- No thyromegaly ,Trachea -central, No jugular venous distension,   No Carotid Bruit   Cardiovascular -Heart Sounds- Normal  and  no murmur   , No gallop rhythm   Respiratory - Normal Respiratory Effort, Normal breath sounds, crepitations bases,  no wheeze  and  no forced expiratory wheeze    Peripheral pitting pedal edema-- none ,  varicose veins right lower extremity  and  varicose veins left lower extremity, no calf pain   Gastrointestinal -Soft abdomen, No palpable masses, Non Tender,Liver,Spleen not palpable. No-- free fluid and shifting dullness  Musculoskeletal- No finger Clubbing. Strength grossly normal   Lymphatic-No Palpable cervical, axillary,Inguinal lymphadenopathy   Psychiatric - normal effect,Orientation  Rt Dorsalis pedis pulses-palpable    Lt Dorsalis pedis pulses- palpable   Rt Posterior tibial pulses -palpable   Left posterior tibial pulses -palpable   Miscellaneous -  no renal bruit  Blood pressure 131/72, pulse 85, temperature 98.1 °F (36.7 °C), temperature source  "Oral, height 5' 2" (1.575 m), weight 78 kg (172 lb), SpO2 95 %.      Investigations  Lab and Imaging have been reviewed in [facility].    Review of Medicine tests    EKG- I had independently reviewed the EKG from--7/2/2018   It was reported to be showing     Normal sinus rhythm  Inferior infarct, age undetermined  Abnormal ECG  No previous ECG available for comparison    2010 - Normal myocardial perfusion     Review of clinical lab tests:  Lab Results   Component Value Date    CREATININE 0.7 09/12/2018    HGB 13.3 05/13/2014     05/13/2014 7/2/2018 Hb 13.2, HCT 39.4            Review of old records- Was done and information gathered regards to events leading to surgery and health conditions of significance in the perioperative period.        Preoperative cardiac risk assessment-  The patient does not have any active cardiac conditions . Revised cardiac risk index predictors-1 ---.Functional capacity is more than 4 Mets. She will be undergoing a Urological procedure that carries a intermediate risk     The estimated risk of the rate of adverse cardiac outcomes  0.9%    No further cardiac work up is indicated prior to proceeding with the surgery     Orders Placed This Encounter    Ambulatory consult to ENT       American Society of Anesthesiologists Physical status classification ( ASA ) class: 3     Postoperative pulmonary complication risk assessment:      ARISCAT ( [place]et) risk index- risk class -  Low, if duration of surgery is under 2 hours, intermediate, if duration of surgery is over 2 hours          Assessment/Plan:     History of back surgery  Had lumbar spine surgery in [place], Right after [place]  Subjectively not troubled with back    Seasonal allergic rhinitis  Winter months   Uses Albuterol , when has bronchitis from allergies   No recent problem     Dysphagia  Has a sensation certain food scratching the throat area as she swallows  Started about  2-3 months ago   Problem with certain vegetables, " meats   Chews food well   No choking or aspiration pneumonia  Had EGD Aug 2018 - Normal upper third of esophagus, middle third of                         esophagus and lower third of esophagus.             Tried Omeprazole with no relief   Suggest ENT evaluation   on regular consistency diet and on thin liquids    Dysphagia- I suggest providing soft diet (Or unless other wise advised by speech evaluation ) in view of the preexisting dysphagia as medication used in the perioperative period can possibly increase the dysphagia. I suggest aspiration precautions       Type 2 diabetes mellitus without retinopathy  Type 2 Diabetes Mellitus  On treatment with oral agents, Insulin    Hemoglobin A1c- 7.6 - from 9/12/2018   Capillary glucose check-yes  Pre breakfast -  Pre lunch -101  I had educated that uncontrolled DM can cause post op complications,risk of infection, wound healing problem,increased length of stay in hospital and its associated complications.I suggest exercise as much as possible and follow diabetic diet    Diabetes Mellitus-I suggest monitoring the glucose in the perioperative period ( Before meals and bed time,if the patient is on oral feeds or every 6 hourly ,if the patient is NPO )  Blood glucose target in hospitalized patients is 140-180. Oral Hypoglycemic agents are generally avoided during the hospital stay . If glucose is consistently elevated ,I suggest using basal ,prandial Insulin regimen to control the glucose , as elevated glucose can be associated with adverse surgical out comes. Please consider involving Hospital Medicine or Endocrinology ,if any help is needed with Glucose control. Patient will be instructed based on the pre op clinic guidelines  about adjustment of diabetic treatment  considering the NPO status for Surgery           Obesity (BMI 30-39.9)  Not known to  have sleep apnea   reports loud  snoring , but no apnea  Encouraged weight loss  Less active due to Left knee  pain . To Her understanding needs Left knee replacement   Gained 25-30 pounds     Hyperlipidemia LDL goal <70  HLD-I  suggest continuation of statin during the entire perioperative period.    Hypertension goal BP (blood pressure) < 130/80  Hypertension-  Blood pressure is acceptable .  I suggest holding -losartan - on the morning of the surgery and can continue that  post operatively under blood pressure, electrolyte and renal function monitoring as long as they are acceptable.I suggest addressing pain control as uncontrolled pain can increased blood pressure     Patient is Ep[employer]  I suggest that the perioperative team be aware of this      Ulcer of esophagus without bleeding  Had black stool in June 2018  ( Hb July 2018 - N)   Was taking Aleve for long time for Left knee pain   No longer having black stool   NSAID effects discussed     Abnormal EKG  July 2018   No suggestion of symptomatic CAD  Normal myocardial perfusion, LV wall motion in 2010    Fatty liver    No suggestion of  hepatic decompensation   Weight loss encouraged    Varicose veins of both lower extremities  Increased risk of thrombosis in the henry operative period , compression stocking use discussed        Preventive perioperative care    Thromboembolic prophylaxis:  Her risk factors for thrombosis include possible neoplasia  obesity, surgical procedure and age.I suggest  thromboembolic prophylaxis ( mechanical/pharmacological, weighing the risk benefits of pharmacological agent use considering henry procedural bleeding )  during the perioperative period.I suggested being active in the post operative period.      Postoperative pulmonary complication prophylaxis-Risk factors for post operative pulmonary complications include surgery lasting over 3 hours, ASA class >2 and proximity of the surgical site to the lungs- I suggest tobacco smoking cessation, incentive spirometry use, early ambulation, end tidal carbon dioxide monitoring and pain  control so as to avoid diaphragmatic splinting , oral care , Head end of bed elevation      Renal complication prophylaxis-Risk factors for renal complications include diabetes mellitus and hypertension . I suggest keeping her well hydrated .I suggested drinking 2 litre's of water a day      Surgical site Infection Prophylaxis-I  suggest appropriate antibiotic for Prophylaxis against Surgical site infections      In view of urological procedure the patient  is at risk of postoperative urinary retention.  I suggest avoidance / minimizing the of  Benzodiazepines,Anticholinergic medication,antihistamines ( Benadryl) , if possible in the perioperative period. I suggest using the minimum possible use of opioids for the minimum period of time in the perioperative period. Benadryl avoidance suggested      This visit was focused on Preoperative evaluation, Perioperative Medical management, complication reduction plans. I suggest that the patient follows up with primary care or relevant sub specialists for ongoing health care.    I appreciate the opportunity to be involved in this patients care. Please feel free to contact me if there were any questions about this consultation.    Patient is optimized     Patient was instructed to call and update me about any changes to health,  medication, office visits ,testing out side of the [facility] operative care center , hospitalizations between now and surgery     Lisset Piedra MD  Perioperative Medicine  [facility]   Pager [phone]  ----  9/12- 17 50     Spoke to her   She had ENT evaluation in the past   She can either follow up with local ENT or come to [facility]   To keep me updated  She has no questions on the Medication instructions given to her

## 2018-09-12 NOTE — ASSESSMENT & PLAN NOTE
Had black stool in June 2018  ( Hb July 2018 - N)   Was taking Aleve for long time for Left knee pain   No longer having black stool   NSAID effects discussed

## 2018-09-12 NOTE — ASSESSMENT & PLAN NOTE
Has a sensation certain food scratching the throat area as she swallows  Started about  2-3 months ago   Problem with certain vegetables, meats   Chews food well   No choking or aspiration pneumonia  Had EGD Aug 2018 - Normal upper third of esophagus, middle third of                         esophagus and lower third of esophagus.             Tried Omeprazole with no relief   Suggest ENT evaluation   on regular consistency diet and on thin liquids    Dysphagia- I suggest providing soft diet (Or unless other wise advised by speech evaluation ) in view of the preexisting dysphagia as medication used in the perioperative period can possibly increase the dysphagia. I suggest aspiration precautions

## 2018-09-12 NOTE — ASSESSMENT & PLAN NOTE
Hypertension-  Blood pressure is acceptable .  I suggest holding -losartan - on the morning of the surgery and can continue that  post operatively under blood pressure, electrolyte and renal function monitoring as long as they are acceptable.I suggest addressing pain control as uncontrolled pain can increased blood pressure

## 2018-09-12 NOTE — HPI
History of present illness- I had the pleasure of meeting this pleasant 62 y.o. lady in the pre op clinic prior to her elective Urological surgery. The patient is new to me . Dary was accompanied by audrey Diaz.    I have obtained the history by speaking to the patient and by reviewing the electronic health records.    Events leading up to surgery / History of presenting illness -    Right renal mass  Has a history of mid abdominal pains since Feb/ March 2018  ,on occassions  Random onset ( could not relate the onset on pain with food ) . No constipation  Was evaluated by Gynecologist   Had a CT scan leading to diagnosis of Rt renal mass   Have been having Recurrent UTI's almost on a monthly basis  UTI treatment helpspain  She has been troubled with moderate abdominal  pain sinceFeb/ March 2018  . Pain  and decreases with UTI treatment .    Relevant health conditions of significance for the perioperative period/ History of presenting illness -    Subjectively describes health as good     Patient Active Problem List    Diagnosis Date Noted    Right renal mass 09/12/2018    Patient is Voodoo 09/12/2018         Dysphagia 08/21/2018                        Type 2 diabetes mellitus without retinopathy 10/17/2017    Obesity (BMI 30-39.9) 01/21/2016              Hypertension goal BP (blood pressure) < 130/80 10/10/2013    Hyperlipidemia LDL goal <70 10/10/2013    Seasonal allergic rhinitis 10/10/2013    Type 2 diabetes mellitus with other specified complication 10/10/2013               Not known to have heart disease

## 2018-09-12 NOTE — DISCHARGE INSTRUCTIONS
Your surgery has been scheduled for:__________________________________________    You should report to:  ____[facility], located on the [place] side of the first floor of the           [facility] ([phone])  ____[facility], located on the [facility] side of the            Second floor of the [facility] ([phone])  ____3rd Floor [facility] located on the [facility] side of the [facility] [phone]  Please Note   - Tell your doctor if you take Aspirin, products containing Aspirin, herbal medications  or blood thinners, such as Coumadin, Ticlid, or Plavix.  (Consult your provider regarding holding or stopping before surgery).  - Arrange for someone to drive you home following surgery.  You will not be allowed to leave the surgical facility alone or drive yourself home following sedation and anesthesia.  Before Surgery  - Stop taking all herbal medications 14days prior to surgery  - No Motrin/Advil (Ibuprofen) 7 days before surgery  - No Aleve (Naproxen) 7 days before surgery  - Stop Taking Asprin, products containing Asprin _____days before surgery  - Stop taking blood thinners_______days before surgery  - No Goody's/BC  Powder 7 days before surgery  - Refrain from drinking alcoholic beverages for 24hours before and after surgery  - Stop or limit smoking _________days before surgery  - You may take Tylenol for pain  Night before Surgery  Stop ALL solid food, gum, candy (including vitamins) 8 hours before arrival time.  (Please note: If your surgeon gives you different eating and drinking instructions, please follow surgeon's directions.)  Stop all CLOUDY liquids: coffee with creamer, formula, tube feeds, cloudy juices, non-human milk and breast milk with additives, 6 hours prior to arrival time.  Stop plain breast milk 4 hours prior to arrival time.  The patient should be ENCOURAGED to drink carbohydrate-rich  clear liquids (sports drinks, clear juices) until 2 hours prior to arrival time.  CLEAR liquids include only water, black coffee NO creamer, clear oral rehydration drinks, clear sports drinks or clear fruit juices (no orange juice, no pulpy juices, no apple cider). Advise patients if they can read newsprint through the liquid, it qualifies as clear liquid.   IF IN DOUBT, drink water instead.   - Take a shower or bath (shower is recommended).  Bathe with Hibiclens soap or an antibacterial soap from the neck down.  If not supplied by your surgeon, hibiclens soap will need to be purchased over the counter in pharmacy.  Rinse soap off thoroughly.  - Shampoo your hair with your regular shampoo  The Day of Surgery  · NOTHING TO  DRINK 2 hours before arrival time. If you are told to take medication on the morning of surgery, it may be taken with a sip of water.   - Take another bath or shower with hibiclens or any antibacterial soap, to reduce the chance of infection.  - Take heart and blood pressure medications with a small sip of water, as advised by the perioperative team.  - Do not take fluid pills  - You may brush your teeth and rinse your mouth, but do not swall any additional water.   - Do not apply perfumes, powder, body lotions or deodorant on the day of surgery.  - Nail polish should be removed.  - Do not wear makeup or moisturizer  - Wear comfortable clothes, such as a button front shirt and loose fitting pants.  - Leave all jewelry, including body piercings, and valuables at home.    - Bring any devices you will neeed after surgery such as crutches or canes.  - If you have sleep apnea, please bring your CPAP machine  In the event that your physical condition changes including the onset of a cold or respiratory illness, or if you have to delay or cancel your surgery, please notify your surgeon.  Anesthesia: General Anesthesia     You are watched continuously during your procedure by your anesthesia provider.      Youre due to have surgery. During surgery, youll be given medicine called anesthesia or anesthetic. This will keep you comfortable and pain-free. Your anesthesia provider will use general anesthesia.  What is general anesthesia?  General anesthesia puts you into a state like deep sleep. It goes into the bloodstream (IV anesthetics), into the lungs (gas anesthetics), or both. You feel nothing during the procedure. You will not remember it. During the procedure, the anesthesia provider monitors you continuously. He or she checks your heart rate and rhythm, blood pressure, breathing, and blood oxygen.  · IV anesthetics. IV anesthetics are given through an IV line in your arm. Theyre often given first. This is so you are asleep before a gas anesthetic is started. Some kinds of IV anesthetics relieve pain. Others relax you. Your doctor will decide which kind is best in your case.  · Gas anesthetics. Gas anesthetics are breathed into the lungs. They are often used to keep you asleep. They can be given through a facemask or a tube placed in your larynx or trachea (breathing tube).  ? If you have a facemask, your anesthesia provider will most likely place it over your nose and mouth while youre still awake. Youll breathe oxygen through the mask as your IV anesthetic is started. Gas anesthetic may be added through the mask.  ? If you have a tube in the larynx or trachea, it will be inserted into your throat after youre asleep.  Anesthesia tools and medicines  You will likely have:  · IV anesthetics. These are put into an IV line into your bloodstream.  · Gas anesthetics. You breathe these anesthetics into your lungs, where they pass into your bloodstream.  · Pulse oximeter. This is a small clip that is attached to the end of your finger. This measures your blood oxygen level.  · Electrocardiography leads (electrodes). These are small sticky pads that are placed on your chest. They record your heart rate and  rhythm.  · Blood pressure cuff. This reads your blood pressure.  Risks and possible complications  General anesthesia has some risks. These include:  · Breathing problems  · Nausea and vomiting  · Sore throat or hoarseness (usually temporary)  · Allergic reaction to the anesthetic  · Irregular heartbeat (rare)  · Cardiac arrest (rare)   Anesthesia safety  · Follow all instructions you are given for how long not to eat or drink before your procedure.  · Be sure your doctor knows what medicines and drugs you take. This includes over-the-counter medicines, herbs, supplements, alcohol or other drugs. You will be asked when those were last taken.  · Have an adult family member or friend drive you home after the procedure.  · For the first 24 hours after your surgery:  ? Do not drive or use heavy equipment.  ? Do not make important decisions or sign legal documents. If important decisions or signing legal documents is necessary during the first 24 hours after surgery, have a trusted family member or spouse act on your behalf.  ? Avoid alcohol.  ? Have a responsible adult stay with you. He or she can watch for problems and help keep you safe.  Date Last Reviewed: 12/1/2016  © 1437-8449 [employer]. [address] [place] [address], [place] [address]. All rights reserved. This information is not intended as a substitute for professional medical care. Always follow your healthcare professional's instructions

## 2018-09-12 NOTE — H&P (VIEW-ONLY)
[facility] - Pre Op Consult  Progress Note    Patient Name: Dary Chaney  MRN: [id number]  Date of Evaluation- 09/12/2018  PCP- Sakina Cooper, DO    Future cases for Dary Chaney [[id number]]     Case ID Status Date Time John Procedure Provider Location    [id number] [facility] 9/13/2018  7:00  XI ROBOTIC NEPHRECTOMY, PARTIAL Tommy Bradshaw MD [[id number]] [facility] OR 2ND FLR      Rt    HPI:  History of present illness- I had the pleasure of meeting this pleasant 62 y.o. lady in the pre op clinic prior to her elective Urological surgery. The patient is new to me . Dary was accompanied by audrey Diaz.    I have obtained the history by speaking to the patient and by reviewing the electronic health records.    Events leading up to surgery / History of presenting illness -    Right renal mass  Has a history of mid abdominal pains since Feb/ March 2018  ,on occassions  Random onset ( could not relate the onset on pain with food ) . No constipation  Was evaluated by Gynecologist   Had a CT scan leading to diagnosis of Rt renal mass   Have been having Recurrent UTI's almost on a monthly basis  UTI treatment helpspain  She has been troubled with moderate abdominal  pain sinceFeb/ March 2018  . Pain  and decreases with UTI treatment .    Relevant health conditions of significance for the perioperative period/ History of presenting illness -    Subjectively describes health as good     Patient Active Problem List    Diagnosis Date Noted    Right renal mass 09/12/2018    Patient is Caodaism 09/12/2018         Dysphagia 08/21/2018                        Type 2 diabetes mellitus without retinopathy 10/17/2017    Obesity (BMI 30-39.9) 01/21/2016              Hypertension goal BP (blood pressure) < 130/80 10/10/2013    Hyperlipidemia LDL goal <70 10/10/2013    Seasonal allergic rhinitis 10/10/2013    Type 2 diabetes mellitus with other specified complication 10/10/2013               Not known to have heart  disease       Subjective/ Objective:          Chief complaint-Preoperative evaluation, Perioperative Medical management, complication reduction plan     Active cardiac conditions- none    Revised cardiac risk index predictors- insulin requiring diabetes mellitus    Functional capacity -Examples of physical activity, does catering,  can take 1 flight of stairs and dancing----- She can undertake all the above activities without  chest pain,chest tightness, Shortness of breath ,dizziness,lightheadedness making her exercise tolerance more  than 4 Mets.       Review of Systems   Constitutional: Negative for chills and fever.            Weight gain from reduced activity   HENT:        STOPBANG score  3/ 8    Occasional Loud Snoring   HTN  Age over 50     Suggested follow up       Eyes:        No new visual changes   Respiratory:        No cough , phlegm  No Hemoptysis   Cardiovascular:        As noted   Gastrointestinal:        No overt GI/ blood losses  Bowel movements- Regular    Endocrine:        Prednisone use > 20 mg daily for 3 weeks-None   Genitourinary: Negative for dysuria.        No urinary hesitancy   Known ,when has UTI   Does not feel like she has UTI   Musculoskeletal:        As above      Skin: Negative for rash.   Neurological: Negative for syncope.        No unilateral weakness   Hematological:        Current use of Anticoagulants  Current use of Antiplatelet agents  None   Psychiatric/Behavioral:        No Depression,Anxiety       No vascular stenting       Past Surgical History:   Procedure Laterality Date    CHOLECYSTECTOMY      CHOLECYSTECTOMY      COLONOSCOPY  2012    COLONOSCOPY N/A 3/1/2018    Procedure: COLONOSCOPY;  Surgeon: Phillip Brower MD;  Location: [facility];  Service: Endoscopy;  Laterality: N/A;    COLONOSCOPY N/A 3/1/2018    Performed by Phillip [facility]er [facility] at [facility] ENDO    ESOPHAGOGASTRODUODENOSCOPY N/A 8/21/2018    Procedure: ESOPHAGOGASTRODUODENOSCOPY (EGD);  Surgeon:  Maco Wynn MD;  Location: [facility] ENDO;  Service: Endoscopy;  Laterality: N/A;    ESOPHAGOGASTRODUODENOSCOPY (EGD) N/A 8/21/2018    Performed by Maco Wynn MD at [facility] ENDO    HYSTERECTOMY  1993    uterine prolapse    KNEE SURGERY  03/2017    SPINE SURGERY         No anesthesia, bleeding, cardiac problems , PONV with previous surgeries/procedures.  Medications and Allergies reviewed in epic.   FH- No anesthesia,bleeding / venous thrombosis , early onset heart disease in family   Lives with    , who can help     Physical Exam   HENT:   Head: Normocephalic.       Physical Exam   HENT:   Head: Normocephalic.   .lrre  Constitutional- Vitals - Body mass index is 31.46 kg/m².,   Vitals:    09/12/18 1056   BP: 131/72   Pulse: 85   Temp: 98.1 °F (36.7 °C)     General appearance-Conscious,Coherent  Eyes- No conjunctival icterus,pupils  round  and reactive to light   ENT-Oral cavity- moist  , Hearing grossly normal   Neck- No thyromegaly ,Trachea -central, No jugular venous distension,   No Carotid Bruit   Cardiovascular -Heart Sounds- Normal  and  no murmur   , No gallop rhythm   Respiratory - Normal Respiratory Effort, Normal breath sounds, crepitations bases,  no wheeze  and  no forced expiratory wheeze    Peripheral pitting pedal edema-- none ,  varicose veins right lower extremity  and  varicose veins left lower extremity, no calf pain   Gastrointestinal -Soft abdomen, No palpable masses, Non Tender,Liver,Spleen not palpable. No-- free fluid and shifting dullness  Musculoskeletal- No finger Clubbing. Strength grossly normal   Lymphatic-No Palpable cervical, axillary,Inguinal lymphadenopathy   Psychiatric - normal effect,Orientation  Rt Dorsalis pedis pulses-palpable    Lt Dorsalis pedis pulses- palpable   Rt Posterior tibial pulses -palpable   Left posterior tibial pulses -palpable   Miscellaneous -  no renal bruit  Blood pressure 131/72, pulse 85, temperature 98.1 °F (36.7 °C), temperature source  "Oral, height 5' 2" (1.575 m), weight 78 kg (172 lb), SpO2 95 %.      Investigations  Lab and Imaging have been reviewed in [facility].    Review of Medicine tests    EKG- I had independently reviewed the EKG from--7/2/2018   It was reported to be showing     Normal sinus rhythm  Inferior infarct, age undetermined  Abnormal ECG  No previous ECG available for comparison    2010 - Normal myocardial perfusion     Review of clinical lab tests:  Lab Results   Component Value Date    CREATININE 0.7 09/12/2018    HGB 13.3 05/13/2014     05/13/2014 7/2/2018 Hb 13.2, HCT 39.4            Review of old records- Was done and information gathered regards to events leading to surgery and health conditions of significance in the perioperative period.        Preoperative cardiac risk assessment-  The patient does not have any active cardiac conditions . Revised cardiac risk index predictors-1 ---.Functional capacity is more than 4 Mets. She will be undergoing a Urological procedure that carries a intermediate risk     The estimated risk of the rate of adverse cardiac outcomes  0.9%    No further cardiac work up is indicated prior to proceeding with the surgery     Orders Placed This Encounter    Ambulatory consult to ENT       American Society of Anesthesiologists Physical status classification ( ASA ) class: 3     Postoperative pulmonary complication risk assessment:      ARISCAT ( [place]et) risk index- risk class -  Low, if duration of surgery is under 2 hours, intermediate, if duration of surgery is over 2 hours          Assessment/Plan:     History of back surgery  Had lumbar spine surgery in [place], Right after [place]  Subjectively not troubled with back    Seasonal allergic rhinitis  Winter months   Uses Albuterol , when has bronchitis from allergies   No recent problem     Dysphagia  Has a sensation certain food scratching the throat area as she swallows  Started about  2-3 months ago   Problem with certain vegetables, " meats   Chews food well   No choking or aspiration pneumonia  Had EGD Aug 2018 - Normal upper third of esophagus, middle third of                         esophagus and lower third of esophagus.             Tried Omeprazole with no relief   Suggest ENT evaluation   on regular consistency diet and on thin liquids    Dysphagia- I suggest providing soft diet (Or unless other wise advised by speech evaluation ) in view of the preexisting dysphagia as medication used in the perioperative period can possibly increase the dysphagia. I suggest aspiration precautions       Type 2 diabetes mellitus without retinopathy  Type 2 Diabetes Mellitus  On treatment with oral agents, Insulin    Hemoglobin A1c- 7.6 - from 9/12/2018   Capillary glucose check-yes  Pre breakfast -  Pre lunch -101  I had educated that uncontrolled DM can cause post op complications,risk of infection, wound healing problem,increased length of stay in hospital and its associated complications.I suggest exercise as much as possible and follow diabetic diet    Diabetes Mellitus-I suggest monitoring the glucose in the perioperative period ( Before meals and bed time,if the patient is on oral feeds or every 6 hourly ,if the patient is NPO )  Blood glucose target in hospitalized patients is 140-180. Oral Hypoglycemic agents are generally avoided during the hospital stay . If glucose is consistently elevated ,I suggest using basal ,prandial Insulin regimen to control the glucose , as elevated glucose can be associated with adverse surgical out comes. Please consider involving Hospital Medicine or Endocrinology ,if any help is needed with Glucose control. Patient will be instructed based on the pre op clinic guidelines  about adjustment of diabetic treatment  considering the NPO status for Surgery           Obesity (BMI 30-39.9)  Not known to  have sleep apnea   reports loud  snoring , but no apnea  Encouraged weight loss  Less active due to Left knee  pain . To Her understanding needs Left knee replacement   Gained 25-30 pounds     Hyperlipidemia LDL goal <70  HLD-I  suggest continuation of statin during the entire perioperative period.    Hypertension goal BP (blood pressure) < 130/80  Hypertension-  Blood pressure is acceptable .  I suggest holding -losartan - on the morning of the surgery and can continue that  post operatively under blood pressure, electrolyte and renal function monitoring as long as they are acceptable.I suggest addressing pain control as uncontrolled pain can increased blood pressure     Patient is Jewish  I suggest that the perioperative team be aware of this      Ulcer of esophagus without bleeding  Had black stool in June 2018  ( Hb July 2018 - N)   Was taking Aleve for long time for Left knee pain   No longer having black stool   NSAID effects discussed     Abnormal EKG  July 2018   No suggestion of symptomatic CAD  Normal myocardial perfusion, LV wall motion in 2010    Fatty liver    No suggestion of  hepatic decompensation   Weight loss encouraged    Varicose veins of both lower extremities  Increased risk of thrombosis in the henry operative period , compression stocking use discussed        Preventive perioperative care    Thromboembolic prophylaxis:  Her risk factors for thrombosis include possible neoplasia  obesity, surgical procedure and age.I suggest  thromboembolic prophylaxis ( mechanical/pharmacological, weighing the risk benefits of pharmacological agent use considering henry procedural bleeding )  during the perioperative period.I suggested being active in the post operative period.      Postoperative pulmonary complication prophylaxis-Risk factors for post operative pulmonary complications include surgery lasting over 3 hours, ASA class >2 and proximity of the surgical site to the lungs- I suggest tobacco smoking cessation, incentive spirometry use, early ambulation, end tidal carbon dioxide monitoring and pain  control so as to avoid diaphragmatic splinting , oral care , Head end of bed elevation      Renal complication prophylaxis-Risk factors for renal complications include diabetes mellitus and hypertension . I suggest keeping her well hydrated .I suggested drinking 2 litre's of water a day      Surgical site Infection Prophylaxis-I  suggest appropriate antibiotic for Prophylaxis against Surgical site infections      In view of urological procedure the patient  is at risk of postoperative urinary retention.  I suggest avoidance / minimizing the of  Benzodiazepines,Anticholinergic medication,antihistamines ( Benadryl) , if possible in the perioperative period. I suggest using the minimum possible use of opioids for the minimum period of time in the perioperative period. Benadryl avoidance suggested      This visit was focused on Preoperative evaluation, Perioperative Medical management, complication reduction plans. I suggest that the patient follows up with primary care or relevant sub specialists for ongoing health care.    I appreciate the opportunity to be involved in this patients care. Please feel free to contact me if there were any questions about this consultation.    Patient is optimized     Patient was instructed to call and update me about any changes to health,  medication, office visits ,testing out side of the [facility] operative care center , hospitalizations between now and surgery     Lisset Piedra MD  Perioperative Medicine  [facility]   Pager [phone]  ----  9/12- 17 51     Spoke to her   She had ENT evaluation in the past   She can either follow up with local ENT [place] [facility] [place]   To keep me updated  She has no questions on the Medication instructions given to her

## 2018-09-13 ENCOUNTER — HOSPITAL ENCOUNTER (INPATIENT)
Facility: HOSPITAL | Age: 63
LOS: 1 days | Discharge: HOME | DRG: 657 | End: 2018-09-14
Attending: UROLOGY | Admitting: UROLOGY
Payer: COMMERCIAL

## 2018-09-13 ENCOUNTER — ANESTHESIA (OUTPATIENT)
Dept: SURGERY | Facility: HOSPITAL | Age: 63
DRG: 657 | End: 2018-09-13
Payer: COMMERCIAL

## 2018-09-13 DIAGNOSIS — N28.89 RIGHT RENAL MASS: ICD-10-CM

## 2018-09-13 DIAGNOSIS — I10 HYPERTENSION GOAL BP (BLOOD PRESSURE) < 130/80: Primary | Chronic | ICD-10-CM

## 2018-09-13 LAB
POCT GLUCOSE: 129 MG/DL (ref 70–110)
POCT GLUCOSE: 147 MG/DL (ref 70–110)
POCT GLUCOSE: 175 MG/DL (ref 70–110)
POCT GLUCOSE: 93 MG/DL (ref 70–110)

## 2018-09-13 PROCEDURE — 63600175 PHARM REV CODE 636 W HCPCS: Performed by: UROLOGY

## 2018-09-13 PROCEDURE — 36000713 HC OR TIME LEV V EA ADD 15 MIN: Performed by: UROLOGY

## 2018-09-13 PROCEDURE — 27201423 OPTIME MED/SURG SUP & DEVICES STERILE SUPPLY: Performed by: UROLOGY

## 2018-09-13 PROCEDURE — 37000009 HC ANESTHESIA EA ADD 15 MINS: Performed by: UROLOGY

## 2018-09-13 PROCEDURE — 27100088 HC CELL SAVER

## 2018-09-13 PROCEDURE — D9220A PRA ANESTHESIA: Mod: CRNA,,, | Performed by: NURSE ANESTHETIST, CERTIFIED REGISTERED

## 2018-09-13 PROCEDURE — 63600175 PHARM REV CODE 636 W HCPCS: Performed by: STUDENT IN AN ORGANIZED HEALTH CARE EDUCATION/TRAINING PROGRAM

## 2018-09-13 PROCEDURE — 88307 TISSUE EXAM BY PATHOLOGIST: CPT | Mod: 26,,, | Performed by: PATHOLOGY

## 2018-09-13 PROCEDURE — 27200677 HC TRANSDUCER MONITOR KIT SINGLE: Performed by: NURSE ANESTHETIST, CERTIFIED REGISTERED

## 2018-09-13 PROCEDURE — 25000003 PHARM REV CODE 250: Performed by: NURSE ANESTHETIST, CERTIFIED REGISTERED

## 2018-09-13 PROCEDURE — 25000003 PHARM REV CODE 250: Performed by: UROLOGY

## 2018-09-13 PROCEDURE — 76998 US GUIDE INTRAOP: CPT | Mod: 26,,, | Performed by: UROLOGY

## 2018-09-13 PROCEDURE — 94761 N-INVAS EAR/PLS OXIMETRY MLT: CPT

## 2018-09-13 PROCEDURE — 25000003 PHARM REV CODE 250: Performed by: ANESTHESIOLOGY

## 2018-09-13 PROCEDURE — D9220A PRA ANESTHESIA: Mod: ANES,,, | Performed by: ANESTHESIOLOGY

## 2018-09-13 PROCEDURE — 11000001 HC ACUTE MED/SURG PRIVATE ROOM

## 2018-09-13 PROCEDURE — 63600175 PHARM REV CODE 636 W HCPCS: Performed by: NURSE ANESTHETIST, CERTIFIED REGISTERED

## 2018-09-13 PROCEDURE — 71000033 HC RECOVERY, INTIAL HOUR: Performed by: UROLOGY

## 2018-09-13 PROCEDURE — 37000008 HC ANESTHESIA 1ST 15 MINUTES: Performed by: UROLOGY

## 2018-09-13 PROCEDURE — 88307 TISSUE EXAM BY PATHOLOGIST: CPT | Performed by: PATHOLOGY

## 2018-09-13 PROCEDURE — 27201037 HC PRESSURE MONITORING SET UP

## 2018-09-13 PROCEDURE — 8E0W4CZ ROBOTIC ASSISTED PROCEDURE OF TRUNK REGION, PERCUTANEOUS ENDOSCOPIC APPROACH: ICD-10-PCS | Performed by: UROLOGY

## 2018-09-13 PROCEDURE — 27000221 HC OXYGEN, UP TO 24 HOURS

## 2018-09-13 PROCEDURE — 0TB04ZZ EXCISION OF RIGHT KIDNEY, PERCUTANEOUS ENDOSCOPIC APPROACH: ICD-10-PCS | Performed by: UROLOGY

## 2018-09-13 PROCEDURE — 27800505 HC CATH, RADIAL ARTERY KIT: Performed by: NURSE ANESTHETIST, CERTIFIED REGISTERED

## 2018-09-13 PROCEDURE — 50543 LAPARO PARTIAL NEPHRECTOMY: CPT | Mod: 22,RT,, | Performed by: UROLOGY

## 2018-09-13 PROCEDURE — 63600175 PHARM REV CODE 636 W HCPCS: Performed by: ANESTHESIOLOGY

## 2018-09-13 PROCEDURE — 25000003 PHARM REV CODE 250: Performed by: STUDENT IN AN ORGANIZED HEALTH CARE EDUCATION/TRAINING PROGRAM

## 2018-09-13 PROCEDURE — 27100025 HC TUBING, SET FLUID WARMER: Performed by: NURSE ANESTHETIST, CERTIFIED REGISTERED

## 2018-09-13 PROCEDURE — 36000712 HC OR TIME LEV V 1ST 15 MIN: Performed by: UROLOGY

## 2018-09-13 PROCEDURE — S0028 INJECTION, FAMOTIDINE, 20 MG: HCPCS | Performed by: ANESTHESIOLOGY

## 2018-09-13 PROCEDURE — 82962 GLUCOSE BLOOD TEST: CPT | Performed by: UROLOGY

## 2018-09-13 PROCEDURE — 71000039 HC RECOVERY, EACH ADD'L HOUR: Performed by: UROLOGY

## 2018-09-13 RX ORDER — ONDANSETRON 2 MG/ML
4 INJECTION INTRAMUSCULAR; INTRAVENOUS EVERY 8 HOURS PRN
Status: DISCONTINUED | OUTPATIENT
Start: 2018-09-13 | End: 2018-09-14 | Stop reason: HOSPADM

## 2018-09-13 RX ORDER — KETAMINE HYDROCHLORIDE 10 MG/ML
INJECTION, SOLUTION INTRAMUSCULAR; INTRAVENOUS
Status: DISCONTINUED | OUTPATIENT
Start: 2018-09-13 | End: 2018-09-13

## 2018-09-13 RX ORDER — OXYCODONE HYDROCHLORIDE 5 MG/1
5 TABLET ORAL EVERY 4 HOURS PRN
Status: DISCONTINUED | OUTPATIENT
Start: 2018-09-13 | End: 2018-09-14 | Stop reason: HOSPADM

## 2018-09-13 RX ORDER — ACETAMINOPHEN 10 MG/ML
1000 INJECTION, SOLUTION INTRAVENOUS
Status: COMPLETED | OUTPATIENT
Start: 2018-09-13 | End: 2018-09-13

## 2018-09-13 RX ORDER — PROPOFOL 10 MG/ML
VIAL (ML) INTRAVENOUS
Status: DISCONTINUED | OUTPATIENT
Start: 2018-09-13 | End: 2018-09-13

## 2018-09-13 RX ORDER — ROCURONIUM BROMIDE 10 MG/ML
INJECTION, SOLUTION INTRAVENOUS
Status: DISCONTINUED | OUTPATIENT
Start: 2018-09-13 | End: 2018-09-13

## 2018-09-13 RX ORDER — ONDANSETRON 2 MG/ML
INJECTION INTRAMUSCULAR; INTRAVENOUS
Status: DISCONTINUED | OUTPATIENT
Start: 2018-09-13 | End: 2018-09-13

## 2018-09-13 RX ORDER — IBUPROFEN 200 MG
24 TABLET ORAL
Status: DISCONTINUED | OUTPATIENT
Start: 2018-09-13 | End: 2018-09-14 | Stop reason: HOSPADM

## 2018-09-13 RX ORDER — DIPHENHYDRAMINE HCL 25 MG
25 CAPSULE ORAL EVERY 6 HOURS PRN
Status: DISCONTINUED | OUTPATIENT
Start: 2018-09-13 | End: 2018-09-14 | Stop reason: HOSPADM

## 2018-09-13 RX ORDER — GLYCOPYRROLATE 0.2 MG/ML
INJECTION INTRAMUSCULAR; INTRAVENOUS
Status: DISCONTINUED | OUTPATIENT
Start: 2018-09-13 | End: 2018-09-13

## 2018-09-13 RX ORDER — LIDOCAINE HCL/PF 100 MG/5ML
SYRINGE (ML) INTRAVENOUS
Status: DISCONTINUED | OUTPATIENT
Start: 2018-09-13 | End: 2018-09-13

## 2018-09-13 RX ORDER — POLYETHYLENE GLYCOL 3350 17 G/17G
17 POWDER, FOR SOLUTION ORAL DAILY
Status: DISCONTINUED | OUTPATIENT
Start: 2018-09-13 | End: 2018-09-14 | Stop reason: HOSPADM

## 2018-09-13 RX ORDER — SODIUM CHLORIDE 9 MG/ML
INJECTION, SOLUTION INTRAVENOUS CONTINUOUS
Status: DISCONTINUED | OUTPATIENT
Start: 2018-09-13 | End: 2018-09-14

## 2018-09-13 RX ORDER — LIDOCAINE HYDROCHLORIDE 10 MG/ML
1 INJECTION, SOLUTION EPIDURAL; INFILTRATION; INTRACAUDAL; PERINEURAL ONCE
Status: COMPLETED | OUTPATIENT
Start: 2018-09-13 | End: 2018-09-13

## 2018-09-13 RX ORDER — OXYCODONE HYDROCHLORIDE 5 MG/1
10 TABLET ORAL EVERY 4 HOURS PRN
Status: DISCONTINUED | OUTPATIENT
Start: 2018-09-13 | End: 2018-09-14 | Stop reason: HOSPADM

## 2018-09-13 RX ORDER — LIDOCAINE HYDROCHLORIDE 10 MG/ML
1 INJECTION, SOLUTION EPIDURAL; INFILTRATION; INTRACAUDAL; PERINEURAL ONCE
Status: DISCONTINUED | OUTPATIENT
Start: 2018-09-13 | End: 2018-09-13 | Stop reason: HOSPADM

## 2018-09-13 RX ORDER — ACETAMINOPHEN 10 MG/ML
1000 INJECTION, SOLUTION INTRAVENOUS EVERY 6 HOURS
Status: COMPLETED | OUTPATIENT
Start: 2018-09-13 | End: 2018-09-14

## 2018-09-13 RX ORDER — FENTANYL CITRATE 50 UG/ML
INJECTION, SOLUTION INTRAMUSCULAR; INTRAVENOUS
Status: DISCONTINUED | OUTPATIENT
Start: 2018-09-13 | End: 2018-09-13

## 2018-09-13 RX ORDER — HYDROMORPHONE HYDROCHLORIDE 1 MG/ML
0.2 INJECTION, SOLUTION INTRAMUSCULAR; INTRAVENOUS; SUBCUTANEOUS EVERY 5 MIN PRN
Status: COMPLETED | OUTPATIENT
Start: 2018-09-13 | End: 2018-09-13

## 2018-09-13 RX ORDER — MIDAZOLAM HYDROCHLORIDE 1 MG/ML
INJECTION, SOLUTION INTRAMUSCULAR; INTRAVENOUS
Status: DISCONTINUED | OUTPATIENT
Start: 2018-09-13 | End: 2018-09-13

## 2018-09-13 RX ORDER — NEOSTIGMINE METHYLSULFATE 1 MG/ML
INJECTION, SOLUTION INTRAVENOUS
Status: DISCONTINUED | OUTPATIENT
Start: 2018-09-13 | End: 2018-09-13

## 2018-09-13 RX ORDER — INSULIN ASPART 100 [IU]/ML
1-10 INJECTION, SOLUTION INTRAVENOUS; SUBCUTANEOUS
Status: DISCONTINUED | OUTPATIENT
Start: 2018-09-13 | End: 2018-09-14 | Stop reason: HOSPADM

## 2018-09-13 RX ORDER — FAMOTIDINE 10 MG/ML
20 INJECTION INTRAVENOUS ONCE
Status: COMPLETED | OUTPATIENT
Start: 2018-09-13 | End: 2018-09-13

## 2018-09-13 RX ORDER — IBUPROFEN 200 MG
16 TABLET ORAL
Status: DISCONTINUED | OUTPATIENT
Start: 2018-09-13 | End: 2018-09-14 | Stop reason: HOSPADM

## 2018-09-13 RX ORDER — CEFAZOLIN SODIUM 1 G/3ML
2 INJECTION, POWDER, FOR SOLUTION INTRAMUSCULAR; INTRAVENOUS
Status: COMPLETED | OUTPATIENT
Start: 2018-09-13 | End: 2018-09-13

## 2018-09-13 RX ORDER — HYDROMORPHONE HYDROCHLORIDE 1 MG/ML
1 INJECTION, SOLUTION INTRAMUSCULAR; INTRAVENOUS; SUBCUTANEOUS
Status: DISCONTINUED | OUTPATIENT
Start: 2018-09-13 | End: 2018-09-14 | Stop reason: HOSPADM

## 2018-09-13 RX ORDER — METOCLOPRAMIDE HYDROCHLORIDE 5 MG/ML
10 INJECTION INTRAMUSCULAR; INTRAVENOUS EVERY 10 MIN PRN
Status: DISCONTINUED | OUTPATIENT
Start: 2018-09-13 | End: 2018-09-13 | Stop reason: HOSPADM

## 2018-09-13 RX ADMIN — Medication 0.2 MG: at 11:09

## 2018-09-13 RX ADMIN — ACETAMINOPHEN 1000 MG: 10 INJECTION, SOLUTION INTRAVENOUS at 11:09

## 2018-09-13 RX ADMIN — SODIUM CHLORIDE, SODIUM GLUCONATE, SODIUM ACETATE, POTASSIUM CHLORIDE, MAGNESIUM CHLORIDE, SODIUM PHOSPHATE, DIBASIC, AND POTASSIUM PHOSPHATE: .53; .5; .37; .037; .03; .012; .00082 INJECTION, SOLUTION INTRAVENOUS at 09:09

## 2018-09-13 RX ADMIN — SODIUM CHLORIDE: 0.9 INJECTION, SOLUTION INTRAVENOUS at 05:09

## 2018-09-13 RX ADMIN — FAMOTIDINE 20 MG: 10 INJECTION, SOLUTION INTRAVENOUS at 05:09

## 2018-09-13 RX ADMIN — FENTANYL CITRATE 100 MCG: 50 INJECTION, SOLUTION INTRAMUSCULAR; INTRAVENOUS at 07:09

## 2018-09-13 RX ADMIN — ROCURONIUM BROMIDE 50 MG: 10 INJECTION, SOLUTION INTRAVENOUS at 07:09

## 2018-09-13 RX ADMIN — ONDANSETRON 4 MG: 2 INJECTION INTRAMUSCULAR; INTRAVENOUS at 04:09

## 2018-09-13 RX ADMIN — SODIUM CHLORIDE, SODIUM GLUCONATE, SODIUM ACETATE, POTASSIUM CHLORIDE, MAGNESIUM CHLORIDE, SODIUM PHOSPHATE, DIBASIC, AND POTASSIUM PHOSPHATE: .53; .5; .37; .037; .03; .012; .00082 INJECTION, SOLUTION INTRAVENOUS at 07:09

## 2018-09-13 RX ADMIN — ROCURONIUM BROMIDE 20 MG: 10 INJECTION, SOLUTION INTRAVENOUS at 07:09

## 2018-09-13 RX ADMIN — KETAMINE HYDROCHLORIDE 30 MG: 10 INJECTION, SOLUTION INTRAMUSCULAR; INTRAVENOUS at 08:09

## 2018-09-13 RX ADMIN — ACETAMINOPHEN 1000 MG: 10 INJECTION, SOLUTION INTRAVENOUS at 05:09

## 2018-09-13 RX ADMIN — Medication 0.2 MG: at 12:09

## 2018-09-13 RX ADMIN — NEOSTIGMINE METHYLSULFATE 5 MG: 1 INJECTION INTRAVENOUS at 10:09

## 2018-09-13 RX ADMIN — POLYETHYLENE GLYCOL 3350 17 G: 17 POWDER, FOR SOLUTION ORAL at 11:09

## 2018-09-13 RX ADMIN — LIDOCAINE HYDROCHLORIDE 10 MG: 10 INJECTION, SOLUTION EPIDURAL; INFILTRATION; INTRACAUDAL; PERINEURAL at 05:09

## 2018-09-13 RX ADMIN — SODIUM CHLORIDE: 0.9 INJECTION, SOLUTION INTRAVENOUS at 11:09

## 2018-09-13 RX ADMIN — MIDAZOLAM HYDROCHLORIDE 2 MG: 1 INJECTION, SOLUTION INTRAMUSCULAR; INTRAVENOUS at 06:09

## 2018-09-13 RX ADMIN — OXYCODONE HYDROCHLORIDE 10 MG: 5 TABLET ORAL at 03:09

## 2018-09-13 RX ADMIN — INSULIN ASPART 1 UNITS: 100 INJECTION, SOLUTION INTRAVENOUS; SUBCUTANEOUS at 11:09

## 2018-09-13 RX ADMIN — CEFAZOLIN 2 G: 330 INJECTION, POWDER, FOR SOLUTION INTRAMUSCULAR; INTRAVENOUS at 07:09

## 2018-09-13 RX ADMIN — PROPOFOL 175 MG: 10 INJECTION, EMULSION INTRAVENOUS at 07:09

## 2018-09-13 RX ADMIN — ACETAMINOPHEN 1000 MG: 10 INJECTION, SOLUTION INTRAVENOUS at 06:09

## 2018-09-13 RX ADMIN — OXYCODONE HYDROCHLORIDE 10 MG: 5 TABLET ORAL at 11:09

## 2018-09-13 RX ADMIN — GLYCOPYRROLATE 0.6 MG: 0.2 INJECTION, SOLUTION INTRAMUSCULAR; INTRAVENOUS at 10:09

## 2018-09-13 RX ADMIN — OXYCODONE HYDROCHLORIDE 5 MG: 5 TABLET ORAL at 10:09

## 2018-09-13 RX ADMIN — ROCURONIUM BROMIDE 30 MG: 10 INJECTION, SOLUTION INTRAVENOUS at 08:09

## 2018-09-13 RX ADMIN — ONDANSETRON 4 MG: 2 INJECTION INTRAMUSCULAR; INTRAVENOUS at 10:09

## 2018-09-13 RX ADMIN — LIDOCAINE HYDROCHLORIDE 100 MG: 20 INJECTION, SOLUTION INTRAVENOUS at 07:09

## 2018-09-13 RX ADMIN — SODIUM CHLORIDE: 0.9 INJECTION, SOLUTION INTRAVENOUS at 06:09

## 2018-09-13 NOTE — ANESTHESIA RELEASE NOTE
"Anesthesia Release from PACU Note    Patient: Dary Chaney    Procedure(s) Performed: Procedure(s) (LRB):  XI ROBOTIC NEPHRECTOMY, PARTIAL (Right)    Anesthesia type: GEN    Post pain: Adequate analgesia reported    Post assessment: no apparent anesthetic complications, tolerated procedure well and no evidence of recall    Post vital signs: BP (!) 146/71   Pulse (!) 59   Temp 36.4 °C (97.6 °F) (Oral)   Resp 11   Ht 5' 2" (1.575 m)   Wt 77.1 kg (170 lb)   SpO2 (!) 94%   Breastfeeding? No   BMI 31.09 kg/m²     Level of consciousness: awake, alert and oriented    Nausea/Vomiting: no nausea/no vomiting    Complications: none    Airway Patency: patent    Respiratory: unassisted, spontaneous ventilation, room air    Cardiovascular: stable and blood pressure at baseline    Hydration: euvolemic    "

## 2018-09-13 NOTE — TRANSFER OF CARE
"Anesthesia Transfer of Care Note    Patient: Dary Chaney    Procedure(s) Performed: Procedure(s) (LRB):  XI ROBOTIC NEPHRECTOMY, PARTIAL (Right)    Patient location: PACU    Anesthesia Type: general    Transport from OR: Transported from OR on 6-10 L/min O2 by face mask with adequate spontaneous ventilation    Post pain: adequate analgesia    Post assessment: no apparent anesthetic complications    Post vital signs: stable    Level of consciousness: sedated and responds to stimulation    Nausea/Vomiting: no nausea/vomiting    Complications: none    Transfer of care protocol was followed      Last vitals:   Visit Vitals  BP (!) 145/76 (BP Location: Left arm, Patient Position: Lying)   Pulse (!) 54   Temp 36.1 °C (97 °F) (Temporal)   Resp 20   Ht 5' 2" (1.575 m)   Wt 77.1 kg (170 lb)   SpO2 100%   Breastfeeding? No   BMI 31.09 kg/m²     "

## 2018-09-13 NOTE — NURSING TRANSFER
Nursing Transfer Note      9/13/2018     Transfer To: 527-B    Transfer via stretcher    Transfer with O2    Transported by Guillermina Cruz    Medicines sent: yes    Chart send with patient: Yes    Notified: spouse

## 2018-09-13 NOTE — OP NOTE
[facility]  Operative Note    Date: 09/13/2018    Pre-Op Diagnosis:   1.  Right renal mass suspicious for renal cell carcinoma  Patient Active Problem List   Diagnosis    Central obesity    Hypertension goal BP (blood pressure) < 130/80    Hyperlipidemia LDL goal <70    Seasonal allergic rhinitis    Acquired cyst of kidney    Intervertebral lumbar disc disorder with myelopathy, lumbar region    Cataract    Obesity (BMI 30-39.9)    Type 2 diabetes mellitus with other specified complication    Type 2 diabetes mellitus without retinopathy    Screen for colon cancer    Esophageal dysphagia    Dysphagia    Ulcer of esophagus without bleeding    Hiatal hernia    Esophageal ring, acquired    Right renal mass    Patient is Restoration    History of back surgery    Abnormal EKG    Fatty liver    Varicose veins of both lower extremities         Post-Op Diagnosis: same    Procedure(s) Performed:   1.  Robotically-assisted laparoscopic right partial nephrectomy--22 Modifier  2.  Intraoperative US for localization of renal tumor    Specimen(s):   1.  Right renal mass    Reason for -22 Modifier: The patient's renal mass was entirely endophytic which required more time and effort to localize. Further, in order to perform the mass excision, a linear nephrotomy in the kidney was made and dissection was carried down to expose the mass and enucleate it cleanly. A complex reconstruction was then performed. All these factors resulted in 65% more time and effort than is usual for a robotic partial nephrectomy.      Surgeon: Tommy Bradshaw MD    Assistant: Sonya Giron MD    Anesthesia: General endotracheal anesthesia    Indications: Dary Chaney is a 62 y.o. female with right renal mass suspicious for renal cell carcinoma.  After discussion of management options and risks and benefits associated with each the patient has elected to pursue surgical management.      Findings:   - Right  renal mass identified with ultrasound and enucleated  - Minimal adhesions from previous cholecystectomy  - warm ischemia time 17 minutes    EBL: 100 mL    Drains:   1.  16 Fr santos catheter    Anesthesia: General endotracheal anesthesia    Complications:  none    Procedure in Detail: After discussion of risks and benefits of the procedure, informed consent was obtained. The patient was brought to the operating room and placed supine on the operating table. SCDs were applied and working prior to induction of anesthesia. General anesthesia was administered. A 16 Fr Santos catheter was placed in the standard fashion with 10 ml sterile water used to inflate the balloon. An OG tube was placed. The patient was then moved into the modified flank position with the right side up. The patient was appropriately padded and secured to the table. The patient was then prepped and draped in the usual sterile fashion. Timeout was performed and preoperative antibiotics were confirmed.     A Veress needle was introduced into the abdomen via the umbilicus. Entry into the peritoneal cavity was confirmed with the drop test and an initial insufflation pressure of <10mmHg. Aspiration during our drop test revealed no blood or succus. The peritoneal cavity was insufflated up to 15 mmHg and the Veress was removed. The location of the 8 mm camera port was marked with a marking pen, 7 cm cranial tot he umbilicus.  This was incised with bovie electrocautery. The 8 mm camera port was then introduced. The camera was passed through the port and the abdomen was inspected and found to be free of bowel or vascular injury. Using direct vision the other 3 robotic ports were placed, just below the right costal margin and lower on the right abdomen, ensuring at least 8 cm between ports to allow robotic mobility. A 12 mm assistant port was placed just lateral to the umbilicus. Each trocar was introduced under direct vision ensuring no injury to the  underlying abdominal contents. A 5 mm port was placed and the liver was retracted with locking graspers.    Dissection began along the white line of Toldt, reflecting the colon medially away from the kidney. The hepatic reflection was released. The psoas muscle was identified. Care was taken to avoid injury to the duodenum. Once the colon was reflected, dissection was carried out just below the kidney, and the ureter was identified. The ureter was elevated and we continued our dissection toward the lower pole of the kidney proximally until we identified the renal hilum. The ureter remained away from our dissection throughout the case.     Careful dissection of the hilum was performed. There was 1 renal vein and 1 renal artery identified.  All vessels were isolated circumferentially. Hemostasis was excellent.     The fat overlying the kidney was opened and freed from the underlying capsule along the kidney's length.  The kidney was freed from its surrounding attachments to allow adequate mobility to visualize the entire lower pole.  This revealed the lower pole tumor.  The US probe was used to delineate the margins of the kidney, which were scored using hot scissors to ensure the entire tumor was identified.     A bulldog clamp was placed on the artery.  The tumor was then resected using cold scissors.  Margins were grossly negative, as confirmed with backbench visualization.  There was not entry into the collecting system.  The tumor was passed into the RUQ.      Electrocautery was used to control the edges of the tumor bed and shrink the defect. 4-0 vicryl was used to control bleeding. The capsule was then closed using 2-0 monocryl V-Lock over a Surgicel bolster. Remaining Surgicel was placed over the closed defect. The bulldog clamp was released for a total warm ischemia time of 17 minutes. Hemostasis of the renorrhaphy was excellent and the kidney was noted to have good color and turgor. The ureter was again  noted to be well away from the resection site and clamped renal artery.     The tumor was placed into an EndoCatch bag via the 12mm assistant port.  A 10 mm Jayy drain was placed into the peritoneal cavity around the resection bed via the RLQ robotic port.  This was placed under direct vision.      The robot was undocked and all trocars were removed.  The 12 mm assistant port incision was extended from skin down to fascia to allow removal of the specimen.  This was inspected and found to be in tact.  This was passed off the field for pathologic analysis.      The extraction site fascia was closed using 0 vicryl in a simple interrupted fashion.  All skin incisions were closed using 4-0 monocryl.  Marcaine was injected for local anesthesia.  Mastisol, steri strips, a telfa and tegaderms  were applied to the incisions.     The patient tolerated the procedure well and was transferred to the recovery room in stable condition.    Disposition:  The patient will be admitted to the Urology service for post-operative monitoring, pain control, and observation due to risk of bleeding post-operatively.    Sonya Giron MD    As the attending surgeon, Dr. Bradshaw was present for the entire procedure and performed all key aspects of the operation.

## 2018-09-13 NOTE — ANESTHESIA PROCEDURE NOTES
Arterial    Diagnosis: renal mass    Patient location during procedure: done in OR  Procedure start time: 9/13/2018 8:16 AM  Timeout: 9/13/2018 8:16 AM  Procedure end time: 9/13/2018 8:16 AM  Staffing  Anesthesiologist: Yonis Huggins MD  Resident/CRNA: Ursula Gutierrez CRNA  Performed: resident/CRNA   Preanesthetic Checklist  Completed: patient identified, site marked, surgical consent, pre-op evaluation, timeout performed, IV checked, risks and benefits discussed, monitors and equipment checked and anesthesia consent givenArterial  Skin Prep: chlorhexidine gluconate  Local Infiltration: none  Orientation: left  Location: radial  Catheter Size: 20 G  Catheter placement by Anatomical landmarks. Heme positive aspiration all ports.Insertion Attempts: 1  Assessment  Dressing: secured with tape and tegaderm  Patient: Tolerated well

## 2018-09-13 NOTE — ANESTHESIA POSTPROCEDURE EVALUATION
"Anesthesia Post Evaluation    Patient: Dary Chaney    Procedure(s) Performed: Procedure(s) (LRB):  XI ROBOTIC NEPHRECTOMY, PARTIAL (Right)    Final Anesthesia Type: general  Patient location during evaluation: PACU  Patient participation: Yes- Able to Participate  Level of consciousness: awake and alert and oriented  Post-procedure vital signs: reviewed and stable  Pain management: adequate  Airway patency: patent  PONV status at discharge: No PONV  Anesthetic complications: no      Cardiovascular status: stable  Respiratory status: unassisted, spontaneous ventilation and room air  Hydration status: euvolemic  Follow-up not needed.        Visit Vitals  BP (!) 146/71   Pulse (!) 59   Temp 36.4 °C (97.6 °F) (Oral)   Resp 11   Ht 5' 2" (1.575 m)   Wt 77.1 kg (170 lb)   SpO2 (!) 94%   Breastfeeding? No   BMI 31.09 kg/m²       Pain/Marla Score: Pain Assessment Performed: Yes (9/13/2018  2:00 PM)  Presence of Pain: complains of pain/discomfort (9/13/2018  2:00 PM)  Pain Rating Prior to Med Admin: 9 (9/13/2018 12:20 PM)  Pain Rating Post Med Admin: 7 (9/13/2018  1:00 PM)  Marla Score: 9 (9/13/2018  2:00 PM)        "

## 2018-09-13 NOTE — INTERVAL H&P NOTE
The patient has been examined and the H&P has been reviewed:    I concur with the findings and no changes have occurred since H&P was written.    Anesthesia/Surgery risks, benefits and alternative options discussed and understood by patient/family.          Active Hospital Problems    Diagnosis  POA    Right renal mass [N28.89]  Yes      Resolved Hospital Problems   No resolved problems to display.

## 2018-09-13 NOTE — PLAN OF CARE
All DaVinci instruments inspected after case by Adria Cooks.  All Instruments appear intact.  Lives checked by Bri Lan

## 2018-09-14 VITALS
SYSTOLIC BLOOD PRESSURE: 157 MMHG | OXYGEN SATURATION: 92 % | BODY MASS INDEX: 31.28 KG/M2 | HEIGHT: 62 IN | RESPIRATION RATE: 18 BRPM | HEART RATE: 96 BPM | TEMPERATURE: 98 F | DIASTOLIC BLOOD PRESSURE: 72 MMHG | WEIGHT: 170 LBS

## 2018-09-14 LAB
ANION GAP SERPL CALC-SCNC: 7 MMOL/L
BASOPHILS # BLD AUTO: 0.04 K/UL
BASOPHILS NFR BLD: 0.5 %
BUN SERPL-MCNC: 10 MG/DL
CALCIUM SERPL-MCNC: 7.4 MG/DL
CHLORIDE SERPL-SCNC: 109 MMOL/L
CO2 SERPL-SCNC: 21 MMOL/L
CREAT SERPL-MCNC: 0.6 MG/DL
DIFFERENTIAL METHOD: ABNORMAL
EOSINOPHIL # BLD AUTO: 0 K/UL
EOSINOPHIL NFR BLD: 0.4 %
ERYTHROCYTE [DISTWIDTH] IN BLOOD BY AUTOMATED COUNT: 15.2 %
EST. GFR  (AFRICAN AMERICAN): >60 ML/MIN/1.73 M^2
EST. GFR  (NON AFRICAN AMERICAN): >60 ML/MIN/1.73 M^2
GLUCOSE SERPL-MCNC: 104 MG/DL
HCT VFR BLD AUTO: 36.9 %
HGB BLD-MCNC: 12.1 G/DL
IMM GRANULOCYTES # BLD AUTO: 0.02 K/UL
IMM GRANULOCYTES NFR BLD AUTO: 0.3 %
LYMPHOCYTES # BLD AUTO: 1.6 K/UL
LYMPHOCYTES NFR BLD: 19.8 %
MAGNESIUM SERPL-MCNC: 2.4 MG/DL
MCH RBC QN AUTO: 27.1 PG
MCHC RBC AUTO-ENTMCNC: 32.8 G/DL
MCV RBC AUTO: 83 FL
MONOCYTES # BLD AUTO: 0.7 K/UL
MONOCYTES NFR BLD: 9.3 %
NEUTROPHILS # BLD AUTO: 5.5 K/UL
NEUTROPHILS NFR BLD: 69.7 %
NRBC BLD-RTO: 0 /100 WBC
PHOSPHATE SERPL-MCNC: 2.8 MG/DL
PLATELET # BLD AUTO: 208 K/UL
PMV BLD AUTO: 11.9 FL
POCT GLUCOSE: 127 MG/DL (ref 70–110)
POCT GLUCOSE: 243 MG/DL (ref 70–110)
POTASSIUM SERPL-SCNC: 4.3 MMOL/L
RBC # BLD AUTO: 4.46 M/UL
SODIUM SERPL-SCNC: 137 MMOL/L
WBC # BLD AUTO: 7.88 K/UL

## 2018-09-14 PROCEDURE — 80048 BASIC METABOLIC PNL TOTAL CA: CPT

## 2018-09-14 PROCEDURE — 84100 ASSAY OF PHOSPHORUS: CPT

## 2018-09-14 PROCEDURE — 25000003 PHARM REV CODE 250: Performed by: UROLOGY

## 2018-09-14 PROCEDURE — 83735 ASSAY OF MAGNESIUM: CPT

## 2018-09-14 PROCEDURE — 63600175 PHARM REV CODE 636 W HCPCS: Performed by: UROLOGY

## 2018-09-14 PROCEDURE — 94761 N-INVAS EAR/PLS OXIMETRY MLT: CPT

## 2018-09-14 PROCEDURE — 85025 COMPLETE CBC W/AUTO DIFF WBC: CPT

## 2018-09-14 PROCEDURE — 36415 COLL VENOUS BLD VENIPUNCTURE: CPT

## 2018-09-14 RX ORDER — POLYETHYLENE GLYCOL 3350 17 G/17G
17 POWDER, FOR SOLUTION ORAL DAILY
Qty: 30 PACKET | Refills: 0 | Status: SHIPPED | OUTPATIENT
Start: 2018-09-14 | End: 2018-11-12

## 2018-09-14 RX ORDER — OXYCODONE AND ACETAMINOPHEN 5; 325 MG/1; MG/1
1 TABLET ORAL EVERY 4 HOURS PRN
Qty: 11 TABLET | Refills: 0 | Status: SHIPPED | OUTPATIENT
Start: 2018-09-14 | End: 2018-11-12

## 2018-09-14 RX ADMIN — ACETAMINOPHEN 1000 MG: 10 INJECTION, SOLUTION INTRAVENOUS at 05:09

## 2018-09-14 RX ADMIN — OXYCODONE HYDROCHLORIDE 10 MG: 5 TABLET ORAL at 10:09

## 2018-09-14 RX ADMIN — SODIUM CHLORIDE: 0.9 INJECTION, SOLUTION INTRAVENOUS at 03:09

## 2018-09-14 RX ADMIN — INSULIN ASPART 4 UNITS: 100 INJECTION, SOLUTION INTRAVENOUS; SUBCUTANEOUS at 11:09

## 2018-09-14 RX ADMIN — POLYETHYLENE GLYCOL 3350 17 G: 17 POWDER, FOR SOLUTION ORAL at 08:09

## 2018-09-14 RX ADMIN — OXYCODONE HYDROCHLORIDE 10 MG: 5 TABLET ORAL at 05:09

## 2018-09-14 NOTE — SUBJECTIVE & OBJECTIVE
Interval History:   No acute events overnight  Tolerated small amount of PO  Good UOP  Pain well controlled  Ambulating    Review of Systems  Objective:     Temp:  [97 °F (36.1 °C)-98.3 °F (36.8 °C)] 98.1 °F (36.7 °C)  Pulse:  [48-89] 85  Resp:  [10-21] 18  SpO2:  [91 %-100 %] 95 %  BP: (121-162)/(65-85) 143/65  Arterial Line BP: (112-163)/() 146/75     Body mass index is 31.09 kg/m².            Drains     Drain                 Urethral Catheter 09/13/18 0720 Non-latex;Straight-tip 16 Fr. less than 1 day                Physical Exam   Nursing note and vitals reviewed.  Constitutional: She is oriented to person, place, and time. She appears well-developed and well-nourished. No distress.   HENT:   Head: Normocephalic and atraumatic.   Eyes: Pupils are equal, round, and reactive to light. Right eye exhibits no discharge. Left eye exhibits no discharge.   Neck: Normal range of motion.   Cardiovascular: Normal rate and intact distal pulses.    Pulmonary/Chest: Effort normal. No respiratory distress.   Abdominal: Soft. She exhibits no distension. There is tenderness. There is no rebound and no guarding.   Incisions CDI   Genitourinary:   Genitourinary Comments: Harvey with clear urine removed on rounds   Musculoskeletal: Normal range of motion.   Neurological: She is alert and oriented to person, place, and time. No cranial nerve deficit.   Skin: Skin is warm and dry. She is not diaphoretic. No erythema.     Psychiatric: She has a normal mood and affect. Her behavior is normal. Judgment and thought content normal.       Significant Labs:    BMP:  Recent Labs   Lab  09/12/18   1015  09/14/18   0515   NA  141  137   K  4.4  4.3   CL  105  109   CO2  27  21*   BUN  18  10   CREATININE  0.7  0.6   CALCIUM  9.5  7.4*       CBC:   Recent Labs   Lab  09/14/18   0515   WBC  7.88   HGB  12.1   HCT  36.9*       All pertinent labs results from the past 24 hours have been reviewed.    Significant Imaging:  All pertinent imaging  results/findings from the past 24 hours have been reviewed.

## 2018-09-14 NOTE — PLAN OF CARE
09/14/18 1038   Discharge Assessment   Assessment Type Discharge Planning Assessment   Confirmed/corrected address and phone number on facesheet? Yes   Assessment information obtained from? Patient;Medical Record   Expected Length of Stay (days) 2   Communicated expected length of stay with patient/caregiver yes   Prior to hospitilization cognitive status: Alert/Oriented   Prior to hospitalization functional status: Independent   Current cognitive status: Alert/Oriented   Current Functional Status: Independent   Lives With spouse   Able to Return to Prior Arrangements yes   Is patient able to care for self after discharge? Yes   Patient's perception of discharge disposition home or selfcare   Readmission Within The Last 30 Days no previous admission in last 30 days   Patient currently being followed by outpatient case management? No   Patient currently receives any other outside agency services? No   Equipment Currently Used at Home cane, straight   Do you have any problems affording any of your prescribed medications? No   Is the patient taking medications as prescribed? yes   Does the patient have transportation home? Yes   Transportation Available family or friend will provide   Does the patient receive services at the [facility]? No   Discharge Plan A Home with family   Patient/Family In Agreement With Plan yes   Admitted with Renal Mass post Nephrectomy. Lives with spouse and is independent in her ADLs. Plan is to DC home. No DC needs identified.

## 2018-09-14 NOTE — NURSING
A,oox, pt in bed resting, no complaints of N/V after eating breakfast. Pain regulated with PRN meds. In NAD. Ambulated in halls. Tolerated well. Regular diet tolerated. Will continue to monitor.

## 2018-09-14 NOTE — PLAN OF CARE
09/14/18 1133   Final Note   Assessment Type Final Discharge Note   Discharge Disposition Home   Hospital Follow Up  Appt(s) scheduled? Yes

## 2018-09-14 NOTE — PROGRESS NOTES
[facility]  Urology  Progress Note    Patient Name: Dary Chaney  MRN: [id number]  Admission Date: 9/13/2018  Hospital Length of Stay: 1 days  Code Status: Full Code   Attending Provider: Tommy Bradshaw MD   Primary Care Physician: Sakina Cooper DO    Subjective:     HPI:  No notes on file    Interval History:   No acute events overnight  Tolerated small amount of PO  Good UOP  Pain well controlled  Ambulating    Review of Systems  Objective:     Temp:  [97 °F (36.1 °C)-98.3 °F (36.8 °C)] 98.1 °F (36.7 °C)  Pulse:  [48-89] 85  Resp:  [10-21] 18  SpO2:  [91 %-100 %] 95 %  BP: (121-162)/(65-85) 143/65  Arterial Line BP: (112-163)/() 146/75     Body mass index is 31.09 kg/m².            Drains     Drain                 Urethral Catheter 09/13/18 0720 Non-latex;Straight-tip 16 Fr. less than 1 day                Physical Exam   Nursing note and vitals reviewed.  Constitutional: She is oriented to person, place, and time. She appears well-developed and well-nourished. No distress.   HENT:   Head: Normocephalic and atraumatic.   Eyes: Pupils are equal, round, and reactive to light. Right eye exhibits no discharge. Left eye exhibits no discharge.   Neck: Normal range of motion.   Cardiovascular: Normal rate and intact distal pulses.    Pulmonary/Chest: Effort normal. No respiratory distress.   Abdominal: Soft. She exhibits no distension. There is tenderness. There is no rebound and no guarding.   Incisions CDI   Genitourinary:   Genitourinary Comments: Harvey with clear urine removed on rounds   Musculoskeletal: Normal range of motion.   Neurological: She is alert and oriented to person, place, and time. No cranial nerve deficit.   Skin: Skin is warm and dry. She is not diaphoretic. No erythema.     Psychiatric: She has a normal mood and affect. Her behavior is normal. Judgment and thought content normal.       Significant Labs:    BMP:  Recent Labs   Lab  09/12/18   1015  09/14/18   0515   NA   141  137   K  4.4  4.3   CL  105  109   CO2  27  21*   BUN  18  10   CREATININE  0.7  0.6   CALCIUM  9.5  7.4*       CBC:   Recent Labs   Lab  09/14/18   0515   WBC  7.88   HGB  12.1   HCT  36.9*       All pertinent labs results from the past 24 hours have been reviewed.    Significant Imaging:  All pertinent imaging results/findings from the past 24 hours have been reviewed.                  Assessment/Plan:     * Right renal mass    61 yo F 1 Day Post-Op Right partial nephrectomy    -IVF Hep lock  -PO pain meds  -Regular diet  -Harvey removed, voiding trial today  -DVT ppx: SCDs, TEDs, subQ heparin q8  -GI ppx: protonix  -Likely D/C this morning after voiding and tolerating breakfast              VTE Risk Mitigation (From admission, onward)        Ordered     IP VTE HIGH RISK PATIENT  Once      09/13/18 1046     Place sequential compression device  Until discontinued      09/13/18 1046     Place JEFFRY hose  Until discontinued      09/13/18 1046     Place sequential compression device  Until discontinued      09/13/18 0526     Place JEFFRY hose  Until discontinued      09/13/18 0526          Shant Douglas MD  Urology  [facility]

## 2018-09-14 NOTE — ASSESSMENT & PLAN NOTE
61 yo F 1 Day Post-Op Right partial nephrectomy    -IVF Hep lock  -PO pain meds  -Regular diet  -Harvey removed, voiding trial today  -DVT ppx: SCDs, TEDs, subQ heparin q8  -GI ppx: protonix  -Likely D/C this morning after voiding and tolerating breakfast

## 2018-09-15 NOTE — DISCHARGE SUMMARY
[facility]  Urology  Discharge Summary      Patient Name: Dary Chaney  MRN: [id number]  Admission Date: 9/13/2018  Hospital Length of Stay: 1 days  Discharge Date and Time: 9/14/2018 11:29 AM  Attending Physician: Tommy Bradshaw  Discharging Provider: Shant Douglas MD  Primary Care Physician: Sakina Cooper DO    HPI:   Dary Chaney is a 62 y.o. female with pmh HTN, HLD, DM2, who presents with a cT1a 2cm right renal mass.     She is a Jehova's witness and is not ok with receiving blood or blood products. She is ok with using a cell saver during surgery       This was found incidentally on abdominal ultrasound during a workup for nonspecific abdominal pain.      She denies gross hematuria, night sweats, unintended weight loss or other constitutional symptoms.  No FH of kidney cancer.     She is a never smoker. She has no significant exposure history.   He baseline eGFR is >60, Cr is 0.8     RENAL nephrometry score:  R=1 E=2 N=1 A=x L= 1  5x     She has had a previous [place] saúl and a partial hysterectomy      Procedure(s) (LRB):  XI ROBOTIC NEPHRECTOMY, PARTIAL (Right)     Indwelling Lines/Drains at time of discharge:   Lines/Drains/Airways     Drain                 Urethral Catheter 09/13/18 0720 Non-latex;Straight-tip 16 Fr. 1 day                Hospital Course     Patient underwent the above described procedures (robotic assisted laparoscopic right partial nephrectomy) and tolerated the surgery well. She ambulated on POD 0 and tolerated a regular diet. Her santos catheter was removed on POD1 and she passed a voiding trial. She was stable for discharge on POD 1 ambulating in the halls, tolerating regular diet, with good urine output and pain well controlled on oral pain medications. She was discharged home with oral pain medications, and stool softeners. She will follow up with Dr. Bradshaw in 2 weeks to review pathology.      Consults: none    Significant Diagnostic Studies: none    Pending  Diagnostic Studies:     None          Final Active Diagnoses:    Diagnosis Date Noted POA    PRINCIPAL PROBLEM:  Right renal mass [N28.89] 09/12/2018 Yes      Problems Resolved During this Admission:       Discharged Condition: good    Disposition: Home or Self Care    Follow Up:  Follow-up Information     Tommy Bradshaw MD In 2 weeks.    Specialty:  Urology  Contact information:  [address]  [facility] [address]  [phone]                 Patient Instructions:      Lifting restrictions     Notify your health care provider if you experience any of the following:  temperature >100.4     Notify your health care provider if you experience any of the following:  persistent nausea and vomiting or diarrhea     Notify your health care provider if you experience any of the following:  severe uncontrolled pain     Notify your health care provider if you experience any of the following:  redness, tenderness, or signs of infection (pain, swelling, redness, odor or green/yellow discharge around incision site)     Notify your health care provider if you experience any of the following:  difficulty breathing or increased cough     Notify your health care provider if you experience any of the following:  severe persistent headache     Notify your health care provider if you experience any of the following:  worsening rash     Notify your health care provider if you experience any of the following:  persistent dizziness, light-headedness, or visual disturbances     Notify your health care provider if you experience any of the following:  increased confusion or weakness     Activity as tolerated     Weight bearing restrictions (specify):     Medications:  Reconciled Home Medications:      Medication List      START taking these medications    oxyCODONE-acetaminophen 5-325 mg per tablet  Commonly known as:  PERCOCET  Take 1 tablet by mouth every 4 (four) hours as needed.     polyethylene glycol 17 gram Pwpk  Commonly  "known as:  GLYCOLAX  Take 17 g by mouth once daily.        CHANGE how you take these medications    dulaglutide 1.5 mg/0.5 mL Pnij  Commonly known as:  TRULICITY  Inject 1.5 mg into the skin every 7 days.  What changed:  additional instructions     fluticasone 50 mcg/actuation nasal spray  Commonly known as:  FLONASE  USE TWO SPRAY(S) IN EACH NOSTRIL ONCE DAILY  What changed:  See the new instructions.     insulin degludec 200 unit/mL (3 mL) Inpn  Commonly known as:  TRESIBA FLEXTOUCH U-200  Inject 76 Units into the skin once daily.  What changed:  additional instructions     loratadine 10 mg tablet  Commonly known as:  CLARITIN  Take 1 tablet (10 mg total) by mouth once daily.  What changed:    · when to take this  · reasons to take this     losartan 25 MG tablet  Commonly known as:  COZAAR  Take 1 tablet (25 mg total) by mouth once daily.  What changed:  when to take this        CONTINUE taking these medications    albuterol 2.5 mg /3 mL (0.083 %) nebulizer solution  Commonly known as:  PROVENTIL  Take 3 mLs (2.5 mg total) by nebulization every 6 (six) hours as needed for Wheezing.     blood sugar diagnostic Strp  Contour Next. Glucose testing three times daily.     blood-glucose meter kit  Contour Next. Use as instructed     glimepiride 4 MG tablet  Commonly known as:  AMARYL  Take 1.5 tablets (6 mg total) by mouth daily with breakfast.     lancets 33 gauge Misc  1 lancet by Misc.(Non-Drug; Combo Route) route 3 (three) times daily.     lidocaine 5 %  Commonly known as:  LIDODERM  Place 1 patch onto the skin daily as needed. Remove & Discard patch within 12 hours or as directed by MD     * omeprazole 40 MG capsule  Commonly known as:  PRILOSEC  Take 1 capsule (40 mg total) by mouth once daily.     * omeprazole 40 MG capsule  Commonly known as:  PRILOSEC  Take 1 capsule (40 mg total) by mouth once daily.     PEN NEEDLE 31 gauge x 5/16" Ndle  Generic drug:  pen needle, diabetic  AS DIRECTED     simvastatin 40 MG " tablet  Commonly known as:  ZOCOR  Take 1 tablet (40 mg total) by mouth nightly.         * This list has 2 medication(s) that are the same as other medications prescribed for you. Read the directions carefully, and ask your doctor or other care provider to review them with you.                Time spent on the discharge of patient: 25 minutes    Shant Douglas MD  Urology  [facility]

## 2018-09-17 LAB
GLUCOSE SERPL-MCNC: 102 MG/DL (ref 70–110)
HCO3 UR-SCNC: 24 MMOL/L (ref 24–28)
HCT VFR BLD CALC: 35 %PCV (ref 36–54)
PCO2 BLDA: 37.5 MMHG (ref 35–45)
PH SMN: 7.41 [PH] (ref 7.35–7.45)
PO2 BLDA: 143 MMHG (ref 80–100)
POC BE: -1 MMOL/L
POC IONIZED CALCIUM: 1.06 MMOL/L (ref 1.06–1.42)
POC SATURATED O2: 99 % (ref 95–100)
POC TCO2: 25 MMOL/L (ref 23–27)
POTASSIUM BLD-SCNC: 3.6 MMOL/L (ref 3.5–5.1)
SAMPLE: ABNORMAL
SODIUM BLD-SCNC: 141 MMOL/L (ref 136–145)

## 2018-09-25 NOTE — PROGRESS NOTES
Subjective:       Patient ID: Dary Chaney is a 62 y.o. female.    Chief Complaint: No chief complaint on file.      HPI: Dary Chaney is a 62 y.o. White female who returns today in follow-up for bilateral renal masses s/p right robotic partial nephrectomy on 9/13/18.    The masses were found incidentally during evaluation for non-specific abdominal pain and bloating. The masses were seen on an abdominal ultrasound. A CT scan with and without contrast was performed which showed the right renal mass to be enhancing and the left renal mass to be likely a cyst.    The patient denied gross hematuria and/or constitutional symptoms attributable to her recently discovered renal mass. The patient denied a personal and family history of kidney cancer. The patient denied any personal history of other cancers. She did describe some lower abdominal pressure and pain. She has no voiding complaints at present. The patient has never smoked. Her eGFR is greater than 60 ml/min.    The patient underwent surgery and did very well. 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 Her final pathologic stage is [id number].    She returns today for her routine post-operative visit.    She is doing well with no complaints.    Review of patient's allergies indicates:   Allergen Reactions    Citrus and derivatives Swelling, Other (See Comments) and Edema     Redness      Latex Other (See Comments)     Other reaction(s): Rash  Other reaction(s): welts  Other reaction(s): Itching    Valsartan Other (See Comments)     Other reaction(s): disoriented    Aspirin Nausea Only     Other reaction(s): Nausea Only    Metformin Anxiety     Other reaction(s): anxiety       Current Outpatient Medications   Medication Sig Dispense Refill    albuterol (PROVENTIL) 2.5 mg /3 mL (0.083 %) nebulizer solution Take 3 mLs (2.5 mg total) by nebulization every 6 (six) hours as needed for Wheezing. 120 mL 0    blood sugar  "diagnostic Strp Contour Next. Glucose testing three times daily. 300 strip 3    blood-glucose meter kit Contour Next. Use as instructed 1 each 0    dulaglutide (TRULICITY) 1.5 mg/0.5 mL PnIj Inject 1.5 mg into the skin every 7 days. (Patient taking differently: Inject 1.5 mg into the skin every 7 days. Takes on Monday) 12 Syringe 1    fluticasone (FLONASE) 50 mcg/actuation nasal spray USE TWO SPRAY(S) IN EACH NOSTRIL ONCE DAILY (Patient taking differently: USE TWO SPRAY(S) IN EACH NOSTRIL ONCE DAILY  prn) 16 g 1    glimepiride (AMARYL) 4 MG tablet Take 1.5 tablets (6 mg total) by mouth daily with breakfast. 180 tablet 1    insulin degludec (TRESIBA FLEXTOUCH U-200) 200 unit/mL (3 mL) InPn Inject 76 Units into the skin once daily. (Patient taking differently: Inject 76 Units into the skin once daily. Pt. reports taking 72 units in the AM) 9 Syringe 1    lancets 33 gauge Misc 1 lancet by Misc.(Non-Drug; Combo Route) route 3 (three) times daily. 300 each 3    lidocaine (LIDODERM) 5 % Place 1 patch onto the skin daily as needed. Remove & Discard patch within 12 hours or as directed by MD 30 patch 3    loratadine (CLARITIN) 10 mg tablet Take 1 tablet (10 mg total) by mouth once daily. (Patient taking differently: Take 10 mg by mouth daily as needed. ) 30 tablet 0    losartan (COZAAR) 25 MG tablet Take 1 tablet (25 mg total) by mouth once daily. (Patient taking differently: Take 25 mg by mouth every morning. ) 90 tablet 1    omeprazole (PRILOSEC) 40 MG capsule Take 1 capsule (40 mg total) by mouth once daily. 30 capsule 1    omeprazole (PRILOSEC) 40 MG capsule Take 1 capsule (40 mg total) by mouth once daily. 30 capsule 1    oxyCODONE-acetaminophen (PERCOCET) 5-325 mg per tablet Take 1 tablet by mouth every 4 (four) hours as needed. 11 tablet 0    PEN NEEDLE 31 X 5/16 " Ndle AS DIRECTED 100 each 10    polyethylene glycol (GLYCOLAX) 17 gram PwPk Take 17 g by mouth once daily. 30 packet 0    simvastatin " (ZOCOR) 40 MG tablet Take 1 tablet (40 mg total) by mouth nightly. 90 tablet 1     No current facility-administered medications for this visit.        Past Medical History:   Diagnosis Date    Arthritis     Cataract     Colon polyp     Diabetes mellitus type II     Hyperlipidemia     Hypertension     UTI (urinary tract infection)        Past Surgical History:   Procedure Laterality Date    CHOLECYSTECTOMY      CHOLECYSTECTOMY      COLONOSCOPY  2012    COLONOSCOPY N/A 3/1/2018    Procedure: COLONOSCOPY;  Surgeon: Phillip Brower MD;  Location: [facility];  Service: Endoscopy;  Laterality: N/A;    COLONOSCOPY N/A 3/1/2018    Performed by Phillip Brower MD at [facility]    ESOPHAGOGASTRODUODENOSCOPY N/A 8/21/2018    Procedure: ESOPHAGOGASTRODUODENOSCOPY (EGD);  Surgeon: Maco Wynn MD;  Location: [facility];  Service: Endoscopy;  Laterality: N/A;    ESOPHAGOGASTRODUODENOSCOPY (EGD) N/A 8/21/2018    Performed by Maco Wynn MD at [facility]    HYSTERECTOMY  1993    uterine prolapse    KNEE SURGERY  03/2017    ROBOT-ASSISTED LAPAROSCOPIC PARTIAL NEPHRECTOMY USING DA DENZEL XI Right 9/13/2018    Procedure: XI ROBOTIC NEPHRECTOMY, PARTIAL;  Surgeon: Tommy Bradshaw MD;  Location: [facility] OR [address] [place] [address];  Service: Urology;  Laterality: Right;  [place] u/s confirmed 9/13 0700 lb  conformation#[id number]    SPINE SURGERY      thumb surgery Rt- July 2018 - cyst       XI ROBOTIC NEPHRECTOMY, PARTIAL Right 9/13/2018    Performed by Tommy Bradshaw MD at [facility] OR [address] [place] [address]       Family History   Problem Relation Age of Onset    Diabetes Brother     Heart disease Neg Hx        Review of Systems    Review of Systems   Constitutional: Negative for fever, chills, activity change, appetite change and unexpected weight change.   HENT: Negative for congestion, nosebleeds, sneezing, sore throat and trouble swallowing.    Eyes: Negative for pain, discharge, redness and visual disturbance.   Respiratory:  Negative for cough, choking, chest tightness and shortness of breath.    Cardiovascular: Negative for chest pain, palpitations and leg swelling.   Gastrointestinal: Negative for nausea, vomiting, abdominal pain, diarrhea, blood in stool, abdominal distention and anal bleeding.  Genitourinary: As documented per HPI   Endocrine: Negative for cold intolerance, heat intolerance, polydipsia, polyphagia and polyuria.   Musculoskeletal: Negative for myalgias, gait problem, neck pain and neck stiffness.   Skin: Negative for color change, pallor, rash and wound.   Allergic/Immunologic: Negative for immunocompromised state.   Neurological: Negative for seizures, facial asymmetry, speech difficulty, weakness and light-headedness.   Hematological: Negative for adenopathy. Does not bruise/bleed easily.   Psychiatric/Behavioral: Negative for hallucinations, behavioral problems, self-injury and agitation. The patient is not hyperactive.      All other systems were reviewed and were negative.    Objective:     Vitals:    09/26/18 1003   BP: (!) 141/81   Pulse: 79     Physical Exam   Vitals reviewed.  Constitutional: She is oriented to person, place, and time. She appears well-developed and well-nourished. No distress.   HENT:   Head: Normocephalic and atraumatic.   Right Ear: External ear normal.   Left Ear: External ear normal.   Nose: Nose normal.   Eyes: EOM are normal. Pupils are equal, round, and reactive to light. Right eye exhibits no discharge. Left eye exhibits no discharge.   Neck: Normal range of motion. Neck supple. No tracheal deviation present. No thyroid enlargement or tenderness.  Cardiovascular: Normal heart sounds and intact distal pulses. No signs of peripheral edema.   Pulmonary/Chest: Effort normal and breath sounds normal. No respiratory distress. She has no wheezes.   Abdominal: Soft. Bowel sounds are normal. She exhibits no distension. There is no tenderness. There is no rebound. No hepatic or splenic  enlargement or tenderness. Well-healing robotic port sites.  Musculoskeletal: Normal range of motion. She exhibits no edema.   Neurological: She is alert and oriented to person, place, and time. Coordination normal.   Skin: Skin is warm and dry. She is not diaphoretic.   Lymphatic: No palpable lymph nodes.  Psychiatric: She has a normal mood and affect. Her behavior is normal. Judgment and thought content normal.     Lab Results   Component Value Date    CREATININE 0.6 09/14/2018     Lab Results   Component Value Date    EGFRNONAA >60.0 09/14/2018     Lab Results   Component Value Date    ESTGFRAFRICA >60.0 09/14/2018     I have personally reviewed all the patient's imaging studies.    Abdominal ultrasound (1/16/18): There is a rounded echogenic cortical based lesion at the mid to lower pole of the right kidney measuring up to 1.4 cm without definite correlate seen on prior exams.  Exophytic heterogeneous lesion noted at the superior pole of the left kidney measuring up to 18 mm with possible internal calcification which was not definitely seen on prior ultrasound.  Simple appearing left renal cyst do not appear significantly changed measuring up to 2.8 cm at the interpolar region and 1.3 cm at the upper pole.    CT abdomen/pelvis without and with contrast (7/23/18): Approximately 2.0 cm enhancing right renal mass consistent with a renal cell carcinoma. Two left-sided lesions consistent with a hyperdense cyst and a Bosniak IIF lesion.    Assessment:       1. Malignant neoplasm of right kidney        Plan:     Diagnoses and all orders for this visit:    Malignant neoplasm of right kidney  -     US Kidney; Future    The patient is doing very well since her procedure.    Her incisions are healing well.    We reviewed her pathology which is very favorable.    I will see her back in 6 months with a renal ultrasound.    I answered all her questions.    I encouraged her and/or any of her family members to call and/or email  me with questions/concerns.    I spent 20 minutes with the patient of which more than half was spent in coordinating the patient's care as well as in direct consultation with the patient in regards to our treatment and plan.

## 2018-09-26 ENCOUNTER — OFFICE VISIT (OUTPATIENT)
Dept: UROLOGY | Facility: CLINIC | Age: 63
End: 2018-09-26
Payer: COMMERCIAL

## 2018-09-26 VITALS
HEIGHT: 62 IN | WEIGHT: 170 LBS | DIASTOLIC BLOOD PRESSURE: 81 MMHG | SYSTOLIC BLOOD PRESSURE: 141 MMHG | HEART RATE: 79 BPM | BODY MASS INDEX: 31.28 KG/M2

## 2018-09-26 DIAGNOSIS — C64.1 MALIGNANT NEOPLASM OF RIGHT KIDNEY: Primary | ICD-10-CM

## 2018-09-26 PROCEDURE — 99024 POSTOP FOLLOW-UP VISIT: CPT | Mod: S$GLB,,, | Performed by: UROLOGY

## 2018-09-26 PROCEDURE — 99999 PR PBB SHADOW E&M-EST. PATIENT-LVL III: CPT | Mod: PBBFAC,,, | Performed by: UROLOGY

## 2018-09-26 NOTE — PATIENT INSTRUCTIONS
What Is Kidney (Renal) Cancer?    Cancer occurs when cells in the body mutate or change and start multiplying out of control. These cells can form lumps of tissue called tumors. Cancer that starts in the cells that make up the kidney is called kidney or renal cancer.  Understanding the kidneys  The kidneys are bean-shaped organs about the size of a bar of soap. They are found in the low back area, one on each side of the spine. The kidneys help keep the body alive by filtering waste and excess fluid from the blood. The kidneys send this liquid and waste (urine) to the bladder through the ureters. Urine then leaves the body through the urethra.  When kidney cancer forms  Kidney cancer forms when cells in the kidney change and multiply abnormally. The cancer can interfere with the working of the kidneys. Kidney cancer may spread beyond the kidneys to other parts of the body. This spread is called metastasis. The more cancer spreads, the harder it is to treat.  Treatment choices for kidney cancer  You and your healthcare provider will discuss a treatment plan that's best for your needs. Treatment choices may include the following.  Surgery  Surgery is the most common treatment for kidney cancer. Your healthcare provider may advise surgery to treat your kidney cancer if any of these apply to you:  · You are healthy enough to have surgery. Your healthcare provider will only advise surgery if he or she expects you to be able to recover from it.  · The tumor is small. In this case, your healthcare provider may do a partial nephrectomy. This surgery allows you to keep some kidney function. Only the part of the kidney that contains the tumor is taken out. In some cases, your provider may advise this surgery if the tumor is larger but you have cancer in both kidneys or if you have only one kidney. The benefit is that you keep part of the kidney. The risk is that there is a chance some cancer cells will be left  behind.  · The tumor is larger, but is only in your kidney. Your healthcare provider will advise the type of surgery you need based on the size of the tumor and where it is. Your provider may advise a simple nephrectomy. This is surgery to take out the entire kidney. Or your provider may advise a radical nephrectomy. This is surgery to take out the whole kidney and the adrenal gland. The adrenal gland is attached to the top of the kidney. Much of the nearby fatty tissue is also taken out. Nearby lymph nodes will also likely be removed. That's because cancer may travel to the nodes first. Taking out the lymph nodes may help prevent the spread of cancer to other parts of your body. And looking at these lymph nodes helps your provider figure out the stage of the cancer. This is important in deciding whether you need other treatments after surgery.  · You have kidney cancer that has spread to other areas and your doctors believe that removing the original kidney tumor will be a useful and effective addition to treatment. Your healthcare provider may suggest a radical nephrectomy and removal of nearby lymph nodes. The tumor or tumors in other parts of the body may also be removed or it may be recommended that you get anti-cancer drug therapy. Even in cases where surgery wont cure the cancer, surgery can sometimes help ease symptoms such as pressure, pain, or bleeding.  · You have symptoms. You may have pain, pressure, or bleeding from tumors that have spread. Your healthcare provider may suggest surgery to remove the tumors. This is done to ease symptoms. Because it doesn't cure the cancer, it is called palliative therapy.  Biologic therapy (immunotherapy)  This treatment strengthens your immune system to help fight cancer. It uses medicines that work like the chemicals that your bodys immune system makes. They help your immune system fight the cancer.  Chemotherapy  Chemotherapy uses medicines to kill cancer cells.  Regular chemotherapy medicines don't usually work well against kidney cancer. Chemotherapy has mostly been replaced by biologic therapy in kidney cancer treatment. But chemotherapy medicines are still sometimes used in rare cases where biologic therapy has not worked.  Radiation therapy  Radiation therapy uses directed rays of energy to kill cancer cells. Radiation therapy doesn't work as well as other methods for treating kidney cancer. Still, it may be used to treat someone who's not healthy enough to have surgery, or a person with kidney cancer that's not responding to other treatments. It may also be used to treat kidney cancer that has spread to the brain or bones. It may be used to treat pain caused by cancer that has spread to the bone (bone metastasis). It can also help stabilize a bone that has been weakened by metastasis and may be at risk for breaking.  Targeted therapies  These therapies use medicines to focus on or prevent changes in the cancer cells. The changes may be either in the cell's molecules or genes. These medicines help keep the cells from growing out of control and spreading to other parts of the body.  Date Last Reviewed: 2/1/2017 © 2000-2017 [employer]. [address] [place] [address], [place] [address]. All rights reserved. This information is not intended as a substitute for professional medical care. Always follow your healthcare professional's instructions.

## 2018-10-15 ENCOUNTER — OFFICE VISIT (OUTPATIENT)
Dept: OTOLARYNGOLOGY | Facility: CLINIC | Age: 63
End: 2018-10-15
Payer: COMMERCIAL

## 2018-10-15 VITALS
DIASTOLIC BLOOD PRESSURE: 84 MMHG | HEIGHT: 62 IN | WEIGHT: 170.19 LBS | SYSTOLIC BLOOD PRESSURE: 146 MMHG | BODY MASS INDEX: 31.32 KG/M2 | TEMPERATURE: 98 F | HEART RATE: 81 BPM

## 2018-10-15 DIAGNOSIS — J30.89 NON-SEASONAL ALLERGIC RHINITIS, UNSPECIFIED TRIGGER: Primary | ICD-10-CM

## 2018-10-15 DIAGNOSIS — J32.9 CHRONIC SINUSITIS, UNSPECIFIED LOCATION: ICD-10-CM

## 2018-10-15 PROCEDURE — 99214 OFFICE O/P EST MOD 30 MIN: CPT | Mod: S$GLB,,, | Performed by: PHYSICIAN ASSISTANT

## 2018-10-15 PROCEDURE — 99999 PR PBB SHADOW E&M-EST. PATIENT-LVL IV: CPT | Mod: PBBFAC,,, | Performed by: PHYSICIAN ASSISTANT

## 2018-10-15 PROCEDURE — 3077F SYST BP >= 140 MM HG: CPT | Mod: CPTII,S$GLB,, | Performed by: PHYSICIAN ASSISTANT

## 2018-10-15 PROCEDURE — 3008F BODY MASS INDEX DOCD: CPT | Mod: CPTII,S$GLB,, | Performed by: PHYSICIAN ASSISTANT

## 2018-10-15 PROCEDURE — 3079F DIAST BP 80-89 MM HG: CPT | Mod: CPTII,S$GLB,, | Performed by: PHYSICIAN ASSISTANT

## 2018-10-15 RX ORDER — LEVOCETIRIZINE DIHYDROCHLORIDE 5 MG/1
5 TABLET, FILM COATED ORAL NIGHTLY
Qty: 30 TABLET | Refills: 11 | Status: SHIPPED | OUTPATIENT
Start: 2018-10-15 | End: 2018-10-29

## 2018-10-15 RX ORDER — METHYLPREDNISOLONE 4 MG/1
TABLET ORAL
Qty: 1 PACKAGE | Refills: 0 | Status: SHIPPED | OUTPATIENT
Start: 2018-10-15 | End: 2018-10-29 | Stop reason: ALTCHOICE

## 2018-10-15 RX ORDER — AMOXICILLIN AND CLAVULANATE POTASSIUM 875; 125 MG/1; MG/1
1 TABLET, FILM COATED ORAL EVERY 12 HOURS
Qty: 20 TABLET | Refills: 0 | Status: SHIPPED | OUTPATIENT
Start: 2018-10-15 | End: 2018-10-25

## 2018-10-15 NOTE — PROGRESS NOTES
Subjective:   Patient: Dary Chaney [id number], :1955   Visit date:10/15/2018 9:48 AM    Chief Complaint:  Ear Fullness and Sinus Problem    HPI:  Dary is a 62 y.o. female who is here for evaluation of facial pain/pressure, otalgia, and increased nasal congestion x 2 weeks. Patient c/o persistent and increased pressure over bilateral frontal and maxillary sinuses, greater on the left side, and with HA's. She c/o increased nasal congestion with persistent thick, clear mucus and a constant post-nasal drip. She also c/o otalgia worse in L ear, denies drainage from either ear. She denies fever or cough. She denies a hx of significant allergies. She is currently using Flonase with some relief in symptoms. She has used Claritin QPM w/o relief. No other relieving factors.     Review of Systems:  -     Allergic/Immunologic: is allergic to citrus and derivatives; latex; valsartan; aspirin; and metformin..  -     Constitutional: Current temp: 97.6 °F (36.4 °C) (Tympanic)    Her meds, allergies, medical, surgical, social & family histories were reviewed & updated:  -     She has a current medication list which includes the following prescription(s): albuterol, blood sugar diagnostic, blood-glucose meter, dulaglutide, fluticasone, glimepiride, insulin degludec, lancets, lidocaine, losartan, omeprazole, omeprazole, oxycodone-acetaminophen, pen needle, polyethylene glycol, simvastatin, amoxicillin-clavulanate 875-125mg, levocetirizine, loratadine, and methylprednisolone.  -     She  has a past medical history of Arthritis, Cataract, Colon polyp, Diabetes mellitus type II, Hyperlipidemia, Hypertension, and UTI (urinary tract infection).   -     She does not have any pertinent problems on file.   -     She  has a past surgical history that includes Cholecystectomy; Spine surgery; Hysterectomy (); Knee surgery (2017); Cholecystectomy; Colonoscopy (); thumb surgery Rt- 2018 - cyst ; XI ROBOTIC NEPHRECTOMY,  "PARTIAL (Right, 9/13/2018); ESOPHAGOGASTRODUODENOSCOPY (EGD) (N/A, 8/21/2018); and COLONOSCOPY (N/A, 3/1/2018).  -     She  reports that  has never smoked. she has never used smokeless tobacco. She reports that she drinks alcohol. She reports that she does not use drugs.  -     Her family history includes Diabetes in her brother.  -     She is allergic to citrus and derivatives; latex; valsartan; aspirin; and metformin.    Objective:     Physical Exam:  Vitals:  BP (!) 146/84   Pulse 81   Temp 97.6 °F (36.4 °C) (Tympanic)   Ht 5' 2" (1.575 m)   Wt 77.2 kg (170 lb 3.1 oz)   BMI 31.13 kg/m²   Appearance:  Well-developed, well-nourished.  Communication:  Able to communicate, no hoarseness.  Head & Face:  Normocephalic, atraumatic, no sinus tenderness, normal facial strength.  Eyes:  Extraocular motions intact.  Ears:  L TM bulging with clear fluid present. R TM WNL. Bilateral EAC's WNL.   Nose:  No masses/lesions of external nose, nasal mucosa, septum, and turbinates. Diffuse erythema and edema in bilateral inferior turbinates greater on left side.   Mouth:  No mass/lesion of lips, teeth, gums, hard/soft palate, tongue, tonsils, or oropharynx.  Neck & Lymphatics:  No cervical lymphadenopathy, no neck mass/crepitus/ asymmetry, trachea is midline, no thyroid enlargement/tenderness/mass.  Neuro/Psych: Alert with normal mood and affect.   Abdominal: Normal appearance.   Respiration/Chest:  Symmetric expansion during respiration, normal respiratory effort.  Skin:  Warm and intact  Cardiovascular:  No peripheral vascular edema or varicosities.    Assessment & Plan:   Dary was seen today for ear fullness and sinus problem.    Diagnoses and all orders for this visit:    Non-seasonal allergic rhinitis, unspecified trigger    Chronic sinusitis, unspecified location    Other orders  -     amoxicillin-clavulanate 875-125mg (AUGMENTIN) 875-125 mg per tablet; Take 1 tablet by mouth every 12 (twelve) hours. for 10 days  -     " methylPREDNISolone (MEDROL DOSEPACK) 4 mg tablet; use as directed  -     levocetirizine (XYZAL) 5 MG tablet; Take 1 tablet (5 mg total) by mouth every evening.      -SINUSITIS/RHINITIS  Dary presents today for initial evaluation.  They have multiple sinonasal complaints and determination of the underlying etiology is a problem of moderate to high complexity.  Patients may present with sinonasal symptoms such as nasal obstruction as a primary anatomic disorder.  Patients may also present with recurrent or chronic inflammatory sinonasal symptoms.  Generally, patients can be stratified into one of several groups.      The first group represents patients who have frequent or recurrent upper respiratory infections, most frequently viral.  These patients most frequently have normally functioning immune system's but have high levels of exposure.  This is commonly seen in nurses and schoolteachers as well as other groups who spend large amounts of time around sick patients and children.      Other patients may have isolated rhinitis without evidence of sinusitis.  The majority of these patients have ALLERGIC rhinitis however other groups may have more rare forms of rhinitis such as nonallergic rhinitis with eosinophilia syndrome.  As a screening evaluation, if the patient has had a normal endoscopy, from time to time a simple x-ray of the sinuses may be performed in combination with antigen specific ALLERGY testing.    The third group of patients present with significant evidence of chronic sinusitis.  Nasal endoscopy may reveal polyps or purulent drainage.  CT scan is an important aspect to determining the extent of disease.  Some patients with chronic sinusitis have an underlying etiology of ALLERGY leading to obstruction of the ostiomeatal units with furthering of the infection.  Others may have an acute infection that leads to stenosis of the sinonasal openings followed by a long, progressive course of infection.   Patients with unilateral disease who do not have inverting papilloma typically fall into this latter group.    CT scan of the sinuses has not been performed.      Antigen specific allergy testing  has not been performed.    Allergy treatment with topical steroids and/or antihistamines has been used with good compliance with symptoms unchanged.      By review of all data, history and exam, there does seem to be a clear distinction between allergic rhinitis versus rhinitis with a component of chronic sinusitis.   My impression is that Dary presents with chronic sinusitis.      My recommendation is:    Augmentin x 10days  Medrol Dosepak  Continue Flonase  Added Xyzal QHS  Recheck in 2 wks     Over 25 minutes were spent in face to face contact with the patient with greater than 50% spent discussing their diagnosis and coordination of care.     Milena Velasquez, PAC

## 2018-10-15 NOTE — LETTER
October 15, 2018      Lisset Piedra MD  [address] [place] [address]           [facility]  [address] [facility] [address] [place] [address]  Phone: [phone]  Fax: [phone]          Patient: Dary Chaney   MR Number: [id number]   YOB: 1955   Date of Visit: 10/15/2018       Dear Dr. Lisset Piedra:    Thank you for referring Dary Chaney to me for evaluation. Attached you will find relevant portions of my assessment and plan of care.    If you have questions, please do not hesitate to call me. I look forward to following Dary Chaney along with you.    Sincerely,    Milena Velasquez PA-C    Enclosure  CC:  No Recipients    If you would like to receive this communication electronically, please contact [email or web address] or [phone] to request more information on [facility] Link access.    For providers and/or their staff who would like to refer a patient to [facility], please contact us through our one-stop-shop provider referral line, [facility], at [phone].    If you feel you have received this communication in error or would no longer like to receive these types of communications, please e-mail [email or web address]

## 2018-10-22 DIAGNOSIS — E11.69 TYPE 2 DIABETES MELLITUS WITH OTHER SPECIFIED COMPLICATION, WITH LONG-TERM CURRENT USE OF INSULIN: ICD-10-CM

## 2018-10-22 DIAGNOSIS — Z79.4 TYPE 2 DIABETES MELLITUS WITH OTHER SPECIFIED COMPLICATION, WITH LONG-TERM CURRENT USE OF INSULIN: ICD-10-CM

## 2018-10-22 RX ORDER — INSULIN DEGLUDEC 200 U/ML
INJECTION, SOLUTION SUBCUTANEOUS
Qty: 4 SYRINGE | Refills: 1 | Status: SHIPPED | OUTPATIENT
Start: 2018-10-22 | End: 2018-11-29 | Stop reason: SDUPTHER

## 2018-10-29 ENCOUNTER — OFFICE VISIT (OUTPATIENT)
Dept: OTOLARYNGOLOGY | Facility: CLINIC | Age: 63
End: 2018-10-29
Payer: COMMERCIAL

## 2018-10-29 VITALS
DIASTOLIC BLOOD PRESSURE: 86 MMHG | HEART RATE: 83 BPM | SYSTOLIC BLOOD PRESSURE: 152 MMHG | BODY MASS INDEX: 31.13 KG/M2 | WEIGHT: 170.19 LBS

## 2018-10-29 DIAGNOSIS — J32.9 CHRONIC SINUSITIS, UNSPECIFIED LOCATION: Primary | ICD-10-CM

## 2018-10-29 PROCEDURE — 99213 OFFICE O/P EST LOW 20 MIN: CPT | Mod: S$GLB,,, | Performed by: PHYSICIAN ASSISTANT

## 2018-10-29 PROCEDURE — 99999 PR PBB SHADOW E&M-EST. PATIENT-LVL IV: CPT | Mod: PBBFAC,,, | Performed by: PHYSICIAN ASSISTANT

## 2018-10-29 PROCEDURE — 3079F DIAST BP 80-89 MM HG: CPT | Mod: CPTII,S$GLB,, | Performed by: PHYSICIAN ASSISTANT

## 2018-10-29 PROCEDURE — 3008F BODY MASS INDEX DOCD: CPT | Mod: CPTII,S$GLB,, | Performed by: PHYSICIAN ASSISTANT

## 2018-10-29 PROCEDURE — 3077F SYST BP >= 140 MM HG: CPT | Mod: CPTII,S$GLB,, | Performed by: PHYSICIAN ASSISTANT

## 2018-10-29 RX ORDER — AZELASTINE 1 MG/ML
1 SPRAY, METERED NASAL 2 TIMES DAILY
Qty: 30 ML | Refills: 11 | Status: SHIPPED | OUTPATIENT
Start: 2018-10-29 | End: 2018-11-12 | Stop reason: ALTCHOICE

## 2018-10-29 NOTE — PROGRESS NOTES
Subjective:   Patient: Dary Chaney [id number], :1955   Visit date:10/29/2018 10:30 AM    Chief Complaint:  Follow-up (2 week post therapy with no change )     HPI:  Dary is a 62 y.o. female who is here for follow-up. She was last here on 10/15/18 for AR/Sinusitis, tx w/ Augmentin x 10 days/ Medrol Dosepak/ Xyzal/ Flonase. She returns to clinic on 10/29/18 and reports little to no relief. She continues to have severe facial pressure and pain over her frontal and maxillary sinuses equal on both sides. She c/o bilateral ear pressure, greater on left. She c/o persistent, thick clear drainage from her nose and in her throat. She continues to use Flonase and Claritin, she d/c Xyzal due to being ineffective. Denies fever(s). Patient has a hx of DM Type II insulin dependant, reports she monitors BG very closely at home and did well with recent Medrol Dosepak.     Review of Systems:  -     Allergic/Immunologic: is allergic to citrus and derivatives; latex; valsartan; aspirin; and metformin..  -     Constitutional: Current temp:      Her meds, allergies, medical, surgical, social & family histories were reviewed & updated:  -     She has a current medication list which includes the following prescription(s): albuterol, blood sugar diagnostic, blood-glucose meter, dulaglutide, fluticasone, glimepiride, lancets, lidocaine, loratadine, losartan, omeprazole, oxycodone-acetaminophen, pen needle, polyethylene glycol, simvastatin, tresiba flextouch u-200, and azelastine.  -     She  has a past medical history of Arthritis, Cataract, Colon polyp, Diabetes mellitus type II, Hyperlipidemia, Hypertension, and UTI (urinary tract infection).   -     She does not have any pertinent problems on file.   -     She  has a past surgical history that includes Cholecystectomy; Spine surgery; Hysterectomy (); Knee surgery (2017); Cholecystectomy; Colonoscopy (); thumb surgery Rt- 2018 - cyst ; XI ROBOTIC NEPHRECTOMY,  PARTIAL (Right, 9/13/2018); ESOPHAGOGASTRODUODENOSCOPY (EGD) (N/A, 8/21/2018); and COLONOSCOPY (N/A, 3/1/2018).  -     She  reports that  has never smoked. she has never used smokeless tobacco. She reports that she drinks alcohol. She reports that she does not use drugs.  -     Her family history includes Diabetes in her brother.  -     She is allergic to citrus and derivatives; latex; valsartan; aspirin; and metformin.    Objective:     Physical Exam:  Vitals:  BP (!) 152/86   Pulse 83   Wt 77.2 kg (170 lb 3.1 oz)   BMI 31.13 kg/m²   Appearance:  Well-developed, well-nourished.  Communication:  Able to communicate, no hoarseness.  Head & Face:  Normocephalic, atraumatic, no sinus tenderness, normal facial strength.  Eyes:  Extraocular motions intact.  Ears:  R TM and EAC are WNL. L TM is retracted, clear, intact. EAC WNL.   Nose:  No masses/lesions of external nose, nasal mucosa, septum, and turbinates were within normal limits.  Mouth:  No mass/lesion of lips, teeth, gums, hard/soft palate, tongue, tonsils, or oropharynx.  Neck & Lymphatics:  No cervical lymphadenopathy, no neck mass/crepitus/ asymmetry, trachea is midline, no thyroid enlargement/tenderness/mass.  Neuro/Psych: Alert with normal mood and affect.   Abdominal: Normal appearance.   Respiration/Chest:  Symmetric expansion during respiration, normal respiratory effort.  Skin:  Warm and intact  Cardiovascular:  No peripheral vascular edema or varicosities.    Unable to obtain tymps on L ear.     Assessment & Plan:   Dary was seen today for follow-up.    Diagnoses and all orders for this visit:    Chronic sinusitis, unspecified location  -     CT Sinuses without Contrast; Future    Other orders  -     azelastine (ASTELIN) 137 mcg (0.1 %) nasal spray; 1 spray (137 mcg total) by Nasal route 2 (two) times daily.      CT sinuses- I will contact pt with results  Continue Flonase and Claritin, added Astelin.     Milena Velasquez, PAC

## 2018-11-05 ENCOUNTER — HOSPITAL ENCOUNTER (OUTPATIENT)
Dept: RADIOLOGY | Facility: HOSPITAL | Age: 63
Discharge: HOME | End: 2018-11-05
Attending: PHYSICIAN ASSISTANT
Payer: COMMERCIAL

## 2018-11-05 DIAGNOSIS — J32.9 CHRONIC SINUSITIS, UNSPECIFIED LOCATION: ICD-10-CM

## 2018-11-05 PROCEDURE — 70486 CT MAXILLOFACIAL W/O DYE: CPT | Mod: 26,,, | Performed by: RADIOLOGY

## 2018-11-05 PROCEDURE — 70486 CT MAXILLOFACIAL W/O DYE: CPT | Mod: TC,PO

## 2018-11-05 RX ORDER — LEVOFLOXACIN 500 MG/1
500 TABLET, FILM COATED ORAL DAILY
Qty: 10 TABLET | Refills: 0 | Status: SHIPPED | OUTPATIENT
Start: 2018-11-05 | End: 2018-11-12

## 2018-11-05 RX ORDER — METHYLPREDNISOLONE 4 MG/1
TABLET ORAL
Qty: 1 PACKAGE | Refills: 0 | Status: SHIPPED | OUTPATIENT
Start: 2018-11-05 | End: 2018-11-12

## 2018-11-05 NOTE — PROGRESS NOTES
I called the pt with her CT results of her sinuses. Overall, does not look bad at all, some inflammation and anatomy we may address in the future to improve breathing. Pt states she is still having frontal HA's and pressure in her ears and over cheeks still worse on the left side. This improved with previous Augmentin but symptoms returned once she completed it. Advised we can try Levaquin x 10 days and Medrol Dosepak, advised pt again to monitor BG on steroids, she voiced agreeing. Continue allergy medications. If residual HA's after medications, consider consult with neurologist.

## 2018-11-12 ENCOUNTER — OFFICE VISIT (OUTPATIENT)
Dept: OPHTHALMOLOGY | Facility: CLINIC | Age: 63
End: 2018-11-12
Payer: COMMERCIAL

## 2018-11-12 DIAGNOSIS — H04.123 DRY EYES: ICD-10-CM

## 2018-11-12 DIAGNOSIS — I10 HYPERTENSION, UNSPECIFIED TYPE: ICD-10-CM

## 2018-11-12 DIAGNOSIS — Z13.5 GLAUCOMA SCREENING: ICD-10-CM

## 2018-11-12 DIAGNOSIS — H25.013 CORTICAL SENILE CATARACT OF BOTH EYES: ICD-10-CM

## 2018-11-12 DIAGNOSIS — E11.9 TYPE 2 DIABETES MELLITUS WITHOUT RETINOPATHY: Primary | ICD-10-CM

## 2018-11-12 PROCEDURE — 92014 COMPRE OPH EXAM EST PT 1/>: CPT | Mod: S$GLB,,, | Performed by: OPTOMETRIST

## 2018-11-12 PROCEDURE — 99999 PR PBB SHADOW E&M-EST. PATIENT-LVL II: CPT | Mod: PBBFAC,,, | Performed by: OPTOMETRIST

## 2018-11-12 NOTE — PROGRESS NOTES
HPI     Yearly diabetic eye exam and hypertensive eye exam.  VA stable, night driving difficult  OS gritty feeling, not using drops  No pain   BS under control    [facility] patient    Last edited by Sara Carlos, OD on 11/12/2018  1:31 PM. (History)            Assessment /Plan     For exam results, see Encounter Report.    Type 2 diabetes mellitus without retinopathy    Cortical senile cataract of both eyes    Dry eyes    Hypertension, unspecified type    Glaucoma screening      No diabetic or hypertensive retinopathy both eyes.  Cataract(s) not yet affecting activities of daily living, therefore, surgery consult is not yet indicated.  Glaucoma screening negative both eyes.  Dry eye symptoms left eye.  Artificial tears as needed..  Continue over the counter readers.  Return to clinic 1 yr.

## 2018-11-20 ENCOUNTER — LAB VISIT (OUTPATIENT)
Dept: LAB | Facility: HOSPITAL | Age: 63
End: 2018-11-20
Attending: INTERNAL MEDICINE
Payer: COMMERCIAL

## 2018-11-20 DIAGNOSIS — Z79.4 TYPE 2 DIABETES MELLITUS WITH OTHER SPECIFIED COMPLICATION, WITH LONG-TERM CURRENT USE OF INSULIN: ICD-10-CM

## 2018-11-20 DIAGNOSIS — E11.69 TYPE 2 DIABETES MELLITUS WITH OTHER SPECIFIED COMPLICATION, WITH LONG-TERM CURRENT USE OF INSULIN: ICD-10-CM

## 2018-11-20 LAB
ESTIMATED AVG GLUCOSE: 146 MG/DL
HBA1C MFR BLD HPLC: 6.7 %
TSH SERPL DL<=0.005 MIU/L-ACNC: 0.98 UIU/ML

## 2018-11-20 PROCEDURE — 84443 ASSAY THYROID STIM HORMONE: CPT

## 2018-11-20 PROCEDURE — 36415 COLL VENOUS BLD VENIPUNCTURE: CPT | Mod: PO

## 2018-11-20 PROCEDURE — 83036 HEMOGLOBIN GLYCOSYLATED A1C: CPT

## 2018-11-29 ENCOUNTER — OFFICE VISIT (OUTPATIENT)
Dept: DIABETES | Facility: CLINIC | Age: 63
End: 2018-11-29
Payer: COMMERCIAL

## 2018-11-29 VITALS
SYSTOLIC BLOOD PRESSURE: 156 MMHG | HEIGHT: 62 IN | WEIGHT: 171.94 LBS | BODY MASS INDEX: 31.64 KG/M2 | DIASTOLIC BLOOD PRESSURE: 92 MMHG

## 2018-11-29 DIAGNOSIS — Z79.4 TYPE 2 DIABETES MELLITUS WITH OTHER SPECIFIED COMPLICATION, WITH LONG-TERM CURRENT USE OF INSULIN: Primary | ICD-10-CM

## 2018-11-29 DIAGNOSIS — E78.5 HYPERLIPIDEMIA LDL GOAL <70: Chronic | ICD-10-CM

## 2018-11-29 DIAGNOSIS — E11.69 TYPE 2 DIABETES MELLITUS WITH OTHER SPECIFIED COMPLICATION, WITH LONG-TERM CURRENT USE OF INSULIN: Primary | ICD-10-CM

## 2018-11-29 DIAGNOSIS — E78.5 HYPERLIPIDEMIA LDL GOAL <100: ICD-10-CM

## 2018-11-29 DIAGNOSIS — I10 HYPERTENSION GOAL BP (BLOOD PRESSURE) < 130/80: Chronic | ICD-10-CM

## 2018-11-29 LAB — GLUCOSE SERPL-MCNC: 99 MG/DL (ref 70–110)

## 2018-11-29 PROCEDURE — 99214 OFFICE O/P EST MOD 30 MIN: CPT | Mod: S$GLB,,, | Performed by: NURSE PRACTITIONER

## 2018-11-29 PROCEDURE — 3044F HG A1C LEVEL LT 7.0%: CPT | Mod: CPTII,S$GLB,, | Performed by: NURSE PRACTITIONER

## 2018-11-29 PROCEDURE — 99999 PR PBB SHADOW E&M-EST. PATIENT-LVL III: CPT | Mod: PBBFAC,,, | Performed by: NURSE PRACTITIONER

## 2018-11-29 PROCEDURE — 82962 GLUCOSE BLOOD TEST: CPT | Mod: S$GLB,,, | Performed by: NURSE PRACTITIONER

## 2018-11-29 PROCEDURE — 3080F DIAST BP >= 90 MM HG: CPT | Mod: CPTII,S$GLB,, | Performed by: NURSE PRACTITIONER

## 2018-11-29 PROCEDURE — 3077F SYST BP >= 140 MM HG: CPT | Mod: CPTII,S$GLB,, | Performed by: NURSE PRACTITIONER

## 2018-11-29 PROCEDURE — 3008F BODY MASS INDEX DOCD: CPT | Mod: CPTII,S$GLB,, | Performed by: NURSE PRACTITIONER

## 2018-11-29 RX ORDER — GLIMEPIRIDE 4 MG/1
6 TABLET ORAL
Qty: 180 TABLET | Refills: 1 | Status: SHIPPED | OUTPATIENT
Start: 2018-11-29 | End: 2019-04-30 | Stop reason: SDUPTHER

## 2018-11-29 RX ORDER — PEN NEEDLE, DIABETIC 30 GX3/16"
1 NEEDLE, DISPOSABLE MISCELLANEOUS DAILY
Qty: 100 EACH | Refills: 11 | Status: SHIPPED | OUTPATIENT
Start: 2018-11-29 | End: 2019-04-30 | Stop reason: SDUPTHER

## 2018-11-29 RX ORDER — GLIMEPIRIDE 4 MG/1
6 TABLET ORAL
Qty: 180 TABLET | Refills: 1 | Status: SHIPPED | OUTPATIENT
Start: 2018-11-29 | End: 2018-11-29 | Stop reason: SDUPTHER

## 2018-11-29 RX ORDER — INSULIN DEGLUDEC 200 U/ML
INJECTION, SOLUTION SUBCUTANEOUS
Qty: 12 SYRINGE | Refills: 1 | Status: SHIPPED | OUTPATIENT
Start: 2018-11-29 | End: 2019-02-04 | Stop reason: SDUPTHER

## 2018-11-29 NOTE — PROGRESS NOTES
"Subjective:         Patient ID: Dary Chaney is a 62 y.o. female.  Patient's current PCP is Sakina Cooper DO.   Social History     Socioeconomic History    Marital status:      Spouse name: Not on file    Number of children: 3    Years of education: Not on file    Highest education level: Not on file   Social Needs    Financial resource strain: Not on file    Food insecurity - worry: Not on file    Food insecurity - inability: Not on file    Transportation needs - medical: Not on file    Transportation needs - non-medical: Not on file   Occupational History    Occupation: Stay at Home    Occupation:    Tobacco Use    Smoking status: Never Smoker    Smokeless tobacco: Never Used   Substance and Sexual Activity    Alcohol use: Yes     Comment: occasional wine     Drug use: No    Sexual activity: Yes     Partners: Male     Birth control/protection: Surgical   Other Topics Concern    Not on file   Social History Narrative    . Housewife.       Chief Complaint: Diabetes      Dary Chaney is a 62 y.o. White female presenting for 3 month follow up of diabetes. Patient has been diagnosed with diabetes for approximately 10 years and has the following complications associated with diabetes: hypertension, hyperlipidemia, obesity. At last visit, medications were adjusted for uncontrolled blood glucoses. Blood glucose testing is performed regularly. Patient reports fastings 120-130, PP less than 180. Patient reports that she is s/p right robotic partial nephrectomy on 9/13/18 due to renal cell carcinoma. Condition is controlled.     She denies any recent hospital admissions, emergency room visits, hypoglycemia.      Height: 5' 2" (157.5 cm)  //  Weight: 78 kg (171 lb 15.3 oz), Body mass index is 31.45 kg/m².    Her blood sugar in clinic today is:   Lab Results   Component Value Date    POCGLU 99 11/29/2018       Labs reviewed and are noted below.    Her most recent A1C is: "   Lab Results   Component Value Date    HGBA1C 6.7 (H) 11/20/2018     Lab Results   Component Value Date    CPEPTIDE 1.10 12/14/2017     Lab Results   Component Value Date    GLUTAMICACID 0.00 12/14/2017     Lab Results   Component Value Date    WBC 7.88 09/14/2018    HGB 12.1 09/14/2018    HCT 36.9 (L) 09/14/2018     09/14/2018    CHOL 179 06/12/2018    TRIG 175 (H) 06/12/2018    HDL 60 06/12/2018    ALT 21 06/12/2018    AST 12 06/12/2018     09/14/2018    K 4.3 09/14/2018     09/14/2018    CREATININE 0.6 09/14/2018    BUN 10 09/14/2018    CO2 21 (L) 09/14/2018    TSH 0.980 11/20/2018    INR 1.0 06/21/2010    GLUF 117 (H) 04/27/2010    HGBA1C 6.7 (H) 11/20/2018       CURRENT DM MEDICATIONS:   Current Outpatient Prescriptions   Medication Sig Dispense Refill    glimepiride (AMARYL) 4 MG tablet Take 1.5 tablet (6 mg total) by mouth daily with breakfast.  90 tablet 1    Tresiba Inject 74 units SQ     PATIENT ONLY TAKING 72 UNITS 1 Box 3    Trulicity 1.5mg q week 9 Syringe 3     No current facility-administered medications for this visit.        Health Maintenance   Topic Date Due    Zoster Vaccine  12/05/2014    Influenza Vaccine  08/01/2018    Foot Exam  09/11/2018    Hemoglobin A1c  05/20/2019    Lipid Panel  06/12/2019    Eye Exam  11/12/2019    Mammogram  07/19/2020    Pneumococcal PPSV23 (Medium Risk) (2) 08/10/2021    TETANUS VACCINE  08/26/2021    Colonoscopy  03/01/2028    Hepatitis C Screening  Completed       STANDARDS OF CARE:  Current Ophthalmologist/Optometrist: Dr. Carlos. Last exam 2018  Current Podiatrist: No  ACE/ARB: Yes  Statin: Yes  She  has attended diabetes education in the past     LIFESTYLE:  ACTIVITY LEVEL: Moderately Active  EXERCISE:  none  MEAL PLANNING: Patient reports number of meals per day to be 3 and number of snacks per day to be 2    BLOOD GLUCOSE TESTING: Patient reports testing on average a total of 2 times per day.      Review of Systems    Constitutional: Negative.  Negative for activity change, appetite change, chills, diaphoresis, fatigue, fever and unexpected weight change.   Eyes: Negative for visual disturbance.   Respiratory: Negative for apnea and shortness of breath.    Cardiovascular: Negative.  Negative for chest pain, palpitations and leg swelling.   Gastrointestinal: Negative for abdominal distention, constipation, diarrhea, nausea and vomiting.   Endocrine: Negative for cold intolerance, heat intolerance, polydipsia, polyphagia and polyuria.   Genitourinary: Negative.  Negative for frequency.   Skin: Negative.  Negative for color change, pallor, rash and wound.   Neurological: Negative for dizziness, seizures, syncope, light-headedness, numbness and headaches.   Psychiatric/Behavioral: Negative for confusion, hallucinations and suicidal ideas.         Objective:      Physical Exam   Constitutional: She is oriented to person, place, and time. She appears well-developed and well-nourished.   HENT:   Head: Normocephalic and atraumatic.   Neck: Normal range of motion. Neck supple.   Cardiovascular: Normal rate.   Pulses:       Dorsalis pedis pulses are 2+ on the right side, and 2+ on the left side.        Posterior tibial pulses are 2+ on the right side, and 2+ on the left side.   Pulmonary/Chest: Effort normal.   Musculoskeletal:        Right foot: There is no deformity.        Left foot: There is no deformity.   Feet:   Right Foot:   Protective Sensation: 10 sites tested. 10 sites sensed.   Skin Integrity: Negative for ulcer, blister, skin breakdown, erythema, warmth, callus or dry skin.   Left Foot:   Protective Sensation: 10 sites tested. 10 sites sensed.   Skin Integrity: Negative for ulcer, blister, skin breakdown, erythema, warmth, callus or dry skin.   Neurological: She is alert and oriented to person, place, and time.   Skin: Skin is warm and dry.   Psychiatric: She has a normal mood and affect. Her behavior is normal. Judgment and  "thought content normal.   Nursing note and vitals reviewed.      Assessment:       1. Type 2 diabetes mellitus with other specified complication, with long-term current use of insulin    2. Hypertension goal BP (blood pressure) < 130/80    3. Hyperlipidemia LDL goal <100        Plan:   Type 2 diabetes mellitus with other specified complication, with long-term current use of insulin  -     POCT Glucose, Hand-Held Device  -     insulin degludec (TRESIBA FLEXTOUCH U-200) 200 unit/mL (3 mL) InPn; INJECT 76 UNITS SUBCUTANEOUSLY ONCE DAILY  Dispense: 12 Syringe; Refill: 1  -     pen needle, diabetic (PEN NEEDLE) 31 gauge x 5/16" Ndle; Inject 1 each into the skin once daily.  Dispense: 100 each; Refill: 11  -     glimepiride (AMARYL) 4 MG tablet; Take 1.5 tablets (6 mg total) by mouth daily with breakfast.  Dispense: 180 tablet; Refill: 1  -     dulaglutide (TRULICITY) 1.5 mg/0.5 mL PnIj; Inject 1.5 mg into the skin every 7 days.  Dispense: 12 Syringe; Refill: 1    - Condition Controlled. Continue current regimen.  DM education reviewed. Patient encouraged to carb count and exercise per recommendations. Labs and Referrals as noted. Patient instructed to send in log once weekly for my review. RV scheduled in 3 months    Hyperlipidemia LDL goal <100    - LDL at goal.      Hypertension goal BP (blood pressure) < 130/80  -     Hypertension Digital Medicine (HDMP) Enrollment Order    - BP  elevated today in clinic, patient advised to begin testing daily and return to pcp if elevation continues.    Additional Plan Details:    1.) Patient was instructed to monitor blood glucose 2 - 3 x daily, fasting and ac dinner or at bedtime. Discussed ADA goal for fasting blood sugar, 80 - 130mg/dL; pp blood sugars below 180 mg/dl. Also, discussed prevention of hypoglycemia and the need to adjust goals to higher levels if persistent hypoglycemia.  Reminded to bring BG records or meter to each visit for review.  2.) Reviewed pathophysiology of " Type 2 diabetes, complications related to the disease, importance of annual dilated eye exam and daily foot examination.  3.) We discussed the ADA recommendations, which are as follows:  Hemoglobin A1c below 7.0 %. All patients with diabetes should be on statins unless contraindicated.  ACE or ARB therapy if not contraindicated.    4.) Continue medications as prescribed.  My Tuner e-mail or phone review in one week with BG records for adjustment of medication.  5.) Meal planning teaching: Reviewed carb counting, portion control, importance of spacing meals throughout the day to prevent post prandial elevations.  6.) Discussed activity with related benefits, methods, and precautions. Recommended patient start or continue some form of exercise and increase as tolerated to 30 minutes per day to aid in control of BGs.  7.) A1C, TSH, Lipid Panel, CMP with eGFR and Micro/Creatinine are utd or were ordered per ADA protocol.  8.) Return to clinic in 3 months for follow up. The patient was explained the above plan and given opportunity to ask questions.  She understands, chooses and consents to this plan and accepts all the risks, which include but are not limited to the risks mentioned above. She understands the alternative of having no testing, interventions or treatments at this time. She left content and without further questions.     A total of 25 minutes was spent in face to face time, of which over 50% was spent in counseling patient on disease process, complications, treatment, and side effects of medications.        HADLEY Singh

## 2018-11-29 NOTE — PATIENT INSTRUCTIONS
CONTINUE CURRENT REGIMEN.     IF YOU ARE HAPPY WITH YOUR VISIT TODAY, PLEASE FILL OUT THE SURVEY THAT MAY BE SENT TO YOUR EMAIL ADDRESS FOLLOWING THIS VISIT. WE LOVE TO HEAR YOUR COMMENTS! HAVE A WONDERFUL DAY!

## 2018-11-30 ENCOUNTER — OFFICE VISIT (OUTPATIENT)
Dept: INTERNAL MEDICINE | Facility: CLINIC | Age: 63
End: 2018-11-30
Payer: COMMERCIAL

## 2018-11-30 VITALS
HEIGHT: 62 IN | WEIGHT: 171.06 LBS | TEMPERATURE: 96 F | BODY MASS INDEX: 31.48 KG/M2 | OXYGEN SATURATION: 95 % | SYSTOLIC BLOOD PRESSURE: 136 MMHG | HEART RATE: 78 BPM | DIASTOLIC BLOOD PRESSURE: 88 MMHG

## 2018-11-30 DIAGNOSIS — E11.69 COMBINED HYPERLIPIDEMIA ASSOCIATED WITH TYPE 2 DIABETES MELLITUS: ICD-10-CM

## 2018-11-30 DIAGNOSIS — R06.2 WHEEZING: ICD-10-CM

## 2018-11-30 DIAGNOSIS — I10 HYPERTENSION GOAL BP (BLOOD PRESSURE) < 130/80: Primary | Chronic | ICD-10-CM

## 2018-11-30 DIAGNOSIS — E78.2 COMBINED HYPERLIPIDEMIA ASSOCIATED WITH TYPE 2 DIABETES MELLITUS: ICD-10-CM

## 2018-11-30 DIAGNOSIS — Z23 IMMUNIZATION DUE: ICD-10-CM

## 2018-11-30 PROBLEM — Z12.11 SCREEN FOR COLON CANCER: Status: RESOLVED | Noted: 2018-03-01 | Resolved: 2018-11-30

## 2018-11-30 PROCEDURE — 90686 IIV4 VACC NO PRSV 0.5 ML IM: CPT | Mod: S$GLB,,, | Performed by: INTERNAL MEDICINE

## 2018-11-30 PROCEDURE — 3044F HG A1C LEVEL LT 7.0%: CPT | Mod: CPTII,S$GLB,, | Performed by: INTERNAL MEDICINE

## 2018-11-30 PROCEDURE — 99999 PR PBB SHADOW E&M-EST. PATIENT-LVL III: CPT | Mod: PBBFAC,,, | Performed by: INTERNAL MEDICINE

## 2018-11-30 PROCEDURE — 3008F BODY MASS INDEX DOCD: CPT | Mod: CPTII,S$GLB,, | Performed by: INTERNAL MEDICINE

## 2018-11-30 PROCEDURE — 90471 IMMUNIZATION ADMIN: CPT | Mod: S$GLB,,, | Performed by: INTERNAL MEDICINE

## 2018-11-30 PROCEDURE — 3075F SYST BP GE 130 - 139MM HG: CPT | Mod: CPTII,S$GLB,, | Performed by: INTERNAL MEDICINE

## 2018-11-30 PROCEDURE — 99214 OFFICE O/P EST MOD 30 MIN: CPT | Mod: 25,S$GLB,, | Performed by: INTERNAL MEDICINE

## 2018-11-30 PROCEDURE — 3079F DIAST BP 80-89 MM HG: CPT | Mod: CPTII,S$GLB,, | Performed by: INTERNAL MEDICINE

## 2018-11-30 RX ORDER — METHYLPREDNISOLONE 4 MG/1
4 TABLET ORAL DAILY
COMMUNITY
Start: 2018-11-05 | End: 2019-04-30 | Stop reason: ALTCHOICE

## 2018-11-30 RX ORDER — ALBUTEROL SULFATE 0.83 MG/ML
2.5 SOLUTION RESPIRATORY (INHALATION) EVERY 6 HOURS PRN
Qty: 120 ML | Refills: 1 | Status: SHIPPED | OUTPATIENT
Start: 2018-11-30 | End: 2021-10-04 | Stop reason: SDUPTHER

## 2018-11-30 RX ORDER — LOSARTAN POTASSIUM 25 MG/1
25 TABLET ORAL DAILY
Qty: 90 TABLET | Refills: 1 | Status: SHIPPED | OUTPATIENT
Start: 2018-11-30 | End: 2019-05-03 | Stop reason: ALTCHOICE

## 2018-11-30 RX ORDER — SIMVASTATIN 40 MG/1
40 TABLET, FILM COATED ORAL NIGHTLY
Qty: 90 TABLET | Refills: 1 | Status: SHIPPED | OUTPATIENT
Start: 2018-11-30 | End: 2019-12-16

## 2018-11-30 RX ORDER — SIMVASTATIN 40 MG/1
TABLET, FILM COATED ORAL
Qty: 90 TABLET | Refills: 1 | OUTPATIENT
Start: 2018-11-30

## 2018-11-30 RX ORDER — SIMVASTATIN 40 MG/1
40 TABLET, FILM COATED ORAL NIGHTLY
Qty: 90 TABLET | Refills: 1 | Status: CANCELLED | OUTPATIENT
Start: 2018-11-30

## 2018-11-30 RX ORDER — LEVOFLOXACIN 500 MG/1
500 TABLET, FILM COATED ORAL DAILY
COMMUNITY
Start: 2018-11-05 | End: 2019-04-30 | Stop reason: ALTCHOICE

## 2018-11-30 NOTE — PROGRESS NOTES
Subjective:       Patient ID: Dary Chaney is a 62 y.o. female.    Chief Complaint: Follow-up (HTN)    Dary Chaney  62 y.o. White female    Patient presents with:  Follow-up: HTN    HPI: Presents to the clinic to follow up on HTN. At her recent visit with diabetes management her b/p was elevated. She states she had taken an OTC cold medication the night before. It is better today. She is compliant with losartan and needs refills.   Diabetes--improved.                      HGBA1C                   6.7 (H)             11/20/2018            HLD--stable on simvastatin.                      CHOL                     179                 06/12/2018                 HDL                      60                  06/12/2018                 LDLCALC                  84.0                06/12/2018                    TRIG                     175 (H)             06/12/2018            She has been having trouble with her allergies/sinuses lately. She has been wheezing. She uses her nebulizer machine and needs refills on the solution.        Past Medical History:  Arthritis  Cataract  Colon polyp  Diabetes mellitus type II  Hyperlipidemia  Hypertension  UTI (urinary tract infection)    Current Outpatient Medications on File Prior to Visit:  blood sugar diagnostic Strp, Contour Next. Glucose testing three times daily., Disp: 300 strip, Rfl: 3  blood-glucose meter kit, Contour Next. Use as instructed, Disp: 1 each, Rfl: 0  dulaglutide (TRULICITY) 1.5 mg/0.5 mL PnIj, Inject 1.5 mg into the skin every 7 days., Disp: 12 Syringe, Rfl: 1  fluticasone (FLONASE) 50 mcg/actuation nasal spray, USE TWO SPRAY(S) IN EACH NOSTRIL ONCE DAILY (Patient taking differently: USE TWO SPRAY(S) IN EACH NOSTRIL ONCE DAILY  prn), Disp: 16 g, Rfl: 1  glimepiride (AMARYL) 4 MG tablet, Take 1.5 tablets (6 mg total) by mouth daily with breakfast., Disp: 180 tablet, Rfl: 1  insulin degludec (TRESIBA FLEXTOUCH U-200) 200 unit/mL (3 mL) InPn, INJECT 76 UNITS  "SUBCUTANEOUSLY ONCE DAILY, Disp: 12 Syringe, Rfl: 1  lancets 33 gauge Misc, 1 lancet by Misc.(Non-Drug; Combo Route) route 3 (three) times daily., Disp: 300 each, Rfl: 3  levoFLOXacin (LEVAQUIN) 500 MG tablet, Take 500 mg by mouth once daily., Disp: , Rfl:   lidocaine (LIDODERM) 5 %, Place 1 patch onto the skin daily as needed. Remove & Discard patch within 12 hours or as directed by MD, Disp: 30 patch, Rfl: 3  methylPREDNISolone (MEDROL) 4 MG Tab, Take 4 mg by mouth once daily., Disp: , Rfl:   pen needle, diabetic (PEN NEEDLE) 31 gauge x 5/16" Ndle, Inject 1 each into the skin once daily., Disp: 100 each, Rfl: 11  albuterol (PROVENTIL) 2.5 mg /3 mL (0.083 %) nebulizer solution, Take 3 mLs (2.5 mg total) by nebulization every 6 (six) hours as needed for Wheezing., Disp: 120 mL, Rfl: 0  losartan (COZAAR) 25 MG tablet, Take 1 tablet (25 mg total) by mouth once daily. (Patient taking differently: Take 25 mg by mouth every morning. ), Disp: 90 tablet, Rfl: 1  simvastatin (ZOCOR) 40 MG tablet, Take 1 tablet (40 mg total) by mouth nightly., Disp: 90 tablet, Rfl: 1  loratadine (CLARITIN) 10 mg tablet, Take 1 tablet (10 mg total) by mouth once daily. (Patient taking differently: Take 10 mg by mouth daily as needed. ), Disp: 30 tablet, Rfl: 0  omeprazole (PRILOSEC) 40 MG capsule, Take 1 capsule (40 mg total) by mouth once daily., Disp: 30 capsule, Rfl: 1    Allergies:  Review of patient's allergies indicates:   -- Citrus and derivatives -- Swelling, Other (See Comments) and                            Edema    --  Redness             Lip swelling             Itching   -- Latex -- Other (See Comments)    --  Other reaction(s): Rash             Other reaction(s): welts             Other reaction(s): Itching   -- Valsartan -- Other (See Comments)    --  Other reaction(s): disoriented   -- Aspirin -- Nausea Only    --  Other reaction(s): Nausea Only   -- Metformin -- Anxiety    --  Other reaction(s): anxiety          Review of " Systems   Constitutional: Negative for fever and unexpected weight change.   HENT: Positive for congestion and postnasal drip.    Respiratory: Positive for cough and wheezing. Negative for shortness of breath.    Cardiovascular: Negative for chest pain.   Gastrointestinal: Negative for abdominal pain.   Genitourinary: Negative for difficulty urinating.   Musculoskeletal: Negative for gait problem.   Neurological: Negative for dizziness and headaches.       Objective:      Physical Exam   Constitutional: She is oriented to person, place, and time. She appears well-developed and well-nourished. No distress.   Eyes: No scleral icterus.   Neck: No tracheal deviation present.   Cardiovascular: Normal rate, regular rhythm and normal heart sounds.   Pulmonary/Chest: Effort normal and breath sounds normal. No respiratory distress. She has no wheezes. She has no rales.   Abdominal: Soft. Bowel sounds are normal.   Neurological: She is alert and oriented to person, place, and time.   Skin: Skin is warm and dry.   Psychiatric: She has a normal mood and affect.   Vitals reviewed.      Assessment:       1. Hypertension goal BP (blood pressure) < 130/80    2. Combined hyperlipidemia associated with type 2 diabetes mellitus    3. Wheezing    4. Immunization due        Plan:       Dary was seen today for follow-up.    Diagnoses and all orders for this visit:    Hypertension goal BP (blood pressure) < 130/80  -     Refill losartan (COZAAR) 25 MG tablet; Take 1 tablet (25 mg total) by mouth once daily.    Combined hyperlipidemia associated with type 2 diabetes mellitus  -     Continue current management  -     Refill simvastatin (ZOCOR) 40 MG tablet; Take 1 tablet (40 mg total) by mouth nightly.    Wheezing  -     Refill albuterol (PROVENTIL) 2.5 mg /3 mL (0.083 %) nebulizer solution; Take 3 mLs (2.5 mg total) by nebulization every 6 (six) hours as needed for Wheezing.    Immunization due  -     Influenza - Quadrivalent (3 years &  older) (PF)    Labs and f/u in 3 months and as needed.

## 2018-12-16 NOTE — TELEPHONE ENCOUNTER
Spoke with pt informed her that ms vega sent a new antibiotic to her local pharmacy and if not better at the complete of this medication to follow up with her primary care doctor. Pt stated understanding and thanks.    Metabolic encephalopathy due to sepsis 2/2 E. Coli bacteremia likely from UTI  Mental status improving with abx, continue to monitor Metabolic encephalopathy due to sepsis 2/2 E. Coli bacteremia likely from UTI  Mental status improving with abx, continue to monitor, likely at baseline

## 2018-12-31 RX ORDER — OMEPRAZOLE 40 MG/1
CAPSULE, DELAYED RELEASE ORAL
Qty: 30 CAPSULE | Refills: 1 | Status: SHIPPED | OUTPATIENT
Start: 2018-12-31 | End: 2019-03-13 | Stop reason: SDUPTHER

## 2019-02-01 DIAGNOSIS — E11.69 TYPE 2 DIABETES MELLITUS WITH OTHER SPECIFIED COMPLICATION, WITH LONG-TERM CURRENT USE OF INSULIN: ICD-10-CM

## 2019-02-01 DIAGNOSIS — Z79.4 TYPE 2 DIABETES MELLITUS WITH OTHER SPECIFIED COMPLICATION, WITH LONG-TERM CURRENT USE OF INSULIN: ICD-10-CM

## 2019-02-04 RX ORDER — INSULIN DEGLUDEC 200 U/ML
INJECTION, SOLUTION SUBCUTANEOUS
Qty: 4 SYRINGE | Refills: 1 | Status: SHIPPED | OUTPATIENT
Start: 2019-02-04 | End: 2019-04-22 | Stop reason: SDUPTHER

## 2019-02-12 ENCOUNTER — TELEPHONE (OUTPATIENT)
Dept: INTERNAL MEDICINE | Facility: CLINIC | Age: 64
End: 2019-02-12

## 2019-02-12 NOTE — TELEPHONE ENCOUNTER
----- Message from Lianne Millan sent at 2/12/2019 12:42 PM CST -----  Contact: pt  Type:  Patient Returning Call    Who Called: pt   Who Left Message for Patient: Dr Cooper office  Does the patient know what this is regarding?:no  Would the patient rather a call back or a response via [employer]? Call back  Best Call Back Number:[phone]  Additional Information:

## 2019-02-12 NOTE — TELEPHONE ENCOUNTER
----- Message from Umu Tyson sent at 2/12/2019 10:07 AM CST -----  Patient states nurse michael told her to give her a call so she can get her scheduled with doctor leighann..[phone]

## 2019-02-14 ENCOUNTER — OFFICE VISIT (OUTPATIENT)
Dept: INTERNAL MEDICINE | Facility: CLINIC | Age: 64
End: 2019-02-14
Payer: COMMERCIAL

## 2019-02-14 VITALS
HEIGHT: 62 IN | BODY MASS INDEX: 32.09 KG/M2 | TEMPERATURE: 97 F | HEART RATE: 93 BPM | DIASTOLIC BLOOD PRESSURE: 82 MMHG | WEIGHT: 174.38 LBS | OXYGEN SATURATION: 93 % | SYSTOLIC BLOOD PRESSURE: 134 MMHG

## 2019-02-14 DIAGNOSIS — E11.59 HYPERTENSION ASSOCIATED WITH DIABETES: ICD-10-CM

## 2019-02-14 DIAGNOSIS — M17.12 ARTHRITIS OF LEFT KNEE: ICD-10-CM

## 2019-02-14 DIAGNOSIS — I15.2 HYPERTENSION ASSOCIATED WITH DIABETES: ICD-10-CM

## 2019-02-14 DIAGNOSIS — R94.31 ABNORMAL EKG: ICD-10-CM

## 2019-02-14 DIAGNOSIS — M25.562 CHRONIC PAIN OF LEFT KNEE: ICD-10-CM

## 2019-02-14 DIAGNOSIS — G89.29 CHRONIC PAIN OF LEFT KNEE: ICD-10-CM

## 2019-02-14 DIAGNOSIS — Z01.818 PREOPERATIVE EXAMINATION: Primary | ICD-10-CM

## 2019-02-14 PROBLEM — Z01.810 PREOP CARDIOVASCULAR EXAM: Status: ACTIVE | Noted: 2019-02-14

## 2019-02-14 PROCEDURE — 3008F BODY MASS INDEX DOCD: CPT | Mod: CPTII,S$GLB,, | Performed by: INTERNAL MEDICINE

## 2019-02-14 PROCEDURE — 3008F PR BODY MASS INDEX (BMI) DOCUMENTED: ICD-10-PCS | Mod: CPTII,S$GLB,, | Performed by: INTERNAL MEDICINE

## 2019-02-14 PROCEDURE — 99999 PR PBB SHADOW E&M-EST. PATIENT-LVL IV: CPT | Mod: PBBFAC,,, | Performed by: INTERNAL MEDICINE

## 2019-02-14 PROCEDURE — 99499 RISK ADDL DX/OHS AUDIT: ICD-10-PCS | Mod: S$GLB,,, | Performed by: INTERNAL MEDICINE

## 2019-02-14 PROCEDURE — 99499 UNLISTED E&M SERVICE: CPT | Mod: S$GLB,,, | Performed by: INTERNAL MEDICINE

## 2019-02-14 PROCEDURE — 3079F DIAST BP 80-89 MM HG: CPT | Mod: CPTII,S$GLB,, | Performed by: INTERNAL MEDICINE

## 2019-02-14 PROCEDURE — 3075F PR MOST RECENT SYSTOLIC BLOOD PRESS GE 130-139MM HG: ICD-10-PCS | Mod: CPTII,S$GLB,, | Performed by: INTERNAL MEDICINE

## 2019-02-14 PROCEDURE — 99214 OFFICE O/P EST MOD 30 MIN: CPT | Mod: S$GLB,,, | Performed by: INTERNAL MEDICINE

## 2019-02-14 PROCEDURE — 3044F PR MOST RECENT HEMOGLOBIN A1C LEVEL <7.0%: ICD-10-PCS | Mod: CPTII,S$GLB,, | Performed by: INTERNAL MEDICINE

## 2019-02-14 PROCEDURE — 99999 PR PBB SHADOW E&M-EST. PATIENT-LVL IV: ICD-10-PCS | Mod: PBBFAC,,, | Performed by: INTERNAL MEDICINE

## 2019-02-14 PROCEDURE — 3075F SYST BP GE 130 - 139MM HG: CPT | Mod: CPTII,S$GLB,, | Performed by: INTERNAL MEDICINE

## 2019-02-14 PROCEDURE — 3044F HG A1C LEVEL LT 7.0%: CPT | Mod: CPTII,S$GLB,, | Performed by: INTERNAL MEDICINE

## 2019-02-14 PROCEDURE — 3079F PR MOST RECENT DIASTOLIC BLOOD PRESSURE 80-89 MM HG: ICD-10-PCS | Mod: CPTII,S$GLB,, | Performed by: INTERNAL MEDICINE

## 2019-02-14 PROCEDURE — 99214 PR OFFICE/OUTPT VISIT, EST, LEVL IV, 30-39 MIN: ICD-10-PCS | Mod: S$GLB,,, | Performed by: INTERNAL MEDICINE

## 2019-02-15 ENCOUNTER — CLINICAL SUPPORT (OUTPATIENT)
Dept: CARDIOLOGY | Facility: CLINIC | Age: 64
End: 2019-02-15
Payer: COMMERCIAL

## 2019-02-15 ENCOUNTER — OFFICE VISIT (OUTPATIENT)
Dept: CARDIOLOGY | Facility: CLINIC | Age: 64
End: 2019-02-15
Payer: COMMERCIAL

## 2019-02-15 VITALS
WEIGHT: 174.25 LBS | HEART RATE: 100 BPM | HEIGHT: 62 IN | DIASTOLIC BLOOD PRESSURE: 84 MMHG | BODY MASS INDEX: 32.07 KG/M2 | SYSTOLIC BLOOD PRESSURE: 136 MMHG

## 2019-02-15 DIAGNOSIS — I10 HYPERTENSION GOAL BP (BLOOD PRESSURE) < 130/80: Chronic | ICD-10-CM

## 2019-02-15 DIAGNOSIS — E78.5 HYPERLIPIDEMIA LDL GOAL <70: Chronic | ICD-10-CM

## 2019-02-15 DIAGNOSIS — I10 ESSENTIAL HYPERTENSION: Primary | ICD-10-CM

## 2019-02-15 DIAGNOSIS — I10 ESSENTIAL HYPERTENSION: ICD-10-CM

## 2019-02-15 DIAGNOSIS — E11.69 TYPE 2 DIABETES MELLITUS WITH OTHER SPECIFIED COMPLICATION, WITH LONG-TERM CURRENT USE OF INSULIN: ICD-10-CM

## 2019-02-15 DIAGNOSIS — Z79.4 TYPE 2 DIABETES MELLITUS WITH OTHER SPECIFIED COMPLICATION, WITH LONG-TERM CURRENT USE OF INSULIN: ICD-10-CM

## 2019-02-15 DIAGNOSIS — Z01.810 PREOP CARDIOVASCULAR EXAM: Primary | ICD-10-CM

## 2019-02-15 DIAGNOSIS — R94.31 ABNORMAL EKG: ICD-10-CM

## 2019-02-15 PROCEDURE — 99999 PR PBB SHADOW E&M-EST. PATIENT-LVL III: ICD-10-PCS | Mod: PBBFAC,,, | Performed by: INTERNAL MEDICINE

## 2019-02-15 PROCEDURE — 99244 PR OFFICE CONSULTATION,LEVEL IV: ICD-10-PCS | Mod: S$GLB,,, | Performed by: INTERNAL MEDICINE

## 2019-02-15 PROCEDURE — 93000 ELECTROCARDIOGRAM COMPLETE: CPT | Mod: S$GLB,,, | Performed by: INTERNAL MEDICINE

## 2019-02-15 PROCEDURE — 99999 PR PBB SHADOW E&M-EST. PATIENT-LVL III: CPT | Mod: PBBFAC,,, | Performed by: INTERNAL MEDICINE

## 2019-02-15 PROCEDURE — 93000 EKG 12-LEAD: ICD-10-PCS | Mod: S$GLB,,, | Performed by: INTERNAL MEDICINE

## 2019-02-15 PROCEDURE — 99244 OFF/OP CNSLTJ NEW/EST MOD 40: CPT | Mod: S$GLB,,, | Performed by: INTERNAL MEDICINE

## 2019-02-15 PROCEDURE — 99499 RISK ADDL DX/OHS AUDIT: ICD-10-PCS | Mod: S$GLB,,, | Performed by: INTERNAL MEDICINE

## 2019-02-15 PROCEDURE — 99499 UNLISTED E&M SERVICE: CPT | Mod: S$GLB,,, | Performed by: INTERNAL MEDICINE

## 2019-02-15 RX ORDER — MUPIROCIN 20 MG/G
OINTMENT TOPICAL
COMMUNITY
Start: 2019-02-12 | End: 2020-09-23

## 2019-02-15 NOTE — PROGRESS NOTES
Subjective:    Patient ID:  Dary Chaney is a 63 y.o. female who presents for evaluation of Abnormal ECG      Pt referred by Dr. Sakina Cooper      HPI  Pt presents for preop eval.  Due for knee surgery later this month with Dr. Cook, 2/20/19.  Her current med conditions include DM, HTN, hyperlipidemia.  Nonsmoker.   She was noted to have abnl preop ECG.  Old ecgs in epic system reviewed and have shown possible inferior infarct at times and nonspecific st-t abnl.  Lipids, DM controlled on current therapy.  She denies cp sxs. No dyspnea.  No exercise due to knee pain issues.  BP stable.  DM x 15 years.  - stress test 2010.  S/p 2 surgeries last year, thumb surgery and renal cell cancer surgery without cardiac complications.  Has never seen a cardiologist though in past.    Past Medical History:   Diagnosis Date    Arthritis     Cataract     Colon polyp     Diabetes mellitus type II     Hyperlipidemia     Hypertension     Renal cell carcinoma 09/2018    UTI (urinary tract infection)        Current Outpatient Medications:     albuterol (PROVENTIL) 2.5 mg /3 mL (0.083 %) nebulizer solution, Take 3 mLs (2.5 mg total) by nebulization every 6 (six) hours as needed for Wheezing., Disp: 120 mL, Rfl: 1    blood sugar diagnostic Strp, Contour Next. Glucose testing three times daily., Disp: 300 strip, Rfl: 3    blood-glucose meter kit, Contour Next. Use as instructed, Disp: 1 each, Rfl: 0    dulaglutide (TRULICITY) 1.5 mg/0.5 mL PnIj, Inject 1.5 mg into the skin every 7 days., Disp: 12 Syringe, Rfl: 1    fluticasone (FLONASE) 50 mcg/actuation nasal spray, USE TWO SPRAY(S) IN EACH NOSTRIL ONCE DAILY (Patient taking differently: USE TWO SPRAY(S) IN EACH NOSTRIL ONCE DAILY  prn), Disp: 16 g, Rfl: 1    glimepiride (AMARYL) 4 MG tablet, Take 1.5 tablets (6 mg total) by mouth daily with breakfast., Disp: 180 tablet, Rfl: 1    lancets 33 gauge Misc, 1 lancet by Misc.(Non-Drug; Combo Route) route 3 (three) times  "daily., Disp: 300 each, Rfl: 3    levoFLOXacin (LEVAQUIN) 500 MG tablet, Take 500 mg by mouth once daily., Disp: , Rfl:     loratadine (CLARITIN) 10 mg tablet, Take 1 tablet (10 mg total) by mouth once daily. (Patient taking differently: Take 10 mg by mouth daily as needed. ), Disp: 30 tablet, Rfl: 0    losartan (COZAAR) 25 MG tablet, Take 1 tablet (25 mg total) by mouth once daily., Disp: 90 tablet, Rfl: 1    omeprazole (PRILOSEC) 40 MG capsule, TAKE 1 CAPSULE BY MOUTH ONCE DAILY, Disp: 30 capsule, Rfl: 1    pen needle, diabetic (PEN NEEDLE) 31 gauge x 5/16" Ndle, Inject 1 each into the skin once daily., Disp: 100 each, Rfl: 11    simvastatin (ZOCOR) 40 MG tablet, Take 1 tablet (40 mg total) by mouth nightly., Disp: 90 tablet, Rfl: 1    TRESIBA FLEXTOUCH U-200 200 unit/mL (3 mL) InPn, INJECT 76 UNITS SUBCUTANEOUSLY ONCE DAILY, Disp: 4 Syringe, Rfl: 1    lidocaine (LIDODERM) 5 %, Place 1 patch onto the skin daily as needed. Remove & Discard patch within 12 hours or as directed by MD, Disp: 30 patch, Rfl: 3    methylPREDNISolone (MEDROL) 4 MG Tab, Take 4 mg by mouth once daily., Disp: , Rfl:     mupirocin (BACTROBAN) 2 % ointment, , Disp: , Rfl:       Review of Systems   Constitution: Negative.   HENT: Negative.    Eyes: Negative.    Cardiovascular: Negative.    Respiratory: Negative.    Endocrine: Negative.    Hematologic/Lymphatic: Negative.    Skin: Negative.    Musculoskeletal: Positive for arthritis and joint pain.   Gastrointestinal: Negative.    Genitourinary: Negative.    Neurological: Negative.    Psychiatric/Behavioral: Negative.    Allergic/Immunologic: Negative.        /84 (BP Location: Right arm, Patient Position: Sitting, BP Method: Medium (Manual))   Pulse 100   Ht 5' 2" (1.575 m)   Wt 79.1 kg (174 lb 4.4 oz)   BMI 31.88 kg/m²     Wt Readings from Last 3 Encounters:   02/15/19 79.1 kg (174 lb 4.4 oz)   02/14/19 79.1 kg (174 lb 6.1 oz)   11/30/18 77.6 kg (171 lb 1.2 oz)     Temp " Readings from Last 3 Encounters:   02/14/19 96.6 °F (35.9 °C) (Tympanic)   11/30/18 96.3 °F (35.7 °C) (Tympanic)   10/15/18 97.6 °F (36.4 °C) (Tympanic)     BP Readings from Last 3 Encounters:   02/15/19 136/84   02/14/19 134/82   11/30/18 136/88     Pulse Readings from Last 3 Encounters:   02/15/19 100   02/14/19 93   11/30/18 78          Objective:    Physical Exam   Constitutional: She is oriented to person, place, and time. Vital signs are normal. She appears well-developed and well-nourished. She is active and cooperative. She does not have a sickly appearance. She does not appear ill. No distress.   HENT:   Head: Normocephalic.   Neck: Neck supple. Normal carotid pulses, no hepatojugular reflux and no JVD present. Carotid bruit is not present. No thyromegaly present.   Cardiovascular: Normal rate, regular rhythm, S1 normal, S2 normal, normal heart sounds and normal pulses. PMI is not displaced. Exam reveals no gallop and no friction rub.   No murmur heard.  Pulses:       Radial pulses are 2+ on the right side, and 2+ on the left side.   Pulmonary/Chest: Effort normal and breath sounds normal. She has no wheezes. She has no rales.   Abdominal: Soft. Normal appearance, normal aorta and bowel sounds are normal. She exhibits no pulsatile liver, no abdominal bruit, no ascites and no mass. There is no splenomegaly or hepatomegaly. There is no tenderness.   Musculoskeletal: She exhibits no edema.   Lymphadenopathy:     She has no cervical adenopathy.   Neurological: She is alert and oriented to person, place, and time.   Skin: Skin is warm. She is not diaphoretic.   Psychiatric: She has a normal mood and affect. Her behavior is normal.   Nursing note and vitals reviewed.      I have reviewed all pertinent labs and cardiac studies.      Chemistry        Component Value Date/Time     09/14/2018 0515    K 4.3 09/14/2018 0515     09/14/2018 0515    CO2 21 (L) 09/14/2018 0515    BUN 10 09/14/2018 0515     CREATININE 0.6 09/14/2018 0515     09/14/2018 0515        Component Value Date/Time    CALCIUM 7.4 (L) 09/14/2018 0515    ALKPHOS 92 06/12/2018 0909    AST 12 06/12/2018 0909    ALT 21 06/12/2018 0909    BILITOT 0.3 06/12/2018 0909    ESTGFRAFRICA >60.0 09/14/2018 0515    EGFRNONAA >60.0 09/14/2018 0515        Lab Results   Component Value Date    WBC 7.88 09/14/2018    HGB 12.1 09/14/2018    HCT 36.9 (L) 09/14/2018    MCV 83 09/14/2018     09/14/2018     Lab Results   Component Value Date    HGBA1C 6.7 (H) 11/20/2018     Lab Results   Component Value Date    CHOL 179 06/12/2018    CHOL 176 03/12/2018    CHOL 190 12/07/2017     Lab Results   Component Value Date    HDL 60 06/12/2018    HDL 70 03/12/2018    HDL 58 12/07/2017     Lab Results   Component Value Date    LDLCALC 84.0 06/12/2018    LDLCALC 77.6 03/12/2018    LDLCALC 98.6 12/07/2017     Lab Results   Component Value Date    TRIG 175 (H) 06/12/2018    TRIG 142 03/12/2018    TRIG 167 (H) 12/07/2017     Lab Results   Component Value Date    CHOLHDL 33.5 06/12/2018    CHOLHDL 39.8 03/12/2018    CHOLHDL 30.5 12/07/2017           Assessment:       1. Preop cardiovascular exam    2. Abnormal EKG    3. Type 2 diabetes mellitus with other specified complication, with long-term current use of insulin    4. Hypertension goal BP (blood pressure) < 130/80    5. Hyperlipidemia LDL goal <70         Plan:             Limited exercise capacity due to knee issues; however, she has had 2 surgeries within last year without cardiac complications which would be good predictor that she should do well on upcoming surgery.   Abnormal ecg, inferior infarct noted on prior ecgs and on today's ecg.  Multiple risk factors for CAD.  Cannot rule out silent MI in past with her DM.   Stress MPI  Echo  Continue current meds  Continue risk factor modification with PCP  Phone review for test results.

## 2019-02-15 NOTE — PROGRESS NOTES
Subjective:       Patient ID: Dary Chaney is a 63 y.o. female.    Chief Complaint: Pre-op Exam    Dary Chaney  63 y.o. White female     Patient presents with:  Pre-op Exam    HPI: Here today for a preoperative evaluation. She will be having a left total knee replacement by Dr. Cook on 2/20/19.   She has chronic left knee pain due to arthritis. Her pain is getting progressively worse and interfering with her life.   She denies prior history of surgical or anesthetic complications.  HTN--stable on losartan. She denies symptoms.   Diabetes--compliant with medication. Labs done recently at [facility] show an A1C increase (7.4) and a glucose of 111.   Her EKG is abnormal. She denies symptoms. She states she has never been evaluated by cardiology.      Past Medical History:  Arthritis  Cataract  Colon polyp  Diabetes mellitus type II  Hyperlipidemia  Hypertension  09/2018: Renal cell carcinoma  UTI (urinary tract infection)    Past Surgical History:  CHOLECYSTECTOMY  CHOLECYSTECTOMY  2012: COLONOSCOPY  3/1/2018: COLONOSCOPY; N/A      Comment:  Performed by Phillip Brower MD at [facility] ENDO  8/21/2018: ESOPHAGOGASTRODUODENOSCOPY (EGD); N/A      Comment:  Performed by Maco Wynn MD at [facility] ENDO  1993: HYSTERECTOMY      Comment:  uterine prolapse  03/2017: KNEE SURGERY  SPINE SURGERY  thumb surgery Rt- July 2018 - cyst   9/13/2018: XI ROBOTIC NEPHRECTOMY, PARTIAL; Right      Comment:  Performed by Tommy Bradshaw MD at [facility] OR [address] [place] [address]    Review of patient's family history indicates:  Problem: Diabetes      Relation: Brother          Age of Onset: (Not Specified)  Problem: Heart disease      Relation: Neg Hx          Age of Onset: (Not Specified)      Current Outpatient Medications on File Prior to Visit:  albuterol (PROVENTIL) 2.5 mg /3 mL (0.083 %) nebulizer solution, Take 3 mLs (2.5 mg total) by nebulization every 6 (six) hours as needed for Wheezing., Disp: 120 mL, Rfl: 1  blood sugar diagnostic Strp,  "Contour Next. Glucose testing three times daily., Disp: 300 strip, Rfl: 3  dulaglutide (TRULICITY) 1.5 mg/0.5 mL PnIj, Inject 1.5 mg into the skin every 7 days., Disp: 12 Syringe, Rfl: 1  glimepiride (AMARYL) 4 MG tablet, Take 1.5 tablets (6 mg total) by mouth daily with breakfast., Disp: 180 tablet, Rfl: 1  lancets 33 gauge Misc, 1 lancet by Misc.(Non-Drug; Combo Route) route 3 (three) times daily., Disp: 300 each, Rfl: 3  levoFLOXacin (LEVAQUIN) 500 MG tablet, Take 500 mg by mouth once daily., Disp: , Rfl:   lidocaine (LIDODERM) 5 %, Place 1 patch onto the skin daily as needed. Remove & Discard patch within 12 hours or as directed by MD, Disp: 30 patch, Rfl: 3  losartan (COZAAR) 25 MG tablet, Take 1 tablet (25 mg total) by mouth once daily., Disp: 90 tablet, Rfl: 1  methylPREDNISolone (MEDROL) 4 MG Tab, Take 4 mg by mouth once daily., Disp: , Rfl:   omeprazole (PRILOSEC) 40 MG capsule, TAKE 1 CAPSULE BY MOUTH ONCE DAILY, Disp: 30 capsule, Rfl: 1  pen needle, diabetic (PEN NEEDLE) 31 gauge x 5/16" Ndle, Inject 1 each into the skin once daily., Disp: 100 each, Rfl: 11  simvastatin (ZOCOR) 40 MG tablet, Take 1 tablet (40 mg total) by mouth nightly., Disp: 90 tablet, Rfl: 1  TRESIBA FLEXTOUCH U-200 200 unit/mL (3 mL) InPn, INJECT 76 UNITS SUBCUTANEOUSLY ONCE DAILY, Disp: 4 Syringe, Rfl: 1  blood-glucose meter kit, Contour Next. Use as instructed, Disp: 1 each, Rfl: 0  fluticasone (FLONASE) 50 mcg/actuation nasal spray, USE TWO SPRAY(S) IN EACH NOSTRIL ONCE DAILY (Patient taking differently: USE TWO SPRAY(S) IN EACH NOSTRIL ONCE DAILY  prn), Disp: 16 g, Rfl: 1  loratadine (CLARITIN) 10 mg tablet, Take 1 tablet (10 mg total) by mouth once daily. (Patient taking differently: Take 10 mg by mouth daily as needed. ), Disp: 30 tablet, Rfl: 0    Allergies:  Review of patient's allergies indicates:   -- Citrus and derivatives -- Swelling, Other (See Comments) and                            Edema    --  Redness             Lip " swelling             Itching   -- Latex -- Other (See Comments)    --  Other reaction(s): Rash             Other reaction(s): welts             Other reaction(s): Itching   -- Valsartan -- Other (See Comments)    --  Other reaction(s): disoriented   -- Aspirin -- Nausea Only    --  Other reaction(s): Nausea Only   -- Metformin -- Anxiety    --  Other reaction(s): anxiety        Review of Systems   Constitutional: Negative for fever and unexpected weight change.   HENT: Negative for dental problem, sinus pain and sore throat.    Respiratory: Negative for cough and shortness of breath.    Cardiovascular: Negative for chest pain and leg swelling.   Gastrointestinal: Negative for abdominal pain and diarrhea.   Genitourinary: Negative for dysuria.   Musculoskeletal: Positive for arthralgias and gait problem.   Neurological: Negative for dizziness and headaches.       Objective:      Physical Exam   Constitutional: She is oriented to person, place, and time. She appears well-developed and well-nourished. No distress.   HENT:   Mouth/Throat: No oropharyngeal exudate.   Eyes: No scleral icterus.   Neck: No tracheal deviation present. No thyromegaly present.   Cardiovascular: Normal rate, regular rhythm and normal heart sounds.   Pulmonary/Chest: Effort normal and breath sounds normal. No respiratory distress. She has no wheezes. She has no rales.   Abdominal: Soft. Bowel sounds are normal.   Musculoskeletal: She exhibits no edema.   Lymphadenopathy:     She has no cervical adenopathy.   Neurological: She is alert and oriented to person, place, and time.   Skin: Skin is warm and dry.   Psychiatric: She has a normal mood and affect.   Vitals reviewed.      Assessment:       1. Preoperative examination    2. Chronic pain of left knee    3. Arthritis of left knee    4. Hypertension associated with diabetes    5. Abnormal EKG        Plan:       Dary was seen today for pre-op exam.    Diagnoses and all orders for this  visit:    Preoperative examination  -    Asymptomatic    Chronic pain of left knee    Arthritis of left knee    Hypertension associated with diabetes  -     Worsened diabetes--advised compliance with medication and follow up with diabetes management    Abnormal EKG  -     Ambulatory consult to Cardiology    Okay to have surgery once evaluated by cardiology.     Paperwork completed.

## 2019-02-18 ENCOUNTER — HOSPITAL ENCOUNTER (OUTPATIENT)
Dept: CARDIOLOGY | Facility: HOSPITAL | Age: 64
Discharge: HOME | End: 2019-02-18
Attending: INTERNAL MEDICINE
Payer: COMMERCIAL

## 2019-02-18 ENCOUNTER — HOSPITAL ENCOUNTER (OUTPATIENT)
Dept: RADIOLOGY | Facility: HOSPITAL | Age: 64
Discharge: HOME | End: 2019-02-18
Attending: INTERNAL MEDICINE
Payer: COMMERCIAL

## 2019-02-18 ENCOUNTER — HOSPITAL ENCOUNTER (OUTPATIENT)
Dept: PULMONOLOGY | Facility: HOSPITAL | Age: 64
Discharge: HOME | End: 2019-02-18
Attending: INTERNAL MEDICINE
Payer: COMMERCIAL

## 2019-02-18 ENCOUNTER — TELEPHONE (OUTPATIENT)
Dept: CARDIOLOGY | Facility: CLINIC | Age: 64
End: 2019-02-18

## 2019-02-18 DIAGNOSIS — I10 HYPERTENSION GOAL BP (BLOOD PRESSURE) < 130/80: Chronic | ICD-10-CM

## 2019-02-18 DIAGNOSIS — Z01.810 PREOP CARDIOVASCULAR EXAM: ICD-10-CM

## 2019-02-18 DIAGNOSIS — R94.31 ABNORMAL EKG: ICD-10-CM

## 2019-02-18 DIAGNOSIS — Z79.4 TYPE 2 DIABETES MELLITUS WITH OTHER SPECIFIED COMPLICATION, WITH LONG-TERM CURRENT USE OF INSULIN: ICD-10-CM

## 2019-02-18 DIAGNOSIS — E11.69 TYPE 2 DIABETES MELLITUS WITH OTHER SPECIFIED COMPLICATION, WITH LONG-TERM CURRENT USE OF INSULIN: ICD-10-CM

## 2019-02-18 LAB
DIASTOLIC DYSFUNCTION: NO
DIASTOLIC DYSFUNCTION: NO
ESTIMATED PA SYSTOLIC PRESSURE: 15.39
RETIRED EF AND QEF - SEE NOTES: 60 (ref 55–65)

## 2019-02-18 PROCEDURE — 93306 TTE W/DOPPLER COMPLETE: CPT | Mod: 26,,, | Performed by: INTERNAL MEDICINE

## 2019-02-18 PROCEDURE — 93017 CV STRESS TEST TRACING ONLY: CPT

## 2019-02-18 PROCEDURE — C8929 TTE W OR WO FOL WCON,DOPPLER: HCPCS

## 2019-02-18 PROCEDURE — 93016 CV STRESS TEST SUPVJ ONLY: CPT | Mod: ,,, | Performed by: INTERNAL MEDICINE

## 2019-02-18 PROCEDURE — A9502 TC99M TETROFOSMIN: HCPCS

## 2019-02-18 PROCEDURE — 93018 CV STRESS TEST I&R ONLY: CPT | Mod: ,,, | Performed by: INTERNAL MEDICINE

## 2019-02-18 PROCEDURE — 63600175 PHARM REV CODE 636 W HCPCS: Performed by: PHYSICIAN ASSISTANT

## 2019-02-18 PROCEDURE — 93016 NM MULTI PHARM STRESS CARDIAC COMPONENT: ICD-10-PCS | Mod: ,,, | Performed by: INTERNAL MEDICINE

## 2019-02-18 PROCEDURE — 78452 HT MUSCLE IMAGE SPECT MULT: CPT | Mod: 26,,, | Performed by: INTERNAL MEDICINE

## 2019-02-18 PROCEDURE — 93018 NM MULTI PHARM STRESS CARDIAC COMPONENT: ICD-10-PCS | Mod: ,,, | Performed by: INTERNAL MEDICINE

## 2019-02-18 PROCEDURE — 93306 2D ECHO WITH COLOR FLOW DOPPLER: ICD-10-PCS | Mod: 26,,, | Performed by: INTERNAL MEDICINE

## 2019-02-18 PROCEDURE — 78452 NM MULTI PHARM STRESS CARDIAC COMPONENT: ICD-10-PCS | Mod: 26,,, | Performed by: INTERNAL MEDICINE

## 2019-02-18 RX ORDER — DULAGLUTIDE 1.5 MG/.5ML
INJECTION, SOLUTION SUBCUTANEOUS
Qty: 12 SYRINGE | Refills: 1 | Status: SHIPPED | OUTPATIENT
Start: 2019-02-18 | End: 2019-10-23 | Stop reason: SDUPTHER

## 2019-02-18 RX ORDER — REGADENOSON 0.08 MG/ML
0.4 INJECTION, SOLUTION INTRAVENOUS ONCE
Status: COMPLETED | OUTPATIENT
Start: 2019-02-18 | End: 2019-02-18

## 2019-02-18 RX ADMIN — REGADENOSON 0.4 MG: 0.08 INJECTION, SOLUTION INTRAVENOUS at 04:02

## 2019-02-19 ENCOUNTER — TELEPHONE (OUTPATIENT)
Dept: CARDIOLOGY | Facility: CLINIC | Age: 64
End: 2019-02-19

## 2019-02-19 NOTE — TELEPHONE ENCOUNTER
Please call pt.  She passed her nuclear stress test.  No blockages noted.  Echo shows normal heart strength/function.  Please fax clearance and call her surgeon's office, Dr. Cook.  Surgery is Feb 20th.    Dr Bland

## 2019-02-19 NOTE — TELEPHONE ENCOUNTER
Faxed copy of all needed paperwork    ----- Message from Maria A Baker sent at 2/19/2019 12:08 PM CST -----  Contact: CRISS Owens)  Please give Bev a call at [phone] regarding the pt clearance. It has not been faxed and surgery is tomorrow. Fax to [phone] ATTN: Bev

## 2019-02-21 ENCOUNTER — TELEPHONE (OUTPATIENT)
Dept: INTERNAL MEDICINE | Facility: CLINIC | Age: 64
End: 2019-02-21

## 2019-02-21 DIAGNOSIS — E78.5 HYPERLIPIDEMIA LDL GOAL <70: Primary | ICD-10-CM

## 2019-02-21 NOTE — TELEPHONE ENCOUNTER
Pt has a lab appt tomorrow with no labs attach to them what labs did you want her to get and can you put a order in for them.

## 2019-03-14 RX ORDER — OMEPRAZOLE 40 MG/1
CAPSULE, DELAYED RELEASE ORAL
Qty: 30 CAPSULE | Refills: 3 | Status: SHIPPED | OUTPATIENT
Start: 2019-03-14 | End: 2019-07-11 | Stop reason: SDUPTHER

## 2019-03-15 ENCOUNTER — TELEPHONE (OUTPATIENT)
Dept: INTERNAL MEDICINE | Facility: CLINIC | Age: 64
End: 2019-03-15

## 2019-03-15 NOTE — TELEPHONE ENCOUNTER
Spoke to pt about calling the pharmacist she said she already talk to them and it was on recall because they sent her a letter

## 2019-03-15 NOTE — TELEPHONE ENCOUNTER
----- Message from Maria Elena Rivers sent at 3/15/2019 11:55 AM CDT -----  Contact: Patient  Type:  Needs Medical Advice    Who Called: Dary  Symptoms (please be specific): n/a  How long has patient had these symptoms:  n/a  Pharmacy name and phone #:  n/a  Would the patient rather a call back or a response via [facility]?call back   Best Call Back Number: [phone]  Additional Information: The patient called to speak with the doctor (she doesn't want to speak with the nurse). She stated it's about recalled medication.

## 2019-03-15 NOTE — TELEPHONE ENCOUNTER
Spoke to pt she states a letter was sent from the pharmacy about her Losartan being on recall she would like another medication.

## 2019-03-21 ENCOUNTER — HOSPITAL ENCOUNTER (OUTPATIENT)
Dept: RADIOLOGY | Facility: HOSPITAL | Age: 64
Discharge: HOME | End: 2019-03-21
Attending: UROLOGY
Payer: COMMERCIAL

## 2019-03-21 DIAGNOSIS — C64.1 MALIGNANT NEOPLASM OF RIGHT KIDNEY: ICD-10-CM

## 2019-03-21 PROCEDURE — 76770 US EXAM ABDO BACK WALL COMP: CPT | Mod: TC

## 2019-03-21 PROCEDURE — 76770 US RETROPERITONEAL COMPLETE: ICD-10-PCS | Mod: 26,,, | Performed by: RADIOLOGY

## 2019-03-21 PROCEDURE — 76770 US EXAM ABDO BACK WALL COMP: CPT | Mod: 26,,, | Performed by: RADIOLOGY

## 2019-04-22 DIAGNOSIS — E11.69 TYPE 2 DIABETES MELLITUS WITH OTHER SPECIFIED COMPLICATION, WITH LONG-TERM CURRENT USE OF INSULIN: Primary | ICD-10-CM

## 2019-04-22 DIAGNOSIS — Z79.4 TYPE 2 DIABETES MELLITUS WITH OTHER SPECIFIED COMPLICATION, WITH LONG-TERM CURRENT USE OF INSULIN: ICD-10-CM

## 2019-04-22 DIAGNOSIS — Z79.4 TYPE 2 DIABETES MELLITUS WITH OTHER SPECIFIED COMPLICATION, WITH LONG-TERM CURRENT USE OF INSULIN: Primary | ICD-10-CM

## 2019-04-22 DIAGNOSIS — E11.69 TYPE 2 DIABETES MELLITUS WITH OTHER SPECIFIED COMPLICATION, WITH LONG-TERM CURRENT USE OF INSULIN: ICD-10-CM

## 2019-04-22 RX ORDER — INSULIN DEGLUDEC 200 U/ML
INJECTION, SOLUTION SUBCUTANEOUS
Qty: 12 SYRINGE | Refills: 0 | Status: SHIPPED | OUTPATIENT
Start: 2019-04-22 | End: 2019-04-30 | Stop reason: SDUPTHER

## 2019-04-23 ENCOUNTER — LAB VISIT (OUTPATIENT)
Dept: LAB | Facility: HOSPITAL | Age: 64
End: 2019-04-23
Attending: NURSE PRACTITIONER
Payer: COMMERCIAL

## 2019-04-23 DIAGNOSIS — E11.69 TYPE 2 DIABETES MELLITUS WITH OTHER SPECIFIED COMPLICATION, WITH LONG-TERM CURRENT USE OF INSULIN: ICD-10-CM

## 2019-04-23 DIAGNOSIS — Z79.4 TYPE 2 DIABETES MELLITUS WITH OTHER SPECIFIED COMPLICATION, WITH LONG-TERM CURRENT USE OF INSULIN: ICD-10-CM

## 2019-04-23 LAB
ALBUMIN SERPL BCP-MCNC: 3.9 G/DL (ref 3.5–5.2)
ALP SERPL-CCNC: 107 U/L (ref 55–135)
ALT SERPL W/O P-5'-P-CCNC: 15 U/L (ref 10–44)
ANION GAP SERPL CALC-SCNC: 6 MMOL/L (ref 8–16)
AST SERPL-CCNC: 12 U/L (ref 10–40)
BASOPHILS # BLD AUTO: 0.02 K/UL (ref 0–0.2)
BASOPHILS NFR BLD: 0.3 % (ref 0–1.9)
BILIRUB SERPL-MCNC: 0.3 MG/DL (ref 0.1–1)
BUN SERPL-MCNC: 25 MG/DL (ref 8–23)
CALCIUM SERPL-MCNC: 9.6 MG/DL (ref 8.7–10.5)
CHLORIDE SERPL-SCNC: 105 MMOL/L (ref 95–110)
CHOLEST SERPL-MCNC: 177 MG/DL (ref 120–199)
CHOLEST/HDLC SERPL: 3.3 {RATIO} (ref 2–5)
CO2 SERPL-SCNC: 30 MMOL/L (ref 23–29)
CREAT SERPL-MCNC: 0.8 MG/DL (ref 0.5–1.4)
DIFFERENTIAL METHOD: ABNORMAL
EOSINOPHIL # BLD AUTO: 0.2 K/UL (ref 0–0.5)
EOSINOPHIL NFR BLD: 2.7 % (ref 0–8)
ERYTHROCYTE [DISTWIDTH] IN BLOOD BY AUTOMATED COUNT: 15.2 % (ref 11.5–14.5)
EST. GFR  (AFRICAN AMERICAN): >60 ML/MIN/1.73 M^2
EST. GFR  (NON AFRICAN AMERICAN): >60 ML/MIN/1.73 M^2
ESTIMATED AVG GLUCOSE: 137 MG/DL (ref 68–131)
GLUCOSE SERPL-MCNC: 135 MG/DL (ref 70–110)
HBA1C MFR BLD HPLC: 6.4 % (ref 4–5.6)
HCT VFR BLD AUTO: 41.5 % (ref 37–48.5)
HDLC SERPL-MCNC: 54 MG/DL (ref 40–75)
HDLC SERPL: 30.5 % (ref 20–50)
HGB BLD-MCNC: 12.8 G/DL (ref 12–16)
IMM GRANULOCYTES # BLD AUTO: 0.01 K/UL (ref 0–0.04)
IMM GRANULOCYTES NFR BLD AUTO: 0.2 % (ref 0–0.5)
LDLC SERPL CALC-MCNC: 92 MG/DL (ref 63–159)
LYMPHOCYTES # BLD AUTO: 2.1 K/UL (ref 1–4.8)
LYMPHOCYTES NFR BLD: 32.3 % (ref 18–48)
MCH RBC QN AUTO: 24.6 PG (ref 27–31)
MCHC RBC AUTO-ENTMCNC: 30.8 G/DL (ref 32–36)
MCV RBC AUTO: 80 FL (ref 82–98)
MONOCYTES # BLD AUTO: 0.4 K/UL (ref 0.3–1)
MONOCYTES NFR BLD: 6.2 % (ref 4–15)
NEUTROPHILS # BLD AUTO: 3.8 K/UL (ref 1.8–7.7)
NEUTROPHILS NFR BLD: 58.3 % (ref 38–73)
NONHDLC SERPL-MCNC: 123 MG/DL
NRBC BLD-RTO: 0 /100 WBC
PLATELET # BLD AUTO: 326 K/UL (ref 150–350)
PMV BLD AUTO: 10.6 FL (ref 9.2–12.9)
POTASSIUM SERPL-SCNC: 5.1 MMOL/L (ref 3.5–5.1)
PROT SERPL-MCNC: 7.1 G/DL (ref 6–8.4)
RBC # BLD AUTO: 5.21 M/UL (ref 4–5.4)
SODIUM SERPL-SCNC: 141 MMOL/L (ref 136–145)
TRIGL SERPL-MCNC: 155 MG/DL (ref 30–150)
TSH SERPL DL<=0.005 MIU/L-ACNC: 0.76 UIU/ML (ref 0.4–4)
WBC # BLD AUTO: 6.57 K/UL (ref 3.9–12.7)

## 2019-04-23 PROCEDURE — 85025 COMPLETE CBC W/AUTO DIFF WBC: CPT

## 2019-04-23 PROCEDURE — 84443 ASSAY THYROID STIM HORMONE: CPT

## 2019-04-23 PROCEDURE — 36415 COLL VENOUS BLD VENIPUNCTURE: CPT | Mod: PO

## 2019-04-23 PROCEDURE — 80053 COMPREHEN METABOLIC PANEL: CPT

## 2019-04-23 PROCEDURE — 83036 HEMOGLOBIN GLYCOSYLATED A1C: CPT

## 2019-04-23 PROCEDURE — 80061 LIPID PANEL: CPT

## 2019-04-24 ENCOUNTER — OFFICE VISIT (OUTPATIENT)
Dept: UROLOGY | Facility: CLINIC | Age: 64
End: 2019-04-24
Payer: COMMERCIAL

## 2019-04-24 VITALS
SYSTOLIC BLOOD PRESSURE: 140 MMHG | HEART RATE: 85 BPM | WEIGHT: 171.94 LBS | HEIGHT: 62 IN | DIASTOLIC BLOOD PRESSURE: 78 MMHG | BODY MASS INDEX: 31.64 KG/M2

## 2019-04-24 DIAGNOSIS — C64.1 MALIGNANT NEOPLASM OF RIGHT KIDNEY: Primary | ICD-10-CM

## 2019-04-24 DIAGNOSIS — N28.1 RENAL CYST, LEFT: ICD-10-CM

## 2019-04-24 PROCEDURE — 99999 PR PBB SHADOW E&M-EST. PATIENT-LVL III: ICD-10-PCS | Mod: PBBFAC,,, | Performed by: UROLOGY

## 2019-04-24 PROCEDURE — 3078F DIAST BP <80 MM HG: CPT | Mod: CPTII,S$GLB,, | Performed by: UROLOGY

## 2019-04-24 PROCEDURE — 3008F PR BODY MASS INDEX (BMI) DOCUMENTED: ICD-10-PCS | Mod: CPTII,S$GLB,, | Performed by: UROLOGY

## 2019-04-24 PROCEDURE — 3077F PR MOST RECENT SYSTOLIC BLOOD PRESSURE >= 140 MM HG: ICD-10-PCS | Mod: CPTII,S$GLB,, | Performed by: UROLOGY

## 2019-04-24 PROCEDURE — 3008F BODY MASS INDEX DOCD: CPT | Mod: CPTII,S$GLB,, | Performed by: UROLOGY

## 2019-04-24 PROCEDURE — 99214 OFFICE O/P EST MOD 30 MIN: CPT | Mod: S$GLB,,, | Performed by: UROLOGY

## 2019-04-24 PROCEDURE — 99999 PR PBB SHADOW E&M-EST. PATIENT-LVL III: CPT | Mod: PBBFAC,,, | Performed by: UROLOGY

## 2019-04-24 PROCEDURE — 99214 PR OFFICE/OUTPT VISIT, EST, LEVL IV, 30-39 MIN: ICD-10-PCS | Mod: S$GLB,,, | Performed by: UROLOGY

## 2019-04-24 PROCEDURE — 3078F PR MOST RECENT DIASTOLIC BLOOD PRESSURE < 80 MM HG: ICD-10-PCS | Mod: CPTII,S$GLB,, | Performed by: UROLOGY

## 2019-04-24 PROCEDURE — 3077F SYST BP >= 140 MM HG: CPT | Mod: CPTII,S$GLB,, | Performed by: UROLOGY

## 2019-04-24 RX ORDER — INSULIN DEGLUDEC 100 U/ML
72 INJECTION, SOLUTION SUBCUTANEOUS DAILY
COMMUNITY
End: 2019-04-30 | Stop reason: SDUPTHER

## 2019-04-24 RX ORDER — TRAMADOL HYDROCHLORIDE 50 MG/1
TABLET ORAL
COMMUNITY
Start: 2019-03-06 | End: 2021-10-04

## 2019-04-24 RX ORDER — OXYCODONE AND ACETAMINOPHEN 10; 325 MG/1; MG/1
1 TABLET ORAL
Refills: 0 | COMMUNITY
Start: 2019-03-06 | End: 2019-12-16

## 2019-04-24 RX ORDER — GABAPENTIN 300 MG/1
300 CAPSULE ORAL
COMMUNITY
Start: 2019-02-22 | End: 2019-05-03

## 2019-04-24 NOTE — PROGRESS NOTES
Subjective:       Patient ID: Dary Chaney is a 63 y.o. female.    Chief Complaint: No chief complaint on file.      HPI: Dary Chaney is a 63 y.o. White female who returns today in follow-up for bilateral renal masses s/p right robotic partial nephrectomy on 9/13/18.    The masses were found incidentally during evaluation for non-specific abdominal pain and bloating. The masses were seen on an abdominal ultrasound. A CT scan with and without contrast was performed which showed the right renal mass to be enhancing and the left renal mass to be likely a cyst.    The patient denied gross hematuria and/or constitutional symptoms attributable to her recently discovered renal mass. The patient denied a personal and family history of kidney cancer. The patient denied any personal history of other cancers. She did describe some lower abdominal pressure and pain. She has no voiding complaints at present. The patient has never smoked. Her eGFR is greater than 60 ml/min.    The patient underwent surgery and did very well. 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 Her final pathologic stage is [id number].    She returns today for a routine follow-up visit with surveillance imaging.    She is doing well without complaints.    Review of patient's allergies indicates:   Allergen Reactions    Citrus and derivatives Swelling, Other (See Comments) and Edema     Redness      Latex Other (See Comments)     Other reaction(s): Rash  Other reaction(s): welts  Other reaction(s): Itching    Valsartan Other (See Comments)     Other reaction(s): disoriented    Aspirin Nausea Only     Other reaction(s): Nausea Only    Metformin Anxiety     Other reaction(s): anxiety       Current Outpatient Medications   Medication Sig Dispense Refill    gabapentin (NEURONTIN) 300 MG capsule Take 300 mg by mouth.      albuterol (PROVENTIL) 2.5 mg /3 mL (0.083 %) nebulizer solution Take 3 mLs (2.5 mg total) by  "nebulization every 6 (six) hours as needed for Wheezing. 120 mL 1    blood sugar diagnostic Strp Contour Next. Glucose testing three times daily. 300 strip 3    blood-glucose meter kit Contour Next. Use as instructed 1 each 0    dulaglutide (TRULICITY) 0.75 mg/0.5 mL PnIj Inject 70 Units into the skin.      fluticasone (FLONASE) 50 mcg/actuation nasal spray USE TWO SPRAY(S) IN EACH NOSTRIL ONCE DAILY (Patient taking differently: USE TWO SPRAY(S) IN EACH NOSTRIL ONCE DAILY  prn) 16 g 1    glimepiride (AMARYL) 4 MG tablet Take 1.5 tablets (6 mg total) by mouth daily with breakfast. 180 tablet 1    insulin degludec (TRESIBA FLEXTOUCH U-100) 100 unit/mL (3 mL) InPn Inject into the skin.      lancets 33 gauge Misc 1 lancet by Misc.(Non-Drug; Combo Route) route 3 (three) times daily. 300 each 3    levoFLOXacin (LEVAQUIN) 500 MG tablet Take 500 mg by mouth once daily.      lidocaine (LIDODERM) 5 % Place 1 patch onto the skin daily as needed. Remove & Discard patch within 12 hours or as directed by MD 30 patch 3    loratadine (CLARITIN) 10 mg tablet Take 1 tablet (10 mg total) by mouth once daily. (Patient taking differently: Take 10 mg by mouth daily as needed. ) 30 tablet 0    losartan (COZAAR) 25 MG tablet Take 1 tablet (25 mg total) by mouth once daily. 90 tablet 1    methylPREDNISolone (MEDROL) 4 MG Tab Take 4 mg by mouth once daily.      mupirocin (BACTROBAN) 2 % ointment       omeprazole (PRILOSEC) 40 MG capsule TAKE 1 CAPSULE BY MOUTH ONCE DAILY 30 capsule 3    oxyCODONE-acetaminophen (PERCOCET)  mg per tablet Take 1 tablet by mouth every 4 to 6 hours as needed.  0    pen needle, diabetic (PEN NEEDLE) 31 gauge x 5/16" Ndle Inject 1 each into the skin once daily. 100 each 11    simvastatin (ZOCOR) 40 MG tablet Take 1 tablet (40 mg total) by mouth nightly. 90 tablet 1    traMADol (ULTRAM) 50 mg tablet       TRESIBA FLEXTOUCH U-200 200 unit/mL (3 mL) InPn INJECT 76 UNITS SUBCUTANEOUSLY ONCE " DAILY 12 Syringe 0    TRULICITY 1.5 mg/0.5 mL PnIj INJECT 1.5 MG INTO THE SKIN EVERY 7 DAYS 12 Syringe 1     No current facility-administered medications for this visit.        Past Medical History:   Diagnosis Date    Arthritis     Cataract     Colon polyp     Diabetes mellitus type II     Hyperlipidemia     Hypertension     Renal cell carcinoma 09/2018    UTI (urinary tract infection)        Past Surgical History:   Procedure Laterality Date    CHOLECYSTECTOMY      CHOLECYSTECTOMY      COLONOSCOPY  2012    COLONOSCOPY N/A 3/1/2018    [facility]    ESOPHAGOGASTRODUODENOSCOPY (EGD) N/A 8/21/2018    Performed by Maco Wynn [facility] at [facility] ENDO    HYSTERECTOMY  1993    uterine prolapse    KNEE SURGERY  03/2017    SPINE SURGERY      thumb surgery Rt- July 2018 - cyst       XI ROBOTIC NEPHRECTOMY, PARTIAL Right 9/13/2018    Performed by [facility]shaw [facility] at [facility] OR [address] [place] [address]       Family History   Problem Relation Age of Onset    Diabetes Brother     Heart disease Neg Hx        Review of Systems    Review of Systems   Constitutional: Negative for fever, chills, activity change, appetite change and unexpected weight change.   HENT: Negative for congestion, nosebleeds, sneezing, sore throat and trouble swallowing.    Eyes: Negative for pain, discharge, redness and visual disturbance.   Respiratory: Negative for cough, choking, chest tightness and shortness of breath.    Cardiovascular: Negative for chest pain, palpitations and leg swelling.   Gastrointestinal: Negative for nausea, vomiting, abdominal pain, diarrhea, blood in stool, abdominal distention and anal bleeding.  Genitourinary: As documented per HPI   Endocrine: Negative for cold intolerance, heat intolerance, polydipsia, polyphagia and polyuria.   Musculoskeletal: Negative for myalgias, gait problem, neck pain and neck stiffness.   Skin: Negative for color change, pallor, rash and wound.    Allergic/Immunologic: Negative for immunocompromised state.   Neurological: Negative for seizures, facial asymmetry, speech difficulty, weakness and light-headedness.   Hematological: Negative for adenopathy. Does not bruise/bleed easily.   Psychiatric/Behavioral: Negative for hallucinations, behavioral problems, self-injury and agitation. The patient is not hyperactive.      All other systems were reviewed and were negative.    Objective:     Vitals:    04/24/19 0907   BP: (!) 140/78   Pulse: 85     Physical Exam   Vitals reviewed.  Constitutional: She is oriented to person, place, and time. She appears well-developed and well-nourished. No distress.   HENT:   Head: Normocephalic and atraumatic.   Right Ear: External ear normal.   Left Ear: External ear normal.   Nose: Nose normal.   Eyes: EOM are normal. Pupils are equal, round, and reactive to light. Right eye exhibits no discharge. Left eye exhibits no discharge.   Neck: Normal range of motion. Neck supple. No tracheal deviation present. No thyroid enlargement or tenderness.  Cardiovascular: Normal heart sounds and intact distal pulses. No signs of peripheral edema.   Pulmonary/Chest: Effort normal and breath sounds normal. No respiratory distress. She has no wheezes.   Abdominal: Soft. Bowel sounds are normal. She exhibits no distension. There is no tenderness. There is no rebound. No hepatic or splenic enlargement or tenderness. Well-healing robotic port sites.  Musculoskeletal: Normal range of motion. She exhibits no edema.   Neurological: She is alert and oriented to person, place, and time. Coordination normal.   Skin: Skin is warm and dry. She is not diaphoretic.   Lymphatic: No palpable lymph nodes.  Psychiatric: She has a normal mood and affect. Her behavior is normal. Judgment and thought content normal.     Lab Results   Component Value Date    CREATININE 0.8 04/23/2019     Lab Results   Component Value Date    EGFRNONAA >60.0 04/23/2019     Lab  Results   Component Value Date    ESTGFRAFRICA >60.0 04/23/2019     I have personally reviewed all the patient's imaging studies.    Abdominal ultrasound (1/16/18): There is a rounded echogenic cortical based lesion at the mid to lower pole of the right kidney measuring up to 1.4 cm without definite correlate seen on prior exams.  Exophytic heterogeneous lesion noted at the superior pole of the left kidney measuring up to 18 mm with possible internal calcification which was not definitely seen on prior ultrasound.  Simple appearing left renal cyst do not appear significantly changed measuring up to 2.8 cm at the interpolar region and 1.3 cm at the upper pole.    CT abdomen/pelvis without and with contrast (7/23/18): Approximately 2.0 cm enhancing right renal mass consistent with a renal cell carcinoma. Two left-sided lesions consistent with a hyperdense cyst and a Bosniak IIF lesion.    Renal ultrasound (3/21/19): No discrete right renal lesions are identified.  Solid upper pole exophytic left renal lesion with coarse calcification stable in appearance when compared to the prior exam.  Simple interpolar left renal cyst measuring up to 2.4 cm.    Assessment:       1. Malignant neoplasm of right kidney    2. Renal cyst, left        Plan:     Diagnoses and all orders for this visit:    Malignant neoplasm of right kidney  -     CT Abdomen With Without Contrast; Future    Renal cyst, left    The patient continues to do very well since her surgery in September 2018.    Her follow-up imaging shows no evidence of disease recurrence.    I will see her back in 6 months with interval imaging.    I answered all her questions.    I encouraged her and/or any of her family members to call and/or email me with questions/concerns.    I spent 30 minutes with the patient of which more than half was spent in coordinating the patient's care as well as in direct consultation with the patient in regards to our treatment and plan.

## 2019-04-24 NOTE — PATIENT INSTRUCTIONS
What Is Kidney (Renal) Cancer?    Cancer occurs when cells in the body mutate or change and start multiplying out of control. These cells can form lumps of tissue called tumors. Cancer that starts in the cells that make up the kidney is called kidney or renal cancer.  Understanding the kidneys  The kidneys are bean-shaped organs about the size of a bar of soap. They are found in the low back area, one on each side of the spine. The kidneys help keep the body alive by filtering waste and excess fluid from the blood. The kidneys send this liquid and waste (urine) to the bladder through the ureters. Urine then leaves the body through the urethra.  When kidney cancer forms  Kidney cancer forms when cells in the kidney change and multiply abnormally. The cancer can interfere with the working of the kidneys. Kidney cancer may spread beyond the kidneys to other parts of the body. This spread is called metastasis. The more cancer spreads, the harder it is to treat.  Treatment choices for kidney cancer  You and your healthcare provider will discuss a treatment plan that's best for your needs. Treatment choices may include the following.  Surgery  Surgery is the most common treatment for kidney cancer. Your healthcare provider may advise surgery to treat your kidney cancer if any of these apply to you:  · You are healthy enough to have surgery. Your healthcare provider will only advise surgery if he or she expects you to be able to recover from it.  · The tumor is small. In this case, your healthcare provider may do a partial nephrectomy. This surgery allows you to keep some kidney function. Only the part of the kidney that contains the tumor is taken out. In some cases, your provider may advise this surgery if the tumor is larger but you have cancer in both kidneys or if you have only one kidney. The benefit is that you keep part of the kidney. The risk is that there is a chance some cancer cells will be left  behind.  · The tumor is larger, but is only in your kidney. Your healthcare provider will advise the type of surgery you need based on the size of the tumor and where it is. Your provider may advise a simple nephrectomy. This is surgery to take out the entire kidney. Or your provider may advise a radical nephrectomy. This is surgery to take out the whole kidney and the adrenal gland. The adrenal gland is attached to the top of the kidney. Much of the nearby fatty tissue is also taken out. Nearby lymph nodes will also likely be removed. That's because cancer may travel to the nodes first. Taking out the lymph nodes may help prevent the spread of cancer to other parts of your body. And looking at these lymph nodes helps your provider figure out the stage of the cancer. This is important in deciding whether you need other treatments after surgery.  · You have kidney cancer that has spread to other areas and your doctors believe that removing the original kidney tumor will be a useful and effective addition to treatment. Your healthcare provider may suggest a radical nephrectomy and removal of nearby lymph nodes. The tumor or tumors in other parts of the body may also be removed or it may be recommended that you get anti-cancer drug therapy. Even in cases where surgery wont cure the cancer, surgery can sometimes help ease symptoms such as pressure, pain, or bleeding.  · You have symptoms. You may have pain, pressure, or bleeding from tumors that have spread. Your healthcare provider may suggest surgery to remove the tumors. This is done to ease symptoms. Because it doesn't cure the cancer, it is called palliative therapy.  Biologic therapy (immunotherapy)  This treatment strengthens your immune system to help fight cancer. It uses medicines that work like the chemicals that your bodys immune system makes. They help your immune system fight the cancer.  Chemotherapy  Chemotherapy uses medicines to kill cancer cells.  Regular chemotherapy medicines don't usually work well against kidney cancer. Chemotherapy has mostly been replaced by biologic therapy in kidney cancer treatment. But chemotherapy medicines are still sometimes used in rare cases where biologic therapy has not worked.  Radiation therapy  Radiation therapy uses directed rays of energy to kill cancer cells. Radiation therapy doesn't work as well as other methods for treating kidney cancer. Still, it may be used to treat someone who's not healthy enough to have surgery, or a person with kidney cancer that's not responding to other treatments. It may also be used to treat kidney cancer that has spread to the brain or bones. It may be used to treat pain caused by cancer that has spread to the bone (bone metastasis). It can also help stabilize a bone that has been weakened by metastasis and may be at risk for breaking.  Targeted therapies  These therapies use medicines to focus on or prevent changes in the cancer cells. The changes may be either in the cell's molecules or genes. These medicines help keep the cells from growing out of control and spreading to other parts of the body.  Date Last Reviewed: 2/1/2017 © 2000-2017 [employer]. [address] [place] [address], [place] [address]. All rights reserved. This information is not intended as a substitute for professional medical care. Always follow your healthcare professional's instructions.

## 2019-04-30 ENCOUNTER — OFFICE VISIT (OUTPATIENT)
Dept: DIABETES | Facility: CLINIC | Age: 64
End: 2019-04-30
Payer: COMMERCIAL

## 2019-04-30 VITALS
SYSTOLIC BLOOD PRESSURE: 152 MMHG | WEIGHT: 171.75 LBS | BODY MASS INDEX: 31.6 KG/M2 | HEIGHT: 62 IN | DIASTOLIC BLOOD PRESSURE: 84 MMHG

## 2019-04-30 DIAGNOSIS — E11.69 TYPE 2 DIABETES MELLITUS WITH OTHER SPECIFIED COMPLICATION, WITH LONG-TERM CURRENT USE OF INSULIN: Primary | ICD-10-CM

## 2019-04-30 DIAGNOSIS — I10 HYPERTENSION GOAL BP (BLOOD PRESSURE) < 130/80: Chronic | ICD-10-CM

## 2019-04-30 DIAGNOSIS — E78.5 HYPERLIPIDEMIA LDL GOAL <70: Chronic | ICD-10-CM

## 2019-04-30 DIAGNOSIS — Z79.4 TYPE 2 DIABETES MELLITUS WITH OTHER SPECIFIED COMPLICATION, WITH LONG-TERM CURRENT USE OF INSULIN: Primary | ICD-10-CM

## 2019-04-30 LAB — GLUCOSE SERPL-MCNC: 128 MG/DL (ref 70–110)

## 2019-04-30 PROCEDURE — 99999 PR PBB SHADOW E&M-EST. PATIENT-LVL III: CPT | Mod: PBBFAC,,, | Performed by: NURSE PRACTITIONER

## 2019-04-30 PROCEDURE — 3077F PR MOST RECENT SYSTOLIC BLOOD PRESSURE >= 140 MM HG: ICD-10-PCS | Mod: CPTII,S$GLB,, | Performed by: NURSE PRACTITIONER

## 2019-04-30 PROCEDURE — 3079F PR MOST RECENT DIASTOLIC BLOOD PRESSURE 80-89 MM HG: ICD-10-PCS | Mod: CPTII,S$GLB,, | Performed by: NURSE PRACTITIONER

## 2019-04-30 PROCEDURE — 3044F PR MOST RECENT HEMOGLOBIN A1C LEVEL <7.0%: ICD-10-PCS | Mod: CPTII,S$GLB,, | Performed by: NURSE PRACTITIONER

## 2019-04-30 PROCEDURE — 82962 POCT GLUCOSE, HAND-HELD DEVICE: ICD-10-PCS | Mod: S$GLB,,, | Performed by: NURSE PRACTITIONER

## 2019-04-30 PROCEDURE — 99999 PR PBB SHADOW E&M-EST. PATIENT-LVL III: ICD-10-PCS | Mod: PBBFAC,,, | Performed by: NURSE PRACTITIONER

## 2019-04-30 PROCEDURE — 3077F SYST BP >= 140 MM HG: CPT | Mod: CPTII,S$GLB,, | Performed by: NURSE PRACTITIONER

## 2019-04-30 PROCEDURE — 3079F DIAST BP 80-89 MM HG: CPT | Mod: CPTII,S$GLB,, | Performed by: NURSE PRACTITIONER

## 2019-04-30 PROCEDURE — 3044F HG A1C LEVEL LT 7.0%: CPT | Mod: CPTII,S$GLB,, | Performed by: NURSE PRACTITIONER

## 2019-04-30 PROCEDURE — 82962 GLUCOSE BLOOD TEST: CPT | Mod: S$GLB,,, | Performed by: NURSE PRACTITIONER

## 2019-04-30 PROCEDURE — 3008F PR BODY MASS INDEX (BMI) DOCUMENTED: ICD-10-PCS | Mod: CPTII,S$GLB,, | Performed by: NURSE PRACTITIONER

## 2019-04-30 PROCEDURE — 3008F BODY MASS INDEX DOCD: CPT | Mod: CPTII,S$GLB,, | Performed by: NURSE PRACTITIONER

## 2019-04-30 PROCEDURE — 99214 OFFICE O/P EST MOD 30 MIN: CPT | Mod: S$GLB,,, | Performed by: NURSE PRACTITIONER

## 2019-04-30 PROCEDURE — 99214 PR OFFICE/OUTPT VISIT, EST, LEVL IV, 30-39 MIN: ICD-10-PCS | Mod: S$GLB,,, | Performed by: NURSE PRACTITIONER

## 2019-04-30 RX ORDER — INSULIN DEGLUDEC 200 U/ML
INJECTION, SOLUTION SUBCUTANEOUS
Qty: 12 SYRINGE | Refills: 3 | Status: SHIPPED | OUTPATIENT
Start: 2019-04-30 | End: 2019-10-24 | Stop reason: SDUPTHER

## 2019-04-30 RX ORDER — GLIMEPIRIDE 4 MG/1
6 TABLET ORAL
Qty: 180 TABLET | Refills: 1 | Status: SHIPPED | OUTPATIENT
Start: 2019-04-30 | End: 2019-11-14 | Stop reason: SDUPTHER

## 2019-04-30 RX ORDER — PEN NEEDLE, DIABETIC 30 GX3/16"
1 NEEDLE, DISPOSABLE MISCELLANEOUS DAILY
Qty: 100 EACH | Refills: 11 | Status: SHIPPED | OUTPATIENT
Start: 2019-04-30 | End: 2019-11-14 | Stop reason: SDUPTHER

## 2019-04-30 NOTE — PATIENT INSTRUCTIONS
CONTINUE CURRENT REGIMEN. YOU ARE DOING GREAT!!!      LET US KNOW IF THERE IS ANYTHING WE CAN DO FOR YOU.

## 2019-04-30 NOTE — PROGRESS NOTES
"Subjective:         Patient ID: Dary Chaney is a 63 y.o. female.  Patient's current PCP is Sakina Cooper DO.       Chief Complaint: Diabetes Mellitus      Dary Chaney is a 63 y.o. White female presenting for 3 month follow up of diabetes. Patient has been diagnosed with diabetes for over 10 years and has the following complications associated with diabetes: hypertension, hyperlipidemia, obesity. Blood glucose testing is performed regularly. Patient reports fastings 110-130, PP less than 180. Condition remained controlled.     She denies any recent hospital admissions, emergency room visits, hypoglycemia.      Height: 5' 2" (157.5 cm)  //  Weight: 77.9 kg (171 lb 11.8 oz), Body mass index is 31.41 kg/m².    Her blood sugar in clinic today is:   Lab Results   Component Value Date    POCGLU 128 (A) 04/30/2019       Labs reviewed and are noted below.    Her most recent A1C is:   Lab Results   Component Value Date    HGBA1C 6.4 (H) 04/23/2019     Lab Results   Component Value Date    CPEPTIDE 1.10 12/14/2017     Lab Results   Component Value Date    GLUTAMICACID 0.00 12/14/2017         CURRENT DM MEDICATIONS:   Current Outpatient Prescriptions   Medication Sig Dispense Refill    glimepiride (AMARYL) 4 MG tablet Take 1.5 tablet (6 mg total) by mouth daily with breakfast.  90 tablet 1    Tresiba Inject 74 units SQ    1 Box 3    Trulicity 1.5mg q week 9 Syringe 3     No current facility-administered medications for this visit.        Health Maintenance   Topic Date Due    Zoster Vaccine  12/05/2014    Pneumococcal Vaccine (Highest Risk) (3 of 3 - PCV13) 08/10/2017    Hemoglobin A1c  10/23/2019    Eye Exam  11/12/2019    Foot Exam  11/29/2019    Low Dose Statin  02/15/2020    Lipid Panel  04/23/2020    Mammogram  07/19/2020    TETANUS VACCINE  08/26/2021    Colonoscopy  03/01/2028    Hepatitis C Screening  Completed    Influenza Vaccine  Completed       STANDARDS OF CARE:  Current " "Ophthalmologist/Optometrist: Dr. Carlos. Last exam 2018  Current Podiatrist: No  ACE/ARB: Yes  Statin: Yes  She  has attended diabetes education in the past     LIFESTYLE:  ACTIVITY LEVEL: Moderately Active  EXERCISE:  none  MEAL PLANNING: Patient reports number of meals per day to be 3 and number of snacks per day to be 2    BLOOD GLUCOSE TESTING: Patient reports testing on average a total of 2 times per day.      Review of Systems   Constitutional: Negative.  Negative for activity change, appetite change, fatigue and unexpected weight change.   Eyes: Negative for visual disturbance.   Gastrointestinal: Negative for constipation, diarrhea and nausea.   Endocrine: Negative.  Negative for cold intolerance, heat intolerance, polydipsia, polyphagia and polyuria.   Skin: Negative for wound.         Objective:      Physical Exam   Constitutional: She appears well-developed and well-nourished.   HENT:   Head: Normocephalic and atraumatic.   Cardiovascular: Normal rate.   Pulmonary/Chest: Effort normal.   Skin: Skin is warm and dry.   Psychiatric: She has a normal mood and affect. Her speech is normal and behavior is normal. Judgment and thought content normal.   Nursing note and vitals reviewed.      Assessment:       1. Type 2 diabetes mellitus with other specified complication, with long-term current use of insulin    2. Hyperlipidemia LDL goal <70    3. Hypertension goal BP (blood pressure) < 130/80        Plan:   Type 2 diabetes mellitus with other specified complication, with long-term current use of insulin  -     POCT Glucose, Hand-Held Device  -     insulin degludec (TRESIBA FLEXTOUCH U-200) 200 unit/mL (3 mL) InPn; INJECT 76 UNITS SUBCUTANEOUSLY ONCE DAILY  Dispense: 12 Syringe; Refill: 3  -     pen needle, diabetic (PEN NEEDLE) 31 gauge x 5/16" Ndle; Inject 1 each into the skin once daily.  Dispense: 100 each; Refill: 11  -     glimepiride (AMARYL) 4 MG tablet; Take 1.5 tablets (6 mg total) by mouth daily with " breakfast.  Dispense: 180 tablet; Refill: 1    - Condition Controlled. Continue current regimen.  DM education reviewed. Patient encouraged to carb count and exercise per recommendations. Labs and Referrals as noted. Patient instructed to send in log once weekly for my review. RV scheduled in 6 months    Hyperlipidemia LDL goal <100    - LDL at goal.    Hypertension goal BP (blood pressure) < 130/80    - BP mildly elevated today in clinic. PCP office to be notified.     Additional Plan Details:    1.) Patient was instructed to monitor blood glucose 2 - 3 x daily, fasting and ac dinner or at bedtime. Discussed ADA goal for fasting blood sugar, 80 - 130mg/dL; pp blood sugars below 180 mg/dl. Also, discussed prevention of hypoglycemia and the need to adjust goals to higher levels if persistent hypoglycemia.  Reminded to bring BG records or meter to each visit for review.    2.)The patient was explained the above plan and given opportunity to ask questions.  She understands, chooses and consents to this plan and accepts all the risks, which include but are not limited to the risks mentioned above. She understands the alternative of having no testing, interventions or treatments at this time. She left content and without further questions.     A total of 25 minutes was spent in face to face time, of which over 50% was spent in counseling patient on disease process, complications, treatment, and side effects of medications.        HADLEY Singh

## 2019-05-03 ENCOUNTER — OFFICE VISIT (OUTPATIENT)
Dept: INTERNAL MEDICINE | Facility: CLINIC | Age: 64
End: 2019-05-03
Payer: COMMERCIAL

## 2019-05-03 VITALS
OXYGEN SATURATION: 97 % | HEART RATE: 85 BPM | DIASTOLIC BLOOD PRESSURE: 86 MMHG | WEIGHT: 169.56 LBS | SYSTOLIC BLOOD PRESSURE: 142 MMHG | BODY MASS INDEX: 31.2 KG/M2 | TEMPERATURE: 96 F | HEIGHT: 62 IN

## 2019-05-03 DIAGNOSIS — I10 HYPERTENSION GOAL BP (BLOOD PRESSURE) < 130/80: Primary | Chronic | ICD-10-CM

## 2019-05-03 DIAGNOSIS — E11.9 CONTROLLED TYPE 2 DIABETES MELLITUS WITHOUT COMPLICATION, WITH LONG-TERM CURRENT USE OF INSULIN: ICD-10-CM

## 2019-05-03 DIAGNOSIS — E78.5 HYPERLIPIDEMIA LDL GOAL <70: Chronic | ICD-10-CM

## 2019-05-03 DIAGNOSIS — E66.9 OBESITY (BMI 30.0-34.9): ICD-10-CM

## 2019-05-03 DIAGNOSIS — Z79.4 CONTROLLED TYPE 2 DIABETES MELLITUS WITHOUT COMPLICATION, WITH LONG-TERM CURRENT USE OF INSULIN: ICD-10-CM

## 2019-05-03 PROCEDURE — 3079F DIAST BP 80-89 MM HG: CPT | Mod: CPTII,S$GLB,, | Performed by: INTERNAL MEDICINE

## 2019-05-03 PROCEDURE — 99214 PR OFFICE/OUTPT VISIT, EST, LEVL IV, 30-39 MIN: ICD-10-PCS | Mod: S$GLB,,, | Performed by: INTERNAL MEDICINE

## 2019-05-03 PROCEDURE — 3077F SYST BP >= 140 MM HG: CPT | Mod: CPTII,S$GLB,, | Performed by: INTERNAL MEDICINE

## 2019-05-03 PROCEDURE — 3077F PR MOST RECENT SYSTOLIC BLOOD PRESSURE >= 140 MM HG: ICD-10-PCS | Mod: CPTII,S$GLB,, | Performed by: INTERNAL MEDICINE

## 2019-05-03 PROCEDURE — 99214 OFFICE O/P EST MOD 30 MIN: CPT | Mod: S$GLB,,, | Performed by: INTERNAL MEDICINE

## 2019-05-03 PROCEDURE — 3008F BODY MASS INDEX DOCD: CPT | Mod: CPTII,S$GLB,, | Performed by: INTERNAL MEDICINE

## 2019-05-03 PROCEDURE — 3044F HG A1C LEVEL LT 7.0%: CPT | Mod: CPTII,S$GLB,, | Performed by: INTERNAL MEDICINE

## 2019-05-03 PROCEDURE — 3008F PR BODY MASS INDEX (BMI) DOCUMENTED: ICD-10-PCS | Mod: CPTII,S$GLB,, | Performed by: INTERNAL MEDICINE

## 2019-05-03 PROCEDURE — 99999 PR PBB SHADOW E&M-EST. PATIENT-LVL III: CPT | Mod: PBBFAC,,, | Performed by: INTERNAL MEDICINE

## 2019-05-03 PROCEDURE — 99999 PR PBB SHADOW E&M-EST. PATIENT-LVL III: ICD-10-PCS | Mod: PBBFAC,,, | Performed by: INTERNAL MEDICINE

## 2019-05-03 PROCEDURE — 3079F PR MOST RECENT DIASTOLIC BLOOD PRESSURE 80-89 MM HG: ICD-10-PCS | Mod: CPTII,S$GLB,, | Performed by: INTERNAL MEDICINE

## 2019-05-03 PROCEDURE — 3044F PR MOST RECENT HEMOGLOBIN A1C LEVEL <7.0%: ICD-10-PCS | Mod: CPTII,S$GLB,, | Performed by: INTERNAL MEDICINE

## 2019-05-03 RX ORDER — OLMESARTAN MEDOXOMIL 20 MG/1
20 TABLET ORAL DAILY
Qty: 30 TABLET | Refills: 3 | Status: SHIPPED | OUTPATIENT
Start: 2019-05-03 | End: 2019-08-31 | Stop reason: SDUPTHER

## 2019-05-03 NOTE — PROGRESS NOTES
Subjective:       Patient ID: Dary Chaney is a 63 y.o. female.    Chief Complaint: Follow-up (3 month )    Dary Chaney  63 y.o. White female    Patient presents with:  Follow-up: 3 month     HPI: Here today to follow up on chronic conditions.  HTN--mildly elevated. She has been compliant with her low sodium diet and losartan. She does want to change her losartan due to recent recall. She has not been exercising much due to her recent knee surgery. She is in therapy.   HLD--compliant with simvastatin.                       CHOL                     177                 04/23/2019                    HDL                      54                  04/23/2019                    LDLCALC                  92.0                04/23/2019                 TRIG                     155 (H)             04/23/2019            Diabetes--stable on glimepiride, Trulicity and Tresiba.                   HGBA1C                   6.4 (H)             04/23/2019                Past Medical History:  Arthritis  Cataract  Colon polyp  Diabetes mellitus type II  Hyperlipidemia  Hypertension  Renal cell carcinoma  01/20/2019: Total knee replacement status, left      Comment:  Dr.Robert Cook  UTI (urinary tract infection)    Current Outpatient Medications on File Prior to Visit:  albuterol (PROVENTIL) 2.5 mg /3 mL (0.083 %) nebulizer solution, Take 3 mLs (2.5 mg total) by nebulization every 6 (six) hours as needed for Wheezing., Disp: 120 mL, Rfl: 1  blood sugar diagnostic Strp, Contour Next. Glucose testing three times daily., Disp: 300 strip, Rfl: 3  blood-glucose meter kit, Contour Next. Use as instructed, Disp: 1 each, Rfl: 0  fluticasone (FLONASE) 50 mcg/actuation nasal spray, USE TWO SPRAY(S) IN EACH NOSTRIL ONCE DAILY (Patient taking differently: USE TWO SPRAY(S) IN EACH NOSTRIL ONCE DAILY  prn), Disp: 16 g, Rfl: 1  glimepiride (AMARYL) 4 MG tablet, Take 1.5 tablets (6 mg total) by mouth daily with breakfast., Disp: 180 tablet, Rfl:  "1  insulin degludec (TRESIBA FLEXTOUCH U-200) 200 unit/mL (3 mL) InPn, INJECT 76 UNITS SUBCUTANEOUSLY ONCE DAILY, Disp: 12 Syringe, Rfl: 3  lancets 33 gauge Misc, 1 lancet by Misc.(Non-Drug; Combo Route) route 3 (three) times daily., Disp: 300 each, Rfl: 3  loratadine (CLARITIN) 10 mg tablet, Take 1 tablet (10 mg total) by mouth once daily. (Patient taking differently: Take 10 mg by mouth daily as needed. ), Disp: 30 tablet, Rfl: 0  pen needle, diabetic (PEN NEEDLE) 31 gauge x 5/16" Ndle, Inject 1 each into the skin once daily., Disp: 100 each, Rfl: 11  simvastatin (ZOCOR) 40 MG tablet, Take 1 tablet (40 mg total) by mouth nightly., Disp: 90 tablet, Rfl: 1  traMADol (ULTRAM) 50 mg tablet, , Disp: , Rfl:   TRULICITY 1.5 mg/0.5 mL PnIj, INJECT 1.5 MG INTO THE SKIN EVERY 7 DAYS, Disp: 12 Syringe, Rfl: 1  losartan (COZAAR) 25 MG tablet, Take 1 tablet (25 mg total) by mouth once daily., Disp: 90 tablet, Rfl: 1  lidocaine (LIDODERM) 5 %, Place 1 patch onto the skin daily as needed. Remove & Discard patch within 12 hours or as directed by MD, Disp: 30 patch, Rfl: 3  mupirocin (BACTROBAN) 2 % ointment, , Disp: , Rfl:   omeprazole (PRILOSEC) 40 MG capsule, TAKE 1 CAPSULE BY MOUTH ONCE DAILY, Disp: 30 capsule, Rfl: 3  oxyCODONE-acetaminophen (PERCOCET)  mg per tablet, Take 1 tablet by mouth every 4 to 6 hours as needed., Disp: , Rfl: 0    Allergies:  Review of patient's allergies indicates:   -- Citrus and derivatives -- Swelling, Other (See Comments) and                            Edema    --  Redness             Lip swelling             Itching   -- Latex -- Other (See Comments)    --  Other reaction(s): Rash             Other reaction(s): welts             Other reaction(s): Itching   -- Valsartan -- Other (See Comments)    --  Other reaction(s): disoriented   -- Aspirin -- Nausea Only    --  Other reaction(s): Nausea Only   -- Metformin -- Anxiety    --  Other reaction(s): anxiety          Review of Systems "   Constitutional: Negative for fever and unexpected weight change.   Respiratory: Negative for shortness of breath.    Cardiovascular: Negative for chest pain and leg swelling.   Gastrointestinal: Negative for abdominal pain, constipation and diarrhea.   Genitourinary: Negative for difficulty urinating.   Musculoskeletal: Positive for arthralgias and gait problem.   Neurological: Negative for dizziness and headaches.       Objective:      Physical Exam   Constitutional: She is oriented to person, place, and time. She appears well-developed and well-nourished. No distress.   Eyes: No scleral icterus.   Neck: No tracheal deviation present.   Cardiovascular: Normal rate, regular rhythm and normal heart sounds.   Pulmonary/Chest: Effort normal and breath sounds normal. No respiratory distress. She has no wheezes. She has no rales.   Abdominal: Soft. Bowel sounds are normal.   Neurological: She is alert and oriented to person, place, and time.   Skin: Skin is warm and dry.   Psychiatric: She has a normal mood and affect.   Vitals reviewed.      Assessment:       1. Hypertension goal BP (blood pressure) < 130/80    2. Hyperlipidemia LDL goal <70    3. Controlled type 2 diabetes mellitus without complication, with long-term current use of insulin    4. Obesity (BMI 30.0-34.9)        Plan:       Dary was seen today for follow-up.    Diagnoses and all orders for this visit:    Hypertension goal BP (blood pressure) < 130/80  -     Comprehensive metabolic panel; Standing  -     Lipid panel; Standing  -     Change to olmesartan (BENICAR) 20 MG tablet; Take 1 tablet (20 mg total) by mouth once daily.  -     Recommend home b/p monitoring     Hyperlipidemia LDL goal <70  -     Continue simvastatin  -     Comprehensive metabolic panel; Standing  -     Lipid panel; Standing    Controlled type 2 diabetes mellitus without complication, with long-term current use of insulin  -     Continue current management  -     Comprehensive  metabolic panel; Standing  -     Hemoglobin A1c; Standing  -     Lipid panel; Standing    Obesity (BMI 30.0-34.9)  -     Lifestyle modifications discussed    RTC in 1 month for b/p recheck.

## 2019-06-04 ENCOUNTER — CLINICAL SUPPORT (OUTPATIENT)
Dept: INTERNAL MEDICINE | Facility: CLINIC | Age: 64
End: 2019-06-04
Payer: COMMERCIAL

## 2019-06-04 VITALS — SYSTOLIC BLOOD PRESSURE: 144 MMHG | HEART RATE: 80 BPM | OXYGEN SATURATION: 95 % | DIASTOLIC BLOOD PRESSURE: 86 MMHG

## 2019-06-04 PROCEDURE — 99999 PR PBB SHADOW E&M-EST. PATIENT-LVL III: CPT | Mod: PBBFAC,,,

## 2019-06-04 PROCEDURE — 99999 PR PBB SHADOW E&M-EST. PATIENT-LVL III: ICD-10-PCS | Mod: PBBFAC,,,

## 2019-06-04 NOTE — PROGRESS NOTES
Patient presented in office today for blood pressure check. Patient medications reviewed and pharmacy updated at time of visit. Patient took blood pressure medication before visit and complains of no headaches, blurred vision, dizziness or numbness/tingling in arms or fingers. Patient's blood pressure is 144/86 on right arm, sitting with large manual cuff. Patient's pulse is 80 and oxygen level 95. Patient allowed to sit for ten minutes before rechecking blood pressure. Patient's second blood pressure reading is 130/82 on right arm, sitting, with large manual cuff.

## 2019-07-12 RX ORDER — OMEPRAZOLE 40 MG/1
CAPSULE, DELAYED RELEASE ORAL
Qty: 30 CAPSULE | Refills: 3 | Status: SHIPPED | OUTPATIENT
Start: 2019-07-12 | End: 2020-01-02

## 2019-07-29 DIAGNOSIS — E11.69 TYPE 2 DIABETES MELLITUS WITH OTHER SPECIFIED COMPLICATION, WITH LONG-TERM CURRENT USE OF INSULIN: ICD-10-CM

## 2019-07-29 DIAGNOSIS — Z79.4 TYPE 2 DIABETES MELLITUS WITH OTHER SPECIFIED COMPLICATION, WITH LONG-TERM CURRENT USE OF INSULIN: ICD-10-CM

## 2019-07-29 RX ORDER — INSULIN DEGLUDEC 200 U/ML
INJECTION, SOLUTION SUBCUTANEOUS
Qty: 36 ML | Refills: 0 | Status: SHIPPED | OUTPATIENT
Start: 2019-07-29 | End: 2019-08-28

## 2019-08-31 DIAGNOSIS — I10 HYPERTENSION GOAL BP (BLOOD PRESSURE) < 130/80: Chronic | ICD-10-CM

## 2019-09-03 RX ORDER — OLMESARTAN MEDOXOMIL 20 MG/1
TABLET ORAL
Qty: 30 TABLET | Refills: 3 | Status: SHIPPED | OUTPATIENT
Start: 2019-09-03 | End: 2020-01-02

## 2019-10-18 ENCOUNTER — TELEPHONE (OUTPATIENT)
Dept: RADIOLOGY | Facility: HOSPITAL | Age: 64
End: 2019-10-18

## 2019-10-21 ENCOUNTER — HOSPITAL ENCOUNTER (OUTPATIENT)
Dept: RADIOLOGY | Facility: HOSPITAL | Age: 64
Discharge: HOME | End: 2019-10-21
Attending: UROLOGY
Payer: COMMERCIAL

## 2019-10-21 ENCOUNTER — LAB VISIT (OUTPATIENT)
Dept: LAB | Facility: HOSPITAL | Age: 64
End: 2019-10-21
Attending: INTERNAL MEDICINE
Payer: COMMERCIAL

## 2019-10-21 DIAGNOSIS — Z79.4 CONTROLLED TYPE 2 DIABETES MELLITUS WITHOUT COMPLICATION, WITH LONG-TERM CURRENT USE OF INSULIN: ICD-10-CM

## 2019-10-21 DIAGNOSIS — C64.1 MALIGNANT NEOPLASM OF RIGHT KIDNEY: ICD-10-CM

## 2019-10-21 DIAGNOSIS — E78.5 HYPERLIPIDEMIA LDL GOAL <70: Chronic | ICD-10-CM

## 2019-10-21 DIAGNOSIS — E11.9 CONTROLLED TYPE 2 DIABETES MELLITUS WITHOUT COMPLICATION, WITH LONG-TERM CURRENT USE OF INSULIN: ICD-10-CM

## 2019-10-21 DIAGNOSIS — I10 HYPERTENSION GOAL BP (BLOOD PRESSURE) < 130/80: Chronic | ICD-10-CM

## 2019-10-21 LAB
ALBUMIN SERPL BCP-MCNC: 3.5 G/DL (ref 3.5–5.2)
ALP SERPL-CCNC: 90 U/L (ref 55–135)
ALT SERPL W/O P-5'-P-CCNC: 23 U/L (ref 10–44)
ANION GAP SERPL CALC-SCNC: 10 MMOL/L (ref 8–16)
AST SERPL-CCNC: 12 U/L (ref 10–40)
BILIRUB SERPL-MCNC: 0.3 MG/DL (ref 0.1–1)
BUN SERPL-MCNC: 22 MG/DL (ref 8–23)
CALCIUM SERPL-MCNC: 9.2 MG/DL (ref 8.7–10.5)
CHLORIDE SERPL-SCNC: 106 MMOL/L (ref 95–110)
CO2 SERPL-SCNC: 27 MMOL/L (ref 23–29)
CREAT SERPL-MCNC: 0.7 MG/DL (ref 0.5–1.4)
EST. GFR  (AFRICAN AMERICAN): >60 ML/MIN/1.73 M^2
EST. GFR  (NON AFRICAN AMERICAN): >60 ML/MIN/1.73 M^2
GLUCOSE SERPL-MCNC: 167 MG/DL (ref 70–110)
POTASSIUM SERPL-SCNC: 4.3 MMOL/L (ref 3.5–5.1)
PROT SERPL-MCNC: 6.7 G/DL (ref 6–8.4)
SODIUM SERPL-SCNC: 143 MMOL/L (ref 136–145)

## 2019-10-21 PROCEDURE — 74170 CT ABD WO CNTRST FLWD CNTRST: CPT | Mod: 26,,, | Performed by: RADIOLOGY

## 2019-10-21 PROCEDURE — 74170 CT ABD WO CNTRST FLWD CNTRST: CPT | Mod: TC

## 2019-10-21 PROCEDURE — 74170 CT ABDOMEN W WO CONTRAST: ICD-10-PCS | Mod: 26,,, | Performed by: RADIOLOGY

## 2019-10-21 PROCEDURE — 80053 COMPREHEN METABOLIC PANEL: CPT

## 2019-10-21 PROCEDURE — 25500020 PHARM REV CODE 255: Performed by: UROLOGY

## 2019-10-21 PROCEDURE — 36415 COLL VENOUS BLD VENIPUNCTURE: CPT

## 2019-10-21 RX ADMIN — IOHEXOL 100 ML: 350 INJECTION, SOLUTION INTRAVENOUS at 11:10

## 2019-10-23 ENCOUNTER — OFFICE VISIT (OUTPATIENT)
Dept: UROLOGY | Facility: CLINIC | Age: 64
End: 2019-10-23
Payer: COMMERCIAL

## 2019-10-23 VITALS — WEIGHT: 174.38 LBS | BODY MASS INDEX: 31.9 KG/M2 | DIASTOLIC BLOOD PRESSURE: 96 MMHG | SYSTOLIC BLOOD PRESSURE: 150 MMHG

## 2019-10-23 DIAGNOSIS — C64.9 RENAL CELL CARCINOMA, UNSPECIFIED LATERALITY: Primary | ICD-10-CM

## 2019-10-23 DIAGNOSIS — I10 HYPERTENSION, UNSPECIFIED TYPE: ICD-10-CM

## 2019-10-23 LAB
BILIRUB SERPL-MCNC: NORMAL MG/DL
BLOOD URINE, POC: NORMAL
COLOR, POC UA: YELLOW
GLUCOSE UR QL STRIP: 100
KETONES UR QL STRIP: NORMAL
LEUKOCYTE ESTERASE URINE, POC: NORMAL
NITRITE, POC UA: NORMAL
PH, POC UA: 5
PROTEIN, POC: NORMAL
SPECIFIC GRAVITY, POC UA: 1.02
UROBILINOGEN, POC UA: NORMAL

## 2019-10-23 PROCEDURE — 99499 RISK ADDL DX/OHS AUDIT: ICD-10-PCS | Mod: S$GLB,,, | Performed by: UROLOGY

## 2019-10-23 PROCEDURE — 81002 URINALYSIS NONAUTO W/O SCOPE: CPT | Mod: S$GLB,,, | Performed by: UROLOGY

## 2019-10-23 PROCEDURE — 99999 PR PBB SHADOW E&M-EST. PATIENT-LVL III: CPT | Mod: PBBFAC,,, | Performed by: UROLOGY

## 2019-10-23 PROCEDURE — 99499 UNLISTED E&M SERVICE: CPT | Mod: S$GLB,,, | Performed by: UROLOGY

## 2019-10-23 PROCEDURE — 99999 PR PBB SHADOW E&M-EST. PATIENT-LVL III: ICD-10-PCS | Mod: PBBFAC,,, | Performed by: UROLOGY

## 2019-10-23 PROCEDURE — 99214 OFFICE O/P EST MOD 30 MIN: CPT | Mod: 25,S$GLB,, | Performed by: UROLOGY

## 2019-10-23 PROCEDURE — 81002 POCT URINE DIPSTICK WITHOUT MICROSCOPE: ICD-10-PCS | Mod: S$GLB,,, | Performed by: UROLOGY

## 2019-10-23 PROCEDURE — 99214 PR OFFICE/OUTPT VISIT, EST, LEVL IV, 30-39 MIN: ICD-10-PCS | Mod: 25,S$GLB,, | Performed by: UROLOGY

## 2019-10-23 RX ORDER — DULAGLUTIDE 1.5 MG/.5ML
INJECTION, SOLUTION SUBCUTANEOUS
Qty: 12 SYRINGE | Refills: 1 | Status: SHIPPED | OUTPATIENT
Start: 2019-10-23 | End: 2019-10-24 | Stop reason: SDUPTHER

## 2019-10-23 NOTE — PROGRESS NOTES
Chief Complaint: RCC    HPI:   10/23/19: 6 mo CT reassuring.  Reviewed history in detail.  4/24/19: returns today in follow-up for bilateral renal masses s/p right robotic partial nephrectomy on 9/13/18. The masses were found incidentally during evaluation for non-specific abdominal pain and bloating. The masses were seen on an abdominal ultrasound. A CT scan with and without contrast was performed which showed the right renal mass to be enhancing and the left renal mass to be likely a cyst. The patient denied gross hematuria and/or constitutional symptoms attributable to her recently discovered renal mass. The patient denied a personal and family history of kidney cancer. The patient denied any personal history of other cancers. She did describe some lower abdominal pressure and pain. She has no voiding complaints at present. The patient has never smoked. Her eGFR is greater than 60 ml/min.    The patient underwent surgery and did very well. 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 Her final pathologic stage is [id number].  Allergies:  Citrus and derivatives; Latex; Valsartan; Aspirin; and Metformin    Medications: has a current medication list which includes the following prescription(s): albuterol, blood sugar diagnostic, fluticasone propionate, glimepiride, insulin degludec, lancets, lidocaine, olmesartan, omeprazole, simvastatin, trulicity, blood-glucose meter, loratadine, mupirocin, oxycodone-acetaminophen, pen needle, diabetic, and tramadol.    Review of Systems:  General: No fever, chills, fatigability, or weight loss.  Skin: No rashes, itching, or changes in color or texture of skin.  Chest: Denies IZQUIERDO, cyanosis, wheezing, cough, and sputum production.  Abdomen: Appetite fine. No weight loss. Denies diarrhea, abdominal pain, hematemesis, or blood in stool.  Musculoskeletal: No joint stiffness or swelling. Denies back pain.  : As above.  All other review of systems  negative.    PMH:   has a past medical history of Arthritis, Cataract, Colon polyp, Diabetes mellitus type II, Hyperlipidemia, Hypertension, Renal cell carcinoma (09/2018), Total knee replacement status, left (01/20/2019), and UTI (urinary tract infection).    PSH:   has a past surgical history that includes Cholecystectomy; Spine surgery; Hysterectomy (1993); Knee surgery (03/2017); Cholecystectomy; Colonoscopy (2012); Colonoscopy (N/A, 3/1/2018); Esophagogastroduodenoscopy (N/A, 8/21/2018); thumb surgery Rt- July 2018 - cyst ; Robot-assisted laparoscopic partial nephrectomy using da Basim Xi (Right, 9/13/2018); Joint replacement; and left knee replacement (01/20/2019).    FamHx: family history includes Diabetes in her brother.    SocHx:  reports that she has never smoked. She has never used smokeless tobacco. She reports that she drinks alcohol. She reports that she does not use drugs.     Physical Exam:  Vitals:   Vitals:    10/23/19 1304   BP: (!) 150/96     General: A&Ox3. No apparent distress. No deformities.  Neck: No masses. Normal thyroid.  Lungs: normal inspiration. No use of accessory muscles.  Heart: normal pulse. No arrhythmias.  Abdomen: Soft. NT. ND. No masses. No hernias. No hepatosplenomegaly.  Lymphatic: Neck and groin nodes negative.  Skin: The skin is warm and dry. No jaundice.  Ext: No c/c/e.  : deferred    Labs/Studies:   Urinalysis performed in clinic, summary: UA normal     Impression/Plan:   1. CT/RTC 1 year.  No obvious RCC recurrence.  2. HTN: discussed and referred to PCP for further management.

## 2019-10-24 DIAGNOSIS — Z79.4 TYPE 2 DIABETES MELLITUS WITH OTHER SPECIFIED COMPLICATION, WITH LONG-TERM CURRENT USE OF INSULIN: ICD-10-CM

## 2019-10-24 DIAGNOSIS — E11.69 TYPE 2 DIABETES MELLITUS WITH OTHER SPECIFIED COMPLICATION, WITH LONG-TERM CURRENT USE OF INSULIN: ICD-10-CM

## 2019-10-24 RX ORDER — INSULIN DEGLUDEC 200 U/ML
INJECTION, SOLUTION SUBCUTANEOUS
Qty: 12 SYRINGE | Refills: 3 | Status: SHIPPED | OUTPATIENT
Start: 2019-10-24 | End: 2020-06-24

## 2019-10-24 NOTE — TELEPHONE ENCOUNTER
----- Message from Milena Chaney MD sent at 10/22/2019 10:45 AM CDT -----  Patient not active on portal  Call with resultes  CMP is stable  Only abnormality is glucose 167. ALl other labs are normal.  She has follow up with Dr Cooper in 2 weeks, DM  In one week

## 2019-11-14 ENCOUNTER — LAB VISIT (OUTPATIENT)
Dept: LAB | Facility: HOSPITAL | Age: 64
End: 2019-11-14
Payer: COMMERCIAL

## 2019-11-14 ENCOUNTER — OFFICE VISIT (OUTPATIENT)
Dept: DIABETES | Facility: CLINIC | Age: 64
End: 2019-11-14
Payer: COMMERCIAL

## 2019-11-14 VITALS
HEIGHT: 62 IN | WEIGHT: 176.56 LBS | DIASTOLIC BLOOD PRESSURE: 78 MMHG | SYSTOLIC BLOOD PRESSURE: 156 MMHG | BODY MASS INDEX: 32.49 KG/M2

## 2019-11-14 DIAGNOSIS — E66.9 OBESITY (BMI 30-39.9): ICD-10-CM

## 2019-11-14 DIAGNOSIS — E78.5 HYPERLIPIDEMIA LDL GOAL <70: Chronic | ICD-10-CM

## 2019-11-14 DIAGNOSIS — E11.9 CONTROLLED TYPE 2 DIABETES MELLITUS WITHOUT COMPLICATION, WITH LONG-TERM CURRENT USE OF INSULIN: ICD-10-CM

## 2019-11-14 DIAGNOSIS — I10 HYPERTENSION GOAL BP (BLOOD PRESSURE) < 130/80: Chronic | ICD-10-CM

## 2019-11-14 DIAGNOSIS — Z79.4 CONTROLLED TYPE 2 DIABETES MELLITUS WITHOUT COMPLICATION, WITH LONG-TERM CURRENT USE OF INSULIN: ICD-10-CM

## 2019-11-14 DIAGNOSIS — E11.9 TYPE 2 DIABETES MELLITUS WITH HEMOGLOBIN A1C GOAL OF LESS THAN 7.0%: Primary | ICD-10-CM

## 2019-11-14 LAB
ALBUMIN SERPL BCP-MCNC: 3.7 G/DL (ref 3.5–5.2)
ALP SERPL-CCNC: 89 U/L (ref 55–135)
ALT SERPL W/O P-5'-P-CCNC: 25 U/L (ref 10–44)
ANION GAP SERPL CALC-SCNC: 8 MMOL/L (ref 8–16)
AST SERPL-CCNC: 15 U/L (ref 10–40)
BILIRUB SERPL-MCNC: 0.3 MG/DL (ref 0.1–1)
BUN SERPL-MCNC: 21 MG/DL (ref 8–23)
CALCIUM SERPL-MCNC: 9.1 MG/DL (ref 8.7–10.5)
CHLORIDE SERPL-SCNC: 106 MMOL/L (ref 95–110)
CHOLEST SERPL-MCNC: 233 MG/DL (ref 120–199)
CHOLEST/HDLC SERPL: 4.7 {RATIO} (ref 2–5)
CO2 SERPL-SCNC: 27 MMOL/L (ref 23–29)
CREAT SERPL-MCNC: 0.8 MG/DL (ref 0.5–1.4)
EST. GFR  (AFRICAN AMERICAN): >60 ML/MIN/1.73 M^2
EST. GFR  (NON AFRICAN AMERICAN): >60 ML/MIN/1.73 M^2
ESTIMATED AVG GLUCOSE: 163 MG/DL (ref 68–131)
GLUCOSE SERPL-MCNC: 92 MG/DL (ref 70–110)
GLUCOSE SERPL-MCNC: 97 MG/DL (ref 70–110)
HBA1C MFR BLD HPLC: 7.3 % (ref 4–5.6)
HDLC SERPL-MCNC: 50 MG/DL (ref 40–75)
HDLC SERPL: 21.5 % (ref 20–50)
LDLC SERPL CALC-MCNC: 136.2 MG/DL (ref 63–159)
NONHDLC SERPL-MCNC: 183 MG/DL
POTASSIUM SERPL-SCNC: 4.2 MMOL/L (ref 3.5–5.1)
PROT SERPL-MCNC: 6.8 G/DL (ref 6–8.4)
SODIUM SERPL-SCNC: 141 MMOL/L (ref 136–145)
TRIGL SERPL-MCNC: 234 MG/DL (ref 30–150)

## 2019-11-14 PROCEDURE — 82962 GLUCOSE BLOOD TEST: CPT | Mod: S$GLB,,, | Performed by: NURSE PRACTITIONER

## 2019-11-14 PROCEDURE — 80061 LIPID PANEL: CPT

## 2019-11-14 PROCEDURE — 99214 OFFICE O/P EST MOD 30 MIN: CPT | Mod: S$GLB,,, | Performed by: NURSE PRACTITIONER

## 2019-11-14 PROCEDURE — 83036 HEMOGLOBIN GLYCOSYLATED A1C: CPT

## 2019-11-14 PROCEDURE — 99999 PR PBB SHADOW E&M-EST. PATIENT-LVL III: CPT | Mod: PBBFAC,,, | Performed by: NURSE PRACTITIONER

## 2019-11-14 PROCEDURE — 99214 PR OFFICE/OUTPT VISIT, EST, LEVL IV, 30-39 MIN: ICD-10-PCS | Mod: S$GLB,,, | Performed by: NURSE PRACTITIONER

## 2019-11-14 PROCEDURE — 36415 COLL VENOUS BLD VENIPUNCTURE: CPT

## 2019-11-14 PROCEDURE — 99999 PR PBB SHADOW E&M-EST. PATIENT-LVL III: ICD-10-PCS | Mod: PBBFAC,,, | Performed by: NURSE PRACTITIONER

## 2019-11-14 PROCEDURE — 80053 COMPREHEN METABOLIC PANEL: CPT

## 2019-11-14 PROCEDURE — 82962 POCT GLUCOSE, HAND-HELD DEVICE: ICD-10-PCS | Mod: S$GLB,,, | Performed by: NURSE PRACTITIONER

## 2019-11-14 RX ORDER — INSULIN PUMP SYRINGE, 3 ML
EACH MISCELLANEOUS
Qty: 1 EACH | Refills: 0 | Status: SHIPPED | OUTPATIENT
Start: 2019-11-14 | End: 2020-06-24

## 2019-11-14 RX ORDER — PEN NEEDLE, DIABETIC 30 GX3/16"
1 NEEDLE, DISPOSABLE MISCELLANEOUS DAILY
Qty: 100 EACH | Refills: 11 | Status: SHIPPED | OUTPATIENT
Start: 2019-11-14 | End: 2020-06-24 | Stop reason: SDUPTHER

## 2019-11-14 RX ORDER — GLIMEPIRIDE 4 MG/1
6 TABLET ORAL
Qty: 180 TABLET | Refills: 1 | Status: SHIPPED | OUTPATIENT
Start: 2019-11-14 | End: 2019-12-16 | Stop reason: SDUPTHER

## 2019-11-14 NOTE — PROGRESS NOTES
"Subjective:         Patient ID: Dary Chaney is a 63 y.o. female.  Patient's current PCP is Sakian Cooper DO.       Chief Complaint: Diabetes Mellitus      Dary Chaney is a 63 y.o. White female presenting for follow up of diabetes. Patient has been diagnosed with diabetes for over 10 years and has the following complications associated with diabetes: hypertension, hyperlipidemia, obesity. Blood glucose testing is performed regularly. Patient reports fastings 110-130, PP less than 180. Condition remained controlled.     She denies any recent hospital admissions, emergency room visits, hypoglycemia.      Height: 5' 2" (157.5 cm)  //  Weight: 80.1 kg (176 lb 9.4 oz), Body mass index is 32.3 kg/m².    Her blood sugar in clinic today is:   Lab Results   Component Value Date    POCGLU 128 (A) 04/30/2019       Labs reviewed and are noted below.    Her most recent A1C is:   Lab Results   Component Value Date    HGBA1C 6.4 (H) 04/23/2019     Lab Results   Component Value Date    CPEPTIDE 1.10 12/14/2017     Lab Results   Component Value Date    GLUTAMICACID 0.00 12/14/2017         CURRENT DM MEDICATIONS:   Current Outpatient Prescriptions   Medication Sig Dispense Refill    glimepiride (AMARYL) 4 MG tablet Take 1.5 tablet (6 mg total) by mouth daily with breakfast.  90 tablet 1    Tresiba Inject 74 units SQ    1 Box 3    Trulicity 1.5mg q week 9 Syringe 3     No current facility-administered medications for this visit.        Health Maintenance   Topic Date Due    Pneumococcal Vaccine (Highest Risk) (3 of 3 - PCV13) 08/10/2017    Hemoglobin A1c  10/23/2019    Eye Exam  11/12/2019    Foot Exam  11/29/2019    Lipid Panel  04/23/2020    Mammogram  07/19/2020    Low Dose Statin  10/23/2020    TETANUS VACCINE  08/26/2021    Colonoscopy  03/01/2028    Hepatitis C Screening  Completed       STANDARDS OF CARE:  Current Ophthalmologist/Optometrist: Dr. Carlos. Last exam 2018  Current Podiatrist: No  ACE/ARB: " "Yes  Statin: Yes  She  has attended diabetes education in the past       BLOOD GLUCOSE TESTING: Patient reports testing on average a total of 2 times per day.      Review of Systems   Constitutional: Negative.  Negative for activity change, appetite change, fatigue and unexpected weight change.   Eyes: Negative for visual disturbance.   Gastrointestinal: Negative for constipation, diarrhea and nausea.   Endocrine: Negative.  Negative for cold intolerance, heat intolerance, polydipsia, polyphagia and polyuria.   Skin: Negative for wound.         Objective:      Physical Exam   Constitutional: She appears well-developed and well-nourished.   HENT:   Head: Normocephalic and atraumatic.   Cardiovascular: Normal rate.   Pulses:       Dorsalis pedis pulses are 2+ on the right side, and 2+ on the left side.        Posterior tibial pulses are 2+ on the right side, and 2+ on the left side.   Pulmonary/Chest: Effort normal.   Musculoskeletal:        Right foot: There is no deformity.        Left foot: There is no deformity.   Feet:   Right Foot:   Protective Sensation: 10 sites tested. 10 sites sensed.   Left Foot:   Protective Sensation: 10 sites tested. 10 sites sensed.   Skin: Skin is warm and dry.   Psychiatric: She has a normal mood and affect. Her speech is normal and behavior is normal. Judgment and thought content normal.   Nursing note and vitals reviewed.      Assessment:       1. Type 2 diabetes mellitus with hemoglobin A1c goal of less than 7.0%    2. Obesity (BMI 30-39.9)    3. Hyperlipidemia LDL goal <70    4. Hypertension goal BP (blood pressure) < 130/80        Plan:   Type 2 diabetes mellitus with hemoglobin A1c goal of less than 7.0%  -     POCT Glucose, Hand-Held Device  -     pen needle, diabetic (PEN NEEDLE) 31 gauge x 5/16" Ndle; Inject 1 each into the skin once daily.  Dispense: 100 each; Refill: 11  -     glimepiride (AMARYL) 4 MG tablet; Take 1.5 tablets (6 mg total) by mouth daily with breakfast.  " Dispense: 180 tablet; Refill: 1  -     dulaglutide (TRULICITY) 1.5 mg/0.5 mL PnIj; INJECT 1 SYRINGE SUBCUTANEOUSLY ONCE A WEEK  Dispense: 12 Syringe; Refill: 1  -     blood-glucose meter kit; Contour Next. Use as instructed  Dispense: 1 each; Refill: 0    - Condition Controlled. Continue current regimen.  DM education reviewed. Patient encouraged to carb count and exercise per recommendations. Labs and Referrals as noted. Patient instructed to send in log once weekly for my review. RV scheduled in 6 months    Hyperlipidemia LDL goal <100    - Lipid panel today    Hypertension goal BP (blood pressure) < 130/80    - Suboptimally controlled. Upcoming appointment with PCP noted.     Additional Plan Details:    1.) Patient was instructed to monitor blood glucose 2 - 3 x daily, fasting and ac dinner or at bedtime. Discussed ADA goal for fasting blood sugar, 80 - 130mg/dL; pp blood sugars below 180 mg/dl. Also, discussed prevention of hypoglycemia and the need to adjust goals to higher levels if persistent hypoglycemia.  Reminded to bring BG records or meter to each visit for review.    2.)The patient was explained the above plan and given opportunity to ask questions.  She understands, chooses and consents to this plan and accepts all the risks, which include but are not limited to the risks mentioned above. She understands the alternative of having no testing, interventions or treatments at this time. She left content and without further questions.     A total of 25 minutes was spent in face to face time, of which over 50% was spent in counseling patient on disease process, complications, treatment, and side effects of medications.        HADLEY Singh

## 2019-11-14 NOTE — PATIENT INSTRUCTIONS
Labs today    IF YOU ARE HAPPY WITH YOUR VISIT TODAY, PLEASE FILL OUT THE SURVEY THAT MAY BE SENT TO YOUR EMAIL ADDRESS OR MAILBOX FOLLOWING THIS VISIT. WE LOVE TO HEAR YOUR COMMENTS! HAVE A WONDERFUL DAY!

## 2019-12-02 ENCOUNTER — OFFICE VISIT (OUTPATIENT)
Dept: OPHTHALMOLOGY | Facility: CLINIC | Age: 64
End: 2019-12-02
Payer: MEDICARE

## 2019-12-02 DIAGNOSIS — H04.123 DRY EYES: ICD-10-CM

## 2019-12-02 DIAGNOSIS — E11.9 TYPE 2 DIABETES MELLITUS WITHOUT RETINOPATHY: Primary | ICD-10-CM

## 2019-12-02 DIAGNOSIS — H25.013 CORTICAL SENILE CATARACT OF BOTH EYES: ICD-10-CM

## 2019-12-02 DIAGNOSIS — Z13.5 GLAUCOMA SCREENING: ICD-10-CM

## 2019-12-02 DIAGNOSIS — I10 HYPERTENSION, UNSPECIFIED TYPE: ICD-10-CM

## 2019-12-02 PROCEDURE — 92014 COMPRE OPH EXAM EST PT 1/>: CPT | Mod: S$GLB,,, | Performed by: OPTOMETRIST

## 2019-12-02 PROCEDURE — 92014 PR EYE EXAM, EST PATIENT,COMPREHESV: ICD-10-PCS | Mod: S$GLB,,, | Performed by: OPTOMETRIST

## 2019-12-02 PROCEDURE — 99999 PR PBB SHADOW E&M-EST. PATIENT-LVL II: CPT | Mod: PBBFAC,,, | Performed by: OPTOMETRIST

## 2019-12-02 PROCEDURE — 99999 PR PBB SHADOW E&M-EST. PATIENT-LVL II: ICD-10-PCS | Mod: PBBFAC,,, | Performed by: OPTOMETRIST

## 2019-12-02 NOTE — PROGRESS NOTES
HPI     Diabetic Eye Exam      Additional comments: Yearly              Comments     [place] [facility] [place] exam  Patient here for yearly DM eye exam  Has noticeable changes in vision in the left eye  Patient c/o floaters OS  Floaters have been present for a few months.  Wears OTC readers +1.50  No other complaints  No drops  Lab Results       Component                Value               Date                       HGBA1C                   7.3 (H)             11/14/2019            1. DM            Last edited by Loli Jonas, PCT on 12/2/2019  9:17 AM. (History)              Assessment /Plan     For exam results, see Encounter Report.    Type 2 diabetes mellitus without retinopathy    Cortical senile cataract of both eyes    Dry eyes    Hypertension, unspecified type    Glaucoma screening      No diabetic retinopathy in either eye.  Glaucoma screening negative in both eyes.  Cataract(s) not yet affecting activities of daily living and does not meet the vision requirements for referral   Surgery consult is not yet indicated.  Continue artificial tears as needed. for dry eyes.  Continue over the counter readers.  Return to clinic 1 yr.

## 2019-12-16 ENCOUNTER — OFFICE VISIT (OUTPATIENT)
Dept: DIABETES | Facility: CLINIC | Age: 64
End: 2019-12-16
Payer: MEDICARE

## 2019-12-16 VITALS
WEIGHT: 174.38 LBS | DIASTOLIC BLOOD PRESSURE: 82 MMHG | BODY MASS INDEX: 32.09 KG/M2 | SYSTOLIC BLOOD PRESSURE: 141 MMHG | HEIGHT: 62 IN

## 2019-12-16 DIAGNOSIS — I10 HYPERTENSION GOAL BP (BLOOD PRESSURE) < 130/80: Chronic | ICD-10-CM

## 2019-12-16 DIAGNOSIS — E78.5 HYPERLIPIDEMIA LDL GOAL <70: Chronic | ICD-10-CM

## 2019-12-16 DIAGNOSIS — E11.9 TYPE 2 DIABETES MELLITUS WITH HEMOGLOBIN A1C GOAL OF LESS THAN 7.0%: Primary | ICD-10-CM

## 2019-12-16 DIAGNOSIS — E66.9 OBESITY (BMI 30-39.9): ICD-10-CM

## 2019-12-16 LAB — GLUCOSE SERPL-MCNC: 112 MG/DL (ref 70–110)

## 2019-12-16 PROCEDURE — 3008F BODY MASS INDEX DOCD: CPT | Mod: CPTII,S$GLB,, | Performed by: NURSE PRACTITIONER

## 2019-12-16 PROCEDURE — 3008F PR BODY MASS INDEX (BMI) DOCUMENTED: ICD-10-PCS | Mod: CPTII,S$GLB,, | Performed by: NURSE PRACTITIONER

## 2019-12-16 PROCEDURE — 99213 OFFICE O/P EST LOW 20 MIN: CPT | Mod: S$GLB,,, | Performed by: NURSE PRACTITIONER

## 2019-12-16 PROCEDURE — 82962 GLUCOSE BLOOD TEST: CPT | Mod: S$GLB,,, | Performed by: NURSE PRACTITIONER

## 2019-12-16 PROCEDURE — 3077F SYST BP >= 140 MM HG: CPT | Mod: CPTII,S$GLB,, | Performed by: NURSE PRACTITIONER

## 2019-12-16 PROCEDURE — 1101F PT FALLS ASSESS-DOCD LE1/YR: CPT | Mod: CPTII,S$GLB,, | Performed by: NURSE PRACTITIONER

## 2019-12-16 PROCEDURE — 99999 PR PBB SHADOW E&M-EST. PATIENT-LVL III: CPT | Mod: PBBFAC,,, | Performed by: NURSE PRACTITIONER

## 2019-12-16 PROCEDURE — 3079F DIAST BP 80-89 MM HG: CPT | Mod: CPTII,S$GLB,, | Performed by: NURSE PRACTITIONER

## 2019-12-16 PROCEDURE — 82962 POCT GLUCOSE, HAND-HELD DEVICE: ICD-10-PCS | Mod: S$GLB,,, | Performed by: NURSE PRACTITIONER

## 2019-12-16 PROCEDURE — 3079F PR MOST RECENT DIASTOLIC BLOOD PRESSURE 80-89 MM HG: ICD-10-PCS | Mod: CPTII,S$GLB,, | Performed by: NURSE PRACTITIONER

## 2019-12-16 PROCEDURE — 1101F PR PT FALLS ASSESS DOC 0-1 FALLS W/OUT INJ PAST YR: ICD-10-PCS | Mod: CPTII,S$GLB,, | Performed by: NURSE PRACTITIONER

## 2019-12-16 PROCEDURE — 99999 PR PBB SHADOW E&M-EST. PATIENT-LVL III: ICD-10-PCS | Mod: PBBFAC,,, | Performed by: NURSE PRACTITIONER

## 2019-12-16 PROCEDURE — 99213 PR OFFICE/OUTPT VISIT, EST, LEVL III, 20-29 MIN: ICD-10-PCS | Mod: S$GLB,,, | Performed by: NURSE PRACTITIONER

## 2019-12-16 PROCEDURE — 3077F PR MOST RECENT SYSTOLIC BLOOD PRESSURE >= 140 MM HG: ICD-10-PCS | Mod: CPTII,S$GLB,, | Performed by: NURSE PRACTITIONER

## 2019-12-16 RX ORDER — GLIMEPIRIDE 4 MG/1
6 TABLET ORAL
Qty: 180 TABLET | Refills: 3 | Status: SHIPPED | OUTPATIENT
Start: 2019-12-16 | End: 2020-03-18 | Stop reason: SDUPTHER

## 2019-12-16 RX ORDER — ROSUVASTATIN CALCIUM 40 MG/1
40 TABLET, COATED ORAL NIGHTLY
Qty: 90 TABLET | Refills: 3 | Status: SHIPPED | OUTPATIENT
Start: 2019-12-16 | End: 2021-01-05 | Stop reason: SDUPTHER

## 2019-12-16 NOTE — PATIENT INSTRUCTIONS
"STOP ZOCOR  START CRESTOR ONE TABLET DAILY.     IF YOU ARE HAPPY WITH YOUR VISIT TODAY, PLEASE FILL OUT THE SURVEY THAT MAY BE SENT TO YOUR EMAIL ADDRESS OR MAILBOX FOLLOWING THIS VISIT. WE LOVE TO HEAR YOUR COMMENTS! HAVE A WONDERFUL DAY!        Triglycerides  Does this test have other names?  Lipid panel, fasting lipoprotein panel  What is this test?  This test measures the amount of triglycerides in your blood.  Triglycerides are one of several types of fats in your blood. Other kinds are LDL ("bad") cholesterol and HDL ("good") cholesterol.  Knowing your triglyceride level is important, especially if you have diabetes, are overweight or a smoker, or are mostly inactive. High triglyceride levels may put you at greater risk for a heart attack or stroke.    This test is part of a group of cholesterol and blood fat tests called a fasting lipoprotein panel, or lipid panel. This panel is recommended for all adults at least once every 5 years, or as recommended by your healthcare provider.  Knowing your triglyceride level helps your healthcare provider suggest healthy changes to your diet or lifestyle. If you have triglycerides that are high to very high, your provider is more likely to prescribe medicines to lower your triglycerides or your LDL cholesterol.  Why do I need this test?  You may have this test as part of a routine checkup. You may also need this test if you're overweight, drink too much alcohol, rarely exercise, or have other conditions like high blood pressure or diabetes.  If you are on cholesterol-lowering medicines, you may have this test to see how well your treatment is working.  What other tests might I have along with this test?  Your healthcare provider will order screening tests for LDL, HDL, and total cholesterol.  What do my test results mean?  Many things may affect your lab test results. These include the method each lab uses to do the test. Even if your test results are different from the " normal value, you may not have a problem. To learn what the results mean for you, talk with your healthcare provider.  Results are given in milligrams per deciliter (mg/dL). Normal levels of triglycerides are less than 150 mg/dL.  Here are how higher numbers are classified:  · 150 to 199 mg/dL: Borderline high  · 200 to 499 mg/dL: High  · 500 mg/dL and above: Very high  If you have a high triglyceride level, you have a greater risk for heart attack and stroke.  A triglyceride level above 150 mg/dL also means that you may have an increased risk for metabolic syndrome. This is a cluster of symptoms including high blood pressure, high blood sugar, and high body fat around the waist. These symptoms have been linked to increased risk for diabetes, heart disease, and stroke.  High triglycerides levels can also be caused by certain diseases or inherited conditions.  If your triglyceride level is above 200 mg/dL, your healthcare provider may recommend that you:  · Lose weight  · Limit high-fat foods containing saturated fats. These are animal fats found in meat, butter, and whole milk.  · Limit trans fats, which are found in many processed foods like chips and store-bought cookies  · Cut back on drinks with added sugars, such as soda  · Limit your alcohol intake  · Stop smoking  · Control your blood pressure  · Exercise for at least 30 minutes a day, 5 days a week  · Limit the calories from fat in your diet to 25% to 35% of your total intake  If your triglycerides are extremely high--above 500 mg/dL--you may have an added risk for problems with your pancreas. You will likely need medicine to lower your levels along with recommended changes in diet and lifestyle.  How is this test done?  The test requires a blood sample, which is drawn through a needle from a vein in your arm.  Does this test pose any risks?  Taking a blood sample with a needle carries risks that include bleeding, infection, bruising, or feeling dizzy. When  the needle pricks your arm, you may feel a slight stinging sensation or pain. Afterward, the site may be slightly sore.  What might affect my test results?  Not fasting for the required length of time before the test can affect your results. Certain medicines can affect your results, as can drinking alcohol.  How do I prepare for the test?  If you have this test as part of a cholesterol screening, you will need to not eat or drink anything but water for 9 to 14 hours before the test. In addition, be sure your healthcare provider knows about all medicines, herbs, vitamins, and supplements you are taking. This includes medicines that don't need a prescription and any illicit drugs you may use.     © 8503-6550 [employer]. [address] [place] [address], [place] [address]. All rights reserved. This information is not intended as a substitute for professional medical care. Always follow your healthcare professional's instructions.              Understanding Your Cholesterol Numbers  The higher your blood cholesterol, the greater your risk for heart attack (acute myocardial infarction), cardiovascular disease, or stroke. High cholesterol can cause any artery in your body to become narrow. Thats why you need to know your cholesterol level. If its high, you can take steps to bring it down. Eating the right foods and getting enough exercise can help. Some people also need medicine to control their cholesterol. Your healthcare provider can help you get started on a plan to control your cholesterol.        Blood flows easily when arteries are clear.      Less blood flows when cholesterol builds up in artery walls.   Checking your cholesterol  Your cholesterol is checked with a simple blood test. The results tell you how much cholesterol you have in your blood. Get checked as often as your healthcare provider suggests. If you have diabetes or high cholesterol, you may need your blood tested as often as every 3 months.  As you work to lower your cholesterol, your numbers will change slowly. But they will change. Be patient and stay on track. Discuss your numbers with your healthcare provider.   Your total cholesterol number  A blood test will give you a number for the total amount of cholesterol in your blood. The higher this number, the more likely it is that cholesterol will build up in your blood vessels. Even if your cholesterol is just slightly high, you are at increased risk for health problems.  My total cholesterol is: ________________  Your lipid numbers  Total cholesterol is just one part of the story. Cholesterol is made up of different kinds of fats (lipids). If your total cholesterol is high, knowing your lipid profile is important. The 2 most important lipids are HDL and LDL. Lipids are checked after you have fasted. This means you dont eat for a certain amount of time before the test is done. Along with cholesterol, triglyceride can also lead to blocked arteries. Triglycerides are another type of fat. Knowing your HDL, LDL, and triglyceride numbers, as well as your total cholesterol gives you a more complete picture of your cholesterol level:  · HDL is called the good cholesterol. It moves out of the bloodstream and does not block your blood vessels. HDL levels are affected by how much you exercise and what you eat.My HDL cholesterol is: ________________  · LDL is called the bad cholesterol. This is because it can stick to your artery walls and block blood flow. LDL levels are most affected by what you eat and by your genes.My LDL cholesterol is: ________________  · Triglyceride is a type of fat the body uses to store energy. Too much triglyceride can increase your risk for heart disease. Triglyceride levels should be under 150.My triglyceride is:  ________________  Based on your other health issues and risk factors, your healthcare provider may have specific goals for treating your cholesterol. You may need to  use different kinds of medicines that work on the different types of cholesterol. Work with your provider to know what your goals of treatment are. Stick with your treatment plan to reach the goals you set.  High-risk groups  Some people may need to take medicines called statins to control their cholesterol. This is in addition to eating a healthy diet and getting regular exercise.  Statins can help you stay healthy. They can also help prevent a heart attack or stroke. You may need to take a statin if you are in one of these groups:  · Adults who have had a heart attack or stroke. Or adults who have had peripheral vascular disease, a ministroke (transient ischemic attack), or stable or unstable angina. This group also includes people who have had a procedure to restore blood flow through a blocked artery. These procedures include percutaneous coronary intervention, angioplasty, stent, and open-heart bypass surgery.  · Adults who have diabetes. Or adults who are at higher risk of having a heart attack or stroke and have an LDL cholesterol level of 70 to 189 mg/dL  · Adults who are 21 years old or older and have an LDL cholesterol level of 190 mg/dL or higher.  If you are in a high-risk group, talk with your healthcare provider about your treatment goals. Make sure you understand why these goals are important, based on your own health history and your family history of heart disease or high cholesterol.  Make a plan to have regular cholesterol checks. You may need to fast before getting this test. Also ask your provider about any side effects your medicines may cause. Let your provider know about any side effects you have. You may need to take more than one medicine to reach the cholesterol goals that you and your provider decide on.  Date Last Reviewed: 10/1/2016  © 6762-3693 [employer]. [address] [place] [address], [place] [address]. All rights reserved. This information is not intended as a substitute for  professional medical care. Always follow your healthcare professional's instructions.

## 2019-12-16 NOTE — PROGRESS NOTES
"Subjective:         Patient ID: Dary Chaney is a 64 y.o. female.  Patient's current PCP is Sakina Cooper DO.       Chief Complaint: Diabetes Mellitus      Dary Chaney is a 64 y.o. White female presenting for follow up of diabetes. Patient has been diagnosed with diabetes for over 10 years and has the following complications associated with diabetes: hypertension, hyperlipidemia, obesity. Last A1C was above goal of 7, so patient presents today for follow up.  Blood glucose testing is performed regularly. Patient reports fastings , most closer to 70, PP mostly 130-140s.      She denies any recent hospital admissions, emergency room visits, hypoglycemia.      Height: 5' 2" (157.5 cm)  //  Weight: 79.1 kg (174 lb 6.1 oz), Body mass index is 31.9 kg/m².    Her blood sugar in clinic today is:   Lab Results   Component Value Date    POCGLU 112 (A) 12/16/2019       Labs reviewed and are noted below.    Her most recent A1C is:   Lab Results   Component Value Date    HGBA1C 7.3 (H) 11/14/2019     Lab Results   Component Value Date    CPEPTIDE 1.10 12/14/2017     Lab Results   Component Value Date    GLUTAMICACID 0.00 12/14/2017         CURRENT DM MEDICATIONS:   Current Outpatient Prescriptions   Medication Sig Dispense Refill    glimepiride (AMARYL) 4 MG tablet Take 1.5 tablet (6 mg total) by mouth daily with breakfast.  90 tablet 1    Tresiba Inject 74 units SQ    1 Box 3    Trulicity 1.5mg q week 9 Syringe 3     No current facility-administered medications for this visit.        Health Maintenance   Topic Date Due    DEXA SCAN  10/17/2016    Pneumococcal Vaccine (65+ High/Highest Risk) (1 of 2 - PCV13) 12/05/2019    Hemoglobin A1c  05/14/2020    Mammogram  07/19/2020    Foot Exam  11/14/2020    Lipid Panel  11/14/2020    Eye Exam  12/02/2020    Low Dose Statin  12/16/2020    TETANUS VACCINE  08/26/2021    Colonoscopy  03/01/2028    Hepatitis C Screening  Completed       STANDARDS OF " CARE:  Current Ophthalmologist/Optometrist: Dr. Carlos.  Current Podiatrist: No  ACE/ARB: Yes  Statin: Yes  She  has attended diabetes education in the past       BLOOD GLUCOSE TESTING: Patient reports testing 3 times per day.      Review of Systems   Constitutional: Negative.  Negative for activity change, appetite change, fatigue and unexpected weight change.   Eyes: Negative for visual disturbance.   Gastrointestinal: Negative for constipation, diarrhea and nausea.   Endocrine: Negative.  Negative for cold intolerance, heat intolerance, polydipsia, polyphagia and polyuria.   Skin: Negative for wound.         Objective:      Physical Exam   Constitutional: She appears well-developed and well-nourished.   HENT:   Head: Normocephalic and atraumatic.   Cardiovascular: Normal rate.   Pulses:       Dorsalis pedis pulses are 2+ on the right side, and 2+ on the left side.        Posterior tibial pulses are 2+ on the right side, and 2+ on the left side.   Pulmonary/Chest: Effort normal.   Musculoskeletal:        Right foot: There is no deformity.        Left foot: There is no deformity.   Feet:   Right Foot:   Protective Sensation: 10 sites tested. 10 sites sensed.   Left Foot:   Protective Sensation: 10 sites tested. 10 sites sensed.   Skin: Skin is warm and dry.   Psychiatric: She has a normal mood and affect. Her speech is normal and behavior is normal. Judgment and thought content normal.   Nursing note and vitals reviewed.      Assessment:       1. Type 2 diabetes mellitus with hemoglobin A1c goal of less than 7.0%    2. Obesity (BMI 30-39.9)    3. Hypertension goal BP (blood pressure) < 130/80    4. Hyperlipidemia LDL goal <70        Plan:   Type 2 diabetes mellitus with hemoglobin A1c goal of less than 7.0%  -     POCT Glucose, Hand-Held Device  -     Hemoglobin A1c; Future; Expected date: 12/16/2019    - Condition Controlled. Continue current regimen.Patient to continue to work on portion control. DM education  reviewed. Patient encouraged to carb count and exercise per recommendations. Labs and Referrals as noted. Patient instructed to send in log once weekly for my review. RV scheduled in 3 months.    Obesity (BMI 30-39.9)    - DM diet discussed.     Hyperlipidemia LDL goal <100    - LDL not at goal. Discontinue Zocor. Start Crestor 40 mg daily.     Hypertension goal BP (blood pressure) < 130/80    - Suboptimally controlled. Upcoming appointment with PCP noted.     Additional Plan Details:    1.) Patient was instructed to monitor blood glucose 2 - 3 x daily, fasting and ac dinner or at bedtime. Discussed ADA goal for fasting blood sugar, 80 - 130mg/dL; pp blood sugars below 180 mg/dl. Also, discussed prevention of hypoglycemia and the need to adjust goals to higher levels if persistent hypoglycemia.  Reminded to bring BG records or meter to each visit for review.    2.)The patient was explained the above plan and given opportunity to ask questions.  She understands, chooses and consents to this plan and accepts all the risks, which include but are not limited to the risks mentioned above. She understands the alternative of having no testing, interventions or treatments at this time. She left content and without further questions.     A total of 15 minutes was spent in face to face time, of which over 50% was spent in counseling patient on disease process, complications, treatment, and side effects of medications.        HADLEY Singh

## 2019-12-17 ENCOUNTER — OFFICE VISIT (OUTPATIENT)
Dept: INTERNAL MEDICINE | Facility: CLINIC | Age: 64
End: 2019-12-17
Payer: MEDICARE

## 2019-12-17 VITALS
OXYGEN SATURATION: 97 % | BODY MASS INDEX: 32.3 KG/M2 | HEART RATE: 70 BPM | SYSTOLIC BLOOD PRESSURE: 130 MMHG | WEIGHT: 176.56 LBS | TEMPERATURE: 96 F | DIASTOLIC BLOOD PRESSURE: 70 MMHG

## 2019-12-17 DIAGNOSIS — E78.5 HYPERLIPIDEMIA LDL GOAL <70: ICD-10-CM

## 2019-12-17 DIAGNOSIS — I10 HYPERTENSION GOAL BP (BLOOD PRESSURE) < 130/80: Primary | ICD-10-CM

## 2019-12-17 DIAGNOSIS — Z78.0 ASYMPTOMATIC MENOPAUSE: ICD-10-CM

## 2019-12-17 DIAGNOSIS — Z23 IMMUNIZATION DUE: ICD-10-CM

## 2019-12-17 DIAGNOSIS — E66.9 OBESITY (BMI 30.0-34.9): ICD-10-CM

## 2019-12-17 PROCEDURE — 3008F PR BODY MASS INDEX (BMI) DOCUMENTED: ICD-10-PCS | Mod: CPTII,S$GLB,, | Performed by: INTERNAL MEDICINE

## 2019-12-17 PROCEDURE — 90662 IIV NO PRSV INCREASED AG IM: CPT | Mod: S$GLB,,, | Performed by: INTERNAL MEDICINE

## 2019-12-17 PROCEDURE — G0008 FLU VACCINE - HIGH DOSE (65+) PRESERVATIVE FREE IM: ICD-10-PCS | Mod: S$GLB,,, | Performed by: INTERNAL MEDICINE

## 2019-12-17 PROCEDURE — 99999 PR PBB SHADOW E&M-EST. PATIENT-LVL III: CPT | Mod: PBBFAC,,, | Performed by: INTERNAL MEDICINE

## 2019-12-17 PROCEDURE — 3075F PR MOST RECENT SYSTOLIC BLOOD PRESS GE 130-139MM HG: ICD-10-PCS | Mod: CPTII,S$GLB,, | Performed by: INTERNAL MEDICINE

## 2019-12-17 PROCEDURE — 3075F SYST BP GE 130 - 139MM HG: CPT | Mod: CPTII,S$GLB,, | Performed by: INTERNAL MEDICINE

## 2019-12-17 PROCEDURE — 99999 PR PBB SHADOW E&M-EST. PATIENT-LVL III: ICD-10-PCS | Mod: PBBFAC,,, | Performed by: INTERNAL MEDICINE

## 2019-12-17 PROCEDURE — 3078F PR MOST RECENT DIASTOLIC BLOOD PRESSURE < 80 MM HG: ICD-10-PCS | Mod: CPTII,S$GLB,, | Performed by: INTERNAL MEDICINE

## 2019-12-17 PROCEDURE — 99214 OFFICE O/P EST MOD 30 MIN: CPT | Mod: 25,S$GLB,, | Performed by: INTERNAL MEDICINE

## 2019-12-17 PROCEDURE — 90670 PCV13 VACCINE IM: CPT | Mod: S$GLB,,, | Performed by: INTERNAL MEDICINE

## 2019-12-17 PROCEDURE — 90670 PNEUMOCOCCAL CONJUGATE VACCINE 13-VALENT LESS THAN 5YO & GREATER THAN: ICD-10-PCS | Mod: S$GLB,,, | Performed by: INTERNAL MEDICINE

## 2019-12-17 PROCEDURE — 3078F DIAST BP <80 MM HG: CPT | Mod: CPTII,S$GLB,, | Performed by: INTERNAL MEDICINE

## 2019-12-17 PROCEDURE — G0008 ADMIN INFLUENZA VIRUS VAC: HCPCS | Mod: S$GLB,,, | Performed by: INTERNAL MEDICINE

## 2019-12-17 PROCEDURE — 1101F PT FALLS ASSESS-DOCD LE1/YR: CPT | Mod: CPTII,S$GLB,, | Performed by: INTERNAL MEDICINE

## 2019-12-17 PROCEDURE — G0009 PNEUMOCOCCAL CONJUGATE VACCINE 13-VALENT LESS THAN 5YO & GREATER THAN: ICD-10-PCS | Mod: S$GLB,,, | Performed by: INTERNAL MEDICINE

## 2019-12-17 PROCEDURE — 99214 PR OFFICE/OUTPT VISIT, EST, LEVL IV, 30-39 MIN: ICD-10-PCS | Mod: 25,S$GLB,, | Performed by: INTERNAL MEDICINE

## 2019-12-17 PROCEDURE — 3008F BODY MASS INDEX DOCD: CPT | Mod: CPTII,S$GLB,, | Performed by: INTERNAL MEDICINE

## 2019-12-17 PROCEDURE — 1101F PR PT FALLS ASSESS DOC 0-1 FALLS W/OUT INJ PAST YR: ICD-10-PCS | Mod: CPTII,S$GLB,, | Performed by: INTERNAL MEDICINE

## 2019-12-17 PROCEDURE — G0009 ADMIN PNEUMOCOCCAL VACCINE: HCPCS | Mod: S$GLB,,, | Performed by: INTERNAL MEDICINE

## 2019-12-17 PROCEDURE — 90662 FLU VACCINE - HIGH DOSE (65+) PRESERVATIVE FREE IM: ICD-10-PCS | Mod: S$GLB,,, | Performed by: INTERNAL MEDICINE

## 2019-12-18 ENCOUNTER — APPOINTMENT (OUTPATIENT)
Dept: RADIOLOGY | Facility: HOSPITAL | Age: 64
End: 2019-12-18
Attending: INTERNAL MEDICINE
Payer: MEDICARE

## 2019-12-18 DIAGNOSIS — Z78.0 ASYMPTOMATIC MENOPAUSE: ICD-10-CM

## 2019-12-18 PROCEDURE — 77080 DXA BONE DENSITY AXIAL: CPT | Mod: 26,,, | Performed by: RADIOLOGY

## 2019-12-18 PROCEDURE — 77080 DXA BONE DENSITY AXIAL: CPT | Mod: TC

## 2019-12-18 PROCEDURE — 77080 DEXA BONE DENSITY SPINE HIP: ICD-10-PCS | Mod: 26,,, | Performed by: RADIOLOGY

## 2020-01-02 DIAGNOSIS — I10 HYPERTENSION GOAL BP (BLOOD PRESSURE) < 130/80: Chronic | ICD-10-CM

## 2020-01-02 RX ORDER — OLMESARTAN MEDOXOMIL 20 MG/1
TABLET ORAL
Qty: 30 TABLET | Refills: 0 | Status: SHIPPED | OUTPATIENT
Start: 2020-01-02 | End: 2020-02-13

## 2020-01-02 RX ORDER — OMEPRAZOLE 40 MG/1
CAPSULE, DELAYED RELEASE ORAL
Qty: 30 CAPSULE | Refills: 0 | Status: SHIPPED | OUTPATIENT
Start: 2020-01-02 | End: 2020-02-13

## 2020-02-12 DIAGNOSIS — I10 HYPERTENSION GOAL BP (BLOOD PRESSURE) < 130/80: Chronic | ICD-10-CM

## 2020-02-13 RX ORDER — OLMESARTAN MEDOXOMIL 20 MG/1
TABLET ORAL
Qty: 30 TABLET | Refills: 6 | Status: SHIPPED | OUTPATIENT
Start: 2020-02-13 | End: 2020-07-27

## 2020-02-13 RX ORDER — OMEPRAZOLE 40 MG/1
CAPSULE, DELAYED RELEASE ORAL
Qty: 30 CAPSULE | Refills: 6 | Status: SHIPPED | OUTPATIENT
Start: 2020-02-13 | End: 2020-08-17 | Stop reason: SDUPTHER

## 2020-02-17 ENCOUNTER — HOSPITAL ENCOUNTER (OUTPATIENT)
Dept: RADIOLOGY | Facility: HOSPITAL | Age: 65
Discharge: HOME | End: 2020-02-17
Attending: NURSE PRACTITIONER
Payer: MEDICARE

## 2020-02-17 ENCOUNTER — OFFICE VISIT (OUTPATIENT)
Dept: FAMILY MEDICINE | Facility: CLINIC | Age: 65
End: 2020-02-17
Payer: MEDICARE

## 2020-02-17 VITALS
WEIGHT: 174.38 LBS | HEART RATE: 92 BPM | HEIGHT: 62 IN | TEMPERATURE: 97 F | SYSTOLIC BLOOD PRESSURE: 127 MMHG | DIASTOLIC BLOOD PRESSURE: 83 MMHG | OXYGEN SATURATION: 96 % | BODY MASS INDEX: 32.09 KG/M2

## 2020-02-17 DIAGNOSIS — R10.9 FLANK PAIN: ICD-10-CM

## 2020-02-17 DIAGNOSIS — R30.0 DYSURIA: Primary | ICD-10-CM

## 2020-02-17 DIAGNOSIS — N76.0 ACUTE VAGINITIS: ICD-10-CM

## 2020-02-17 PROCEDURE — 87086 URINE CULTURE/COLONY COUNT: CPT

## 2020-02-17 PROCEDURE — 87481 CANDIDA DNA AMP PROBE: CPT | Mod: 59

## 2020-02-17 PROCEDURE — 3079F PR MOST RECENT DIASTOLIC BLOOD PRESSURE 80-89 MM HG: ICD-10-PCS | Mod: CPTII,S$GLB,, | Performed by: NURSE PRACTITIONER

## 2020-02-17 PROCEDURE — 3079F DIAST BP 80-89 MM HG: CPT | Mod: CPTII,S$GLB,, | Performed by: NURSE PRACTITIONER

## 2020-02-17 PROCEDURE — 87661 TRICHOMONAS VAGINALIS AMPLIF: CPT

## 2020-02-17 PROCEDURE — 3074F SYST BP LT 130 MM HG: CPT | Mod: CPTII,S$GLB,, | Performed by: NURSE PRACTITIONER

## 2020-02-17 PROCEDURE — 99214 OFFICE O/P EST MOD 30 MIN: CPT | Mod: S$GLB,,, | Performed by: NURSE PRACTITIONER

## 2020-02-17 PROCEDURE — 3008F BODY MASS INDEX DOCD: CPT | Mod: CPTII,S$GLB,, | Performed by: NURSE PRACTITIONER

## 2020-02-17 PROCEDURE — 3008F PR BODY MASS INDEX (BMI) DOCUMENTED: ICD-10-PCS | Mod: CPTII,S$GLB,, | Performed by: NURSE PRACTITIONER

## 2020-02-17 PROCEDURE — 1101F PR PT FALLS ASSESS DOC 0-1 FALLS W/OUT INJ PAST YR: ICD-10-PCS | Mod: CPTII,S$GLB,, | Performed by: NURSE PRACTITIONER

## 2020-02-17 PROCEDURE — 3074F PR MOST RECENT SYSTOLIC BLOOD PRESSURE < 130 MM HG: ICD-10-PCS | Mod: CPTII,S$GLB,, | Performed by: NURSE PRACTITIONER

## 2020-02-17 PROCEDURE — 99999 PR PBB SHADOW E&M-EST. PATIENT-LVL V: ICD-10-PCS | Mod: PBBFAC,,, | Performed by: NURSE PRACTITIONER

## 2020-02-17 PROCEDURE — 74019 RADEX ABDOMEN 2 VIEWS: CPT | Mod: TC,PO

## 2020-02-17 PROCEDURE — 74019 XR ABDOMEN FLAT AND ERECT: ICD-10-PCS | Mod: 26,,, | Performed by: RADIOLOGY

## 2020-02-17 PROCEDURE — 74019 RADEX ABDOMEN 2 VIEWS: CPT | Mod: 26,,, | Performed by: RADIOLOGY

## 2020-02-17 PROCEDURE — 1101F PT FALLS ASSESS-DOCD LE1/YR: CPT | Mod: CPTII,S$GLB,, | Performed by: NURSE PRACTITIONER

## 2020-02-17 PROCEDURE — 99214 PR OFFICE/OUTPT VISIT, EST, LEVL IV, 30-39 MIN: ICD-10-PCS | Mod: S$GLB,,, | Performed by: NURSE PRACTITIONER

## 2020-02-17 PROCEDURE — 99999 PR PBB SHADOW E&M-EST. PATIENT-LVL V: CPT | Mod: PBBFAC,,, | Performed by: NURSE PRACTITIONER

## 2020-02-17 NOTE — PROGRESS NOTES
Subjective:       Patient ID: Dary Chaney is a 64 y.o. female.    Chief Complaint: No chief complaint on file.  pt reports to clinic with chief complaint of UTI symptoms.  Onset 3 weeks ago, symptoms resolved but returned.  Complains of dysuria, suprapubic pain and left flank pain.  No visible hematuria, no fever or chills.  Notes vaginal itching and irritation. No discharge.  PMH of DM2, renal stones, HTN, obesity,     Dysuria    This is a new problem. The current episode started 1 to 4 weeks ago. The problem occurs intermittently. The quality of the pain is described as burning and aching. There has been no fever. She is not sexually active. There is no history of pyelonephritis. Associated symptoms include flank pain. Pertinent negatives include no discharge, frequency or urgency. She has tried nothing for the symptoms. Her past medical history is significant for diabetes mellitus and kidney stones.     Review of Systems   Constitutional: Negative.    HENT: Negative.    Respiratory: Negative.    Cardiovascular: Negative.    Gastrointestinal: Negative.    Genitourinary: Positive for dysuria and flank pain. Negative for frequency and urgency.   Skin: Negative.    Neurological: Negative.        Objective:      Physical Exam   Constitutional: She is oriented to person, place, and time. She appears well-developed and well-nourished.   HENT:   Head: Normocephalic.   Eyes: EOM are normal.   Neck: Neck supple.   Cardiovascular: Normal rate and normal heart sounds.   Pulmonary/Chest: Effort normal and breath sounds normal.   Abdominal: Soft. Bowel sounds are normal. There is tenderness in the left lower quadrant. There is CVA tenderness (left).   Musculoskeletal: Normal range of motion.   Neurological: She is alert and oriented to person, place, and time.   Skin: Skin is warm and dry.   Psychiatric: She has a normal mood and affect.   Vitals reviewed.      Assessment:       1. Dysuria    2. Acute vaginitis    3. Flank  pain        Plan:   Dysuria  -     POCT URINE DIPSTICK WITHOUT MICROSCOPE  -     Urine culture; Future; Expected date: 02/17/2020  ua is unremarkable in office today.  Will await culture results   Acute vaginitis  -     VAGINOSIS SCREEN BY DNA PROBE    Flank pain  -     X-Ray Abdomen Flat And Erect; Future; Expected date: 02/17/2020      No follow-ups on file.

## 2020-02-18 ENCOUNTER — TELEPHONE (OUTPATIENT)
Dept: FAMILY MEDICINE | Facility: CLINIC | Age: 65
End: 2020-02-18

## 2020-02-18 DIAGNOSIS — R10.9 FLANK PAIN: ICD-10-CM

## 2020-02-18 DIAGNOSIS — R10.9 ACUTE FLANK PAIN: Primary | ICD-10-CM

## 2020-02-18 LAB
BACTERIA UR CULT: NO GROWTH
BACTERIAL VAGINOSIS DNA: NEGATIVE
CANDIDA GLABRATA DNA: NEGATIVE
CANDIDA KRUSEI DNA: NEGATIVE
CANDIDA RRNA VAG QL PROBE: POSITIVE
T VAGINALIS RRNA GENITAL QL PROBE: NEGATIVE

## 2020-02-18 NOTE — TELEPHONE ENCOUNTER
----- Message from Caleb Armendariz sent at 2/18/2020  9:55 AM CST -----  Contact: Pt   Type:  Patient Returning Call    Who Called: pt   Who Left Message for Patient:nurse   Does the patient know what this is regarding?:xray results   Would the patient rather a call back or a response via [facility]? Phone   Best Call Back Number: [phone]  Additional Information:

## 2020-02-19 DIAGNOSIS — B37.31 VAGINAL CANDIDIASIS: Primary | ICD-10-CM

## 2020-02-19 RX ORDER — FLUCONAZOLE 150 MG/1
150 TABLET ORAL DAILY
Qty: 1 TABLET | Refills: 0 | Status: SHIPPED | OUTPATIENT
Start: 2020-02-19 | End: 2020-02-20

## 2020-02-20 ENCOUNTER — HOSPITAL ENCOUNTER (OUTPATIENT)
Dept: RADIOLOGY | Facility: HOSPITAL | Age: 65
Discharge: HOME | End: 2020-02-20
Attending: NURSE PRACTITIONER
Payer: MEDICARE

## 2020-02-20 DIAGNOSIS — R10.9 FLANK PAIN: ICD-10-CM

## 2020-02-20 DIAGNOSIS — R10.9 ACUTE FLANK PAIN: ICD-10-CM

## 2020-02-20 PROCEDURE — 74176 CT ABD & PELVIS W/O CONTRAST: CPT | Mod: TC

## 2020-03-02 ENCOUNTER — TELEPHONE (OUTPATIENT)
Dept: DIABETES | Facility: CLINIC | Age: 65
End: 2020-03-02

## 2020-03-02 NOTE — TELEPHONE ENCOUNTER
Returned pt call regarding her concerns pt wanted to reschedule her appt so I moved her appt and put her with another provider so that she can be seen sooner

## 2020-03-02 NOTE — TELEPHONE ENCOUNTER
----- Message from Narendra Bonilla sent at 3/2/2020 11:05 AM CST -----  ..Type:  Patient Returning Call    Who Called: pt   Who Left Message for Patient:  Does the patient know what this is regarding?:appt   Would the patient rather a call back or a response via [facility]?  Call back   Best Call Back Number: [phone]  Additional Information: Pt is requesting a call from nurse to discuss r/s her appt system wont allow appt desk to do so

## 2020-03-03 ENCOUNTER — PATIENT OUTREACH (OUTPATIENT)
Dept: ADMINISTRATIVE | Facility: HOSPITAL | Age: 65
End: 2020-03-03

## 2020-03-03 DIAGNOSIS — E11.9 TYPE 2 DIABETES MELLITUS WITH HEMOGLOBIN A1C GOAL OF LESS THAN 7.0%: Primary | ICD-10-CM

## 2020-03-15 ENCOUNTER — TELEPHONE (OUTPATIENT)
Dept: DIABETES | Facility: CLINIC | Age: 65
End: 2020-03-15

## 2020-03-16 ENCOUNTER — TELEPHONE (OUTPATIENT)
Dept: DIABETES | Facility: CLINIC | Age: 65
End: 2020-03-16

## 2020-03-16 NOTE — TELEPHONE ENCOUNTER
----- Message from Kerline Jonas sent at 3/16/2020 10:28 AM CDT -----  Contact: pt  Pt would like a call back to get appt r/s and can be reached at [phone]        Thanks,  Kerline Jonas

## 2020-03-18 ENCOUNTER — OFFICE VISIT (OUTPATIENT)
Dept: DIABETES | Facility: CLINIC | Age: 65
End: 2020-03-18
Payer: MEDICARE

## 2020-03-18 VITALS
SYSTOLIC BLOOD PRESSURE: 130 MMHG | DIASTOLIC BLOOD PRESSURE: 78 MMHG | WEIGHT: 172.81 LBS | BODY MASS INDEX: 31.8 KG/M2 | TEMPERATURE: 98 F | HEIGHT: 62 IN | HEART RATE: 78 BPM

## 2020-03-18 DIAGNOSIS — K76.0 FATTY LIVER: ICD-10-CM

## 2020-03-18 DIAGNOSIS — E11.9 TYPE 2 DIABETES MELLITUS WITH HEMOGLOBIN A1C GOAL OF LESS THAN 7.0%: Primary | ICD-10-CM

## 2020-03-18 DIAGNOSIS — E66.9 OBESITY (BMI 30-39.9): ICD-10-CM

## 2020-03-18 DIAGNOSIS — I73.9 CLAUDICATION OF BOTH LOWER EXTREMITIES: ICD-10-CM

## 2020-03-18 DIAGNOSIS — E78.5 HYPERLIPIDEMIA LDL GOAL <70: Chronic | ICD-10-CM

## 2020-03-18 DIAGNOSIS — I10 HYPERTENSION GOAL BP (BLOOD PRESSURE) < 130/80: Chronic | ICD-10-CM

## 2020-03-18 PROCEDURE — 99999 PR PBB SHADOW E&M-EST. PATIENT-LVL IV: CPT | Mod: PBBFAC,,, | Performed by: PHYSICIAN ASSISTANT

## 2020-03-18 PROCEDURE — 3051F HG A1C>EQUAL 7.0%<8.0%: CPT | Mod: CPTII,S$GLB,, | Performed by: PHYSICIAN ASSISTANT

## 2020-03-18 PROCEDURE — 3008F PR BODY MASS INDEX (BMI) DOCUMENTED: ICD-10-PCS | Mod: CPTII,S$GLB,, | Performed by: PHYSICIAN ASSISTANT

## 2020-03-18 PROCEDURE — 3078F DIAST BP <80 MM HG: CPT | Mod: CPTII,S$GLB,, | Performed by: PHYSICIAN ASSISTANT

## 2020-03-18 PROCEDURE — 3078F PR MOST RECENT DIASTOLIC BLOOD PRESSURE < 80 MM HG: ICD-10-PCS | Mod: CPTII,S$GLB,, | Performed by: PHYSICIAN ASSISTANT

## 2020-03-18 PROCEDURE — 3051F PR MOST RECENT HEMOGLOBIN A1C LEVEL 7.0 - < 8.0%: ICD-10-PCS | Mod: CPTII,S$GLB,, | Performed by: PHYSICIAN ASSISTANT

## 2020-03-18 PROCEDURE — 99214 PR OFFICE/OUTPT VISIT, EST, LEVL IV, 30-39 MIN: ICD-10-PCS | Mod: S$GLB,,, | Performed by: PHYSICIAN ASSISTANT

## 2020-03-18 PROCEDURE — 3075F SYST BP GE 130 - 139MM HG: CPT | Mod: CPTII,S$GLB,, | Performed by: PHYSICIAN ASSISTANT

## 2020-03-18 PROCEDURE — 3008F BODY MASS INDEX DOCD: CPT | Mod: CPTII,S$GLB,, | Performed by: PHYSICIAN ASSISTANT

## 2020-03-18 PROCEDURE — 1101F PT FALLS ASSESS-DOCD LE1/YR: CPT | Mod: CPTII,S$GLB,, | Performed by: PHYSICIAN ASSISTANT

## 2020-03-18 PROCEDURE — 99499 RISK ADDL DX/OHS AUDIT: ICD-10-PCS | Mod: S$GLB,,, | Performed by: PHYSICIAN ASSISTANT

## 2020-03-18 PROCEDURE — 99999 PR PBB SHADOW E&M-EST. PATIENT-LVL IV: ICD-10-PCS | Mod: PBBFAC,,, | Performed by: PHYSICIAN ASSISTANT

## 2020-03-18 PROCEDURE — 99499 UNLISTED E&M SERVICE: CPT | Mod: S$GLB,,, | Performed by: PHYSICIAN ASSISTANT

## 2020-03-18 PROCEDURE — 99214 OFFICE O/P EST MOD 30 MIN: CPT | Mod: S$GLB,,, | Performed by: PHYSICIAN ASSISTANT

## 2020-03-18 PROCEDURE — 3075F PR MOST RECENT SYSTOLIC BLOOD PRESS GE 130-139MM HG: ICD-10-PCS | Mod: CPTII,S$GLB,, | Performed by: PHYSICIAN ASSISTANT

## 2020-03-18 PROCEDURE — 1101F PR PT FALLS ASSESS DOC 0-1 FALLS W/OUT INJ PAST YR: ICD-10-PCS | Mod: CPTII,S$GLB,, | Performed by: PHYSICIAN ASSISTANT

## 2020-03-18 RX ORDER — GLIMEPIRIDE 4 MG/1
8 TABLET ORAL
Qty: 180 TABLET | Refills: 3 | Status: SHIPPED | OUTPATIENT
Start: 2020-03-18 | End: 2020-09-23 | Stop reason: SDUPTHER

## 2020-03-18 NOTE — PROGRESS NOTES
PCP: Sakina Cooper DO    Subjective:     Chief Complaint: Diabetes - Established Patient    HISTORY OF PRESENT ILLNESS: 64 y.o.   female presenting for diabetes management visit.   Patient has previously been established with the Diabetes Management Department and was last seen by Isha Boyer NP on 12/16/19.   Patient is new to me and is here for establishment of future care.  Patient has had Type II diabetes since 2005.  Pertinent to decision making is the following comorbidities: HTN, HLD, Obesity by BMI and Fatty Liver  Patient has the following Diabetes complications: without complications  She  has attended diabetes education in the past.     Patient's most recent A1c of 7.9% was completed 1 months ago.   Patient states since Her last A1c Her blood glucose levels have been within range in the morning / fasting and in the evening.   Patient monitors blood glucose 2 times per day with Contour Next : Fasting and Before Bed.   Patient blood glucose monitoring device will not be uploaded into Media Section today secondary to unable to upload successfully.   Per meter recall, fasting blood sugar ranging 84 - 181, before lunch 104 - 107, and before bed approximately 278.   Patient endorses the following diabetes related symptoms: Leg cramping or claudication.   Patient is due today for the following diabetes-related health maintenance standards: Foot Exam  and Urine Microalbumin/creatinine ratio.   She denies recent hospital admissions or emergency room visits.   She voices having hypoglycemia when skipping meal.   Patient's concerns today include glycemic control.   Patient medication regimen is as below.     CURRENT DM MEDICATIONS:    Trulicity 1.5 mg weekly   Amaryl 6 mg before breakfast   Tresiba 72 units daily    Patient has failed the following Diabetes medications:    Metformin - allergy       Labs Reviewed.       Lab Results   Component Value Date    CPEPTIDE 1.10 12/14/2017     Lab  "Results   Component Value Date    GLUTAMICACID 0.00 12/14/2017       Height: 5' 2" (157.5 cm)  //  Weight: 78.4 kg (172 lb 13.5 oz), Body mass index is 31.61 kg/m².  Her blood sugar in clinic today is:    123 @ 10:22 am      Review of Systems   Constitutional: Negative for activity change, appetite change, chills and fever.   HENT: Negative for dental problem, mouth sores, nosebleeds, sore throat and trouble swallowing.    Eyes: Negative for pain and discharge.   Respiratory: Negative for shortness of breath, wheezing and stridor.    Cardiovascular: Negative for chest pain, palpitations and leg swelling.        Claudication in BLE   Gastrointestinal: Negative for abdominal pain, diarrhea, nausea and vomiting.   Endocrine: Negative for polydipsia, polyphagia and polyuria.   Genitourinary: Negative for dysuria, frequency and urgency.   Musculoskeletal: Negative for joint swelling and myalgias.   Skin: Negative for rash and wound.   Neurological: Negative for dizziness, syncope, weakness and headaches.   Psychiatric/Behavioral: Negative for behavioral problems and dysphoric mood.         Diabetes Management Status  Statin: Taking  ACE/ARB: Taking    Screening or Prevention Patient's value Goal Complete/Controlled?   HgA1C Testing and Control   Lab Results   Component Value Date    HGBA1C 7.9 (H) 02/17/2020      Annually/Less than 8% Yes   Lipid profile : 02/17/2020 Annually Yes   LDL control Lab Results   Component Value Date    LDLCALC 95.0 02/17/2020    Annually/Less than 100 mg/dl  Yes   Nephropathy screening Lab Results   Component Value Date    MICALBCREAT 10.4 04/23/2019     Lab Results   Component Value Date    PROTEINUA Negative 09/08/2014    Annually Yes   Blood pressure BP Readings from Last 1 Encounters:   03/18/20 130/78    Less than 140/90 Yes   Dilated retinal exam : 12/02/2019 Annually Yes    Foot exam   : 11/29/2018 Annually No     Social History     Socioeconomic History    Marital status:      " Spouse name: Not on file    Number of children: 3    Years of education: Not on file    Highest education level: Not on file   Occupational History    Occupation: Stay at Home    Occupation:    Social Needs    Financial resource strain: Not on file    Food insecurity:     Worry: Not on file     Inability: Not on file    Transportation needs:     Medical: Not on file     Non-medical: Not on file   Tobacco Use    Smoking status: Never Smoker    Smokeless tobacco: Never Used   Substance and Sexual Activity    Alcohol use: Yes     Comment: occasional wine     Drug use: No    Sexual activity: Yes     Partners: Male     Birth control/protection: Surgical   Lifestyle    Physical activity:     Days per week: Not on file     Minutes per session: Not on file    Stress: Not on file   Relationships    Social connections:     Talks on phone: Not on file     Gets together: Not on file     Attends Muslim service: Not on file     Active member of club or organization: Not on file     Attends meetings of clubs or organizations: Not on file     Relationship status: Not on file   Other Topics Concern    Not on file   Social History Narrative    . Housewife.     Past Medical History:   Diagnosis Date    Arthritis     Cataract     Colon polyp     Diabetes mellitus type II     Hyperlipidemia     Hypertension     Renal cell carcinoma 09/2018    Total knee replacement status, left 01/20/2019    Dr.Robert Cook    UTI (urinary tract infection)        Objective:        Physical Exam   Constitutional: She is oriented to person, place, and time. She appears well-developed and well-nourished. No distress.   HENT:   Head: Normocephalic and atraumatic.   Right Ear: External ear normal.   Left Ear: External ear normal.   Nose: Nose normal.   Eyes: Pupils are equal, round, and reactive to light. EOM are normal. Right eye exhibits no discharge. Left eye exhibits no discharge.   Neck: Normal  range of motion. Neck supple.   Cardiovascular: Normal rate, regular rhythm, normal heart sounds and intact distal pulses.   Pulses:       Dorsalis pedis pulses are 2+ on the right side, and 2+ on the left side.        Posterior tibial pulses are 2+ on the right side, and 2+ on the left side.   Pulmonary/Chest: Effort normal and breath sounds normal.   Abdominal: Soft.   Musculoskeletal: Normal range of motion.        Right foot: There is normal range of motion and no deformity.        Left foot: There is normal range of motion and no deformity.   Feet:   Right Foot:   Protective Sensation: 6 sites tested. 6 sites sensed.   Skin Integrity: Positive for dry skin. Negative for ulcer, blister, skin breakdown, erythema, warmth or callus.   Left Foot:   Protective Sensation: 6 sites tested. 6 sites sensed.   Skin Integrity: Positive for dry skin. Negative for ulcer, blister, skin breakdown, erythema, warmth or callus.   Neurological: She is oriented to person, place, and time.   Skin: Skin is warm and dry. Capillary refill takes less than 2 seconds. She is not diaphoretic.   Psychiatric: She has a normal mood and affect. Her behavior is normal. Judgment and thought content normal.         Assessment / Plan:     Type 2 diabetes mellitus with hemoglobin A1c goal of less than 7.0%  -     POCT Glucose, Hand-Held Device  -     glimepiride (AMARYL) 4 MG tablet; Take 2 tablets (8 mg total) by mouth daily with breakfast.  Dispense: 180 tablet; Refill: 3  -     Microalbumin/creatinine urine ratio; Future; Expected date: 03/18/2020  -     CV Ankle Brachial Indices W Stress W Toe Tracings; Future  -     Hemoglobin A1c; Standing    Claudication of both lower extremities  -     CV Ankle Brachial Indices W Stress W Toe Tracings; Future    Fatty liver    Hypertension goal BP (blood pressure) < 130/80    Hyperlipidemia LDL goal <70    Obesity (BMI 30-39.9)      Additional Plan Details:    - POCT Glucose  - Encouraged continuation of  lifestyle changes including regular exercise and limiting carbohydrates to 30-45 grams per meal threes times daily and 15 grams per snack with a limit of two daily.   - Encouraged continued monitoring of blood glucose with maintenance of 2 times daily at Fasting and Before Bed.   - Current DM Medication Regimen: Change Glimepiride to 4 mg before breakfast and before dinner. Continue Tresiba 72 units daily and Trulicity 1.5 mg weekly. Will consider adding Jardiance if dinner time Glimepiride does not improve BGs.   - Health Maintenance standards addressed today: Foot Exam - completed today and documented in physical exam with feedback to patient about proper diabetic foot care and findings and Urine Microalbumin / Creatinine Ratio  - MARTHA to evaluate BLE Claudication scheduled today.   - Nursing Visit: Patient is age 79 or younger with an A1c of 7.5 or greater and will need nursing visit by Phone in 2 weeks for BG log review and non-insulin adjustment since patient unable or unwilling to come in for physical visit.   - Follow up in 3 months with A1c prior.       Blakeney McKnight, PA-C [facility]    A total of 30 minutes was spent in face to face time, of which over 50 % was spent in counseling patient on disease process, complications, treatment, and side effects of medications.

## 2020-03-18 NOTE — PATIENT INSTRUCTIONS
Today:     - Change Glimepiride 4 mg before breakfast and before dinner.    PRINCIPAL DISCHARGE DIAGNOSIS  Diagnosis: Wound infection after surgery  Assessment and Plan of Treatment:       SECONDARY DISCHARGE DIAGNOSES  Diagnosis: Anemia  Assessment and Plan of Treatment: - peristent since right hip replacement in Jan 2020  - eat a well-balanced iron rich diet  - see your primary doctor and get repeat blood count testing (CBC) in 2-3 weeks to ensure the blood count is starting to improve PRINCIPAL DISCHARGE DIAGNOSIS  Diagnosis: Wound infection after surgery  Assessment and Plan of Treatment: May shower with TORITO dressing with battery removed.  Remove TORITO dressing on POD #7. Suture removal at surgeon's office, 2 weeks post-op. Ambulate as tolerated.      SECONDARY DISCHARGE DIAGNOSES  Diagnosis: Anemia  Assessment and Plan of Treatment: - peristent since right hip replacement in Jan 2020  - eat a well-balanced iron rich diet  - see your primary doctor and get repeat blood count testing (CBC) in 2-3 weeks to ensure the blood count is starting to improve

## 2020-03-26 DIAGNOSIS — Z79.4 TYPE 2 DIABETES MELLITUS WITH OTHER SPECIFIED COMPLICATION, WITH LONG-TERM CURRENT USE OF INSULIN: ICD-10-CM

## 2020-03-26 DIAGNOSIS — E11.69 TYPE 2 DIABETES MELLITUS WITH OTHER SPECIFIED COMPLICATION, WITH LONG-TERM CURRENT USE OF INSULIN: ICD-10-CM

## 2020-03-26 RX ORDER — LANCETS 33 GAUGE
1 EACH MISCELLANEOUS 3 TIMES DAILY
Qty: 300 EACH | Refills: 3 | Status: SHIPPED | OUTPATIENT
Start: 2020-03-26 | End: 2020-06-24

## 2020-03-26 NOTE — TELEPHONE ENCOUNTER
----- Message from Lianne Millan sent at 3/26/2020 10:03 AM CDT -----  Contact: pt   Pt stated she calling for a refill on Lancets and blood sugar diagnostic Strp, she can be reached at [phone]      [address] [place] - [address] [place]  [address] [address] [place] [address]  Phone: [phone] Fax: [phone]

## 2020-04-15 ENCOUNTER — TELEPHONE (OUTPATIENT)
Dept: INTERNAL MEDICINE | Facility: CLINIC | Age: 65
End: 2020-04-15

## 2020-04-15 ENCOUNTER — PATIENT MESSAGE (OUTPATIENT)
Dept: INTERNAL MEDICINE | Facility: CLINIC | Age: 65
End: 2020-04-15

## 2020-04-15 NOTE — TELEPHONE ENCOUNTER
Spoke pt concerning her upcoming appt with Dr. Cooper and getting her appt converted to a [employer] virtual/video appt. Gave pt the Epic MyChart Tech Support number [phone] to call for any technical issues she may run into as pt stated she has tried several times already to get into pt portal. I told the pt to call back if unsuccessful after calling the Help line to discuss other options for her appt. Pt verbalized understanding.

## 2020-04-15 NOTE — TELEPHONE ENCOUNTER
----- Message from Sherrell Dutton sent at 4/15/2020  1:46 PM CDT -----  Contact: pt  .Type:  Patient Returning Call    Who Called: pt  Who Left Message for Patient:   Does the patient know what this is regarding?: upcoming appt   Would the patient rather a call back or a response via [facility]?  Call back   Best Call Back Number: [phone] (home)   Additional Information:

## 2020-05-18 ENCOUNTER — OFFICE VISIT (OUTPATIENT)
Dept: INTERNAL MEDICINE | Facility: CLINIC | Age: 65
End: 2020-05-18
Payer: MEDICARE

## 2020-05-18 ENCOUNTER — PATIENT MESSAGE (OUTPATIENT)
Dept: INTERNAL MEDICINE | Facility: CLINIC | Age: 65
End: 2020-05-18

## 2020-05-18 VITALS
BODY MASS INDEX: 32.02 KG/M2 | DIASTOLIC BLOOD PRESSURE: 80 MMHG | TEMPERATURE: 97 F | OXYGEN SATURATION: 97 % | WEIGHT: 175.06 LBS | SYSTOLIC BLOOD PRESSURE: 126 MMHG | HEART RATE: 86 BPM

## 2020-05-18 DIAGNOSIS — R25.2 LEG CRAMPS: Primary | ICD-10-CM

## 2020-05-18 PROCEDURE — 99213 PR OFFICE/OUTPT VISIT, EST, LEVL III, 20-29 MIN: ICD-10-PCS | Mod: S$GLB,,, | Performed by: INTERNAL MEDICINE

## 2020-05-18 PROCEDURE — 99999 PR PBB SHADOW E&M-EST. PATIENT-LVL III: CPT | Mod: PBBFAC,,, | Performed by: INTERNAL MEDICINE

## 2020-05-18 PROCEDURE — 3008F PR BODY MASS INDEX (BMI) DOCUMENTED: ICD-10-PCS | Mod: CPTII,S$GLB,, | Performed by: INTERNAL MEDICINE

## 2020-05-18 PROCEDURE — 99213 OFFICE O/P EST LOW 20 MIN: CPT | Mod: S$GLB,,, | Performed by: INTERNAL MEDICINE

## 2020-05-18 PROCEDURE — 99999 PR PBB SHADOW E&M-EST. PATIENT-LVL III: ICD-10-PCS | Mod: PBBFAC,,, | Performed by: INTERNAL MEDICINE

## 2020-05-18 PROCEDURE — 3008F BODY MASS INDEX DOCD: CPT | Mod: CPTII,S$GLB,, | Performed by: INTERNAL MEDICINE

## 2020-05-18 PROCEDURE — 3079F PR MOST RECENT DIASTOLIC BLOOD PRESSURE 80-89 MM HG: ICD-10-PCS | Mod: CPTII,S$GLB,, | Performed by: INTERNAL MEDICINE

## 2020-05-18 PROCEDURE — 1101F PR PT FALLS ASSESS DOC 0-1 FALLS W/OUT INJ PAST YR: ICD-10-PCS | Mod: CPTII,S$GLB,, | Performed by: INTERNAL MEDICINE

## 2020-05-18 PROCEDURE — 3074F PR MOST RECENT SYSTOLIC BLOOD PRESSURE < 130 MM HG: ICD-10-PCS | Mod: CPTII,S$GLB,, | Performed by: INTERNAL MEDICINE

## 2020-05-18 PROCEDURE — 3079F DIAST BP 80-89 MM HG: CPT | Mod: CPTII,S$GLB,, | Performed by: INTERNAL MEDICINE

## 2020-05-18 PROCEDURE — 3051F PR MOST RECENT HEMOGLOBIN A1C LEVEL 7.0 - < 8.0%: ICD-10-PCS | Mod: CPTII,S$GLB,, | Performed by: INTERNAL MEDICINE

## 2020-05-18 PROCEDURE — 1101F PT FALLS ASSESS-DOCD LE1/YR: CPT | Mod: CPTII,S$GLB,, | Performed by: INTERNAL MEDICINE

## 2020-05-18 PROCEDURE — 3051F HG A1C>EQUAL 7.0%<8.0%: CPT | Mod: CPTII,S$GLB,, | Performed by: INTERNAL MEDICINE

## 2020-05-18 PROCEDURE — 3074F SYST BP LT 130 MM HG: CPT | Mod: CPTII,S$GLB,, | Performed by: INTERNAL MEDICINE

## 2020-05-18 RX ORDER — TIZANIDINE 4 MG/1
4 TABLET ORAL 2 TIMES DAILY PRN
Qty: 30 TABLET | Refills: 0 | Status: SHIPPED | OUTPATIENT
Start: 2020-05-18 | End: 2022-05-30

## 2020-05-18 NOTE — PATIENT INSTRUCTIONS
Tizanidine tablets or capsules  What is this medicine?  TIZANIDINE (mario mckinney) helps to relieve muscle spasms. It may be used to help in the treatment of multiple sclerosis and spinal cord injury.  How should I use this medicine?  Take this medicine by mouth with a full glass of water. Take this medicine on an empty stomach, at least 30 minutes before or 2 hours after food. Do not take with food unless you talk with your doctor. Follow the directions on the prescription label. Take your medicine at regular intervals. Do not take your medicine more often than directed. Do not stop taking except on your doctor's advice. Suddenly stopping the medicine can be very dangerous.  Talk to your pediatrician regarding the use of this medicine in children.  Patients over 65 years old may have a stronger reaction and need a smaller dose.  What side effects may I notice from receiving this medicine?  Side effects that you should report to your doctor or health care professional as soon as possible:  · allergic reactions like skin rash, itching or hives, swelling of the face, lips, or tongue  · breathing problems  · hallucinations  · signs and symptoms of liver injury like dark yellow or brown urine; general ill feeling or flu-like symptoms; light-colored stools; loss of appetite; nausea; right upper quadrant belly pain; unusually weak or tired; yellowing of the eyes or skin  · signs and symptoms of low blood pressure like dizziness; feeling faint or lightheaded, falls; unusually weak or tired  · unusually slow heartbeat  · unusually weak or tired  Side effects that usually do not require medical attention (report to your doctor or health care professional if they continue or are bothersome):  · blurred vision  · constipation  · dizziness  · dry mouth  · tiredness  What may interact with this medicine?  Do not take this medicine with any of the following  medications:  · ciprofloxacin  · cisapride  · dofetilide  · dronedarone  · fluvoxamine  · narcotic medicines for cough  · pimozide  · thiabendazole  · thioridazine  · ziprasidone  This medicine may also interact with the following medications:  · acyclovir  · alcohol  · antihistamines for allergy, cough and cold  · baclofen  · certain antibiotics like levofloxacin, ofloxacin  · certain medicines for anxiety or sleep  · certain medicines for blood pressure, heart disease, irregular heart beat  · certain medicines for depression like amitriptyline, fluoxetine, sertraline  · certain medicines for seizures like phenobarbital, primidone  · certain medicines for stomach problems like cimetidine, famotidine  · female hormones, like estrogens or progestins and birth control pills, patches, rings, or injections  · general anesthetics like halothane, isoflurane, methoxyflurane, propofol  · local anesthetics like lidocaine, pramoxine, tetracaine  · medicines that relax muscles for surgery  · narcotic medicines for pain  · other medicines that prolong the QT interval (cause an abnormal heart rhythm)  · phenothiazines like chlorpromazine, mesoridazine, prochlorperazine  · ticlopidine  · zileuton  What if I miss a dose?  If you miss a dose, take it as soon as you can. If it is almost time for your next dose, take only that dose. Do not take double or extra doses.  Where should I keep my medicine?  Keep out of the reach of children.  Store at room temperature between 15 and 30 degrees C (59 and 86 degrees F). Throw away any unused medicine after the expiration date.  What should I tell my health care provider before I take this medicine?  They need to know if you have any of these conditions:  · kidney disease  · liver disease  · low blood pressure  · mental disorder  · an unusual or allergic reaction to tizanidine, other medicines, lactose (tablets only), foods, dyes, or preservatives  · pregnant or trying to get  pregnant  · breast-feeding  What should I watch for while using this medicine?  Tell your doctor or health care professional if your symptoms do not start to get better or if they get worse.  You may get drowsy or dizzy. Do not drive, use machinery, or do anything that needs mental alertness until you know how this medicine affects you. Do not stand or sit up quickly, especially if you are an older patient. This reduces the risk of dizzy or fainting spells. Alcohol may interfere with the effect of this medicine. Avoid alcoholic drinks.  If you are taking another medicine that also causes drowsiness, you may have more side effects. Give your health care provider a list of all medicines you use. Your doctor will tell you how much medicine to take. Do not take more medicine than directed. Call emergency for help if you have problems breathing or unusual sleepiness.  Your mouth may get dry. Chewing sugarless gum or sucking hard candy, and drinking plenty of water may help. Contact your doctor if the problem does not go away or is severe.  NOTE:This sheet is a summary. It may not cover all possible information. If you have questions about this medicine, talk to your doctor, pharmacist, or health care provider. Copyright© 2017 [employer]

## 2020-05-18 NOTE — PROGRESS NOTES
Subjective:       Patient ID: Dary Chaney is a 64 y.o. female.    Chief Complaint: Leg Pain    Dary Chaney  64 y.o. White female    Patient presents with:  Leg Pain    HPI: Presents to the clinic with complaint of bilateral leg pain described as cramping. She has had them in the past on and off but it has been more consistent for the past week. She denies trauma. She denies back pain. She does report occasional tingling. She has uncontrolled diabetes. She is schedule to have MARTHA's later this month.   She has tried OTC oral and topical agents including magnesium, potassium, [employer] and lavender oil without relief.     Past Medical History:  Arthritis  Cataract  Colon polyp  Diabetes mellitus type II  Hyperlipidemia  Hypertension  09/2018: Renal cell carcinoma  01/20/2019: Total knee replacement status, left      Comment:  Dr.Robert Cook  UTI (urinary tract infection)    Current Outpatient Medications on File Prior to Visit:  blood sugar diagnostic Strp, Contour Next. Glucose testing three times daily., Disp: 300 strip, Rfl: 3  blood-glucose meter kit, Contour Next. Use as instructed, Disp: 1 each, Rfl: 0  dulaglutide (TRULICITY) 1.5 mg/0.5 mL PnIj, INJECT 1 SYRINGE SUBCUTANEOUSLY ONCE A WEEK, Disp: 12 Syringe, Rfl: 1  fluticasone (FLONASE) 50 mcg/actuation nasal spray, USE TWO SPRAY(S) IN EACH NOSTRIL ONCE DAILY (Patient taking differently: USE TWO SPRAY(S) IN EACH NOSTRIL ONCE DAILY  prn), Disp: 16 g, Rfl: 1  glimepiride (AMARYL) 4 MG tablet, Take 2 tablets (8 mg total) by mouth daily with breakfast., Disp: 180 tablet, Rfl: 3  insulin degludec (TRESIBA FLEXTOUCH U-200) 200 unit/mL (3 mL) InPn, INJECT 76 UNITS SUBCUTANEOUSLY ONCE DAILY, Disp: 12 Syringe, Rfl: 3  lancets 33 gauge Misc, 1 lancet by Misc.(Non-Drug; Combo Route) route 3 (three) times daily., Disp: 300 each, Rfl: 3  olmesartan (BENICAR) 20 MG tablet, TAKE 1 TABLET BY MOUTH ONCE DAILY, Disp: 30 tablet, Rfl: 6  omeprazole (PRILOSEC) 40 MG  "capsule, TAKE 1 CAPSULE BY MOUTH ONCE DAILY, Disp: 30 capsule, Rfl: 6  pen needle, diabetic (PEN NEEDLE) 31 gauge x 5/16" Ndle, Inject 1 each into the skin once daily., Disp: 100 each, Rfl: 11  rosuvastatin (CRESTOR) 40 MG Tab, Take 1 tablet (40 mg total) by mouth every evening., Disp: 90 tablet, Rfl: 3  traMADol (ULTRAM) 50 mg tablet, , Disp: , Rfl:   albuterol (PROVENTIL) 2.5 mg /3 mL (0.083 %) nebulizer solution, Take 3 mLs (2.5 mg total) by nebulization every 6 (six) hours as needed for Wheezing., Disp: 120 mL, Rfl: 1  lidocaine (LIDODERM) 5 %, Place 1 patch onto the skin daily as needed. Remove & Discard patch within 12 hours or as directed by MD (Patient not taking: Reported on 5/18/2020), Disp: 30 patch, Rfl: 3  loratadine (CLARITIN) 10 mg tablet, Take 1 tablet (10 mg total) by mouth once daily. (Patient taking differently: Take 10 mg by mouth daily as needed. ), Disp: 30 tablet, Rfl: 0  mupirocin (BACTROBAN) 2 % ointment, , Disp: , Rfl:     Allergies:  Review of patient's allergies indicates:   -- Citrus and derivatives -- Swelling, Other (See Comments) and                            Edema    --  Redness             Lip swelling             Itching   -- Latex -- Other (See Comments)    --  Other reaction(s): Rash             Other reaction(s): welts             Other reaction(s): Itching   -- Valsartan -- Other (See Comments)    --  Other reaction(s): disoriented   -- Aspirin -- Nausea Only    --  Other reaction(s): Nausea Only   -- Metformin -- Anxiety    --  Other reaction(s): anxiety        Review of Systems   Cardiovascular: Negative for leg swelling.   Musculoskeletal: Positive for arthralgias and myalgias. Negative for back pain and gait problem.   Skin: Negative for color change.       Objective:      Physical Exam   Constitutional: She is oriented to person, place, and time. She appears well-developed and well-nourished. No distress.   Eyes: No scleral icterus.   Cardiovascular: Normal rate. "   Pulses:       Dorsalis pedis pulses are 1+ on the right side, and 1+ on the left side.   Pulmonary/Chest: Effort normal. No respiratory distress.   Musculoskeletal: She exhibits no edema.   Neurological: She is alert and oriented to person, place, and time.   Skin: Skin is warm and dry.   Vitals reviewed.      Assessment:       1. Leg cramps    2. Diabetes mellitus type 2, uncontrolled, without complications        Plan:       Dary was seen today for leg pain.    Diagnoses and all orders for this visit:    Leg cramps  -     Vascular vs muscular vs neurological--discussed with patient   -     Agree with MARTHA's  -     tiZANidine (ZANAFLEX) 4 MG tablet; Take 1 tablet (4 mg total) by mouth 2 (two) times daily as needed. Discussed medication risks and benefits.     Diabetes mellitus type 2, uncontrolled, without complications    RTC if symptoms worsen or fail to resolve with treatment.

## 2020-05-27 ENCOUNTER — HOSPITAL ENCOUNTER (OUTPATIENT)
Dept: CARDIOLOGY | Facility: HOSPITAL | Age: 65
Discharge: HOME | End: 2020-05-27
Attending: PHYSICIAN ASSISTANT
Payer: MEDICARE

## 2020-05-27 VITALS — WEIGHT: 175 LBS | BODY MASS INDEX: 32.2 KG/M2 | HEIGHT: 62 IN

## 2020-05-27 DIAGNOSIS — E11.9 TYPE 2 DIABETES MELLITUS WITH HEMOGLOBIN A1C GOAL OF LESS THAN 7.0%: ICD-10-CM

## 2020-05-27 DIAGNOSIS — I73.9 CLAUDICATION OF BOTH LOWER EXTREMITIES: ICD-10-CM

## 2020-05-27 LAB
ABI POST MINUTES1: 5 MIN
IMMEDIATE ARM BP: 185 MMHG
IMMEDIATE LEFT ABI: 1.15
IMMEDIATE LEFT TIBIAL: 213 MMHG
IMMEDIATE RIGHT ABI: 1.1
IMMEDIATE RIGHT TIBIAL: 203 MMHG
LEFT ABI: 1.2
LEFT ARM BP: 142 MMHG
LEFT DORSALIS PEDIS: 170 MMHG
LEFT POSTERIOR TIBIAL: 154 MMHG
LEFT TBI: 0.87
LEFT TOE PRESSURE: 124 MMHG
POST1 ARM BP: 143 MMHG
POST1 LEFT ABI: 1.26
POST1 LEFT TIBIAL: 180 MMHG
POST1 RIGHT ABI: 1.22
POST1 RIGHT TIBIAL: 175 MMHG
RIGHT ABI: 1.19
RIGHT ARM BP: 140 MMHG
RIGHT DORSALIS PEDIS: 169 MMHG
RIGHT POSTERIOR TIBIAL: 155 MMHG
RIGHT TBI: 0.87
RIGHT TOE PRESSURE: 124 MMHG
TREADMILL GRADE: 10 %
TREADMILL SPEED: 2 MPH
TREADMILL TIME: 2.29 MIN

## 2020-05-27 PROCEDURE — 93922 UPR/L XTREMITY ART 2 LEVELS: CPT | Mod: 26,,, | Performed by: INTERNAL MEDICINE

## 2020-05-27 PROCEDURE — 93922 UPR/L XTREMITY ART 2 LEVELS: CPT

## 2020-05-27 PROCEDURE — 93922 CV US ANKLE BRACHIAL INDICES EXT LTD WO STRESS (CUPID ONLY): ICD-10-PCS | Mod: 26,,, | Performed by: INTERNAL MEDICINE

## 2020-06-17 ENCOUNTER — LAB VISIT (OUTPATIENT)
Dept: LAB | Facility: HOSPITAL | Age: 65
End: 2020-06-17
Attending: INTERNAL MEDICINE
Payer: MEDICARE

## 2020-06-17 DIAGNOSIS — Z79.4 CONTROLLED TYPE 2 DIABETES MELLITUS WITHOUT COMPLICATION, WITH LONG-TERM CURRENT USE OF INSULIN: ICD-10-CM

## 2020-06-17 DIAGNOSIS — I10 HYPERTENSION GOAL BP (BLOOD PRESSURE) < 130/80: Chronic | ICD-10-CM

## 2020-06-17 DIAGNOSIS — E11.9 CONTROLLED TYPE 2 DIABETES MELLITUS WITHOUT COMPLICATION, WITH LONG-TERM CURRENT USE OF INSULIN: ICD-10-CM

## 2020-06-17 DIAGNOSIS — E78.5 HYPERLIPIDEMIA LDL GOAL <70: Chronic | ICD-10-CM

## 2020-06-17 LAB
ESTIMATED AVG GLUCOSE: 169 MG/DL (ref 68–131)
HBA1C MFR BLD HPLC: 7.5 % (ref 4–5.6)

## 2020-06-17 PROCEDURE — 83036 HEMOGLOBIN GLYCOSYLATED A1C: CPT

## 2020-06-17 PROCEDURE — 80053 COMPREHEN METABOLIC PANEL: CPT

## 2020-06-17 PROCEDURE — 36415 COLL VENOUS BLD VENIPUNCTURE: CPT | Mod: PO

## 2020-06-18 LAB
ALBUMIN SERPL BCP-MCNC: 3.9 G/DL (ref 3.5–5.2)
ALP SERPL-CCNC: 93 U/L (ref 55–135)
ALT SERPL W/O P-5'-P-CCNC: 29 U/L (ref 10–44)
ANION GAP SERPL CALC-SCNC: 8 MMOL/L (ref 8–16)
AST SERPL-CCNC: 19 U/L (ref 10–40)
BILIRUB SERPL-MCNC: 0.3 MG/DL (ref 0.1–1)
BUN SERPL-MCNC: 27 MG/DL (ref 8–23)
CALCIUM SERPL-MCNC: 9.2 MG/DL (ref 8.7–10.5)
CHLORIDE SERPL-SCNC: 104 MMOL/L (ref 95–110)
CO2 SERPL-SCNC: 28 MMOL/L (ref 23–29)
CREAT SERPL-MCNC: 0.9 MG/DL (ref 0.5–1.4)
EST. GFR  (AFRICAN AMERICAN): >60 ML/MIN/1.73 M^2
EST. GFR  (NON AFRICAN AMERICAN): >60 ML/MIN/1.73 M^2
GLUCOSE SERPL-MCNC: 116 MG/DL (ref 70–110)
POTASSIUM SERPL-SCNC: 4.8 MMOL/L (ref 3.5–5.1)
PROT SERPL-MCNC: 7.1 G/DL (ref 6–8.4)
SODIUM SERPL-SCNC: 140 MMOL/L (ref 136–145)

## 2020-06-24 ENCOUNTER — OFFICE VISIT (OUTPATIENT)
Dept: DIABETES | Facility: CLINIC | Age: 65
End: 2020-06-24
Payer: MEDICARE

## 2020-06-24 VITALS
DIASTOLIC BLOOD PRESSURE: 75 MMHG | HEIGHT: 62 IN | HEART RATE: 82 BPM | BODY MASS INDEX: 32.05 KG/M2 | WEIGHT: 174.19 LBS | SYSTOLIC BLOOD PRESSURE: 135 MMHG

## 2020-06-24 DIAGNOSIS — E11.9 TYPE 2 DIABETES MELLITUS WITH HEMOGLOBIN A1C GOAL OF LESS THAN 7.0%: Primary | ICD-10-CM

## 2020-06-24 DIAGNOSIS — K76.0 FATTY LIVER: ICD-10-CM

## 2020-06-24 DIAGNOSIS — I10 HYPERTENSION GOAL BP (BLOOD PRESSURE) < 130/80: ICD-10-CM

## 2020-06-24 DIAGNOSIS — E66.9 OBESITY (BMI 30-39.9): ICD-10-CM

## 2020-06-24 DIAGNOSIS — E78.5 HYPERLIPIDEMIA LDL GOAL <70: ICD-10-CM

## 2020-06-24 LAB — GLUCOSE SERPL-MCNC: 201 MG/DL (ref 70–110)

## 2020-06-24 PROCEDURE — 99999 PR PBB SHADOW E&M-EST. PATIENT-LVL IV: CPT | Mod: PBBFAC,,, | Performed by: PHYSICIAN ASSISTANT

## 2020-06-24 PROCEDURE — 3075F SYST BP GE 130 - 139MM HG: CPT | Mod: CPTII,S$GLB,, | Performed by: PHYSICIAN ASSISTANT

## 2020-06-24 PROCEDURE — 99499 UNLISTED E&M SERVICE: CPT | Mod: S$GLB,,, | Performed by: PHYSICIAN ASSISTANT

## 2020-06-24 PROCEDURE — 1101F PT FALLS ASSESS-DOCD LE1/YR: CPT | Mod: CPTII,S$GLB,, | Performed by: PHYSICIAN ASSISTANT

## 2020-06-24 PROCEDURE — 3008F PR BODY MASS INDEX (BMI) DOCUMENTED: ICD-10-PCS | Mod: CPTII,S$GLB,, | Performed by: PHYSICIAN ASSISTANT

## 2020-06-24 PROCEDURE — 82962 POCT GLUCOSE, HAND-HELD DEVICE: ICD-10-PCS | Mod: S$GLB,,, | Performed by: PHYSICIAN ASSISTANT

## 2020-06-24 PROCEDURE — 3051F HG A1C>EQUAL 7.0%<8.0%: CPT | Mod: CPTII,S$GLB,, | Performed by: PHYSICIAN ASSISTANT

## 2020-06-24 PROCEDURE — 3075F PR MOST RECENT SYSTOLIC BLOOD PRESS GE 130-139MM HG: ICD-10-PCS | Mod: CPTII,S$GLB,, | Performed by: PHYSICIAN ASSISTANT

## 2020-06-24 PROCEDURE — 3008F BODY MASS INDEX DOCD: CPT | Mod: CPTII,S$GLB,, | Performed by: PHYSICIAN ASSISTANT

## 2020-06-24 PROCEDURE — 3051F PR MOST RECENT HEMOGLOBIN A1C LEVEL 7.0 - < 8.0%: ICD-10-PCS | Mod: CPTII,S$GLB,, | Performed by: PHYSICIAN ASSISTANT

## 2020-06-24 PROCEDURE — 82962 GLUCOSE BLOOD TEST: CPT | Mod: S$GLB,,, | Performed by: PHYSICIAN ASSISTANT

## 2020-06-24 PROCEDURE — 99214 PR OFFICE/OUTPT VISIT, EST, LEVL IV, 30-39 MIN: ICD-10-PCS | Mod: S$GLB,,, | Performed by: PHYSICIAN ASSISTANT

## 2020-06-24 PROCEDURE — 99999 PR PBB SHADOW E&M-EST. PATIENT-LVL IV: ICD-10-PCS | Mod: PBBFAC,,, | Performed by: PHYSICIAN ASSISTANT

## 2020-06-24 PROCEDURE — 3078F PR MOST RECENT DIASTOLIC BLOOD PRESSURE < 80 MM HG: ICD-10-PCS | Mod: CPTII,S$GLB,, | Performed by: PHYSICIAN ASSISTANT

## 2020-06-24 PROCEDURE — 99499 RISK ADDL DX/OHS AUDIT: ICD-10-PCS | Mod: S$GLB,,, | Performed by: PHYSICIAN ASSISTANT

## 2020-06-24 PROCEDURE — 1101F PR PT FALLS ASSESS DOC 0-1 FALLS W/OUT INJ PAST YR: ICD-10-PCS | Mod: CPTII,S$GLB,, | Performed by: PHYSICIAN ASSISTANT

## 2020-06-24 PROCEDURE — 3078F DIAST BP <80 MM HG: CPT | Mod: CPTII,S$GLB,, | Performed by: PHYSICIAN ASSISTANT

## 2020-06-24 PROCEDURE — 99214 OFFICE O/P EST MOD 30 MIN: CPT | Mod: S$GLB,,, | Performed by: PHYSICIAN ASSISTANT

## 2020-06-24 RX ORDER — PEN NEEDLE, DIABETIC 30 GX3/16"
1 NEEDLE, DISPOSABLE MISCELLANEOUS DAILY
Qty: 100 EACH | Refills: 11 | Status: SHIPPED | OUTPATIENT
Start: 2020-06-24 | End: 2023-04-13

## 2020-06-24 RX ORDER — LANCETS
1 EACH MISCELLANEOUS 4 TIMES DAILY
Qty: 100 EACH | Refills: 11 | Status: SHIPPED | OUTPATIENT
Start: 2020-06-24

## 2020-06-24 RX ORDER — INSULIN DEGLUDEC 200 U/ML
75 INJECTION, SOLUTION SUBCUTANEOUS DAILY
Qty: 12 ML | Refills: 11 | Status: SHIPPED | OUTPATIENT
Start: 2020-06-24 | End: 2021-08-17

## 2020-06-24 RX ORDER — INSULIN PUMP SYRINGE, 3 ML
EACH MISCELLANEOUS
Qty: 1 EACH | Refills: 0 | Status: SHIPPED | OUTPATIENT
Start: 2020-06-24 | End: 2021-10-12

## 2020-06-24 NOTE — PATIENT INSTRUCTIONS
Change Tresiba to 75 units daily     Continue Trulicity 1.5 mg weekly     Continue Glimepiride 4 mg before breakfast and before dinner

## 2020-08-05 ENCOUNTER — PATIENT OUTREACH (OUTPATIENT)
Dept: ADMINISTRATIVE | Facility: HOSPITAL | Age: 65
End: 2020-08-05

## 2020-08-17 RX ORDER — OMEPRAZOLE 40 MG/1
40 CAPSULE, DELAYED RELEASE ORAL DAILY
Qty: 30 CAPSULE | Refills: 0 | Status: SHIPPED | OUTPATIENT
Start: 2020-08-17 | End: 2020-09-23 | Stop reason: SDUPTHER

## 2020-09-18 ENCOUNTER — LAB VISIT (OUTPATIENT)
Dept: LAB | Facility: HOSPITAL | Age: 65
End: 2020-09-18
Attending: INTERNAL MEDICINE
Payer: MEDICARE

## 2020-09-18 DIAGNOSIS — Z79.4 CONTROLLED TYPE 2 DIABETES MELLITUS WITHOUT COMPLICATION, WITH LONG-TERM CURRENT USE OF INSULIN: ICD-10-CM

## 2020-09-18 DIAGNOSIS — E11.9 TYPE 2 DIABETES MELLITUS WITH HEMOGLOBIN A1C GOAL OF LESS THAN 7.0%: ICD-10-CM

## 2020-09-18 DIAGNOSIS — E78.5 HYPERLIPIDEMIA LDL GOAL <70: Chronic | ICD-10-CM

## 2020-09-18 DIAGNOSIS — E11.9 CONTROLLED TYPE 2 DIABETES MELLITUS WITHOUT COMPLICATION, WITH LONG-TERM CURRENT USE OF INSULIN: ICD-10-CM

## 2020-09-18 DIAGNOSIS — I10 HYPERTENSION GOAL BP (BLOOD PRESSURE) < 130/80: Chronic | ICD-10-CM

## 2020-09-18 PROCEDURE — 80061 LIPID PANEL: CPT

## 2020-09-18 PROCEDURE — 36415 COLL VENOUS BLD VENIPUNCTURE: CPT | Mod: PO

## 2020-09-18 PROCEDURE — 83036 HEMOGLOBIN GLYCOSYLATED A1C: CPT

## 2020-09-19 LAB
CHOLEST SERPL-MCNC: 213 MG/DL (ref 120–199)
CHOLEST/HDLC SERPL: 4.2 {RATIO} (ref 2–5)
ESTIMATED AVG GLUCOSE: 180 MG/DL (ref 68–131)
ESTIMATED AVG GLUCOSE: 180 MG/DL (ref 68–131)
HBA1C MFR BLD HPLC: 7.9 % (ref 4–5.6)
HBA1C MFR BLD HPLC: 7.9 % (ref 4–5.6)
HDLC SERPL-MCNC: 51 MG/DL (ref 40–75)
HDLC SERPL: 23.9 % (ref 20–50)
LDLC SERPL CALC-MCNC: 116.2 MG/DL (ref 63–159)
NONHDLC SERPL-MCNC: 162 MG/DL
TRIGL SERPL-MCNC: 229 MG/DL (ref 30–150)

## 2020-09-23 ENCOUNTER — OFFICE VISIT (OUTPATIENT)
Dept: INTERNAL MEDICINE | Facility: CLINIC | Age: 65
End: 2020-09-23
Payer: MEDICARE

## 2020-09-23 VITALS
OXYGEN SATURATION: 97 % | TEMPERATURE: 98 F | WEIGHT: 171.94 LBS | DIASTOLIC BLOOD PRESSURE: 72 MMHG | HEART RATE: 90 BPM | BODY MASS INDEX: 31.64 KG/M2 | HEIGHT: 62 IN | SYSTOLIC BLOOD PRESSURE: 130 MMHG

## 2020-09-23 DIAGNOSIS — R20.2 PARESTHESIA OF BILATERAL LEGS: ICD-10-CM

## 2020-09-23 DIAGNOSIS — I10 HYPERTENSION GOAL BP (BLOOD PRESSURE) < 130/80: Primary | Chronic | ICD-10-CM

## 2020-09-23 DIAGNOSIS — Z23 IMMUNIZATION DUE: ICD-10-CM

## 2020-09-23 DIAGNOSIS — E78.5 HYPERLIPIDEMIA LDL GOAL <70: Chronic | ICD-10-CM

## 2020-09-23 DIAGNOSIS — Z12.31 ENCOUNTER FOR SCREENING MAMMOGRAM FOR MALIGNANT NEOPLASM OF BREAST: ICD-10-CM

## 2020-09-23 DIAGNOSIS — K21.9 CHRONIC GERD: ICD-10-CM

## 2020-09-23 PROCEDURE — 99999 PR PBB SHADOW E&M-EST. PATIENT-LVL V: CPT | Mod: PBBFAC,,, | Performed by: INTERNAL MEDICINE

## 2020-09-23 PROCEDURE — 99999 PR PBB SHADOW E&M-EST. PATIENT-LVL V: ICD-10-PCS | Mod: PBBFAC,,, | Performed by: INTERNAL MEDICINE

## 2020-09-23 PROCEDURE — 3051F PR MOST RECENT HEMOGLOBIN A1C LEVEL 7.0 - < 8.0%: ICD-10-PCS | Mod: CPTII,S$GLB,, | Performed by: INTERNAL MEDICINE

## 2020-09-23 PROCEDURE — 3075F SYST BP GE 130 - 139MM HG: CPT | Mod: CPTII,S$GLB,, | Performed by: INTERNAL MEDICINE

## 2020-09-23 PROCEDURE — 3008F PR BODY MASS INDEX (BMI) DOCUMENTED: ICD-10-PCS | Mod: CPTII,S$GLB,, | Performed by: INTERNAL MEDICINE

## 2020-09-23 PROCEDURE — 3078F DIAST BP <80 MM HG: CPT | Mod: CPTII,S$GLB,, | Performed by: INTERNAL MEDICINE

## 2020-09-23 PROCEDURE — 99214 PR OFFICE/OUTPT VISIT, EST, LEVL IV, 30-39 MIN: ICD-10-PCS | Mod: 25,S$GLB,, | Performed by: INTERNAL MEDICINE

## 2020-09-23 PROCEDURE — 1101F PT FALLS ASSESS-DOCD LE1/YR: CPT | Mod: CPTII,S$GLB,, | Performed by: INTERNAL MEDICINE

## 2020-09-23 PROCEDURE — 1101F PR PT FALLS ASSESS DOC 0-1 FALLS W/OUT INJ PAST YR: ICD-10-PCS | Mod: CPTII,S$GLB,, | Performed by: INTERNAL MEDICINE

## 2020-09-23 PROCEDURE — 3078F PR MOST RECENT DIASTOLIC BLOOD PRESSURE < 80 MM HG: ICD-10-PCS | Mod: CPTII,S$GLB,, | Performed by: INTERNAL MEDICINE

## 2020-09-23 PROCEDURE — 3075F PR MOST RECENT SYSTOLIC BLOOD PRESS GE 130-139MM HG: ICD-10-PCS | Mod: CPTII,S$GLB,, | Performed by: INTERNAL MEDICINE

## 2020-09-23 PROCEDURE — G0008 ADMIN INFLUENZA VIRUS VAC: HCPCS | Mod: S$GLB,,, | Performed by: INTERNAL MEDICINE

## 2020-09-23 PROCEDURE — 3051F HG A1C>EQUAL 7.0%<8.0%: CPT | Mod: CPTII,S$GLB,, | Performed by: INTERNAL MEDICINE

## 2020-09-23 PROCEDURE — 90694 VACC AIIV4 NO PRSRV 0.5ML IM: CPT | Mod: S$GLB,,, | Performed by: INTERNAL MEDICINE

## 2020-09-23 PROCEDURE — 3008F BODY MASS INDEX DOCD: CPT | Mod: CPTII,S$GLB,, | Performed by: INTERNAL MEDICINE

## 2020-09-23 PROCEDURE — 99499 UNLISTED E&M SERVICE: CPT | Mod: S$GLB,,, | Performed by: INTERNAL MEDICINE

## 2020-09-23 PROCEDURE — 99214 OFFICE O/P EST MOD 30 MIN: CPT | Mod: 25,S$GLB,, | Performed by: INTERNAL MEDICINE

## 2020-09-23 PROCEDURE — G0008 PR ADMIN INFLUENZA VIRUS VAC: ICD-10-PCS | Mod: S$GLB,,, | Performed by: INTERNAL MEDICINE

## 2020-09-23 PROCEDURE — 90694 FLU VACCINE - QUADRIVALENT - ADJUVANTED: ICD-10-PCS | Mod: S$GLB,,, | Performed by: INTERNAL MEDICINE

## 2020-09-23 PROCEDURE — 99499 RISK ADDL DX/OHS AUDIT: ICD-10-PCS | Mod: S$GLB,,, | Performed by: INTERNAL MEDICINE

## 2020-09-23 RX ORDER — GABAPENTIN 300 MG/1
300 CAPSULE ORAL 2 TIMES DAILY
Qty: 60 CAPSULE | Refills: 1 | Status: SHIPPED | OUTPATIENT
Start: 2020-09-23 | End: 2022-05-30

## 2020-09-23 RX ORDER — OLMESARTAN MEDOXOMIL 20 MG/1
20 TABLET ORAL DAILY
Qty: 90 TABLET | Refills: 1 | Status: SHIPPED | OUTPATIENT
Start: 2020-09-23 | End: 2020-11-09

## 2020-09-23 RX ORDER — OMEPRAZOLE 40 MG/1
40 CAPSULE, DELAYED RELEASE ORAL DAILY
Qty: 90 CAPSULE | Refills: 1 | Status: SHIPPED | OUTPATIENT
Start: 2020-09-23 | End: 2021-04-19

## 2020-09-23 RX ORDER — GLIMEPIRIDE 4 MG/1
8 TABLET ORAL
Qty: 180 TABLET | Refills: 1 | Status: SHIPPED | OUTPATIENT
Start: 2020-09-23 | End: 2021-06-01

## 2020-09-23 NOTE — PROGRESS NOTES
Subjective:       Patient ID: Dary Chaney is a 64 y.o. female.    Chief Complaint: Follow-up (3 month)    Dary Chaney  64 y.o. White female    Patient presents with:  Follow-up: 3 month    HPI: Here today to follow up on chronic conditions.  HTN--stable on olmesartan. She needs refills.   HLD--compliant with rosuvastatin.                   LDLCALC                  116.2               09/18/2020                 CHOL                     213 (H)             09/18/2020                 TRIG                     229 (H)             09/18/2020                 HDL                      51                  09/18/2020                 ALT                      29                  06/17/2020                 AST                      19                  06/17/2020                 NA                       140                 06/17/2020                 K                        4.8                 06/17/2020                 CL                       104                 06/17/2020                 CREATININE               0.9                 06/17/2020                 BUN                      27 (H)              06/17/2020                 CO2                      28                  06/17/2020            Diabetes--uncontrolled. She admits to non compliance with her diet and exercise regimen. She is taking glimepiride, Trulicity and Tresiba as prescribed. She is under the care of diabetes management.                HGBA1C                   7.9 (H)             09/18/2020            She continues to have cramping in her legs. She was prescribed tizanidine and states it helped minimally. Now she has numbness and tingling in her legs. She was on gabapentin in the past but states it was for something else and she no longer takes it.   GERD--stable on omeprazole. She needs refills.     Past Medical History:  Arthritis  Cataract  Colon polyp  Diabetes mellitus type II  Hyperlipidemia  Hypertension  09/2018: Renal cell  "carcinoma  01/20/2019: Total knee replacement status, left      Comment:  Dr.Robert Cook  UTI (urinary tract infection)    Current Outpatient Medications on File Prior to Visit:  albuterol (PROVENTIL) 2.5 mg /3 mL (0.083 %) nebulizer solution, Take 3 mLs (2.5 mg total) by nebulization every 6 (six) hours as needed for Wheezing., Disp: 120 mL, Rfl: 1  blood sugar diagnostic Strp, 1 each by Misc.(Non-Drug; Combo Route) route 4 (four) times daily., Disp: 100 each, Rfl: 11  blood-glucose meter kit, Test finger stick blood sugar once daily., Disp: 1 each, Rfl: 0  dulaglutide (TRULICITY) 1.5 mg/0.5 mL PnIj, INJECT 1 SYRINGE SUBCUTANEOUSLY ONCE A WEEK, Disp: 12 Syringe, Rfl: 1  fluticasone (FLONASE) 50 mcg/actuation nasal spray, USE TWO SPRAY(S) IN EACH NOSTRIL ONCE DAILY (Patient taking differently: USE TWO SPRAY(S) IN EACH NOSTRIL ONCE DAILY  prn), Disp: 16 g, Rfl: 1  insulin degludec (TRESIBA FLEXTOUCH U-200) 200 unit/mL (3 mL) InPn, Inject 75 Units into the skin once daily., Disp: 12 mL, Rfl: 11  lancets Misc, 1 each by Misc.(Non-Drug; Combo Route) route 4 (four) times daily., Disp: 100 each, Rfl: 11  lidocaine (LIDODERM) 5 %, Place 1 patch onto the skin daily as needed. Remove & Discard patch within 12 hours or as directed by MD, Disp: 30 patch, Rfl: 3  loratadine (CLARITIN) 10 mg tablet, Take 1 tablet (10 mg total) by mouth once daily. (Patient taking differently: Take 10 mg by mouth daily as needed. ), Disp: 30 tablet, Rfl: 0  pen needle, diabetic (PEN NEEDLE) 31 gauge x 5/16" Ndle, Inject 1 each into the skin once daily., Disp: 100 each, Rfl: 11  pen needle, diabetic (RELION PEN NEEDLES MISC), INJECT 1 INTO SKIN ONCE DAILY, Disp: , Rfl:   rosuvastatin (CRESTOR) 40 MG Tab, Take 1 tablet (40 mg total) by mouth every evening., Disp: 90 tablet, Rfl: 3  tiZANidine (ZANAFLEX) 4 MG tablet, Take 1 tablet (4 mg total) by mouth 2 (two) times daily as needed., Disp: 30 tablet, Rfl: 0  traMADol (ULTRAM) 50 mg tablet, , Disp: " , Rfl:   glimepiride (AMARYL) 4 MG tablet, Take 2 tablets (8 mg total) by mouth daily with breakfast., Disp: 180 tablet, Rfl: 3  olmesartan (BENICAR) 20 MG tablet, Take 1 tablet by mouth once daily, Disp: 90 tablet, Rfl: 0  omeprazole (PRILOSEC) 40 MG capsule, Take 1 capsule (40 mg total) by mouth once daily., Disp: 30 capsule, Rfl: 0    Allergies:  Review of patient's allergies indicates:   -- Citrus and derivatives -- Swelling, Other (See Comments) and                            Edema    --  Redness             Lip swelling             Itching   -- Latex -- Other (See Comments)    --  Other reaction(s): Rash             Other reaction(s): welts             Other reaction(s): Itching   -- Valsartan -- Other (See Comments)    --  Other reaction(s): disoriented   -- Aspirin -- Nausea Only    --  Other reaction(s): Nausea Only   -- Metformin -- Anxiety    --  Other reaction(s): anxiety        Review of Systems   Constitutional: Negative for fever and unexpected weight change.   Respiratory: Negative for shortness of breath.    Cardiovascular: Negative for chest pain.   Genitourinary: Negative for difficulty urinating.   Neurological: Positive for numbness. Negative for dizziness and headaches.         Objective:      Physical Exam  Vitals signs reviewed.   Constitutional:       General: She is not in acute distress.     Appearance: She is well-developed. She is not ill-appearing.   Eyes:      General: No scleral icterus.  Cardiovascular:      Rate and Rhythm: Normal rate.   Pulmonary:      Effort: Pulmonary effort is normal. No respiratory distress.   Neurological:      Mental Status: She is alert and oriented to person, place, and time.   Psychiatric:         Behavior: Behavior normal.         Thought Content: Thought content normal.         Judgment: Judgment normal.         Assessment:       1. Hypertension goal BP (blood pressure) < 130/80    2. Hyperlipidemia LDL goal <70    3. Uncontrolled type 2 diabetes  mellitus without complication, with long-term current use of insulin    4. Paresthesia of bilateral legs    5. Chronic GERD    6. Encounter for screening mammogram for malignant neoplasm of breast    7. Immunization due        Plan:       Dary was seen today for follow-up.    Diagnoses and all orders for this visit:    Hypertension goal BP (blood pressure) < 130/80  -     Comprehensive metabolic panel; Standing  -     Refill olmesartan (BENICAR) 20 MG tablet; Take 1 tablet (20 mg total) by mouth once daily.    Hyperlipidemia LDL goal <70  -     Continue rosuvastatin  -     Lifestyle modifications discussed  -     Comprehensive metabolic panel; Standing    Uncontrolled type 2 diabetes mellitus without complication, with long-term current use of insulin  -     Comprehensive metabolic panel; Standing  -     Refill glimepiride (AMARYL) 4 MG tablet; Take 2 tablets (8 mg total) by mouth daily with breakfast.  -     F/U with diabetes management     Paresthesia of bilateral legs  -     gabapentin (NEURONTIN) 300 MG capsule; Take 1 capsule (300 mg total) by mouth 2 (two) times daily.    Chronic GERD  -     Refill omeprazole (PRILOSEC) 40 MG capsule; Take 1 capsule (40 mg total) by mouth once daily.    Encounter for screening mammogram for malignant neoplasm of breast  -     Mammo Digital Screening Bilat w/ [facility]; Future    Immunization due  -     Influenza - Quadrivalent (Adjuvanted)    Labs and f/u in 3 months.

## 2020-09-26 DIAGNOSIS — E11.9 TYPE 2 DIABETES MELLITUS WITH HEMOGLOBIN A1C GOAL OF LESS THAN 7.0%: Primary | ICD-10-CM

## 2020-09-28 ENCOUNTER — TELEPHONE (OUTPATIENT)
Dept: DIABETES | Facility: CLINIC | Age: 65
End: 2020-09-28

## 2020-09-28 NOTE — TELEPHONE ENCOUNTER
----- Message from Alecia Goodman PA-C sent at 9/26/2020 10:28 PM CDT -----  The following labs have been reviewed and interpreted as following:     A1c: Regression; Will place referral for Certified Diabetic Educator to establish care in interim before our visit in November; Staff please call and discuss

## 2020-10-06 ENCOUNTER — PATIENT MESSAGE (OUTPATIENT)
Dept: ADMINISTRATIVE | Facility: HOSPITAL | Age: 65
End: 2020-10-06

## 2020-10-19 ENCOUNTER — TELEPHONE (OUTPATIENT)
Dept: RADIOLOGY | Facility: HOSPITAL | Age: 65
End: 2020-10-19

## 2020-10-20 ENCOUNTER — PATIENT OUTREACH (OUTPATIENT)
Dept: ADMINISTRATIVE | Facility: OTHER | Age: 65
End: 2020-10-20

## 2020-10-20 ENCOUNTER — HOSPITAL ENCOUNTER (OUTPATIENT)
Dept: RADIOLOGY | Facility: HOSPITAL | Age: 65
Discharge: HOME | End: 2020-10-20
Attending: UROLOGY
Payer: MEDICARE

## 2020-10-20 DIAGNOSIS — C64.9 RENAL CELL CARCINOMA, UNSPECIFIED LATERALITY: ICD-10-CM

## 2020-10-20 DIAGNOSIS — I10 HYPERTENSION, UNSPECIFIED TYPE: ICD-10-CM

## 2020-10-20 PROCEDURE — 74178 CT ABDOMEN PELVIS W WO CONTRAST: ICD-10-PCS | Mod: 26,,, | Performed by: RADIOLOGY

## 2020-10-20 PROCEDURE — 74178 CT ABD&PLV WO CNTR FLWD CNTR: CPT | Mod: TC

## 2020-10-20 PROCEDURE — 74178 CT ABD&PLV WO CNTR FLWD CNTR: CPT | Mod: 26,,, | Performed by: RADIOLOGY

## 2020-10-20 PROCEDURE — 25500020 PHARM REV CODE 255: Performed by: UROLOGY

## 2020-10-20 RX ADMIN — IOHEXOL 100 ML: 350 INJECTION, SOLUTION INTRAVENOUS at 11:10

## 2020-10-21 ENCOUNTER — CLINICAL SUPPORT (OUTPATIENT)
Dept: DIABETES | Facility: CLINIC | Age: 65
End: 2020-10-21
Payer: MEDICARE

## 2020-10-21 VITALS — WEIGHT: 169.75 LBS | BODY MASS INDEX: 31.24 KG/M2 | HEIGHT: 62 IN

## 2020-10-21 DIAGNOSIS — E11.9 TYPE 2 DIABETES MELLITUS WITH HEMOGLOBIN A1C GOAL OF LESS THAN 7.0%: ICD-10-CM

## 2020-10-21 PROCEDURE — G0108 PR DIAB MANAGE TRN  PER INDIV: ICD-10-PCS | Mod: S$GLB,,, | Performed by: DIETITIAN, REGISTERED

## 2020-10-21 PROCEDURE — G0108 DIAB MANAGE TRN  PER INDIV: HCPCS | Mod: S$GLB,,, | Performed by: DIETITIAN, REGISTERED

## 2020-10-21 PROCEDURE — 99999 PR PBB SHADOW E&M-EST. PATIENT-LVL III: ICD-10-PCS | Mod: PBBFAC,,, | Performed by: DIETITIAN, REGISTERED

## 2020-10-21 PROCEDURE — 99999 PR PBB SHADOW E&M-EST. PATIENT-LVL III: CPT | Mod: PBBFAC,,, | Performed by: DIETITIAN, REGISTERED

## 2020-10-21 NOTE — PROGRESS NOTES
Diabetes Education  Author: Helena Phillips RD, CDE  Date: 10/21/2020    Diabetes Care Management Summary  Diabetes Education Record Assessment/Progress: Initial(assessment 10/21/20)  Current Diabetes Risk Level: Moderate     Last A1c:   Lab Results   Component Value Date    HGBA1C 7.9 (H) 09/18/2020    HGBA1C 7.9 (H) 09/18/2020     Last visit with Diabetes Educator: Last Education Visit: Not Found    Diabetes Type  Diabetes Type : Type II    Diabetes History  Diabetes Diagnosis: >10 years(>15yrs)  Current Treatment: Diet, Exercise, Oral Medication, Injectable, Insulin(Amaryl 4mg 2tab bfst daily, tresiba 75units daily, trulicity 1.5 mg wkly)  Reviewed Problem List with Patient: Yes    Health Maintenance was reviewed today with patient. Discussed with patient importance of routine eye exams, foot exams/foot care, blood work (i.e.: A1c, microalbumin, and lipid), dental visits, yearly flu vaccine, and pneumonia vaccine as indicated by PCP. Patient verbalized understanding.     Health Maintenance Topics with due status: Not Due       Topic Last Completion Date    TETANUS VACCINE 08/26/2011    Colorectal Cancer Screening 03/01/2018    Eye Exam 12/02/2019    Pneumococcal Vaccine (65+ High/Highest Risk) 12/17/2019    DEXA SCAN 12/18/2019    Foot Exam 03/18/2020    Lipid Panel 09/18/2020    Hemoglobin A1c 09/18/2020    Urine Microalbumin 09/18/2020    Low Dose Statin 09/23/2020     Health Maintenance Due   Topic Date Due    Shingles Vaccine (1 of 2) 12/05/2004    Mammogram  07/19/2020       Nutrition  Meal Planning: (Intake ~1500-1700cals/d. Irregular carb intake ~15grm-80grm/meal, 20-30grm/snack. No excess sat fat, sodium.)  Meal Plan 24 Hour Recall - Breakfast: oatmeal (splenda, water) occs w/ English muffin, butter OR honey nut cheerios, almond milk (unsweetened) and English muffin, butter- coffee  Meal Plan 24 Hour Recall - Lunch: 1pm (had low bg yesterday) ham cheese sandwich (wheat), no sides - unsweetened ice  tea  Meal Plan 24 Hour Recall - Dinner: 5pm meatloaf, creamed potatoes 1c, peas 1/2c - water  Meal Plan 24 Hour Recall - Snack: occs pb crackers or granola bar; isaias: diet/splenda, water    Monitoring   Self Monitoring : Per recall, fst 120-130, acl (1pm 60-90 due lunch meal delay), bed 150-160, occs 190 (couple times mos due cake at dinner).  Blood Glucose Logs: No  In the last month, how often have you had a low blood sugar reaction?: more than once a week(Pt reports hypoglycemic episode before lunch ~4-5d/wk due to delay lunch; treats using food/meal (sandwich, granola bar).)    Exercise   Exercise Type: none    Social History  Preferred Learning Method: Face to Face  Primary Support: Self  Smoking Status: Never a Smoker  Alcohol Use: Rarely       Barriers to Change  Barriers to Change: None  Learning Challenges : None    Readiness to Learn   Readiness to Learn : Eager    Cultural Influences  Cultural Influences: No    Diabetes Education Assessment/Progress  Diabetes Disease Process (diabetes disease process and treatment options): Discussion, Individual Session, Demonstrates Understanding/Competency(verbalizes/demonstrates), Written Materials Provided  Nutrition (Incorporating nutritional management into one's lifestyle): Discussion, Individual Session, Demonstrates Understanding/Competency (verbalizes/demonstrates), Written Materials Provided  Physical Activity (incorporating physical activity into one's lifestyle): Discussion, Individual Session, Demonstrates Understanding/Competency (verbalizes/demonstrates), Written Materials Provided  Medications (states correct name, dose, onset, peak, duration, side effects & timing of meds): Discussion, Individual Session, Demonstrates Understanding/Competency(verbalizes/demonstrates), Written Materials Provided  Monitoring (monitoring blood glucose/other parameters & using results): Discussion, Individual Session, Demonstrates Understanding/Competency  (verbalizes/demonstrates), Written Materials Provided  Acute Complications (preventing, detecting, and treating acute complications): Discussion, Individual Session, Demonstrates Understanding/Competency (verbalizes/demonstrates), Written Materials Provided  Chronic Complications (preventing, detecting, and treating chronic complications): Discussion, Individual Session, Demonstrates Understanding/Competency (verbalizes/demonstrates), Written Materials Provided  Clinical (diabetes, other pertinent medical history, and relevant comorbidities reviewed during visit): Discussion, Individual Session, Demonstrates Understanding/Competency (verbalizes/demonstrates)  Cognitive (knowledge of self-management skills, functional health literacy): Discussion, Individual Session, Demonstrates Understanding/Competency (verbalizes/demonstrates)  Psychosocial (emotional response to diabetes): Not Covered/Deferred  Diabetes Distress and Support Systems: Not Covered/Deferred  Behavioral (readiness for change, lifestyle practices, self-care behaviors): Discussion, Individual Session, Demonstrates Understanding/Competency (verbalizes/demonstrates)    Goals  Patient has selected/evaluated goals during today's session: Yes, selected  Healthy Eating: Set(use meal plan - carb portions/spacing (MR shake or pb/cheese crackers 5 around 1030-1130a))  Start Date: 10/21/20  Target Date: 11/17/20  Monitoring: Set(test BG 2x/d - fst, ac, 2hr pp/bed (rotate times); bring records clinic)  Start Date: 10/21/20  Target Date: 11/17/20  Reducing Risks: Set(carry soft peppermints/glu tab for hypoglycemia treatment (rule 15))  Start Date: 10/21/20  Target Date: 11/17/20     Diabetes Care Plan/Intervention  Education Plan/Intervention: Individual Follow-Up DSMT(FU w/ Beena for medical mgmt.) Pt may benefit from reduction Amaryl 4mg from 2tab am daily to 1tab am daily due to consistent mid-am hypoglycemia; will coord further w/ Roselynight. RTC ~4wks, prn  or as referred.    Diabetes Meal Plan  Restrictions: Low Fat, Low Sodium, Restricted Carbohydrate  Calories: 1400  Carbohydrate Per Meal: 30-45g  Carbohydrate Per Snack : 7-15g    Today's Self-Management Care Plan was developed with the patient's input and is based on barriers identified during today's assessment.    The long and short-term goals in the care plan were written with the patient/caregiver's input. The patient has agreed to work toward these goals to improve her overall diabetes control.      The patient received a copy of today's self-management plan and verbalized understanding of the care plan, goals, and all of today's instructions.      The patient was encouraged to communicate with her physician and care team regarding her condition(s) and treatment.  I provided the patient with my contact information today and encouraged her to contact me via phone or patient portal as needed.     Education Units of Time   Time Spent: 60 min

## 2020-10-21 NOTE — Clinical Note
Carlos Alston, Met w/ pt today. Pt reports hypoglycemic episode before lunch ~4-5d/wk due to delay lunch; treats using food/meal (sandwich, granola bar). We discussed strategies for am snack, strategies to prevent meal delay. She's on Amaryl 4mg 2tab am daily, tresiba 75u daily, trulicity 1.5mg/wkly. Do you think she'd benefit from reduction Amaryl or would you like to see how she does w/ meal plan improvements first? Thank you!

## 2020-10-26 ENCOUNTER — OFFICE VISIT (OUTPATIENT)
Dept: UROLOGY | Facility: CLINIC | Age: 65
End: 2020-10-26
Payer: MEDICARE

## 2020-10-26 VITALS
WEIGHT: 170.88 LBS | SYSTOLIC BLOOD PRESSURE: 162 MMHG | DIASTOLIC BLOOD PRESSURE: 80 MMHG | BODY MASS INDEX: 31.25 KG/M2 | TEMPERATURE: 98 F

## 2020-10-26 DIAGNOSIS — C64.9 RENAL CELL CARCINOMA, UNSPECIFIED LATERALITY: ICD-10-CM

## 2020-10-26 DIAGNOSIS — N28.1 RENAL CYST: ICD-10-CM

## 2020-10-26 DIAGNOSIS — N20.0 RENAL STONE: ICD-10-CM

## 2020-10-26 DIAGNOSIS — I10 HYPERTENSION, UNSPECIFIED TYPE: Primary | ICD-10-CM

## 2020-10-26 LAB
BILIRUB SERPL-MCNC: NORMAL MG/DL
BLOOD URINE, POC: NORMAL
CLARITY, POC UA: NORMAL
COLOR, POC UA: YELLOW
GLUCOSE UR QL STRIP: 1000
KETONES UR QL STRIP: NORMAL
LEUKOCYTE ESTERASE URINE, POC: NORMAL
NITRITE, POC UA: NORMAL
PH, POC UA: 5
PROTEIN, POC: NORMAL
SPECIFIC GRAVITY, POC UA: 1.01
UROBILINOGEN, POC UA: NORMAL

## 2020-10-26 PROCEDURE — 3079F DIAST BP 80-89 MM HG: CPT | Mod: CPTII,S$GLB,, | Performed by: UROLOGY

## 2020-10-26 PROCEDURE — 3008F PR BODY MASS INDEX (BMI) DOCUMENTED: ICD-10-PCS | Mod: CPTII,S$GLB,, | Performed by: UROLOGY

## 2020-10-26 PROCEDURE — 3079F PR MOST RECENT DIASTOLIC BLOOD PRESSURE 80-89 MM HG: ICD-10-PCS | Mod: CPTII,S$GLB,, | Performed by: UROLOGY

## 2020-10-26 PROCEDURE — 99214 PR OFFICE/OUTPT VISIT, EST, LEVL IV, 30-39 MIN: ICD-10-PCS | Mod: 25,S$GLB,, | Performed by: UROLOGY

## 2020-10-26 PROCEDURE — 99214 OFFICE O/P EST MOD 30 MIN: CPT | Mod: 25,S$GLB,, | Performed by: UROLOGY

## 2020-10-26 PROCEDURE — 99499 RISK ADDL DX/OHS AUDIT: ICD-10-PCS | Mod: S$GLB,,, | Performed by: UROLOGY

## 2020-10-26 PROCEDURE — 3008F BODY MASS INDEX DOCD: CPT | Mod: CPTII,S$GLB,, | Performed by: UROLOGY

## 2020-10-26 PROCEDURE — 99499 UNLISTED E&M SERVICE: CPT | Mod: S$GLB,,, | Performed by: UROLOGY

## 2020-10-26 PROCEDURE — 1101F PR PT FALLS ASSESS DOC 0-1 FALLS W/OUT INJ PAST YR: ICD-10-PCS | Mod: CPTII,S$GLB,, | Performed by: UROLOGY

## 2020-10-26 PROCEDURE — 81002 URINALYSIS NONAUTO W/O SCOPE: CPT | Mod: S$GLB,,, | Performed by: UROLOGY

## 2020-10-26 PROCEDURE — 1101F PT FALLS ASSESS-DOCD LE1/YR: CPT | Mod: CPTII,S$GLB,, | Performed by: UROLOGY

## 2020-10-26 PROCEDURE — 3077F SYST BP >= 140 MM HG: CPT | Mod: CPTII,S$GLB,, | Performed by: UROLOGY

## 2020-10-26 PROCEDURE — 99999 PR PBB SHADOW E&M-EST. PATIENT-LVL III: ICD-10-PCS | Mod: PBBFAC,,, | Performed by: UROLOGY

## 2020-10-26 PROCEDURE — 3077F PR MOST RECENT SYSTOLIC BLOOD PRESSURE >= 140 MM HG: ICD-10-PCS | Mod: CPTII,S$GLB,, | Performed by: UROLOGY

## 2020-10-26 PROCEDURE — 99999 PR PBB SHADOW E&M-EST. PATIENT-LVL III: CPT | Mod: PBBFAC,,, | Performed by: UROLOGY

## 2020-10-26 PROCEDURE — 81002 POCT URINE DIPSTICK WITHOUT MICROSCOPE: ICD-10-PCS | Mod: S$GLB,,, | Performed by: UROLOGY

## 2020-10-26 NOTE — PROGRESS NOTES
Chief Complaint: RCC    HPI:   10/26/20:  Indep assessment of imaging agree with report:  Small renal stone, bilateral stable cysts, no recurrent RCC.  Reviewed history in detail.   10/23/19: 6 mo CT reassuring.  Reviewed history in detail.  4/24/19: returns today in follow-up for bilateral renal masses s/p right robotic partial nephrectomy on 9/13/18. The masses were found incidentally during evaluation for non-specific abdominal pain and bloating. The masses were seen on an abdominal ultrasound. A CT scan with and without contrast was performed which showed the right renal mass to be enhancing and the left renal mass to be likely a cyst. The patient denied gross hematuria and/or constitutional symptoms attributable to her recently discovered renal mass. The patient denied a personal and family history of kidney cancer. The patient denied any personal history of other cancers. She did describe some lower abdominal pressure and pain. She has no voiding complaints at present. The patient has never smoked. Her eGFR is greater than 60 ml/min.    The patient underwent surgery and did very well. 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 Her final pathologic stage is [id number].  Allergies:  Citrus and derivatives, Latex, Valsartan, Aspirin, and Metformin    Medications: has a current medication list which includes the following prescription(s): blood sugar diagnostic, blood-glucose meter, dulaglutide, fluticasone propionate, gabapentin, glimepiride, insulin degludec, lancets, olmesartan, omeprazole, pen needle, diabetic, pen needle, diabetic, rosuvastatin, tizanidine, tramadol, albuterol, lidocaine, and loratadine.    Review of Systems:  General: No fever, chills, fatigability, or weight loss.  Skin: No rashes, itching, or changes in color or texture of skin.  Chest: Denies IZQUIERDO, cyanosis, wheezing, cough, and sputum production.  Abdomen: Appetite fine. No weight loss. Denies diarrhea,  abdominal pain, hematemesis, or blood in stool.  Musculoskeletal: No joint stiffness or swelling. Denies back pain.  : As above.  All other review of systems negative.    PMH:   has a past medical history of Arthritis, Cataract, Colon polyp, Diabetes mellitus type II, Hyperlipidemia, Hypertension, Renal cell carcinoma (09/2018), Total knee replacement status, left (01/20/2019), and UTI (urinary tract infection).    PSH:   has a past surgical history that includes Cholecystectomy; Spine surgery; Hysterectomy (1993); Knee surgery (03/2017); Cholecystectomy; Colonoscopy (2012); Colonoscopy (N/A, 3/1/2018); Esophagogastroduodenoscopy (N/A, 8/21/2018); thumb surgery Rt- July 2018 - cyst ; Robot-assisted laparoscopic partial nephrectomy using da Basim Xi (Right, 9/13/2018); Joint replacement; and left knee replacement (01/20/2019).    FamHx: family history includes Diabetes in her brother.    SocHx:  reports that she has never smoked. She has never used smokeless tobacco. She reports current alcohol use. She reports that she does not use drugs.     Physical Exam:  Vitals:   Vitals:    10/26/20 1332   BP: (!) 162/80   Temp: 98.2 °F (36.8 °C)     General: A&Ox3. No apparent distress. No deformities.  Neck: No masses. Normal thyroid.  Lungs: normal inspiration. No use of accessory muscles.  Heart: normal pulse. No arrhythmias.  Abdomen: Soft. NT. ND. No masses. No hernias. No hepatosplenomegaly.  Lymphatic: Neck and groin nodes negative.  Skin: The skin is warm and dry. No jaundice.  Ext: No c/c/e.  : deferred    Labs/Studies:   Urinalysis performed in clinic, summary: UA normal     Impression/Plan:   1. CT/RTC 1 year.  No obvious RCC recurrence.  2. HTN: elev today, takes her meds  3. Renal stones, cysts benign.

## 2020-10-27 ENCOUNTER — HOSPITAL ENCOUNTER (OUTPATIENT)
Dept: RADIOLOGY | Facility: HOSPITAL | Age: 65
Discharge: HOME | End: 2020-10-27
Attending: INTERNAL MEDICINE
Payer: MEDICARE

## 2020-10-27 DIAGNOSIS — Z12.31 ENCOUNTER FOR SCREENING MAMMOGRAM FOR MALIGNANT NEOPLASM OF BREAST: ICD-10-CM

## 2020-10-27 PROCEDURE — 77067 SCR MAMMO BI INCL CAD: CPT | Mod: 26,,, | Performed by: RADIOLOGY

## 2020-10-27 PROCEDURE — 77067 SCR MAMMO BI INCL CAD: CPT | Mod: TC

## 2020-10-27 PROCEDURE — 77063 MAMMO DIGITAL SCREENING BILAT WITH TOMO: ICD-10-PCS | Mod: 26,,, | Performed by: RADIOLOGY

## 2020-10-27 PROCEDURE — 77063 BREAST TOMOSYNTHESIS BI: CPT | Mod: 26,,, | Performed by: RADIOLOGY

## 2020-10-27 PROCEDURE — 77067 MAMMO DIGITAL SCREENING BILAT WITH TOMO: ICD-10-PCS | Mod: 26,,, | Performed by: RADIOLOGY

## 2020-12-05 DIAGNOSIS — E11.9 TYPE 2 DIABETES MELLITUS WITH HEMOGLOBIN A1C GOAL OF LESS THAN 7.0%: ICD-10-CM

## 2020-12-05 RX ORDER — DULAGLUTIDE 1.5 MG/.5ML
INJECTION, SOLUTION SUBCUTANEOUS
Qty: 12 PEN | Refills: 1 | Status: SHIPPED | OUTPATIENT
Start: 2020-12-05 | End: 2021-01-05

## 2020-12-23 ENCOUNTER — OFFICE VISIT (OUTPATIENT)
Dept: INTERNAL MEDICINE | Facility: CLINIC | Age: 65
End: 2020-12-23
Payer: MEDICARE

## 2020-12-23 VITALS
HEART RATE: 85 BPM | WEIGHT: 171.75 LBS | BODY MASS INDEX: 31.6 KG/M2 | TEMPERATURE: 98 F | OXYGEN SATURATION: 95 % | DIASTOLIC BLOOD PRESSURE: 64 MMHG | HEIGHT: 62 IN | SYSTOLIC BLOOD PRESSURE: 124 MMHG

## 2020-12-23 DIAGNOSIS — I10 HYPERTENSION GOAL BP (BLOOD PRESSURE) < 130/80: Primary | Chronic | ICD-10-CM

## 2020-12-23 DIAGNOSIS — E11.65 UNCONTROLLED TYPE 2 DIABETES MELLITUS WITH HYPERGLYCEMIA, WITH LONG-TERM CURRENT USE OF INSULIN: ICD-10-CM

## 2020-12-23 DIAGNOSIS — E78.5 HYPERLIPIDEMIA LDL GOAL <70: Chronic | ICD-10-CM

## 2020-12-23 DIAGNOSIS — Z79.4 UNCONTROLLED TYPE 2 DIABETES MELLITUS WITH HYPERGLYCEMIA, WITH LONG-TERM CURRENT USE OF INSULIN: ICD-10-CM

## 2020-12-23 PROCEDURE — 1157F ADVNC CARE PLAN IN RCRD: CPT | Mod: S$GLB,,, | Performed by: INTERNAL MEDICINE

## 2020-12-23 PROCEDURE — 3072F PR LOW RISK FOR RETINOPATHY: ICD-10-PCS | Mod: S$GLB,,, | Performed by: INTERNAL MEDICINE

## 2020-12-23 PROCEDURE — 99214 PR OFFICE/OUTPT VISIT, EST, LEVL IV, 30-39 MIN: ICD-10-PCS | Mod: S$GLB,,, | Performed by: INTERNAL MEDICINE

## 2020-12-23 PROCEDURE — 1101F PT FALLS ASSESS-DOCD LE1/YR: CPT | Mod: CPTII,S$GLB,, | Performed by: INTERNAL MEDICINE

## 2020-12-23 PROCEDURE — 3288F PR FALLS RISK ASSESSMENT DOCUMENTED: ICD-10-PCS | Mod: CPTII,S$GLB,, | Performed by: INTERNAL MEDICINE

## 2020-12-23 PROCEDURE — 1159F PR MEDICATION LIST DOCUMENTED IN MEDICAL RECORD: ICD-10-PCS | Mod: S$GLB,,, | Performed by: INTERNAL MEDICINE

## 2020-12-23 PROCEDURE — 1101F PR PT FALLS ASSESS DOC 0-1 FALLS W/OUT INJ PAST YR: ICD-10-PCS | Mod: CPTII,S$GLB,, | Performed by: INTERNAL MEDICINE

## 2020-12-23 PROCEDURE — 99999 PR PBB SHADOW E&M-EST. PATIENT-LVL IV: ICD-10-PCS | Mod: PBBFAC,,, | Performed by: INTERNAL MEDICINE

## 2020-12-23 PROCEDURE — 99499 UNLISTED E&M SERVICE: CPT | Mod: S$GLB,,, | Performed by: INTERNAL MEDICINE

## 2020-12-23 PROCEDURE — 3072F LOW RISK FOR RETINOPATHY: CPT | Mod: S$GLB,,, | Performed by: INTERNAL MEDICINE

## 2020-12-23 PROCEDURE — 99999 PR PBB SHADOW E&M-EST. PATIENT-LVL IV: CPT | Mod: PBBFAC,,, | Performed by: INTERNAL MEDICINE

## 2020-12-23 PROCEDURE — 1159F MED LIST DOCD IN RCRD: CPT | Mod: S$GLB,,, | Performed by: INTERNAL MEDICINE

## 2020-12-23 PROCEDURE — 3288F FALL RISK ASSESSMENT DOCD: CPT | Mod: CPTII,S$GLB,, | Performed by: INTERNAL MEDICINE

## 2020-12-23 PROCEDURE — 1125F PR PAIN SEVERITY QUANTIFIED, PAIN PRESENT: ICD-10-PCS | Mod: S$GLB,,, | Performed by: INTERNAL MEDICINE

## 2020-12-23 PROCEDURE — 3078F DIAST BP <80 MM HG: CPT | Mod: CPTII,S$GLB,, | Performed by: INTERNAL MEDICINE

## 2020-12-23 PROCEDURE — 3008F PR BODY MASS INDEX (BMI) DOCUMENTED: ICD-10-PCS | Mod: CPTII,S$GLB,, | Performed by: INTERNAL MEDICINE

## 2020-12-23 PROCEDURE — 3074F PR MOST RECENT SYSTOLIC BLOOD PRESSURE < 130 MM HG: ICD-10-PCS | Mod: CPTII,S$GLB,, | Performed by: INTERNAL MEDICINE

## 2020-12-23 PROCEDURE — 1125F AMNT PAIN NOTED PAIN PRSNT: CPT | Mod: S$GLB,,, | Performed by: INTERNAL MEDICINE

## 2020-12-23 PROCEDURE — 3078F PR MOST RECENT DIASTOLIC BLOOD PRESSURE < 80 MM HG: ICD-10-PCS | Mod: CPTII,S$GLB,, | Performed by: INTERNAL MEDICINE

## 2020-12-23 PROCEDURE — 99499 RISK ADDL DX/OHS AUDIT: ICD-10-PCS | Mod: S$GLB,,, | Performed by: INTERNAL MEDICINE

## 2020-12-23 PROCEDURE — 3051F HG A1C>EQUAL 7.0%<8.0%: CPT | Mod: CPTII,S$GLB,, | Performed by: INTERNAL MEDICINE

## 2020-12-23 PROCEDURE — 99214 OFFICE O/P EST MOD 30 MIN: CPT | Mod: S$GLB,,, | Performed by: INTERNAL MEDICINE

## 2020-12-23 PROCEDURE — 1157F PR ADVANCE CARE PLAN OR EQUIV PRESENT IN MEDICAL RECORD: ICD-10-PCS | Mod: S$GLB,,, | Performed by: INTERNAL MEDICINE

## 2020-12-23 PROCEDURE — 3008F BODY MASS INDEX DOCD: CPT | Mod: CPTII,S$GLB,, | Performed by: INTERNAL MEDICINE

## 2020-12-23 PROCEDURE — 3074F SYST BP LT 130 MM HG: CPT | Mod: CPTII,S$GLB,, | Performed by: INTERNAL MEDICINE

## 2020-12-23 PROCEDURE — 3051F PR MOST RECENT HEMOGLOBIN A1C LEVEL 7.0 - < 8.0%: ICD-10-PCS | Mod: CPTII,S$GLB,, | Performed by: INTERNAL MEDICINE

## 2020-12-23 NOTE — PROGRESS NOTES
Subjective:       Patient ID: Dary Chaney is a 65 y.o. female.    Chief Complaint: Follow-up (3 month )    Dary Chaney  65 y.o. White female    Patient presents with:  Follow-up: 3 month     HPI: Here today to follow up on chronic conditions.  HTN--stable on olmesartan.   HLD--compliant with rosuvastatin.                   LDLCALC                  116.2               09/18/2020                 CHOL                     213 (H)             09/18/2020                 TRIG                     229 (H)             09/18/2020                 HDL                      51                  09/18/2020                 ALT                      29                  06/17/2020                 AST                      19                  06/17/2020                 NA                       140                 06/17/2020                 K                        4.8                 06/17/2020                 CL                       104                 06/17/2020                 CREATININE               0.8                 10/20/2020                 BUN                      27 (H)              06/17/2020                 CO2                      28                  06/17/2020            Diabetes--she had been having elevated glucose readings but they have been better the past couple of weeks. She is compliant with glimepiride, Trulicity and Tresiba. She is under the care of diabetes management.              HGBA1C                   7.9 (H)             09/18/2020              Past Medical History:  Arthritis  Cataract  Colon polyp  Diabetes mellitus type II  Hyperlipidemia  Hypertension  09/2018: Renal cell carcinoma  01/20/2019: Total knee replacement status, left      Comment:  Dr.Robert Cook  UTI (urinary tract infection)    Current Outpatient Medications on File Prior to Visit:  albuterol (PROVENTIL) 2.5 mg /3 mL (0.083 %) nebulizer solution, Take 3 mLs (2.5 mg total) by nebulization every 6 (six) hours as needed for  "Wheezing., Disp: 120 mL, Rfl: 1  blood sugar diagnostic Strp, 1 each by Misc.(Non-Drug; Combo Route) route 4 (four) times daily., Disp: 100 each, Rfl: 11  blood-glucose meter kit, Test finger stick blood sugar once daily., Disp: 1 each, Rfl: 0  dulaglutide (TRULICITY) 1.5 mg/0.5 mL pen injector, INJECT 1 SYRINGE SUBCUTANEOUSLY ONCE A WEEK, Disp: 12 pen, Rfl: 1  fluticasone (FLONASE) 50 mcg/actuation nasal spray, USE TWO SPRAY(S) IN EACH NOSTRIL ONCE DAILY (Patient taking differently: USE TWO SPRAY(S) IN EACH NOSTRIL ONCE DAILY  prn), Disp: 16 g, Rfl: 1  gabapentin (NEURONTIN) 300 MG capsule, Take 1 capsule (300 mg total) by mouth 2 (two) times daily., Disp: 60 capsule, Rfl: 1  glimepiride (AMARYL) 4 MG tablet, Take 2 tablets (8 mg total) by mouth daily with breakfast., Disp: 180 tablet, Rfl: 1  insulin degludec (TRESIBA FLEXTOUCH U-200) 200 unit/mL (3 mL) InPn, Inject 75 Units into the skin once daily., Disp: 12 mL, Rfl: 11  lancets Misc, 1 each by Misc.(Non-Drug; Combo Route) route 4 (four) times daily., Disp: 100 each, Rfl: 11  loratadine (CLARITIN) 10 mg tablet, Take 1 tablet (10 mg total) by mouth once daily. (Patient taking differently: Take 10 mg by mouth daily as needed. ), Disp: 30 tablet, Rfl: 0  olmesartan (BENICAR) 20 MG tablet, Take 1 tablet by mouth once daily, Disp: 90 tablet, Rfl: 2  omeprazole (PRILOSEC) 40 MG capsule, Take 1 capsule (40 mg total) by mouth once daily., Disp: 90 capsule, Rfl: 1  pen needle, diabetic (PEN NEEDLE) 31 gauge x 5/16" Ndle, Inject 1 each into the skin once daily., Disp: 100 each, Rfl: 11  pen needle, diabetic (RELION PEN NEEDLES MISC), INJECT 1 INTO SKIN ONCE DAILY, Disp: , Rfl:   rosuvastatin (CRESTOR) 40 MG Tab, Take 1 tablet (40 mg total) by mouth every evening., Disp: 90 tablet, Rfl: 3  tiZANidine (ZANAFLEX) 4 MG tablet, Take 1 tablet (4 mg total) by mouth 2 (two) times daily as needed., Disp: 30 tablet, Rfl: 0  traMADol (ULTRAM) 50 mg tablet, , Disp: , Rfl: "     Allergies:  Review of patient's allergies indicates:   -- Citrus and derivatives -- Swelling, Other (See Comments) and                            Edema    --  Redness             Lip swelling             Itching   -- Latex -- Other (See Comments)    --  Other reaction(s): Rash             Other reaction(s): welts             Other reaction(s): Itching   -- Valsartan -- Other (See Comments)    --  Other reaction(s): disoriented   -- Aspirin -- Nausea Only    --  Other reaction(s): Nausea Only   -- Metformin -- Anxiety    --  Other reaction(s): anxiety        Review of Systems   Constitutional: Negative for fever and unexpected weight change.   Respiratory: Negative for shortness of breath.    Cardiovascular: Negative for chest pain.   Genitourinary: Negative for difficulty urinating.   Musculoskeletal: Positive for arthralgias.   Neurological: Negative for dizziness and headaches.         Objective:      Physical Exam  Constitutional:       General: She is not in acute distress.     Appearance: She is well-developed. She is not ill-appearing.   Eyes:      General: No scleral icterus.  Cardiovascular:      Rate and Rhythm: Normal rate.   Pulmonary:      Effort: Pulmonary effort is normal. No respiratory distress.   Neurological:      Mental Status: She is alert and oriented to person, place, and time.   Psychiatric:         Behavior: Behavior normal.         Thought Content: Thought content normal.         Judgment: Judgment normal.         Assessment:       1. Hypertension goal BP (blood pressure) < 130/80    2. Hyperlipidemia LDL goal <70    3. Uncontrolled type 2 diabetes mellitus with hyperglycemia, with long-term current use of insulin        Plan:       Dary was seen today for follow-up.    Diagnoses and all orders for this visit:    Hypertension goal BP (blood pressure) < 130/80  -     Continue olmesartan    Hyperlipidemia LDL goal <70  -     Continue rosuvastatin    Uncontrolled type 2 diabetes mellitus  with hyperglycemia, with long-term current use of insulin  -     Check A1C and CMP  -     F/U with diabetes management     Schedule labs.     F/U in 3 months.

## 2020-12-28 ENCOUNTER — LAB VISIT (OUTPATIENT)
Dept: LAB | Facility: HOSPITAL | Age: 65
End: 2020-12-28
Attending: INTERNAL MEDICINE
Payer: MEDICARE

## 2020-12-28 DIAGNOSIS — Z79.4 CONTROLLED TYPE 2 DIABETES MELLITUS WITHOUT COMPLICATION, WITH LONG-TERM CURRENT USE OF INSULIN: ICD-10-CM

## 2020-12-28 DIAGNOSIS — E11.9 CONTROLLED TYPE 2 DIABETES MELLITUS WITHOUT COMPLICATION, WITH LONG-TERM CURRENT USE OF INSULIN: ICD-10-CM

## 2020-12-28 DIAGNOSIS — I10 HYPERTENSION GOAL BP (BLOOD PRESSURE) < 130/80: Chronic | ICD-10-CM

## 2020-12-28 DIAGNOSIS — E78.5 HYPERLIPIDEMIA LDL GOAL <70: Chronic | ICD-10-CM

## 2020-12-28 PROCEDURE — 83036 HEMOGLOBIN GLYCOSYLATED A1C: CPT

## 2020-12-28 PROCEDURE — 80061 LIPID PANEL: CPT

## 2020-12-28 PROCEDURE — 80053 COMPREHEN METABOLIC PANEL: CPT

## 2020-12-28 PROCEDURE — 36415 COLL VENOUS BLD VENIPUNCTURE: CPT | Mod: PO

## 2020-12-29 ENCOUNTER — TELEPHONE (OUTPATIENT)
Dept: INTERNAL MEDICINE | Facility: CLINIC | Age: 65
End: 2020-12-29

## 2020-12-29 LAB
ALBUMIN SERPL BCP-MCNC: 3.7 G/DL (ref 3.5–5.2)
ALP SERPL-CCNC: 92 U/L (ref 55–135)
ALT SERPL W/O P-5'-P-CCNC: 20 U/L (ref 10–44)
ANION GAP SERPL CALC-SCNC: 12 MMOL/L (ref 8–16)
AST SERPL-CCNC: 14 U/L (ref 10–40)
BILIRUB SERPL-MCNC: 0.3 MG/DL (ref 0.1–1)
BUN SERPL-MCNC: 21 MG/DL (ref 8–23)
CALCIUM SERPL-MCNC: 8.9 MG/DL (ref 8.7–10.5)
CHLORIDE SERPL-SCNC: 104 MMOL/L (ref 95–110)
CHOLEST SERPL-MCNC: 134 MG/DL (ref 120–199)
CHOLEST/HDLC SERPL: 2.4 {RATIO} (ref 2–5)
CO2 SERPL-SCNC: 26 MMOL/L (ref 23–29)
CREAT SERPL-MCNC: 0.8 MG/DL (ref 0.5–1.4)
EST. GFR  (AFRICAN AMERICAN): >60 ML/MIN/1.73 M^2
EST. GFR  (NON AFRICAN AMERICAN): >60 ML/MIN/1.73 M^2
ESTIMATED AVG GLUCOSE: 243 MG/DL (ref 68–131)
GLUCOSE SERPL-MCNC: 162 MG/DL (ref 70–110)
HBA1C MFR BLD HPLC: 10.1 % (ref 4–5.6)
HDLC SERPL-MCNC: 56 MG/DL (ref 40–75)
HDLC SERPL: 41.8 % (ref 20–50)
LDLC SERPL CALC-MCNC: 62.6 MG/DL (ref 63–159)
NONHDLC SERPL-MCNC: 78 MG/DL
POTASSIUM SERPL-SCNC: 4.5 MMOL/L (ref 3.5–5.1)
PROT SERPL-MCNC: 6.8 G/DL (ref 6–8.4)
SODIUM SERPL-SCNC: 142 MMOL/L (ref 136–145)
TRIGL SERPL-MCNC: 77 MG/DL (ref 30–150)

## 2021-01-05 ENCOUNTER — PATIENT MESSAGE (OUTPATIENT)
Dept: PHARMACY | Facility: CLINIC | Age: 66
End: 2021-01-05

## 2021-01-05 ENCOUNTER — PATIENT OUTREACH (OUTPATIENT)
Dept: ADMINISTRATIVE | Facility: OTHER | Age: 66
End: 2021-01-05

## 2021-01-05 ENCOUNTER — TELEPHONE (OUTPATIENT)
Dept: PHARMACY | Facility: CLINIC | Age: 66
End: 2021-01-05

## 2021-01-05 ENCOUNTER — PATIENT MESSAGE (OUTPATIENT)
Dept: DIABETES | Facility: CLINIC | Age: 66
End: 2021-01-05

## 2021-01-05 ENCOUNTER — OFFICE VISIT (OUTPATIENT)
Dept: DIABETES | Facility: CLINIC | Age: 66
End: 2021-01-05
Payer: MEDICARE

## 2021-01-05 VITALS
WEIGHT: 170.63 LBS | SYSTOLIC BLOOD PRESSURE: 134 MMHG | HEART RATE: 86 BPM | RESPIRATION RATE: 18 BRPM | DIASTOLIC BLOOD PRESSURE: 81 MMHG | BODY MASS INDEX: 31.4 KG/M2 | HEIGHT: 62 IN

## 2021-01-05 DIAGNOSIS — E66.9 OBESITY (BMI 30-39.9): ICD-10-CM

## 2021-01-05 DIAGNOSIS — K76.0 FATTY LIVER: ICD-10-CM

## 2021-01-05 DIAGNOSIS — I10 HYPERTENSION GOAL BP (BLOOD PRESSURE) < 130/80: ICD-10-CM

## 2021-01-05 DIAGNOSIS — E11.9 TYPE 2 DIABETES MELLITUS WITH HEMOGLOBIN A1C GOAL OF LESS THAN 7.0%: Primary | ICD-10-CM

## 2021-01-05 DIAGNOSIS — E78.5 HYPERLIPIDEMIA LDL GOAL <70: ICD-10-CM

## 2021-01-05 LAB — GLUCOSE SERPL-MCNC: 101 MG/DL (ref 70–110)

## 2021-01-05 PROCEDURE — 1101F PR PT FALLS ASSESS DOC 0-1 FALLS W/OUT INJ PAST YR: ICD-10-PCS | Mod: CPTII,S$GLB,, | Performed by: PHYSICIAN ASSISTANT

## 2021-01-05 PROCEDURE — 3079F PR MOST RECENT DIASTOLIC BLOOD PRESSURE 80-89 MM HG: ICD-10-PCS | Mod: CPTII,S$GLB,, | Performed by: PHYSICIAN ASSISTANT

## 2021-01-05 PROCEDURE — 3008F BODY MASS INDEX DOCD: CPT | Mod: CPTII,S$GLB,, | Performed by: PHYSICIAN ASSISTANT

## 2021-01-05 PROCEDURE — 1157F PR ADVANCE CARE PLAN OR EQUIV PRESENT IN MEDICAL RECORD: ICD-10-PCS | Mod: S$GLB,,, | Performed by: PHYSICIAN ASSISTANT

## 2021-01-05 PROCEDURE — 3288F FALL RISK ASSESSMENT DOCD: CPT | Mod: CPTII,S$GLB,, | Performed by: PHYSICIAN ASSISTANT

## 2021-01-05 PROCEDURE — 3079F DIAST BP 80-89 MM HG: CPT | Mod: CPTII,S$GLB,, | Performed by: PHYSICIAN ASSISTANT

## 2021-01-05 PROCEDURE — 1159F PR MEDICATION LIST DOCUMENTED IN MEDICAL RECORD: ICD-10-PCS | Mod: S$GLB,,, | Performed by: PHYSICIAN ASSISTANT

## 2021-01-05 PROCEDURE — 3008F PR BODY MASS INDEX (BMI) DOCUMENTED: ICD-10-PCS | Mod: CPTII,S$GLB,, | Performed by: PHYSICIAN ASSISTANT

## 2021-01-05 PROCEDURE — 99999 PR PBB SHADOW E&M-EST. PATIENT-LVL IV: CPT | Mod: PBBFAC,,, | Performed by: PHYSICIAN ASSISTANT

## 2021-01-05 PROCEDURE — 1101F PT FALLS ASSESS-DOCD LE1/YR: CPT | Mod: CPTII,S$GLB,, | Performed by: PHYSICIAN ASSISTANT

## 2021-01-05 PROCEDURE — 82962 GLUCOSE BLOOD TEST: CPT | Mod: S$GLB,,, | Performed by: PHYSICIAN ASSISTANT

## 2021-01-05 PROCEDURE — 1157F ADVNC CARE PLAN IN RCRD: CPT | Mod: S$GLB,,, | Performed by: PHYSICIAN ASSISTANT

## 2021-01-05 PROCEDURE — 99999 PR PBB SHADOW E&M-EST. PATIENT-LVL IV: ICD-10-PCS | Mod: PBBFAC,,, | Performed by: PHYSICIAN ASSISTANT

## 2021-01-05 PROCEDURE — 99499 UNLISTED E&M SERVICE: CPT | Mod: S$GLB,,, | Performed by: PHYSICIAN ASSISTANT

## 2021-01-05 PROCEDURE — 1126F AMNT PAIN NOTED NONE PRSNT: CPT | Mod: S$GLB,,, | Performed by: PHYSICIAN ASSISTANT

## 2021-01-05 PROCEDURE — 82962 POCT GLUCOSE, HAND-HELD DEVICE: ICD-10-PCS | Mod: S$GLB,,, | Performed by: PHYSICIAN ASSISTANT

## 2021-01-05 PROCEDURE — 3046F HEMOGLOBIN A1C LEVEL >9.0%: CPT | Mod: CPTII,S$GLB,, | Performed by: PHYSICIAN ASSISTANT

## 2021-01-05 PROCEDURE — 3075F PR MOST RECENT SYSTOLIC BLOOD PRESS GE 130-139MM HG: ICD-10-PCS | Mod: CPTII,S$GLB,, | Performed by: PHYSICIAN ASSISTANT

## 2021-01-05 PROCEDURE — 3288F PR FALLS RISK ASSESSMENT DOCUMENTED: ICD-10-PCS | Mod: CPTII,S$GLB,, | Performed by: PHYSICIAN ASSISTANT

## 2021-01-05 PROCEDURE — 1126F PR PAIN SEVERITY QUANTIFIED, NO PAIN PRESENT: ICD-10-PCS | Mod: S$GLB,,, | Performed by: PHYSICIAN ASSISTANT

## 2021-01-05 PROCEDURE — 3046F PR MOST RECENT HEMOGLOBIN A1C LEVEL > 9.0%: ICD-10-PCS | Mod: CPTII,S$GLB,, | Performed by: PHYSICIAN ASSISTANT

## 2021-01-05 PROCEDURE — 3075F SYST BP GE 130 - 139MM HG: CPT | Mod: CPTII,S$GLB,, | Performed by: PHYSICIAN ASSISTANT

## 2021-01-05 PROCEDURE — 99499 RISK ADDL DX/OHS AUDIT: ICD-10-PCS | Mod: S$GLB,,, | Performed by: PHYSICIAN ASSISTANT

## 2021-01-05 PROCEDURE — 99214 OFFICE O/P EST MOD 30 MIN: CPT | Mod: S$GLB,,, | Performed by: PHYSICIAN ASSISTANT

## 2021-01-05 PROCEDURE — 99214 PR OFFICE/OUTPT VISIT, EST, LEVL IV, 30-39 MIN: ICD-10-PCS | Mod: S$GLB,,, | Performed by: PHYSICIAN ASSISTANT

## 2021-01-05 PROCEDURE — 1159F MED LIST DOCD IN RCRD: CPT | Mod: S$GLB,,, | Performed by: PHYSICIAN ASSISTANT

## 2021-01-05 RX ORDER — DULAGLUTIDE 3 MG/.5ML
3 INJECTION, SOLUTION SUBCUTANEOUS WEEKLY
Qty: 12 PEN | Refills: 3 | Status: SHIPPED | OUTPATIENT
Start: 2021-01-05 | End: 2021-04-13

## 2021-01-05 RX ORDER — ROSUVASTATIN CALCIUM 40 MG/1
40 TABLET, COATED ORAL NIGHTLY
Qty: 90 TABLET | Refills: 3 | Status: SHIPPED | OUTPATIENT
Start: 2021-01-05 | End: 2022-02-01 | Stop reason: SDUPTHER

## 2021-01-21 NOTE — PROGRESS NOTES
PCP: Sakina Cooper DO    Subjective:     Chief Complaint: Diabetes - Established Patient    HISTORY OF PRESENT ILLNESS: 64 y.o.   female presenting for diabetes management visit.   Patient has previously been established with the Diabetes Management Department and was last seen by myself on 03/18/20..  Patient has had Type II diabetes since 2005.  Pertinent to decision making is the following comorbidities: HTN, HLD, Obesity by BMI and Fatty Liver  Patient has the following Diabetes complications: without complications  She  has attended diabetes education in the past.     Patient's most recent A1c of 7.5% was completed 1 weeks ago.   Patient states since Her last A1c Her blood glucose levels have been within range in the morning / fasting and in the evening.   Patient monitors blood glucose 2 times per day with Contour Next : Fasting and Before Bed.   Patient blood glucose monitoring device will not be uploaded into Media [employer] today secondary to unable to upload successfully.   Per meter recall, fasting blood sugar ranging 98 - 160 and before bed usually less than 180.   Patient endorses the following diabetes related symptoms: Leg cramping or claudication. Patient had MARTHA following last visit to evaluate this and showed NORMAL EXERCISE MARTHA.  Patient is due today for the following diabetes-related health maintenance standards: None - up to date.   She denies recent hospital admissions or emergency room visits.   She voices having hypoglycemia when skipping meal.   Patient's concerns today include glycemic control.   Patient medication regimen is as below.     CURRENT DM MEDICATIONS:    Trulicity 1.5 mg weekly   Amaryl 4 mg before breakfast and before dinner    Tresiba 72 units daily    Patient has failed the following Diabetes medications:    Metformin - allergy       Labs Reviewed.       Lab Results   Component Value Date    CPEPTIDE 1.10 12/14/2017     Lab Results   Component Value Date     "GLUTAMICACID 0.00 12/14/2017       Height: 5' 2" (157.5 cm)  //  Weight: 79 kg (174 lb 2.6 oz), Body mass index is 31.85 kg/m².  Her blood sugar in clinic today is:    201 @ 9:56 am      Review of Systems   Constitutional: Negative for activity change, appetite change, chills and fever.   HENT: Negative for dental problem, mouth sores, nosebleeds, sore throat and trouble swallowing.    Eyes: Negative for pain and discharge.   Respiratory: Negative for shortness of breath, wheezing and stridor.    Cardiovascular: Negative for chest pain, palpitations and leg swelling.   Gastrointestinal: Negative for abdominal pain, diarrhea, nausea and vomiting.   Endocrine: Negative for polydipsia, polyphagia and polyuria.   Genitourinary: Negative for dysuria, frequency and urgency.   Musculoskeletal: Negative for joint swelling and myalgias.   Skin: Negative for rash and wound.   Neurological: Negative for dizziness, syncope, weakness and headaches.   Psychiatric/Behavioral: Negative for behavioral problems and dysphoric mood.         Diabetes Management Status  Statin: Taking  ACE/ARB: Taking    Screening or Prevention Patient's value Goal Complete/Controlled?   HgA1C Testing and Control   Lab Results   Component Value Date    HGBA1C 7.5 (H) 06/17/2020      Annually/Less than 8% Yes   Lipid profile : 02/17/2020 Annually Yes   LDL control Lab Results   Component Value Date    LDLCALC 95.0 02/17/2020    Annually/Less than 100 mg/dl  Yes   Nephropathy screening Lab Results   Component Value Date    MICALBCREAT 7.1 06/17/2020     Lab Results   Component Value Date    PROTEINUA Negative 09/08/2014    Annually Yes   Blood pressure BP Readings from Last 1 Encounters:   06/24/20 135/75    Less than 140/90 Yes   Dilated retinal exam : 12/02/2019 Annually Yes    Foot exam   : 03/18/2020 Annually No     Social History     Socioeconomic History    Marital status:      Spouse name: Not on file    Number of children: 3    Years of " education: Not on file    Highest education level: Not on file   Occupational History    Occupation: Stay at Home    Occupation:    Social Needs    Financial resource strain: Not on file    Food insecurity     Worry: Not on file     Inability: Not on file    Transportation needs     Medical: Not on file     Non-medical: Not on file   Tobacco Use    Smoking status: Never Smoker    Smokeless tobacco: Never Used   Substance and Sexual Activity    Alcohol use: Yes     Comment: occasional wine     Drug use: No    Sexual activity: Yes     Partners: Male     Birth control/protection: Surgical   Lifestyle    Physical activity     Days per week: Not on file     Minutes per session: Not on file    Stress: Not on file   Relationships    Social connections     Talks on phone: Not on file     Gets together: Not on file     Attends [employer] service: Not on file     Active member of club or organization: Not on file     Attends meetings of clubs or organizations: Not on file     Relationship status: Not on file   Other Topics Concern    Not on file   Social History Narrative    . Housewife.     Past Medical History:   Diagnosis Date    Arthritis     Cataract     Colon polyp     Diabetes mellitus type II     Hyperlipidemia     Hypertension     Renal cell carcinoma 09/2018    Total knee replacement status, left 01/20/2019    Dr.Robert Cook    UTI (urinary tract infection)        Objective:        Physical Exam  Constitutional:       General: She is not in acute distress.     Appearance: She is well-developed. She is not diaphoretic.   HENT:      Head: Normocephalic and atraumatic.      Right Ear: External ear normal.      Left Ear: External ear normal.      Nose: Nose normal.   Eyes:      General:         Right eye: No discharge.         Left eye: No discharge.      Pupils: Pupils are equal, round, and reactive to light.   Neck:      Musculoskeletal: Normal range of motion and neck  "supple.   Cardiovascular:      Rate and Rhythm: Normal rate and regular rhythm.      Heart sounds: Normal heart sounds.   Pulmonary:      Effort: Pulmonary effort is normal.      Breath sounds: Normal breath sounds.   Abdominal:      Palpations: Abdomen is soft.   Musculoskeletal: Normal range of motion.   Skin:     General: Skin is warm and dry.      Capillary Refill: Capillary refill takes less than 2 seconds.   Neurological:      Mental Status: She is alert and oriented to person, place, and time.      Motor: No abnormal muscle tone.      Coordination: Coordination normal.   Psychiatric:         Behavior: Behavior normal.         Thought Content: Thought content normal.         Judgment: Judgment normal.           Assessment / Plan:     Type 2 diabetes mellitus with hemoglobin A1c goal of less than 7.0%  -     POCT Glucose, Hand-Held Device  -     Ambulatory referral/consult to Pharmacy Assistance; Future; Expected date: 07/01/2020  -     insulin degludec (TRESIBA FLEXTOUCH U-200) 200 unit/mL (3 mL) InPn; Inject 75 Units into the skin once daily.  Dispense: 12 mL; Refill: 11  -     pen needle, diabetic (PEN NEEDLE) 31 gauge x 5/16" Ndle; Inject 1 each into the skin once daily.  Dispense: 100 each; Refill: 11  -     blood-glucose meter kit; Test finger stick blood sugar once daily.  Dispense: 1 each; Refill: 0  -     blood sugar diagnostic Strp; 1 each by Misc.(Non-Drug; Combo Route) route 4 (four) times daily.  Dispense: 100 each; Refill: 11  -     lancets Misc; 1 each by Misc.(Non-Drug; Combo Route) route 4 (four) times daily.  Dispense: 100 each; Refill: 11    Fatty liver    Hypertension goal BP (blood pressure) < 130/80    Hyperlipidemia LDL goal <70    Obesity (BMI 30-39.9)      Additional Plan Details:    - POCT Glucose  - Encouraged continuation of lifestyle changes including regular exercise and limiting carbohydrates to 30-45 grams per meal threes times daily and 15 grams per snack with a limit of two " daily.   - Encouraged continued monitoring of blood glucose with maintenance of 2 times daily at Fasting and Before Bed.   - Current DM Medication Regimen: Change Glimepiride to 4 mg before breakfast and before dinner. Change Tresiba to 75 units daily. Continue Trulicity 1.5 mg weekly. Will consider adding Jardiance if dinner time Glimepiride does not improve BGs.   - Health Maintenance standards addressed today: None - up to date  - NORMAL EXERCISE MARTHA - no signs of PAD; patient to follow up with PCP if cramping continues   - Referral to Pharmacy Assistance - Trulicity and Tresiba  - Nursing Visit: Patient is age 79 or younger with an A1c of 7.5 or greater and will not need nursing visit at this time .   - Follow up in 3 months with A1c prior.       Blakeney McKnight, PA-C [facility]    A total of 30 minutes was spent in face to face time, of which over 50 % was spent in counseling patient on disease process, complications, treatment, and side effects of medications.                       Hemigard Intro: Due to skin fragility and wound tension, it was decided to use HEMIGARD adhesive retention suture devices to permit a linear closure. The skin was cleaned and dried for a 6cm distance away from the wound. Excessive hair, if present, was removed to allow for adhesion.

## 2021-03-18 ENCOUNTER — LAB VISIT (OUTPATIENT)
Dept: LAB | Facility: HOSPITAL | Age: 66
End: 2021-03-18
Attending: PHYSICIAN ASSISTANT
Payer: MEDICARE

## 2021-03-18 DIAGNOSIS — E11.9 TYPE 2 DIABETES MELLITUS WITH HEMOGLOBIN A1C GOAL OF LESS THAN 7.0%: ICD-10-CM

## 2021-03-18 PROCEDURE — 83036 HEMOGLOBIN GLYCOSYLATED A1C: CPT | Performed by: PHYSICIAN ASSISTANT

## 2021-03-18 PROCEDURE — 36415 COLL VENOUS BLD VENIPUNCTURE: CPT | Mod: PO | Performed by: PHYSICIAN ASSISTANT

## 2021-03-19 LAB
ESTIMATED AVG GLUCOSE: 203 MG/DL (ref 68–131)
HBA1C MFR BLD: 8.7 % (ref 4–5.6)

## 2021-04-09 ENCOUNTER — PATIENT OUTREACH (OUTPATIENT)
Dept: ADMINISTRATIVE | Facility: OTHER | Age: 66
End: 2021-04-09

## 2021-04-13 ENCOUNTER — OFFICE VISIT (OUTPATIENT)
Dept: DIABETES | Facility: CLINIC | Age: 66
End: 2021-04-13
Payer: MEDICARE

## 2021-04-13 VITALS
BODY MASS INDEX: 31.8 KG/M2 | HEIGHT: 62 IN | DIASTOLIC BLOOD PRESSURE: 77 MMHG | HEART RATE: 71 BPM | SYSTOLIC BLOOD PRESSURE: 145 MMHG | WEIGHT: 172.81 LBS

## 2021-04-13 DIAGNOSIS — I10 HYPERTENSION GOAL BP (BLOOD PRESSURE) < 130/80: ICD-10-CM

## 2021-04-13 DIAGNOSIS — E11.9 TYPE 2 DIABETES MELLITUS WITH HEMOGLOBIN A1C GOAL OF LESS THAN 7.0%: Primary | ICD-10-CM

## 2021-04-13 DIAGNOSIS — E66.9 OBESITY (BMI 30-39.9): ICD-10-CM

## 2021-04-13 DIAGNOSIS — K76.0 FATTY LIVER: ICD-10-CM

## 2021-04-13 DIAGNOSIS — E78.5 HYPERLIPIDEMIA LDL GOAL <70: ICD-10-CM

## 2021-04-13 PROCEDURE — 99999 PR PBB SHADOW E&M-EST. PATIENT-LVL IV: ICD-10-PCS | Mod: PBBFAC,,, | Performed by: PHYSICIAN ASSISTANT

## 2021-04-13 PROCEDURE — 1126F AMNT PAIN NOTED NONE PRSNT: CPT | Mod: S$GLB,,, | Performed by: PHYSICIAN ASSISTANT

## 2021-04-13 PROCEDURE — 3077F SYST BP >= 140 MM HG: CPT | Mod: CPTII,S$GLB,, | Performed by: PHYSICIAN ASSISTANT

## 2021-04-13 PROCEDURE — 99499 UNLISTED E&M SERVICE: CPT | Mod: S$GLB,,, | Performed by: PHYSICIAN ASSISTANT

## 2021-04-13 PROCEDURE — 1159F MED LIST DOCD IN RCRD: CPT | Mod: S$GLB,,, | Performed by: PHYSICIAN ASSISTANT

## 2021-04-13 PROCEDURE — 99499 RISK ADDL DX/OHS AUDIT: ICD-10-PCS | Mod: S$GLB,,, | Performed by: PHYSICIAN ASSISTANT

## 2021-04-13 PROCEDURE — 1159F PR MEDICATION LIST DOCUMENTED IN MEDICAL RECORD: ICD-10-PCS | Mod: S$GLB,,, | Performed by: PHYSICIAN ASSISTANT

## 2021-04-13 PROCEDURE — 99214 PR OFFICE/OUTPT VISIT, EST, LEVL IV, 30-39 MIN: ICD-10-PCS | Mod: S$GLB,,, | Performed by: PHYSICIAN ASSISTANT

## 2021-04-13 PROCEDURE — 3008F BODY MASS INDEX DOCD: CPT | Mod: CPTII,S$GLB,, | Performed by: PHYSICIAN ASSISTANT

## 2021-04-13 PROCEDURE — 1126F PR PAIN SEVERITY QUANTIFIED, NO PAIN PRESENT: ICD-10-PCS | Mod: S$GLB,,, | Performed by: PHYSICIAN ASSISTANT

## 2021-04-13 PROCEDURE — 1157F ADVNC CARE PLAN IN RCRD: CPT | Mod: S$GLB,,, | Performed by: PHYSICIAN ASSISTANT

## 2021-04-13 PROCEDURE — 1101F PT FALLS ASSESS-DOCD LE1/YR: CPT | Mod: CPTII,S$GLB,, | Performed by: PHYSICIAN ASSISTANT

## 2021-04-13 PROCEDURE — 3288F FALL RISK ASSESSMENT DOCD: CPT | Mod: CPTII,S$GLB,, | Performed by: PHYSICIAN ASSISTANT

## 2021-04-13 PROCEDURE — 99999 PR PBB SHADOW E&M-EST. PATIENT-LVL IV: CPT | Mod: PBBFAC,,, | Performed by: PHYSICIAN ASSISTANT

## 2021-04-13 PROCEDURE — 3052F HG A1C>EQUAL 8.0%<EQUAL 9.0%: CPT | Mod: CPTII,S$GLB,, | Performed by: PHYSICIAN ASSISTANT

## 2021-04-13 PROCEDURE — 3078F PR MOST RECENT DIASTOLIC BLOOD PRESSURE < 80 MM HG: ICD-10-PCS | Mod: CPTII,S$GLB,, | Performed by: PHYSICIAN ASSISTANT

## 2021-04-13 PROCEDURE — 3008F PR BODY MASS INDEX (BMI) DOCUMENTED: ICD-10-PCS | Mod: CPTII,S$GLB,, | Performed by: PHYSICIAN ASSISTANT

## 2021-04-13 PROCEDURE — 3288F PR FALLS RISK ASSESSMENT DOCUMENTED: ICD-10-PCS | Mod: CPTII,S$GLB,, | Performed by: PHYSICIAN ASSISTANT

## 2021-04-13 PROCEDURE — 1157F PR ADVANCE CARE PLAN OR EQUIV PRESENT IN MEDICAL RECORD: ICD-10-PCS | Mod: S$GLB,,, | Performed by: PHYSICIAN ASSISTANT

## 2021-04-13 PROCEDURE — 3077F PR MOST RECENT SYSTOLIC BLOOD PRESSURE >= 140 MM HG: ICD-10-PCS | Mod: CPTII,S$GLB,, | Performed by: PHYSICIAN ASSISTANT

## 2021-04-13 PROCEDURE — 99214 OFFICE O/P EST MOD 30 MIN: CPT | Mod: S$GLB,,, | Performed by: PHYSICIAN ASSISTANT

## 2021-04-13 PROCEDURE — 1101F PR PT FALLS ASSESS DOC 0-1 FALLS W/OUT INJ PAST YR: ICD-10-PCS | Mod: CPTII,S$GLB,, | Performed by: PHYSICIAN ASSISTANT

## 2021-04-13 PROCEDURE — 3078F DIAST BP <80 MM HG: CPT | Mod: CPTII,S$GLB,, | Performed by: PHYSICIAN ASSISTANT

## 2021-04-13 PROCEDURE — 3052F PR MOST RECENT HEMOGLOBIN A1C LEVEL 8.0 - < 9.0%: ICD-10-PCS | Mod: CPTII,S$GLB,, | Performed by: PHYSICIAN ASSISTANT

## 2021-04-13 RX ORDER — DULAGLUTIDE 4.5 MG/.5ML
4.5 INJECTION, SOLUTION SUBCUTANEOUS WEEKLY
Qty: 12 PEN | Refills: 3
Start: 2021-04-13 | End: 2021-08-05 | Stop reason: SDUPTHER

## 2021-04-15 DIAGNOSIS — K21.9 CHRONIC GERD: ICD-10-CM

## 2021-04-19 ENCOUNTER — OFFICE VISIT (OUTPATIENT)
Dept: OPHTHALMOLOGY | Facility: CLINIC | Age: 66
End: 2021-04-19
Payer: MEDICARE

## 2021-04-19 DIAGNOSIS — E11.9 DIABETES MELLITUS WITHOUT COMPLICATION: ICD-10-CM

## 2021-04-19 DIAGNOSIS — E11.36 DIABETIC CATARACT: Primary | ICD-10-CM

## 2021-04-19 DIAGNOSIS — H40.019 OPEN ANGLE GLAUCOMA SUSPECT WITH BORDERLINE FINDINGS AT LOW RISK: ICD-10-CM

## 2021-04-19 PROCEDURE — 92015 DETERMINE REFRACTIVE STATE: CPT | Mod: S$GLB,,, | Performed by: OPTOMETRIST

## 2021-04-19 PROCEDURE — 99499 UNLISTED E&M SERVICE: CPT | Mod: S$GLB,,, | Performed by: OPTOMETRIST

## 2021-04-19 PROCEDURE — 1157F PR ADVANCE CARE PLAN OR EQUIV PRESENT IN MEDICAL RECORD: ICD-10-PCS | Mod: S$GLB,,, | Performed by: OPTOMETRIST

## 2021-04-19 PROCEDURE — 1157F ADVNC CARE PLAN IN RCRD: CPT | Mod: S$GLB,,, | Performed by: OPTOMETRIST

## 2021-04-19 PROCEDURE — 2023F PR DILATED RETINAL EXAM W/O EVID OF RETINOPATHY: ICD-10-PCS | Mod: S$GLB,,, | Performed by: OPTOMETRIST

## 2021-04-19 PROCEDURE — 99999 PR PBB SHADOW E&M-EST. PATIENT-LVL III: CPT | Mod: PBBFAC,,, | Performed by: OPTOMETRIST

## 2021-04-19 PROCEDURE — 92015 PR REFRACTION: ICD-10-PCS | Mod: S$GLB,,, | Performed by: OPTOMETRIST

## 2021-04-19 PROCEDURE — 3052F HG A1C>EQUAL 8.0%<EQUAL 9.0%: CPT | Mod: CPTII,S$GLB,, | Performed by: OPTOMETRIST

## 2021-04-19 PROCEDURE — 92014 COMPRE OPH EXAM EST PT 1/>: CPT | Mod: S$GLB,,, | Performed by: OPTOMETRIST

## 2021-04-19 PROCEDURE — 3052F PR MOST RECENT HEMOGLOBIN A1C LEVEL 8.0 - < 9.0%: ICD-10-PCS | Mod: CPTII,S$GLB,, | Performed by: OPTOMETRIST

## 2021-04-19 PROCEDURE — 2023F DILAT RTA XM W/O RTNOPTHY: CPT | Mod: S$GLB,,, | Performed by: OPTOMETRIST

## 2021-04-19 PROCEDURE — 92014 PR EYE EXAM, EST PATIENT,COMPREHESV: ICD-10-PCS | Mod: S$GLB,,, | Performed by: OPTOMETRIST

## 2021-04-19 PROCEDURE — 99499 RISK ADDL DX/OHS AUDIT: ICD-10-PCS | Mod: S$GLB,,, | Performed by: OPTOMETRIST

## 2021-04-19 PROCEDURE — 99999 PR PBB SHADOW E&M-EST. PATIENT-LVL III: ICD-10-PCS | Mod: PBBFAC,,, | Performed by: OPTOMETRIST

## 2021-04-19 RX ORDER — OMEPRAZOLE 40 MG/1
CAPSULE, DELAYED RELEASE ORAL
Qty: 90 CAPSULE | Refills: 2 | Status: SHIPPED | OUTPATIENT
Start: 2021-04-19 | End: 2022-01-14

## 2021-04-20 ENCOUNTER — LAB VISIT (OUTPATIENT)
Dept: LAB | Facility: HOSPITAL | Age: 66
End: 2021-04-20
Attending: INTERNAL MEDICINE
Payer: MEDICARE

## 2021-04-20 ENCOUNTER — CLINICAL SUPPORT (OUTPATIENT)
Dept: DIABETES | Facility: CLINIC | Age: 66
End: 2021-04-20
Payer: MEDICARE

## 2021-04-20 ENCOUNTER — OFFICE VISIT (OUTPATIENT)
Dept: INTERNAL MEDICINE | Facility: CLINIC | Age: 66
End: 2021-04-20
Payer: MEDICARE

## 2021-04-20 VITALS
HEART RATE: 86 BPM | TEMPERATURE: 98 F | SYSTOLIC BLOOD PRESSURE: 130 MMHG | DIASTOLIC BLOOD PRESSURE: 84 MMHG | OXYGEN SATURATION: 95 % | BODY MASS INDEX: 31.57 KG/M2 | WEIGHT: 172.63 LBS

## 2021-04-20 DIAGNOSIS — E11.65 TYPE 2 DIABETES MELLITUS WITH HYPERGLYCEMIA, WITH LONG-TERM CURRENT USE OF INSULIN: ICD-10-CM

## 2021-04-20 DIAGNOSIS — E11.59 HYPERTENSION ASSOCIATED WITH DIABETES: ICD-10-CM

## 2021-04-20 DIAGNOSIS — I15.2 HYPERTENSION ASSOCIATED WITH DIABETES: ICD-10-CM

## 2021-04-20 DIAGNOSIS — Z79.4 TYPE 2 DIABETES MELLITUS WITH HYPERGLYCEMIA, WITH LONG-TERM CURRENT USE OF INSULIN: ICD-10-CM

## 2021-04-20 DIAGNOSIS — E11.9 TYPE 2 DIABETES MELLITUS WITH HEMOGLOBIN A1C GOAL OF LESS THAN 7.0%: Primary | ICD-10-CM

## 2021-04-20 DIAGNOSIS — R51.9 CHRONIC INTRACTABLE HEADACHE, UNSPECIFIED HEADACHE TYPE: ICD-10-CM

## 2021-04-20 DIAGNOSIS — J30.89 ENVIRONMENTAL AND SEASONAL ALLERGIES: ICD-10-CM

## 2021-04-20 DIAGNOSIS — G89.29 CHRONIC INTRACTABLE HEADACHE, UNSPECIFIED HEADACHE TYPE: Primary | ICD-10-CM

## 2021-04-20 DIAGNOSIS — R51.9 CHRONIC INTRACTABLE HEADACHE, UNSPECIFIED HEADACHE TYPE: Primary | ICD-10-CM

## 2021-04-20 DIAGNOSIS — G89.29 CHRONIC INTRACTABLE HEADACHE, UNSPECIFIED HEADACHE TYPE: ICD-10-CM

## 2021-04-20 LAB
ALBUMIN SERPL BCP-MCNC: 4 G/DL (ref 3.5–5.2)
ALP SERPL-CCNC: 97 U/L (ref 55–135)
ALT SERPL W/O P-5'-P-CCNC: 26 U/L (ref 10–44)
ANION GAP SERPL CALC-SCNC: 7 MMOL/L (ref 8–16)
AST SERPL-CCNC: 17 U/L (ref 10–40)
BASOPHILS # BLD AUTO: 0.03 K/UL (ref 0–0.2)
BASOPHILS NFR BLD: 0.4 % (ref 0–1.9)
BILIRUB SERPL-MCNC: 0.4 MG/DL (ref 0.1–1)
BUN SERPL-MCNC: 19 MG/DL (ref 8–23)
CALCIUM SERPL-MCNC: 9.1 MG/DL (ref 8.7–10.5)
CHLORIDE SERPL-SCNC: 102 MMOL/L (ref 95–110)
CO2 SERPL-SCNC: 29 MMOL/L (ref 23–29)
CREAT SERPL-MCNC: 0.9 MG/DL (ref 0.5–1.4)
DIFFERENTIAL METHOD: ABNORMAL
EOSINOPHIL # BLD AUTO: 0.2 K/UL (ref 0–0.5)
EOSINOPHIL NFR BLD: 2 % (ref 0–8)
ERYTHROCYTE [DISTWIDTH] IN BLOOD BY AUTOMATED COUNT: 15.7 % (ref 11.5–14.5)
EST. GFR  (AFRICAN AMERICAN): >60 ML/MIN/1.73 M^2
EST. GFR  (NON AFRICAN AMERICAN): >60 ML/MIN/1.73 M^2
GLUCOSE SERPL-MCNC: 237 MG/DL (ref 70–110)
HCT VFR BLD AUTO: 39.8 % (ref 37–48.5)
HGB BLD-MCNC: 12.8 G/DL (ref 12–16)
IMM GRANULOCYTES # BLD AUTO: 0.01 K/UL (ref 0–0.04)
IMM GRANULOCYTES NFR BLD AUTO: 0.1 % (ref 0–0.5)
LYMPHOCYTES # BLD AUTO: 2 K/UL (ref 1–4.8)
LYMPHOCYTES NFR BLD: 25.4 % (ref 18–48)
MCH RBC QN AUTO: 25.3 PG (ref 27–31)
MCHC RBC AUTO-ENTMCNC: 32.2 G/DL (ref 32–36)
MCV RBC AUTO: 79 FL (ref 82–98)
MONOCYTES # BLD AUTO: 0.4 K/UL (ref 0.3–1)
MONOCYTES NFR BLD: 5.5 % (ref 4–15)
NEUTROPHILS # BLD AUTO: 5.3 K/UL (ref 1.8–7.7)
NEUTROPHILS NFR BLD: 66.6 % (ref 38–73)
NRBC BLD-RTO: 0 /100 WBC
PLATELET # BLD AUTO: 263 K/UL (ref 150–450)
PMV BLD AUTO: 10.7 FL (ref 9.2–12.9)
POTASSIUM SERPL-SCNC: 4.4 MMOL/L (ref 3.5–5.1)
PROT SERPL-MCNC: 7.2 G/DL (ref 6–8.4)
RBC # BLD AUTO: 5.05 M/UL (ref 4–5.4)
SODIUM SERPL-SCNC: 138 MMOL/L (ref 136–145)
WBC # BLD AUTO: 7.94 K/UL (ref 3.9–12.7)

## 2021-04-20 PROCEDURE — 3052F PR MOST RECENT HEMOGLOBIN A1C LEVEL 8.0 - < 9.0%: ICD-10-PCS | Mod: CPTII,S$GLB,, | Performed by: FAMILY MEDICINE

## 2021-04-20 PROCEDURE — 1101F PT FALLS ASSESS-DOCD LE1/YR: CPT | Mod: CPTII,S$GLB,, | Performed by: FAMILY MEDICINE

## 2021-04-20 PROCEDURE — 1125F AMNT PAIN NOTED PAIN PRSNT: CPT | Mod: S$GLB,,, | Performed by: FAMILY MEDICINE

## 2021-04-20 PROCEDURE — 3288F FALL RISK ASSESSMENT DOCD: CPT | Mod: CPTII,S$GLB,, | Performed by: FAMILY MEDICINE

## 2021-04-20 PROCEDURE — 1101F PR PT FALLS ASSESS DOC 0-1 FALLS W/OUT INJ PAST YR: ICD-10-PCS | Mod: CPTII,S$GLB,, | Performed by: FAMILY MEDICINE

## 2021-04-20 PROCEDURE — 3008F BODY MASS INDEX DOCD: CPT | Mod: CPTII,S$GLB,, | Performed by: FAMILY MEDICINE

## 2021-04-20 PROCEDURE — 80053 COMPREHEN METABOLIC PANEL: CPT | Performed by: FAMILY MEDICINE

## 2021-04-20 PROCEDURE — 99999 PR PBB SHADOW E&M-EST. PATIENT-LVL V: ICD-10-PCS | Mod: PBBFAC,,, | Performed by: FAMILY MEDICINE

## 2021-04-20 PROCEDURE — 3008F PR BODY MASS INDEX (BMI) DOCUMENTED: ICD-10-PCS | Mod: CPTII,S$GLB,, | Performed by: FAMILY MEDICINE

## 2021-04-20 PROCEDURE — 3052F HG A1C>EQUAL 8.0%<EQUAL 9.0%: CPT | Mod: CPTII,S$GLB,, | Performed by: FAMILY MEDICINE

## 2021-04-20 PROCEDURE — 99999 PR PBB SHADOW E&M-EST. PATIENT-LVL V: CPT | Mod: PBBFAC,,, | Performed by: FAMILY MEDICINE

## 2021-04-20 PROCEDURE — 99214 PR OFFICE/OUTPT VISIT, EST, LEVL IV, 30-39 MIN: ICD-10-PCS | Mod: S$GLB,,, | Performed by: FAMILY MEDICINE

## 2021-04-20 PROCEDURE — 1125F PR PAIN SEVERITY QUANTIFIED, PAIN PRESENT: ICD-10-PCS | Mod: S$GLB,,, | Performed by: FAMILY MEDICINE

## 2021-04-20 PROCEDURE — 1159F PR MEDICATION LIST DOCUMENTED IN MEDICAL RECORD: ICD-10-PCS | Mod: S$GLB,,, | Performed by: FAMILY MEDICINE

## 2021-04-20 PROCEDURE — 1157F PR ADVANCE CARE PLAN OR EQUIV PRESENT IN MEDICAL RECORD: ICD-10-PCS | Mod: S$GLB,,, | Performed by: FAMILY MEDICINE

## 2021-04-20 PROCEDURE — 1157F ADVNC CARE PLAN IN RCRD: CPT | Mod: S$GLB,,, | Performed by: FAMILY MEDICINE

## 2021-04-20 PROCEDURE — 1159F MED LIST DOCD IN RCRD: CPT | Mod: S$GLB,,, | Performed by: FAMILY MEDICINE

## 2021-04-20 PROCEDURE — 99214 OFFICE O/P EST MOD 30 MIN: CPT | Mod: S$GLB,,, | Performed by: FAMILY MEDICINE

## 2021-04-20 PROCEDURE — 36415 COLL VENOUS BLD VENIPUNCTURE: CPT | Performed by: FAMILY MEDICINE

## 2021-04-20 PROCEDURE — 3288F PR FALLS RISK ASSESSMENT DOCUMENTED: ICD-10-PCS | Mod: CPTII,S$GLB,, | Performed by: FAMILY MEDICINE

## 2021-04-20 PROCEDURE — 85025 COMPLETE CBC W/AUTO DIFF WBC: CPT | Performed by: FAMILY MEDICINE

## 2021-04-20 RX ORDER — BUTALBITAL, ACETAMINOPHEN AND CAFFEINE 50; 325; 40 MG/1; MG/1; MG/1
1 TABLET ORAL 3 TIMES DAILY PRN
Qty: 30 TABLET | Refills: 0 | Status: SHIPPED | OUTPATIENT
Start: 2021-04-20 | End: 2021-05-20

## 2021-04-20 RX ORDER — AMITRIPTYLINE HYDROCHLORIDE 25 MG/1
25 TABLET, FILM COATED ORAL NIGHTLY
Qty: 30 TABLET | Refills: 1 | Status: SHIPPED | OUTPATIENT
Start: 2021-04-20 | End: 2022-05-30

## 2021-05-17 ENCOUNTER — TELEPHONE (OUTPATIENT)
Dept: UROLOGY | Facility: CLINIC | Age: 66
End: 2021-05-17

## 2021-05-19 ENCOUNTER — PATIENT OUTREACH (OUTPATIENT)
Dept: ADMINISTRATIVE | Facility: HOSPITAL | Age: 66
End: 2021-05-19

## 2021-05-19 DIAGNOSIS — E11.9 TYPE 2 DIABETES MELLITUS WITHOUT RETINOPATHY: Primary | ICD-10-CM

## 2021-06-14 ENCOUNTER — OFFICE VISIT (OUTPATIENT)
Dept: OPHTHALMOLOGY | Facility: CLINIC | Age: 66
End: 2021-06-14
Payer: MEDICARE

## 2021-06-14 DIAGNOSIS — H15.102 EPISCLERITIS OF LEFT EYE: Primary | ICD-10-CM

## 2021-06-14 DIAGNOSIS — H43.813 POSTERIOR VITREOUS DETACHMENT OF BOTH EYES: ICD-10-CM

## 2021-06-14 DIAGNOSIS — H04.123 DRY EYE SYNDROME OF BOTH EYES: ICD-10-CM

## 2021-06-14 PROCEDURE — 99999 PR PBB SHADOW E&M-EST. PATIENT-LVL I: ICD-10-PCS | Mod: PBBFAC,,, | Performed by: OPTOMETRIST

## 2021-06-14 PROCEDURE — 1159F PR MEDICATION LIST DOCUMENTED IN MEDICAL RECORD: ICD-10-PCS | Mod: S$GLB,,, | Performed by: OPTOMETRIST

## 2021-06-14 PROCEDURE — 99214 OFFICE O/P EST MOD 30 MIN: CPT | Mod: S$GLB,,, | Performed by: OPTOMETRIST

## 2021-06-14 PROCEDURE — 1157F ADVNC CARE PLAN IN RCRD: CPT | Mod: S$GLB,,, | Performed by: OPTOMETRIST

## 2021-06-14 PROCEDURE — 99999 PR PBB SHADOW E&M-EST. PATIENT-LVL I: CPT | Mod: PBBFAC,,, | Performed by: OPTOMETRIST

## 2021-06-14 PROCEDURE — 1157F PR ADVANCE CARE PLAN OR EQUIV PRESENT IN MEDICAL RECORD: ICD-10-PCS | Mod: S$GLB,,, | Performed by: OPTOMETRIST

## 2021-06-14 PROCEDURE — 99214 PR OFFICE/OUTPT VISIT, EST, LEVL IV, 30-39 MIN: ICD-10-PCS | Mod: S$GLB,,, | Performed by: OPTOMETRIST

## 2021-06-14 PROCEDURE — 1159F MED LIST DOCD IN RCRD: CPT | Mod: S$GLB,,, | Performed by: OPTOMETRIST

## 2021-06-14 RX ORDER — LOTEPREDNOL ETABONATE 5 MG/ML
1 SUSPENSION/ DROPS OPHTHALMIC 4 TIMES DAILY
Qty: 5 ML | Refills: 1 | Status: SHIPPED | OUTPATIENT
Start: 2021-06-14 | End: 2021-06-21

## 2021-06-18 ENCOUNTER — LAB VISIT (OUTPATIENT)
Dept: LAB | Facility: HOSPITAL | Age: 66
End: 2021-06-18
Attending: INTERNAL MEDICINE
Payer: MEDICARE

## 2021-06-18 DIAGNOSIS — E11.9 TYPE 2 DIABETES MELLITUS WITHOUT RETINOPATHY: ICD-10-CM

## 2021-06-18 PROCEDURE — 36415 COLL VENOUS BLD VENIPUNCTURE: CPT | Mod: PO | Performed by: INTERNAL MEDICINE

## 2021-06-18 PROCEDURE — 83036 HEMOGLOBIN GLYCOSYLATED A1C: CPT | Performed by: INTERNAL MEDICINE

## 2021-06-19 LAB
ESTIMATED AVG GLUCOSE: 212 MG/DL (ref 68–131)
HBA1C MFR BLD: 9 % (ref 4–5.6)

## 2021-06-25 ENCOUNTER — OFFICE VISIT (OUTPATIENT)
Dept: OPHTHALMOLOGY | Facility: CLINIC | Age: 66
End: 2021-06-25
Payer: MEDICARE

## 2021-06-25 ENCOUNTER — PATIENT OUTREACH (OUTPATIENT)
Dept: ADMINISTRATIVE | Facility: OTHER | Age: 66
End: 2021-06-25

## 2021-06-25 DIAGNOSIS — H15.102 EPISCLERITIS OF LEFT EYE: Primary | ICD-10-CM

## 2021-06-25 PROCEDURE — 99999 PR PBB SHADOW E&M-EST. PATIENT-LVL III: ICD-10-PCS | Mod: PBBFAC,,, | Performed by: OPTOMETRIST

## 2021-06-25 PROCEDURE — 92012 INTRM OPH EXAM EST PATIENT: CPT | Mod: S$GLB,,, | Performed by: OPTOMETRIST

## 2021-06-25 PROCEDURE — 1157F ADVNC CARE PLAN IN RCRD: CPT | Mod: S$GLB,,, | Performed by: OPTOMETRIST

## 2021-06-25 PROCEDURE — 1157F PR ADVANCE CARE PLAN OR EQUIV PRESENT IN MEDICAL RECORD: ICD-10-PCS | Mod: S$GLB,,, | Performed by: OPTOMETRIST

## 2021-06-25 PROCEDURE — 99999 PR PBB SHADOW E&M-EST. PATIENT-LVL III: CPT | Mod: PBBFAC,,, | Performed by: OPTOMETRIST

## 2021-06-25 PROCEDURE — 92012 PR EYE EXAM, EST PATIENT,INTERMED: ICD-10-PCS | Mod: S$GLB,,, | Performed by: OPTOMETRIST

## 2021-08-02 ENCOUNTER — PATIENT OUTREACH (OUTPATIENT)
Dept: ADMINISTRATIVE | Facility: OTHER | Age: 66
End: 2021-08-02

## 2021-08-05 ENCOUNTER — TELEPHONE (OUTPATIENT)
Dept: DIABETES | Facility: CLINIC | Age: 66
End: 2021-08-05

## 2021-08-05 DIAGNOSIS — E11.9 TYPE 2 DIABETES MELLITUS WITH HEMOGLOBIN A1C GOAL OF LESS THAN 7.0%: ICD-10-CM

## 2021-08-16 DIAGNOSIS — E11.9 TYPE 2 DIABETES MELLITUS WITH HEMOGLOBIN A1C GOAL OF LESS THAN 7.0%: ICD-10-CM

## 2021-08-17 ENCOUNTER — TELEPHONE (OUTPATIENT)
Dept: INTERNAL MEDICINE | Facility: CLINIC | Age: 66
End: 2021-08-17

## 2021-08-17 RX ORDER — INSULIN DEGLUDEC 200 U/ML
INJECTION, SOLUTION SUBCUTANEOUS
Qty: 12 PEN | Refills: 1 | Status: SHIPPED | OUTPATIENT
Start: 2021-08-17 | End: 2021-10-04

## 2021-08-17 RX ORDER — DULAGLUTIDE 4.5 MG/.5ML
4.5 INJECTION, SOLUTION SUBCUTANEOUS WEEKLY
Qty: 12 PEN | Refills: 3 | Status: SHIPPED | OUTPATIENT
Start: 2021-08-17 | End: 2022-01-03

## 2021-08-17 RX ORDER — GLIMEPIRIDE 4 MG/1
TABLET ORAL
Qty: 180 TABLET | Refills: 3 | Status: SHIPPED | OUTPATIENT
Start: 2021-08-17 | End: 2022-08-15

## 2021-08-17 RX ORDER — INSULIN DEGLUDEC 200 U/ML
INJECTION, SOLUTION SUBCUTANEOUS DAILY
Status: CANCELLED | OUTPATIENT
Start: 2021-08-17 | End: 2022-08-17

## 2021-09-29 DIAGNOSIS — E11.9 TYPE 2 DIABETES MELLITUS WITHOUT COMPLICATION: ICD-10-CM

## 2021-10-04 ENCOUNTER — LAB VISIT (OUTPATIENT)
Dept: LAB | Facility: HOSPITAL | Age: 66
End: 2021-10-04
Attending: INTERNAL MEDICINE
Payer: MEDICARE

## 2021-10-04 ENCOUNTER — OFFICE VISIT (OUTPATIENT)
Dept: INTERNAL MEDICINE | Facility: CLINIC | Age: 66
End: 2021-10-04
Payer: MEDICARE

## 2021-10-04 VITALS
TEMPERATURE: 98 F | SYSTOLIC BLOOD PRESSURE: 122 MMHG | WEIGHT: 172.38 LBS | HEIGHT: 62 IN | HEART RATE: 85 BPM | DIASTOLIC BLOOD PRESSURE: 70 MMHG | OXYGEN SATURATION: 95 % | BODY MASS INDEX: 31.72 KG/M2

## 2021-10-04 DIAGNOSIS — J01.90 ACUTE SINUSITIS, RECURRENCE NOT SPECIFIED, UNSPECIFIED LOCATION: Primary | ICD-10-CM

## 2021-10-04 DIAGNOSIS — I10 HYPERTENSION GOAL BP (BLOOD PRESSURE) < 130/80: Chronic | ICD-10-CM

## 2021-10-04 DIAGNOSIS — E11.9 TYPE 2 DIABETES MELLITUS WITH HEMOGLOBIN A1C GOAL OF LESS THAN 7.0%: ICD-10-CM

## 2021-10-04 DIAGNOSIS — E78.5 HYPERLIPIDEMIA LDL GOAL <70: Chronic | ICD-10-CM

## 2021-10-04 DIAGNOSIS — R05.9 COUGH: ICD-10-CM

## 2021-10-04 DIAGNOSIS — J20.9 ACUTE BRONCHITIS, UNSPECIFIED ORGANISM: ICD-10-CM

## 2021-10-04 LAB
CTP QC/QA: YES
FLUAV AG NPH QL: NEGATIVE
FLUBV AG NPH QL: NEGATIVE

## 2021-10-04 PROCEDURE — 3078F PR MOST RECENT DIASTOLIC BLOOD PRESSURE < 80 MM HG: ICD-10-PCS | Mod: CPTII,S$GLB,, | Performed by: NURSE PRACTITIONER

## 2021-10-04 PROCEDURE — 3074F PR MOST RECENT SYSTOLIC BLOOD PRESSURE < 130 MM HG: ICD-10-PCS | Mod: CPTII,S$GLB,, | Performed by: NURSE PRACTITIONER

## 2021-10-04 PROCEDURE — 1157F PR ADVANCE CARE PLAN OR EQUIV PRESENT IN MEDICAL RECORD: ICD-10-PCS | Mod: CPTII,S$GLB,, | Performed by: NURSE PRACTITIONER

## 2021-10-04 PROCEDURE — U0003 INFECTIOUS AGENT DETECTION BY NUCLEIC ACID (DNA OR RNA); SEVERE ACUTE RESPIRATORY SYNDROME CORONAVIRUS 2 (SARS-COV-2) (CORONAVIRUS DISEASE [COVID-19]), AMPLIFIED PROBE TECHNIQUE, MAKING USE OF HIGH THROUGHPUT TECHNOLOGIES AS DESCRIBED BY CMS-2020-01-R: HCPCS | Performed by: NURSE PRACTITIONER

## 2021-10-04 PROCEDURE — 4010F PR ACE/ARB THEARPY RXD/TAKEN: ICD-10-PCS | Mod: CPTII,S$GLB,, | Performed by: NURSE PRACTITIONER

## 2021-10-04 PROCEDURE — 1160F PR REVIEW ALL MEDS BY PRESCRIBER/CLIN PHARMACIST DOCUMENTED: ICD-10-PCS | Mod: CPTII,S$GLB,, | Performed by: NURSE PRACTITIONER

## 2021-10-04 PROCEDURE — 80053 COMPREHEN METABOLIC PANEL: CPT | Performed by: INTERNAL MEDICINE

## 2021-10-04 PROCEDURE — 99999 PR PBB SHADOW E&M-EST. PATIENT-LVL V: ICD-10-PCS | Mod: PBBFAC,,, | Performed by: NURSE PRACTITIONER

## 2021-10-04 PROCEDURE — 99214 PR OFFICE/OUTPT VISIT, EST, LEVL IV, 30-39 MIN: ICD-10-PCS | Mod: S$GLB,,, | Performed by: NURSE PRACTITIONER

## 2021-10-04 PROCEDURE — U0005 INFEC AGEN DETEC AMPLI PROBE: HCPCS | Performed by: NURSE PRACTITIONER

## 2021-10-04 PROCEDURE — 3074F SYST BP LT 130 MM HG: CPT | Mod: CPTII,S$GLB,, | Performed by: NURSE PRACTITIONER

## 2021-10-04 PROCEDURE — 1159F MED LIST DOCD IN RCRD: CPT | Mod: CPTII,S$GLB,, | Performed by: NURSE PRACTITIONER

## 2021-10-04 PROCEDURE — 1159F PR MEDICATION LIST DOCUMENTED IN MEDICAL RECORD: ICD-10-PCS | Mod: CPTII,S$GLB,, | Performed by: NURSE PRACTITIONER

## 2021-10-04 PROCEDURE — 4010F ACE/ARB THERAPY RXD/TAKEN: CPT | Mod: CPTII,S$GLB,, | Performed by: NURSE PRACTITIONER

## 2021-10-04 PROCEDURE — 3078F DIAST BP <80 MM HG: CPT | Mod: CPTII,S$GLB,, | Performed by: NURSE PRACTITIONER

## 2021-10-04 PROCEDURE — 3008F BODY MASS INDEX DOCD: CPT | Mod: CPTII,S$GLB,, | Performed by: NURSE PRACTITIONER

## 2021-10-04 PROCEDURE — 1160F RVW MEDS BY RX/DR IN RCRD: CPT | Mod: CPTII,S$GLB,, | Performed by: NURSE PRACTITIONER

## 2021-10-04 PROCEDURE — 99999 PR PBB SHADOW E&M-EST. PATIENT-LVL V: CPT | Mod: PBBFAC,,, | Performed by: NURSE PRACTITIONER

## 2021-10-04 PROCEDURE — 1157F ADVNC CARE PLAN IN RCRD: CPT | Mod: CPTII,S$GLB,, | Performed by: NURSE PRACTITIONER

## 2021-10-04 PROCEDURE — 3052F PR MOST RECENT HEMOGLOBIN A1C LEVEL 8.0 - < 9.0%: ICD-10-PCS | Mod: CPTII,S$GLB,, | Performed by: NURSE PRACTITIONER

## 2021-10-04 PROCEDURE — 87804 INFLUENZA ASSAY W/OPTIC: CPT | Mod: QW,S$GLB,, | Performed by: NURSE PRACTITIONER

## 2021-10-04 PROCEDURE — 87804 POCT INFLUENZA A/B: ICD-10-PCS | Mod: QW,S$GLB,, | Performed by: NURSE PRACTITIONER

## 2021-10-04 PROCEDURE — 36415 COLL VENOUS BLD VENIPUNCTURE: CPT | Mod: PO | Performed by: INTERNAL MEDICINE

## 2021-10-04 PROCEDURE — 1125F AMNT PAIN NOTED PAIN PRSNT: CPT | Mod: CPTII,S$GLB,, | Performed by: NURSE PRACTITIONER

## 2021-10-04 PROCEDURE — 3052F HG A1C>EQUAL 8.0%<EQUAL 9.0%: CPT | Mod: CPTII,S$GLB,, | Performed by: NURSE PRACTITIONER

## 2021-10-04 PROCEDURE — 99214 OFFICE O/P EST MOD 30 MIN: CPT | Mod: S$GLB,,, | Performed by: NURSE PRACTITIONER

## 2021-10-04 PROCEDURE — 1125F PR PAIN SEVERITY QUANTIFIED, PAIN PRESENT: ICD-10-PCS | Mod: CPTII,S$GLB,, | Performed by: NURSE PRACTITIONER

## 2021-10-04 PROCEDURE — 3008F PR BODY MASS INDEX (BMI) DOCUMENTED: ICD-10-PCS | Mod: CPTII,S$GLB,, | Performed by: NURSE PRACTITIONER

## 2021-10-04 RX ORDER — PROMETHAZINE HYDROCHLORIDE AND DEXTROMETHORPHAN HYDROBROMIDE 6.25; 15 MG/5ML; MG/5ML
5 SYRUP ORAL EVERY 8 HOURS PRN
Qty: 118 ML | Refills: 0 | Status: SHIPPED | OUTPATIENT
Start: 2021-10-04 | End: 2021-10-14

## 2021-10-04 RX ORDER — PREDNISONE 10 MG/1
10 TABLET ORAL DAILY
Qty: 5 TABLET | Refills: 0 | Status: SHIPPED | OUTPATIENT
Start: 2021-10-04 | End: 2021-10-12

## 2021-10-04 RX ORDER — INSULIN DEGLUDEC 200 U/ML
75 INJECTION, SOLUTION SUBCUTANEOUS DAILY
Qty: 12 PEN | Refills: 0 | Status: SHIPPED | OUTPATIENT
Start: 2021-10-04 | End: 2022-01-18

## 2021-10-04 RX ORDER — AZITHROMYCIN 250 MG/1
250 TABLET, FILM COATED ORAL DAILY
Qty: 6 TABLET | Refills: 0 | Status: SHIPPED | OUTPATIENT
Start: 2021-10-04 | End: 2021-10-12

## 2021-10-04 RX ORDER — ALBUTEROL SULFATE 0.83 MG/ML
2.5 SOLUTION RESPIRATORY (INHALATION) EVERY 6 HOURS PRN
Qty: 120 ML | Refills: 1 | Status: SHIPPED | OUTPATIENT
Start: 2021-10-04 | End: 2023-12-12 | Stop reason: SDUPTHER

## 2021-10-05 LAB
ALBUMIN SERPL BCP-MCNC: 3.6 G/DL (ref 3.5–5.2)
ALP SERPL-CCNC: 139 U/L (ref 55–135)
ALT SERPL W/O P-5'-P-CCNC: 28 U/L (ref 10–44)
ANION GAP SERPL CALC-SCNC: 10 MMOL/L (ref 8–16)
AST SERPL-CCNC: 15 U/L (ref 10–40)
BILIRUB SERPL-MCNC: 0.3 MG/DL (ref 0.1–1)
BUN SERPL-MCNC: 26 MG/DL (ref 8–23)
CALCIUM SERPL-MCNC: 9.1 MG/DL (ref 8.7–10.5)
CHLORIDE SERPL-SCNC: 103 MMOL/L (ref 95–110)
CO2 SERPL-SCNC: 24 MMOL/L (ref 23–29)
CREAT SERPL-MCNC: 0.9 MG/DL (ref 0.5–1.4)
EST. GFR  (AFRICAN AMERICAN): >60 ML/MIN/1.73 M^2
EST. GFR  (NON AFRICAN AMERICAN): >60 ML/MIN/1.73 M^2
GLUCOSE SERPL-MCNC: 238 MG/DL (ref 70–110)
POTASSIUM SERPL-SCNC: 4.5 MMOL/L (ref 3.5–5.1)
PROT SERPL-MCNC: 6.4 G/DL (ref 6–8.4)
SARS-COV-2 RNA RESP QL NAA+PROBE: NOT DETECTED
SARS-COV-2- CYCLE NUMBER: NORMAL
SODIUM SERPL-SCNC: 137 MMOL/L (ref 136–145)

## 2021-10-12 ENCOUNTER — OFFICE VISIT (OUTPATIENT)
Dept: INTERNAL MEDICINE | Facility: CLINIC | Age: 66
End: 2021-10-12
Payer: MEDICARE

## 2021-10-12 ENCOUNTER — LAB VISIT (OUTPATIENT)
Dept: LAB | Facility: HOSPITAL | Age: 66
End: 2021-10-12
Attending: UROLOGY
Payer: MEDICARE

## 2021-10-12 VITALS
HEIGHT: 62 IN | SYSTOLIC BLOOD PRESSURE: 110 MMHG | DIASTOLIC BLOOD PRESSURE: 72 MMHG | RESPIRATION RATE: 17 BRPM | WEIGHT: 168.88 LBS | TEMPERATURE: 99 F | OXYGEN SATURATION: 98 % | HEART RATE: 82 BPM | BODY MASS INDEX: 31.08 KG/M2

## 2021-10-12 DIAGNOSIS — C64.9 RENAL CELL CARCINOMA, UNSPECIFIED LATERALITY: ICD-10-CM

## 2021-10-12 DIAGNOSIS — I10 HYPERTENSION, UNSPECIFIED TYPE: ICD-10-CM

## 2021-10-12 DIAGNOSIS — E11.9 TYPE 2 DIABETES MELLITUS WITH HEMOGLOBIN A1C GOAL OF LESS THAN 7.0%: ICD-10-CM

## 2021-10-12 DIAGNOSIS — E78.5 HYPERLIPIDEMIA LDL GOAL <70: Primary | Chronic | ICD-10-CM

## 2021-10-12 DIAGNOSIS — N28.1 RENAL CYST: ICD-10-CM

## 2021-10-12 DIAGNOSIS — N20.0 RENAL STONE: ICD-10-CM

## 2021-10-12 DIAGNOSIS — I10 HYPERTENSION GOAL BP (BLOOD PRESSURE) < 130/80: Chronic | ICD-10-CM

## 2021-10-12 PROBLEM — Z01.810 PREOP CARDIOVASCULAR EXAM: Status: RESOLVED | Noted: 2019-02-14 | Resolved: 2021-10-12

## 2021-10-12 LAB
CREAT SERPL-MCNC: 1 MG/DL (ref 0.5–1.4)
EST. GFR  (AFRICAN AMERICAN): >60 ML/MIN/1.73 M^2
EST. GFR  (NON AFRICAN AMERICAN): 59 ML/MIN/1.73 M^2

## 2021-10-12 PROCEDURE — 1157F ADVNC CARE PLAN IN RCRD: CPT | Mod: CPTII,S$GLB,, | Performed by: PHYSICIAN ASSISTANT

## 2021-10-12 PROCEDURE — 3288F FALL RISK ASSESSMENT DOCD: CPT | Mod: CPTII,S$GLB,, | Performed by: PHYSICIAN ASSISTANT

## 2021-10-12 PROCEDURE — 3078F DIAST BP <80 MM HG: CPT | Mod: CPTII,S$GLB,, | Performed by: PHYSICIAN ASSISTANT

## 2021-10-12 PROCEDURE — 1160F RVW MEDS BY RX/DR IN RCRD: CPT | Mod: CPTII,S$GLB,, | Performed by: PHYSICIAN ASSISTANT

## 2021-10-12 PROCEDURE — 1159F MED LIST DOCD IN RCRD: CPT | Mod: CPTII,S$GLB,, | Performed by: PHYSICIAN ASSISTANT

## 2021-10-12 PROCEDURE — 1101F PT FALLS ASSESS-DOCD LE1/YR: CPT | Mod: CPTII,S$GLB,, | Performed by: PHYSICIAN ASSISTANT

## 2021-10-12 PROCEDURE — 3052F HG A1C>EQUAL 8.0%<EQUAL 9.0%: CPT | Mod: CPTII,S$GLB,, | Performed by: PHYSICIAN ASSISTANT

## 2021-10-12 PROCEDURE — 3008F BODY MASS INDEX DOCD: CPT | Mod: CPTII,S$GLB,, | Performed by: PHYSICIAN ASSISTANT

## 2021-10-12 PROCEDURE — 4010F PR ACE/ARB THEARPY RXD/TAKEN: ICD-10-PCS | Mod: CPTII,S$GLB,, | Performed by: PHYSICIAN ASSISTANT

## 2021-10-12 PROCEDURE — 3074F PR MOST RECENT SYSTOLIC BLOOD PRESSURE < 130 MM HG: ICD-10-PCS | Mod: CPTII,S$GLB,, | Performed by: PHYSICIAN ASSISTANT

## 2021-10-12 PROCEDURE — 82565 ASSAY OF CREATININE: CPT | Performed by: UROLOGY

## 2021-10-12 PROCEDURE — 1101F PR PT FALLS ASSESS DOC 0-1 FALLS W/OUT INJ PAST YR: ICD-10-PCS | Mod: CPTII,S$GLB,, | Performed by: PHYSICIAN ASSISTANT

## 2021-10-12 PROCEDURE — 99214 PR OFFICE/OUTPT VISIT, EST, LEVL IV, 30-39 MIN: ICD-10-PCS | Mod: S$GLB,,, | Performed by: PHYSICIAN ASSISTANT

## 2021-10-12 PROCEDURE — 3008F PR BODY MASS INDEX (BMI) DOCUMENTED: ICD-10-PCS | Mod: CPTII,S$GLB,, | Performed by: PHYSICIAN ASSISTANT

## 2021-10-12 PROCEDURE — 99999 PR PBB SHADOW E&M-EST. PATIENT-LVL V: ICD-10-PCS | Mod: PBBFAC,,, | Performed by: PHYSICIAN ASSISTANT

## 2021-10-12 PROCEDURE — 36415 COLL VENOUS BLD VENIPUNCTURE: CPT | Mod: PO | Performed by: UROLOGY

## 2021-10-12 PROCEDURE — 1125F AMNT PAIN NOTED PAIN PRSNT: CPT | Mod: CPTII,S$GLB,, | Performed by: PHYSICIAN ASSISTANT

## 2021-10-12 PROCEDURE — 3052F PR MOST RECENT HEMOGLOBIN A1C LEVEL 8.0 - < 9.0%: ICD-10-PCS | Mod: CPTII,S$GLB,, | Performed by: PHYSICIAN ASSISTANT

## 2021-10-12 PROCEDURE — 1157F PR ADVANCE CARE PLAN OR EQUIV PRESENT IN MEDICAL RECORD: ICD-10-PCS | Mod: CPTII,S$GLB,, | Performed by: PHYSICIAN ASSISTANT

## 2021-10-12 PROCEDURE — 3074F SYST BP LT 130 MM HG: CPT | Mod: CPTII,S$GLB,, | Performed by: PHYSICIAN ASSISTANT

## 2021-10-12 PROCEDURE — 3078F PR MOST RECENT DIASTOLIC BLOOD PRESSURE < 80 MM HG: ICD-10-PCS | Mod: CPTII,S$GLB,, | Performed by: PHYSICIAN ASSISTANT

## 2021-10-12 PROCEDURE — 1125F PR PAIN SEVERITY QUANTIFIED, PAIN PRESENT: ICD-10-PCS | Mod: CPTII,S$GLB,, | Performed by: PHYSICIAN ASSISTANT

## 2021-10-12 PROCEDURE — 99214 OFFICE O/P EST MOD 30 MIN: CPT | Mod: S$GLB,,, | Performed by: PHYSICIAN ASSISTANT

## 2021-10-12 PROCEDURE — 1160F PR REVIEW ALL MEDS BY PRESCRIBER/CLIN PHARMACIST DOCUMENTED: ICD-10-PCS | Mod: CPTII,S$GLB,, | Performed by: PHYSICIAN ASSISTANT

## 2021-10-12 PROCEDURE — 3288F PR FALLS RISK ASSESSMENT DOCUMENTED: ICD-10-PCS | Mod: CPTII,S$GLB,, | Performed by: PHYSICIAN ASSISTANT

## 2021-10-12 PROCEDURE — 99999 PR PBB SHADOW E&M-EST. PATIENT-LVL V: CPT | Mod: PBBFAC,,, | Performed by: PHYSICIAN ASSISTANT

## 2021-10-12 PROCEDURE — 1159F PR MEDICATION LIST DOCUMENTED IN MEDICAL RECORD: ICD-10-PCS | Mod: CPTII,S$GLB,, | Performed by: PHYSICIAN ASSISTANT

## 2021-10-12 PROCEDURE — 4010F ACE/ARB THERAPY RXD/TAKEN: CPT | Mod: CPTII,S$GLB,, | Performed by: PHYSICIAN ASSISTANT

## 2021-10-12 RX ORDER — OLMESARTAN MEDOXOMIL 20 MG/1
TABLET ORAL
Qty: 90 TABLET | Refills: 0 | Status: SHIPPED | OUTPATIENT
Start: 2021-10-12 | End: 2022-01-14

## 2021-10-12 RX ORDER — DIPHENHYDRAMINE HCL 25 MG
TABLET ORAL
Qty: 1 EACH | Refills: 0 | Status: SHIPPED | OUTPATIENT
Start: 2021-10-12 | End: 2022-05-27

## 2021-10-18 ENCOUNTER — LAB VISIT (OUTPATIENT)
Dept: LAB | Facility: HOSPITAL | Age: 66
End: 2021-10-18
Attending: INTERNAL MEDICINE
Payer: MEDICARE

## 2021-10-18 DIAGNOSIS — E11.9 TYPE 2 DIABETES MELLITUS WITH HEMOGLOBIN A1C GOAL OF LESS THAN 7.0%: ICD-10-CM

## 2021-10-18 DIAGNOSIS — I10 HYPERTENSION GOAL BP (BLOOD PRESSURE) < 130/80: Chronic | ICD-10-CM

## 2021-10-18 DIAGNOSIS — E78.5 HYPERLIPIDEMIA LDL GOAL <70: Chronic | ICD-10-CM

## 2021-10-18 LAB
ALBUMIN SERPL BCP-MCNC: 3.5 G/DL (ref 3.5–5.2)
ALP SERPL-CCNC: 85 U/L (ref 55–135)
ALT SERPL W/O P-5'-P-CCNC: 22 U/L (ref 10–44)
ANION GAP SERPL CALC-SCNC: 7 MMOL/L (ref 8–16)
AST SERPL-CCNC: 14 U/L (ref 10–40)
BILIRUB SERPL-MCNC: 0.5 MG/DL (ref 0.1–1)
BUN SERPL-MCNC: 25 MG/DL (ref 8–23)
CALCIUM SERPL-MCNC: 9 MG/DL (ref 8.7–10.5)
CHLORIDE SERPL-SCNC: 103 MMOL/L (ref 95–110)
CHOLEST SERPL-MCNC: 207 MG/DL (ref 120–199)
CHOLEST/HDLC SERPL: 4.5 {RATIO} (ref 2–5)
CO2 SERPL-SCNC: 27 MMOL/L (ref 23–29)
CREAT SERPL-MCNC: 0.8 MG/DL (ref 0.5–1.4)
EST. GFR  (AFRICAN AMERICAN): >60 ML/MIN/1.73 M^2
EST. GFR  (NON AFRICAN AMERICAN): >60 ML/MIN/1.73 M^2
ESTIMATED AVG GLUCOSE: 183 MG/DL (ref 68–131)
GLUCOSE SERPL-MCNC: 104 MG/DL (ref 70–110)
HBA1C MFR BLD: 8 % (ref 4–5.6)
HDLC SERPL-MCNC: 46 MG/DL (ref 40–75)
HDLC SERPL: 22.2 % (ref 20–50)
LDLC SERPL CALC-MCNC: 110.8 MG/DL (ref 63–159)
NONHDLC SERPL-MCNC: 161 MG/DL
POTASSIUM SERPL-SCNC: 4.2 MMOL/L (ref 3.5–5.1)
PROT SERPL-MCNC: 6.3 G/DL (ref 6–8.4)
SODIUM SERPL-SCNC: 137 MMOL/L (ref 136–145)
TRIGL SERPL-MCNC: 251 MG/DL (ref 30–150)

## 2021-10-18 PROCEDURE — 80061 LIPID PANEL: CPT | Performed by: PHYSICIAN ASSISTANT

## 2021-10-18 PROCEDURE — 80053 COMPREHEN METABOLIC PANEL: CPT | Performed by: INTERNAL MEDICINE

## 2021-10-18 PROCEDURE — 83036 HEMOGLOBIN GLYCOSYLATED A1C: CPT | Performed by: PHYSICIAN ASSISTANT

## 2021-10-18 PROCEDURE — 36415 COLL VENOUS BLD VENIPUNCTURE: CPT | Mod: PO | Performed by: INTERNAL MEDICINE

## 2021-10-20 ENCOUNTER — TELEPHONE (OUTPATIENT)
Dept: RADIOLOGY | Facility: HOSPITAL | Age: 66
End: 2021-10-20

## 2021-10-21 ENCOUNTER — HOSPITAL ENCOUNTER (OUTPATIENT)
Dept: RADIOLOGY | Facility: HOSPITAL | Age: 66
Discharge: HOME | End: 2021-10-21
Attending: UROLOGY
Payer: MEDICARE

## 2021-10-21 ENCOUNTER — PES CALL (OUTPATIENT)
Dept: ADMINISTRATIVE | Facility: CLINIC | Age: 66
End: 2021-10-21

## 2021-10-21 DIAGNOSIS — N20.0 RENAL STONE: ICD-10-CM

## 2021-10-21 DIAGNOSIS — N28.1 RENAL CYST: ICD-10-CM

## 2021-10-21 DIAGNOSIS — C64.9 RENAL CELL CARCINOMA, UNSPECIFIED LATERALITY: ICD-10-CM

## 2021-10-21 DIAGNOSIS — I10 HYPERTENSION, UNSPECIFIED TYPE: ICD-10-CM

## 2021-10-21 PROCEDURE — 74178 CT ABD&PLV WO CNTR FLWD CNTR: CPT | Mod: TC

## 2021-10-21 PROCEDURE — 74178 CT ABDOMEN PELVIS W WO CONTRAST: ICD-10-PCS | Mod: 26,,, | Performed by: RADIOLOGY

## 2021-10-21 PROCEDURE — 25500020 PHARM REV CODE 255: Performed by: UROLOGY

## 2021-10-21 PROCEDURE — 74178 CT ABD&PLV WO CNTR FLWD CNTR: CPT | Mod: 26,,, | Performed by: RADIOLOGY

## 2021-10-21 RX ADMIN — IOHEXOL 100 ML: 350 INJECTION, SOLUTION INTRAVENOUS at 10:10

## 2021-10-25 ENCOUNTER — TELEPHONE (OUTPATIENT)
Dept: DIABETES | Facility: CLINIC | Age: 66
End: 2021-10-25
Payer: MEDICARE

## 2021-10-25 DIAGNOSIS — E11.9 TYPE 2 DIABETES MELLITUS WITH HEMOGLOBIN A1C GOAL OF LESS THAN 7.0%: Primary | ICD-10-CM

## 2021-11-03 ENCOUNTER — OFFICE VISIT (OUTPATIENT)
Dept: UROLOGY | Facility: CLINIC | Age: 66
End: 2021-11-03
Payer: MEDICARE

## 2021-11-03 ENCOUNTER — HOSPITAL ENCOUNTER (OUTPATIENT)
Dept: RADIOLOGY | Facility: HOSPITAL | Age: 66
Discharge: HOME | End: 2021-11-03
Attending: UROLOGY
Payer: MEDICARE

## 2021-11-03 VITALS
WEIGHT: 172.19 LBS | HEART RATE: 76 BPM | HEIGHT: 62 IN | DIASTOLIC BLOOD PRESSURE: 83 MMHG | TEMPERATURE: 98 F | SYSTOLIC BLOOD PRESSURE: 143 MMHG | BODY MASS INDEX: 31.68 KG/M2

## 2021-11-03 DIAGNOSIS — C64.9 RENAL CELL CARCINOMA, UNSPECIFIED LATERALITY: Primary | ICD-10-CM

## 2021-11-03 DIAGNOSIS — N20.0 RENAL STONE: ICD-10-CM

## 2021-11-03 DIAGNOSIS — C64.9 RENAL CELL CARCINOMA, UNSPECIFIED LATERALITY: ICD-10-CM

## 2021-11-03 DIAGNOSIS — N28.1 RENAL CYST: ICD-10-CM

## 2021-11-03 PROCEDURE — 99214 OFFICE O/P EST MOD 30 MIN: CPT | Mod: S$GLB,,, | Performed by: UROLOGY

## 2021-11-03 PROCEDURE — 3288F FALL RISK ASSESSMENT DOCD: CPT | Mod: CPTII,S$GLB,, | Performed by: UROLOGY

## 2021-11-03 PROCEDURE — 3061F NEG MICROALBUMINURIA REV: CPT | Mod: CPTII,S$GLB,, | Performed by: UROLOGY

## 2021-11-03 PROCEDURE — 99214 PR OFFICE/OUTPT VISIT, EST, LEVL IV, 30-39 MIN: ICD-10-PCS | Mod: S$GLB,,, | Performed by: UROLOGY

## 2021-11-03 PROCEDURE — 1126F PR PAIN SEVERITY QUANTIFIED, NO PAIN PRESENT: ICD-10-PCS | Mod: CPTII,S$GLB,, | Performed by: UROLOGY

## 2021-11-03 PROCEDURE — 3052F PR MOST RECENT HEMOGLOBIN A1C LEVEL 8.0 - < 9.0%: ICD-10-PCS | Mod: CPTII,S$GLB,, | Performed by: UROLOGY

## 2021-11-03 PROCEDURE — 3079F PR MOST RECENT DIASTOLIC BLOOD PRESSURE 80-89 MM HG: ICD-10-PCS | Mod: CPTII,S$GLB,, | Performed by: UROLOGY

## 2021-11-03 PROCEDURE — 99999 PR PBB SHADOW E&M-EST. PATIENT-LVL V: CPT | Mod: PBBFAC,,, | Performed by: UROLOGY

## 2021-11-03 PROCEDURE — 3077F PR MOST RECENT SYSTOLIC BLOOD PRESSURE >= 140 MM HG: ICD-10-PCS | Mod: CPTII,S$GLB,, | Performed by: UROLOGY

## 2021-11-03 PROCEDURE — 3077F SYST BP >= 140 MM HG: CPT | Mod: CPTII,S$GLB,, | Performed by: UROLOGY

## 2021-11-03 PROCEDURE — 1159F MED LIST DOCD IN RCRD: CPT | Mod: CPTII,S$GLB,, | Performed by: UROLOGY

## 2021-11-03 PROCEDURE — 99999 PR PBB SHADOW E&M-EST. PATIENT-LVL V: ICD-10-PCS | Mod: PBBFAC,,, | Performed by: UROLOGY

## 2021-11-03 PROCEDURE — 1157F PR ADVANCE CARE PLAN OR EQUIV PRESENT IN MEDICAL RECORD: ICD-10-PCS | Mod: CPTII,S$GLB,, | Performed by: UROLOGY

## 2021-11-03 PROCEDURE — 71045 XR CHEST 1 VIEW: ICD-10-PCS | Mod: 26,,, | Performed by: RADIOLOGY

## 2021-11-03 PROCEDURE — 1157F ADVNC CARE PLAN IN RCRD: CPT | Mod: CPTII,S$GLB,, | Performed by: UROLOGY

## 2021-11-03 PROCEDURE — 1101F PT FALLS ASSESS-DOCD LE1/YR: CPT | Mod: CPTII,S$GLB,, | Performed by: UROLOGY

## 2021-11-03 PROCEDURE — 3052F HG A1C>EQUAL 8.0%<EQUAL 9.0%: CPT | Mod: CPTII,S$GLB,, | Performed by: UROLOGY

## 2021-11-03 PROCEDURE — 3288F PR FALLS RISK ASSESSMENT DOCUMENTED: ICD-10-PCS | Mod: CPTII,S$GLB,, | Performed by: UROLOGY

## 2021-11-03 PROCEDURE — 3008F PR BODY MASS INDEX (BMI) DOCUMENTED: ICD-10-PCS | Mod: CPTII,S$GLB,, | Performed by: UROLOGY

## 2021-11-03 PROCEDURE — 1159F PR MEDICATION LIST DOCUMENTED IN MEDICAL RECORD: ICD-10-PCS | Mod: CPTII,S$GLB,, | Performed by: UROLOGY

## 2021-11-03 PROCEDURE — 3066F NEPHROPATHY DOC TX: CPT | Mod: CPTII,S$GLB,, | Performed by: UROLOGY

## 2021-11-03 PROCEDURE — 4010F PR ACE/ARB THEARPY RXD/TAKEN: ICD-10-PCS | Mod: CPTII,S$GLB,, | Performed by: UROLOGY

## 2021-11-03 PROCEDURE — 71045 X-RAY EXAM CHEST 1 VIEW: CPT | Mod: TC

## 2021-11-03 PROCEDURE — 3079F DIAST BP 80-89 MM HG: CPT | Mod: CPTII,S$GLB,, | Performed by: UROLOGY

## 2021-11-03 PROCEDURE — 71045 X-RAY EXAM CHEST 1 VIEW: CPT | Mod: 26,,, | Performed by: RADIOLOGY

## 2021-11-03 PROCEDURE — 1101F PR PT FALLS ASSESS DOC 0-1 FALLS W/OUT INJ PAST YR: ICD-10-PCS | Mod: CPTII,S$GLB,, | Performed by: UROLOGY

## 2021-11-03 PROCEDURE — 1126F AMNT PAIN NOTED NONE PRSNT: CPT | Mod: CPTII,S$GLB,, | Performed by: UROLOGY

## 2021-11-03 PROCEDURE — 3066F PR DOCUMENTATION OF TREATMENT FOR NEPHROPATHY: ICD-10-PCS | Mod: CPTII,S$GLB,, | Performed by: UROLOGY

## 2021-11-03 PROCEDURE — 3061F PR NEG MICROALBUMINURIA RESULT DOCUMENTED/REVIEW: ICD-10-PCS | Mod: CPTII,S$GLB,, | Performed by: UROLOGY

## 2021-11-03 PROCEDURE — 3008F BODY MASS INDEX DOCD: CPT | Mod: CPTII,S$GLB,, | Performed by: UROLOGY

## 2021-11-03 PROCEDURE — 4010F ACE/ARB THERAPY RXD/TAKEN: CPT | Mod: CPTII,S$GLB,, | Performed by: UROLOGY

## 2021-11-18 ENCOUNTER — TELEPHONE (OUTPATIENT)
Dept: ADMINISTRATIVE | Facility: HOSPITAL | Age: 66
End: 2021-11-18
Payer: MEDICARE

## 2021-11-29 ENCOUNTER — TELEPHONE (OUTPATIENT)
Dept: INTERNAL MEDICINE | Facility: CLINIC | Age: 66
End: 2021-11-29
Payer: MEDICARE

## 2021-11-29 DIAGNOSIS — Z12.31 ENCOUNTER FOR SCREENING MAMMOGRAM FOR MALIGNANT NEOPLASM OF BREAST: Primary | ICD-10-CM

## 2021-12-09 ENCOUNTER — OFFICE VISIT (OUTPATIENT)
Dept: INTERNAL MEDICINE | Facility: CLINIC | Age: 66
End: 2021-12-09
Payer: MEDICARE

## 2021-12-09 VITALS
HEIGHT: 62 IN | WEIGHT: 173.75 LBS | DIASTOLIC BLOOD PRESSURE: 80 MMHG | HEART RATE: 76 BPM | OXYGEN SATURATION: 95 % | SYSTOLIC BLOOD PRESSURE: 144 MMHG | BODY MASS INDEX: 31.97 KG/M2

## 2021-12-09 DIAGNOSIS — E11.69 HYPERLIPIDEMIA ASSOCIATED WITH TYPE 2 DIABETES MELLITUS: ICD-10-CM

## 2021-12-09 DIAGNOSIS — E66.9 OBESITY (BMI 30.0-34.9): ICD-10-CM

## 2021-12-09 DIAGNOSIS — I10 HYPERTENSION, UNSPECIFIED TYPE: ICD-10-CM

## 2021-12-09 DIAGNOSIS — Z00.00 ENCOUNTER FOR PREVENTIVE HEALTH EXAMINATION: Primary | ICD-10-CM

## 2021-12-09 DIAGNOSIS — E78.5 HYPERLIPIDEMIA ASSOCIATED WITH TYPE 2 DIABETES MELLITUS: ICD-10-CM

## 2021-12-09 DIAGNOSIS — I70.0 ATHEROSCLEROSIS OF AORTA: ICD-10-CM

## 2021-12-09 DIAGNOSIS — Z85.528 HISTORY OF KIDNEY CANCER: ICD-10-CM

## 2021-12-09 DIAGNOSIS — J84.10 CALCIFIED GRANULOMA OF LUNG: ICD-10-CM

## 2021-12-09 PROBLEM — J98.4 CALCIFIED GRANULOMA OF LUNG: Status: ACTIVE | Noted: 2021-12-09

## 2021-12-09 PROBLEM — Z53.1 REFUSAL OF BLOOD TRANSFUSIONS AS PATIENT IS JEHOVAH'S WITNESS: Status: ACTIVE | Noted: 2021-12-09

## 2021-12-09 PROCEDURE — 4010F ACE/ARB THERAPY RXD/TAKEN: CPT | Mod: CPTII,S$GLB,, | Performed by: NURSE PRACTITIONER

## 2021-12-09 PROCEDURE — 3061F PR NEG MICROALBUMINURIA RESULT DOCUMENTED/REVIEW: ICD-10-PCS | Mod: CPTII,S$GLB,, | Performed by: NURSE PRACTITIONER

## 2021-12-09 PROCEDURE — G0439 PR MEDICARE ANNUAL WELLNESS SUBSEQUENT VISIT: ICD-10-PCS | Mod: S$GLB,,, | Performed by: NURSE PRACTITIONER

## 2021-12-09 PROCEDURE — 99999 PR PBB SHADOW E&M-EST. PATIENT-LVL V: ICD-10-PCS | Mod: PBBFAC,,, | Performed by: NURSE PRACTITIONER

## 2021-12-09 PROCEDURE — 4010F PR ACE/ARB THEARPY RXD/TAKEN: ICD-10-PCS | Mod: CPTII,S$GLB,, | Performed by: NURSE PRACTITIONER

## 2021-12-09 PROCEDURE — G0439 PPPS, SUBSEQ VISIT: HCPCS | Mod: S$GLB,,, | Performed by: NURSE PRACTITIONER

## 2021-12-09 PROCEDURE — 3061F NEG MICROALBUMINURIA REV: CPT | Mod: CPTII,S$GLB,, | Performed by: NURSE PRACTITIONER

## 2021-12-09 PROCEDURE — 1157F ADVNC CARE PLAN IN RCRD: CPT | Mod: CPTII,S$GLB,, | Performed by: NURSE PRACTITIONER

## 2021-12-09 PROCEDURE — 3066F PR DOCUMENTATION OF TREATMENT FOR NEPHROPATHY: ICD-10-PCS | Mod: CPTII,S$GLB,, | Performed by: NURSE PRACTITIONER

## 2021-12-09 PROCEDURE — 99499 UNLISTED E&M SERVICE: CPT | Mod: S$GLB,,, | Performed by: NURSE PRACTITIONER

## 2021-12-09 PROCEDURE — 99999 PR PBB SHADOW E&M-EST. PATIENT-LVL V: CPT | Mod: PBBFAC,,, | Performed by: NURSE PRACTITIONER

## 2021-12-09 PROCEDURE — 99499 RISK ADDL DX/OHS AUDIT: ICD-10-PCS | Mod: S$GLB,,, | Performed by: NURSE PRACTITIONER

## 2021-12-09 PROCEDURE — 3066F NEPHROPATHY DOC TX: CPT | Mod: CPTII,S$GLB,, | Performed by: NURSE PRACTITIONER

## 2021-12-09 PROCEDURE — 1157F PR ADVANCE CARE PLAN OR EQUIV PRESENT IN MEDICAL RECORD: ICD-10-PCS | Mod: CPTII,S$GLB,, | Performed by: NURSE PRACTITIONER

## 2021-12-30 ENCOUNTER — HOSPITAL ENCOUNTER (OUTPATIENT)
Dept: RADIOLOGY | Facility: HOSPITAL | Age: 66
Discharge: HOME | End: 2021-12-30
Attending: INTERNAL MEDICINE
Payer: MEDICARE

## 2021-12-30 VITALS — HEIGHT: 62 IN | WEIGHT: 171.94 LBS | BODY MASS INDEX: 31.64 KG/M2

## 2021-12-30 DIAGNOSIS — Z12.31 ENCOUNTER FOR SCREENING MAMMOGRAM FOR MALIGNANT NEOPLASM OF BREAST: ICD-10-CM

## 2021-12-30 PROCEDURE — 77067 SCR MAMMO BI INCL CAD: CPT | Mod: 26,,, | Performed by: RADIOLOGY

## 2021-12-30 PROCEDURE — 77067 SCR MAMMO BI INCL CAD: CPT | Mod: TC

## 2021-12-30 PROCEDURE — 77063 MAMMO DIGITAL SCREENING BILAT WITH TOMO: ICD-10-PCS | Mod: 26,,, | Performed by: RADIOLOGY

## 2021-12-30 PROCEDURE — 77067 MAMMO DIGITAL SCREENING BILAT WITH TOMO: ICD-10-PCS | Mod: 26,,, | Performed by: RADIOLOGY

## 2021-12-30 PROCEDURE — 77063 BREAST TOMOSYNTHESIS BI: CPT | Mod: 26,,, | Performed by: RADIOLOGY

## 2022-01-03 ENCOUNTER — CLINICAL SUPPORT (OUTPATIENT)
Dept: INTERNAL MEDICINE | Facility: CLINIC | Age: 67
End: 2022-01-03
Payer: MEDICARE

## 2022-01-03 ENCOUNTER — OFFICE VISIT (OUTPATIENT)
Dept: DIABETES | Facility: CLINIC | Age: 67
End: 2022-01-03
Payer: MEDICARE

## 2022-01-03 ENCOUNTER — TELEPHONE (OUTPATIENT)
Dept: INTERNAL MEDICINE | Facility: CLINIC | Age: 67
End: 2022-01-03
Payer: MEDICARE

## 2022-01-03 VITALS
DIASTOLIC BLOOD PRESSURE: 85 MMHG | HEIGHT: 62 IN | BODY MASS INDEX: 31.93 KG/M2 | HEART RATE: 78 BPM | WEIGHT: 173.5 LBS | SYSTOLIC BLOOD PRESSURE: 141 MMHG

## 2022-01-03 DIAGNOSIS — E66.9 OBESITY (BMI 30-39.9): ICD-10-CM

## 2022-01-03 DIAGNOSIS — R05.9 COUGHING: ICD-10-CM

## 2022-01-03 DIAGNOSIS — E78.5 HYPERLIPIDEMIA LDL GOAL <70: ICD-10-CM

## 2022-01-03 DIAGNOSIS — J06.9 URI WITH COUGH AND CONGESTION: Primary | ICD-10-CM

## 2022-01-03 DIAGNOSIS — U07.1 COVID-19: ICD-10-CM

## 2022-01-03 DIAGNOSIS — R06.2 WHEEZING: ICD-10-CM

## 2022-01-03 DIAGNOSIS — K76.0 FATTY LIVER: ICD-10-CM

## 2022-01-03 DIAGNOSIS — I10 HYPERTENSION GOAL BP (BLOOD PRESSURE) < 130/80: ICD-10-CM

## 2022-01-03 DIAGNOSIS — E11.9 TYPE 2 DIABETES MELLITUS WITH HEMOGLOBIN A1C GOAL OF LESS THAN 7.0%: Primary | ICD-10-CM

## 2022-01-03 LAB
CTP QC/QA: YES
GLUCOSE SERPL-MCNC: 199 MG/DL (ref 70–110)
SARS-COV-2 RDRP RESP QL NAA+PROBE: POSITIVE

## 2022-01-03 PROCEDURE — 1101F PR PT FALLS ASSESS DOC 0-1 FALLS W/OUT INJ PAST YR: ICD-10-PCS | Mod: HCNC,CPTII,S$GLB, | Performed by: PHYSICIAN ASSISTANT

## 2022-01-03 PROCEDURE — 3008F PR BODY MASS INDEX (BMI) DOCUMENTED: ICD-10-PCS | Mod: HCNC,CPTII,S$GLB, | Performed by: PHYSICIAN ASSISTANT

## 2022-01-03 PROCEDURE — 99999 PR PBB SHADOW E&M-EST. PATIENT-LVL IV: ICD-10-PCS | Mod: PBBFAC,HCNC,, | Performed by: PHYSICIAN ASSISTANT

## 2022-01-03 PROCEDURE — 82962 POCT GLUCOSE, HAND-HELD DEVICE: ICD-10-PCS | Mod: HCNC,S$GLB,, | Performed by: PHYSICIAN ASSISTANT

## 2022-01-03 PROCEDURE — 99214 OFFICE O/P EST MOD 30 MIN: CPT | Mod: HCNC,S$GLB,, | Performed by: PHYSICIAN ASSISTANT

## 2022-01-03 PROCEDURE — 1125F PR PAIN SEVERITY QUANTIFIED, PAIN PRESENT: ICD-10-PCS | Mod: HCNC,CPTII,S$GLB, | Performed by: PHYSICIAN ASSISTANT

## 2022-01-03 PROCEDURE — 99999 PR PBB SHADOW E&M-EST. PATIENT-LVL IV: CPT | Mod: PBBFAC,HCNC,, | Performed by: PHYSICIAN ASSISTANT

## 2022-01-03 PROCEDURE — 99499 RISK ADDL DX/OHS AUDIT: ICD-10-PCS | Mod: S$GLB,,, | Performed by: PHYSICIAN ASSISTANT

## 2022-01-03 PROCEDURE — 1125F AMNT PAIN NOTED PAIN PRSNT: CPT | Mod: HCNC,CPTII,S$GLB, | Performed by: PHYSICIAN ASSISTANT

## 2022-01-03 PROCEDURE — 1157F PR ADVANCE CARE PLAN OR EQUIV PRESENT IN MEDICAL RECORD: ICD-10-PCS | Mod: HCNC,CPTII,S$GLB, | Performed by: PHYSICIAN ASSISTANT

## 2022-01-03 PROCEDURE — 3288F FALL RISK ASSESSMENT DOCD: CPT | Mod: HCNC,CPTII,S$GLB, | Performed by: PHYSICIAN ASSISTANT

## 2022-01-03 PROCEDURE — U0002 COVID-19 LAB TEST NON-CDC: HCPCS | Mod: QW,S$GLB,, | Performed by: PHYSICIAN ASSISTANT

## 2022-01-03 PROCEDURE — 3052F HG A1C>EQUAL 8.0%<EQUAL 9.0%: CPT | Mod: HCNC,CPTII,S$GLB, | Performed by: PHYSICIAN ASSISTANT

## 2022-01-03 PROCEDURE — 3052F PR MOST RECENT HEMOGLOBIN A1C LEVEL 8.0 - < 9.0%: ICD-10-PCS | Mod: HCNC,CPTII,S$GLB, | Performed by: PHYSICIAN ASSISTANT

## 2022-01-03 PROCEDURE — 99999 PR PBB SHADOW E&M-EST. PATIENT-LVL I: CPT | Mod: PBBFAC,HCNC,,

## 2022-01-03 PROCEDURE — 82962 GLUCOSE BLOOD TEST: CPT | Mod: HCNC,S$GLB,, | Performed by: PHYSICIAN ASSISTANT

## 2022-01-03 PROCEDURE — 3077F PR MOST RECENT SYSTOLIC BLOOD PRESSURE >= 140 MM HG: ICD-10-PCS | Mod: HCNC,CPTII,S$GLB, | Performed by: PHYSICIAN ASSISTANT

## 2022-01-03 PROCEDURE — 99999 PR PBB SHADOW E&M-EST. PATIENT-LVL I: ICD-10-PCS | Mod: PBBFAC,HCNC,,

## 2022-01-03 PROCEDURE — 3079F PR MOST RECENT DIASTOLIC BLOOD PRESSURE 80-89 MM HG: ICD-10-PCS | Mod: HCNC,CPTII,S$GLB, | Performed by: PHYSICIAN ASSISTANT

## 2022-01-03 PROCEDURE — 3079F DIAST BP 80-89 MM HG: CPT | Mod: HCNC,CPTII,S$GLB, | Performed by: PHYSICIAN ASSISTANT

## 2022-01-03 PROCEDURE — 1159F MED LIST DOCD IN RCRD: CPT | Mod: HCNC,CPTII,S$GLB, | Performed by: PHYSICIAN ASSISTANT

## 2022-01-03 PROCEDURE — 99214 PR OFFICE/OUTPT VISIT, EST, LEVL IV, 30-39 MIN: ICD-10-PCS | Mod: HCNC,S$GLB,, | Performed by: PHYSICIAN ASSISTANT

## 2022-01-03 PROCEDURE — 1159F PR MEDICATION LIST DOCUMENTED IN MEDICAL RECORD: ICD-10-PCS | Mod: HCNC,CPTII,S$GLB, | Performed by: PHYSICIAN ASSISTANT

## 2022-01-03 PROCEDURE — 3077F SYST BP >= 140 MM HG: CPT | Mod: HCNC,CPTII,S$GLB, | Performed by: PHYSICIAN ASSISTANT

## 2022-01-03 PROCEDURE — 99499 UNLISTED E&M SERVICE: CPT | Mod: S$GLB,,, | Performed by: PHYSICIAN ASSISTANT

## 2022-01-03 PROCEDURE — 3072F LOW RISK FOR RETINOPATHY: CPT | Mod: HCNC,CPTII,S$GLB, | Performed by: PHYSICIAN ASSISTANT

## 2022-01-03 PROCEDURE — 1101F PT FALLS ASSESS-DOCD LE1/YR: CPT | Mod: HCNC,CPTII,S$GLB, | Performed by: PHYSICIAN ASSISTANT

## 2022-01-03 PROCEDURE — U0002: ICD-10-PCS | Mod: QW,S$GLB,, | Performed by: PHYSICIAN ASSISTANT

## 2022-01-03 PROCEDURE — 3008F BODY MASS INDEX DOCD: CPT | Mod: HCNC,CPTII,S$GLB, | Performed by: PHYSICIAN ASSISTANT

## 2022-01-03 PROCEDURE — 3288F PR FALLS RISK ASSESSMENT DOCUMENTED: ICD-10-PCS | Mod: HCNC,CPTII,S$GLB, | Performed by: PHYSICIAN ASSISTANT

## 2022-01-03 PROCEDURE — 1157F ADVNC CARE PLAN IN RCRD: CPT | Mod: HCNC,CPTII,S$GLB, | Performed by: PHYSICIAN ASSISTANT

## 2022-01-03 PROCEDURE — 3072F PR LOW RISK FOR RETINOPATHY: ICD-10-PCS | Mod: HCNC,CPTII,S$GLB, | Performed by: PHYSICIAN ASSISTANT

## 2022-01-03 RX ORDER — DULAGLUTIDE 1.5 MG/.5ML
INJECTION, SOLUTION SUBCUTANEOUS
COMMUNITY
Start: 2021-12-07 | End: 2022-01-03

## 2022-01-03 RX ORDER — INFLUENZA A VIRUS A/VICTORIA/2570/2019 IVR-215 (H1N1) ANTIGEN (FORMALDEHYDE INACTIVATED), INFLUENZA A VIRUS A/TASMANIA/503/2020 IVR-221 (H3N2) ANTIGEN (FORMALDEHYDE INACTIVATED), INFLUENZA B VIRUS B/PHUKET/3073/2013 ANTIGEN (FORMALDEHYDE INACTIVATED), AND INFLUENZA B VIRUS B/WASHINGTON/02/2019 ANTIGEN (FORMALDEHYDE INACTIVATED) 60; 60; 60; 60 UG/.7ML; UG/.7ML; UG/.7ML; UG/.7ML
INJECTION, SUSPENSION INTRAMUSCULAR
COMMUNITY
Start: 2021-11-12

## 2022-01-03 RX ORDER — DULAGLUTIDE 3 MG/.5ML
3 INJECTION, SOLUTION SUBCUTANEOUS WEEKLY
Qty: 12 PEN | Refills: 3 | Status: SHIPPED | OUTPATIENT
Start: 2022-01-03 | End: 2022-04-12 | Stop reason: SDUPTHER

## 2022-01-03 RX ORDER — PROMETHAZINE HYDROCHLORIDE AND DEXTROMETHORPHAN HYDROBROMIDE 6.25; 15 MG/5ML; MG/5ML
5 SYRUP ORAL NIGHTLY PRN
Qty: 120 ML | Refills: 0 | Status: SHIPPED | OUTPATIENT
Start: 2022-01-03 | End: 2022-05-30

## 2022-01-03 RX ORDER — BENZONATATE 200 MG/1
200 CAPSULE ORAL 3 TIMES DAILY PRN
Qty: 30 CAPSULE | Refills: 0 | Status: SHIPPED | OUTPATIENT
Start: 2022-01-03 | End: 2022-01-13

## 2022-01-03 NOTE — TELEPHONE ENCOUNTER
Patient reports URI/congestion/cough/HA x6 days.  Denies shortness of breath.  Speaks in complete sentences without increased effort.  Has tried claritin, flonase, nebulizer without improvement.  H/o bronchitis.  Has received COVID vaccine and booster.  Denies fever or body aches.    Recommend r/o COVID.  Will also sent in Rx for cough.  Additional recommendations pending results.

## 2022-01-03 NOTE — TELEPHONE ENCOUNTER
PCP is [facility]  Over at [facility] [place].    Having Cough.    Can you call patient, and see symptoms< if need drive thru testing, or just cough meds?    DM is asking for her to be seen.  I have a time blocked for coverage on [facility] box.    Can you call her now?

## 2022-01-03 NOTE — PROGRESS NOTES
"  PCP: Sakina Cooper DO    Subjective:     Chief Complaint: Diabetes - Established Patient    HISTORY OF PRESENT ILLNESS: 66 y.o.  female presenting for diabetes management visit.   The patient's last visit with me was on 4/13/2021.  Patient has had Type II diabetes since 2005.  Pertinent to decision making is the following comorbidities: HTN, HLD, Obesity by BMI and Fatty Liver  Patient has the following Diabetes complications: without complications  She  has attended diabetes education in the past.     Patient's most recent A1c of 8.0% was completed 3 months ago.   Patient states since Her last A1c Her blood glucose levels have been high  in the morning / fasting.   Patient monitors blood glucose 1 times per day with Contour Next : Fasting and Before Dinner.   Patient blood glucose monitoring device will not be uploaded into Media Section today secondary to unable to upload successfully.   Per meter recall, fasting blood sugar ranging 78 - 130 and before dinner 98 - 120 and up to 240 after meals.   Patient endorses the following diabetes related symptoms: HA, congestion, Sinus pressure, Body aches, Cough. This has been present x 8 days. Denies known fever. Has not been tested for COVID - 19.   Patient is due today for the following diabetes-related health maintenance standards: None - up to date.   She denies recent hospital admissions or emergency room visits.   She denies having hypoglycemia.  Patient's concerns today include glycemic control.    Patient medication regimen is as below.     CURRENT DM MEDICATIONS:    Trulicity 1.5 mg weekly - unable to get higher dose at pharm   Amaryl 4 mg before breakfast    Tresiba 75 units daily     Patient has failed the following Diabetes medications:    Metformin - allergy       Labs Reviewed.       Lab Results   Component Value Date    CPEPTIDE 1.10 12/14/2017     Lab Results   Component Value Date    GLUTAMICACID 0.00 12/14/2017       Height: 5' 2" (157.5 " cm)  //  Weight: 78.7 kg (173 lb 8 oz), Body mass index is 31.73 kg/m².  Her blood sugar in clinic today is:    199      Review of Systems   Constitutional: Negative for activity change, appetite change, chills and fever.   HENT: Negative for dental problem, mouth sores, nosebleeds, sore throat and trouble swallowing.    Eyes: Negative for pain and discharge.   Respiratory: Negative for shortness of breath, wheezing and stridor.    Cardiovascular: Negative for chest pain, palpitations and leg swelling.   Gastrointestinal: Negative for abdominal pain, diarrhea, nausea and vomiting.   Endocrine: Negative for polydipsia, polyphagia and polyuria.   Genitourinary: Negative for dysuria, frequency and urgency.   Musculoskeletal: Negative for joint swelling and myalgias.   Skin: Negative for rash and wound.   Neurological: Negative for dizziness, syncope, weakness and headaches.   Psychiatric/Behavioral: Negative for behavioral problems and dysphoric mood.         Diabetes Management Status  Statin: Taking  ACE/ARB: Taking    Screening or Prevention Patient's value Goal Complete/Controlled?   HgA1C Testing and Control   Lab Results   Component Value Date    HGBA1C 8.0 (H) 10/18/2021      Annually/Less than 8% Yes   Lipid profile : 10/18/2021 Annually Yes   LDL control Lab Results   Component Value Date    LDLCALC 110.8 10/18/2021    Annually/Less than 100 mg/dl  Yes   Nephropathy screening Lab Results   Component Value Date    MICALBCREAT 18.3 10/18/2021     Lab Results   Component Value Date    PROTEINUA Negative 09/08/2014    Annually Yes   Blood pressure BP Readings from Last 1 Encounters:   12/09/21 (!) 144/80    Less than 140/90 Yes   Dilated retinal exam : 06/25/2021 Annually Yes    Foot exam   : 04/13/2021 Annually No     Social History     Socioeconomic History    Marital status:     Number of children: 3   Occupational History    Occupation: Stay at Home    Occupation:    Tobacco Use     Smoking status: Never Smoker    Smokeless tobacco: Never Used   Substance and Sexual Activity    Alcohol use: Yes     Comment: occasional wine     Drug use: No    Sexual activity: Yes     Partners: Male     Birth control/protection: Surgical   Social History Narrative    . Housewife.     Social Determinants of Health     Financial Resource Strain: Low Risk     Difficulty of Paying Living Expenses: Not very hard   Food Insecurity: No Food Insecurity    Worried About Running Out of Food in the Last Year: Never true    Ran Out of Food in the Last Year: Never true   Transportation Needs: No Transportation Needs    Lack of Transportation (Medical): No    Lack of Transportation (Non-Medical): No   Physical Activity: Sufficiently Active    Days of Exercise per Week: 7 days    Minutes of Exercise per Session: 30 min   Stress: Stress Concern Present    Feeling of Stress : To some extent   Social Connections: Moderately Integrated    Frequency of Communication with Friends and Family: More than three times a week    Frequency of Social Gatherings with Friends and Family: Once a week    Attends Religion Services: More than 4 times per year    Active Member of Clubs or Organizations: No    Attends Club or Organization Meetings: Never    Marital Status:    Housing Stability: Low Risk     Unable to Pay for Housing in the Last Year: No    Number of Places Lived in the Last Year: 1    Unstable Housing in the Last Year: No     Past Medical History:   Diagnosis Date    Arthritis     Cataract     Colon polyp     Diabetes mellitus type II     Hyperlipidemia     Hypertension     Renal cell carcinoma 09/2018    Total knee replacement status, left 01/20/2019    Dr.Robert Cook    UTI (urinary tract infection)        Objective:        Physical Exam  Constitutional:       General: She is not in acute distress.     Appearance: She is well-developed and well-nourished. She is not diaphoretic.    HENT:      Head: Normocephalic and atraumatic.      Right Ear: External ear normal.      Left Ear: External ear normal.      Nose: Nose normal.   Eyes:      General:         Right eye: No discharge.         Left eye: No discharge.      Extraocular Movements: EOM normal.      Pupils: Pupils are equal, round, and reactive to light.   Cardiovascular:      Rate and Rhythm: Normal rate and regular rhythm.      Pulses: Intact distal pulses.      Heart sounds: Normal heart sounds.   Pulmonary:      Effort: Pulmonary effort is normal.      Breath sounds: Wheezing (mild intermittent) present.   Abdominal:      Palpations: Abdomen is soft.   Musculoskeletal:         General: Normal range of motion.      Cervical back: Normal range of motion and neck supple.   Skin:     General: Skin is warm and dry.      Capillary Refill: Capillary refill takes less than 2 seconds.   Neurological:      Mental Status: She is alert and oriented to person, place, and time.      Motor: No abnormal muscle tone.      Coordination: Coordination normal.   Psychiatric:         Mood and Affect: Mood and affect normal.         Behavior: Behavior normal.         Thought Content: Thought content normal.         Judgment: Judgment normal.           Assessment / Plan:     Type 2 diabetes mellitus with hemoglobin A1c goal of less than 7.0%  -     POCT Glucose, Hand-Held Device  -     dulaglutide (TRULICITY) 3 mg/0.5 mL pen injector; Inject 3 mg into the skin once a week.  Dispense: 12 pen; Refill: 3  -     Hemoglobin A1C; Standing    Coughing    Wheezing    Fatty liver    Hyperlipidemia LDL goal <70    Hypertension goal BP (blood pressure) < 130/80    Obesity (BMI 30-39.9)      Additional Plan Details:    - POCT Glucose  - Encouraged continuation of lifestyle changes including regular exercise and limiting carbohydrates to 30-45 grams per meal threes times daily and 15 grams per snack with a limit of two daily.   - Encouraged continued monitoring of blood  glucose with maintenance of 2 times daily at Fasting and Before Bed.   - Current DM Medication Regimen: Continue Glimepiride to 4 mg before breakfast. Change Tresiba to 68 units daily with Trulicity change.  Change Trulicity 3 mg weekly.  - Same day Primary Care / COVID testing for acute URI / mild intermittent wheezing  - Health Maintenance standards addressed today: None - up to date  - Nursing Visit: Patient is age 79 or younger with an A1c of 7.5 or greater and will not need nursing visit at this time .   - Follow up in 3 months with A1c prior.        Blakeney McKnight, PA-C [facility]    A total of 30 minutes was spent in face to face time, of which over 50 % was spent in counseling patient on disease process, complications, treatment, and side effects of medications.

## 2022-01-04 RX ORDER — ONDANSETRON 4 MG/1
4 TABLET, ORALLY DISINTEGRATING ORAL ONCE AS NEEDED
Status: CANCELLED | OUTPATIENT
Start: 2022-01-04 | End: 2033-06-02

## 2022-01-04 RX ORDER — ALBUTEROL SULFATE 90 UG/1
2 AEROSOL, METERED RESPIRATORY (INHALATION)
Status: CANCELLED | OUTPATIENT
Start: 2022-01-04

## 2022-01-04 NOTE — TELEPHONE ENCOUNTER
Patient was advised of her results and the infusion. Patient stated they called her for infusion appt and she stated and verbalized that she understands

## 2022-01-04 NOTE — TELEPHONE ENCOUNTER
Please advise patient she has tested positive for COVID and meets criteria for infusion therapy.  An order has been placed and they will contact her to schedule.  This IV infusion therapy helps to prevent severe complications from COVID 19 in high risk patients.

## 2022-01-05 ENCOUNTER — INFUSION (OUTPATIENT)
Dept: INFECTIOUS DISEASES | Facility: HOSPITAL | Age: 67
End: 2022-01-05
Attending: INTERNAL MEDICINE
Payer: MEDICARE

## 2022-01-05 VITALS
HEART RATE: 77 BPM | OXYGEN SATURATION: 99 % | TEMPERATURE: 98 F | SYSTOLIC BLOOD PRESSURE: 141 MMHG | DIASTOLIC BLOOD PRESSURE: 78 MMHG | RESPIRATION RATE: 18 BRPM

## 2022-01-05 DIAGNOSIS — U07.1 COVID-19: ICD-10-CM

## 2022-01-05 PROCEDURE — M0243 CASIRIVI AND IMDEVI INFUSION: HCPCS | Performed by: INTERNAL MEDICINE

## 2022-01-05 PROCEDURE — 63600175 PHARM REV CODE 636 W HCPCS: Mod: HCNC | Performed by: INTERNAL MEDICINE

## 2022-01-05 PROCEDURE — 25000003 PHARM REV CODE 250: Mod: HCNC | Performed by: INTERNAL MEDICINE

## 2022-01-05 RX ORDER — ACETAMINOPHEN 325 MG/1
650 TABLET ORAL ONCE AS NEEDED
Status: DISCONTINUED | OUTPATIENT
Start: 2022-01-05 | End: 2022-05-30

## 2022-01-05 RX ORDER — EPINEPHRINE 0.3 MG/.3ML
0.3 INJECTION SUBCUTANEOUS
Status: DISCONTINUED | OUTPATIENT
Start: 2022-01-05 | End: 2022-05-30

## 2022-01-05 RX ORDER — SODIUM CHLORIDE 0.9 % (FLUSH) 0.9 %
10 SYRINGE (ML) INJECTION
Status: DISCONTINUED | OUTPATIENT
Start: 2022-01-05 | End: 2022-05-30

## 2022-01-05 RX ORDER — DIPHENHYDRAMINE HYDROCHLORIDE 50 MG/ML
25 INJECTION INTRAMUSCULAR; INTRAVENOUS ONCE AS NEEDED
Status: DISCONTINUED | OUTPATIENT
Start: 2022-01-05 | End: 2022-05-30

## 2022-01-05 RX ADMIN — CASIRIVIMAB AND IMDEVIMAB 600 MG: 600; 600 INJECTION, SOLUTION, CONCENTRATE INTRAVENOUS at 08:01

## 2022-01-13 DIAGNOSIS — I10 HYPERTENSION GOAL BP (BLOOD PRESSURE) < 130/80: Chronic | ICD-10-CM

## 2022-01-13 DIAGNOSIS — K21.9 CHRONIC GERD: ICD-10-CM

## 2022-01-13 NOTE — TELEPHONE ENCOUNTER
No new care gaps identified.  Powered by [employer]. Reference number: [id number].   1/13/2022 11:36:57 AM CST

## 2022-01-14 RX ORDER — OMEPRAZOLE 40 MG/1
CAPSULE, DELAYED RELEASE ORAL
Qty: 90 CAPSULE | Refills: 0 | Status: SHIPPED | OUTPATIENT
Start: 2022-01-14 | End: 2022-04-12 | Stop reason: SDUPTHER

## 2022-01-14 RX ORDER — OLMESARTAN MEDOXOMIL 20 MG/1
TABLET ORAL
Qty: 90 TABLET | Refills: 0 | Status: SHIPPED | OUTPATIENT
Start: 2022-01-14 | End: 2022-04-12 | Stop reason: SDUPTHER

## 2022-01-31 DIAGNOSIS — E11.9 TYPE 2 DIABETES MELLITUS WITH HEMOGLOBIN A1C GOAL OF LESS THAN 7.0%: ICD-10-CM

## 2022-01-31 DIAGNOSIS — E78.5 HYPERLIPIDEMIA LDL GOAL <70: ICD-10-CM

## 2022-01-31 NOTE — TELEPHONE ENCOUNTER
----- Message from Martha Riley sent at 1/31/2022  1:40 PM CST -----  pt needs call back regarding status of rouvastin refill..[phone]      [address] [place] Lazaro  [address]  [address] [facility] [address]  Phone: [phone] Fax: [phone]

## 2022-02-01 RX ORDER — ROSUVASTATIN CALCIUM 40 MG/1
40 TABLET, COATED ORAL NIGHTLY
Qty: 90 TABLET | Refills: 0 | Status: SHIPPED | OUTPATIENT
Start: 2022-02-01 | End: 2022-05-30 | Stop reason: SDUPTHER

## 2022-02-01 NOTE — TELEPHONE ENCOUNTER
Provider Staff- Action is required     If a refill request needs assessment, Please pend appropriate medication(s) for patient and route the refill request to the Centralized Refill Staff Pool for assessment.     If medication is already pended in a previous encounter, please add any call/ encounter notes to that previous encounter and route that encounter to the [facility] staff pool High Priority.     If medication is not pended as a Refill Request the data required for the Refill Center to assess will not generate and the medications will not be able to be assessed properly by the Refill Center.     Thank you for your assistance!   [facility]     Note composed:12:24 PM 02/01/2022

## 2022-02-01 NOTE — TELEPHONE ENCOUNTER
No new care gaps identified.  Powered by [employer]. Reference number: [id number].   2/01/2022 2:30:53 PM CST

## 2022-02-10 ENCOUNTER — PATIENT OUTREACH (OUTPATIENT)
Dept: ADMINISTRATIVE | Facility: HOSPITAL | Age: 67
End: 2022-02-10
Payer: MEDICARE

## 2022-02-22 DIAGNOSIS — E11.9 TYPE 2 DIABETES MELLITUS WITH HEMOGLOBIN A1C GOAL OF LESS THAN 7.0%: ICD-10-CM

## 2022-02-22 RX ORDER — INSULIN DEGLUDEC 200 U/ML
INJECTION, SOLUTION SUBCUTANEOUS
Qty: 4 PEN | Refills: 0 | Status: SHIPPED | OUTPATIENT
Start: 2022-02-22 | End: 2022-02-28 | Stop reason: SDUPTHER

## 2022-02-22 RX ORDER — INSULIN DEGLUDEC 200 U/ML
INJECTION, SOLUTION SUBCUTANEOUS
Qty: 4 PEN | Refills: 0 | Status: SHIPPED | OUTPATIENT
Start: 2022-02-22 | End: 2022-02-22

## 2022-02-28 DIAGNOSIS — E11.9 TYPE 2 DIABETES MELLITUS WITH HEMOGLOBIN A1C GOAL OF LESS THAN 7.0%: ICD-10-CM

## 2022-02-28 RX ORDER — INSULIN DEGLUDEC 200 U/ML
68 INJECTION, SOLUTION SUBCUTANEOUS DAILY
Qty: 11 PEN | Refills: 3 | Status: SHIPPED | OUTPATIENT
Start: 2022-02-28 | End: 2022-02-28

## 2022-02-28 RX ORDER — INSULIN DEGLUDEC 200 U/ML
68 INJECTION, SOLUTION SUBCUTANEOUS DAILY
Qty: 11 PEN | Refills: 3 | Status: SHIPPED | OUTPATIENT
Start: 2022-02-28 | End: 2023-02-28

## 2022-02-28 NOTE — TELEPHONE ENCOUNTER
----- Message from Caroline Chaney sent at 2/28/2022  3:45 PM CST -----  [facility] is calling in regards to verify the direction on Rx that was just sent over. Caller can be reached at [phone]

## 2022-02-28 NOTE — TELEPHONE ENCOUNTER
Ximena pt to confirm which Rx she needed a refill on. I advised pt her request is being processed ----- Message from Rosette Sethi sent at 2/28/2022 11:01 AM CST -----  Regarding: call back  Contact: [phone]  Type:  RX Refill Request    Who Called: pt  Refill or New Rx:  Refill   RX Name and Strength:rx is not listed in the chart   How is the patient currently taking it? (ex. 1XDay):  Is this a 30 day or 90 day RX:  Preferred Pharmacy with phone number:  [address] - [address] [address] [address]  Phone: [phone] Fax: [phone]    Local or Mail Order:  Local   Ordering Provider:  Would the patient rather a call back or a response via [facility]? Call back  Best Call Back Number:[phone]  Additional Information pt been calling for 3 weeks

## 2022-04-12 ENCOUNTER — PATIENT OUTREACH (OUTPATIENT)
Dept: ADMINISTRATIVE | Facility: HOSPITAL | Age: 67
End: 2022-04-12
Payer: MEDICARE

## 2022-04-12 DIAGNOSIS — I10 HYPERTENSION GOAL BP (BLOOD PRESSURE) < 130/80: Chronic | ICD-10-CM

## 2022-04-12 DIAGNOSIS — K21.9 CHRONIC GERD: ICD-10-CM

## 2022-04-12 DIAGNOSIS — E11.9 TYPE 2 DIABETES MELLITUS WITH HEMOGLOBIN A1C GOAL OF LESS THAN 7.0%: ICD-10-CM

## 2022-04-12 NOTE — TELEPHONE ENCOUNTER
Care Due:                  Date            Visit Type   Department     Provider  --------------------------------------------------------------------------------                                EP -                              PRIMARY      [facility]  Last Visit: 12-      CARE ([facility])   MEDICINE       Sakina Cooper  Next Visit: None Scheduled  None         None Found                                                            Last  Test          Frequency    Reason                     Performed    Due Date  --------------------------------------------------------------------------------    Office Visit  12 months..  amitriptyline,             12- 12-                             olmesartan, omeprazole,                             rosuvastatin.............    Powered by [employer]. Reference number: [id number].   4/12/2022 4:23:10 PM CDT

## 2022-04-13 RX ORDER — OMEPRAZOLE 40 MG/1
40 CAPSULE, DELAYED RELEASE ORAL DAILY
Qty: 30 CAPSULE | Refills: 0 | Status: SHIPPED | OUTPATIENT
Start: 2022-04-13 | End: 2022-05-23 | Stop reason: SDUPTHER

## 2022-04-13 RX ORDER — DULAGLUTIDE 3 MG/.5ML
3 INJECTION, SOLUTION SUBCUTANEOUS WEEKLY
Qty: 4 PEN | Refills: 0 | Status: SHIPPED | OUTPATIENT
Start: 2022-04-13 | End: 2022-05-13

## 2022-04-13 RX ORDER — OLMESARTAN MEDOXOMIL 20 MG/1
20 TABLET ORAL DAILY
Qty: 30 TABLET | Refills: 0 | Status: SHIPPED | OUTPATIENT
Start: 2022-04-13 | End: 2022-05-23 | Stop reason: SDUPTHER

## 2022-05-16 ENCOUNTER — PATIENT OUTREACH (OUTPATIENT)
Dept: ADMINISTRATIVE | Facility: HOSPITAL | Age: 67
End: 2022-05-16
Payer: MEDICARE

## 2022-05-23 ENCOUNTER — LAB VISIT (OUTPATIENT)
Dept: LAB | Facility: HOSPITAL | Age: 67
End: 2022-05-23
Attending: PHYSICIAN ASSISTANT
Payer: MEDICARE

## 2022-05-23 DIAGNOSIS — I10 HYPERTENSION GOAL BP (BLOOD PRESSURE) < 130/80: Chronic | ICD-10-CM

## 2022-05-23 DIAGNOSIS — E11.9 TYPE 2 DIABETES MELLITUS WITH HEMOGLOBIN A1C GOAL OF LESS THAN 7.0%: ICD-10-CM

## 2022-05-23 DIAGNOSIS — K21.9 CHRONIC GERD: ICD-10-CM

## 2022-05-23 LAB
ESTIMATED AVG GLUCOSE: 180 MG/DL (ref 68–131)
HBA1C MFR BLD: 7.9 % (ref 4–5.6)

## 2022-05-23 PROCEDURE — 83036 HEMOGLOBIN GLYCOSYLATED A1C: CPT | Performed by: PHYSICIAN ASSISTANT

## 2022-05-23 PROCEDURE — 36415 COLL VENOUS BLD VENIPUNCTURE: CPT | Mod: PO | Performed by: PHYSICIAN ASSISTANT

## 2022-05-23 RX ORDER — OLMESARTAN MEDOXOMIL 20 MG/1
20 TABLET ORAL DAILY
Qty: 30 TABLET | Refills: 0 | Status: SHIPPED | OUTPATIENT
Start: 2022-05-23 | End: 2022-05-30 | Stop reason: SDUPTHER

## 2022-05-23 RX ORDER — OMEPRAZOLE 40 MG/1
40 CAPSULE, DELAYED RELEASE ORAL DAILY
Qty: 30 CAPSULE | Refills: 0 | Status: SHIPPED | OUTPATIENT
Start: 2022-05-23 | End: 2022-05-30 | Stop reason: SDUPTHER

## 2022-05-23 NOTE — TELEPHONE ENCOUNTER
No new care gaps identified.  [facility] Embedded Care Gaps. Reference number: [id number]. 5/23/2022   10:19:59 AM EDDIET

## 2022-05-25 ENCOUNTER — PATIENT MESSAGE (OUTPATIENT)
Dept: RESEARCH | Facility: HOSPITAL | Age: 67
End: 2022-05-25
Payer: MEDICARE

## 2022-05-27 ENCOUNTER — OFFICE VISIT (OUTPATIENT)
Dept: DIABETES | Facility: CLINIC | Age: 67
End: 2022-05-27
Payer: MEDICARE

## 2022-05-27 ENCOUNTER — TELEPHONE (OUTPATIENT)
Dept: OPHTHALMOLOGY | Facility: CLINIC | Age: 67
End: 2022-05-27
Payer: MEDICARE

## 2022-05-27 VITALS
HEART RATE: 77 BPM | DIASTOLIC BLOOD PRESSURE: 72 MMHG | BODY MASS INDEX: 30.32 KG/M2 | SYSTOLIC BLOOD PRESSURE: 126 MMHG | WEIGHT: 165.81 LBS

## 2022-05-27 DIAGNOSIS — K76.0 FATTY LIVER: ICD-10-CM

## 2022-05-27 DIAGNOSIS — E11.9 TYPE 2 DIABETES MELLITUS WITH HEMOGLOBIN A1C GOAL OF LESS THAN 7.0%: Primary | ICD-10-CM

## 2022-05-27 DIAGNOSIS — E66.9 OBESITY (BMI 30-39.9): ICD-10-CM

## 2022-05-27 DIAGNOSIS — I10 HYPERTENSION GOAL BP (BLOOD PRESSURE) < 130/80: ICD-10-CM

## 2022-05-27 DIAGNOSIS — E78.5 HYPERLIPIDEMIA LDL GOAL <70: ICD-10-CM

## 2022-05-27 DIAGNOSIS — Z63.6 CAREGIVER BURDEN: ICD-10-CM

## 2022-05-27 LAB — GLUCOSE SERPL-MCNC: 162 MG/DL (ref 70–110)

## 2022-05-27 PROCEDURE — 99214 PR OFFICE/OUTPT VISIT, EST, LEVL IV, 30-39 MIN: ICD-10-PCS | Mod: S$GLB,,, | Performed by: PHYSICIAN ASSISTANT

## 2022-05-27 PROCEDURE — 3052F PR MOST RECENT HEMOGLOBIN A1C LEVEL 8.0 - < 9.0%: ICD-10-PCS | Mod: CPTII,S$GLB,, | Performed by: PHYSICIAN ASSISTANT

## 2022-05-27 PROCEDURE — 3074F SYST BP LT 130 MM HG: CPT | Mod: CPTII,S$GLB,, | Performed by: PHYSICIAN ASSISTANT

## 2022-05-27 PROCEDURE — 3074F PR MOST RECENT SYSTOLIC BLOOD PRESSURE < 130 MM HG: ICD-10-PCS | Mod: CPTII,S$GLB,, | Performed by: PHYSICIAN ASSISTANT

## 2022-05-27 PROCEDURE — 4010F ACE/ARB THERAPY RXD/TAKEN: CPT | Mod: CPTII,S$GLB,, | Performed by: PHYSICIAN ASSISTANT

## 2022-05-27 PROCEDURE — 3072F PR LOW RISK FOR RETINOPATHY: ICD-10-PCS | Mod: CPTII,S$GLB,, | Performed by: PHYSICIAN ASSISTANT

## 2022-05-27 PROCEDURE — 99999 PR PBB SHADOW E&M-EST. PATIENT-LVL V: CPT | Mod: PBBFAC,,, | Performed by: PHYSICIAN ASSISTANT

## 2022-05-27 PROCEDURE — 4010F PR ACE/ARB THEARPY RXD/TAKEN: ICD-10-PCS | Mod: CPTII,S$GLB,, | Performed by: PHYSICIAN ASSISTANT

## 2022-05-27 PROCEDURE — 82962 GLUCOSE BLOOD TEST: CPT | Mod: S$GLB,,, | Performed by: PHYSICIAN ASSISTANT

## 2022-05-27 PROCEDURE — 3052F HG A1C>EQUAL 8.0%<EQUAL 9.0%: CPT | Mod: CPTII,S$GLB,, | Performed by: PHYSICIAN ASSISTANT

## 2022-05-27 PROCEDURE — 99214 OFFICE O/P EST MOD 30 MIN: CPT | Mod: S$GLB,,, | Performed by: PHYSICIAN ASSISTANT

## 2022-05-27 PROCEDURE — 1126F PR PAIN SEVERITY QUANTIFIED, NO PAIN PRESENT: ICD-10-PCS | Mod: CPTII,S$GLB,, | Performed by: PHYSICIAN ASSISTANT

## 2022-05-27 PROCEDURE — 3051F PR MOST RECENT HEMOGLOBIN A1C LEVEL 7.0 - < 8.0%: ICD-10-PCS | Mod: CPTII,S$GLB,, | Performed by: PHYSICIAN ASSISTANT

## 2022-05-27 PROCEDURE — 99999 PR PBB SHADOW E&M-EST. PATIENT-LVL V: ICD-10-PCS | Mod: PBBFAC,,, | Performed by: PHYSICIAN ASSISTANT

## 2022-05-27 PROCEDURE — 1126F AMNT PAIN NOTED NONE PRSNT: CPT | Mod: CPTII,S$GLB,, | Performed by: PHYSICIAN ASSISTANT

## 2022-05-27 PROCEDURE — 3008F PR BODY MASS INDEX (BMI) DOCUMENTED: ICD-10-PCS | Mod: CPTII,S$GLB,, | Performed by: PHYSICIAN ASSISTANT

## 2022-05-27 PROCEDURE — 3078F DIAST BP <80 MM HG: CPT | Mod: CPTII,S$GLB,, | Performed by: PHYSICIAN ASSISTANT

## 2022-05-27 PROCEDURE — 3078F PR MOST RECENT DIASTOLIC BLOOD PRESSURE < 80 MM HG: ICD-10-PCS | Mod: CPTII,S$GLB,, | Performed by: PHYSICIAN ASSISTANT

## 2022-05-27 PROCEDURE — 1157F PR ADVANCE CARE PLAN OR EQUIV PRESENT IN MEDICAL RECORD: ICD-10-PCS | Mod: CPTII,S$GLB,, | Performed by: PHYSICIAN ASSISTANT

## 2022-05-27 PROCEDURE — 3051F HG A1C>EQUAL 7.0%<8.0%: CPT | Mod: CPTII,S$GLB,, | Performed by: PHYSICIAN ASSISTANT

## 2022-05-27 PROCEDURE — 1157F ADVNC CARE PLAN IN RCRD: CPT | Mod: CPTII,S$GLB,, | Performed by: PHYSICIAN ASSISTANT

## 2022-05-27 PROCEDURE — 82962 POCT GLUCOSE, HAND-HELD DEVICE: ICD-10-PCS | Mod: S$GLB,,, | Performed by: PHYSICIAN ASSISTANT

## 2022-05-27 PROCEDURE — 3072F LOW RISK FOR RETINOPATHY: CPT | Mod: CPTII,S$GLB,, | Performed by: PHYSICIAN ASSISTANT

## 2022-05-27 PROCEDURE — 3008F BODY MASS INDEX DOCD: CPT | Mod: CPTII,S$GLB,, | Performed by: PHYSICIAN ASSISTANT

## 2022-05-27 RX ORDER — DULAGLUTIDE 3 MG/.5ML
INJECTION, SOLUTION SUBCUTANEOUS
COMMUNITY
Start: 2022-05-03 | End: 2022-05-27

## 2022-05-27 RX ORDER — DULAGLUTIDE 4.5 MG/.5ML
4.5 INJECTION, SOLUTION SUBCUTANEOUS WEEKLY
Qty: 12 PEN | Refills: 3 | Status: SHIPPED | OUTPATIENT
Start: 2022-05-27 | End: 2022-08-05 | Stop reason: ALTCHOICE

## 2022-05-27 RX ORDER — LANCETS
1 EACH MISCELLANEOUS 4 TIMES DAILY
Qty: 300 EACH | Refills: 3 | Status: SHIPPED | OUTPATIENT
Start: 2022-05-27

## 2022-05-27 RX ORDER — INSULIN PUMP SYRINGE, 3 ML
EACH MISCELLANEOUS
Qty: 1 EACH | Refills: 0 | Status: SHIPPED | OUTPATIENT
Start: 2022-05-27

## 2022-05-27 SDOH — SOCIAL DETERMINANTS OF HEALTH (SDOH): DEPENDENT RELATIVE NEEDING CARE AT HOME: Z63.6

## 2022-05-27 NOTE — TELEPHONE ENCOUNTER
Called patient in regards to making an appointment. Pt did not answer, left a voicemail to call back.----- Message from Myesha Head sent at 5/27/2022 12:47 PM CDT -----  Please call patient back would like to schedule and appointment real soon. Please call patient back at [phone]. Please do not reach out on mychart she wants a phone call she wants to talk to someone.     Thanks KL

## 2022-05-27 NOTE — PATIENT INSTRUCTIONS
CURRENT DM MEDICATIONS:   Trulicity 4.5 mg weekly  Amaryl 4 mg before breakfast   Tresiba 50 units daily

## 2022-05-27 NOTE — PROGRESS NOTES
PCP: Sakina Cooper DO    Subjective:     Chief Complaint: Diabetes - Established Patient    HISTORY OF PRESENT ILLNESS: 66 y.o.  female presenting for diabetes management visit.   The patient's last visit with me was on 1/3/2022.  Patient has had Type II diabetes since 2005.  Pertinent to decision making is the following comorbidities: HTN, HLD, Obesity by BMI and Fatty Liver  Patient has the following Diabetes complications: without complications  She  has attended diabetes education in the past.     Patient's most recent A1c of 7.9% was completed 1 weeks ago.   Patient states since Her last A1c Her blood glucose levels have been within range in the morning / fasting.   Patient monitors blood glucose 1 times per day with Contour Next : Fasting and Before Dinner.   Patient blood glucose monitoring device will not be uploaded into Media Section today secondary to unable to upload successfully.   Per meter recall, fasting blood sugar ranging 78 - 102.  Patient endorses the following diabetes related symptoms: None.   Patient is due today for the following diabetes-related health maintenance standards: Foot Exam , Eye Exam and COVID-19 Vaccine .   She denies recent hospital admissions or emergency room visits.   She denies having hypoglycemia.  Patient's concerns today include glycemic control.    Patient medication regimen is as below.     CURRENT DM MEDICATIONS:    Trulicity 3 mg weekly   Amaryl 4 mg before breakfast    Tresiba 64 units daily     Patient has failed the following Diabetes medications:    Metformin - allergy       Labs Reviewed.       Lab Results   Component Value Date    CPEPTIDE 1.10 12/14/2017     Lab Results   Component Value Date    GLUTAMICACID 0.00 12/14/2017          //  Weight: 75.2 kg (165 lb 12.6 oz), Body mass index is 30.32 kg/m².  Her blood sugar in clinic today is:    162      Review of Systems   Constitutional: Negative for activity change, appetite change, chills and  fever.   HENT: Negative for dental problem, mouth sores, nosebleeds, sore throat and trouble swallowing.    Eyes: Negative for pain and discharge.   Respiratory: Negative for shortness of breath, wheezing and stridor.    Cardiovascular: Negative for chest pain, palpitations and leg swelling.   Gastrointestinal: Negative for abdominal pain, diarrhea, nausea and vomiting.   Endocrine: Negative for polydipsia, polyphagia and polyuria.   Genitourinary: Negative for dysuria, frequency and urgency.   Musculoskeletal: Negative for joint swelling and myalgias.   Skin: Negative for rash and wound.   Neurological: Negative for dizziness, syncope, weakness and headaches.   Psychiatric/Behavioral: Negative for behavioral problems and dysphoric mood.         Diabetes Management Status  Statin: Taking  ACE/ARB: Taking    Screening or Prevention Patient's value Goal Complete/Controlled?   HgA1C Testing and Control   Lab Results   Component Value Date    HGBA1C 7.9 (H) 05/23/2022      Annually/Less than 8% Yes   Lipid profile : 10/18/2021 Annually Yes   LDL control Lab Results   Component Value Date    LDLCALC 110.8 10/18/2021    Annually/Less than 100 mg/dl  Yes   Nephropathy screening Lab Results   Component Value Date    MICALBCREAT 18.3 10/18/2021     Lab Results   Component Value Date    PROTEINUA Negative 09/08/2014    Annually Yes   Blood pressure BP Readings from Last 1 Encounters:   01/05/22 (!) 141/78    Less than 140/90 Yes   Dilated retinal exam : 06/25/2021 Annually Yes    Foot exam   : 04/13/2021 Annually No     Social History     Socioeconomic History    Marital status:     Number of children: 3   Occupational History    Occupation: Stay at Home    Occupation:    Tobacco Use    Smoking status: Never Smoker    Smokeless tobacco: Never Used   Substance and Sexual Activity    Alcohol use: Yes     Comment: occasional wine     Drug use: No    Sexual activity: Yes     Partners: Male      Birth control/protection: Surgical   Social History Narrative    . Housewife.     Social Determinants of Health     Financial Resource Strain: Low Risk     Difficulty of Paying Living Expenses: Not very hard   Food Insecurity: No Food Insecurity    Worried About Running Out of Food in the Last Year: Never true    Ran Out of Food in the Last Year: Never true   Transportation Needs: No Transportation Needs    Lack of Transportation (Medical): No    Lack of Transportation (Non-Medical): No   Physical Activity: Sufficiently Active    Days of Exercise per Week: 7 days    Minutes of Exercise per Session: 30 min   Stress: Stress Concern Present    Feeling of Stress : To some extent   Social Connections: Moderately Integrated    Frequency of Communication with Friends and Family: More than three times a week    Frequency of Social Gatherings with Friends and Family: Once a week    Attends Pent[employer]ost[employer]ism Services: More than 4 times per year    Active Member of Clubs or Organizations: No    Attends Club or Organization Meetings: Never    Marital Status:    Housing Stability: Low Risk     Unable to Pay for Housing in the Last Year: No    Number of Places Lived in the Last Year: 1    Unstable Housing in the Last Year: No     Past Medical History:   Diagnosis Date    Arthritis     Cataract     Colon polyp     Diabetes mellitus type II     Hyperlipidemia     Hypertension     Renal cell carcinoma 09/2018    Total knee replacement status, left 01/20/2019    Dr.Robert Cook    UTI (urinary tract infection)        Objective:        Physical Exam  Constitutional:       General: She is not in acute distress.     Appearance: She is well-developed. She is not diaphoretic.   HENT:      Head: Normocephalic and atraumatic.      Right Ear: External ear normal.      Left Ear: External ear normal.      Nose: Nose normal.   Eyes:      General:         Right eye: No discharge.         Left eye: No discharge.       Pupils: Pupils are equal, round, and reactive to light.   Cardiovascular:      Rate and Rhythm: Normal rate and regular rhythm.      Pulses:           Dorsalis pedis pulses are 2+ on the right side and 2+ on the left side.        Posterior tibial pulses are 2+ on the right side and 2+ on the left side.      Heart sounds: Normal heart sounds.   Pulmonary:      Effort: Pulmonary effort is normal.      Breath sounds: Normal breath sounds.   Abdominal:      Palpations: Abdomen is soft.   Musculoskeletal:         General: Normal range of motion.      Cervical back: Normal range of motion and neck supple.      Right foot: Normal range of motion. No deformity.      Left foot: Normal range of motion. No deformity.   Feet:      Right foot:      Protective Sensation: 6 sites tested. 6 sites sensed.      Skin integrity: Dry skin present. No ulcer, blister, skin breakdown, erythema, warmth or callus.      Left foot:      Protective Sensation: 6 sites tested. 6 sites sensed.      Skin integrity: Dry skin present. No ulcer, blister, skin breakdown, erythema, warmth or callus.   Skin:     General: Skin is warm and dry.      Capillary Refill: Capillary refill takes less than 2 seconds.   Neurological:      Mental Status: She is alert and oriented to person, place, and time.      Motor: No abnormal muscle tone.      Coordination: Coordination normal.   Psychiatric:         Behavior: Behavior normal.         Thought Content: Thought content normal.         Judgment: Judgment normal.           Assessment / Plan:     Type 2 diabetes mellitus with hemoglobin A1c goal of less than 7.0%  -     POCT Glucose, Hand-Held Device  -     blood-glucose meter kit; Test finger stick blood sugar once daily.  Dispense: 1 each; Refill: 0  -     blood sugar diagnostic Strp; 1 each by Misc.(Non-Drug; Combo Route) route 4 (four) times daily.  Dispense: 300 each; Refill: 3  -     lancets Misc; 1 each by Misc.(Non-Drug; Combo Route) route 4 (four)  times daily.  Dispense: 300 each; Refill: 3  -     dulaglutide (TRULICITY) 4.5 mg/0.5 mL pen injector; Inject 4.5 mg into the skin once a week.  Dispense: 12 pen; Refill: 3    Fatty liver    Hyperlipidemia LDL goal <70    Hypertension goal BP (blood pressure) < 130/80    Obesity (BMI 30-39.9)    Caregiver burden  -     Ambulatory referral/consult to Psychiatry; Future; Expected date: 06/03/2022      Additional Plan Details:    - POCT Glucose  - Encouraged continuation of lifestyle changes including regular exercise and limiting carbohydrates to 30-45 grams per meal threes times daily and 15 grams per snack with a limit of two daily.   - Encouraged continued monitoring of blood glucose with maintenance of 2 times daily at Fasting and Before Bed. Insurance preferred Meter and supplies Rx today.   - Current DM Medication Regimen: Continue Glimepiride to 4 mg before breakfast. Change Tresiba to 50 units daily with Trulicity change.  Change Trulicity 4.5 mg weekly.  - Referral to Psychiatry - Caregiver burden  - Health Maintenance standards addressed today: Foot Exam - completed today and documented in physical exam with feedback to patient about proper diabetic foot care and findings, Eye Exam - will be completed within [facility] and scheduled today and COVID - 19 Vaccine - booster sched  - Nursing Visit: Patient is age 79 or younger with an A1c of 7.5 or greater and will not need nursing visit at this time .   - Follow up in 3 months with A1c prior and in 4 weeks for call.      Alecia Goodman PA-C  [facility]    A total of 30 minutes was spent in face to face time, of which over 50 % was spent in counseling patient on disease process, complications, treatment, and side effects of medications.

## 2022-05-27 NOTE — TELEPHONE ENCOUNTER
Called pt, pt did not answer. Left voicemail for pt to call back.----- Message from Cinthia Phan MA sent at 5/27/2022 11:47 AM CDT -----  Please call pt to schedule f/u

## 2022-05-30 ENCOUNTER — OFFICE VISIT (OUTPATIENT)
Dept: INTERNAL MEDICINE | Facility: CLINIC | Age: 67
End: 2022-05-30
Payer: MEDICARE

## 2022-05-30 ENCOUNTER — LAB VISIT (OUTPATIENT)
Dept: LAB | Facility: HOSPITAL | Age: 67
End: 2022-05-30
Attending: PHYSICIAN ASSISTANT
Payer: MEDICARE

## 2022-05-30 ENCOUNTER — OFFICE VISIT (OUTPATIENT)
Dept: OPHTHALMOLOGY | Facility: CLINIC | Age: 67
End: 2022-05-30
Payer: MEDICARE

## 2022-05-30 VITALS
SYSTOLIC BLOOD PRESSURE: 120 MMHG | HEART RATE: 70 BPM | DIASTOLIC BLOOD PRESSURE: 78 MMHG | OXYGEN SATURATION: 98 % | BODY MASS INDEX: 30.39 KG/M2 | WEIGHT: 165.13 LBS | HEIGHT: 62 IN | TEMPERATURE: 98 F | RESPIRATION RATE: 18 BRPM

## 2022-05-30 DIAGNOSIS — H04.123 DRY EYE SYNDROME OF BOTH EYES: ICD-10-CM

## 2022-05-30 DIAGNOSIS — H40.013 AT LOW RISK FOR OPEN-ANGLE GLAUCOMA IN BOTH EYES: ICD-10-CM

## 2022-05-30 DIAGNOSIS — I10 HYPERTENSION GOAL BP (BLOOD PRESSURE) < 130/80: Chronic | ICD-10-CM

## 2022-05-30 DIAGNOSIS — E11.36 DIABETIC CATARACT: ICD-10-CM

## 2022-05-30 DIAGNOSIS — H01.02A SQUAMOUS BLEPHARITIS OF UPPER AND LOWER EYELIDS OF BOTH EYES: ICD-10-CM

## 2022-05-30 DIAGNOSIS — E11.9 TYPE 2 DIABETES MELLITUS WITH HEMOGLOBIN A1C GOAL OF LESS THAN 7.0%: ICD-10-CM

## 2022-05-30 DIAGNOSIS — E78.5 HYPERLIPIDEMIA LDL GOAL <70: Chronic | ICD-10-CM

## 2022-05-30 DIAGNOSIS — K21.9 CHRONIC GERD: ICD-10-CM

## 2022-05-30 DIAGNOSIS — E78.5 HYPERLIPIDEMIA LDL GOAL <70: Primary | Chronic | ICD-10-CM

## 2022-05-30 DIAGNOSIS — E11.9 DIABETES MELLITUS TYPE 2 WITHOUT RETINOPATHY: Primary | ICD-10-CM

## 2022-05-30 DIAGNOSIS — H01.02B SQUAMOUS BLEPHARITIS OF UPPER AND LOWER EYELIDS OF BOTH EYES: ICD-10-CM

## 2022-05-30 DIAGNOSIS — H52.7 REFRACTIVE ERRORS: ICD-10-CM

## 2022-05-30 LAB
ALBUMIN SERPL BCP-MCNC: 3.8 G/DL (ref 3.5–5.2)
ALP SERPL-CCNC: 90 U/L (ref 55–135)
ALT SERPL W/O P-5'-P-CCNC: 23 U/L (ref 10–44)
ANION GAP SERPL CALC-SCNC: 10 MMOL/L (ref 8–16)
AST SERPL-CCNC: 13 U/L (ref 10–40)
BASOPHILS # BLD AUTO: 0.03 K/UL (ref 0–0.2)
BASOPHILS NFR BLD: 0.4 % (ref 0–1.9)
BILIRUB SERPL-MCNC: 0.4 MG/DL (ref 0.1–1)
BUN SERPL-MCNC: 22 MG/DL (ref 8–23)
CALCIUM SERPL-MCNC: 9.2 MG/DL (ref 8.7–10.5)
CHLORIDE SERPL-SCNC: 104 MMOL/L (ref 95–110)
CHOLEST SERPL-MCNC: 148 MG/DL (ref 120–199)
CHOLEST/HDLC SERPL: 2.6 {RATIO} (ref 2–5)
CO2 SERPL-SCNC: 28 MMOL/L (ref 23–29)
CREAT SERPL-MCNC: 0.7 MG/DL (ref 0.5–1.4)
DIFFERENTIAL METHOD: ABNORMAL
EOSINOPHIL # BLD AUTO: 0.2 K/UL (ref 0–0.5)
EOSINOPHIL NFR BLD: 3.5 % (ref 0–8)
ERYTHROCYTE [DISTWIDTH] IN BLOOD BY AUTOMATED COUNT: 15.8 % (ref 11.5–14.5)
EST. GFR  (AFRICAN AMERICAN): >60 ML/MIN/1.73 M^2
EST. GFR  (NON AFRICAN AMERICAN): >60 ML/MIN/1.73 M^2
ESTIMATED AVG GLUCOSE: 183 MG/DL (ref 68–131)
GLUCOSE SERPL-MCNC: 190 MG/DL (ref 70–110)
HBA1C MFR BLD: 8 % (ref 4–5.6)
HCT VFR BLD AUTO: 39.6 % (ref 37–48.5)
HDLC SERPL-MCNC: 58 MG/DL (ref 40–75)
HDLC SERPL: 39.2 % (ref 20–50)
HGB BLD-MCNC: 12.9 G/DL (ref 12–16)
IMM GRANULOCYTES # BLD AUTO: 0.01 K/UL (ref 0–0.04)
IMM GRANULOCYTES NFR BLD AUTO: 0.1 % (ref 0–0.5)
LDLC SERPL CALC-MCNC: 62.4 MG/DL (ref 63–159)
LYMPHOCYTES # BLD AUTO: 2.1 K/UL (ref 1–4.8)
LYMPHOCYTES NFR BLD: 30.8 % (ref 18–48)
MCH RBC QN AUTO: 25.3 PG (ref 27–31)
MCHC RBC AUTO-ENTMCNC: 32.6 G/DL (ref 32–36)
MCV RBC AUTO: 78 FL (ref 82–98)
MONOCYTES # BLD AUTO: 0.5 K/UL (ref 0.3–1)
MONOCYTES NFR BLD: 7.6 % (ref 4–15)
NEUTROPHILS # BLD AUTO: 3.9 K/UL (ref 1.8–7.7)
NEUTROPHILS NFR BLD: 57.6 % (ref 38–73)
NONHDLC SERPL-MCNC: 90 MG/DL
NRBC BLD-RTO: 0 /100 WBC
PLATELET # BLD AUTO: 245 K/UL (ref 150–450)
PMV BLD AUTO: 9.6 FL (ref 9.2–12.9)
POTASSIUM SERPL-SCNC: 4.6 MMOL/L (ref 3.5–5.1)
PROT SERPL-MCNC: 7 G/DL (ref 6–8.4)
RBC # BLD AUTO: 5.1 M/UL (ref 4–5.4)
SODIUM SERPL-SCNC: 142 MMOL/L (ref 136–145)
TRIGL SERPL-MCNC: 138 MG/DL (ref 30–150)
WBC # BLD AUTO: 6.81 K/UL (ref 3.9–12.7)

## 2022-05-30 PROCEDURE — 2023F PR DILATED RETINAL EXAM W/O EVID OF RETINOPATHY: ICD-10-PCS | Mod: CPTII,S$GLB,, | Performed by: OPTOMETRIST

## 2022-05-30 PROCEDURE — 1157F PR ADVANCE CARE PLAN OR EQUIV PRESENT IN MEDICAL RECORD: ICD-10-PCS | Mod: CPTII,S$GLB,, | Performed by: OPTOMETRIST

## 2022-05-30 PROCEDURE — 3072F LOW RISK FOR RETINOPATHY: CPT | Mod: CPTII,S$GLB,, | Performed by: PHYSICIAN ASSISTANT

## 2022-05-30 PROCEDURE — 3051F HG A1C>EQUAL 7.0%<8.0%: CPT | Mod: CPTII,S$GLB,, | Performed by: PHYSICIAN ASSISTANT

## 2022-05-30 PROCEDURE — 4010F ACE/ARB THERAPY RXD/TAKEN: CPT | Mod: CPTII,S$GLB,, | Performed by: PHYSICIAN ASSISTANT

## 2022-05-30 PROCEDURE — 1157F ADVNC CARE PLAN IN RCRD: CPT | Mod: CPTII,S$GLB,, | Performed by: OPTOMETRIST

## 2022-05-30 PROCEDURE — 3288F FALL RISK ASSESSMENT DOCD: CPT | Mod: CPTII,S$GLB,, | Performed by: PHYSICIAN ASSISTANT

## 2022-05-30 PROCEDURE — 3051F PR MOST RECENT HEMOGLOBIN A1C LEVEL 7.0 - < 8.0%: ICD-10-PCS | Mod: CPTII,S$GLB,, | Performed by: OPTOMETRIST

## 2022-05-30 PROCEDURE — 3288F PR FALLS RISK ASSESSMENT DOCUMENTED: ICD-10-PCS | Mod: CPTII,S$GLB,, | Performed by: PHYSICIAN ASSISTANT

## 2022-05-30 PROCEDURE — 4010F ACE/ARB THERAPY RXD/TAKEN: CPT | Mod: CPTII,S$GLB,, | Performed by: OPTOMETRIST

## 2022-05-30 PROCEDURE — 36415 COLL VENOUS BLD VENIPUNCTURE: CPT | Performed by: PHYSICIAN ASSISTANT

## 2022-05-30 PROCEDURE — 92014 COMPRE OPH EXAM EST PT 1/>: CPT | Mod: S$GLB,,, | Performed by: OPTOMETRIST

## 2022-05-30 PROCEDURE — 4010F PR ACE/ARB THEARPY RXD/TAKEN: ICD-10-PCS | Mod: CPTII,S$GLB,, | Performed by: OPTOMETRIST

## 2022-05-30 PROCEDURE — 1160F RVW MEDS BY RX/DR IN RCRD: CPT | Mod: CPTII,S$GLB,, | Performed by: PHYSICIAN ASSISTANT

## 2022-05-30 PROCEDURE — 92015 PR REFRACTION: ICD-10-PCS | Mod: S$GLB,,, | Performed by: OPTOMETRIST

## 2022-05-30 PROCEDURE — 1159F PR MEDICATION LIST DOCUMENTED IN MEDICAL RECORD: ICD-10-PCS | Mod: CPTII,S$GLB,, | Performed by: OPTOMETRIST

## 2022-05-30 PROCEDURE — 1157F ADVNC CARE PLAN IN RCRD: CPT | Mod: CPTII,S$GLB,, | Performed by: PHYSICIAN ASSISTANT

## 2022-05-30 PROCEDURE — 2023F DILAT RTA XM W/O RTNOPTHY: CPT | Mod: CPTII,S$GLB,, | Performed by: OPTOMETRIST

## 2022-05-30 PROCEDURE — 1157F PR ADVANCE CARE PLAN OR EQUIV PRESENT IN MEDICAL RECORD: ICD-10-PCS | Mod: CPTII,S$GLB,, | Performed by: PHYSICIAN ASSISTANT

## 2022-05-30 PROCEDURE — 1159F MED LIST DOCD IN RCRD: CPT | Mod: CPTII,S$GLB,, | Performed by: OPTOMETRIST

## 2022-05-30 PROCEDURE — 3074F SYST BP LT 130 MM HG: CPT | Mod: CPTII,S$GLB,, | Performed by: PHYSICIAN ASSISTANT

## 2022-05-30 PROCEDURE — 99999 PR PBB SHADOW E&M-EST. PATIENT-LVL I: ICD-10-PCS | Mod: PBBFAC,,, | Performed by: OPTOMETRIST

## 2022-05-30 PROCEDURE — 1101F PT FALLS ASSESS-DOCD LE1/YR: CPT | Mod: CPTII,S$GLB,, | Performed by: PHYSICIAN ASSISTANT

## 2022-05-30 PROCEDURE — 4010F PR ACE/ARB THEARPY RXD/TAKEN: ICD-10-PCS | Mod: CPTII,S$GLB,, | Performed by: PHYSICIAN ASSISTANT

## 2022-05-30 PROCEDURE — 99999 PR PBB SHADOW E&M-EST. PATIENT-LVL IV: CPT | Mod: PBBFAC,,, | Performed by: PHYSICIAN ASSISTANT

## 2022-05-30 PROCEDURE — 3072F PR LOW RISK FOR RETINOPATHY: ICD-10-PCS | Mod: CPTII,S$GLB,, | Performed by: PHYSICIAN ASSISTANT

## 2022-05-30 PROCEDURE — 3051F PR MOST RECENT HEMOGLOBIN A1C LEVEL 7.0 - < 8.0%: ICD-10-PCS | Mod: CPTII,S$GLB,, | Performed by: PHYSICIAN ASSISTANT

## 2022-05-30 PROCEDURE — 83036 HEMOGLOBIN GLYCOSYLATED A1C: CPT | Performed by: PHYSICIAN ASSISTANT

## 2022-05-30 PROCEDURE — 80061 LIPID PANEL: CPT | Performed by: PHYSICIAN ASSISTANT

## 2022-05-30 PROCEDURE — 92015 DETERMINE REFRACTIVE STATE: CPT | Mod: S$GLB,,, | Performed by: OPTOMETRIST

## 2022-05-30 PROCEDURE — 3078F DIAST BP <80 MM HG: CPT | Mod: CPTII,S$GLB,, | Performed by: PHYSICIAN ASSISTANT

## 2022-05-30 PROCEDURE — 99214 PR OFFICE/OUTPT VISIT, EST, LEVL IV, 30-39 MIN: ICD-10-PCS | Mod: S$GLB,,, | Performed by: PHYSICIAN ASSISTANT

## 2022-05-30 PROCEDURE — 3051F HG A1C>EQUAL 7.0%<8.0%: CPT | Mod: CPTII,S$GLB,, | Performed by: OPTOMETRIST

## 2022-05-30 PROCEDURE — 99999 PR PBB SHADOW E&M-EST. PATIENT-LVL IV: ICD-10-PCS | Mod: PBBFAC,,, | Performed by: PHYSICIAN ASSISTANT

## 2022-05-30 PROCEDURE — 80053 COMPREHEN METABOLIC PANEL: CPT | Performed by: PHYSICIAN ASSISTANT

## 2022-05-30 PROCEDURE — 1159F PR MEDICATION LIST DOCUMENTED IN MEDICAL RECORD: ICD-10-PCS | Mod: CPTII,S$GLB,, | Performed by: PHYSICIAN ASSISTANT

## 2022-05-30 PROCEDURE — 1160F PR REVIEW ALL MEDS BY PRESCRIBER/CLIN PHARMACIST DOCUMENTED: ICD-10-PCS | Mod: CPTII,S$GLB,, | Performed by: PHYSICIAN ASSISTANT

## 2022-05-30 PROCEDURE — 85025 COMPLETE CBC W/AUTO DIFF WBC: CPT | Performed by: PHYSICIAN ASSISTANT

## 2022-05-30 PROCEDURE — 92014 PR EYE EXAM, EST PATIENT,COMPREHESV: ICD-10-PCS | Mod: S$GLB,,, | Performed by: OPTOMETRIST

## 2022-05-30 PROCEDURE — 1159F MED LIST DOCD IN RCRD: CPT | Mod: CPTII,S$GLB,, | Performed by: PHYSICIAN ASSISTANT

## 2022-05-30 PROCEDURE — 1126F AMNT PAIN NOTED NONE PRSNT: CPT | Mod: CPTII,S$GLB,, | Performed by: PHYSICIAN ASSISTANT

## 2022-05-30 PROCEDURE — 3008F BODY MASS INDEX DOCD: CPT | Mod: CPTII,S$GLB,, | Performed by: PHYSICIAN ASSISTANT

## 2022-05-30 PROCEDURE — 99214 OFFICE O/P EST MOD 30 MIN: CPT | Mod: S$GLB,,, | Performed by: PHYSICIAN ASSISTANT

## 2022-05-30 PROCEDURE — 1101F PR PT FALLS ASSESS DOC 0-1 FALLS W/OUT INJ PAST YR: ICD-10-PCS | Mod: CPTII,S$GLB,, | Performed by: PHYSICIAN ASSISTANT

## 2022-05-30 PROCEDURE — 3078F PR MOST RECENT DIASTOLIC BLOOD PRESSURE < 80 MM HG: ICD-10-PCS | Mod: CPTII,S$GLB,, | Performed by: PHYSICIAN ASSISTANT

## 2022-05-30 PROCEDURE — 3074F PR MOST RECENT SYSTOLIC BLOOD PRESSURE < 130 MM HG: ICD-10-PCS | Mod: CPTII,S$GLB,, | Performed by: PHYSICIAN ASSISTANT

## 2022-05-30 PROCEDURE — 1126F PR PAIN SEVERITY QUANTIFIED, NO PAIN PRESENT: ICD-10-PCS | Mod: CPTII,S$GLB,, | Performed by: PHYSICIAN ASSISTANT

## 2022-05-30 PROCEDURE — 99999 PR PBB SHADOW E&M-EST. PATIENT-LVL I: CPT | Mod: PBBFAC,,, | Performed by: OPTOMETRIST

## 2022-05-30 PROCEDURE — 3008F PR BODY MASS INDEX (BMI) DOCUMENTED: ICD-10-PCS | Mod: CPTII,S$GLB,, | Performed by: PHYSICIAN ASSISTANT

## 2022-05-30 RX ORDER — NEOMYCIN SULFATE, POLYMYXIN B SULFATE, AND DEXAMETHASONE 3.5; 10000; 1 MG/G; [USP'U]/G; MG/G
OINTMENT OPHTHALMIC NIGHTLY
Qty: 1 EACH | Refills: 1 | Status: SHIPPED | OUTPATIENT
Start: 2022-05-30 | End: 2022-06-09

## 2022-05-30 RX ORDER — ROSUVASTATIN CALCIUM 40 MG/1
40 TABLET, COATED ORAL NIGHTLY
Qty: 90 TABLET | Refills: 1 | Status: SHIPPED | OUTPATIENT
Start: 2022-05-30 | End: 2023-08-29

## 2022-05-30 RX ORDER — OMEPRAZOLE 40 MG/1
40 CAPSULE, DELAYED RELEASE ORAL DAILY
Qty: 90 CAPSULE | Refills: 0 | Status: SHIPPED | OUTPATIENT
Start: 2022-05-30 | End: 2022-06-28

## 2022-05-30 RX ORDER — OLMESARTAN MEDOXOMIL 20 MG/1
20 TABLET ORAL DAILY
Qty: 90 TABLET | Refills: 1 | Status: SHIPPED | OUTPATIENT
Start: 2022-05-30 | End: 2022-12-28

## 2022-05-30 NOTE — PROGRESS NOTES
HPI     NIDDM exam  No visual complaints.  New patient last eye exam 06/14/2021 DKT.  Last eye visit 06/25/2021 DNL.  Update  glasses RX.  Lab Results       Component                Value               Date                       HGBA1C                   7.9 (H)             05/23/2022                Last edited by Camille Charles MA on 5/30/2022  3:24 PM. (History)            Assessment /Plan     For exam results, see Encounter Report.    Diabetes mellitus type 2 without retinopathy    Diabetic cataract    Dry eye syndrome of both eyes    At low risk for open-angle glaucoma in both eyes    Squamous blepharitis of upper and lower eyelids of both eyes  -     neomycin-polymyxin-dexamethasone (DEXACINE) 3.5 mg/g-10,000 unit/g-0.1 % Oint; Place into both eyes every evening. Apply to lid margins for 10 days  Dispense: 1 each; Refill: 1    Refractive errors      No Background Diabetic Retinopathy    Moderate cataracts OU, not surgical    AT prn, will treat blepharitis and should improve symptoms of DE    Maxitrol prabhakar hs OU    Dispense Final Rx for glasses.  RTC 1 year repeat glaucoma workup  Discussed above and answered questions.

## 2022-05-30 NOTE — PROGRESS NOTES
"Subjective:      Patient ID: Dary Chaney is a 66 y.o. female.    Chief Complaint: Follow-up    Patient is known to me, being seen today for follow up.  Denies current concerns/complaints      HTN- olmesartan 20mg, controlled   HLD- on statin therapy   DM- A1c 7.9%, glimepiride 8mg, trulicity 4.5mg, tresiba 68 units nightly, followed by Diabetes Mgmt     Last visit Oct 2021 with myself.     Review of Systems   Constitutional: Negative for chills, diaphoresis and fever.   HENT: Negative for congestion, rhinorrhea and sore throat.    Respiratory: Negative for cough, shortness of breath and wheezing.    Cardiovascular: Negative for chest pain and leg swelling.   Gastrointestinal: Negative for abdominal pain, constipation, diarrhea, nausea and vomiting.   Skin: Negative for rash.   Neurological: Negative for dizziness, light-headedness and headaches.       Objective:   /78   Pulse 70   Temp 97.6 °F (36.4 °C)   Resp 18   Ht 5' 2" (1.575 m)   Wt 74.9 kg (165 lb 2 oz)   SpO2 98%   BMI 30.20 kg/m²   Physical Exam  Constitutional:       General: She is not in acute distress.     Appearance: Normal appearance. She is well-developed. She is not ill-appearing.   HENT:      Head: Normocephalic and atraumatic.   Cardiovascular:      Rate and Rhythm: Normal rate and regular rhythm.      Pulses:           Dorsalis pedis pulses are 2+ on the right side and 2+ on the left side.      Heart sounds: Normal heart sounds. No murmur heard.  Pulmonary:      Effort: Pulmonary effort is normal. No respiratory distress.      Breath sounds: Normal breath sounds. No decreased breath sounds.   Musculoskeletal:      Right lower leg: No edema.      Left lower leg: No edema.   Feet:      Right foot:      Protective Sensation: 10 sites tested. 10 sites sensed.      Skin integrity: Skin integrity normal.      Toenail Condition: Right toenails are normal.      Left foot:      Protective Sensation: 10 sites tested. 10 sites sensed.      " Skin integrity: Skin integrity normal.      Toenail Condition: Left toenails are normal.   Skin:     General: Skin is warm and dry.      Findings: No rash.   Psychiatric:         Speech: Speech normal.         Behavior: Behavior normal.         Thought Content: Thought content normal.       Assessment:      1. Hyperlipidemia LDL goal <70    2. Hypertension goal BP (blood pressure) < 130/80    3. Type 2 diabetes mellitus with hemoglobin A1c goal of less than 7.0%    4. Chronic GERD       Plan:   Hyperlipidemia LDL goal <70  -     Lipid Panel; Future; Expected date: 05/30/2022  -     rosuvastatin (CRESTOR) 40 MG Tab; Take 1 tablet (40 mg total) by mouth every evening.  Dispense: 90 tablet; Refill: 1    Hypertension goal BP (blood pressure) < 130/80  -     CBC Auto Differential; Future; Expected date: 05/30/2022  -     Comprehensive Metabolic Panel; Future; Expected date: 05/30/2022  -     olmesartan (BENICAR) 20 MG tablet; Take 1 tablet (20 mg total) by mouth once daily.  Dispense: 90 tablet; Refill: 1    Type 2 diabetes mellitus with hemoglobin A1c goal of less than 7.0%  -     Comprehensive Metabolic Panel; Future; Expected date: 05/30/2022  -     rosuvastatin (CRESTOR) 40 MG Tab; Take 1 tablet (40 mg total) by mouth every evening.  Dispense: 90 tablet; Refill: 1    Chronic GERD  -     omeprazole (PRILOSEC) 40 MG capsule; Take 1 capsule (40 mg total) by mouth once daily.  Dispense: 90 capsule; Refill: 0      Eye appt to be scheduled    Fasting labs     3mth f/u PCP

## 2022-06-15 ENCOUNTER — TELEPHONE (OUTPATIENT)
Dept: ADMINISTRATIVE | Facility: HOSPITAL | Age: 67
End: 2022-06-15
Payer: MEDICARE

## 2022-06-17 ENCOUNTER — TELEPHONE (OUTPATIENT)
Dept: ADMINISTRATIVE | Facility: CLINIC | Age: 67
End: 2022-06-17
Payer: MEDICARE

## 2022-06-20 ENCOUNTER — OFFICE VISIT (OUTPATIENT)
Dept: INTERNAL MEDICINE | Facility: CLINIC | Age: 67
End: 2022-06-20
Payer: MEDICARE

## 2022-06-20 VITALS
BODY MASS INDEX: 29.54 KG/M2 | OXYGEN SATURATION: 99 % | DIASTOLIC BLOOD PRESSURE: 82 MMHG | HEART RATE: 65 BPM | HEIGHT: 63 IN | WEIGHT: 166.69 LBS | SYSTOLIC BLOOD PRESSURE: 136 MMHG

## 2022-06-20 DIAGNOSIS — E78.5 HYPERLIPIDEMIA ASSOCIATED WITH TYPE 2 DIABETES MELLITUS: ICD-10-CM

## 2022-06-20 DIAGNOSIS — J84.10 CALCIFIED GRANULOMA OF LUNG: ICD-10-CM

## 2022-06-20 DIAGNOSIS — Z85.528 HISTORY OF KIDNEY CANCER: ICD-10-CM

## 2022-06-20 DIAGNOSIS — Z00.00 ENCOUNTER FOR PREVENTIVE HEALTH EXAMINATION: Primary | ICD-10-CM

## 2022-06-20 DIAGNOSIS — E11.69 HYPERLIPIDEMIA ASSOCIATED WITH TYPE 2 DIABETES MELLITUS: ICD-10-CM

## 2022-06-20 DIAGNOSIS — I70.0 ATHEROSCLEROSIS OF AORTA: ICD-10-CM

## 2022-06-20 DIAGNOSIS — I10 HYPERTENSION, UNSPECIFIED TYPE: ICD-10-CM

## 2022-06-20 PROCEDURE — 3072F PR LOW RISK FOR RETINOPATHY: ICD-10-PCS | Mod: CPTII,S$GLB,, | Performed by: NURSE PRACTITIONER

## 2022-06-20 PROCEDURE — 1125F PR PAIN SEVERITY QUANTIFIED, PAIN PRESENT: ICD-10-PCS | Mod: CPTII,S$GLB,, | Performed by: NURSE PRACTITIONER

## 2022-06-20 PROCEDURE — 3008F BODY MASS INDEX DOCD: CPT | Mod: CPTII,S$GLB,, | Performed by: NURSE PRACTITIONER

## 2022-06-20 PROCEDURE — 1160F PR REVIEW ALL MEDS BY PRESCRIBER/CLIN PHARMACIST DOCUMENTED: ICD-10-PCS | Mod: CPTII,S$GLB,, | Performed by: NURSE PRACTITIONER

## 2022-06-20 PROCEDURE — 1159F MED LIST DOCD IN RCRD: CPT | Mod: CPTII,S$GLB,, | Performed by: NURSE PRACTITIONER

## 2022-06-20 PROCEDURE — 99999 PR PBB SHADOW E&M-EST. PATIENT-LVL V: CPT | Mod: PBBFAC,,, | Performed by: NURSE PRACTITIONER

## 2022-06-20 PROCEDURE — G0439 PR MEDICARE ANNUAL WELLNESS SUBSEQUENT VISIT: ICD-10-PCS | Mod: S$GLB,,, | Performed by: NURSE PRACTITIONER

## 2022-06-20 PROCEDURE — 3052F HG A1C>EQUAL 8.0%<EQUAL 9.0%: CPT | Mod: CPTII,S$GLB,, | Performed by: NURSE PRACTITIONER

## 2022-06-20 PROCEDURE — 4010F PR ACE/ARB THEARPY RXD/TAKEN: ICD-10-PCS | Mod: CPTII,S$GLB,, | Performed by: NURSE PRACTITIONER

## 2022-06-20 PROCEDURE — 3008F PR BODY MASS INDEX (BMI) DOCUMENTED: ICD-10-PCS | Mod: CPTII,S$GLB,, | Performed by: NURSE PRACTITIONER

## 2022-06-20 PROCEDURE — 1170F FXNL STATUS ASSESSED: CPT | Mod: CPTII,S$GLB,, | Performed by: NURSE PRACTITIONER

## 2022-06-20 PROCEDURE — 1160F RVW MEDS BY RX/DR IN RCRD: CPT | Mod: CPTII,S$GLB,, | Performed by: NURSE PRACTITIONER

## 2022-06-20 PROCEDURE — 1125F AMNT PAIN NOTED PAIN PRSNT: CPT | Mod: CPTII,S$GLB,, | Performed by: NURSE PRACTITIONER

## 2022-06-20 PROCEDURE — 1157F PR ADVANCE CARE PLAN OR EQUIV PRESENT IN MEDICAL RECORD: ICD-10-PCS | Mod: CPTII,S$GLB,, | Performed by: NURSE PRACTITIONER

## 2022-06-20 PROCEDURE — 1101F PR PT FALLS ASSESS DOC 0-1 FALLS W/OUT INJ PAST YR: ICD-10-PCS | Mod: CPTII,S$GLB,, | Performed by: NURSE PRACTITIONER

## 2022-06-20 PROCEDURE — 3072F LOW RISK FOR RETINOPATHY: CPT | Mod: CPTII,S$GLB,, | Performed by: NURSE PRACTITIONER

## 2022-06-20 PROCEDURE — 1170F PR FUNCTIONAL STATUS ASSESSED: ICD-10-PCS | Mod: CPTII,S$GLB,, | Performed by: NURSE PRACTITIONER

## 2022-06-20 PROCEDURE — 99999 PR PBB SHADOW E&M-EST. PATIENT-LVL V: ICD-10-PCS | Mod: PBBFAC,,, | Performed by: NURSE PRACTITIONER

## 2022-06-20 PROCEDURE — 1101F PT FALLS ASSESS-DOCD LE1/YR: CPT | Mod: CPTII,S$GLB,, | Performed by: NURSE PRACTITIONER

## 2022-06-20 PROCEDURE — 4010F ACE/ARB THERAPY RXD/TAKEN: CPT | Mod: CPTII,S$GLB,, | Performed by: NURSE PRACTITIONER

## 2022-06-20 PROCEDURE — 1157F ADVNC CARE PLAN IN RCRD: CPT | Mod: CPTII,S$GLB,, | Performed by: NURSE PRACTITIONER

## 2022-06-20 PROCEDURE — 3075F SYST BP GE 130 - 139MM HG: CPT | Mod: CPTII,S$GLB,, | Performed by: NURSE PRACTITIONER

## 2022-06-20 PROCEDURE — 3288F FALL RISK ASSESSMENT DOCD: CPT | Mod: CPTII,S$GLB,, | Performed by: NURSE PRACTITIONER

## 2022-06-20 PROCEDURE — 3052F PR MOST RECENT HEMOGLOBIN A1C LEVEL 8.0 - < 9.0%: ICD-10-PCS | Mod: CPTII,S$GLB,, | Performed by: NURSE PRACTITIONER

## 2022-06-20 PROCEDURE — 3075F PR MOST RECENT SYSTOLIC BLOOD PRESS GE 130-139MM HG: ICD-10-PCS | Mod: CPTII,S$GLB,, | Performed by: NURSE PRACTITIONER

## 2022-06-20 PROCEDURE — 3079F DIAST BP 80-89 MM HG: CPT | Mod: CPTII,S$GLB,, | Performed by: NURSE PRACTITIONER

## 2022-06-20 PROCEDURE — 1159F PR MEDICATION LIST DOCUMENTED IN MEDICAL RECORD: ICD-10-PCS | Mod: CPTII,S$GLB,, | Performed by: NURSE PRACTITIONER

## 2022-06-20 PROCEDURE — G0439 PPPS, SUBSEQ VISIT: HCPCS | Mod: S$GLB,,, | Performed by: NURSE PRACTITIONER

## 2022-06-20 PROCEDURE — 3079F PR MOST RECENT DIASTOLIC BLOOD PRESSURE 80-89 MM HG: ICD-10-PCS | Mod: CPTII,S$GLB,, | Performed by: NURSE PRACTITIONER

## 2022-06-20 PROCEDURE — 3288F PR FALLS RISK ASSESSMENT DOCUMENTED: ICD-10-PCS | Mod: CPTII,S$GLB,, | Performed by: NURSE PRACTITIONER

## 2022-06-20 NOTE — PATIENT INSTRUCTIONS
Counseling and Referral of Other Preventative  (Italic type indicates deductible and co-insurance are waived)    Patient Name: Dary Chaney  Today's Date: 6/20/2022    Health Maintenance       Date Due Completion Date    Shingles Vaccine (1 of 2) Never done ---    Pneumococcal Vaccines (Age 65+) (4 - PPSV23 or PCV20) 08/10/2021 12/17/2019    TETANUS VACCINE 08/26/2021 8/26/2011    COVID-19 Vaccine (4 - Booster for Moderna series) 03/12/2022 11/12/2021    Hemoglobin A1c 08/30/2022 5/30/2022    Diabetes Urine Screening 10/18/2022 10/18/2021    DEXA Scan 12/18/2022 12/18/2019    Mammogram 12/30/2022 12/30/2021    Override on 11/13/2012: (N/S)    Colorectal Cancer Screening 03/01/2023 3/1/2018    Foot Exam 05/30/2023 5/30/2022    Override on 5/30/2022: Done    Override on 9/17/2015: Done    Override on 5/14/2015: Done    Override on 1/14/2015: Done    Override on 9/15/2014: Done    Lipid Panel 05/30/2023 5/30/2022    Eye Exam 05/30/2023 5/30/2022    High Dose Statin 06/20/2023 6/20/2022        No orders of the defined types were placed in this encounter.    The following information is provided to all patients.  This information is to help you find resources for any of the problems found today that may be affecting your health:                Living healthy guide: www.[facility].[place].gov      Understanding Diabetes: www.diabetes.org      Eating healthy: www.cdc.gov/healthyweight      CDC home safety checklist: www.cdc.gov/steadi/patient.html      Agency on Aging: www.goea.[place].gov      Alcoholics anonymous (AA): www.aa.org      Physical Activity: www.yen.nih.gov/sb6myqb      Tobacco use: www.quitwithusla.org

## 2022-06-20 NOTE — PROGRESS NOTES
"  Dary Chaney presented for a  Medicare AWV and comprehensive Health Risk Assessment today. The following components were reviewed and updated:    · Medical history  · Family History  · Social history  · Allergies and Current Medications  · Health Risk Assessment  · Health Maintenance  · Care Team         ** See Completed Assessments for Annual Wellness Visit within the encounter summary.**         The following assessments were completed:  · Living Situation  · CAGE  · Depression Screening  · Timed Get Up and Go  · Whisper Test-na  · Cognitive Function Screening  · Nutrition Screening  · ADL Screening  · PAQ Screening        Vitals:    06/20/22 0840   BP: 136/82   BP Location: Left arm   Patient Position: Sitting   Pulse: 65   SpO2: 99%   Weight: 75.6 kg (166 lb 10.7 oz)   Height: 5' 2.6" (1.59 m)     Body mass index is 29.9 kg/m².  Physical Exam  Vitals and nursing note reviewed.   Constitutional:       Appearance: She is well-developed.   HENT:      Head: Normocephalic.   Cardiovascular:      Rate and Rhythm: Normal rate and regular rhythm.      Heart sounds: Normal heart sounds.   Pulmonary:      Effort: Pulmonary effort is normal. No respiratory distress.      Breath sounds: Normal breath sounds.   Abdominal:      Palpations: Abdomen is soft. There is no mass.      Tenderness: There is no abdominal tenderness.   Musculoskeletal:         General: Normal range of motion.   Skin:     General: Skin is warm and dry.   Neurological:      Mental Status: She is alert and oriented to person, place, and time.      Motor: No abnormal muscle tone.   Psychiatric:         Speech: Speech normal.         Behavior: Behavior normal.               Diagnoses and health risks identified today and associated recommendations/orders:    1. Encounter for preventive health examination  Discussed locations to receive COVID booster  Discussed receiving Shingrix, tetanus vaccine, and pneumonia vaccine at pharmacy.      Declines apt today " for foot pain will follow up with PCP at upcoming apt.       Reports stress  PHQ 2-1  Discussed counseling. Psychiatry department contact information given to patient, along with information on providers in department.    Advised to follow up with PCP for further evaluation and recommendations. Patient expressed understanding.      2. Atherosclerosis of aorta  Ct 10/21  Discussed diagnosis and risk reduction.   Advised to follow up with PCP for further recommendations. Patient expressed understanding.       3. Calcified granuloma of lung  cxr 11/21  Advised to follow up with PCP for further evaluation and recommendations. Patient expressed understanding.      4. Hyperlipidemia associated with type 2 diabetes mellitus  a1c 8.0  Lipid-s/c  Continue current treatment plan as previously prescribed with your  pcp and DM management.     5. Hypertension, unspecified type  Continue current treatment plan as previously prescribed with your  pcp     6.History of kidney cancer  Continue current treatment plan as previously prescribed with your  Urologist.       Provided Dary with a 5-10 year written screening schedule and personal prevention plan. Recommendations were developed using the USPSTF age appropriate recommendations. Education, counseling, and referrals were provided as needed. After Visit Summary printed and given to patient which includes a list of additional screenings\tests needed.    Follow up in about 1 year (around 6/20/2023) for awv.    Harriett Garcia NP  I offered to discuss advanced care planning, including how to pick a person who would make decisions for you if you were unable to make them for yourself, called a health care power of , and what kind of decisions you might make such as use of life sustaining treatments such as ventilators and tube feeding when faced with a life limiting illness recorded on a living will that they will need to know. (How you want to be cared for as you near the  end of your natural life)     X  Patient has advanced directives on file, which we reviewed, and they do not wish to make changes.

## 2022-07-01 ENCOUNTER — OFFICE VISIT (OUTPATIENT)
Dept: INTERNAL MEDICINE | Facility: CLINIC | Age: 67
End: 2022-07-01
Payer: MEDICARE

## 2022-07-01 VITALS
WEIGHT: 165.13 LBS | TEMPERATURE: 98 F | OXYGEN SATURATION: 97 % | DIASTOLIC BLOOD PRESSURE: 76 MMHG | HEIGHT: 63 IN | HEART RATE: 87 BPM | SYSTOLIC BLOOD PRESSURE: 130 MMHG | BODY MASS INDEX: 29.26 KG/M2

## 2022-07-01 DIAGNOSIS — I10 HYPERTENSION GOAL BP (BLOOD PRESSURE) < 130/80: Primary | Chronic | ICD-10-CM

## 2022-07-01 DIAGNOSIS — E78.5 HYPERLIPIDEMIA LDL GOAL <70: Chronic | ICD-10-CM

## 2022-07-01 DIAGNOSIS — E11.9 TYPE 2 DIABETES MELLITUS WITH HEMOGLOBIN A1C GOAL OF LESS THAN 7.0%: ICD-10-CM

## 2022-07-01 PROCEDURE — 3075F SYST BP GE 130 - 139MM HG: CPT | Mod: CPTII,S$GLB,, | Performed by: PHYSICIAN ASSISTANT

## 2022-07-01 PROCEDURE — 3288F FALL RISK ASSESSMENT DOCD: CPT | Mod: CPTII,S$GLB,, | Performed by: PHYSICIAN ASSISTANT

## 2022-07-01 PROCEDURE — 3075F PR MOST RECENT SYSTOLIC BLOOD PRESS GE 130-139MM HG: ICD-10-PCS | Mod: CPTII,S$GLB,, | Performed by: PHYSICIAN ASSISTANT

## 2022-07-01 PROCEDURE — 1101F PR PT FALLS ASSESS DOC 0-1 FALLS W/OUT INJ PAST YR: ICD-10-PCS | Mod: CPTII,S$GLB,, | Performed by: PHYSICIAN ASSISTANT

## 2022-07-01 PROCEDURE — 1125F AMNT PAIN NOTED PAIN PRSNT: CPT | Mod: CPTII,S$GLB,, | Performed by: PHYSICIAN ASSISTANT

## 2022-07-01 PROCEDURE — 1159F MED LIST DOCD IN RCRD: CPT | Mod: CPTII,S$GLB,, | Performed by: PHYSICIAN ASSISTANT

## 2022-07-01 PROCEDURE — 3008F PR BODY MASS INDEX (BMI) DOCUMENTED: ICD-10-PCS | Mod: CPTII,S$GLB,, | Performed by: PHYSICIAN ASSISTANT

## 2022-07-01 PROCEDURE — 4010F ACE/ARB THERAPY RXD/TAKEN: CPT | Mod: CPTII,S$GLB,, | Performed by: PHYSICIAN ASSISTANT

## 2022-07-01 PROCEDURE — 1160F PR REVIEW ALL MEDS BY PRESCRIBER/CLIN PHARMACIST DOCUMENTED: ICD-10-PCS | Mod: CPTII,S$GLB,, | Performed by: PHYSICIAN ASSISTANT

## 2022-07-01 PROCEDURE — 1125F PR PAIN SEVERITY QUANTIFIED, PAIN PRESENT: ICD-10-PCS | Mod: CPTII,S$GLB,, | Performed by: PHYSICIAN ASSISTANT

## 2022-07-01 PROCEDURE — 3288F PR FALLS RISK ASSESSMENT DOCUMENTED: ICD-10-PCS | Mod: CPTII,S$GLB,, | Performed by: PHYSICIAN ASSISTANT

## 2022-07-01 PROCEDURE — 3078F DIAST BP <80 MM HG: CPT | Mod: CPTII,S$GLB,, | Performed by: PHYSICIAN ASSISTANT

## 2022-07-01 PROCEDURE — 3052F PR MOST RECENT HEMOGLOBIN A1C LEVEL 8.0 - < 9.0%: ICD-10-PCS | Mod: CPTII,S$GLB,, | Performed by: PHYSICIAN ASSISTANT

## 2022-07-01 PROCEDURE — 99213 OFFICE O/P EST LOW 20 MIN: CPT | Mod: S$GLB,,, | Performed by: PHYSICIAN ASSISTANT

## 2022-07-01 PROCEDURE — 4010F PR ACE/ARB THEARPY RXD/TAKEN: ICD-10-PCS | Mod: CPTII,S$GLB,, | Performed by: PHYSICIAN ASSISTANT

## 2022-07-01 PROCEDURE — 3008F BODY MASS INDEX DOCD: CPT | Mod: CPTII,S$GLB,, | Performed by: PHYSICIAN ASSISTANT

## 2022-07-01 PROCEDURE — 1160F RVW MEDS BY RX/DR IN RCRD: CPT | Mod: CPTII,S$GLB,, | Performed by: PHYSICIAN ASSISTANT

## 2022-07-01 PROCEDURE — 3072F PR LOW RISK FOR RETINOPATHY: ICD-10-PCS | Mod: CPTII,S$GLB,, | Performed by: PHYSICIAN ASSISTANT

## 2022-07-01 PROCEDURE — 1157F ADVNC CARE PLAN IN RCRD: CPT | Mod: CPTII,S$GLB,, | Performed by: PHYSICIAN ASSISTANT

## 2022-07-01 PROCEDURE — 1101F PT FALLS ASSESS-DOCD LE1/YR: CPT | Mod: CPTII,S$GLB,, | Performed by: PHYSICIAN ASSISTANT

## 2022-07-01 PROCEDURE — 3072F LOW RISK FOR RETINOPATHY: CPT | Mod: CPTII,S$GLB,, | Performed by: PHYSICIAN ASSISTANT

## 2022-07-01 PROCEDURE — 99999 PR PBB SHADOW E&M-EST. PATIENT-LVL V: CPT | Mod: PBBFAC,,, | Performed by: PHYSICIAN ASSISTANT

## 2022-07-01 PROCEDURE — 1157F PR ADVANCE CARE PLAN OR EQUIV PRESENT IN MEDICAL RECORD: ICD-10-PCS | Mod: CPTII,S$GLB,, | Performed by: PHYSICIAN ASSISTANT

## 2022-07-01 PROCEDURE — 3052F HG A1C>EQUAL 8.0%<EQUAL 9.0%: CPT | Mod: CPTII,S$GLB,, | Performed by: PHYSICIAN ASSISTANT

## 2022-07-01 PROCEDURE — 99213 PR OFFICE/OUTPT VISIT, EST, LEVL III, 20-29 MIN: ICD-10-PCS | Mod: S$GLB,,, | Performed by: PHYSICIAN ASSISTANT

## 2022-07-01 PROCEDURE — 3078F PR MOST RECENT DIASTOLIC BLOOD PRESSURE < 80 MM HG: ICD-10-PCS | Mod: CPTII,S$GLB,, | Performed by: PHYSICIAN ASSISTANT

## 2022-07-01 PROCEDURE — 1159F PR MEDICATION LIST DOCUMENTED IN MEDICAL RECORD: ICD-10-PCS | Mod: CPTII,S$GLB,, | Performed by: PHYSICIAN ASSISTANT

## 2022-07-01 PROCEDURE — 99999 PR PBB SHADOW E&M-EST. PATIENT-LVL V: ICD-10-PCS | Mod: PBBFAC,,, | Performed by: PHYSICIAN ASSISTANT

## 2022-07-01 NOTE — PROGRESS NOTES
"Subjective:      Patient ID: Dary Chaney is a 66 y.o. female.    Chief Complaint: Follow-up    Patient is new to me, being seen today for 3mth f/u.     HTN- olmesartan 20mg, controlled   HLD- on statin therapy   DM- A1c 8.0%, glimepiride 8mg, trulicity 4.5mg, tresiba 68 units nightly, followed by Diabetes Mgmt   Blood sugar in AM 120s     Labs completed May 2022, no significant change     Received PNA and Shingles vaccine Wednesday and it made her feel bad, arms sore and lightheaded, low grade fever   Better today but arms still sore     Last visit May 2022.    Review of Systems   Constitutional: Positive for fatigue. Negative for chills, diaphoresis and fever.   HENT: Negative for congestion, rhinorrhea and sore throat.    Respiratory: Negative for cough, shortness of breath and wheezing.    Cardiovascular: Negative for chest pain, palpitations and leg swelling.   Gastrointestinal: Negative for abdominal pain, constipation, diarrhea, nausea and vomiting.   Skin: Negative for rash.   Neurological: Positive for light-headedness. Negative for dizziness and headaches.       Objective:   /76   Pulse 87   Temp 97.6 °F (36.4 °C)   Ht 5' 2.6" (1.59 m)   Wt 74.9 kg (165 lb 2 oz)   SpO2 97%   BMI 29.63 kg/m²   Physical Exam  Constitutional:       General: She is not in acute distress.     Appearance: Normal appearance. She is well-developed. She is not ill-appearing.   HENT:      Head: Normocephalic and atraumatic.   Cardiovascular:      Rate and Rhythm: Normal rate and regular rhythm.      Heart sounds: Normal heart sounds. No murmur heard.  Pulmonary:      Effort: Pulmonary effort is normal. No respiratory distress.      Breath sounds: Normal breath sounds. No decreased breath sounds.   Musculoskeletal:      Right lower leg: No edema.      Left lower leg: No edema.   Skin:     General: Skin is warm and dry.      Findings: No rash.   Psychiatric:         Speech: Speech normal.         Behavior: Behavior " normal.         Thought Content: Thought content normal.       Assessment:      1. Hypertension goal BP (blood pressure) < 130/80    2. Hyperlipidemia LDL goal <70    3. Type 2 diabetes mellitus with hemoglobin A1c goal of less than 7.0%       Plan:   Hypertension goal BP (blood pressure) < 130/80   Controlled     Hyperlipidemia LDL goal <70   Improved, continue statin    Type 2 diabetes mellitus with hemoglobin A1c goal of less than 7.0%   Keep appt with Diabetes, monitor sugar     Plans to received 2nd COVID booster in a few weeks  Discussed need for tetanus     3mth f/u w PCP or sooner if needed

## 2022-08-05 ENCOUNTER — OFFICE VISIT (OUTPATIENT)
Dept: DIABETES | Facility: CLINIC | Age: 67
End: 2022-08-05
Payer: MEDICARE

## 2022-08-05 VITALS
WEIGHT: 167.56 LBS | HEART RATE: 73 BPM | SYSTOLIC BLOOD PRESSURE: 141 MMHG | BODY MASS INDEX: 30.06 KG/M2 | DIASTOLIC BLOOD PRESSURE: 76 MMHG

## 2022-08-05 DIAGNOSIS — E11.9 TYPE 2 DIABETES MELLITUS WITH HEMOGLOBIN A1C GOAL OF LESS THAN 7.0%: Primary | ICD-10-CM

## 2022-08-05 DIAGNOSIS — E66.9 OBESITY (BMI 30-39.9): ICD-10-CM

## 2022-08-05 DIAGNOSIS — E78.5 HYPERLIPIDEMIA LDL GOAL <70: ICD-10-CM

## 2022-08-05 DIAGNOSIS — I10 HYPERTENSION GOAL BP (BLOOD PRESSURE) < 130/80: ICD-10-CM

## 2022-08-05 DIAGNOSIS — K76.0 FATTY LIVER: ICD-10-CM

## 2022-08-05 LAB — GLUCOSE SERPL-MCNC: 125 MG/DL (ref 70–110)

## 2022-08-05 PROCEDURE — 99999 PR PBB SHADOW E&M-EST. PATIENT-LVL IV: CPT | Mod: PBBFAC,,, | Performed by: PHYSICIAN ASSISTANT

## 2022-08-05 PROCEDURE — 3072F LOW RISK FOR RETINOPATHY: CPT | Mod: CPTII,S$GLB,, | Performed by: PHYSICIAN ASSISTANT

## 2022-08-05 PROCEDURE — 1159F MED LIST DOCD IN RCRD: CPT | Mod: CPTII,S$GLB,, | Performed by: PHYSICIAN ASSISTANT

## 2022-08-05 PROCEDURE — 99999 PR PBB SHADOW E&M-EST. PATIENT-LVL IV: ICD-10-PCS | Mod: PBBFAC,,, | Performed by: PHYSICIAN ASSISTANT

## 2022-08-05 PROCEDURE — 1126F AMNT PAIN NOTED NONE PRSNT: CPT | Mod: CPTII,S$GLB,, | Performed by: PHYSICIAN ASSISTANT

## 2022-08-05 PROCEDURE — 1157F PR ADVANCE CARE PLAN OR EQUIV PRESENT IN MEDICAL RECORD: ICD-10-PCS | Mod: CPTII,S$GLB,, | Performed by: PHYSICIAN ASSISTANT

## 2022-08-05 PROCEDURE — 3288F FALL RISK ASSESSMENT DOCD: CPT | Mod: CPTII,S$GLB,, | Performed by: PHYSICIAN ASSISTANT

## 2022-08-05 PROCEDURE — 1157F ADVNC CARE PLAN IN RCRD: CPT | Mod: CPTII,S$GLB,, | Performed by: PHYSICIAN ASSISTANT

## 2022-08-05 PROCEDURE — 3288F PR FALLS RISK ASSESSMENT DOCUMENTED: ICD-10-PCS | Mod: CPTII,S$GLB,, | Performed by: PHYSICIAN ASSISTANT

## 2022-08-05 PROCEDURE — 3008F PR BODY MASS INDEX (BMI) DOCUMENTED: ICD-10-PCS | Mod: CPTII,S$GLB,, | Performed by: PHYSICIAN ASSISTANT

## 2022-08-05 PROCEDURE — 3077F PR MOST RECENT SYSTOLIC BLOOD PRESSURE >= 140 MM HG: ICD-10-PCS | Mod: CPTII,S$GLB,, | Performed by: PHYSICIAN ASSISTANT

## 2022-08-05 PROCEDURE — 3052F HG A1C>EQUAL 8.0%<EQUAL 9.0%: CPT | Mod: CPTII,S$GLB,, | Performed by: PHYSICIAN ASSISTANT

## 2022-08-05 PROCEDURE — 3077F SYST BP >= 140 MM HG: CPT | Mod: CPTII,S$GLB,, | Performed by: PHYSICIAN ASSISTANT

## 2022-08-05 PROCEDURE — 1101F PT FALLS ASSESS-DOCD LE1/YR: CPT | Mod: CPTII,S$GLB,, | Performed by: PHYSICIAN ASSISTANT

## 2022-08-05 PROCEDURE — 1159F PR MEDICATION LIST DOCUMENTED IN MEDICAL RECORD: ICD-10-PCS | Mod: CPTII,S$GLB,, | Performed by: PHYSICIAN ASSISTANT

## 2022-08-05 PROCEDURE — 99214 OFFICE O/P EST MOD 30 MIN: CPT | Mod: S$GLB,,, | Performed by: PHYSICIAN ASSISTANT

## 2022-08-05 PROCEDURE — 1126F PR PAIN SEVERITY QUANTIFIED, NO PAIN PRESENT: ICD-10-PCS | Mod: CPTII,S$GLB,, | Performed by: PHYSICIAN ASSISTANT

## 2022-08-05 PROCEDURE — 3078F DIAST BP <80 MM HG: CPT | Mod: CPTII,S$GLB,, | Performed by: PHYSICIAN ASSISTANT

## 2022-08-05 PROCEDURE — 3078F PR MOST RECENT DIASTOLIC BLOOD PRESSURE < 80 MM HG: ICD-10-PCS | Mod: CPTII,S$GLB,, | Performed by: PHYSICIAN ASSISTANT

## 2022-08-05 PROCEDURE — 3052F PR MOST RECENT HEMOGLOBIN A1C LEVEL 8.0 - < 9.0%: ICD-10-PCS | Mod: CPTII,S$GLB,, | Performed by: PHYSICIAN ASSISTANT

## 2022-08-05 PROCEDURE — 99214 PR OFFICE/OUTPT VISIT, EST, LEVL IV, 30-39 MIN: ICD-10-PCS | Mod: S$GLB,,, | Performed by: PHYSICIAN ASSISTANT

## 2022-08-05 PROCEDURE — 3008F BODY MASS INDEX DOCD: CPT | Mod: CPTII,S$GLB,, | Performed by: PHYSICIAN ASSISTANT

## 2022-08-05 PROCEDURE — 82962 GLUCOSE BLOOD TEST: CPT | Mod: S$GLB,,, | Performed by: PHYSICIAN ASSISTANT

## 2022-08-05 PROCEDURE — 4010F ACE/ARB THERAPY RXD/TAKEN: CPT | Mod: CPTII,S$GLB,, | Performed by: PHYSICIAN ASSISTANT

## 2022-08-05 PROCEDURE — 3072F PR LOW RISK FOR RETINOPATHY: ICD-10-PCS | Mod: CPTII,S$GLB,, | Performed by: PHYSICIAN ASSISTANT

## 2022-08-05 PROCEDURE — 4010F PR ACE/ARB THEARPY RXD/TAKEN: ICD-10-PCS | Mod: CPTII,S$GLB,, | Performed by: PHYSICIAN ASSISTANT

## 2022-08-05 PROCEDURE — 1101F PR PT FALLS ASSESS DOC 0-1 FALLS W/OUT INJ PAST YR: ICD-10-PCS | Mod: CPTII,S$GLB,, | Performed by: PHYSICIAN ASSISTANT

## 2022-08-05 PROCEDURE — 82962 POCT GLUCOSE, HAND-HELD DEVICE: ICD-10-PCS | Mod: S$GLB,,, | Performed by: PHYSICIAN ASSISTANT

## 2022-08-05 RX ORDER — TIRZEPATIDE 5 MG/.5ML
5 INJECTION, SOLUTION SUBCUTANEOUS
Qty: 2 ML | Refills: 11 | Status: SHIPPED | OUTPATIENT
Start: 2022-08-05 | End: 2022-09-06 | Stop reason: ALTCHOICE

## 2022-08-05 NOTE — Clinical Note
Carlos hoff - she is on [place] now. You tried to help her last year but was deductible prob. She is trying to get Mounjaro (Zelda) and tresiba.   Thank you!

## 2022-08-05 NOTE — PROGRESS NOTES
PCP: Sakina Cooper DO    Subjective:     Chief Complaint: Diabetes - Established Patient    HISTORY OF PRESENT ILLNESS: 66 y.o.  female presenting for diabetes management visit.   The patient's last visit with me was on 5/27/2022.  Patient has had Type II diabetes since 2005.  Pertinent to decision making is the following comorbidities: HTN, HLD, Obesity by BMI and Fatty Liver  Patient has the following Diabetes complications: without complications  She  has attended diabetes education in the past.     Patient's most recent A1c of 8.0% was completed 2 months ago.   Patient states since Her last A1c Her blood glucose levels have been within range in the morning / fasting.   Patient monitors blood glucose 1 times per day with Contour Next : Fasting and Before Dinner.   Patient blood glucose monitoring device will not be uploaded into Media Section today secondary to unable to upload successfully.   Per meter recall, fasting blood sugar ranging 79 - 140 and after lunch 130 - 150 and up to 220 with recent steroid use.  Patient endorses the following diabetes related symptoms: None.   Patient is due today for the following diabetes-related health maintenance standards: COVID-19 Vaccine .   She denies recent hospital admissions or emergency room visits.   She denies having hypoglycemia.  Patient's concerns today include glycemic control.    Patient medication regimen is as below.     CURRENT DM MEDICATIONS:    Trulicity 4.5. mg weekly   Amaryl 4 mg before breakfast    Tresiba 62 units daily      Patient has failed the following Diabetes medications:    Metformin - allergy       Labs Reviewed.       Lab Results   Component Value Date    CPEPTIDE 1.10 12/14/2017     Lab Results   Component Value Date    GLUTAMICACID 0.00 12/14/2017          //  Weight: 76 kg (167 lb 8.8 oz), Body mass index is 30.06 kg/m².  Her blood sugar in clinic today is:          Review of Systems   Constitutional: Negative for  activity change, appetite change, chills and fever.   HENT: Negative for dental problem, mouth sores, nosebleeds, sore throat and trouble swallowing.    Eyes: Negative for pain and discharge.   Respiratory: Negative for shortness of breath, wheezing and stridor.    Cardiovascular: Negative for chest pain, palpitations and leg swelling.   Gastrointestinal: Negative for abdominal pain, diarrhea, nausea and vomiting.   Endocrine: Negative for polydipsia, polyphagia and polyuria.   Genitourinary: Negative for dysuria, frequency and urgency.   Musculoskeletal: Negative for joint swelling and myalgias.   Skin: Negative for rash and wound.   Neurological: Negative for dizziness, syncope, weakness and headaches.   Psychiatric/Behavioral: Negative for behavioral problems and dysphoric mood.         Diabetes Management Status  Statin: Taking  ACE/ARB: Taking    Screening or Prevention Patient's value Goal Complete/Controlled?   HgA1C Testing and Control   Lab Results   Component Value Date    HGBA1C 8.0 (H) 05/30/2022      Annually/Less than 8% Yes   Lipid profile : 05/30/2022 Annually Yes   LDL control Lab Results   Component Value Date    LDLCALC 62.4 (L) 05/30/2022    Annually/Less than 100 mg/dl  Yes   Nephropathy screening Lab Results   Component Value Date    MICALBCREAT 18.3 10/18/2021     Lab Results   Component Value Date    PROTEINUA Negative 09/08/2014    Annually Yes   Blood pressure BP Readings from Last 1 Encounters:   08/05/22 (!) 141/76    Less than 140/90 Yes   Dilated retinal exam : 05/30/2022 Annually Yes    Foot exam   : 05/30/2022 Annually No     Social History     Socioeconomic History    Marital status:     Number of children: 3   Occupational History    Occupation: Stay at Home    Occupation:    Tobacco Use    Smoking status: Never Smoker    Smokeless tobacco: Never Used   Substance and Sexual Activity    Alcohol use: Yes     Comment: occasional wine     Drug use: No     Sexual activity: Yes     Partners: Male     Birth control/protection: Surgical   Social History Narrative    . Housewife.     Social Determinants of Health     Financial Resource Strain: Low Risk     Difficulty of Paying Living Expenses: Not very hard   Food Insecurity: No Food Insecurity    Worried About Running Out of Food in the Last Year: Never true    Ran Out of Food in the Last Year: Never true   Transportation Needs: No Transportation Needs    Lack of Transportation (Medical): No    Lack of Transportation (Non-Medical): No   Physical Activity: Insufficiently Active    Days of Exercise per Week: 2 days    Minutes of Exercise per Session: 20 min   Stress: Stress Concern Present    Feeling of Stress : To some extent   Social Connections: Moderately Integrated    Frequency of Communication with Friends and Family: More than three times a week    Frequency of Social Gatherings with Friends and Family: Twice a week    Attends Taoist Services: More than 4 times per year    Active Member of Clubs or Organizations: No    Attends Club or Organization Meetings: Never    Marital Status:    Housing Stability: Low Risk     Unable to Pay for Housing in the Last Year: No    Number of Places Lived in the Last Year: 1    Unstable Housing in the Last Year: No     Past Medical History:   Diagnosis Date    Arthritis     Cataract     Colon polyp     Diabetes mellitus type II 15 years     am 05/30/2022    Hyperlipidemia     Hypertension     Renal cell carcinoma 09/2018    Total knee replacement status, left 01/20/2019    Dr.Robert Cook    UTI (urinary tract infection)        Objective:        Physical Exam  Constitutional:       General: She is not in acute distress.     Appearance: She is well-developed. She is not diaphoretic.   HENT:      Head: Normocephalic and atraumatic.      Right Ear: External ear normal.      Left Ear: External ear normal.      Nose: Nose normal.    Eyes:      General:         Right eye: No discharge.         Left eye: No discharge.      Pupils: Pupils are equal, round, and reactive to light.   Cardiovascular:      Rate and Rhythm: Normal rate and regular rhythm.      Heart sounds: Normal heart sounds.   Pulmonary:      Effort: Pulmonary effort is normal.      Breath sounds: Normal breath sounds.   Abdominal:      Palpations: Abdomen is soft.   Musculoskeletal:         General: Normal range of motion.      Cervical back: Normal range of motion and neck supple.   Skin:     General: Skin is warm and dry.      Capillary Refill: Capillary refill takes less than 2 seconds.   Neurological:      Mental Status: She is alert and oriented to person, place, and time.      Motor: No abnormal muscle tone.      Coordination: Coordination normal.   Psychiatric:         Behavior: Behavior normal.         Thought Content: Thought content normal.         Judgment: Judgment normal.           Assessment / Plan:     Type 2 diabetes mellitus with hemoglobin A1c goal of less than 7.0%  -     POCT Glucose, Hand-Held Device  -     Ambulatory referral/consult to Pharmacy Assistance; Future; Expected date: 08/12/2022  -     tirzepatide (MOUNJARO) 5 mg/0.5 mL PnIj; Inject 5 mg (one pen) into the skin every 7 days.  Dispense: 2 mL; Refill: 11  -     Glucose, Fasting; Future; Expected date: 08/05/2022  -     C-Peptide; Future; Expected date: 08/05/2022  -     Hemoglobin A1C; Standing    Fatty liver    Hyperlipidemia LDL goal <70    Hypertension goal BP (blood pressure) < 130/80    Obesity (BMI 30-39.9)      Additional Plan Details:    - POCT Glucose  - Encouraged continuation of lifestyle changes including regular exercise and limiting carbohydrates to 30-45 grams per meal threes times daily and 15 grams per snack with a limit of two daily.   - Encouraged continued monitoring of blood glucose with maintenance of 2 times daily at Fasting and Before Bed.   - Current DM Medication Regimen:  Continue Glimepiride to 4 mg before breakfast. Change Tresiba to 50 units daily with Mounjaro change.  Change Trulicity 4.5 mg weekly to Mounjaro 5 mg weekly.  - Fasting labs scheduled  - Referral to Pharmacy Assistance - Mounjaro and tresiba  - Health Maintenance standards addressed today: COVID - 19 Vaccine - booster sched  - Nursing Visit: Patient is age 79 or younger with an A1c of 7.5 or greater and will not need nursing visit at this time .   - Follow up in 3 months with A1c prior and call in 4 weeks.      Blakeney McKnight, PA-C [facility]    A total of 30 minutes was spent in face to face time, of which over 50 % was spent in counseling patient on disease process, complications, treatment, and side effects of medications.

## 2022-08-05 NOTE — PATIENT INSTRUCTIONS
CURRENT DM MEDICATIONS:   Trulicity 4.5. mg weekly - change to Mounjaro 5 mg weekly  Amaryl 4 mg before breakfast   Tresiba 62 units daily - once able to change to Mounjaro - drop to 50 units of Tresiba

## 2022-08-22 ENCOUNTER — TELEPHONE (OUTPATIENT)
Dept: DIABETES | Facility: CLINIC | Age: 67
End: 2022-08-22
Payer: MEDICARE

## 2022-08-22 NOTE — TELEPHONE ENCOUNTER
----- Message from Bev Bland sent at 8/22/2022 10:38 AM CDT -----  Contact: Self [phone]  Would like to receive medical advice.    Pharmacy name/number (copy/paste from chart):      [facility] [address] [address] [facility]  [address] [facility] [address]  Phone: [phone] Fax: [phone]    Would they like a call back or a response via [facility]:  call back    Additional information:  Calling to speak with the nurse regarding tirzepatide (MOUNJARO) 5 mg/0.5 mL PnIj. Pt states she has not received medication because of high cost.

## 2022-08-30 ENCOUNTER — LAB VISIT (OUTPATIENT)
Dept: LAB | Facility: HOSPITAL | Age: 67
End: 2022-08-30
Attending: PHYSICIAN ASSISTANT
Payer: MEDICARE

## 2022-08-30 DIAGNOSIS — E11.9 TYPE 2 DIABETES MELLITUS WITH HEMOGLOBIN A1C GOAL OF LESS THAN 7.0%: ICD-10-CM

## 2022-08-30 LAB
ESTIMATED AVG GLUCOSE: 192 MG/DL (ref 68–131)
GLUCOSE SERPL-MCNC: 144 MG/DL (ref 70–110)
HBA1C MFR BLD: 8.3 % (ref 4–5.6)

## 2022-08-30 PROCEDURE — 82947 ASSAY GLUCOSE BLOOD QUANT: CPT | Performed by: PHYSICIAN ASSISTANT

## 2022-08-30 PROCEDURE — 84681 ASSAY OF C-PEPTIDE: CPT | Performed by: PHYSICIAN ASSISTANT

## 2022-08-30 PROCEDURE — 83036 HEMOGLOBIN GLYCOSYLATED A1C: CPT | Performed by: PHYSICIAN ASSISTANT

## 2022-08-30 PROCEDURE — 36415 COLL VENOUS BLD VENIPUNCTURE: CPT | Mod: PO | Performed by: PHYSICIAN ASSISTANT

## 2022-08-31 LAB — C PEPTIDE SERPL-MCNC: 1.73 NG/ML (ref 0.78–5.19)

## 2022-09-02 NOTE — PROGRESS NOTES
PCP: Sakina Cooper DO    Subjective:     Chief Complaint: Diabetes - Established Patient    Established Patient - Audio Only Telehealth Visit     The patient location is: Home  The chief complaint leading to consultation is: Diabetes follow up  Visit type: Virtual visit with audio only (telephone)  Total Time Spent with Patient: 12 minutes     The reason for the audio only service rather than synchronous audio and video virtual visit was related to technical difficulties or patient preference/necessity.     Each patient to whom I provide medical services by telemedicine is:  (1) informed of the relationship between the physician and patient and the respective role of any other health care provider with respect to management of the patient; and (2) notified that they may decline to receive medical services by telemedicine and may withdraw from such care at any time. Patient verbally consented to receive this service via voice-only telephone call.    This service was not originating from a related E/M service provided within the previous 7 days nor will  to an E/M service or procedure within the next 24 hours or my soonest available appointment.  Prevailing standard of care was able to be met in this audio-only visit.       HISTORY OF PRESENT ILLNESS: 66 y.o.  female presenting for diabetes management visit.   The patient's last visit with me was on 8/5/2022.  Patient has had Type II diabetes since 2005.  Pertinent to decision making is the following comorbidities: HTN, HLD, Obesity by BMI and Fatty Liver  Patient has the following Diabetes complications: without complications  She  has attended diabetes education in the past.     Patient's most recent A1c of 8.3% was completed 1 weeks ago.   Patient states since Her last A1c Her blood glucose levels have been within range in the morning / fasting.   Patient monitors blood glucose 1 times per day with Contour Next : Fasting and Before Dinner.   Patient  blood glucose monitoring device will not be uploaded into Media Section today secondary to unable to upload successfully.   Per meter recall, fasting blood sugar ranging 88 - 130 and after lunch 130 -140.  Patient endorses the following diabetes related symptoms: None.   Patient is due today for the following diabetes-related health maintenance standards: Influenza Vaccine and COVID-19 Vaccine .   She denies recent hospital admissions or emergency room visits.   She denies having hypoglycemia.  Patient's concerns today include glycemic control.  Of note, patient is in Medicare Gap.   Patient medication regimen is as below.     CURRENT DM MEDICATIONS:   Trulicity 4.5 mg weekly   Amaryl 4 mg before breakfast   Tresiba 62 units daily      Patient has failed the following Diabetes medications:   Metformin - allergy   Mounjaro - cost; no pharm asst available         Labs Reviewed.       Lab Results   Component Value Date    CPEPTIDE 1.73 08/30/2022     Lab Results   Component Value Date    GLUTAMICACID 0.00 12/14/2017          //   , There is no height or weight on file to calculate BMI.  Her blood sugar in clinic today is:          Review of Systems   Constitutional:  Negative for activity change, appetite change, chills and fever.   HENT:  Negative for dental problem, mouth sores, nosebleeds, sore throat and trouble swallowing.    Eyes:  Negative for pain and discharge.   Respiratory:  Negative for shortness of breath, wheezing and stridor.    Cardiovascular:  Negative for chest pain, palpitations and leg swelling.   Gastrointestinal:  Negative for abdominal pain, diarrhea, nausea and vomiting.   Endocrine: Negative for polydipsia, polyphagia and polyuria.   Genitourinary:  Negative for dysuria, frequency and urgency.   Musculoskeletal:  Negative for joint swelling and myalgias.   Skin:  Negative for rash and wound.   Neurological:  Negative for dizziness, syncope, weakness and headaches.   Psychiatric/Behavioral:   Negative for behavioral problems and dysphoric mood.        Diabetes Management Status  Statin: Taking  ACE/ARB: Taking    Screening or Prevention Patient's value Goal Complete/Controlled?   HgA1C Testing and Control   Lab Results   Component Value Date    HGBA1C 8.3 (H) 08/30/2022      Annually/Less than 8% Yes   Lipid profile : 05/30/2022 Annually Yes   LDL control Lab Results   Component Value Date    LDLCALC 62.4 (L) 05/30/2022    Annually/Less than 100 mg/dl  Yes   Nephropathy screening Lab Results   Component Value Date    MICALBCREAT 10.3 08/30/2022     Lab Results   Component Value Date    PROTEINUA Negative 09/08/2014    Annually Yes   Blood pressure BP Readings from Last 1 Encounters:   08/05/22 (!) 141/76    Less than 140/90 Yes   Dilated retinal exam : 05/30/2022 Annually Yes    Foot exam   : 05/30/2022 Annually No     Social History     Socioeconomic History    Marital status:     Number of children: 3   Occupational History    Occupation: Stay at Home    Occupation:    Tobacco Use    Smoking status: Never    Smokeless tobacco: Never   Substance and Sexual Activity    Alcohol use: Yes     Comment: occasional wine     Drug use: No    Sexual activity: Yes     Partners: Male     Birth control/protection: Surgical   Social History Narrative    . Housewife.     Social Determinants of Health     Financial Resource Strain: Low Risk     Difficulty of Paying Living Expenses: Not very hard   Food Insecurity: No Food Insecurity    Worried About Running Out of Food in the Last Year: Never true    Ran Out of Food in the Last Year: Never true   Transportation Needs: No Transportation Needs    Lack of Transportation (Medical): No    Lack of Transportation (Non-Medical): No   Physical Activity: Insufficiently Active    Days of Exercise per Week: 2 days    Minutes of Exercise per Session: 20 min   Stress: Stress Concern Present    Feeling of Stress : To some extent   Social Connections:  Moderately Integrated    Frequency of Communication with Friends and Family: More than three times a week    Frequency of Social Gatherings with Friends and Family: Twice a week    Attends Church Services: More than 4 times per year    Active Member of Clubs or Organizations: No    Attends Club or Organization Meetings: Never    Marital Status:    Housing Stability: Low Risk     Unable to Pay for Housing in the Last Year: No    Number of Places Lived in the Last Year: 1    Unstable Housing in the Last Year: No     Past Medical History:   Diagnosis Date    Arthritis     Cataract     Colon polyp     Diabetes mellitus type II 15 years     am 05/30/2022    Hyperlipidemia     Hypertension     Renal cell carcinoma 09/2018    Total knee replacement status, left 01/20/2019    Dr.Robert Cook    UTI (urinary tract infection)        Objective:        Physical Exam  Neurological:      Mental Status: She is alert and oriented to person, place, and time. Mental status is at baseline.   Psychiatric:         Mood and Affect: Mood normal.         Behavior: Behavior normal.         Thought Content: Thought content normal.         Judgment: Judgment normal.         Assessment / Plan:     Type 2 diabetes mellitus with hemoglobin A1c goal of less than 7.0%  -     glimepiride (AMARYL) 4 MG tablet; Take 1 tablet (4 mg total) by mouth 2 (two) times daily with meals.  Dispense: 180 tablet; Refill: 3    Fatty liver    Hyperlipidemia LDL goal <70    Hypertension goal BP (blood pressure) < 130/80    Obesity (BMI 30-39.9)    Additional Plan Details:    - POCT Glucose  - Encouraged continuation of lifestyle changes including regular exercise and limiting carbohydrates to 30-45 grams per meal threes times daily and 15 grams per snack with a limit of two daily.   - Encouraged continued monitoring of blood glucose with maintenance of 2 times daily at Fasting and Before Bed.   - Current DM Medication Regimen: Change Glimepiride to  4 mg BID wm. Continue Tresiba 62 units daily.  Continue Trulicity 4.5 mg weekly - will consider switch Mounjaro 5 mg weekly when pharmacy assistance available.  - Health Maintenance standards addressed today: Flu Shot - patient would like to complete outside of [facility] and COVID - 19 Vaccine - booster sched  - Nursing Visit: Patient is age 79 or younger with an A1c of 7.5 or greater and will not need nursing visit at this time .   - Follow up in 3 weeks .      Blakeney McKnight, PA-C [facility]

## 2022-09-06 ENCOUNTER — OFFICE VISIT (OUTPATIENT)
Dept: DIABETES | Facility: CLINIC | Age: 67
End: 2022-09-06
Payer: MEDICARE

## 2022-09-06 DIAGNOSIS — E66.9 OBESITY (BMI 30-39.9): ICD-10-CM

## 2022-09-06 DIAGNOSIS — E11.9 TYPE 2 DIABETES MELLITUS WITH HEMOGLOBIN A1C GOAL OF LESS THAN 7.0%: Primary | ICD-10-CM

## 2022-09-06 DIAGNOSIS — I10 HYPERTENSION GOAL BP (BLOOD PRESSURE) < 130/80: ICD-10-CM

## 2022-09-06 DIAGNOSIS — K76.0 FATTY LIVER: ICD-10-CM

## 2022-09-06 DIAGNOSIS — E78.5 HYPERLIPIDEMIA LDL GOAL <70: ICD-10-CM

## 2022-09-06 PROCEDURE — 1157F ADVNC CARE PLAN IN RCRD: CPT | Mod: CPTII,95,, | Performed by: PHYSICIAN ASSISTANT

## 2022-09-06 PROCEDURE — 1157F PR ADVANCE CARE PLAN OR EQUIV PRESENT IN MEDICAL RECORD: ICD-10-PCS | Mod: CPTII,95,, | Performed by: PHYSICIAN ASSISTANT

## 2022-09-06 PROCEDURE — 99442 PR PHYSICIAN TELEPHONE EVALUATION 11-20 MIN: CPT | Mod: 95,,, | Performed by: PHYSICIAN ASSISTANT

## 2022-09-06 PROCEDURE — 3052F PR MOST RECENT HEMOGLOBIN A1C LEVEL 8.0 - < 9.0%: ICD-10-PCS | Mod: CPTII,95,, | Performed by: PHYSICIAN ASSISTANT

## 2022-09-06 PROCEDURE — 99442 PR PHYSICIAN TELEPHONE EVALUATION 11-20 MIN: ICD-10-PCS | Mod: 95,,, | Performed by: PHYSICIAN ASSISTANT

## 2022-09-06 PROCEDURE — 3066F NEPHROPATHY DOC TX: CPT | Mod: CPTII,95,, | Performed by: PHYSICIAN ASSISTANT

## 2022-09-06 PROCEDURE — 4010F ACE/ARB THERAPY RXD/TAKEN: CPT | Mod: CPTII,95,, | Performed by: PHYSICIAN ASSISTANT

## 2022-09-06 PROCEDURE — 3066F PR DOCUMENTATION OF TREATMENT FOR NEPHROPATHY: ICD-10-PCS | Mod: CPTII,95,, | Performed by: PHYSICIAN ASSISTANT

## 2022-09-06 PROCEDURE — 3061F NEG MICROALBUMINURIA REV: CPT | Mod: CPTII,95,, | Performed by: PHYSICIAN ASSISTANT

## 2022-09-06 PROCEDURE — 3052F HG A1C>EQUAL 8.0%<EQUAL 9.0%: CPT | Mod: CPTII,95,, | Performed by: PHYSICIAN ASSISTANT

## 2022-09-06 PROCEDURE — 3072F LOW RISK FOR RETINOPATHY: CPT | Mod: CPTII,95,, | Performed by: PHYSICIAN ASSISTANT

## 2022-09-06 PROCEDURE — 4010F PR ACE/ARB THEARPY RXD/TAKEN: ICD-10-PCS | Mod: CPTII,95,, | Performed by: PHYSICIAN ASSISTANT

## 2022-09-06 PROCEDURE — 3072F PR LOW RISK FOR RETINOPATHY: ICD-10-PCS | Mod: CPTII,95,, | Performed by: PHYSICIAN ASSISTANT

## 2022-09-06 PROCEDURE — 3061F PR NEG MICROALBUMINURIA RESULT DOCUMENTED/REVIEW: ICD-10-PCS | Mod: CPTII,95,, | Performed by: PHYSICIAN ASSISTANT

## 2022-09-06 RX ORDER — GLIMEPIRIDE 4 MG/1
4 TABLET ORAL 2 TIMES DAILY WITH MEALS
Qty: 180 TABLET | Refills: 3 | Status: SHIPPED | OUTPATIENT
Start: 2022-09-06 | End: 2023-07-03 | Stop reason: SDUPTHER

## 2022-09-06 NOTE — PATIENT INSTRUCTIONS
CURRENT DM MEDICATIONS:   Trulicity 4.5 mg weekly   Amaryl 4 mg before meals twice a day  Tresiba 62 units daily

## 2022-09-27 NOTE — PROGRESS NOTES
Unable to reach pt regarding appt. Left vm X 2 to call and reschedule appt     Alecia Goodman PA-C  Diabetes Management

## 2022-09-28 ENCOUNTER — OFFICE VISIT (OUTPATIENT)
Dept: DIABETES | Facility: CLINIC | Age: 67
End: 2022-09-28
Payer: MEDICARE

## 2022-09-28 DIAGNOSIS — E78.5 HYPERLIPIDEMIA LDL GOAL <70: ICD-10-CM

## 2022-09-28 DIAGNOSIS — K76.0 FATTY LIVER: ICD-10-CM

## 2022-09-28 DIAGNOSIS — I10 HYPERTENSION GOAL BP (BLOOD PRESSURE) < 130/80: ICD-10-CM

## 2022-09-28 DIAGNOSIS — E66.9 OBESITY (BMI 30-39.9): ICD-10-CM

## 2022-09-28 DIAGNOSIS — Z53.21 PATIENT LEFT WITHOUT BEING SEEN: Primary | ICD-10-CM

## 2022-09-28 DIAGNOSIS — E11.9 TYPE 2 DIABETES MELLITUS WITH HEMOGLOBIN A1C GOAL OF LESS THAN 7.0%: ICD-10-CM

## 2022-09-28 PROCEDURE — 4010F PR ACE/ARB THEARPY RXD/TAKEN: ICD-10-PCS | Mod: CPTII,95,, | Performed by: PHYSICIAN ASSISTANT

## 2022-09-28 PROCEDURE — 3061F NEG MICROALBUMINURIA REV: CPT | Mod: CPTII,95,, | Performed by: PHYSICIAN ASSISTANT

## 2022-09-28 PROCEDURE — 3066F PR DOCUMENTATION OF TREATMENT FOR NEPHROPATHY: ICD-10-PCS | Mod: CPTII,95,, | Performed by: PHYSICIAN ASSISTANT

## 2022-09-28 PROCEDURE — 3061F PR NEG MICROALBUMINURIA RESULT DOCUMENTED/REVIEW: ICD-10-PCS | Mod: CPTII,95,, | Performed by: PHYSICIAN ASSISTANT

## 2022-09-28 PROCEDURE — 99499 UNLISTED E&M SERVICE: CPT | Mod: 95,,, | Performed by: PHYSICIAN ASSISTANT

## 2022-09-28 PROCEDURE — 4010F ACE/ARB THERAPY RXD/TAKEN: CPT | Mod: CPTII,95,, | Performed by: PHYSICIAN ASSISTANT

## 2022-09-28 PROCEDURE — 3066F NEPHROPATHY DOC TX: CPT | Mod: CPTII,95,, | Performed by: PHYSICIAN ASSISTANT

## 2022-09-28 PROCEDURE — 3072F LOW RISK FOR RETINOPATHY: CPT | Mod: CPTII,95,, | Performed by: PHYSICIAN ASSISTANT

## 2022-09-28 PROCEDURE — 99499 NO LOS: ICD-10-PCS | Mod: 95,,, | Performed by: PHYSICIAN ASSISTANT

## 2022-09-28 PROCEDURE — 1157F PR ADVANCE CARE PLAN OR EQUIV PRESENT IN MEDICAL RECORD: ICD-10-PCS | Mod: CPTII,95,, | Performed by: PHYSICIAN ASSISTANT

## 2022-09-28 PROCEDURE — 1157F ADVNC CARE PLAN IN RCRD: CPT | Mod: CPTII,95,, | Performed by: PHYSICIAN ASSISTANT

## 2022-09-28 PROCEDURE — 3072F PR LOW RISK FOR RETINOPATHY: ICD-10-PCS | Mod: CPTII,95,, | Performed by: PHYSICIAN ASSISTANT

## 2022-09-28 PROCEDURE — 3052F HG A1C>EQUAL 8.0%<EQUAL 9.0%: CPT | Mod: CPTII,95,, | Performed by: PHYSICIAN ASSISTANT

## 2022-09-28 PROCEDURE — 3052F PR MOST RECENT HEMOGLOBIN A1C LEVEL 8.0 - < 9.0%: ICD-10-PCS | Mod: CPTII,95,, | Performed by: PHYSICIAN ASSISTANT

## 2022-09-30 ENCOUNTER — TELEPHONE (OUTPATIENT)
Dept: INTERNAL MEDICINE | Facility: CLINIC | Age: 67
End: 2022-09-30
Payer: MEDICARE

## 2022-09-30 NOTE — TELEPHONE ENCOUNTER
----- Message from Erica Treadwell sent at 9/29/2022 10:30 AM CDT -----  Contact: Patient, [phone]  Calling to speak with the patient regarding her medications. Please call her. Thanks.

## 2022-10-18 ENCOUNTER — OFFICE VISIT (OUTPATIENT)
Dept: DIABETES | Facility: CLINIC | Age: 67
End: 2022-10-18
Payer: MEDICARE

## 2022-10-18 DIAGNOSIS — E78.5 HYPERLIPIDEMIA LDL GOAL <70: ICD-10-CM

## 2022-10-18 DIAGNOSIS — I10 HYPERTENSION GOAL BP (BLOOD PRESSURE) < 130/80: ICD-10-CM

## 2022-10-18 DIAGNOSIS — E11.9 TYPE 2 DIABETES MELLITUS WITH HEMOGLOBIN A1C GOAL OF LESS THAN 7.0%: Primary | ICD-10-CM

## 2022-10-18 DIAGNOSIS — K76.0 FATTY LIVER: ICD-10-CM

## 2022-10-18 DIAGNOSIS — E66.9 OBESITY (BMI 30-39.9): ICD-10-CM

## 2022-10-18 PROCEDURE — 3072F LOW RISK FOR RETINOPATHY: CPT | Mod: CPTII,95,, | Performed by: PHYSICIAN ASSISTANT

## 2022-10-18 PROCEDURE — 3052F PR MOST RECENT HEMOGLOBIN A1C LEVEL 8.0 - < 9.0%: ICD-10-PCS | Mod: CPTII,95,, | Performed by: PHYSICIAN ASSISTANT

## 2022-10-18 PROCEDURE — 3061F PR NEG MICROALBUMINURIA RESULT DOCUMENTED/REVIEW: ICD-10-PCS | Mod: CPTII,95,, | Performed by: PHYSICIAN ASSISTANT

## 2022-10-18 PROCEDURE — 3052F HG A1C>EQUAL 8.0%<EQUAL 9.0%: CPT | Mod: CPTII,95,, | Performed by: PHYSICIAN ASSISTANT

## 2022-10-18 PROCEDURE — 3066F PR DOCUMENTATION OF TREATMENT FOR NEPHROPATHY: ICD-10-PCS | Mod: CPTII,95,, | Performed by: PHYSICIAN ASSISTANT

## 2022-10-18 PROCEDURE — 3066F NEPHROPATHY DOC TX: CPT | Mod: CPTII,95,, | Performed by: PHYSICIAN ASSISTANT

## 2022-10-18 PROCEDURE — 4010F ACE/ARB THERAPY RXD/TAKEN: CPT | Mod: CPTII,95,, | Performed by: PHYSICIAN ASSISTANT

## 2022-10-18 PROCEDURE — 99441 PR PHYSICIAN TELEPHONE EVALUATION 5-10 MIN: ICD-10-PCS | Mod: 95,,, | Performed by: PHYSICIAN ASSISTANT

## 2022-10-18 PROCEDURE — 99441 PR PHYSICIAN TELEPHONE EVALUATION 5-10 MIN: CPT | Mod: 95,,, | Performed by: PHYSICIAN ASSISTANT

## 2022-10-18 PROCEDURE — 3061F NEG MICROALBUMINURIA REV: CPT | Mod: CPTII,95,, | Performed by: PHYSICIAN ASSISTANT

## 2022-10-18 PROCEDURE — 4010F PR ACE/ARB THEARPY RXD/TAKEN: ICD-10-PCS | Mod: CPTII,95,, | Performed by: PHYSICIAN ASSISTANT

## 2022-10-18 PROCEDURE — 1157F PR ADVANCE CARE PLAN OR EQUIV PRESENT IN MEDICAL RECORD: ICD-10-PCS | Mod: CPTII,95,, | Performed by: PHYSICIAN ASSISTANT

## 2022-10-18 PROCEDURE — 3072F PR LOW RISK FOR RETINOPATHY: ICD-10-PCS | Mod: CPTII,95,, | Performed by: PHYSICIAN ASSISTANT

## 2022-10-18 PROCEDURE — 1157F ADVNC CARE PLAN IN RCRD: CPT | Mod: CPTII,95,, | Performed by: PHYSICIAN ASSISTANT

## 2022-10-18 NOTE — PROGRESS NOTES
PCP: Sakina Cooper DO    Subjective:     Chief Complaint: Diabetes - Established Patient    Established Patient - Audio Only Telehealth Visit     The patient location is: Home  The chief complaint leading to consultation is: Diabetes follow up  Visit type: Virtual visit with audio only (telephone)  Total Time Spent with Patient: 9 minutes     The reason for the audio only service rather than synchronous audio and video virtual visit was related to technical difficulties or patient preference/necessity.     Each patient to whom I provide medical services by telemedicine is:  (1) informed of the relationship between the physician and patient and the respective role of any other health care provider with respect to management of the patient; and (2) notified that they may decline to receive medical services by telemedicine and may withdraw from such care at any time. Patient verbally consented to receive this service via voice-only telephone call.    This service was not originating from a related E/M service provided within the previous 7 days nor will  to an E/M service or procedure within the next 24 hours or my soonest available appointment.  Prevailing standard of care was able to be met in this audio-only visit.       HISTORY OF PRESENT ILLNESS: 66 y.o.  female presenting for diabetes management visit.   The patient's last visit with me was on 9/28/2022.   Patient has had Type II diabetes since 2005.  Pertinent to decision making is the following comorbidities: HTN, HLD, Obesity by BMI and Fatty Liver  Patient has the following Diabetes complications: without complications  She  has attended diabetes education in the past.     Patient's most recent A1c of 8.3% was completed 2 months ago.   Patient states since Her last A1c Her blood glucose levels have been within range in the morning / fasting.   Patient monitors blood glucose 1 times per day with Contour Next : Fasting and Before Dinner.    Patient blood glucose monitoring device will not be uploaded into Media Section today secondary to unable to upload successfully.   Per meter recall, fasting blood sugar ranging 79 - 90 and after lunch up to 160.  Patient endorses the following diabetes related symptoms: None.   Patient is due today for the following diabetes-related health maintenance standards: Influenza Vaccine and COVID-19 Vaccine .   She denies recent hospital admissions or emergency room visits.   She denies having hypoglycemia.  Patient's concerns today include glycemic control.  Of note, patient is in Medicare Gap.   Patient medication regimen is as below.     CURRENT DM MEDICATIONS:   Trulicity 4.5 mg weekly - off since last week; needs pharm asst  Amaryl 4 mg before breakfast and before dinner  Tresiba 62 units daily      Patient has failed the following Diabetes medications:   Metformin - allergy   Mounjaro - cost; no pharm asst available         Labs Reviewed.       Lab Results   Component Value Date    CPEPTIDE 1.73 08/30/2022     Lab Results   Component Value Date    GLUTAMICACID 0.00 12/14/2017          //   , There is no height or weight on file to calculate BMI.  Her blood sugar in clinic today is:          Review of Systems   Constitutional:  Negative for activity change, appetite change, chills and fever.   HENT:  Negative for dental problem, mouth sores, nosebleeds, sore throat and trouble swallowing.    Eyes:  Negative for pain and discharge.   Respiratory:  Negative for shortness of breath, wheezing and stridor.    Cardiovascular:  Negative for chest pain, palpitations and leg swelling.   Gastrointestinal:  Negative for abdominal pain, diarrhea, nausea and vomiting.   Endocrine: Negative for polydipsia, polyphagia and polyuria.   Genitourinary:  Negative for dysuria, frequency and urgency.   Musculoskeletal:  Negative for joint swelling and myalgias.   Skin:  Negative for rash and wound.   Neurological:  Negative for  dizziness, syncope, weakness and headaches.   Psychiatric/Behavioral:  Negative for behavioral problems and dysphoric mood.        Diabetes Management Status  Statin: Taking  ACE/ARB: Taking    Screening or Prevention Patient's value Goal Complete/Controlled?   HgA1C Testing and Control   Lab Results   Component Value Date    HGBA1C 8.3 (H) 08/30/2022      Annually/Less than 8% Yes   Lipid profile : 05/30/2022 Annually Yes   LDL control Lab Results   Component Value Date    LDLCALC 62.4 (L) 05/30/2022    Annually/Less than 100 mg/dl  Yes   Nephropathy screening Lab Results   Component Value Date    MICALBCREAT 10.3 08/30/2022     Lab Results   Component Value Date    PROTEINUA Negative 09/08/2014    Annually Yes   Blood pressure BP Readings from Last 1 Encounters:   08/05/22 (!) 141/76    Less than 140/90 Yes   Dilated retinal exam : 05/30/2022 Annually Yes    Foot exam   : 05/30/2022 Annually No     Social History     Socioeconomic History    Marital status:     Number of children: 3   Occupational History    Occupation: Stay at Home    Occupation:    Tobacco Use    Smoking status: Never    Smokeless tobacco: Never   Substance and Sexual Activity    Alcohol use: Yes     Comment: occasional wine     Drug use: No    Sexual activity: Yes     Partners: Male     Birth control/protection: Surgical   Social History Narrative    . Housewife.     Social Determinants of Health     Financial Resource Strain: Low Risk     Difficulty of Paying Living Expenses: Not very hard   Food Insecurity: No Food Insecurity    Worried About Running Out of Food in the Last Year: Never true    Ran Out of Food in the Last Year: Never true   Transportation Needs: No Transportation Needs    Lack of Transportation (Medical): No    Lack of Transportation (Non-Medical): No   Physical Activity: Insufficiently Active    Days of Exercise per Week: 2 days    Minutes of Exercise per Session: 20 min   Stress: Stress  Concern Present    Feeling of Stress : To some extent   Social Connections: Moderately Integrated    Frequency of Communication with Friends and Family: More than three times a week    Frequency of Social Gatherings with Friends and Family: Twice a week    Attends Hindu Services: More than 4 times per year    Active Member of Clubs or Organizations: No    Attends Club or Organization Meetings: Never    Marital Status:    Housing Stability: Low Risk     Unable to Pay for Housing in the Last Year: No    Number of Places Lived in the Last Year: 1    Unstable Housing in the Last Year: No     Past Medical History:   Diagnosis Date    Arthritis     Cataract     Colon polyp     Diabetes mellitus type II 15 years     am 05/30/2022    Hyperlipidemia     Hypertension     Renal cell carcinoma 09/2018    Total knee replacement status, left 01/20/2019    Dr.Robert Cook    UTI (urinary tract infection)        Objective:        Physical Exam  Neurological:      Mental Status: She is alert and oriented to person, place, and time. Mental status is at baseline.   Psychiatric:         Mood and Affect: Mood normal.         Behavior: Behavior normal.         Thought Content: Thought content normal.         Judgment: Judgment normal.         Assessment / Plan:     Type 2 diabetes mellitus with hemoglobin A1c goal of less than 7.0%    Fatty liver    Hyperlipidemia LDL goal <70    Hypertension goal BP (blood pressure) < 130/80    Obesity (BMI 30-39.9)    Additional Plan Details:    - POCT Glucose  - Encouraged continuation of lifestyle changes including regular exercise and limiting carbohydrates to 30-45 grams per meal threes times daily and 15 grams per snack with a limit of two daily.   - Encouraged continued monitoring of blood glucose with maintenance of 2 times daily at Fasting and Before Bed.   - Current DM Medication Regimen: Continue Glimepiride to 4 mg BID wm. Continue Tresiba 62 units daily.  Continue  Trulicity 4.5 mg weekly - will consider switch Mounjaro 5 mg weekly when pharmacy assistance available.  - Pharmacy Asst: Trulicity 4.5  - Health Maintenance standards addressed today: Flu Shot - patient would like to complete outside of [facility] and COVID - 19 Vaccine - booster sched  - Nursing Visit: Patient is age 79 or younger with an A1c of 7.5 or greater and will not need nursing visit at this time .   - Follow up in 8 weeks .      Blakeney McKnight, PA-C [facility]

## 2022-10-18 NOTE — Clinical Note
Staff - A1c at [facility] on 11/28  Orestes - can you reach out to her about doing urgent alhaji for trulicity 4.5

## 2022-11-02 ENCOUNTER — HOSPITAL ENCOUNTER (OUTPATIENT)
Dept: RADIOLOGY | Facility: HOSPITAL | Age: 67
Discharge: HOME | End: 2022-11-02
Attending: UROLOGY
Payer: MEDICARE

## 2022-11-02 DIAGNOSIS — C64.9 RENAL CELL CARCINOMA, UNSPECIFIED LATERALITY: ICD-10-CM

## 2022-11-02 PROCEDURE — 74170 CT ABD WO CNTRST FLWD CNTRST: CPT | Mod: TC

## 2022-11-02 PROCEDURE — 25500020 PHARM REV CODE 255: Performed by: UROLOGY

## 2022-11-02 PROCEDURE — 71045 X-RAY EXAM CHEST 1 VIEW: CPT | Mod: TC

## 2022-11-02 RX ADMIN — IOHEXOL 100 ML: 350 INJECTION, SOLUTION INTRAVENOUS at 10:11

## 2022-12-19 DIAGNOSIS — K21.9 CHRONIC GERD: ICD-10-CM

## 2022-12-19 RX ORDER — PNEUMOCOCCAL 20-VALENT CONJUGATE VACCINE 2.2; 2.2; 2.2; 2.2; 2.2; 2.2; 2.2; 2.2; 2.2; 2.2; 2.2; 2.2; 2.2; 2.2; 2.2; 2.2; 4.4; 2.2; 2.2; 2.2 UG/.5ML; UG/.5ML; UG/.5ML; UG/.5ML; UG/.5ML; UG/.5ML; UG/.5ML; UG/.5ML; UG/.5ML; UG/.5ML; UG/.5ML; UG/.5ML; UG/.5ML; UG/.5ML; UG/.5ML; UG/.5ML; UG/.5ML; UG/.5ML; UG/.5ML; UG/.5ML
INJECTION, SUSPENSION INTRAMUSCULAR
COMMUNITY
Start: 2022-06-29

## 2022-12-19 RX ORDER — ZOSTER VACCINE RECOMBINANT, ADJUVANTED 50 MCG/0.5
KIT INTRAMUSCULAR
COMMUNITY
Start: 2022-06-29

## 2022-12-19 NOTE — TELEPHONE ENCOUNTER
No new care gaps identified.  [facility] Embedded Care Gaps. Reference number: [id number]. 12/19/2022   3:28:31 PM CST

## 2022-12-20 NOTE — TELEPHONE ENCOUNTER
Refill Routing Note   Medication(s) are not appropriate for processing by [facility] for the following reason(s):      - Patient has not been seen in over 15 months by PCP    [facility] action(s):  Defer          Medication reconciliation completed: No     Appointments  past 12m or future 3m with PCP    Date Provider   Last Visit   12/23/2020 Sakina Cooper, DO   Next Visit   2/6/2023 Sakina Cooper, DO   ED visits in past 90 days: 0        Note composed:1:07 PM 12/20/2022

## 2022-12-21 DIAGNOSIS — K21.9 CHRONIC GERD: ICD-10-CM

## 2022-12-21 RX ORDER — OMEPRAZOLE 40 MG/1
40 CAPSULE, DELAYED RELEASE ORAL DAILY
Qty: 90 CAPSULE | Refills: 0 | Status: SHIPPED | OUTPATIENT
Start: 2022-12-21 | End: 2023-03-16

## 2022-12-21 RX ORDER — OMEPRAZOLE 40 MG/1
40 CAPSULE, DELAYED RELEASE ORAL DAILY
Qty: 90 CAPSULE | Refills: 0 | OUTPATIENT
Start: 2022-12-21

## 2022-12-21 NOTE — TELEPHONE ENCOUNTER
No new care gaps identified.  [facility] Embedded Care Gaps. Reference number: [id number]. 12/21/2022   3:28:54 PM CST

## 2022-12-22 NOTE — TELEPHONE ENCOUNTER
Refill Decision Note   Dary Chaney  is requesting a refill authorization.  Brief Assessment and Rationale for Refill:  Quick Discontinue     Medication Therapy Plan:  Receipt confirmed by pharmacy (12/21/2022  5:24 PM CST)    Medication Reconciliation Completed: No   Comments:     No Care Gaps recommended.     Note composed:9:38 PM 12/21/2022

## 2023-01-04 ENCOUNTER — OFFICE VISIT (OUTPATIENT)
Dept: UROLOGY | Facility: CLINIC | Age: 68
End: 2023-01-04
Payer: MEDICARE

## 2023-01-04 VITALS
SYSTOLIC BLOOD PRESSURE: 133 MMHG | WEIGHT: 170.06 LBS | HEART RATE: 97 BPM | BODY MASS INDEX: 31.3 KG/M2 | HEIGHT: 62 IN | DIASTOLIC BLOOD PRESSURE: 80 MMHG

## 2023-01-04 DIAGNOSIS — N28.1 RENAL CYST: ICD-10-CM

## 2023-01-04 DIAGNOSIS — N20.0 RENAL STONE: ICD-10-CM

## 2023-01-04 DIAGNOSIS — C64.9 RENAL CELL CARCINOMA, UNSPECIFIED LATERALITY: Primary | ICD-10-CM

## 2023-01-04 PROCEDURE — 1160F PR REVIEW ALL MEDS BY PRESCRIBER/CLIN PHARMACIST DOCUMENTED: ICD-10-PCS | Mod: HCNC,CPTII,S$GLB, | Performed by: UROLOGY

## 2023-01-04 PROCEDURE — 1101F PT FALLS ASSESS-DOCD LE1/YR: CPT | Mod: HCNC,CPTII,S$GLB, | Performed by: UROLOGY

## 2023-01-04 PROCEDURE — 3079F DIAST BP 80-89 MM HG: CPT | Mod: HCNC,CPTII,S$GLB, | Performed by: UROLOGY

## 2023-01-04 PROCEDURE — 3075F SYST BP GE 130 - 139MM HG: CPT | Mod: HCNC,CPTII,S$GLB, | Performed by: UROLOGY

## 2023-01-04 PROCEDURE — 1126F PR PAIN SEVERITY QUANTIFIED, NO PAIN PRESENT: ICD-10-PCS | Mod: HCNC,CPTII,S$GLB, | Performed by: UROLOGY

## 2023-01-04 PROCEDURE — 1157F PR ADVANCE CARE PLAN OR EQUIV PRESENT IN MEDICAL RECORD: ICD-10-PCS | Mod: HCNC,CPTII,S$GLB, | Performed by: UROLOGY

## 2023-01-04 PROCEDURE — 1101F PR PT FALLS ASSESS DOC 0-1 FALLS W/OUT INJ PAST YR: ICD-10-PCS | Mod: HCNC,CPTII,S$GLB, | Performed by: UROLOGY

## 2023-01-04 PROCEDURE — 3072F LOW RISK FOR RETINOPATHY: CPT | Mod: HCNC,CPTII,S$GLB, | Performed by: UROLOGY

## 2023-01-04 PROCEDURE — 99214 PR OFFICE/OUTPT VISIT, EST, LEVL IV, 30-39 MIN: ICD-10-PCS | Mod: HCNC,S$GLB,, | Performed by: UROLOGY

## 2023-01-04 PROCEDURE — 3008F PR BODY MASS INDEX (BMI) DOCUMENTED: ICD-10-PCS | Mod: HCNC,CPTII,S$GLB, | Performed by: UROLOGY

## 2023-01-04 PROCEDURE — 99214 OFFICE O/P EST MOD 30 MIN: CPT | Mod: HCNC,S$GLB,, | Performed by: UROLOGY

## 2023-01-04 PROCEDURE — 1160F RVW MEDS BY RX/DR IN RCRD: CPT | Mod: HCNC,CPTII,S$GLB, | Performed by: UROLOGY

## 2023-01-04 PROCEDURE — 1159F MED LIST DOCD IN RCRD: CPT | Mod: HCNC,CPTII,S$GLB, | Performed by: UROLOGY

## 2023-01-04 PROCEDURE — 3072F PR LOW RISK FOR RETINOPATHY: ICD-10-PCS | Mod: HCNC,CPTII,S$GLB, | Performed by: UROLOGY

## 2023-01-04 PROCEDURE — 3075F PR MOST RECENT SYSTOLIC BLOOD PRESS GE 130-139MM HG: ICD-10-PCS | Mod: HCNC,CPTII,S$GLB, | Performed by: UROLOGY

## 2023-01-04 PROCEDURE — 3079F PR MOST RECENT DIASTOLIC BLOOD PRESSURE 80-89 MM HG: ICD-10-PCS | Mod: HCNC,CPTII,S$GLB, | Performed by: UROLOGY

## 2023-01-04 PROCEDURE — 3008F BODY MASS INDEX DOCD: CPT | Mod: HCNC,CPTII,S$GLB, | Performed by: UROLOGY

## 2023-01-04 PROCEDURE — 1159F PR MEDICATION LIST DOCUMENTED IN MEDICAL RECORD: ICD-10-PCS | Mod: HCNC,CPTII,S$GLB, | Performed by: UROLOGY

## 2023-01-04 PROCEDURE — 1126F AMNT PAIN NOTED NONE PRSNT: CPT | Mod: HCNC,CPTII,S$GLB, | Performed by: UROLOGY

## 2023-01-04 PROCEDURE — 3288F PR FALLS RISK ASSESSMENT DOCUMENTED: ICD-10-PCS | Mod: HCNC,CPTII,S$GLB, | Performed by: UROLOGY

## 2023-01-04 PROCEDURE — 99999 PR PBB SHADOW E&M-EST. PATIENT-LVL IV: ICD-10-PCS | Mod: PBBFAC,HCNC,, | Performed by: UROLOGY

## 2023-01-04 PROCEDURE — 3288F FALL RISK ASSESSMENT DOCD: CPT | Mod: HCNC,CPTII,S$GLB, | Performed by: UROLOGY

## 2023-01-04 PROCEDURE — 99999 PR PBB SHADOW E&M-EST. PATIENT-LVL IV: CPT | Mod: PBBFAC,HCNC,, | Performed by: UROLOGY

## 2023-01-04 PROCEDURE — 1157F ADVNC CARE PLAN IN RCRD: CPT | Mod: HCNC,CPTII,S$GLB, | Performed by: UROLOGY

## 2023-01-04 NOTE — PROGRESS NOTES
Chief Complaint:   Encounter Diagnoses   Name Primary?    Renal cell carcinoma, unspecified laterality Yes    Renal stone     Renal cyst          HPI:   1/4/23- doing well with no complaints, here today to discuss labs and imaging, no stone pain.  4/24/19: returns today in follow-up for bilateral renal masses s/p right robotic partial nephrectomy on 9/13/18. The masses were found incidentally during evaluation for non-specific abdominal pain and bloating. The masses were seen on an abdominal ultrasound. A CT scan with and without contrast was performed which showed the right renal mass to be enhancing and the left renal mass to be likely a cyst. The patient denied gross hematuria and/or constitutional symptoms attributable to her recently discovered renal mass. The patient denied a personal and family history of kidney cancer. The patient denied any personal history of other cancers. She did describe some lower abdominal pressure and pain. She has no voiding complaints at present. The patient has never smoked. Her eGFR is greater than 60 ml/min.  The patient underwent surgery and did very well. 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 Her final pathologic stage is [id number].      Allergies:  Citrus and derivatives, Latex, Valsartan, Aspirin, and Metformin    Medications: has a current medication list which includes the following prescription(s): blood sugar diagnostic, blood-glucose meter, fluticasone propionate, fluzone highdose quad 21-22 pf, glimepiride, tresiba flextouch u-200, lancets, lancets, olmesartan, omeprazole, pen needle, diabetic, pen needle, diabetic, prevnar 20 (pf), rosuvastatin, shingrix (pf), albuterol, and loratadine.    Review of Systems:  General: No fever, chills, fatigability, or weight loss.  Skin: No rashes, itching, or changes in color or texture of skin.  Chest: Denies IZQUIERDO, cyanosis, wheezing, cough, and sputum production.  Abdomen: Appetite fine. No  weight loss. Denies diarrhea, abdominal pain, hematemesis, or blood in stool.  Musculoskeletal: No joint stiffness or swelling. Denies back pain.  : As above.  All other review of systems negative.    PMH:   has a past medical history of Arthritis, Cataract, Colon polyp, Diabetes mellitus type II (15 years), Hyperlipidemia, Hypertension, Renal cell carcinoma (09/2018), Total knee replacement status, left (01/20/2019), and UTI (urinary tract infection).    PSH:   has a past surgical history that includes Cholecystectomy; Spine surgery; Hysterectomy (1993); Knee surgery (03/2017); Cholecystectomy; Colonoscopy (2012); Colonoscopy (N/A, 3/1/2018); Esophagogastroduodenoscopy (N/A, 8/21/2018); thumb surgery Rt- July 2018 - cyst ; Robot-assisted laparoscopic partial nephrectomy using da Basim Xi (Right, 9/13/2018); Joint replacement; and left knee replacement (01/20/2019).    FamHx: family history includes Diabetes in her brother and mother; Hypertension in her mother.    SocHx:  reports that she has never smoked. She has never used smokeless tobacco. She reports current alcohol use. She reports that she does not use drugs.     Physical Exam:  Vitals:   Vitals:    01/04/23 1400   BP: 133/80   Pulse: 97     General: A&Ox3. No apparent distress. No deformities.  Neck: No masses. Normal thyroid.  Lungs: normal inspiration. No use of accessory muscles.  Heart: normal pulse. No arrhythmias.  Abdomen: Soft. NT. ND. No masses. No hernias. No hepatosplenomegaly.  Skin: The skin is warm and dry. No jaundice.  Ext: No c/c/e.    Labs/Studies:   CXR normal 11/22  CT renal protocol stable left renal cyst, no recurrence, 2 mm left renal stone 11/22  Creatinine 0.9 11/22     Impression/Plan:        1. Renal cell carcinoma-  pT1a F2 right robotic partial nephrectomy 9/13/18    CT scan demonstrates no evidence of recurrence, chest x-ray and labs are stable.  See me in 1 year with a CT, labs and chest x-ray.  This will place her at 5  years postprocedure.    2. Renal cysts- stable, no need for intervention.    3. Renal stones- 2 mm stone identified on the left side, no ureteral colic pain.  Call with any issues.

## 2023-01-28 NOTE — PROGRESS NOTES
PCP: Sakina Cooper DO    Subjective:     Chief Complaint: Diabetes - Established Patient    Established Patient - Audio Only Telehealth Visit     The patient location is: Home  The chief complaint leading to consultation is: Diabetes follow up  Visit type: Virtual visit with audio only (telephone)  Total Time Spent with Patient: 14 minutes     The reason for the audio only service rather than synchronous audio and video virtual visit was related to technical difficulties or patient preference/necessity.     Each patient to whom I provide medical services by telemedicine is:  (1) informed of the relationship between the physician and patient and the respective role of any other health care provider with respect to management of the patient; and (2) notified that they may decline to receive medical services by telemedicine and may withdraw from such care at any time. Patient verbally consented to receive this service via voice-only telephone call.    This service was not originating from a related E/M service provided within the previous 7 days nor will  to an E/M service or procedure within the next 24 hours or my soonest available appointment.  Prevailing standard of care was able to be met in this audio-only visit.       HISTORY OF PRESENT ILLNESS: 67 y.o.  female presenting for diabetes management visit.   The patient's last visit with me was on 10/18/2022.  Patient has had Type II diabetes since 2005.  Pertinent to decision making is the following comorbidities: HTN, HLD, Obesity by BMI and Fatty Liver  Patient has the following Diabetes complications: without complications  She  has attended diabetes education in the past.     Patient's most recent A1c of 8.3% was completed 5 months ago.   Patient states since Her last A1c Her blood glucose levels have been within range in the morning / fasting.   Patient monitors blood glucose 1 times per day with Contour Next : Fasting and Before Dinner.    Patient blood glucose monitoring device will not be uploaded into Media Section today secondary to unable to upload successfully.   Per meter recall, fasting blood sugar ranging 98 - 120s and after dinner up to 212 with dessert.  Patient endorses the following diabetes related symptoms: None.   Patient is due today for the following diabetes-related health maintenance standards: A1c, Influenza Vaccine, and Mammogram  .   She denies recent hospital admissions or emergency room visits.   She denies having hypoglycemia.  Patient's concerns today include glycemic control.  Of note, patient needs help with pharmacy assistance.   Patient medication regimen is as below.     CURRENT DM MEDICATIONS:   Trulicity 4.5 mg weekly   Amaryl 4 mg before breakfast and before dinner  Tresiba 62 units daily      Patient has failed the following Diabetes medications:   Metformin - allergy   Mounjaro - cost; no pharm asst available         Labs Reviewed.       Lab Results   Component Value Date    CPEPTIDE 1.73 08/30/2022     Lab Results   Component Value Date    GLUTAMICACID 0.00 12/14/2017          //   , There is no height or weight on file to calculate BMI.  Her blood sugar in clinic today is:          Review of Systems   Constitutional:  Negative for activity change, appetite change, chills and fever.   HENT:  Negative for dental problem, mouth sores, nosebleeds, sore throat and trouble swallowing.    Eyes:  Negative for pain and discharge.   Respiratory:  Negative for shortness of breath, wheezing and stridor.    Cardiovascular:  Negative for chest pain, palpitations and leg swelling.   Gastrointestinal:  Negative for abdominal pain, diarrhea, nausea and vomiting.   Endocrine: Negative for polydipsia, polyphagia and polyuria.   Genitourinary:  Negative for dysuria, frequency and urgency.   Musculoskeletal:  Negative for joint swelling and myalgias.   Skin:  Negative for rash and wound.   Neurological:  Negative for dizziness,  syncope, weakness and headaches.   Psychiatric/Behavioral:  Negative for behavioral problems and dysphoric mood.        Diabetes Management Status  Statin: Taking  ACE/ARB: Taking    Screening or Prevention Patient's value Goal Complete/Controlled?   HgA1C Testing and Control   Lab Results   Component Value Date    HGBA1C 8.3 (H) 08/30/2022      Annually/Less than 8% Yes   Lipid profile : 05/30/2022 Annually Yes   LDL control Lab Results   Component Value Date    LDLCALC 62.4 (L) 05/30/2022    Annually/Less than 100 mg/dl  Yes   Nephropathy screening Lab Results   Component Value Date    MICALBCREAT 10.3 08/30/2022     Lab Results   Component Value Date    PROTEINUA Negative 09/08/2014    Annually Yes   Blood pressure BP Readings from Last 1 Encounters:   01/04/23 133/80    Less than 140/90 Yes   Dilated retinal exam : 05/30/2022 Annually Yes    Foot exam   : 05/30/2022 Annually No     Social History     Socioeconomic History    Marital status:     Number of children: 3   Occupational History    Occupation: Stay at Home    Occupation:    Tobacco Use    Smoking status: Never    Smokeless tobacco: Never   Substance and Sexual Activity    Alcohol use: Yes     Comment: occasional wine     Drug use: No    Sexual activity: Yes     Partners: Male     Birth control/protection: Surgical   Social History Narrative    . Housewife.     Social Determinants of Health     Financial Resource Strain: Low Risk     Difficulty of Paying Living Expenses: Not very hard   Food Insecurity: No Food Insecurity    Worried About Running Out of Food in the Last Year: Never true    Ran Out of Food in the Last Year: Never true   Transportation Needs: No Transportation Needs    Lack of Transportation (Medical): No    Lack of Transportation (Non-Medical): No   Physical Activity: Insufficiently Active    Days of Exercise per Week: 2 days    Minutes of Exercise per Session: 20 min   Stress: Stress Concern Present     Feeling of Stress : To some extent   Social Connections: Moderately Integrated    Frequency of Communication with Friends and Family: More than three times a week    Frequency of Social Gatherings with Friends and Family: Twice a week    Attends Yarsanism Services: More than 4 times per year    Active Member of Clubs or Organizations: No    Attends Club or Organization Meetings: Never    Marital Status:    Housing Stability: Low Risk     Unable to Pay for Housing in the Last Year: No    Number of Places Lived in the Last Year: 1    Unstable Housing in the Last Year: No     Past Medical History:   Diagnosis Date    Arthritis     Cataract     Colon polyp     Diabetes mellitus type II 15 years     am 05/30/2022    Hyperlipidemia     Hypertension     Renal cell carcinoma 09/2018    Total knee replacement status, left 01/20/2019    Dr.Robert Cook    UTI (urinary tract infection)        Objective:        Physical Exam  Neurological:      Mental Status: She is alert and oriented to person, place, and time. Mental status is at baseline.   Psychiatric:         Mood and Affect: Mood normal.         Behavior: Behavior normal.         Thought Content: Thought content normal.         Judgment: Judgment normal.         Assessment / Plan:     Type 2 diabetes mellitus with hemoglobin A1c goal of less than 7.0%  -     Hemoglobin A1C; Standing  -     semaglutide (OZEMPIC) 2 mg/dose (8 mg/3 mL) PnIj; Inject 2 mg into the skin every 7 days.  Dispense: 1 pen; Refill: 11    Fatty liver    Hyperlipidemia LDL goal <70    Hypertension goal BP (blood pressure) < 130/80    Obesity (BMI 30-39.9)    Screening mammogram for breast cancer  -     Mammo Digital Screening Bilat w/ [facility]; Future; Expected date: 01/30/2023      Additional Plan Details:    - POCT Glucose  - Encouraged continuation of lifestyle changes including regular exercise and limiting carbohydrates to 30-45 grams per meal threes times daily and 15 grams per snack  with a limit of two daily.   - Encouraged continued monitoring of blood glucose with maintenance of 2 times daily at Fasting and Before Bed.   - Current DM Medication Regimen: Continue Glimepiride to 4 mg BID wm. Change Tresiba 62 units daily to 50 units daily with Ozempic change.  Change Trulicity to Ozempic 2 mg weekly.  - Pharmacy Asst: Ozempic 2 mg, Tresiba  - Health Maintenance standards addressed today: A1c to be scheduled, Flu Shot - patient would like to complete outside of [facility], and Mammo - order placed and to be scheduled  - Nursing Visit: Patient is age 79 or younger with an A1c of 7.5 or greater and will not need nursing visit at this time .   - Follow up in 6 weeks .      Blakeney McKnight, PA-C [facility]

## 2023-01-30 ENCOUNTER — OFFICE VISIT (OUTPATIENT)
Dept: DIABETES | Facility: CLINIC | Age: 68
End: 2023-01-30
Payer: MEDICARE

## 2023-01-30 DIAGNOSIS — E66.9 OBESITY (BMI 30-39.9): ICD-10-CM

## 2023-01-30 DIAGNOSIS — I10 HYPERTENSION GOAL BP (BLOOD PRESSURE) < 130/80: ICD-10-CM

## 2023-01-30 DIAGNOSIS — K76.0 FATTY LIVER: ICD-10-CM

## 2023-01-30 DIAGNOSIS — Z12.31 SCREENING MAMMOGRAM FOR BREAST CANCER: ICD-10-CM

## 2023-01-30 DIAGNOSIS — E11.9 TYPE 2 DIABETES MELLITUS WITH HEMOGLOBIN A1C GOAL OF LESS THAN 7.0%: Primary | ICD-10-CM

## 2023-01-30 DIAGNOSIS — E78.5 HYPERLIPIDEMIA LDL GOAL <70: ICD-10-CM

## 2023-01-30 PROCEDURE — 3072F LOW RISK FOR RETINOPATHY: CPT | Mod: HCNC,CPTII,95, | Performed by: PHYSICIAN ASSISTANT

## 2023-01-30 PROCEDURE — 99442 PR PHYSICIAN TELEPHONE EVALUATION 11-20 MIN: ICD-10-PCS | Mod: HCNC,95,, | Performed by: PHYSICIAN ASSISTANT

## 2023-01-30 PROCEDURE — 3072F PR LOW RISK FOR RETINOPATHY: ICD-10-PCS | Mod: HCNC,CPTII,95, | Performed by: PHYSICIAN ASSISTANT

## 2023-01-30 PROCEDURE — 1157F PR ADVANCE CARE PLAN OR EQUIV PRESENT IN MEDICAL RECORD: ICD-10-PCS | Mod: HCNC,CPTII,95, | Performed by: PHYSICIAN ASSISTANT

## 2023-01-30 PROCEDURE — 99442 PR PHYSICIAN TELEPHONE EVALUATION 11-20 MIN: CPT | Mod: HCNC,95,, | Performed by: PHYSICIAN ASSISTANT

## 2023-01-30 PROCEDURE — 1157F ADVNC CARE PLAN IN RCRD: CPT | Mod: HCNC,CPTII,95, | Performed by: PHYSICIAN ASSISTANT

## 2023-01-30 RX ORDER — SEMAGLUTIDE 2.68 MG/ML
2 INJECTION, SOLUTION SUBCUTANEOUS
Qty: 1 PEN | Refills: 11 | Status: SHIPPED | OUTPATIENT
Start: 2023-01-30 | End: 2023-12-08 | Stop reason: SDUPTHER

## 2023-01-30 NOTE — PATIENT INSTRUCTIONS
CURRENT DM MEDICATIONS:   Trulicity 4.5 mg weekly - Change Ozempic 2 mg weekly   Amaryl 4 mg before breakfast and before dinner  Tresiba 62 units daily - change to 50 units with change to ozempic

## 2023-01-30 NOTE — Clinical Note
Sched mammo at [facility] in am - any day  6 week audio  3 mo in person   [facility]at[facility] - needs applications for ozempic 2 mg and tresiba

## 2023-02-01 ENCOUNTER — LAB VISIT (OUTPATIENT)
Dept: LAB | Facility: HOSPITAL | Age: 68
End: 2023-02-01
Attending: PHYSICIAN ASSISTANT
Payer: MEDICARE

## 2023-02-01 DIAGNOSIS — E11.9 TYPE 2 DIABETES MELLITUS WITH HEMOGLOBIN A1C GOAL OF LESS THAN 7.0%: ICD-10-CM

## 2023-02-01 LAB
ESTIMATED AVG GLUCOSE: 197 MG/DL (ref 68–131)
HBA1C MFR BLD: 8.5 % (ref 4–5.6)

## 2023-02-01 PROCEDURE — 36415 COLL VENOUS BLD VENIPUNCTURE: CPT | Mod: HCNC,PO | Performed by: PHYSICIAN ASSISTANT

## 2023-02-01 PROCEDURE — 83036 HEMOGLOBIN GLYCOSYLATED A1C: CPT | Mod: HCNC | Performed by: PHYSICIAN ASSISTANT

## 2023-02-03 RX ORDER — BNT162B2 ORIGINAL AND OMICRON BA.4/BA.5 .1125; .1125 MG/2.25ML; MG/2.25ML
INJECTION, SUSPENSION INTRAMUSCULAR
COMMUNITY
Start: 2022-10-27

## 2023-02-06 ENCOUNTER — OFFICE VISIT (OUTPATIENT)
Dept: INTERNAL MEDICINE | Facility: CLINIC | Age: 68
End: 2023-02-06
Payer: MEDICARE

## 2023-02-06 VITALS
SYSTOLIC BLOOD PRESSURE: 138 MMHG | TEMPERATURE: 98 F | OXYGEN SATURATION: 98 % | DIASTOLIC BLOOD PRESSURE: 80 MMHG | BODY MASS INDEX: 31.32 KG/M2 | HEART RATE: 80 BPM | HEIGHT: 62 IN | WEIGHT: 170.19 LBS | RESPIRATION RATE: 20 BRPM

## 2023-02-06 DIAGNOSIS — I10 HYPERTENSION GOAL BP (BLOOD PRESSURE) < 130/80: Chronic | ICD-10-CM

## 2023-02-06 DIAGNOSIS — I70.0 ATHEROSCLEROSIS OF AORTA: ICD-10-CM

## 2023-02-06 DIAGNOSIS — Z00.00 ANNUAL PHYSICAL EXAM: Primary | ICD-10-CM

## 2023-02-06 DIAGNOSIS — J84.10 CALCIFIED GRANULOMA OF LUNG: ICD-10-CM

## 2023-02-06 DIAGNOSIS — E78.5 HYPERLIPIDEMIA LDL GOAL <70: Chronic | ICD-10-CM

## 2023-02-06 DIAGNOSIS — F43.21 SITUATIONAL DEPRESSION: ICD-10-CM

## 2023-02-06 DIAGNOSIS — Z79.4 UNCONTROLLED TYPE 2 DIABETES MELLITUS WITH HYPERGLYCEMIA, WITH LONG-TERM CURRENT USE OF INSULIN: ICD-10-CM

## 2023-02-06 DIAGNOSIS — E11.65 UNCONTROLLED TYPE 2 DIABETES MELLITUS WITH HYPERGLYCEMIA, WITH LONG-TERM CURRENT USE OF INSULIN: ICD-10-CM

## 2023-02-06 PROCEDURE — 1157F PR ADVANCE CARE PLAN OR EQUIV PRESENT IN MEDICAL RECORD: ICD-10-PCS | Mod: HCNC,CPTII,S$GLB, | Performed by: INTERNAL MEDICINE

## 2023-02-06 PROCEDURE — 3008F BODY MASS INDEX DOCD: CPT | Mod: HCNC,CPTII,S$GLB, | Performed by: INTERNAL MEDICINE

## 2023-02-06 PROCEDURE — 3052F PR MOST RECENT HEMOGLOBIN A1C LEVEL 8.0 - < 9.0%: ICD-10-PCS | Mod: HCNC,CPTII,S$GLB, | Performed by: INTERNAL MEDICINE

## 2023-02-06 PROCEDURE — 3288F FALL RISK ASSESSMENT DOCD: CPT | Mod: HCNC,CPTII,S$GLB, | Performed by: INTERNAL MEDICINE

## 2023-02-06 PROCEDURE — 3079F DIAST BP 80-89 MM HG: CPT | Mod: HCNC,CPTII,S$GLB, | Performed by: INTERNAL MEDICINE

## 2023-02-06 PROCEDURE — 1160F RVW MEDS BY RX/DR IN RCRD: CPT | Mod: HCNC,CPTII,S$GLB, | Performed by: INTERNAL MEDICINE

## 2023-02-06 PROCEDURE — 3079F PR MOST RECENT DIASTOLIC BLOOD PRESSURE 80-89 MM HG: ICD-10-PCS | Mod: HCNC,CPTII,S$GLB, | Performed by: INTERNAL MEDICINE

## 2023-02-06 PROCEDURE — 3075F PR MOST RECENT SYSTOLIC BLOOD PRESS GE 130-139MM HG: ICD-10-PCS | Mod: HCNC,CPTII,S$GLB, | Performed by: INTERNAL MEDICINE

## 2023-02-06 PROCEDURE — 3072F PR LOW RISK FOR RETINOPATHY: ICD-10-PCS | Mod: HCNC,CPTII,S$GLB, | Performed by: INTERNAL MEDICINE

## 2023-02-06 PROCEDURE — 1101F PR PT FALLS ASSESS DOC 0-1 FALLS W/OUT INJ PAST YR: ICD-10-PCS | Mod: HCNC,CPTII,S$GLB, | Performed by: INTERNAL MEDICINE

## 2023-02-06 PROCEDURE — 1101F PT FALLS ASSESS-DOCD LE1/YR: CPT | Mod: HCNC,CPTII,S$GLB, | Performed by: INTERNAL MEDICINE

## 2023-02-06 PROCEDURE — 1159F MED LIST DOCD IN RCRD: CPT | Mod: HCNC,CPTII,S$GLB, | Performed by: INTERNAL MEDICINE

## 2023-02-06 PROCEDURE — 3052F HG A1C>EQUAL 8.0%<EQUAL 9.0%: CPT | Mod: HCNC,CPTII,S$GLB, | Performed by: INTERNAL MEDICINE

## 2023-02-06 PROCEDURE — 1126F AMNT PAIN NOTED NONE PRSNT: CPT | Mod: HCNC,CPTII,S$GLB, | Performed by: INTERNAL MEDICINE

## 2023-02-06 PROCEDURE — 3288F PR FALLS RISK ASSESSMENT DOCUMENTED: ICD-10-PCS | Mod: HCNC,CPTII,S$GLB, | Performed by: INTERNAL MEDICINE

## 2023-02-06 PROCEDURE — 3008F PR BODY MASS INDEX (BMI) DOCUMENTED: ICD-10-PCS | Mod: HCNC,CPTII,S$GLB, | Performed by: INTERNAL MEDICINE

## 2023-02-06 PROCEDURE — 1159F PR MEDICATION LIST DOCUMENTED IN MEDICAL RECORD: ICD-10-PCS | Mod: HCNC,CPTII,S$GLB, | Performed by: INTERNAL MEDICINE

## 2023-02-06 PROCEDURE — 99214 PR OFFICE/OUTPT VISIT, EST, LEVL IV, 30-39 MIN: ICD-10-PCS | Mod: HCNC,S$GLB,, | Performed by: INTERNAL MEDICINE

## 2023-02-06 PROCEDURE — 99999 PR PBB SHADOW E&M-EST. PATIENT-LVL V: CPT | Mod: PBBFAC,HCNC,, | Performed by: INTERNAL MEDICINE

## 2023-02-06 PROCEDURE — 99999 PR PBB SHADOW E&M-EST. PATIENT-LVL V: ICD-10-PCS | Mod: PBBFAC,HCNC,, | Performed by: INTERNAL MEDICINE

## 2023-02-06 PROCEDURE — 3072F LOW RISK FOR RETINOPATHY: CPT | Mod: HCNC,CPTII,S$GLB, | Performed by: INTERNAL MEDICINE

## 2023-02-06 PROCEDURE — 1126F PR PAIN SEVERITY QUANTIFIED, NO PAIN PRESENT: ICD-10-PCS | Mod: HCNC,CPTII,S$GLB, | Performed by: INTERNAL MEDICINE

## 2023-02-06 PROCEDURE — 99214 OFFICE O/P EST MOD 30 MIN: CPT | Mod: HCNC,S$GLB,, | Performed by: INTERNAL MEDICINE

## 2023-02-06 PROCEDURE — 1157F ADVNC CARE PLAN IN RCRD: CPT | Mod: HCNC,CPTII,S$GLB, | Performed by: INTERNAL MEDICINE

## 2023-02-06 PROCEDURE — 1160F PR REVIEW ALL MEDS BY PRESCRIBER/CLIN PHARMACIST DOCUMENTED: ICD-10-PCS | Mod: HCNC,CPTII,S$GLB, | Performed by: INTERNAL MEDICINE

## 2023-02-06 PROCEDURE — 3075F SYST BP GE 130 - 139MM HG: CPT | Mod: HCNC,CPTII,S$GLB, | Performed by: INTERNAL MEDICINE

## 2023-02-06 NOTE — PROGRESS NOTES
Dary Chaney  67 y.o. White female  [id number]    Chief Complaint:  Chief Complaint   Patient presents with    Annual Exam       History of Present Illness:  HTN--stable.   DM--worsened. She admits to diet non compliance. She is taking medication as prescribed. She is under the care of diabetes management.   HLD--compliant with rosuvastatin.   She has been depressed since her father's passing recently. She would like to talk with a counselor.     Laboratory   Lab Results   Component Value Date    WBC 6.36 11/02/2022    HGB 12.8 11/02/2022    HCT 40.8 11/02/2022     11/02/2022    CHOL 148 05/30/2022    TRIG 138 05/30/2022    HDL 58 05/30/2022    ALT 35 11/02/2022    AST 16 11/02/2022     11/02/2022    K 4.5 11/02/2022     11/02/2022    CREATININE 0.9 11/02/2022    BUN 28 (H) 11/02/2022    CO2 28 11/02/2022    TSH 0.757 04/23/2019    INR 1.0 06/21/2010    HGBA1C 8.5 (H) 02/01/2023     Review of Systems   Constitutional:  Negative for fever.   Respiratory:  Positive for wheezing (on occasion). Negative for shortness of breath.    Cardiovascular:  Negative for chest pain.   Gastrointestinal:  Positive for constipation.   Neurological:  Negative for dizziness and headaches.   Psychiatric/Behavioral:  Positive for depression.      The following were reviewed: Active problem list, medication list, allergies, family history, social history, and Health Maintenance.     History:  Past Medical History:   Diagnosis Date    Arthritis     Cataract     Colon polyp     Diabetes mellitus type II 15 years     am 05/30/2022    Hyperlipidemia     Hypertension     Renal cell carcinoma 09/2018    Total knee replacement status, left 01/20/2019    Dr.Robert Cook    UTI (urinary tract infection)      Past Surgical History:   Procedure Laterality Date    CHOLECYSTECTOMY      CHOLECYSTECTOMY      COLONOSCOPY  2012    COLONOSCOPY N/A 3/1/2018    Procedure: COLONOSCOPY;  Surgeon: Phillip Brower MD;  Location:  [facility] ENDO;  Service: Endoscopy;  Laterality: N/A;    ESOPHAGOGASTRODUODENOSCOPY N/A 8/21/2018    Procedure: ESOPHAGOGASTRODUODENOSCOPY (EGD);  Surgeon: Maco Wynn MD;  Location: [facility];  Service: Endoscopy;  Laterality: N/A;    HYSTERECTOMY  1993    uterine prolapse    JOINT REPLACEMENT      KNEE SURGERY  03/2017    left knee replacement  01/20/2019    Dr.Robert Cook    ROBOT-ASSISTED LAPAROSCOPIC PARTIAL NEPHRECTOMY USING DA DENZEL XI Right 9/13/2018    Procedure: XI ROBOTIC NEPHRECTOMY, PARTIAL;  Surgeon: Tommy Bradshaw MD;  Location: [address];  Service: Urology;  Laterality: Right;  [place] u/s confirmed 9/13 0700 lb  conformation#[id number]    SPINE SURGERY      thumb surgery Rt- July 2018 - cyst        Family History   Problem Relation Age of Onset    Diabetes Mother     Hypertension Mother     Diabetes Brother     Heart disease Neg Hx      Social History     Socioeconomic History    Marital status:     Number of children: 3   Occupational History    Occupation: Stay at Home    Occupation:    Tobacco Use    Smoking status: Never    Smokeless tobacco: Never   Substance and Sexual Activity    Alcohol use: Yes     Comment: occasional wine     Drug use: No    Sexual activity: Yes     Partners: Male     Birth control/protection: Surgical   Social History Narrative    . Housewife.     Social Determinants of Health     Financial Resource Strain: Low Risk     Difficulty of Paying Living Expenses: Not very hard   Food Insecurity: No Food Insecurity    Worried About Running Out of Food in the Last Year: Never true    Ran Out of Food in the Last Year: Never true   Transportation Needs: No Transportation Needs    Lack of Transportation (Medical): No    Lack of Transportation (Non-Medical): No   Physical Activity: Insufficiently Active    Days of Exercise per Week: 2 days    Minutes of Exercise per Session: 20 min   Stress: Stress Concern Present    Feeling of Stress : To  some extent   Social Connections: Moderately Integrated    Frequency of Communication with Friends and Family: More than three times a week    Frequency of Social Gatherings with Friends and Family: Twice a week    Attends Jewish Services: More than 4 times per year    Active Member of Clubs or Organizations: No    Attends Club or Organization Meetings: Never    Marital Status:    Housing Stability: Low Risk     Unable to Pay for Housing in the Last Year: No    Number of Places Lived in the Last Year: 1    Unstable Housing in the Last Year: No     Patient Active Problem List   Diagnosis    Central obesity    Hypertension goal BP (blood pressure) < 130/80    Hyperlipidemia LDL goal <70    Seasonal allergic rhinitis    Acquired cyst of kidney    Intervertebral lumbar disc disorder with myelopathy, lumbar region    Cataract    Obesity (BMI 30-39.9)    Type 2 diabetes mellitus without retinopathy    Esophageal dysphagia    Dysphagia    Ulcer of esophagus without bleeding    Hiatal hernia    Esophageal ring, acquired    Renal cyst    Patient is Gnosticism    History of back surgery    Abnormal EKG    Fatty liver    Varicose veins of both lower extremities    Type 2 diabetes mellitus with hemoglobin A1c goal of less than 7.0%    Atherosclerosis of aorta    Calcified granuloma of lung    Refusal of blood transfusions as patient is Gnosticism    Renal cell carcinoma    Renal stone     Review of patient's allergies indicates:   Allergen Reactions    Citrus and derivatives Swelling, Other (See Comments) and Edema     Redness  Lip swelling   Itching     Other reaction(s): Edema, Other (See Comments)  Redness  Lip swelling   Itching     Latex Other (See Comments)     Other reaction(s): Rash  Other reaction(s): welts  Other reaction(s): Itching  Other reaction(s): Other (See Comments)  Other reaction(s): Rash  Other reaction(s): whelps  Other reaction(s): Itching  Other reaction(s): Rash  Other  reaction(s): welts  Other reaction(s): Itching    Valsartan Other (See Comments)     Other reaction(s): disoriented  Other reaction(s): Other (See Comments)  Other reaction(s): disoriented  Other reaction(s): disoriented    Aspirin Nausea Only     Other reaction(s): Nausea Only    Metformin Anxiety     Other reaction(s): anxiety  Other reaction(s): anxiety  Other reaction(s): anxiety       Health Maintenance  Health Maintenance Topics with due status: Not Due       Topic Last Completion Date    Colorectal Cancer Screening 03/01/2018    DEXA Scan 12/18/2019    Foot Exam 05/30/2022    Lipid Panel 05/30/2022    Eye Exam 05/30/2022    Diabetes Urine Screening 08/30/2022    Hemoglobin A1c 02/01/2023    High Dose Statin 02/06/2023     Health Maintenance Due   Topic Date Due    TETANUS VACCINE  08/26/2021    Shingles Vaccine (2 of 2) 08/24/2022    Influenza Vaccine (1) 09/01/2022    Mammogram  12/30/2022       Medications:  Current Outpatient Medications on File Prior to Visit   Medication Sig Dispense Refill    albuterol (PROVENTIL) 2.5 mg /3 mL (0.083 %) nebulizer solution Take 3 mLs (2.5 mg total) by nebulization every 6 (six) hours as needed for Wheezing. 120 mL 1    blood sugar diagnostic Strp 1 each by Misc.(Non-Drug; Combo Route) route 4 (four) times daily. 300 each 3    blood-glucose meter kit Test finger stick blood sugar once daily. 1 each 0    fluticasone (FLONASE) 50 mcg/actuation nasal spray USE TWO SPRAY(S) IN EACH NOSTRIL ONCE DAILY 16 g 1    FLUZONE HIGHDOSE QUAD 21-22  mcg/0.7 mL Syrg       glimepiride (AMARYL) 4 MG tablet Take 1 tablet (4 mg total) by mouth 2 (two) times daily with meals. 180 tablet 3    insulin degludec (TRESIBA FLEXTOUCH U-200) 200 unit/mL (3 mL) insulin pen Inject 68 Units into the skin once daily. 11 pen 3    lancets Misc 1 each by Misc.(Non-Drug; Combo Route) route 4 (four) times daily. 100 each 11    lancets Misc 1 each by Misc.(Non-Drug; Combo Route) route 4 (four) times  "daily. 300 each 3    loratadine (CLARITIN) 10 mg tablet Take 1 tablet (10 mg total) by mouth once daily. (Patient taking differently: Take 10 mg by mouth daily as needed.) 30 tablet 0    olmesartan (BENICAR) 20 MG tablet Take 1 tablet (20 mg total) by mouth once daily. 90 tablet 0    omeprazole (PRILOSEC) 40 MG capsule Take 1 capsule (40 mg total) by mouth once daily. 90 capsule 0    pen needle, diabetic (PEN NEEDLE) 31 gauge x 5/16" Ndle Inject 1 each into the skin once daily. 100 each 11    pen needle, diabetic (RELION PEN NEEDLES MISC) INJECT 1 INTO SKIN ONCE DAILY      PFIZER COVID BIVAL,12Y UP,,PF, 30 mcg/0.3 mL injection       PREVNAR 20, PF, 0.5 mL Syrg injection       rosuvastatin (CRESTOR) 40 MG Tab Take 1 tablet (40 mg total) by mouth every evening. 90 tablet 1    semaglutide (OZEMPIC) 2 mg/dose (8 mg/3 mL) PnIj Inject 2 mg into the skin every 7 days. 1 pen 11    SHINGRIX, PF, 50 mcg/0.5 mL injection        No current facility-administered medications on file prior to visit.       Medications have been reviewed and reconciled with patient at visit today.    Exam:  Vitals:    02/06/23 1647   BP: 138/80   Pulse: 80   Resp: 20   Temp: 97.8 °F (36.6 °C)     Weight: 77.2 kg (170 lb 3.1 oz)   Body mass index is 31.13 kg/m².      Physical Exam  Vitals reviewed.   Constitutional:       General: She is not in acute distress.     Appearance: She is well-developed. She is not ill-appearing.   Eyes:      General: No scleral icterus.  Cardiovascular:      Rate and Rhythm: Normal rate and regular rhythm.      Heart sounds: Normal heart sounds.   Pulmonary:      Effort: Pulmonary effort is normal. No respiratory distress.      Breath sounds: Normal breath sounds.   Abdominal:      General: Bowel sounds are normal.      Palpations: Abdomen is soft.   Skin:     General: Skin is warm and dry.   Neurological:      Mental Status: She is alert and oriented to person, place, and time.   Psychiatric:         Behavior: Behavior " normal.      Comments: Tearful          Assessment:  The primary encounter diagnosis was Annual physical exam. Diagnoses of Hypertension goal BP (blood pressure) < 130/80, Uncontrolled type 2 diabetes mellitus with hyperglycemia, with long-term current use of insulin, Hyperlipidemia LDL goal <70, Atherosclerosis of aorta, Calcified granuloma of lung, and Situational depression were also pertinent to this visit.    Plan:  Annual physical exam  -     Counseled regarding age appropriate screenings and immunizations  -     Counseled regarding lifestyle modifications    Hypertension goal BP (blood pressure) < 130/80  -     Continue olmesartan  -     Comprehensive Metabolic Panel; Standing  -     Lipid Panel; Standing    Uncontrolled type 2 diabetes mellitus with hyperglycemia, with long-term current use of insulin  -     Lifestyle modifications discussed  -     F/U with diabetes management  -     Comprehensive Metabolic Panel; Standing  -     Hemoglobin A1C; Standing  -     Lipid Panel; Standing    Hyperlipidemia LDL goal <70  -     Continue rosuvastatin  -     Comprehensive Metabolic Panel; Standing  -     Lipid Panel; Standing    Atherosclerosis of aorta  -     Continue rosuvastatin  -     Lipid Panel; Standing    Calcified granuloma of lung  -     asymptomatic    Situational depression  -     Ambulatory referral/consult to Psychiatry; Future; Expected date: 02/13/2023      Follow up in about 3 months (around 5/6/2023).

## 2023-02-07 DIAGNOSIS — Z00.00 ENCOUNTER FOR MEDICARE ANNUAL WELLNESS EXAM: ICD-10-CM

## 2023-02-09 DIAGNOSIS — Z00.00 ENCOUNTER FOR MEDICARE ANNUAL WELLNESS EXAM: ICD-10-CM

## 2023-03-01 ENCOUNTER — OFFICE VISIT (OUTPATIENT)
Dept: OTOLARYNGOLOGY | Facility: CLINIC | Age: 68
End: 2023-03-01
Payer: MEDICARE

## 2023-03-01 ENCOUNTER — CLINICAL SUPPORT (OUTPATIENT)
Dept: AUDIOLOGY | Facility: CLINIC | Age: 68
End: 2023-03-01
Payer: MEDICARE

## 2023-03-01 VITALS — WEIGHT: 168.88 LBS | BODY MASS INDEX: 30.89 KG/M2 | TEMPERATURE: 98 F

## 2023-03-01 DIAGNOSIS — M26.623 BILATERAL TEMPOROMANDIBULAR JOINT PAIN: ICD-10-CM

## 2023-03-01 DIAGNOSIS — H90.3 SENSORINEURAL HEARING LOSS, BILATERAL: Primary | ICD-10-CM

## 2023-03-01 DIAGNOSIS — H92.03 OTALGIA OF BOTH EARS: ICD-10-CM

## 2023-03-01 DIAGNOSIS — H90.3 SENSORINEURAL HEARING LOSS (SNHL) OF BOTH EARS: ICD-10-CM

## 2023-03-01 DIAGNOSIS — H61.21 RIGHT EAR IMPACTED CERUMEN: Primary | ICD-10-CM

## 2023-03-01 PROCEDURE — 99999 PR PBB SHADOW E&M-EST. PATIENT-LVL I: CPT | Mod: PBBFAC,HCNC,, | Performed by: AUDIOLOGIST-HEARING AID FITTER

## 2023-03-01 PROCEDURE — 92567 PR TYMPA2METRY: ICD-10-PCS | Mod: HCNC,S$GLB,, | Performed by: AUDIOLOGIST-HEARING AID FITTER

## 2023-03-01 PROCEDURE — 92557 COMPREHENSIVE HEARING TEST: CPT | Mod: HCNC,S$GLB,, | Performed by: AUDIOLOGIST-HEARING AID FITTER

## 2023-03-01 PROCEDURE — 3072F LOW RISK FOR RETINOPATHY: CPT | Mod: HCNC,CPTII,S$GLB, | Performed by: OTOLARYNGOLOGY

## 2023-03-01 PROCEDURE — 1159F MED LIST DOCD IN RCRD: CPT | Mod: HCNC,CPTII,S$GLB, | Performed by: OTOLARYNGOLOGY

## 2023-03-01 PROCEDURE — 1101F PT FALLS ASSESS-DOCD LE1/YR: CPT | Mod: HCNC,CPTII,S$GLB, | Performed by: OTOLARYNGOLOGY

## 2023-03-01 PROCEDURE — 69210 REMOVE IMPACTED EAR WAX UNI: CPT | Mod: HCNC,S$GLB,, | Performed by: OTOLARYNGOLOGY

## 2023-03-01 PROCEDURE — 1157F PR ADVANCE CARE PLAN OR EQUIV PRESENT IN MEDICAL RECORD: ICD-10-PCS | Mod: HCNC,CPTII,S$GLB, | Performed by: OTOLARYNGOLOGY

## 2023-03-01 PROCEDURE — 3052F PR MOST RECENT HEMOGLOBIN A1C LEVEL 8.0 - < 9.0%: ICD-10-PCS | Mod: HCNC,CPTII,S$GLB, | Performed by: OTOLARYNGOLOGY

## 2023-03-01 PROCEDURE — 1157F ADVNC CARE PLAN IN RCRD: CPT | Mod: HCNC,CPTII,S$GLB, | Performed by: OTOLARYNGOLOGY

## 2023-03-01 PROCEDURE — 3072F PR LOW RISK FOR RETINOPATHY: ICD-10-PCS | Mod: HCNC,CPTII,S$GLB, | Performed by: OTOLARYNGOLOGY

## 2023-03-01 PROCEDURE — 69210 PR REMOVAL IMPACTED CERUMEN REQUIRING INSTRUMENTATION, UNILATERAL: ICD-10-PCS | Mod: HCNC,S$GLB,, | Performed by: OTOLARYNGOLOGY

## 2023-03-01 PROCEDURE — 99203 OFFICE O/P NEW LOW 30 MIN: CPT | Mod: 25,HCNC,S$GLB, | Performed by: OTOLARYNGOLOGY

## 2023-03-01 PROCEDURE — 3008F BODY MASS INDEX DOCD: CPT | Mod: HCNC,CPTII,S$GLB, | Performed by: OTOLARYNGOLOGY

## 2023-03-01 PROCEDURE — 3052F HG A1C>EQUAL 8.0%<EQUAL 9.0%: CPT | Mod: HCNC,CPTII,S$GLB, | Performed by: OTOLARYNGOLOGY

## 2023-03-01 PROCEDURE — 99999 PR PBB SHADOW E&M-EST. PATIENT-LVL III: CPT | Mod: PBBFAC,HCNC,, | Performed by: OTOLARYNGOLOGY

## 2023-03-01 PROCEDURE — 99999 PR PBB SHADOW E&M-EST. PATIENT-LVL I: ICD-10-PCS | Mod: PBBFAC,HCNC,, | Performed by: AUDIOLOGIST-HEARING AID FITTER

## 2023-03-01 PROCEDURE — 3288F FALL RISK ASSESSMENT DOCD: CPT | Mod: HCNC,CPTII,S$GLB, | Performed by: OTOLARYNGOLOGY

## 2023-03-01 PROCEDURE — 1159F PR MEDICATION LIST DOCUMENTED IN MEDICAL RECORD: ICD-10-PCS | Mod: HCNC,CPTII,S$GLB, | Performed by: OTOLARYNGOLOGY

## 2023-03-01 PROCEDURE — 3288F PR FALLS RISK ASSESSMENT DOCUMENTED: ICD-10-PCS | Mod: HCNC,CPTII,S$GLB, | Performed by: OTOLARYNGOLOGY

## 2023-03-01 PROCEDURE — 3008F PR BODY MASS INDEX (BMI) DOCUMENTED: ICD-10-PCS | Mod: HCNC,CPTII,S$GLB, | Performed by: OTOLARYNGOLOGY

## 2023-03-01 PROCEDURE — 99999 PR PBB SHADOW E&M-EST. PATIENT-LVL III: ICD-10-PCS | Mod: PBBFAC,HCNC,, | Performed by: OTOLARYNGOLOGY

## 2023-03-01 PROCEDURE — 99203 PR OFFICE/OUTPT VISIT, NEW, LEVL III, 30-44 MIN: ICD-10-PCS | Mod: 25,HCNC,S$GLB, | Performed by: OTOLARYNGOLOGY

## 2023-03-01 PROCEDURE — 92567 TYMPANOMETRY: CPT | Mod: HCNC,S$GLB,, | Performed by: AUDIOLOGIST-HEARING AID FITTER

## 2023-03-01 PROCEDURE — 1101F PR PT FALLS ASSESS DOC 0-1 FALLS W/OUT INJ PAST YR: ICD-10-PCS | Mod: HCNC,CPTII,S$GLB, | Performed by: OTOLARYNGOLOGY

## 2023-03-01 PROCEDURE — 92557 PR COMPREHENSIVE HEARING TEST: ICD-10-PCS | Mod: HCNC,S$GLB,, | Performed by: AUDIOLOGIST-HEARING AID FITTER

## 2023-03-01 NOTE — PROGRESS NOTES
Referring Provider:    Aaareferral Self  No address on file  Subjective:   Patient: Dary Chaney [id number], :1955   Visit date:3/1/2023 2:05 PM    Chief Complaint:  Ear Fullness (With pain   in the right ear )    HPI:    Prior notes reviewed by myself.  Clinical documentation obtained by nursing staff reviewed.     67-year-old female presents for evaluation of ear fullness and right-sided otalgia.  These symptoms are present the majority of the time.  She feels that her hearing is somewhat muffled.  No significant past otologic history.      Objective:     Physical Exam:  Vitals:  Temp 98.4 °F (36.9 °C)   Wt 76.6 kg (168 lb 14 oz)   BMI 30.89 kg/m²   General appearance:  Well developed, well nourished    Ears:  Otoscopy of external auditory canals and tympanic membranes was significant for right sided cerumen impaction with normal TMs, clinical speech reception thresholds grossly intact, no mass/lesion of auricle.    Nose:  No masses/lesions of external nose, nasal mucosa, septum, and turbinates were within normal limits.    Mouth:  No mass/lesion of lips, teeth, gums, hard/soft palate, tongue, tonsils, or oropharynx.    Neck & Lymphatics:  No cervical lymphadenopathy, no neck mass/crepitus/ asymmetry, trachea is midline, no thyroid enlargement/tenderness/mass. Mildly TTP with audible and palpable crepitus        [x]  Data Reviewed:    Lab Results   Component Value Date    WBC 6.36 2022    HGB 12.8 2022    HCT 40.8 2022    MCV 81 (L) 2022    EOSINOPHIL 3.0 2022       Procedure Note    CHIEF COMPLAINT:  Cerumen Impaction    Description:  The patient was seated in an exam chair.  An ear speculum was placed in the right EAC and was examined under the microscope.  Suction and/or loop curettes were used to remove a large cerumen impaction.  The tympanic membrane was visualized and was normal in appearance.  The patient tolerated the procedure well.       Media Information    File  Link    Annotation on 3/1/2023  2:29 PM by MICHA Oconnor: AUDIOGRAM        Key Information    Document ID File Type Document Type Description   [id number] Annotation Annotation AUDIOGRAM     Import Information    Attached At Date Time User Dept   Encounter Level 3/1/2023  2:29 PM MICHA Oconnor [facility] Audiology     Encounter    Clinical Support on 3/1/23 with MICHA Oconnor     Media Audit Information    Insurance Documents was deleted from this patient by Lianne Hinkle on 5/13/2014 at 9:13 AM (DCS ID: [id number], File: [id number])   Insurance Documents was deleted from this patient by Lianne Hinkle on 5/13/2014 at 9:13 AM (DCS ID: [id number], File: [id number])   Insurance Documents was deleted from this patient by Lianne Hinkle on 5/13/2014 at 9:13 AM (DCS ID: [id number], File: [id number])   Insurance Documents was deleted from this patient by Lianne Hinkle on 5/13/2014 at 9:13 AM (DCS ID: [id number], File: [id number])   Insurance Documents was deleted from this patient by Lianne Hinkle on 5/13/2014 at 9:13 AM (DCS ID: [id number], File: [id number])   Insurance Documents was deleted from this patient by Interface, Transcription Incoming on 1/5/2016 at 9:52 AM (DCS ID: [id number], File: [id number])   Insurance Documents was deleted from this patient by Interface, Transcription Incoming on 1/5/2016 at 10:03 AM (DCS ID: [id number], File: [id number])         Assessment & Plan:   Right ear impacted cerumen    Otalgia of both ears    Sensorineural hearing loss (SNHL) of both ears    Bilateral temporomandibular joint pain        We successfully debrided her right ear.  Her ear exam was essentially normal.  She does have some mild sensorineural hearing loss.  I feel that her biggest issue may be secondary to her TMJ.  She has audible and palpable crepitus bilaterally with significant tenderness.  I suggested that she discuss this further with her dental professional.  I explained that this could be causing  some of these otologic symptoms as well.    We had a long discussion regarding the underlying pathology of temporomandibular joint dysfunction (TMD) as the cause of ear pain.  We further discussed conservative measures to treat TMD including avoiding gum and other foods that require lots of chewing, warm compresses, and scheduled antinflammatories. We also discussed bruxism (grinding of the teeth) and the role that often plays in TMJ related pain.  I explained that for patients with evidence of wear consistent with bruxism, a mouth guard to be worn while sleeping is often necessary.  If the pain persists with conservative treatment, the patient will then schedule an appointment with a dentist for further evaluation.

## 2023-03-01 NOTE — PROGRESS NOTES
Referring Provider:Dr. Terrance Chaney was seen for an audiological evaluation.  Patient complains of ear fullness and right-sided otalgia.  These symptoms are present the majority of the time.  She feels that her hearing is somewhat muffled.  No significant past otologic history.    Results reveal a mild sensorineural hearing loss 250-8000 Hz for the right ear, and a mild sensorineural hearing loss 250-8000 Hz for the left ear.   Speech Reception Thresholds were  20 dBHL for the right ear and 25 dBHL for the left ear.   Word recognition scores were excellent for the right ear and excellent for the left ear.   Tympanograms were Type A, normal for the right ear and Type A, normal for the left ear.    Patient was counseled on the above findings.    Recommendations:  1. ENT  2. Annual Audiograms

## 2023-03-14 ENCOUNTER — HOSPITAL ENCOUNTER (OUTPATIENT)
Dept: RADIOLOGY | Facility: HOSPITAL | Age: 68
Discharge: HOME | End: 2023-03-14
Attending: PHYSICIAN ASSISTANT
Payer: MEDICARE

## 2023-03-14 DIAGNOSIS — Z12.31 SCREENING MAMMOGRAM FOR BREAST CANCER: ICD-10-CM

## 2023-03-14 PROCEDURE — 77067 SCR MAMMO BI INCL CAD: CPT | Mod: TC,HCNC

## 2023-03-14 PROCEDURE — 77063 MAMMO DIGITAL SCREENING BILAT WITH TOMO: ICD-10-PCS | Mod: 26,HCNC,, | Performed by: RADIOLOGY

## 2023-03-14 PROCEDURE — 77067 SCR MAMMO BI INCL CAD: CPT | Mod: 26,HCNC,, | Performed by: RADIOLOGY

## 2023-03-14 PROCEDURE — 77067 MAMMO DIGITAL SCREENING BILAT WITH TOMO: ICD-10-PCS | Mod: 26,HCNC,, | Performed by: RADIOLOGY

## 2023-03-14 PROCEDURE — 77063 BREAST TOMOSYNTHESIS BI: CPT | Mod: 26,HCNC,, | Performed by: RADIOLOGY

## 2023-03-16 DIAGNOSIS — K21.9 CHRONIC GERD: ICD-10-CM

## 2023-03-16 DIAGNOSIS — I10 HYPERTENSION GOAL BP (BLOOD PRESSURE) < 130/80: Chronic | ICD-10-CM

## 2023-03-16 RX ORDER — OMEPRAZOLE 40 MG/1
CAPSULE, DELAYED RELEASE ORAL
Qty: 90 CAPSULE | Refills: 3 | Status: SHIPPED | OUTPATIENT
Start: 2023-03-16 | End: 2024-03-08

## 2023-03-16 RX ORDER — OLMESARTAN MEDOXOMIL 20 MG/1
TABLET ORAL
Qty: 90 TABLET | Refills: 2 | Status: SHIPPED | OUTPATIENT
Start: 2023-03-16 | End: 2023-12-12

## 2023-03-16 NOTE — TELEPHONE ENCOUNTER
No new care gaps identified.  [facility] Embedded Care Gaps. Reference number: [id number]. 3/16/2023   8:01:28 AM EDDIET

## 2023-03-16 NOTE — TELEPHONE ENCOUNTER
Refill Decision Note   Dary Chaney  is requesting a refill authorization.  Brief Assessment and Rationale for Refill:  Approve     Medication Therapy Plan:       Medication Reconciliation Completed: No   Comments:     No Care Gaps recommended.     Note composed:3:06 PM 03/16/2023

## 2023-03-21 ENCOUNTER — OFFICE VISIT (OUTPATIENT)
Dept: DIABETES | Facility: CLINIC | Age: 68
End: 2023-03-21
Payer: MEDICARE

## 2023-03-21 DIAGNOSIS — K76.0 FATTY LIVER: ICD-10-CM

## 2023-03-21 DIAGNOSIS — I10 HYPERTENSION GOAL BP (BLOOD PRESSURE) < 130/80: ICD-10-CM

## 2023-03-21 DIAGNOSIS — E66.9 OBESITY (BMI 30-39.9): ICD-10-CM

## 2023-03-21 DIAGNOSIS — E11.9 TYPE 2 DIABETES MELLITUS WITH HEMOGLOBIN A1C GOAL OF LESS THAN 7.0%: Primary | ICD-10-CM

## 2023-03-21 DIAGNOSIS — E78.5 HYPERLIPIDEMIA LDL GOAL <70: ICD-10-CM

## 2023-03-21 PROCEDURE — 4010F PR ACE/ARB THEARPY RXD/TAKEN: ICD-10-PCS | Mod: HCNC,CPTII,95, | Performed by: PHYSICIAN ASSISTANT

## 2023-03-21 PROCEDURE — 1157F ADVNC CARE PLAN IN RCRD: CPT | Mod: HCNC,CPTII,95, | Performed by: PHYSICIAN ASSISTANT

## 2023-03-21 PROCEDURE — 1157F PR ADVANCE CARE PLAN OR EQUIV PRESENT IN MEDICAL RECORD: ICD-10-PCS | Mod: HCNC,CPTII,95, | Performed by: PHYSICIAN ASSISTANT

## 2023-03-21 PROCEDURE — 99442 PR PHYSICIAN TELEPHONE EVALUATION 11-20 MIN: ICD-10-PCS | Mod: HCNC,95,, | Performed by: PHYSICIAN ASSISTANT

## 2023-03-21 PROCEDURE — 99442 PR PHYSICIAN TELEPHONE EVALUATION 11-20 MIN: CPT | Mod: HCNC,95,, | Performed by: PHYSICIAN ASSISTANT

## 2023-03-21 PROCEDURE — 3052F PR MOST RECENT HEMOGLOBIN A1C LEVEL 8.0 - < 9.0%: ICD-10-PCS | Mod: HCNC,CPTII,95, | Performed by: PHYSICIAN ASSISTANT

## 2023-03-21 PROCEDURE — 3072F PR LOW RISK FOR RETINOPATHY: ICD-10-PCS | Mod: HCNC,CPTII,95, | Performed by: PHYSICIAN ASSISTANT

## 2023-03-21 PROCEDURE — 3052F HG A1C>EQUAL 8.0%<EQUAL 9.0%: CPT | Mod: HCNC,CPTII,95, | Performed by: PHYSICIAN ASSISTANT

## 2023-03-21 PROCEDURE — 3072F LOW RISK FOR RETINOPATHY: CPT | Mod: HCNC,CPTII,95, | Performed by: PHYSICIAN ASSISTANT

## 2023-03-21 PROCEDURE — 4010F ACE/ARB THERAPY RXD/TAKEN: CPT | Mod: HCNC,CPTII,95, | Performed by: PHYSICIAN ASSISTANT

## 2023-03-21 NOTE — PROGRESS NOTES
PCP: Sakina Cooper DO    Subjective:     Chief Complaint: Diabetes - Established Patient    Established Patient - Audio Only Telehealth Visit     The patient location is: Home  The chief complaint leading to consultation is: Diabetes follow up  Visit type: Virtual visit with audio only (telephone)  Total Time Spent with Patient: 14 minutes     The reason for the audio only service rather than synchronous audio and video virtual visit was related to technical difficulties or patient preference/necessity.     Each patient to whom I provide medical services by telemedicine is:  (1) informed of the relationship between the physician and patient and the respective role of any other health care provider with respect to management of the patient; and (2) notified that they may decline to receive medical services by telemedicine and may withdraw from such care at any time. Patient verbally consented to receive this service via voice-only telephone call.    This service was not originating from a related E/M service provided within the previous 7 days nor will  to an E/M service or procedure within the next 24 hours or my soonest available appointment.  Prevailing standard of care was able to be met in this audio-only visit.       HISTORY OF PRESENT ILLNESS: 67 y.o.  female presenting for diabetes management visit.   The patient's last visit with me was on 1/30/2023.  Patient has had Type II diabetes since 2005.  Pertinent to decision making is the following comorbidities: HTN, HLD, Obesity by BMI and Fatty Liver  Patient has the following Diabetes complications: without complications  She  has attended diabetes education in the past.     Patient's most recent A1c of 8.5% was completed 1 months ago.   Patient states since Her last A1c Her blood glucose levels have been within range in the morning / fasting.   Patient monitors blood glucose 1 times per day with Contour Next : Fasting and After Dinner.    Patient blood glucose monitoring device will not be uploaded into Media Section today secondary to unable to upload successfully.   Per meter recall, fasting blood sugar ranging 93 - 177.  Patient endorses the following diabetes related symptoms: None.   Patient is due today for the following diabetes-related health maintenance standards: Influenza Vaccine.   She denies recent hospital admissions or emergency room visits.   She denies having hypoglycemia.  Patient's concerns today include glycemic control.  Of note, patient needs help with pharmacy assistance.   Patient medication regimen is as below.     CURRENT DM MEDICATIONS:   Ozempic 2 mg weekly   Amaryl 4 mg before breakfast and before dinner  Tresiba 62 units daily      Patient has failed the following Diabetes medications:   Metformin - allergy   Onglyza  Levemir   Victoza / Trulicity - GLP-1 escalation  Mounjaro - cost; no pharm asst available         Labs Reviewed.       Lab Results   Component Value Date    CPEPTIDE 1.73 08/30/2022     Lab Results   Component Value Date    GLUTAMICACID 0.00 12/14/2017          //   , There is no height or weight on file to calculate BMI.  Her blood sugar in clinic today is:          Review of Systems   Constitutional:  Negative for activity change, appetite change, chills and fever.   HENT:  Negative for dental problem, mouth sores, nosebleeds, sore throat and trouble swallowing.    Eyes:  Negative for pain and discharge.   Respiratory:  Negative for shortness of breath, wheezing and stridor.    Cardiovascular:  Negative for chest pain, palpitations and leg swelling.   Gastrointestinal:  Negative for abdominal pain, diarrhea, nausea and vomiting.   Endocrine: Negative for polydipsia, polyphagia and polyuria.   Genitourinary:  Negative for dysuria, frequency and urgency.   Musculoskeletal:  Negative for joint swelling and myalgias.   Skin:  Negative for rash and wound.   Neurological:  Negative for dizziness, syncope,  weakness and headaches.   Psychiatric/Behavioral:  Negative for behavioral problems and dysphoric mood.        Diabetes Management Status  Statin: Taking  ACE/ARB: Taking    Screening or Prevention Patient's value Goal Complete/Controlled?   HgA1C Testing and Control   Lab Results   Component Value Date    HGBA1C 8.5 (H) 02/01/2023      Annually/Less than 8% Yes   Lipid profile : 05/30/2022 Annually Yes   LDL control Lab Results   Component Value Date    LDLCALC 62.4 (L) 05/30/2022    Annually/Less than 100 mg/dl  Yes   Nephropathy screening Lab Results   Component Value Date    MICALBCREAT 10.3 08/30/2022     Lab Results   Component Value Date    PROTEINUA Negative 09/08/2014    Annually Yes   Blood pressure BP Readings from Last 1 Encounters:   02/06/23 138/80    Less than 140/90 Yes   Dilated retinal exam : 05/30/2022 Annually Yes    Foot exam   : 05/30/2022 Annually No     Social History     Socioeconomic History    Marital status:     Number of children: 3   Occupational History    Occupation: Stay at Home    Occupation:    Tobacco Use    Smoking status: Never    Smokeless tobacco: Never   Substance and Sexual Activity    Alcohol use: Yes     Comment: occasional wine     Drug use: No    Sexual activity: Yes     Partners: Male     Birth control/protection: Surgical   Social History Narrative    . Housewife.     Social Determinants of Health     Financial Resource Strain: Low Risk     Difficulty of Paying Living Expenses: Not very hard   Food Insecurity: No Food Insecurity    Worried About Running Out of Food in the Last Year: Never true    Ran Out of Food in the Last Year: Never true   Transportation Needs: No Transportation Needs    Lack of Transportation (Medical): No    Lack of Transportation (Non-Medical): No   Physical Activity: Insufficiently Active    Days of Exercise per Week: 2 days    Minutes of Exercise per Session: 20 min   Stress: Stress Concern Present    Feeling of  Stress : To some extent   Social Connections: Moderately Integrated    Frequency of Communication with Friends and Family: More than three times a week    Frequency of Social Gatherings with Friends and Family: Twice a week    Attends Mosque Services: More than 4 times per year    Active Member of Clubs or Organizations: No    Attends Club or Organization Meetings: Never    Marital Status:    Housing Stability: Low Risk     Unable to Pay for Housing in the Last Year: No    Number of Places Lived in the Last Year: 1    Unstable Housing in the Last Year: No     Past Medical History:   Diagnosis Date    Arthritis     Cataract     Colon polyp     Diabetes mellitus type II 15 years     am 05/30/2022    Hyperlipidemia     Hypertension     Renal cell carcinoma 09/2018    Total knee replacement status, left 01/20/2019    Dr.Robert Cook    UTI (urinary tract infection)        Objective:        Physical Exam  Neurological:      Mental Status: She is alert and oriented to person, place, and time. Mental status is at baseline.   Psychiatric:         Mood and Affect: Mood normal.         Behavior: Behavior normal.         Thought Content: Thought content normal.         Judgment: Judgment normal.         Assessment / Plan:     Type 2 diabetes mellitus with hemoglobin A1c goal of less than 7.0%    Fatty liver    Hyperlipidemia LDL goal <70    Hypertension goal BP (blood pressure) < 130/80    Obesity (BMI 30-39.9)      Additional Plan Details:    - POCT Glucose  - Encouraged continuation of lifestyle changes including regular exercise and limiting carbohydrates to 30-45 grams per meal threes times daily and 15 grams per snack with a limit of two daily.   - Encouraged continued monitoring of blood glucose with maintenance of 2 times daily at Fasting and After Dinner.   - Current DM Medication Regimen: Continue Glimepiride to 4 mg BID wm. Continue Tresiba 62 units daily.  Continue Ozempic 2 mg weekly. Will defer  changes until After dinner BG reviewed. Will consider adding Jardiance.   - Pharmacy Asst: Ozempic 2 mg, Tresiba  - Health Maintenance standards addressed today: Flu Shot - patient would like to complete outside of [facility]  - Nursing Visit: Patient is age 79 or younger with an A1c of 7.5 or greater and will not need nursing visit at this time .   - Follow up in 3 weeks .      [facility]

## 2023-03-27 RX ORDER — ZOSTER VACCINE RECOMBINANT, ADJUVANTED 50 MCG/0.5
KIT INTRAMUSCULAR
OUTPATIENT
Start: 2023-03-27

## 2023-03-27 NOTE — TELEPHONE ENCOUNTER
----- Message from Margaret Aleks sent at 3/27/2023 12:50 PM CDT -----  Contact: Dary  .Type:  RX Refill Request    Who Called: Dary   Refill or New Rx:refill   RX Name and Strength:SHINGRIX, PF, 50 mcg/0.5 mL injection  How is the patient currently taking it? (ex. 1XDay):as prescribed   Is this a 30 day or 90 day RX:30 days   Preferred Pharmacy with phone number:.  [place] [id number] [address]  Phone: [phone] Fax: [phone]  Local or Mail Order:local   Ordering Provider:Mazin Bonilla   Would the patient rather a call back or a response via [facility]? Call   Best Call Back Number:.[phone]   Additional Information: Patient requesting medication

## 2023-04-13 ENCOUNTER — TELEPHONE (OUTPATIENT)
Dept: ADMINISTRATIVE | Facility: HOSPITAL | Age: 68
End: 2023-04-13
Payer: MEDICARE

## 2023-04-13 ENCOUNTER — OFFICE VISIT (OUTPATIENT)
Dept: DIABETES | Facility: CLINIC | Age: 68
End: 2023-04-13
Payer: MEDICARE

## 2023-04-13 DIAGNOSIS — E66.9 OBESITY (BMI 30-39.9): ICD-10-CM

## 2023-04-13 DIAGNOSIS — K76.0 FATTY LIVER: ICD-10-CM

## 2023-04-13 DIAGNOSIS — E11.9 TYPE 2 DIABETES MELLITUS WITH HEMOGLOBIN A1C GOAL OF LESS THAN 7.0%: Primary | ICD-10-CM

## 2023-04-13 DIAGNOSIS — E78.5 HYPERLIPIDEMIA LDL GOAL <70: ICD-10-CM

## 2023-04-13 DIAGNOSIS — I10 HYPERTENSION GOAL BP (BLOOD PRESSURE) < 130/80: ICD-10-CM

## 2023-04-13 PROCEDURE — 1157F PR ADVANCE CARE PLAN OR EQUIV PRESENT IN MEDICAL RECORD: ICD-10-PCS | Mod: HCNC,CPTII,95, | Performed by: PHYSICIAN ASSISTANT

## 2023-04-13 PROCEDURE — 3052F HG A1C>EQUAL 8.0%<EQUAL 9.0%: CPT | Mod: HCNC,CPTII,95, | Performed by: PHYSICIAN ASSISTANT

## 2023-04-13 PROCEDURE — 4010F ACE/ARB THERAPY RXD/TAKEN: CPT | Mod: HCNC,CPTII,95, | Performed by: PHYSICIAN ASSISTANT

## 2023-04-13 PROCEDURE — 99442 PR PHYSICIAN TELEPHONE EVALUATION 11-20 MIN: ICD-10-PCS | Mod: HCNC,95,, | Performed by: PHYSICIAN ASSISTANT

## 2023-04-13 PROCEDURE — 4010F PR ACE/ARB THEARPY RXD/TAKEN: ICD-10-PCS | Mod: HCNC,CPTII,95, | Performed by: PHYSICIAN ASSISTANT

## 2023-04-13 PROCEDURE — 99442 PR PHYSICIAN TELEPHONE EVALUATION 11-20 MIN: CPT | Mod: HCNC,95,, | Performed by: PHYSICIAN ASSISTANT

## 2023-04-13 PROCEDURE — 3052F PR MOST RECENT HEMOGLOBIN A1C LEVEL 8.0 - < 9.0%: ICD-10-PCS | Mod: HCNC,CPTII,95, | Performed by: PHYSICIAN ASSISTANT

## 2023-04-13 PROCEDURE — 3072F PR LOW RISK FOR RETINOPATHY: ICD-10-PCS | Mod: HCNC,CPTII,95, | Performed by: PHYSICIAN ASSISTANT

## 2023-04-13 PROCEDURE — 1157F ADVNC CARE PLAN IN RCRD: CPT | Mod: HCNC,CPTII,95, | Performed by: PHYSICIAN ASSISTANT

## 2023-04-13 PROCEDURE — 3072F LOW RISK FOR RETINOPATHY: CPT | Mod: HCNC,CPTII,95, | Performed by: PHYSICIAN ASSISTANT

## 2023-04-13 RX ORDER — PEN NEEDLE, DIABETIC 30 GX3/16"
1 NEEDLE, DISPOSABLE MISCELLANEOUS DAILY
Qty: 100 EACH | Refills: 11 | Status: SHIPPED | OUTPATIENT
Start: 2023-04-13

## 2023-04-13 RX ORDER — INSULIN DEGLUDEC 200 U/ML
62 INJECTION, SOLUTION SUBCUTANEOUS DAILY
Qty: 4 PEN | Refills: 9 | Status: SHIPPED | OUTPATIENT
Start: 2023-04-13 | End: 2023-12-08 | Stop reason: SDUPTHER

## 2023-04-13 NOTE — PROGRESS NOTES
PCP: Sakina Cooper DO    Subjective:     Chief Complaint: Diabetes - Established Patient    Established Patient - Audio Only Telehealth Visit     The patient location is: Home  The chief complaint leading to consultation is: Diabetes follow up  Visit type: Virtual visit with audio only (telephone)  Total Time Spent with Patient: 15 minutes     The reason for the audio only service rather than synchronous audio and video virtual visit was related to technical difficulties or patient preference/necessity.     Each patient to whom I provide medical services by telemedicine is:  (1) informed of the relationship between the physician and patient and the respective role of any other health care provider with respect to management of the patient; and (2) notified that they may decline to receive medical services by telemedicine and may withdraw from such care at any time. Patient verbally consented to receive this service via voice-only telephone call.    This service was not originating from a related E/M service provided within the previous 7 days nor will  to an E/M service or procedure within the next 24 hours or my soonest available appointment.  Prevailing standard of care was able to be met in this audio-only visit.       HISTORY OF PRESENT ILLNESS: 67 y.o.  female presenting for diabetes management visit.   The patient's last visit with me was on 3/21/2023.   Patient has had Type II diabetes since 2005.  Pertinent to decision making is the following comorbidities: HTN, HLD, Obesity by BMI and Fatty Liver  Patient has the following Diabetes complications: without complications  She  has attended diabetes education in the past.     Patient's most recent A1c of 8.5% was completed 2 months ago.   Patient states since Her last A1c Her blood glucose levels have been within range in the morning / fasting.   Patient monitors blood glucose 1 times per day with Contour Next : Fasting and After Dinner.    Patient blood glucose monitoring device will not be uploaded into Media Section today secondary to unable to upload successfully.   Per meter recall, fasting blood sugar ranging 77 - 150 and after dinner 125 - 208.  Patient endorses the following diabetes related symptoms: None.   Patient is due today for the following diabetes-related health maintenance standards: Foot Exam  and Influenza Vaccine.   She denies recent hospital admissions or emergency room visits.   She denies having hypoglycemia.  Patient's concerns today include glycemic control.  Of note, patient working with pharmacy assistance for tresiba / ozempic .   Patient medication regimen is as below.     CURRENT DM MEDICATIONS:   Ozempic 2 mg weekly   Amaryl 4 mg before breakfast and before dinner  Tresiba 62 units daily      Patient has failed the following Diabetes medications:   Metformin - allergy   Onglyza  Levemir   Victoza / Trulicity - GLP-1 escalation  Mounjaro - cost; no pharm asst available         Labs Reviewed.       Lab Results   Component Value Date    CPEPTIDE 1.73 08/30/2022     Lab Results   Component Value Date    GLUTAMICACID 0.00 12/14/2017          //   , There is no height or weight on file to calculate BMI.  Her blood sugar in clinic today is:          Review of Systems   Constitutional:  Negative for activity change, appetite change, chills and fever.   HENT:  Negative for dental problem, mouth sores, nosebleeds, sore throat and trouble swallowing.    Eyes:  Negative for pain and discharge.   Respiratory:  Negative for shortness of breath, wheezing and stridor.    Cardiovascular:  Negative for chest pain, palpitations and leg swelling.   Gastrointestinal:  Negative for abdominal pain, diarrhea, nausea and vomiting.   Endocrine: Negative for polydipsia, polyphagia and polyuria.   Genitourinary:  Negative for dysuria, frequency and urgency.   Musculoskeletal:  Negative for joint swelling and myalgias.   Skin:  Negative for rash  and wound.   Neurological:  Negative for dizziness, syncope, weakness and headaches.   Psychiatric/Behavioral:  Negative for behavioral problems and dysphoric mood.        Diabetes Management Status  Statin: Taking  ACE/ARB: Taking    Screening or Prevention Patient's value Goal Complete/Controlled?   HgA1C Testing and Control   Lab Results   Component Value Date    HGBA1C 8.5 (H) 02/01/2023      Annually/Less than 8% Yes   Lipid profile : 05/30/2022 Annually Yes   LDL control Lab Results   Component Value Date    LDLCALC 62.4 (L) 05/30/2022    Annually/Less than 100 mg/dl  Yes   Nephropathy screening Lab Results   Component Value Date    MICALBCREAT 10.3 08/30/2022     Lab Results   Component Value Date    PROTEINUA Negative 09/08/2014    Annually Yes   Blood pressure BP Readings from Last 1 Encounters:   02/06/23 138/80    Less than 140/90 Yes   Dilated retinal exam : 05/30/2022 Annually Yes    Foot exam   : 05/30/2022 Annually No     Social History     Socioeconomic History    Marital status:     Number of children: 3   Occupational History    Occupation: Stay at Home    Occupation:    Tobacco Use    Smoking status: Never    Smokeless tobacco: Never   Substance and Sexual Activity    Alcohol use: Yes     Comment: occasional wine     Drug use: No    Sexual activity: Yes     Partners: Male     Birth control/protection: Surgical   Social History Narrative    . Housewife.     Social Determinants of Health     Financial Resource Strain: Low Risk     Difficulty of Paying Living Expenses: Not very hard   Food Insecurity: No Food Insecurity    Worried About Running Out of Food in the Last Year: Never true    Ran Out of Food in the Last Year: Never true   Transportation Needs: No Transportation Needs    Lack of Transportation (Medical): No    Lack of Transportation (Non-Medical): No   Physical Activity: Insufficiently Active    Days of Exercise per Week: 2 days    Minutes of Exercise per  "Session: 20 min   Stress: Stress Concern Present    Feeling of Stress : To some extent   Social Connections: Moderately Integrated    Frequency of Communication with Friends and Family: More than three times a week    Frequency of Social Gatherings with Friends and Family: Twice a week    Attends Oriental orthodox Services: More than 4 times per year    Active Member of Clubs or Organizations: No    Attends Club or Organization Meetings: Never    Marital Status:    Housing Stability: Low Risk     Unable to Pay for Housing in the Last Year: No    Number of Places Lived in the Last Year: 1    Unstable Housing in the Last Year: No     Past Medical History:   Diagnosis Date    Arthritis     Cataract     Colon polyp     Diabetes mellitus type II 15 years     am 05/30/2022    Hyperlipidemia     Hypertension     Renal cell carcinoma 09/2018    Total knee replacement status, left 01/20/2019    Dr.Robert Cook    UTI (urinary tract infection)        Objective:        Physical Exam  Neurological:      Mental Status: She is alert and oriented to person, place, and time. Mental status is at baseline.   Psychiatric:         Mood and Affect: Mood normal.         Behavior: Behavior normal.         Thought Content: Thought content normal.         Judgment: Judgment normal.         Assessment / Plan:     Type 2 diabetes mellitus with hemoglobin A1c goal of less than 7.0%  -     empagliflozin (JARDIANCE) 10 mg tablet; Take 1 tablet (10 mg total) by mouth once daily.  Dispense: 30 tablet; Refill: 11  -     pen needle, diabetic (PEN NEEDLE) 31 gauge x 5/16" Ndle; Inject 1 each into the skin once daily.  Dispense: 100 each; Refill: 11  -     insulin degludec (TRESIBA FLEXTOUCH U-200) 200 unit/mL (3 mL) insulin pen; Inject 62 Units into the skin once daily.  Dispense: 4 pen; Refill: 9    Fatty liver    Hyperlipidemia LDL goal <70    Hypertension goal BP (blood pressure) < 130/80    Obesity (BMI 30-39.9)      Additional Plan " Details:    - POCT Glucose  - Encouraged continuation of lifestyle changes including regular exercise and limiting carbohydrates to 30-45 grams per meal threes times daily and 15 grams per snack with a limit of two daily.   - Encouraged continued monitoring of blood glucose with maintenance of 2 times daily at Fasting and After Dinner.   - Current DM Medication Regimen: Change Glimepiride to 4 mg before breakfast and 2 mg before dinner with Jardiance start. Change Tresiba 50 units daily with Jardiance start.  Continue Ozempic 2 mg weekly. Add Jardiance 10 mg daily.   - Pharmacy Asst: Ozempic 2 mg, Tresiba, Jardiance 25 mg  - Health Maintenance standards addressed today: Foot Exam - deferred by patient today because Telemedicine or Telephone visit and Flu Shot - patient would like to complete outside of [facility]  - Nursing Visit: Patient is age 79 or younger with an A1c of 7.5 or greater and will not need nursing visit at this time .   - Follow up in 4 weeks .      Blakeney McKnight, PA-C [facility]

## 2023-04-13 NOTE — PATIENT INSTRUCTIONS
CURRENT DM MEDICATIONS:   Ozempic 2 mg weekly   Amaryl 4 mg before breakfast and 2 mg (half tab) before dinner - change with Jardiance start  Tresiba 50 units daily   Jardiance 10 mg daily

## 2023-04-13 NOTE — Clinical Note
Change 5/1 audio to 5/19  Change 5/8 labs from ON to [facility]   Orestes: I know you are processing her tresiba / ozempic but also need Jardiance 25 mg added. Sorry about that. Thank you!

## 2023-05-01 ENCOUNTER — PATIENT OUTREACH (OUTPATIENT)
Dept: ADMINISTRATIVE | Facility: HOSPITAL | Age: 68
End: 2023-05-01
Payer: MEDICARE

## 2023-05-08 ENCOUNTER — PATIENT OUTREACH (OUTPATIENT)
Dept: ADMINISTRATIVE | Facility: HOSPITAL | Age: 68
End: 2023-05-08
Payer: MEDICARE

## 2023-05-17 ENCOUNTER — LAB VISIT (OUTPATIENT)
Dept: LAB | Facility: HOSPITAL | Age: 68
End: 2023-05-17
Attending: INTERNAL MEDICINE
Payer: MEDICARE

## 2023-05-17 DIAGNOSIS — E11.65 UNCONTROLLED TYPE 2 DIABETES MELLITUS WITH HYPERGLYCEMIA, WITH LONG-TERM CURRENT USE OF INSULIN: ICD-10-CM

## 2023-05-17 DIAGNOSIS — Z79.4 UNCONTROLLED TYPE 2 DIABETES MELLITUS WITH HYPERGLYCEMIA, WITH LONG-TERM CURRENT USE OF INSULIN: ICD-10-CM

## 2023-05-17 DIAGNOSIS — E78.5 HYPERLIPIDEMIA LDL GOAL <70: Chronic | ICD-10-CM

## 2023-05-17 DIAGNOSIS — I10 HYPERTENSION GOAL BP (BLOOD PRESSURE) < 130/80: Chronic | ICD-10-CM

## 2023-05-17 DIAGNOSIS — I70.0 ATHEROSCLEROSIS OF AORTA: ICD-10-CM

## 2023-05-17 LAB
ALBUMIN SERPL BCP-MCNC: 3.8 G/DL (ref 3.5–5.2)
ALP SERPL-CCNC: 105 U/L (ref 55–135)
ALT SERPL W/O P-5'-P-CCNC: 42 U/L (ref 10–44)
ANION GAP SERPL CALC-SCNC: 8 MMOL/L (ref 8–16)
AST SERPL-CCNC: 18 U/L (ref 10–40)
BILIRUB SERPL-MCNC: 0.3 MG/DL (ref 0.1–1)
BUN SERPL-MCNC: 23 MG/DL (ref 8–23)
CALCIUM SERPL-MCNC: 9 MG/DL (ref 8.7–10.5)
CHLORIDE SERPL-SCNC: 106 MMOL/L (ref 95–110)
CHOLEST SERPL-MCNC: 220 MG/DL (ref 120–199)
CHOLEST/HDLC SERPL: 4.7 {RATIO} (ref 2–5)
CO2 SERPL-SCNC: 27 MMOL/L (ref 23–29)
CREAT SERPL-MCNC: 0.7 MG/DL (ref 0.5–1.4)
EST. GFR  (NO RACE VARIABLE): >60 ML/MIN/1.73 M^2
ESTIMATED AVG GLUCOSE: 180 MG/DL (ref 68–131)
GLUCOSE SERPL-MCNC: 145 MG/DL (ref 70–110)
HBA1C MFR BLD: 7.9 % (ref 4–5.6)
HDLC SERPL-MCNC: 47 MG/DL (ref 40–75)
HDLC SERPL: 21.4 % (ref 20–50)
LDLC SERPL CALC-MCNC: 113.8 MG/DL (ref 63–159)
NONHDLC SERPL-MCNC: 173 MG/DL
POTASSIUM SERPL-SCNC: 4.9 MMOL/L (ref 3.5–5.1)
PROT SERPL-MCNC: 6.4 G/DL (ref 6–8.4)
SODIUM SERPL-SCNC: 141 MMOL/L (ref 136–145)
TRIGL SERPL-MCNC: 296 MG/DL (ref 30–150)

## 2023-05-17 PROCEDURE — 80053 COMPREHEN METABOLIC PANEL: CPT | Mod: HCNC | Performed by: INTERNAL MEDICINE

## 2023-05-17 PROCEDURE — 36415 COLL VENOUS BLD VENIPUNCTURE: CPT | Mod: HCNC,PO | Performed by: INTERNAL MEDICINE

## 2023-05-17 PROCEDURE — 80061 LIPID PANEL: CPT | Mod: HCNC | Performed by: INTERNAL MEDICINE

## 2023-05-17 PROCEDURE — 83036 HEMOGLOBIN GLYCOSYLATED A1C: CPT | Mod: HCNC | Performed by: INTERNAL MEDICINE

## 2023-05-19 ENCOUNTER — OFFICE VISIT (OUTPATIENT)
Dept: DIABETES | Facility: CLINIC | Age: 68
End: 2023-05-19
Payer: MEDICARE

## 2023-05-19 DIAGNOSIS — K76.0 FATTY LIVER: ICD-10-CM

## 2023-05-19 DIAGNOSIS — I10 HYPERTENSION GOAL BP (BLOOD PRESSURE) < 130/80: ICD-10-CM

## 2023-05-19 DIAGNOSIS — E66.9 OBESITY (BMI 30-39.9): ICD-10-CM

## 2023-05-19 DIAGNOSIS — E78.5 HYPERLIPIDEMIA LDL GOAL <70: ICD-10-CM

## 2023-05-19 DIAGNOSIS — E11.9 TYPE 2 DIABETES MELLITUS WITH HEMOGLOBIN A1C GOAL OF LESS THAN 7.0%: Primary | ICD-10-CM

## 2023-05-19 PROCEDURE — 4010F PR ACE/ARB THEARPY RXD/TAKEN: ICD-10-PCS | Mod: HCNC,CPTII,95, | Performed by: PHYSICIAN ASSISTANT

## 2023-05-19 PROCEDURE — 3051F PR MOST RECENT HEMOGLOBIN A1C LEVEL 7.0 - < 8.0%: ICD-10-PCS | Mod: HCNC,CPTII,95, | Performed by: PHYSICIAN ASSISTANT

## 2023-05-19 PROCEDURE — 3072F LOW RISK FOR RETINOPATHY: CPT | Mod: HCNC,CPTII,95, | Performed by: PHYSICIAN ASSISTANT

## 2023-05-19 PROCEDURE — 99441 PR PHYSICIAN TELEPHONE EVALUATION 5-10 MIN: ICD-10-PCS | Mod: HCNC,95,, | Performed by: PHYSICIAN ASSISTANT

## 2023-05-19 PROCEDURE — 1157F ADVNC CARE PLAN IN RCRD: CPT | Mod: HCNC,CPTII,95, | Performed by: PHYSICIAN ASSISTANT

## 2023-05-19 PROCEDURE — 3051F HG A1C>EQUAL 7.0%<8.0%: CPT | Mod: HCNC,CPTII,95, | Performed by: PHYSICIAN ASSISTANT

## 2023-05-19 PROCEDURE — 3072F PR LOW RISK FOR RETINOPATHY: ICD-10-PCS | Mod: HCNC,CPTII,95, | Performed by: PHYSICIAN ASSISTANT

## 2023-05-19 PROCEDURE — 1157F PR ADVANCE CARE PLAN OR EQUIV PRESENT IN MEDICAL RECORD: ICD-10-PCS | Mod: HCNC,CPTII,95, | Performed by: PHYSICIAN ASSISTANT

## 2023-05-19 PROCEDURE — 99441 PR PHYSICIAN TELEPHONE EVALUATION 5-10 MIN: CPT | Mod: HCNC,95,, | Performed by: PHYSICIAN ASSISTANT

## 2023-05-19 PROCEDURE — 4010F ACE/ARB THERAPY RXD/TAKEN: CPT | Mod: HCNC,CPTII,95, | Performed by: PHYSICIAN ASSISTANT

## 2023-05-19 NOTE — PROGRESS NOTES
PCP: Sakina Cooper DO    Subjective:     Chief Complaint: Diabetes - Established Patient    Established Patient - Audio Only Telehealth Visit     The patient location is: Home  The chief complaint leading to consultation is: Diabetes follow up  Visit type: Virtual visit with audio only (telephone)  Total Time Spent with Patient: 10 minutes     The reason for the audio only service rather than synchronous audio and video virtual visit was related to technical difficulties or patient preference/necessity.     Each patient to whom I provide medical services by telemedicine is:  (1) informed of the relationship between the physician and patient and the respective role of any other health care provider with respect to management of the patient; and (2) notified that they may decline to receive medical services by telemedicine and may withdraw from such care at any time. Patient verbally consented to receive this service via voice-only telephone call.    This service was not originating from a related E/M service provided within the previous 7 days nor will  to an E/M service or procedure within the next 24 hours or my soonest available appointment.  Prevailing standard of care was able to be met in this audio-only visit.       HISTORY OF PRESENT ILLNESS: 67 y.o.  female presenting for diabetes management visit.   The patient's last visit with me was on 4/13/2023.   Patient has had Type II diabetes since 2005.  Pertinent to decision making is the following comorbidities: HTN, HLD, Obesity by BMI and Fatty Liver  Patient has the following Diabetes complications: without complications  She  has attended diabetes education in the past.     Patient's most recent A1c of 7.9% was completed 2 days ago.   Patient states since Her last A1c Her blood glucose levels have been within range in the morning / fasting.   Patient monitors blood glucose 1 times per day with Contour Next : Fasting and After Dinner.   Patient  blood glucose monitoring device will not be uploaded into Media Section today secondary to unable to upload successfully.   Per meter recall, fasting blood sugar ranging 96 - 158 and after dinner 160 - 170s and up to 240 with dessert.  Patient endorses the following diabetes related symptoms: None.   Patient is due today for the following diabetes-related health maintenance standards: Foot Exam  and Eye Exam.   She denies recent hospital admissions or emergency room visits.   She denies having hypoglycemia.  Patient's concerns today include glycemic control.    Patient medication regimen is as below.     CURRENT DM MEDICATIONS:   Ozempic 2 mg weekly   Amaryl 4 mg before breakfast and 2 mg before dinner  Tresiba 62 units daily  Jardiance 10 mg daily      Patient has failed the following Diabetes medications:   Metformin - allergy   Onglyza  Levemir   Victoza / Trulicity - GLP-1 escalation  Mounjaro - cost; no pharm asst available         Labs Reviewed.       Lab Results   Component Value Date    CPEPTIDE 1.73 08/30/2022     Lab Results   Component Value Date    GLUTAMICACID 0.00 12/14/2017          //   , There is no height or weight on file to calculate BMI.  Her blood sugar in clinic today is:          Review of Systems   Constitutional:  Negative for activity change, appetite change, chills and fever.   HENT:  Negative for dental problem, mouth sores, nosebleeds, sore throat and trouble swallowing.    Eyes:  Negative for pain and discharge.   Respiratory:  Negative for shortness of breath, wheezing and stridor.    Cardiovascular:  Negative for chest pain, palpitations and leg swelling.   Gastrointestinal:  Negative for abdominal pain, diarrhea, nausea and vomiting.   Endocrine: Negative for polydipsia, polyphagia and polyuria.   Genitourinary:  Negative for dysuria, frequency and urgency.   Musculoskeletal:  Negative for joint swelling and myalgias.   Skin:  Negative for rash and wound.   Neurological:  Negative  for dizziness, syncope, weakness and headaches.   Psychiatric/Behavioral:  Negative for behavioral problems and dysphoric mood.        Diabetes Management Status  Statin: Taking  ACE/ARB: Taking    Screening or Prevention Patient's value Goal Complete/Controlled?   HgA1C Testing and Control   Lab Results   Component Value Date    HGBA1C 7.9 (H) 05/17/2023      Annually/Less than 8% Yes   Lipid profile : 05/17/2023 Annually Yes   LDL control Lab Results   Component Value Date    LDLCALC 113.8 05/17/2023    Annually/Less than 100 mg/dl  Yes   Nephropathy screening Lab Results   Component Value Date    MICALBCREAT 10.3 08/30/2022     Lab Results   Component Value Date    PROTEINUA Negative 09/08/2014    Annually Yes   Blood pressure BP Readings from Last 1 Encounters:   02/06/23 138/80    Less than 140/90 Yes   Dilated retinal exam : 05/30/2022 Annually Yes    Foot exam   : 05/30/2022 Annually No     Social History     Socioeconomic History    Marital status:     Number of children: 3   Occupational History    Occupation: Stay at Home    Occupation:    Tobacco Use    Smoking status: Never    Smokeless tobacco: Never   Substance and Sexual Activity    Alcohol use: Yes     Comment: occasional wine     Drug use: No    Sexual activity: Yes     Partners: Male     Birth control/protection: Surgical   Social History Narrative    . Housewife.     Social Determinants of Health     Financial Resource Strain: Low Risk     Difficulty of Paying Living Expenses: Not very hard   Food Insecurity: No Food Insecurity    Worried About Running Out of Food in the Last Year: Never true    Ran Out of Food in the Last Year: Never true   Transportation Needs: No Transportation Needs    Lack of Transportation (Medical): No    Lack of Transportation (Non-Medical): No   Physical Activity: Insufficiently Active    Days of Exercise per Week: 2 days    Minutes of Exercise per Session: 20 min   Stress: Stress Concern  Present    Feeling of Stress : To some extent   Social Connections: Moderately Integrated    Frequency of Communication with Friends and Family: More than three times a week    Frequency of Social Gatherings with Friends and Family: Twice a week    Attends Restorationist Services: More than 4 times per year    Active Member of Clubs or Organizations: No    Attends Club or Organization Meetings: Never    Marital Status:    Housing Stability: Low Risk     Unable to Pay for Housing in the Last Year: No    Number of Places Lived in the Last Year: 1    Unstable Housing in the Last Year: No     Past Medical History:   Diagnosis Date    Arthritis     Cataract     Colon polyp     Diabetes mellitus type II 15 years     am 05/30/2022    Hyperlipidemia     Hypertension     Renal cell carcinoma 09/2018    Total knee replacement status, left 01/20/2019    Dr.Robert Cook    UTI (urinary tract infection)        Objective:        Physical Exam  Neurological:      Mental Status: She is alert and oriented to person, place, and time. Mental status is at baseline.   Psychiatric:         Mood and Affect: Mood normal.         Behavior: Behavior normal.         Thought Content: Thought content normal.         Judgment: Judgment normal.         Assessment / Plan:     Type 2 diabetes mellitus with hemoglobin A1c goal of less than 7.0%    Fatty liver    Hyperlipidemia LDL goal <70    Hypertension goal BP (blood pressure) < 130/80    Obesity (BMI 30-39.9)      Additional Plan Details:    - POCT Glucose  - Encouraged continuation of lifestyle changes including regular exercise and limiting carbohydrates to 30-45 grams per meal threes times daily and 15 grams per snack with a limit of two daily.   - Encouraged continued monitoring of blood glucose with maintenance of 2 times daily at Fasting and After Dinner. Add Afterbreakfast reading.   - Current DM Medication Regimen: Hold Glimepiride with Jardiance change. Change Tresiba 56 units  daily with Jardiance change.  Continue Ozempic 2 mg weekly. Change Jardiance 25 mg daily.   - Health Maintenance standards addressed today: Foot Exam - deferred by patient today because Telemedicine or Telephone visit and Eye Exam - will be completed within [facility] system and scheduled today  - Nursing Visit: Patient is age 79 or younger with an A1c of 7.5 or greater and will not need nursing visit at this time .   - Follow up in 4 - 6 weeks .      Alecia Goodman PA-C  [facility]

## 2023-05-25 ENCOUNTER — TELEPHONE (OUTPATIENT)
Dept: INTERNAL MEDICINE | Facility: CLINIC | Age: 68
End: 2023-05-25
Payer: MEDICARE

## 2023-05-25 DIAGNOSIS — M21.619 BUNION: Primary | ICD-10-CM

## 2023-05-25 NOTE — PROGRESS NOTES
Discussed with patient and patient verbalized understanding patient said she has had a bunion for a while and it is starting to bother her more she would like a referral to Orthopaedic

## 2023-05-25 NOTE — TELEPHONE ENCOUNTER
----- Message from Maribel Richter LPN sent at 5/25/2023  1:57 PM CDT -----  Discussed with patient and patient verbalized understanding patient said she has had a bunion for a while and it is starting to bother her more she would like a referral to Orthopaedic

## 2023-05-30 ENCOUNTER — OFFICE VISIT (OUTPATIENT)
Dept: PODIATRY | Facility: CLINIC | Age: 68
End: 2023-05-30
Payer: MEDICARE

## 2023-05-30 ENCOUNTER — HOSPITAL ENCOUNTER (OUTPATIENT)
Dept: RADIOLOGY | Facility: HOSPITAL | Age: 68
Discharge: HOME | End: 2023-05-30
Attending: PODIATRIST
Payer: MEDICARE

## 2023-05-30 DIAGNOSIS — M21.619 BUNION: ICD-10-CM

## 2023-05-30 DIAGNOSIS — M19.079 ARTHRITIS OF METATARSOPHALANGEAL (MTP) JOINT OF GREAT TOE: Primary | ICD-10-CM

## 2023-05-30 PROCEDURE — 3072F PR LOW RISK FOR RETINOPATHY: ICD-10-PCS | Mod: CPTII,S$GLB,, | Performed by: PODIATRIST

## 2023-05-30 PROCEDURE — 1160F PR REVIEW ALL MEDS BY PRESCRIBER/CLIN PHARMACIST DOCUMENTED: ICD-10-PCS | Mod: CPTII,S$GLB,, | Performed by: PODIATRIST

## 2023-05-30 PROCEDURE — 1159F MED LIST DOCD IN RCRD: CPT | Mod: CPTII,S$GLB,, | Performed by: PODIATRIST

## 2023-05-30 PROCEDURE — 73630 X-RAY EXAM OF FOOT: CPT | Mod: TC,RT

## 2023-05-30 PROCEDURE — 1159F PR MEDICATION LIST DOCUMENTED IN MEDICAL RECORD: ICD-10-PCS | Mod: CPTII,S$GLB,, | Performed by: PODIATRIST

## 2023-05-30 PROCEDURE — 73630 X-RAY EXAM OF FOOT: CPT | Mod: 26,RT,, | Performed by: RADIOLOGY

## 2023-05-30 PROCEDURE — 99999 PR PBB SHADOW E&M-EST. PATIENT-LVL III: CPT | Mod: PBBFAC,,, | Performed by: PODIATRIST

## 2023-05-30 PROCEDURE — 1125F AMNT PAIN NOTED PAIN PRSNT: CPT | Mod: CPTII,S$GLB,, | Performed by: PODIATRIST

## 2023-05-30 PROCEDURE — 1157F PR ADVANCE CARE PLAN OR EQUIV PRESENT IN MEDICAL RECORD: ICD-10-PCS | Mod: CPTII,S$GLB,, | Performed by: PODIATRIST

## 2023-05-30 PROCEDURE — 20600 DRAIN/INJ JOINT/BURSA W/O US: CPT | Mod: RT,S$GLB,, | Performed by: PODIATRIST

## 2023-05-30 PROCEDURE — 3072F LOW RISK FOR RETINOPATHY: CPT | Mod: CPTII,S$GLB,, | Performed by: PODIATRIST

## 2023-05-30 PROCEDURE — 4010F PR ACE/ARB THEARPY RXD/TAKEN: ICD-10-PCS | Mod: CPTII,S$GLB,, | Performed by: PODIATRIST

## 2023-05-30 PROCEDURE — 1160F RVW MEDS BY RX/DR IN RCRD: CPT | Mod: CPTII,S$GLB,, | Performed by: PODIATRIST

## 2023-05-30 PROCEDURE — 3051F HG A1C>EQUAL 7.0%<8.0%: CPT | Mod: CPTII,S$GLB,, | Performed by: PODIATRIST

## 2023-05-30 PROCEDURE — 1125F PR PAIN SEVERITY QUANTIFIED, PAIN PRESENT: ICD-10-PCS | Mod: CPTII,S$GLB,, | Performed by: PODIATRIST

## 2023-05-30 PROCEDURE — 99999 PR PBB SHADOW E&M-EST. PATIENT-LVL III: ICD-10-PCS | Mod: PBBFAC,,, | Performed by: PODIATRIST

## 2023-05-30 PROCEDURE — 99203 OFFICE O/P NEW LOW 30 MIN: CPT | Mod: 25,S$GLB,, | Performed by: PODIATRIST

## 2023-05-30 PROCEDURE — 4010F ACE/ARB THERAPY RXD/TAKEN: CPT | Mod: CPTII,S$GLB,, | Performed by: PODIATRIST

## 2023-05-30 PROCEDURE — 20600 SMALL JOINT ASPIRATION/INJECTION: R GREAT MTP: ICD-10-PCS | Mod: RT,S$GLB,, | Performed by: PODIATRIST

## 2023-05-30 PROCEDURE — 73630 XR FOOT COMPLETE 3 VIEW RIGHT: ICD-10-PCS | Mod: 26,RT,, | Performed by: RADIOLOGY

## 2023-05-30 PROCEDURE — 3051F PR MOST RECENT HEMOGLOBIN A1C LEVEL 7.0 - < 8.0%: ICD-10-PCS | Mod: CPTII,S$GLB,, | Performed by: PODIATRIST

## 2023-05-30 PROCEDURE — 1157F ADVNC CARE PLAN IN RCRD: CPT | Mod: CPTII,S$GLB,, | Performed by: PODIATRIST

## 2023-05-30 PROCEDURE — 99203 PR OFFICE/OUTPT VISIT, NEW, LEVL III, 30-44 MIN: ICD-10-PCS | Mod: 25,S$GLB,, | Performed by: PODIATRIST

## 2023-05-30 RX ORDER — DEXAMETHASONE SODIUM PHOSPHATE 4 MG/ML
4 INJECTION, SOLUTION INTRA-ARTICULAR; INTRALESIONAL; INTRAMUSCULAR; INTRAVENOUS; SOFT TISSUE
Status: DISCONTINUED | OUTPATIENT
Start: 2023-05-30 | End: 2023-05-30 | Stop reason: HOSPADM

## 2023-05-30 RX ORDER — TRIAMCINOLONE ACETONIDE 40 MG/ML
40 INJECTION, SUSPENSION INTRA-ARTICULAR; INTRAMUSCULAR
Status: DISCONTINUED | OUTPATIENT
Start: 2023-05-30 | End: 2023-05-30 | Stop reason: HOSPADM

## 2023-05-30 RX ADMIN — DEXAMETHASONE SODIUM PHOSPHATE 4 MG: 4 INJECTION, SOLUTION INTRA-ARTICULAR; INTRALESIONAL; INTRAMUSCULAR; INTRAVENOUS; SOFT TISSUE at 10:05

## 2023-05-30 RX ADMIN — TRIAMCINOLONE ACETONIDE 40 MG: 40 INJECTION, SUSPENSION INTRA-ARTICULAR; INTRAMUSCULAR at 10:05

## 2023-05-30 NOTE — PROCEDURES
Small Joint Aspiration/Injection: R great MTP    Date/Time: 5/30/2023 10:00 AM  Performed by: Mckayla Roach DPM  Authorized by: Mckayla Roach DPM     Consent Done?:  Yes (Verbal)  Indications:  Pain, joint swelling and arthritis  Site marked: the procedure site was marked    Timeout: prior to procedure the correct patient, procedure, and site was verified    Prep: patient was prepped and draped in usual sterile fashion      Local anesthesia used?: Yes    Anesthesia:  Local infiltration  Location:  Great toe  Site:  R great MTP  Needle size:  25 G  Approach:  Dorsal  Medications:  4 mg dexAMETHasone 4 mg/mL; 40 mg triamcinolone acetonide 40 mg/mL  Patient tolerance:  Patient tolerated the procedure well with no immediate complications

## 2023-05-30 NOTE — PROGRESS NOTES
Subjective:       Patient ID: Dary Chaney is a 67 y.o. female.    Chief Complaint: Bunions (Patient complains of 3/10 pain at present to right 1st MTP. )      HPI: Dary Chaney presents to the office today at the referral of Dr. Cooper with complaints of Mild pains to the right foot at the great toe due to arthritis and/or bunion deformity. Patient states pains are recalcitrant to oral NSAID therapy and wider width shoe gear and high toe box shoe gear. Patient has had no injection therapy to the 1st MTPJ on the affected limb prior. Patient has had discomfort to the great toe for the past several months. Patient's Primary Care Provider is Sakina Cooper DO.     Review of patient's allergies indicates:   Allergen Reactions    Citrus and derivatives Swelling, Other (See Comments) and Edema     Redness  Lip swelling   Itching     Other reaction(s): Edema, Other (See Comments)  Redness  Lip swelling   Itching     Latex Other (See Comments)     Other reaction(s): Rash  Other reaction(s): welts  Other reaction(s): Itching  Other reaction(s): Other (See Comments)  Other reaction(s): Rash  Other reaction(s): whelps  Other reaction(s): Itching  Other reaction(s): Rash  Other reaction(s): welts  Other reaction(s): Itching    Valsartan Other (See Comments)     Other reaction(s): disoriented  Other reaction(s): Other (See Comments)  Other reaction(s): disoriented  Other reaction(s): disoriented    Aspirin Nausea Only     Other reaction(s): Nausea Only    Metformin Anxiety     Other reaction(s): anxiety  Other reaction(s): anxiety  Other reaction(s): anxiety       Past Medical History:   Diagnosis Date    Arthritis     Cataract     Colon polyp     Diabetes mellitus type II 15 years     am 05/30/2022    Hyperlipidemia     Hypertension     Renal cell carcinoma 09/2018    Total knee replacement status, left 01/20/2019    Dr.Robert Cook    UTI (urinary tract infection)        Family History   Problem Relation Age of  Onset    Diabetes Mother     Hypertension Mother     Diabetes Brother     Heart disease Neg Hx        Social History     Socioeconomic History    Marital status:     Number of children: 3   Occupational History    Occupation: Stay at Home    Occupation:    Tobacco Use    Smoking status: Never    Smokeless tobacco: Never   Substance and Sexual Activity    Alcohol use: Yes     Comment: occasional wine     Drug use: No    Sexual activity: Yes     Partners: Male     Birth control/protection: Surgical   Social History Narrative    . Housewife.     Social Determinants of Health     Financial Resource Strain: Low Risk     Difficulty of Paying Living Expenses: Not very hard   Food Insecurity: No Food Insecurity    Worried About Running Out of Food in the Last Year: Never true    Ran Out of Food in the Last Year: Never true   Transportation Needs: No Transportation Needs    Lack of Transportation (Medical): No    Lack of Transportation (Non-Medical): No   Physical Activity: Insufficiently Active    Days of Exercise per Week: 2 days    Minutes of Exercise per Session: 20 min   Stress: Stress Concern Present    Feeling of Stress : To some extent   Social Connections: Moderately Integrated    Frequency of Communication with Friends and Family: More than three times a week    Frequency of Social Gatherings with Friends and Family: Twice a week    Attends Restoration Services: More than 4 times per year    Active Member of Clubs or Organizations: No    Attends Club or Organization Meetings: Never    Marital Status:    Housing Stability: Low Risk     Unable to Pay for Housing in the Last Year: No    Number of Places Lived in the Last Year: 1    Unstable Housing in the Last Year: No       Past Surgical History:   Procedure Laterality Date    CHOLECYSTECTOMY      CHOLECYSTECTOMY      COLONOSCOPY  2012    COLONOSCOPY N/A 3/1/2018    Procedure: COLONOSCOPY;  Surgeon: Phillip Brower MD;   Location: [facility];  Service: Endoscopy;  Laterality: N/A;    ESOPHAGOGASTRODUODENOSCOPY N/A 8/21/2018    Procedure: ESOPHAGOGASTRODUODENOSCOPY (EGD);  Surgeon: Maco Wynn MD;  Location: [facility];  Service: Endoscopy;  Laterality: N/A;    HYSTERECTOMY  1993    uterine prolapse    JOINT REPLACEMENT      KNEE SURGERY  03/2017    left knee replacement  01/20/2019    Dr.Robert Cook    ROBOT-ASSISTED LAPAROSCOPIC PARTIAL NEPHRECTOMY USING DA DENZEL XI Right 9/13/2018    Procedure: XI ROBOTIC NEPHRECTOMY, PARTIAL;  Surgeon: Tommy Bradshaw MD;  Location: [facility] OR [address] [place] [address];  Service: Urology;  Laterality: Right;  [place] u/s confirmed 9/13 0700 lb  conformation#[id number]    SPINE SURGERY      thumb surgery Rt- July 2018 - cyst          Review of Systems      Objective:   There were no vitals taken for this visit.    X-Ray Foot Complete Right  Narrative: EXAM: XR FOOT COMPLETE 3 VIEW RIGHT    CLINICAL INDICATION:   Pain in right foot.  Bunion of the right foot.    FINDINGS:  Comparisons are made to 10/13/2014.  AP, oblique and lateral views of the right foot were submitted for interpretation.  Hypertrophy of the medial head of the first metatarsal bone.  Bipartite tibial sesamoid bone.  Large plantar and Achilles calcaneal spurs.  Moderate degenerative changes of the second through fifth toe interphalangeal joints.  Spurring of the fifth metatarsal styloid process and dorsal surface of the talonavicular joint.    Alignment is satisfactory. No     fractures, dislocations, or erosive arthritic change.  Negative for radiopaque foreign bodies or air in the soft tissues.  Impression: 1.  Negative for acute process involving the right foot.  2.  Similar degree distribution of degenerative changes involving the hindfoot and forefoot as detailed above.    Finalized on: 5/30/2023 12:14 PM By:  Obi Byers MD  [facility]# [id number]      2023-05-30 12:16:[id number]    [facility]      Physical Exam  LOWER EXTREMITY PHYSICAL  EXAMINATION    NEUROLOGY: Protective sensation is intact via 5.07 [place] monofilament. Proprioception is intact. Sensation to light touch is intact.    VASCULAR: On the right foot, the dorsalis pedis pulse is 2/4 and the posterior tibial pulse is 2/4. Capillary refill time is less than 3 seconds. Hair growth is present on the dorsum of the foot and at the digits. Proximal to distal temperature is warm to warm    ORTHOPEDIC: Mild bunion deformity is noted to the right foot. Degenerative arthropathy; hallux limitus is noted. Upon ROM in the sagittal plan, there is moderate pains to the end ROM, dorsally. There are slight pains to the plantar aspect of the 1st MTPJ as well. No pains to palpation of the tibial or the fibular sesamoid. There is a small medial eminence appreciated. Mild to moderate dorsal spurring noted. Palpation of the dorsal-lateral aspect of the 1st MTPJ is moderate to severely painful. There is joint crepitus noted. The joint is full and somewhat edematous.      DERMATOLOGY: Skin is supple, dry and intact. No hypertrophic skin formation is noted. No overt breaks in the skin is noted.       Assessment:     1. Arthritis of metatarsophalangeal (MTP) joint of great toe    2. Bunion          Plan:     Arthritis of metatarsophalangeal (MTP) joint of great toe    Bunion  -     Ambulatory referral/consult to Podiatry  -     X-Ray Foot Complete Right; Future; Expected date: 05/30/2023        Thorough discussion is had with the patient today, concerning the diagnosis, its etiology, and the treatment algorithm at present.    Mild dorsal spurring present on x-ray of the 1st metatarsophalangeal joint.  There is also a decrease in the joint space present on the AP image.  Joint space narrowing is noted.  Hallux limitus/rigidus is present.    Discussed treatment options regarding conservative and surgical outcome.  Discussed appropriate shoe gear modifications, intra-articular steroid injections, surgical  intervention.  Patient would like to proceed with conservative options this time.  I do agree with this as her last A1c is 7.9.  We discussed patient would become a surgical candidate once A1c is below 7.5.    Patient would like to proceed with intra-articular steroid injection.  Patient relates interest in steroid injection at this time.  Did discuss risk of steroid use as relates to increased anxiety, insomnia, post injection steroid flares, elevated blood sugars, swelling, pigment/skin changes.  Patient relates understanding.  Recommend use of anti-inflammatories with icing to decrease risk of a post-injection steroid flare.        Future Appointments   Date Time Provider Department Center   7/3/2023  2:00 PM Alecia Goodman PA-C [facility] DIABETE [facility]   7/24/2023  1:45 PM Quinton Kapadia OD [facility] OPHTHAL [facility]   11/21/2023  9:10 AM LABORATORY, CENTRAL [facility]   11/27/2023  1:00 PM Macarena Bae PA-C ON IM  [facility]   1/3/2024  8:15 AM [facility]   1/3/2024  9:30 AM LABORATORY, [place] [place] [facility]   1/3/2024  9:30 AM [facility] CT1 LIMIT 500 LBS [facility] CT SCAN [facility]   1/5/2024  2:00 PM Todd Cisse MD ON UROLOGY  [facility]

## 2023-06-11 ENCOUNTER — TELEPHONE (OUTPATIENT)
Dept: ADMINISTRATIVE | Facility: HOSPITAL | Age: 68
End: 2023-06-11
Payer: MEDICARE

## 2023-07-03 ENCOUNTER — OFFICE VISIT (OUTPATIENT)
Dept: DIABETES | Facility: CLINIC | Age: 68
End: 2023-07-03
Payer: MEDICARE

## 2023-07-03 DIAGNOSIS — E66.9 OBESITY (BMI 30-39.9): ICD-10-CM

## 2023-07-03 DIAGNOSIS — E11.9 TYPE 2 DIABETES MELLITUS WITH HEMOGLOBIN A1C GOAL OF LESS THAN 7.0%: Primary | ICD-10-CM

## 2023-07-03 DIAGNOSIS — I10 HYPERTENSION GOAL BP (BLOOD PRESSURE) < 130/80: ICD-10-CM

## 2023-07-03 DIAGNOSIS — K76.0 FATTY LIVER: ICD-10-CM

## 2023-07-03 DIAGNOSIS — E78.5 HYPERLIPIDEMIA LDL GOAL <70: ICD-10-CM

## 2023-07-03 PROCEDURE — 1157F PR ADVANCE CARE PLAN OR EQUIV PRESENT IN MEDICAL RECORD: ICD-10-PCS | Mod: HCNC,CPTII,95, | Performed by: PHYSICIAN ASSISTANT

## 2023-07-03 PROCEDURE — 3072F LOW RISK FOR RETINOPATHY: CPT | Mod: HCNC,CPTII,95, | Performed by: PHYSICIAN ASSISTANT

## 2023-07-03 PROCEDURE — 99442 PR PHYSICIAN TELEPHONE EVALUATION 11-20 MIN: CPT | Mod: HCNC,95,, | Performed by: PHYSICIAN ASSISTANT

## 2023-07-03 PROCEDURE — 99442 PR PHYSICIAN TELEPHONE EVALUATION 11-20 MIN: ICD-10-PCS | Mod: HCNC,95,, | Performed by: PHYSICIAN ASSISTANT

## 2023-07-03 PROCEDURE — 4010F ACE/ARB THERAPY RXD/TAKEN: CPT | Mod: HCNC,CPTII,95, | Performed by: PHYSICIAN ASSISTANT

## 2023-07-03 PROCEDURE — 3051F PR MOST RECENT HEMOGLOBIN A1C LEVEL 7.0 - < 8.0%: ICD-10-PCS | Mod: HCNC,CPTII,95, | Performed by: PHYSICIAN ASSISTANT

## 2023-07-03 PROCEDURE — 3072F PR LOW RISK FOR RETINOPATHY: ICD-10-PCS | Mod: HCNC,CPTII,95, | Performed by: PHYSICIAN ASSISTANT

## 2023-07-03 PROCEDURE — 4010F PR ACE/ARB THEARPY RXD/TAKEN: ICD-10-PCS | Mod: HCNC,CPTII,95, | Performed by: PHYSICIAN ASSISTANT

## 2023-07-03 PROCEDURE — 3051F HG A1C>EQUAL 7.0%<8.0%: CPT | Mod: HCNC,CPTII,95, | Performed by: PHYSICIAN ASSISTANT

## 2023-07-03 PROCEDURE — 1157F ADVNC CARE PLAN IN RCRD: CPT | Mod: HCNC,CPTII,95, | Performed by: PHYSICIAN ASSISTANT

## 2023-07-03 RX ORDER — GLIMEPIRIDE 4 MG/1
4 TABLET ORAL 2 TIMES DAILY WITH MEALS
Qty: 180 TABLET | Refills: 3 | Status: SHIPPED | OUTPATIENT
Start: 2023-07-03 | End: 2024-02-14

## 2023-07-03 NOTE — PATIENT INSTRUCTIONS
CURRENT DM MEDICATIONS:   Ozempic 2 mg weekly   Amaryl 4 mg before meals twice daily  Tresiba 70 units daily  Jardiance 25 mg daily

## 2023-07-03 NOTE — PROGRESS NOTES
PCP: Sakina Cooper DO    Subjective:     Chief Complaint: Diabetes - Established Patient    Established Patient - Audio Only Telehealth Visit     The patient location is: Home  The chief complaint leading to consultation is: Diabetes follow up  Visit type: Virtual visit with audio only (telephone)  Total Time Spent with Patient: 20  minutes     The reason for the audio only service rather than synchronous audio and video virtual visit was related to technical difficulties or patient preference/necessity.     Each patient to whom I provide medical services by telemedicine is:  (1) informed of the relationship between the physician and patient and the respective role of any other health care provider with respect to management of the patient; and (2) notified that they may decline to receive medical services by telemedicine and may withdraw from such care at any time. Patient verbally consented to receive this service via voice-only telephone call.    This service was not originating from a related E/M service provided within the previous 7 days nor will  to an E/M service or procedure within the next 24 hours or my soonest available appointment.  Prevailing standard of care was able to be met in this audio-only visit.       HISTORY OF PRESENT ILLNESS: 67 y.o.  female presenting for diabetes management visit.   The patient's last visit with me was on 5/19/2023.  Patient has had Type II diabetes since 2005.  Pertinent to decision making is the following comorbidities: HTN, HLD, Obesity by BMI and Fatty Liver  Patient has the following Diabetes complications: without complications  She  has attended diabetes education in the past.     Patient's most recent A1c of 7.9% was completed 2 months ago.   Patient states since Her last A1c Her blood glucose levels have been within range in the morning / fasting.   Patient monitors blood glucose 1 times per day with Contour Next : Fasting and After Dinner.    Patient blood glucose monitoring device will not be uploaded into Media Section today secondary to unable to upload successfully.   Per meter recall, fasting blood sugar ranging 92 - 115 and after dinner 180 - 220s.  Patient endorses the following diabetes related symptoms: None.   Patient is due today for the following diabetes-related health maintenance standards: Foot Exam , Eye Exam, Urine Microalbumin/creatinine ratio, and COVID-19 Vaccine . Previously est with Dr. Ryder for eye exam.   She denies recent hospital admissions or emergency room visits.   She denies having hypoglycemia.  Patient's concerns today include glycemic control.    Patient medication regimen is as below.     CURRENT DM MEDICATIONS:   Ozempic 2 mg weekly   Amaryl - holding  Tresiba 68 units daily  Jardiance 25 mg daily      Patient has failed the following Diabetes medications:   Metformin - allergy   Onglyza  Levemir   Victoza / Trulicity - GLP-1 escalation  Mounjaro - cost; no pharm asst available         Labs Reviewed.       Lab Results   Component Value Date    CPEPTIDE 1.73 08/30/2022     Lab Results   Component Value Date    GLUTAMICACID 0.00 12/14/2017          //   , There is no height or weight on file to calculate BMI.  Her blood sugar in clinic today is:          Review of Systems   Constitutional:  Negative for activity change, appetite change, chills and fever.   HENT:  Negative for dental problem, mouth sores, nosebleeds, sore throat and trouble swallowing.    Eyes:  Negative for pain and discharge.   Respiratory:  Negative for shortness of breath, wheezing and stridor.    Cardiovascular:  Negative for chest pain, palpitations and leg swelling.   Gastrointestinal:  Negative for abdominal pain, diarrhea, nausea and vomiting.   Endocrine: Negative for polydipsia, polyphagia and polyuria.   Genitourinary:  Negative for dysuria, frequency and urgency.   Musculoskeletal:  Negative for joint swelling and myalgias.   Skin:   Negative for rash and wound.   Neurological:  Negative for dizziness, syncope, weakness and headaches.   Psychiatric/Behavioral:  Negative for behavioral problems and dysphoric mood.        Diabetes Management Status  Statin: Taking  ACE/ARB: Taking    Screening or Prevention Patient's value Goal Complete/Controlled?   HgA1C Testing and Control   Lab Results   Component Value Date    HGBA1C 7.9 (H) 05/17/2023      Annually/Less than 8% Yes   Lipid profile : 05/17/2023 Annually Yes   LDL control Lab Results   Component Value Date    LDLCALC 113.8 05/17/2023    Annually/Less than 100 mg/dl  Yes   Nephropathy screening Lab Results   Component Value Date    MICALBCREAT 10.3 08/30/2022     Lab Results   Component Value Date    PROTEINUA Negative 09/08/2014    Annually Yes   Blood pressure BP Readings from Last 1 Encounters:   02/06/23 138/80    Less than 140/90 Yes   Dilated retinal exam : 05/30/2022 Annually Yes    Foot exam   : 05/30/2022 Annually No     Social History     Socioeconomic History    Marital status:     Number of children: 3   Occupational History    Occupation: Stay at Home    Occupation:    Tobacco Use    Smoking status: Never    Smokeless tobacco: Never   Substance and Sexual Activity    Alcohol use: Yes     Comment: occasional wine     Drug use: No    Sexual activity: Yes     Partners: Male     Birth control/protection: Surgical   Social History Narrative    . Housewife.     Past Medical History:   Diagnosis Date    Arthritis     Cataract     Colon polyp     Diabetes mellitus type II 15 years     am 05/30/2022    Hyperlipidemia     Hypertension     Renal cell carcinoma 09/2018    Total knee replacement status, left 01/20/2019    Dr.Robert Cook    UTI (urinary tract infection)        Objective:        Physical Exam  Neurological:      Mental Status: She is alert and oriented to person, place, and time. Mental status is at baseline.   Psychiatric:         Mood and  Affect: Mood normal.         Behavior: Behavior normal.         Thought Content: Thought content normal.         Judgment: Judgment normal.         Assessment / Plan:     Type 2 diabetes mellitus with hemoglobin A1c goal of less than 7.0%  -     glimepiride (AMARYL) 4 MG tablet; Take 1 tablet (4 mg total) by mouth 2 (two) times daily with meals.  Dispense: 180 tablet; Refill: 3  -     Hemoglobin A1C; Standing  -     Microalbumin/Creatinine Ratio, Urine; Future; Expected date: 07/03/2023    Fatty liver    Hyperlipidemia LDL goal <70    Hypertension goal BP (blood pressure) < 130/80    Obesity (BMI 30-39.9)      Additional Plan Details:    - POCT Glucose  - Encouraged continuation of lifestyle changes including regular exercise and limiting carbohydrates to 30-45 grams per meal threes times daily and 15 grams per snack with a limit of two daily.   - Encouraged continued monitoring of blood glucose with maintenance of 2 times daily at Fasting and After Dinner. Add Afterbreakfast reading.   - Current DM Medication Regimen: Restart Glimepiride 4 mg before meals BID. Change Tresiba 70 units daily.  Continue Ozempic 2 mg weekly. Continue Jardiance 25 mg daily.   - Health Maintenance standards addressed today: Foot Exam - deferred by patient today because Telemedicine or Telephone visit, Eye Exam - will be completed within [facility] system and scheduled today, Urine Microalbumin / Creatinine Ratio scheduled, and COVID - 19 Vaccine - patient will schedule outside of [facility]   - Nursing Visit: Patient is age 79 or younger with an A1c of 7.5 or greater and will not need nursing visit at this time .   - Follow up in 6 weeks .    CURRENT DM MEDICATIONS:   Ozempic 2 mg weekly   Amaryl 4 mg before meals twice daily  Tresiba 70 units daily  Jardiance 25 mg daily       Alecia Goodman PA-C  [facility]

## 2023-08-09 ENCOUNTER — OFFICE VISIT (OUTPATIENT)
Dept: OPHTHALMOLOGY | Facility: CLINIC | Age: 68
End: 2023-08-09
Payer: MEDICARE

## 2023-08-09 DIAGNOSIS — E11.36 DIABETIC CATARACT: ICD-10-CM

## 2023-08-09 DIAGNOSIS — H01.02A SQUAMOUS BLEPHARITIS OF UPPER AND LOWER EYELIDS OF BOTH EYES: ICD-10-CM

## 2023-08-09 DIAGNOSIS — H40.013 AT LOW RISK FOR OPEN-ANGLE GLAUCOMA IN BOTH EYES: ICD-10-CM

## 2023-08-09 DIAGNOSIS — H01.02B SQUAMOUS BLEPHARITIS OF UPPER AND LOWER EYELIDS OF BOTH EYES: ICD-10-CM

## 2023-08-09 DIAGNOSIS — E11.9 DIABETES MELLITUS WITHOUT COMPLICATION: Primary | ICD-10-CM

## 2023-08-09 DIAGNOSIS — H43.813 POSTERIOR VITREOUS DETACHMENT OF BOTH EYES: ICD-10-CM

## 2023-08-09 PROCEDURE — 3051F HG A1C>EQUAL 7.0%<8.0%: CPT | Mod: HCNC,CPTII,S$GLB, | Performed by: OPTOMETRIST

## 2023-08-09 PROCEDURE — 99999 PR PBB SHADOW E&M-EST. PATIENT-LVL III: ICD-10-PCS | Mod: PBBFAC,HCNC,, | Performed by: OPTOMETRIST

## 2023-08-09 PROCEDURE — 3051F PR MOST RECENT HEMOGLOBIN A1C LEVEL 7.0 - < 8.0%: ICD-10-PCS | Mod: HCNC,CPTII,S$GLB, | Performed by: OPTOMETRIST

## 2023-08-09 PROCEDURE — 4010F ACE/ARB THERAPY RXD/TAKEN: CPT | Mod: HCNC,CPTII,S$GLB, | Performed by: OPTOMETRIST

## 2023-08-09 PROCEDURE — 1159F MED LIST DOCD IN RCRD: CPT | Mod: HCNC,CPTII,S$GLB, | Performed by: OPTOMETRIST

## 2023-08-09 PROCEDURE — 92133 CPTRZD OPH DX IMG PST SGM ON: CPT | Mod: HCNC,S$GLB,, | Performed by: OPTOMETRIST

## 2023-08-09 PROCEDURE — 2023F PR DILATED RETINAL EXAM W/O EVID OF RETINOPATHY: ICD-10-PCS | Mod: HCNC,CPTII,S$GLB, | Performed by: OPTOMETRIST

## 2023-08-09 PROCEDURE — 92133 OCT, OPTIC NERVE - OU - BOTH EYES: ICD-10-PCS | Mod: HCNC,S$GLB,, | Performed by: OPTOMETRIST

## 2023-08-09 PROCEDURE — 1159F PR MEDICATION LIST DOCUMENTED IN MEDICAL RECORD: ICD-10-PCS | Mod: HCNC,CPTII,S$GLB, | Performed by: OPTOMETRIST

## 2023-08-09 PROCEDURE — 92014 COMPRE OPH EXAM EST PT 1/>: CPT | Mod: HCNC,S$GLB,, | Performed by: OPTOMETRIST

## 2023-08-09 PROCEDURE — 1157F PR ADVANCE CARE PLAN OR EQUIV PRESENT IN MEDICAL RECORD: ICD-10-PCS | Mod: HCNC,CPTII,S$GLB, | Performed by: OPTOMETRIST

## 2023-08-09 PROCEDURE — 92014 PR EYE EXAM, EST PATIENT,COMPREHESV: ICD-10-PCS | Mod: HCNC,S$GLB,, | Performed by: OPTOMETRIST

## 2023-08-09 PROCEDURE — 2023F DILAT RTA XM W/O RTNOPTHY: CPT | Mod: HCNC,CPTII,S$GLB, | Performed by: OPTOMETRIST

## 2023-08-09 PROCEDURE — 4010F PR ACE/ARB THEARPY RXD/TAKEN: ICD-10-PCS | Mod: HCNC,CPTII,S$GLB, | Performed by: OPTOMETRIST

## 2023-08-09 PROCEDURE — 99999 PR PBB SHADOW E&M-EST. PATIENT-LVL III: CPT | Mod: PBBFAC,HCNC,, | Performed by: OPTOMETRIST

## 2023-08-09 PROCEDURE — 1157F ADVNC CARE PLAN IN RCRD: CPT | Mod: HCNC,CPTII,S$GLB, | Performed by: OPTOMETRIST

## 2023-08-09 RX ORDER — ERYTHROMYCIN 5 MG/G
OINTMENT OPHTHALMIC 2 TIMES DAILY
Qty: 3.5 G | Refills: 0 | Status: SHIPPED | OUTPATIENT
Start: 2023-08-09 | End: 2023-08-16

## 2023-08-09 NOTE — PROGRESS NOTES
HPI     Diabetic Eye Exam            Comments: Pt says she does not want any distance glasses.     Diagnosed with diabetes in 2008  Lab Results       Component                Value               Date                       HGBA1C                   7.9 (H)             05/17/2023                    Spots and/or Floaters            Comments: Pt states she has been experiencing floaters in her left eye.   She says these are the same floaters she has been experiencing for a long   time, but she is noticing them much more frequently than before.           Comments    Episleritis OS  JOSEY OU  PVD OU             Last edited by Alf Rodrigues MA on 8/9/2023  3:47 PM.            Assessment /Plan     For exam results, see Encounter Report.    Diabetes mellitus without complication  10+ years, last A1c 7.9 There was no diabetic retinopathy present on either eye on examination today. Recommend good blood pressure control, strict blood glucose control, and good cholesterol control.  Continue close care with Dr. Presley regarding diabetes.     Diabetic cataract  Visually significant cataract.  Patient is at the option stage.   Cataract surgery will improve vision, but necessity is dependent on patient's symptoms.   Pt declines surgical consult at this time.  Will continue to monitor over the next 12 months. Pt to call or RTC with any significant change in vision prior to next visit.     Posterior vitreous detachment of both eyes  The nature of posterior vitreous detachment was discussed with the patient.  Signs and symptoms of retinal detachment were discussed with patient.   No holes or tears were noted upon careful retinal examination after dilation today.   Return to clinic as soon as possible (same day) if you notice any new floaters, flashes of light, curtain/veil over your vision from any direction, or any change in vision.    At low risk for open-angle glaucoma in both eyes  -     OCT, Optic Nerve - OU - Both Eyes  The  patient has the following Glaucoma risk factors: Age and Optic Nerve Asymmetry    Recommend observation with annual gOCT.    Squamous blepharitis of upper and lower eyelids of both eyes   Exam findings are consistent with mildblepharitis. Recommended patient begin Antibiotic oint Erythromycin  .   RTC 1 yr for dilated eye exam or sooner if any changes to vision.   Discussed above and answered questions.

## 2023-08-15 ENCOUNTER — TELEPHONE (OUTPATIENT)
Dept: ADMINISTRATIVE | Facility: HOSPITAL | Age: 68
End: 2023-08-15
Payer: MEDICARE

## 2023-08-21 ENCOUNTER — LAB VISIT (OUTPATIENT)
Dept: LAB | Facility: HOSPITAL | Age: 68
End: 2023-08-21
Attending: INTERNAL MEDICINE
Payer: MEDICARE

## 2023-08-21 ENCOUNTER — OFFICE VISIT (OUTPATIENT)
Dept: DIABETES | Facility: CLINIC | Age: 68
End: 2023-08-21
Payer: MEDICARE

## 2023-08-21 VITALS
WEIGHT: 165.38 LBS | HEIGHT: 62 IN | DIASTOLIC BLOOD PRESSURE: 81 MMHG | BODY MASS INDEX: 30.44 KG/M2 | HEART RATE: 79 BPM | SYSTOLIC BLOOD PRESSURE: 139 MMHG

## 2023-08-21 DIAGNOSIS — I10 HYPERTENSION GOAL BP (BLOOD PRESSURE) < 130/80: ICD-10-CM

## 2023-08-21 DIAGNOSIS — E78.5 HYPERLIPIDEMIA LDL GOAL <70: ICD-10-CM

## 2023-08-21 DIAGNOSIS — E11.9 TYPE 2 DIABETES MELLITUS WITH HEMOGLOBIN A1C GOAL OF LESS THAN 7.0%: ICD-10-CM

## 2023-08-21 DIAGNOSIS — E66.9 OBESITY (BMI 30-39.9): ICD-10-CM

## 2023-08-21 DIAGNOSIS — K76.0 FATTY LIVER: ICD-10-CM

## 2023-08-21 DIAGNOSIS — E11.9 TYPE 2 DIABETES MELLITUS WITH HEMOGLOBIN A1C GOAL OF LESS THAN 7.0%: Primary | ICD-10-CM

## 2023-08-21 LAB
ESTIMATED AVG GLUCOSE: 169 MG/DL (ref 68–131)
GLUCOSE SERPL-MCNC: 232 MG/DL (ref 70–110)
HBA1C MFR BLD: 7.5 % (ref 4–5.6)

## 2023-08-21 PROCEDURE — 4010F PR ACE/ARB THEARPY RXD/TAKEN: ICD-10-PCS | Mod: HCNC,CPTII,S$GLB, | Performed by: PHYSICIAN ASSISTANT

## 2023-08-21 PROCEDURE — 99214 OFFICE O/P EST MOD 30 MIN: CPT | Mod: HCNC,S$GLB,, | Performed by: PHYSICIAN ASSISTANT

## 2023-08-21 PROCEDURE — 3079F DIAST BP 80-89 MM HG: CPT | Mod: HCNC,CPTII,S$GLB, | Performed by: PHYSICIAN ASSISTANT

## 2023-08-21 PROCEDURE — 3075F SYST BP GE 130 - 139MM HG: CPT | Mod: HCNC,CPTII,S$GLB, | Performed by: PHYSICIAN ASSISTANT

## 2023-08-21 PROCEDURE — 82962 GLUCOSE BLOOD TEST: CPT | Mod: HCNC,S$GLB,, | Performed by: PHYSICIAN ASSISTANT

## 2023-08-21 PROCEDURE — 3008F BODY MASS INDEX DOCD: CPT | Mod: HCNC,CPTII,S$GLB, | Performed by: PHYSICIAN ASSISTANT

## 2023-08-21 PROCEDURE — 1101F PT FALLS ASSESS-DOCD LE1/YR: CPT | Mod: HCNC,CPTII,S$GLB, | Performed by: PHYSICIAN ASSISTANT

## 2023-08-21 PROCEDURE — 84439 ASSAY OF FREE THYROXINE: CPT | Mod: HCNC | Performed by: PHYSICIAN ASSISTANT

## 2023-08-21 PROCEDURE — 1159F MED LIST DOCD IN RCRD: CPT | Mod: HCNC,CPTII,S$GLB, | Performed by: PHYSICIAN ASSISTANT

## 2023-08-21 PROCEDURE — 84443 ASSAY THYROID STIM HORMONE: CPT | Mod: HCNC | Performed by: PHYSICIAN ASSISTANT

## 2023-08-21 PROCEDURE — 3288F FALL RISK ASSESSMENT DOCD: CPT | Mod: HCNC,CPTII,S$GLB, | Performed by: PHYSICIAN ASSISTANT

## 2023-08-21 PROCEDURE — 36415 COLL VENOUS BLD VENIPUNCTURE: CPT | Mod: HCNC | Performed by: PHYSICIAN ASSISTANT

## 2023-08-21 PROCEDURE — 3079F PR MOST RECENT DIASTOLIC BLOOD PRESSURE 80-89 MM HG: ICD-10-PCS | Mod: HCNC,CPTII,S$GLB, | Performed by: PHYSICIAN ASSISTANT

## 2023-08-21 PROCEDURE — 1126F PR PAIN SEVERITY QUANTIFIED, NO PAIN PRESENT: ICD-10-PCS | Mod: HCNC,CPTII,S$GLB, | Performed by: PHYSICIAN ASSISTANT

## 2023-08-21 PROCEDURE — 4010F ACE/ARB THERAPY RXD/TAKEN: CPT | Mod: HCNC,CPTII,S$GLB, | Performed by: PHYSICIAN ASSISTANT

## 2023-08-21 PROCEDURE — 3288F PR FALLS RISK ASSESSMENT DOCUMENTED: ICD-10-PCS | Mod: HCNC,CPTII,S$GLB, | Performed by: PHYSICIAN ASSISTANT

## 2023-08-21 PROCEDURE — 3008F PR BODY MASS INDEX (BMI) DOCUMENTED: ICD-10-PCS | Mod: HCNC,CPTII,S$GLB, | Performed by: PHYSICIAN ASSISTANT

## 2023-08-21 PROCEDURE — 83036 HEMOGLOBIN GLYCOSYLATED A1C: CPT | Mod: HCNC | Performed by: PHYSICIAN ASSISTANT

## 2023-08-21 PROCEDURE — 99999 PR PBB SHADOW E&M-EST. PATIENT-LVL III: CPT | Mod: PBBFAC,HCNC,, | Performed by: PHYSICIAN ASSISTANT

## 2023-08-21 PROCEDURE — 1101F PR PT FALLS ASSESS DOC 0-1 FALLS W/OUT INJ PAST YR: ICD-10-PCS | Mod: HCNC,CPTII,S$GLB, | Performed by: PHYSICIAN ASSISTANT

## 2023-08-21 PROCEDURE — 1126F AMNT PAIN NOTED NONE PRSNT: CPT | Mod: HCNC,CPTII,S$GLB, | Performed by: PHYSICIAN ASSISTANT

## 2023-08-21 PROCEDURE — 1159F PR MEDICATION LIST DOCUMENTED IN MEDICAL RECORD: ICD-10-PCS | Mod: HCNC,CPTII,S$GLB, | Performed by: PHYSICIAN ASSISTANT

## 2023-08-21 PROCEDURE — 3051F HG A1C>EQUAL 7.0%<8.0%: CPT | Mod: HCNC,CPTII,S$GLB, | Performed by: PHYSICIAN ASSISTANT

## 2023-08-21 PROCEDURE — 99214 PR OFFICE/OUTPT VISIT, EST, LEVL IV, 30-39 MIN: ICD-10-PCS | Mod: HCNC,S$GLB,, | Performed by: PHYSICIAN ASSISTANT

## 2023-08-21 PROCEDURE — 82962 POCT GLUCOSE, HAND-HELD DEVICE: ICD-10-PCS | Mod: HCNC,S$GLB,, | Performed by: PHYSICIAN ASSISTANT

## 2023-08-21 PROCEDURE — 3075F PR MOST RECENT SYSTOLIC BLOOD PRESS GE 130-139MM HG: ICD-10-PCS | Mod: HCNC,CPTII,S$GLB, | Performed by: PHYSICIAN ASSISTANT

## 2023-08-21 PROCEDURE — 1157F ADVNC CARE PLAN IN RCRD: CPT | Mod: HCNC,CPTII,S$GLB, | Performed by: PHYSICIAN ASSISTANT

## 2023-08-21 PROCEDURE — 82570 ASSAY OF URINE CREATININE: CPT | Mod: HCNC | Performed by: PHYSICIAN ASSISTANT

## 2023-08-21 PROCEDURE — 3051F PR MOST RECENT HEMOGLOBIN A1C LEVEL 7.0 - < 8.0%: ICD-10-PCS | Mod: HCNC,CPTII,S$GLB, | Performed by: PHYSICIAN ASSISTANT

## 2023-08-21 PROCEDURE — 99999 PR PBB SHADOW E&M-EST. PATIENT-LVL III: ICD-10-PCS | Mod: PBBFAC,HCNC,, | Performed by: PHYSICIAN ASSISTANT

## 2023-08-21 PROCEDURE — 1157F PR ADVANCE CARE PLAN OR EQUIV PRESENT IN MEDICAL RECORD: ICD-10-PCS | Mod: HCNC,CPTII,S$GLB, | Performed by: PHYSICIAN ASSISTANT

## 2023-08-21 NOTE — PROGRESS NOTES
PCP: Sakina Cooper DO    Subjective:     Chief Complaint: Diabetes - Established Patient    HISTORY OF PRESENT ILLNESS: 67 y.o.  female presenting for diabetes management visit.   The patient's last visit with me was on 7/3/2023.   Patient has had Type II diabetes since 2005.  Pertinent to decision making is the following comorbidities: HTN, HLD, Obesity by BMI and Fatty Liver  Patient has the following Diabetes complications: without complications  She  has attended diabetes education in the past.     Patient's most recent A1c of 7.9% was completed 3 months ago.   Patient states since Her last A1c Her blood glucose levels have been within range in the morning / fasting.   Patient monitors blood glucose 1 times per day with Contour Next : Fasting and After Dinner.   Patient blood glucose monitoring device will not be uploaded into Media Section today secondary to unable to upload successfully.   Per meter recall, fasting blood sugar ranging 90 - 190 and after dinner 170 - 220.  Patient endorses the following diabetes related symptoms: None.   Patient is due today for the following diabetes-related health maintenance standards: Foot Exam , Urine Microalbumin/creatinine ratio, and COVID-19 Vaccine .   She denies recent hospital admissions or emergency room visits.   She denies having hypoglycemia.  Patient's concerns today include glycemic control. Of note, increased stress with recent passing of child and mother who lives in [place] with health conditions.   Patient medication regimen is as below.     CURRENT DM MEDICATIONS:   Ozempic 2 mg weekly   Amaryl 4 mg before meals twice daily  Tresiba 70 units daily  Jardiance 25 mg daily - stopped taking approx 1 mo ago due to GI effects     Patient has failed the following Diabetes medications:   Metformin - allergy   Onglyza  Levemir   Victoza / Trulicity - GLP-1 escalation  Mounjaro - cost; no pharm asst available   Jardiance - GI side effects;         Labs  Reviewed.       Lab Results   Component Value Date    CPEPTIDE 1.73 08/30/2022     Lab Results   Component Value Date    GLUTAMICACID 0.00 12/14/2017          //   , There is no height or weight on file to calculate BMI.  Her blood sugar in clinic today is:          Review of Systems   Constitutional:  Negative for activity change, appetite change, chills and fever.   HENT:  Negative for dental problem, mouth sores, nosebleeds, sore throat and trouble swallowing.    Eyes:  Negative for pain and discharge.   Respiratory:  Negative for shortness of breath, wheezing and stridor.    Cardiovascular:  Negative for chest pain, palpitations and leg swelling.   Gastrointestinal:  Negative for abdominal pain, diarrhea, nausea and vomiting.   Endocrine: Negative for polydipsia, polyphagia and polyuria.   Genitourinary:  Negative for dysuria, frequency and urgency.   Musculoskeletal:  Negative for joint swelling and myalgias.   Skin:  Negative for rash and wound.   Neurological:  Negative for dizziness, syncope, weakness and headaches.   Psychiatric/Behavioral:  Negative for behavioral problems and dysphoric mood.          Diabetes Management Status  Statin: Taking  ACE/ARB: Taking    Screening or Prevention Patient's value Goal Complete/Controlled?   HgA1C Testing and Control   Lab Results   Component Value Date    HGBA1C 7.9 (H) 05/17/2023      Annually/Less than 8% Yes   Lipid profile : 05/17/2023 Annually Yes   LDL control Lab Results   Component Value Date    LDLCALC 113.8 05/17/2023    Annually/Less than 100 mg/dl  Yes   Nephropathy screening Lab Results   Component Value Date    MICALBCREAT 10.3 08/30/2022     Lab Results   Component Value Date    PROTEINUA Negative 09/08/2014    Annually Yes   Blood pressure BP Readings from Last 1 Encounters:   02/06/23 138/80    Less than 140/90 Yes   Dilated retinal exam : 08/09/2023 Annually Yes    Foot exam   : 05/30/2022 Annually No     Social History     Socioeconomic History     Marital status:     Number of children: 3   Occupational History    Occupation: Stay at Home    Occupation:    Tobacco Use    Smoking status: Never    Smokeless tobacco: Never   Substance and Sexual Activity    Alcohol use: Yes     Comment: occasional wine     Drug use: No    Sexual activity: Yes     Partners: Male     Birth control/protection: Surgical   Social History Narrative    . Housewife.     Social Determinants of Health     Financial Resource Strain: Low Risk  (6/20/2022)    Overall Financial Resource Strain (CARDIA)     Difficulty of Paying Living Expenses: Not very hard   Food Insecurity: No Food Insecurity (6/20/2022)    Hunger Vital Sign     Worried About Running Out of Food in the Last Year: Never true     Ran Out of Food in the Last Year: Never true   Transportation Needs: No Transportation Needs (6/20/2022)    PRAPARE - Transportation     Lack of Transportation (Medical): No     Lack of Transportation (Non-Medical): No   Physical Activity: Insufficiently Active (6/20/2022)    Exercise Vital Sign     Days of Exercise per Week: 2 days     Minutes of Exercise per Session: 20 min   Stress: Stress Concern Present (6/20/2022)    [employer] [employer] - Occupational Stress Questionnaire     Feeling of Stress : To some extent   Social Connections: Moderately Integrated (6/20/2022)    Social Connection and Isolation Panel [NHANES]     Frequency of Communication with Friends and Family: More than three times a week     Frequency of Social Gatherings with Friends and Family: Twice a week     Attends Mosque Services: More than 4 times per year     Active Member of Clubs or Organizations: No     Attends Club or Organization Meetings: Never     Marital Status:    Housing Stability: Low Risk  (6/20/2022)    Housing Stability Vital Sign     Unable to Pay for Housing in the Last Year: No     Number of Places Lived in the Last Year: 1     Unstable Housing in  the Last Year: No     Past Medical History:   Diagnosis Date    Arthritis     Cataract     Colon polyp     Diabetes mellitus type II 15 years     am 05/30/2022    Hyperlipidemia     Hypertension     Renal cell carcinoma 09/2018    Total knee replacement status, left 01/20/2019    Dr.Robert Cook    UTI (urinary tract infection)        Objective:        Physical Exam  Constitutional:       General: She is not in acute distress.     Appearance: She is well-developed. She is not diaphoretic.   HENT:      Head: Normocephalic and atraumatic.      Right Ear: External ear normal.      Left Ear: External ear normal.      Nose: Nose normal.   Eyes:      General:         Right eye: No discharge.         Left eye: No discharge.      Pupils: Pupils are equal, round, and reactive to light.   Cardiovascular:      Rate and Rhythm: Normal rate and regular rhythm.      Pulses:           Dorsalis pedis pulses are 2+ on the right side and 2+ on the left side.        Posterior tibial pulses are 2+ on the right side and 2+ on the left side.      Heart sounds: Normal heart sounds.   Pulmonary:      Effort: Pulmonary effort is normal.      Breath sounds: Normal breath sounds.   Abdominal:      Palpations: Abdomen is soft.   Musculoskeletal:         General: Normal range of motion.      Cervical back: Normal range of motion and neck supple.      Right foot: Normal range of motion. No deformity.      Left foot: Normal range of motion. No deformity.   Feet:      Right foot:      Protective Sensation: 6 sites tested.  6 sites sensed.      Skin integrity: Dry skin present. No ulcer, blister, skin breakdown, erythema, warmth or callus.      Left foot:      Protective Sensation: 6 sites tested.  6 sites sensed.      Skin integrity: Dry skin present. No ulcer, blister, skin breakdown, erythema, warmth or callus.   Skin:     General: Skin is warm and dry.      Capillary Refill: Capillary refill takes less than 2 seconds.   Neurological:       Mental Status: She is alert and oriented to person, place, and time. Mental status is at baseline.   Psychiatric:         Mood and Affect: Mood normal.         Behavior: Behavior normal.         Thought Content: Thought content normal.         Judgment: Judgment normal.           Assessment / Plan:     Type 2 diabetes mellitus with hemoglobin A1c goal of less than 7.0%  -     POCT Glucose, Hand-Held Device  -     empagliflozin (JARDIANCE) 10 mg tablet; Take 1 tablet (10 mg total) by mouth once daily.  Dispense: 30 tablet; Refill: 11  -     Hemoglobin A1C; Standing  -     Microalbumin/Creatinine Ratio, Urine; Future; Expected date: 08/21/2023  -     TSH; Future; Expected date: 08/21/2023  -     T4, Free; Future; Expected date: 08/21/2023    Fatty liver    Hyperlipidemia LDL goal <70    Hypertension goal BP (blood pressure) < 130/80    Obesity (BMI 30-39.9)      Additional Plan Details:    - POCT Glucose  - Encouraged continuation of lifestyle changes including regular exercise and limiting carbohydrates to 30-45 grams per meal threes times daily and 15 grams per snack with a limit of two daily.   - Encouraged continued monitoring of blood glucose with maintenance of 2 times daily at Fasting and After Dinner.   - Current DM Medication Regimen: Continue Glimepiride 4 mg before meals BID - focus on 15 mins before meals. Change Tresiba 74 units daily.  Continue Ozempic 2 mg weekly. Change Jardiance 10 mg daily - will trial for GI sx.   - Pharm Asst Renewal: Ozempic and Tresiba  - Nonfasting labs today  - Health Maintenance standards addressed today: Foot Exam - completed today and documented in physical exam with feedback to patient about proper diabetic foot care and findings, Urine Microalbumin / Creatinine Ratio scheduled, and COVID - 19 Vaccine - patient will schedule outside of [facility]   - Nursing Visit: Patient is age 79 or younger with an A1c of 7.5 or greater and will not need nursing visit at this time .    - Follow up in  12  weeks - will be traveling out of state until then.     CURRENT DM MEDICATIONS:   Ozempic 2 mg weekly   Amaryl 4 mg before meals twice daily  Tresiba 74 units daily  Jardiance 10 mg daily      Blakeney McKnight, PA-C [facility]      A total of 30 minutes was spent in face to face time, of which over 50 % was spent in counseling patient on disease process, complications, treatment, and side effects of medications.

## 2023-08-21 NOTE — PATIENT INSTRUCTIONS
CURRENT DM MEDICATIONS:   Ozempic 2 mg weekly   Amaryl 4 mg before meals twice daily  Tresiba 74 units daily  Jardiance 10 mg daily

## 2023-08-22 LAB
ALBUMIN/CREAT UR: 9.3 UG/MG (ref 0–30)
CREAT UR-MCNC: 54 MG/DL (ref 15–325)
MICROALBUMIN UR DL<=1MG/L-MCNC: 5 UG/ML
T4 FREE SERPL-MCNC: 1.01 NG/DL (ref 0.71–1.51)
TSH SERPL DL<=0.005 MIU/L-ACNC: 0.88 UIU/ML (ref 0.4–4)

## 2023-08-28 PROBLEM — E78.5 HYPERLIPIDEMIA ASSOCIATED WITH TYPE 2 DIABETES MELLITUS: Status: ACTIVE | Noted: 2023-08-28

## 2023-08-28 PROBLEM — E11.36 DM CATARACT: Status: ACTIVE | Noted: 2023-08-28

## 2023-08-28 PROBLEM — E11.9 DIABETES MELLITUS TYPE 2 WITHOUT RETINOPATHY: Status: ACTIVE | Noted: 2023-08-28

## 2023-08-28 PROBLEM — E78.1 TYPE 2 DIABETES MELLITUS WITH HYPERTRIGLYCERIDEMIA: Status: ACTIVE | Noted: 2023-08-28

## 2023-08-28 PROBLEM — E11.69 HYPERLIPIDEMIA ASSOCIATED WITH TYPE 2 DIABETES MELLITUS: Status: ACTIVE | Noted: 2023-08-28

## 2023-08-28 PROBLEM — I15.2 HYPERTENSION ASSOCIATED WITH DIABETES: Status: ACTIVE | Noted: 2023-08-28

## 2023-08-28 PROBLEM — E11.59 HYPERTENSION ASSOCIATED WITH DIABETES: Status: ACTIVE | Noted: 2023-08-28

## 2023-08-28 PROBLEM — E11.69 TYPE 2 DIABETES MELLITUS WITH HYPERTRIGLYCERIDEMIA: Status: ACTIVE | Noted: 2023-08-28

## 2023-08-29 ENCOUNTER — OFFICE VISIT (OUTPATIENT)
Dept: INTERNAL MEDICINE | Facility: CLINIC | Age: 68
End: 2023-08-29
Payer: MEDICARE

## 2023-08-29 VITALS
WEIGHT: 166.25 LBS | HEIGHT: 62 IN | SYSTOLIC BLOOD PRESSURE: 130 MMHG | DIASTOLIC BLOOD PRESSURE: 80 MMHG | BODY MASS INDEX: 30.59 KG/M2 | RESPIRATION RATE: 20 BRPM | HEART RATE: 76 BPM

## 2023-08-29 DIAGNOSIS — R13.19 ESOPHAGEAL DYSPHAGIA: ICD-10-CM

## 2023-08-29 DIAGNOSIS — H26.9 CATARACT, UNSPECIFIED CATARACT TYPE, UNSPECIFIED LATERALITY: ICD-10-CM

## 2023-08-29 DIAGNOSIS — Z86.010 HISTORY OF COLON POLYPS: ICD-10-CM

## 2023-08-29 DIAGNOSIS — E65 CENTRAL OBESITY: ICD-10-CM

## 2023-08-29 DIAGNOSIS — I10 HYPERTENSION GOAL BP (BLOOD PRESSURE) < 130/80: Chronic | ICD-10-CM

## 2023-08-29 DIAGNOSIS — Z00.00 ENCOUNTER FOR PREVENTATIVE ADULT HEALTH CARE EXAMINATION: Primary | ICD-10-CM

## 2023-08-29 DIAGNOSIS — K22.10 ULCER OF ESOPHAGUS WITHOUT BLEEDING: ICD-10-CM

## 2023-08-29 DIAGNOSIS — K22.2 ESOPHAGEAL RING, ACQUIRED: ICD-10-CM

## 2023-08-29 DIAGNOSIS — E11.59 HYPERTENSION ASSOCIATED WITH DIABETES: ICD-10-CM

## 2023-08-29 DIAGNOSIS — M51.06 INTERVERTEBRAL LUMBAR DISC DISORDER WITH MYELOPATHY, LUMBAR REGION: ICD-10-CM

## 2023-08-29 DIAGNOSIS — E66.9 OBESITY (BMI 30-39.9): ICD-10-CM

## 2023-08-29 DIAGNOSIS — G89.29 CHRONIC INTRACTABLE HEADACHE, UNSPECIFIED HEADACHE TYPE: ICD-10-CM

## 2023-08-29 DIAGNOSIS — I15.2 HYPERTENSION ASSOCIATED WITH DIABETES: ICD-10-CM

## 2023-08-29 DIAGNOSIS — E11.9 TYPE 2 DIABETES MELLITUS WITHOUT RETINOPATHY: ICD-10-CM

## 2023-08-29 DIAGNOSIS — F43.21 GRIEF AT LOSS OF CHILD: ICD-10-CM

## 2023-08-29 DIAGNOSIS — J30.2 SEASONAL ALLERGIC RHINITIS, UNSPECIFIED TRIGGER: ICD-10-CM

## 2023-08-29 DIAGNOSIS — E11.36 DM CATARACT: ICD-10-CM

## 2023-08-29 DIAGNOSIS — J34.2 NASAL SEPTAL DEVIATION: ICD-10-CM

## 2023-08-29 DIAGNOSIS — E11.69 TYPE 2 DIABETES MELLITUS WITH HYPERTRIGLYCERIDEMIA: ICD-10-CM

## 2023-08-29 DIAGNOSIS — C64.9 RENAL CELL CARCINOMA, UNSPECIFIED LATERALITY: ICD-10-CM

## 2023-08-29 DIAGNOSIS — E78.5 HYPERLIPIDEMIA ASSOCIATED WITH TYPE 2 DIABETES MELLITUS: ICD-10-CM

## 2023-08-29 DIAGNOSIS — J84.10 CALCIFIED GRANULOMA OF LUNG: ICD-10-CM

## 2023-08-29 DIAGNOSIS — E78.5 HYPERLIPIDEMIA LDL GOAL <70: Chronic | ICD-10-CM

## 2023-08-29 DIAGNOSIS — N20.0 RENAL STONE: ICD-10-CM

## 2023-08-29 DIAGNOSIS — N28.1 RENAL CYST: ICD-10-CM

## 2023-08-29 DIAGNOSIS — E78.1 TYPE 2 DIABETES MELLITUS WITH HYPERTRIGLYCERIDEMIA: ICD-10-CM

## 2023-08-29 DIAGNOSIS — E11.69 HYPERLIPIDEMIA ASSOCIATED WITH TYPE 2 DIABETES MELLITUS: ICD-10-CM

## 2023-08-29 DIAGNOSIS — I70.0 ATHEROSCLEROSIS OF AORTA: ICD-10-CM

## 2023-08-29 DIAGNOSIS — E11.9 TYPE 2 DIABETES MELLITUS WITH HEMOGLOBIN A1C GOAL OF LESS THAN 7.0%: ICD-10-CM

## 2023-08-29 DIAGNOSIS — K44.9 HIATAL HERNIA: ICD-10-CM

## 2023-08-29 DIAGNOSIS — R51.9 CHRONIC INTRACTABLE HEADACHE, UNSPECIFIED HEADACHE TYPE: ICD-10-CM

## 2023-08-29 DIAGNOSIS — Z63.4 GRIEF AT LOSS OF CHILD: ICD-10-CM

## 2023-08-29 DIAGNOSIS — K76.0 FATTY LIVER: ICD-10-CM

## 2023-08-29 PROBLEM — Z86.0100 HISTORY OF COLON POLYPS: Status: ACTIVE | Noted: 2023-08-29

## 2023-08-29 PROCEDURE — 3008F PR BODY MASS INDEX (BMI) DOCUMENTED: ICD-10-PCS | Mod: HCNC,CPTII,S$GLB, | Performed by: NURSE PRACTITIONER

## 2023-08-29 PROCEDURE — 4010F PR ACE/ARB THEARPY RXD/TAKEN: ICD-10-PCS | Mod: HCNC,CPTII,S$GLB, | Performed by: NURSE PRACTITIONER

## 2023-08-29 PROCEDURE — 1159F PR MEDICATION LIST DOCUMENTED IN MEDICAL RECORD: ICD-10-PCS | Mod: HCNC,CPTII,S$GLB, | Performed by: NURSE PRACTITIONER

## 2023-08-29 PROCEDURE — 3288F PR FALLS RISK ASSESSMENT DOCUMENTED: ICD-10-PCS | Mod: HCNC,CPTII,S$GLB, | Performed by: NURSE PRACTITIONER

## 2023-08-29 PROCEDURE — 3051F PR MOST RECENT HEMOGLOBIN A1C LEVEL 7.0 - < 8.0%: ICD-10-PCS | Mod: HCNC,CPTII,S$GLB, | Performed by: NURSE PRACTITIONER

## 2023-08-29 PROCEDURE — 99999 PR PBB SHADOW E&M-EST. PATIENT-LVL V: CPT | Mod: PBBFAC,HCNC,, | Performed by: NURSE PRACTITIONER

## 2023-08-29 PROCEDURE — 3051F HG A1C>EQUAL 7.0%<8.0%: CPT | Mod: HCNC,CPTII,S$GLB, | Performed by: NURSE PRACTITIONER

## 2023-08-29 PROCEDURE — 1159F MED LIST DOCD IN RCRD: CPT | Mod: HCNC,CPTII,S$GLB, | Performed by: NURSE PRACTITIONER

## 2023-08-29 PROCEDURE — 1157F PR ADVANCE CARE PLAN OR EQUIV PRESENT IN MEDICAL RECORD: ICD-10-PCS | Mod: HCNC,CPTII,S$GLB, | Performed by: NURSE PRACTITIONER

## 2023-08-29 PROCEDURE — 3066F NEPHROPATHY DOC TX: CPT | Mod: HCNC,CPTII,S$GLB, | Performed by: NURSE PRACTITIONER

## 2023-08-29 PROCEDURE — 3079F PR MOST RECENT DIASTOLIC BLOOD PRESSURE 80-89 MM HG: ICD-10-PCS | Mod: HCNC,CPTII,S$GLB, | Performed by: NURSE PRACTITIONER

## 2023-08-29 PROCEDURE — 3079F DIAST BP 80-89 MM HG: CPT | Mod: HCNC,CPTII,S$GLB, | Performed by: NURSE PRACTITIONER

## 2023-08-29 PROCEDURE — 3288F FALL RISK ASSESSMENT DOCD: CPT | Mod: HCNC,CPTII,S$GLB, | Performed by: NURSE PRACTITIONER

## 2023-08-29 PROCEDURE — 1160F RVW MEDS BY RX/DR IN RCRD: CPT | Mod: HCNC,CPTII,S$GLB, | Performed by: NURSE PRACTITIONER

## 2023-08-29 PROCEDURE — 1170F PR FUNCTIONAL STATUS ASSESSED: ICD-10-PCS | Mod: HCNC,CPTII,S$GLB, | Performed by: NURSE PRACTITIONER

## 2023-08-29 PROCEDURE — 3075F SYST BP GE 130 - 139MM HG: CPT | Mod: HCNC,CPTII,S$GLB, | Performed by: NURSE PRACTITIONER

## 2023-08-29 PROCEDURE — 99999 PR PBB SHADOW E&M-EST. PATIENT-LVL V: ICD-10-PCS | Mod: PBBFAC,HCNC,, | Performed by: NURSE PRACTITIONER

## 2023-08-29 PROCEDURE — 3066F PR DOCUMENTATION OF TREATMENT FOR NEPHROPATHY: ICD-10-PCS | Mod: HCNC,CPTII,S$GLB, | Performed by: NURSE PRACTITIONER

## 2023-08-29 PROCEDURE — 4010F ACE/ARB THERAPY RXD/TAKEN: CPT | Mod: HCNC,CPTII,S$GLB, | Performed by: NURSE PRACTITIONER

## 2023-08-29 PROCEDURE — 3075F PR MOST RECENT SYSTOLIC BLOOD PRESS GE 130-139MM HG: ICD-10-PCS | Mod: HCNC,CPTII,S$GLB, | Performed by: NURSE PRACTITIONER

## 2023-08-29 PROCEDURE — 1170F FXNL STATUS ASSESSED: CPT | Mod: HCNC,CPTII,S$GLB, | Performed by: NURSE PRACTITIONER

## 2023-08-29 PROCEDURE — 1160F PR REVIEW ALL MEDS BY PRESCRIBER/CLIN PHARMACIST DOCUMENTED: ICD-10-PCS | Mod: HCNC,CPTII,S$GLB, | Performed by: NURSE PRACTITIONER

## 2023-08-29 PROCEDURE — G0439 PPPS, SUBSEQ VISIT: HCPCS | Mod: HCNC,S$GLB,, | Performed by: NURSE PRACTITIONER

## 2023-08-29 PROCEDURE — 1101F PT FALLS ASSESS-DOCD LE1/YR: CPT | Mod: HCNC,CPTII,S$GLB, | Performed by: NURSE PRACTITIONER

## 2023-08-29 PROCEDURE — 1157F ADVNC CARE PLAN IN RCRD: CPT | Mod: HCNC,CPTII,S$GLB, | Performed by: NURSE PRACTITIONER

## 2023-08-29 PROCEDURE — 3061F PR NEG MICROALBUMINURIA RESULT DOCUMENTED/REVIEW: ICD-10-PCS | Mod: HCNC,CPTII,S$GLB, | Performed by: NURSE PRACTITIONER

## 2023-08-29 PROCEDURE — 3061F NEG MICROALBUMINURIA REV: CPT | Mod: HCNC,CPTII,S$GLB, | Performed by: NURSE PRACTITIONER

## 2023-08-29 PROCEDURE — 3008F BODY MASS INDEX DOCD: CPT | Mod: HCNC,CPTII,S$GLB, | Performed by: NURSE PRACTITIONER

## 2023-08-29 PROCEDURE — G0439 PR MEDICARE ANNUAL WELLNESS SUBSEQUENT VISIT: ICD-10-PCS | Mod: HCNC,S$GLB,, | Performed by: NURSE PRACTITIONER

## 2023-08-29 PROCEDURE — 1101F PR PT FALLS ASSESS DOC 0-1 FALLS W/OUT INJ PAST YR: ICD-10-PCS | Mod: HCNC,CPTII,S$GLB, | Performed by: NURSE PRACTITIONER

## 2023-08-29 RX ORDER — SIMVASTATIN 40 MG/1
40 TABLET, FILM COATED ORAL NIGHTLY
COMMUNITY

## 2023-08-29 SDOH — SOCIAL DETERMINANTS OF HEALTH (SDOH): DISSAPEARANCE AND DEATH OF FAMILY MEMBER: Z63.4

## 2023-08-29 NOTE — PATIENT INSTRUCTIONS
Counseling and Referral of Other Preventative  (Italic type indicates deductible and co-insurance are waived)    Patient Name: Dary Chaney  Today's Date: 8/29/2023    Health Maintenance       Date Due Completion Date    TETANUS VACCINE 08/26/2021 8/26/2011    Shingles Vaccine (2 of 2) 08/24/2022 6/29/2022    COVID-19 Vaccine (6 - Moderna series) 02/27/2023 10/27/2022    Influenza Vaccine (1) 09/01/2023 11/12/2021    Override on 11/20/2013: Declined    Hemoglobin A1c 02/21/2024 8/21/2023    Mammogram 03/14/2024 3/14/2023    Override on 11/13/2012: (N/S)    Lipid Panel 05/17/2024 5/17/2023    Eye Exam 08/09/2024 8/9/2023    High Dose Statin 08/21/2024 8/21/2023    Diabetes Urine Screening 08/21/2024 8/21/2023    Foot Exam 08/21/2024 8/21/2023    Override on 9/17/2015: Done    Override on 5/14/2015: Done    Override on 1/14/2015: Done    Override on 9/15/2014: Done    DEXA Scan 12/18/2024 12/18/2019    Colorectal Cancer Screening 03/01/2028 3/1/2018        No orders of the defined types were placed in this encounter.    The following information is provided to all patients.  This information is to help you find resources for any of the problems found today that may be affecting your health:                Living healthy guide: www.[facility].[place].gov      Understanding Diabetes: www.diabetes.org      Eating healthy: www.cdc.gov/healthyweight      CDC home safety checklist: www.cdc.gov/steadi/patient.html      Agency on Aging: www.goea.[place].gov      Alcoholics anonymous (AA): www.aa.org      Physical Activity: www.yen.nih.gov/sv4pini      Tobacco use: www.quitwithusla.org

## 2023-08-29 NOTE — PROGRESS NOTES
"  Dary Chaney presented for a  Medicare AWV and comprehensive Health Risk Assessment today. The following components were reviewed and updated:    Medical history  Family History  Social history  Allergies and Current Medications  Health Risk Assessment  Health Maintenance  Care Team         ** See Completed Assessments for Annual Wellness Visit within the encounter summary.**         The following assessments were completed:  Living Situation  CAGE  Depression Screening  Timed Get Up and Go  Whisper Test  Cognitive Function Screening  Nutrition Screening  ADL Screening  PAQ Screening        Vitals:    08/29/23 1125   BP: 130/80   BP Location: Right arm   Patient Position: Sitting   Pulse: 76   Resp: 20   Weight: 75.4 kg (166 lb 3.6 oz)   Height:    Pain scale 5' 2" (1.575 m)    1/5 headache     Body mass index is 30.4 kg/m².  Physical Exam  Constitutional:       General: She is not in acute distress.     Appearance: She is not ill-appearing or diaphoretic.   HENT:      Head: Normocephalic and atraumatic.      Mouth/Throat:      Mouth: Mucous membranes are moist.   Eyes:      General:         Right eye: No discharge.         Left eye: No discharge.      Conjunctiva/sclera: Conjunctivae normal.   Cardiovascular:      Rate and Rhythm: Normal rate and regular rhythm.      Heart sounds: Normal heart sounds.   Pulmonary:      Effort: Pulmonary effort is normal. No respiratory distress.      Breath sounds: Normal breath sounds.   Skin:     General: Skin is warm and dry.   Neurological:      Mental Status: She is alert and oriented to person, place, and time. Mental status is at baseline.   Psychiatric:         Mood and Affect: Mood normal.         Behavior: Behavior normal.         Thought Content: Thought content normal.         Judgment: Judgment normal.     Diagnoses and health risks identified today and associated recommendations/orders:    1. Encounter for preventative adult health care examination  Review for " opioid screening: Patient does not have Rx for Opioids  Review for substance use disorder: Patient does not use substance per chart    Patient states she drinks alcohol maybe 1-2 drinks a month at the most    I offered to discuss advanced care planning, including how to pick a person who would make decisions for you if you were unable to make them for yourself, called a health care power of , and what kind of decisions you might make such as use of life sustaining treatments such as ventilators and tube feeding when faced with a life limiting illness recorded on a living will that they will need to know. (How you want to be cared for as you near the end of your natural life)     X  Patient has advanced directives on file, which we reviewed, and they do not wish to make changes.    2. Calcified granuloma of lung  Monitor  Follow up with PCP    3. Atherosclerosis of aorta, Hyperlipidemia associated with type 2 diabetes mellitus, Hyperlipidemia LDL goal <70, Type 2 diabetes mellitus with hypertriglyceridemia   Monitor  Follow up with PCP  Continue home simvastatin  Blood pressure control    4. Obesity (BMI 30-39.9), Central obesity   Monitor  Follow up with PCP  Continue home ozempic    5. Type 2 diabetes mellitus without retinopathy, DM cataract, Cataract, unspecified cataract type, unspecified laterality  Monitor  Follow up with Ophtho    6. Type 2 diabetes mellitus with hemoglobin A1c goal of less than 7.0%   Monitor  Follow up with PCP  Continue home jardiance, amaryl, tresiba, ozempic  - Ambulatory referral/consult to Pharmacy Assistance; Future- ozempic    7. Renal cell carcinoma, unspecified laterality   Monitor  Follow up as directed     8. Renal stone,  Renal cyst   Monitor  Follow up as directed      9. Hypertension associated with diabetes, Hypertension goal BP (blood pressure) < 130/80  Monitor  Stable  Continue home benicar    10. Seasonal allergic rhinitis, unspecified trigger, Nasal septal  "deviation- mild  Monitor  Stable  Continue home claritin, flonase    11. Hx Ulcer of esophagus without bleeding, Hiatal hernia, Esophageal dysphagia,  Esophageal ring, acquired, Fatty liver, History of colon polyps  Monitor  Follow up with GI as needed, directed    12. Intervertebral lumbar disc disorder with myelopathy, lumbar region   Monitor  Follow up  as needed, directed    13. Chronic intractable headache, unspecified headache type  Monitor  Patient reports Hx chronic headaches "since I was a child"  Patient denies any fever, sore throat, or URI type symptoms  She states usually pain starts at the nape of her neck and radiates upward towards the top of her head, at times "feels like a muscle spasm"  Patient states "I've tried everything and nothing ever works"  - Ambulatory referral/consult to Tele-Headache; Future    14. Grief at loss of child- Daughter  in 2023  Monitor  Patient states her daughter passed away about 3 weeks ago  Patient declined grief counseling for now  Follow up with PCP                          Provided Dary with a 5-10 year written screening schedule and personal prevention plan. Recommendations were developed using the USPSTF age appropriate recommendations. Education, counseling, and referrals were provided as needed. After Visit Summary printed and given to patient which includes a list of additional screenings\tests needed.    Follow up in about 1 year (around 2024) for Annual wellness visit.    JORDIN Andino    "

## 2023-08-30 ENCOUNTER — TELEPHONE (OUTPATIENT)
Dept: PHARMACY | Facility: CLINIC | Age: 68
End: 2023-08-30
Payer: MEDICARE

## 2023-08-30 NOTE — TELEPHONE ENCOUNTER
Spoke with patient about new referral for [employer].  Patient is already approved for [employer].  I will submit application for Jardiance.

## 2023-11-07 ENCOUNTER — OFFICE VISIT (OUTPATIENT)
Dept: URGENT CARE | Facility: CLINIC | Age: 68
End: 2023-11-07
Payer: MEDICARE

## 2023-11-07 ENCOUNTER — PATIENT MESSAGE (OUTPATIENT)
Dept: UROLOGY | Facility: CLINIC | Age: 68
End: 2023-11-07
Payer: MEDICARE

## 2023-11-07 VITALS
DIASTOLIC BLOOD PRESSURE: 65 MMHG | RESPIRATION RATE: 18 BRPM | TEMPERATURE: 97 F | SYSTOLIC BLOOD PRESSURE: 138 MMHG | BODY MASS INDEX: 27.95 KG/M2 | HEIGHT: 65 IN | OXYGEN SATURATION: 95 % | WEIGHT: 167.75 LBS | HEART RATE: 77 BPM

## 2023-11-07 DIAGNOSIS — R11.0 NAUSEA: ICD-10-CM

## 2023-11-07 DIAGNOSIS — R10.2 PELVIC PRESSURE IN FEMALE: ICD-10-CM

## 2023-11-07 DIAGNOSIS — R30.0 DYSURIA: Primary | ICD-10-CM

## 2023-11-07 DIAGNOSIS — N30.90 CYSTITIS WITHOUT HEMATURIA: ICD-10-CM

## 2023-11-07 LAB
BILIRUB UR QL STRIP: NEGATIVE
GLUCOSE UR QL STRIP: NEGATIVE
KETONES UR QL STRIP: NEGATIVE
LEUKOCYTE ESTERASE UR QL STRIP: NEGATIVE
PH, POC UA: 5
POC BLOOD, URINE: NEGATIVE
POC NITRATES, URINE: NEGATIVE
PROT UR QL STRIP: NEGATIVE
SP GR UR STRIP: 1.02 (ref 1–1.03)
UROBILINOGEN UR STRIP-ACNC: NORMAL (ref 0.1–1.1)

## 2023-11-07 PROCEDURE — 87086 URINE CULTURE/COLONY COUNT: CPT | Mod: HCNC | Performed by: NURSE PRACTITIONER

## 2023-11-07 PROCEDURE — 99214 OFFICE O/P EST MOD 30 MIN: CPT | Mod: HCNC,S$GLB,, | Performed by: NURSE PRACTITIONER

## 2023-11-07 PROCEDURE — 99214 PR OFFICE/OUTPT VISIT, EST, LEVL IV, 30-39 MIN: ICD-10-PCS | Mod: HCNC,S$GLB,, | Performed by: NURSE PRACTITIONER

## 2023-11-07 PROCEDURE — 81003 URINALYSIS AUTO W/O SCOPE: CPT | Mod: QW,HCNC,S$GLB, | Performed by: NURSE PRACTITIONER

## 2023-11-07 PROCEDURE — 81003 POCT URINALYSIS, DIPSTICK, AUTOMATED, W/O SCOPE: ICD-10-PCS | Mod: QW,HCNC,S$GLB, | Performed by: NURSE PRACTITIONER

## 2023-11-07 RX ORDER — CEPHALEXIN 500 MG/1
500 CAPSULE ORAL 4 TIMES DAILY
Qty: 14 CAPSULE | Refills: 0 | Status: SHIPPED | OUTPATIENT
Start: 2023-11-07 | End: 2023-11-27

## 2023-11-07 RX ORDER — PHENAZOPYRIDINE HYDROCHLORIDE 200 MG/1
200 TABLET, FILM COATED ORAL 3 TIMES DAILY PRN
Qty: 10 TABLET | Refills: 0 | Status: SHIPPED | OUTPATIENT
Start: 2023-11-07 | End: 2023-11-10

## 2023-11-07 NOTE — PROGRESS NOTES
"Subjective:      Patient ID: Dary Chaney is a 67 y.o. female.    Vitals:  height is 5' 5.32" (1.659 m) and weight is 76.1 kg (167 lb 12.3 oz). Her temperature is 96.7 °F (35.9 °C). Her blood pressure is 138/65 and her pulse is 77. Her respiration is 18 and oxygen saturation is 95%.     Chief Complaint: Dysuria    Patient is a 67-year-old female with a history of renal cell carcinoma and renal stones who presents for evaluation of pelvic pressure with dysuria and associated nausea.  Onset  4 days.  Patient reports using over-the-counter urinary symptom medication without relief.  She denies associated fever, chills, , vomiting, flank pain, hematuria.  No other concerns are voiced.    Dysuria   This is a new problem. The current episode started in the past 7 days. The problem has been unchanged. The pain is at a severity of 8/10. The pain is moderate. There has been no fever. She is Sexually active. There is No history of pyelonephritis. Associated symptoms include frequency and urgency. Pertinent negatives include no behavior changes, chills, discharge, flank pain, hematuria, hesitancy, nausea, possible pregnancy, sweats, vomiting, weight loss, bubble bath use, constipation, rash or withholding. The treatment provided no relief. There is no history of catheterization, diabetes insipidus, diabetes mellitus, genitourinary reflux, hypertension, kidney stones, recurrent UTIs, a single kidney, STD, urinary stasis or a urological procedure.       Constitution: Negative for chills and fever.   Gastrointestinal:  Negative for abdominal pain, nausea, vomiting and constipation.   Genitourinary:  Positive for dysuria, frequency and urgency. Negative for flank pain, hematuria and pelvic pain.   Skin:  Negative for rash.      Objective:     Physical Exam   Constitutional: She is oriented to person, place, and time. She appears well-developed. She is cooperative.   HENT:   Head: Normocephalic and atraumatic.   Ears:   Right " Ear: Hearing and external ear normal.   Left Ear: Hearing and external ear normal.   Nose: Nose normal. No mucosal edema or nasal deformity. No epistaxis. Right sinus exhibits no maxillary sinus tenderness and no frontal sinus tenderness. Left sinus exhibits no maxillary sinus tenderness and no frontal sinus tenderness.   Mouth/Throat: Uvula is midline, oropharynx is clear and moist and mucous membranes are normal. No trismus in the jaw. Normal dentition. No uvula swelling.   Eyes: Conjunctivae and lids are normal.   Neck: Trachea normal and phonation normal. Neck supple.   Cardiovascular: Normal rate, regular rhythm, normal heart sounds and normal pulses.   Pulmonary/Chest: Effort normal and breath sounds normal.   Abdominal: Normal appearance and bowel sounds are normal. Soft. There is no abdominal tenderness. There is no left CVA tenderness and no right CVA tenderness.   Musculoskeletal: Normal range of motion.         General: Normal range of motion.   Neurological: She is alert and oriented to person, place, and time. She exhibits normal muscle tone.   Skin: Skin is warm, dry and intact.   Psychiatric: Her speech is normal and behavior is normal. Judgment and thought content normal.   Nursing note and vitals reviewed.      Assessment:     1. Dysuria    2. Nausea    3. Pelvic pressure in female    4. Cystitis without hematuria        Plan:       Dysuria  -     POCT Urinalysis, Dipstick, Automated, W/O Scope  -     CULTURE, URINE    Nausea    Pelvic pressure in female    Cystitis without hematuria  -     CULTURE, URINE    Other orders  -     cephALEXin (KEFLEX) 500 MG capsule; Take 1 capsule (500 mg total) by mouth 4 (four) times daily. for 7 days  Dispense: 14 capsule; Refill: 0  -     phenazopyridine (PYRIDIUM) 200 MG tablet; Take 1 tablet (200 mg total) by mouth 3 (three) times daily as needed for Pain.  Dispense: 10 tablet; Refill: 0          Medical Decision Making:   Initial Assessment:   Nontoxic appearing  66 yo female c/o dysuria.  After complete evaluation, including thorough history and physical exam, presentation is most consistent with uncomplicated UTI. The patient has no severe flank pain or systemic symptoms to suggest pyelonephritis or sepsis. Physical exam is inconsistent with nephrolithiasis or acute intra-abdominal infection. Patient has no signs of acute distress, vital signs are stable. Patient is tolerating PO  is stable for D/C with PO antibiotics. Patient instructed to drink plenty of water. The patient was informed of findings, and recommended to follow-up with PCP for further management and ER precautions were given.       Clinical Tests:   Lab Tests: Reviewed  The following lab test(s) were unremarkable: Urinalysis   Discussed signs of infection and advised close monitoring due to patient diagnosis of DM and renal cell carcinoma and increased potential for infection.         Results for orders placed or performed in visit on 11/07/23   CULTURE, URINE    Specimen: Urine, Clean Catch   Result Value Ref Range    Urine Culture, Routine No growth    POCT Urinalysis, Dipstick, Automated, W/O Scope   Result Value Ref Range    POC Blood, Urine Negative Negative    POC Bilirubin, Urine Negative Negative    POC Urobilinogen, Urine normal 0.1 - 1.1    POC Ketones, Urine Negative Negative    POC Protein, Urine Negative Negative    POC Nitrates, Urine Negative Negative    POC Glucose, Urine Negative Negative    pH, UA 5.0     POC Specific Gravity, Urine 1.025 1.003 - 1.029    POC Leukocytes, Urine Negative Negative     Patient Instructions   Take the antibiotics to completion.     Drink plenty of fluids, wipe front to back, take showers not baths, no scented soaps, wear breathable cotton underwear, urinate after sexual intercourse.     A urine culture was sent. You will be contacted once it results and appropriate action will be taken if needed.     Take the pyridium three times a day with meals. It will turn  your urine orange. You do not need to take the whole prescription you can stop this once the pain is better and finish out the antibiotics    Please go to the ER for worsening symptoms including fever, worsening flank pain, vomiting, etc.       Please return or see your primary care doctor if you develop new or worsening symptoms.     Please arrange follow up with your primary medical clinic as soon as possible. You must understand that you've received an Urgent Care treatment only and that you may be released before all of your medical problems are known or treated. You, the patient, will arrange for follow up as instructed. If your symptoms worsen or fail to improve you should go to the Emergency Room.

## 2023-11-08 ENCOUNTER — LAB VISIT (OUTPATIENT)
Dept: LAB | Facility: HOSPITAL | Age: 68
End: 2023-11-08
Attending: INTERNAL MEDICINE
Payer: MEDICARE

## 2023-11-08 DIAGNOSIS — I70.0 ATHEROSCLEROSIS OF AORTA: ICD-10-CM

## 2023-11-08 DIAGNOSIS — E78.5 HYPERLIPIDEMIA LDL GOAL <70: Chronic | ICD-10-CM

## 2023-11-08 DIAGNOSIS — I10 HYPERTENSION GOAL BP (BLOOD PRESSURE) < 130/80: Chronic | ICD-10-CM

## 2023-11-08 DIAGNOSIS — E11.65 UNCONTROLLED TYPE 2 DIABETES MELLITUS WITH HYPERGLYCEMIA, WITH LONG-TERM CURRENT USE OF INSULIN: ICD-10-CM

## 2023-11-08 DIAGNOSIS — Z79.4 UNCONTROLLED TYPE 2 DIABETES MELLITUS WITH HYPERGLYCEMIA, WITH LONG-TERM CURRENT USE OF INSULIN: ICD-10-CM

## 2023-11-08 LAB
ALBUMIN SERPL BCP-MCNC: 3.5 G/DL (ref 3.5–5.2)
ALP SERPL-CCNC: 94 U/L (ref 55–135)
ALT SERPL W/O P-5'-P-CCNC: 27 U/L (ref 10–44)
ANION GAP SERPL CALC-SCNC: 10 MMOL/L (ref 8–16)
AST SERPL-CCNC: 15 U/L (ref 10–40)
BACTERIA UR CULT: NO GROWTH
BILIRUB SERPL-MCNC: 0.2 MG/DL (ref 0.1–1)
BUN SERPL-MCNC: 26 MG/DL (ref 8–23)
CALCIUM SERPL-MCNC: 9.2 MG/DL (ref 8.7–10.5)
CHLORIDE SERPL-SCNC: 107 MMOL/L (ref 95–110)
CHOLEST SERPL-MCNC: 227 MG/DL (ref 120–199)
CHOLEST/HDLC SERPL: 4.8 {RATIO} (ref 2–5)
CO2 SERPL-SCNC: 25 MMOL/L (ref 23–29)
CREAT SERPL-MCNC: 0.8 MG/DL (ref 0.5–1.4)
EST. GFR  (NO RACE VARIABLE): >60 ML/MIN/1.73 M^2
ESTIMATED AVG GLUCOSE: 157 MG/DL (ref 68–131)
GLUCOSE SERPL-MCNC: 124 MG/DL (ref 70–110)
HBA1C MFR BLD: 7.1 % (ref 4–5.6)
HDLC SERPL-MCNC: 47 MG/DL (ref 40–75)
HDLC SERPL: 20.7 % (ref 20–50)
LDLC SERPL CALC-MCNC: 141.2 MG/DL (ref 63–159)
NONHDLC SERPL-MCNC: 180 MG/DL
POTASSIUM SERPL-SCNC: 4.6 MMOL/L (ref 3.5–5.1)
PROT SERPL-MCNC: 6.6 G/DL (ref 6–8.4)
SODIUM SERPL-SCNC: 142 MMOL/L (ref 136–145)
TRIGL SERPL-MCNC: 194 MG/DL (ref 30–150)

## 2023-11-08 PROCEDURE — 80061 LIPID PANEL: CPT | Mod: HCNC | Performed by: INTERNAL MEDICINE

## 2023-11-08 PROCEDURE — 36415 COLL VENOUS BLD VENIPUNCTURE: CPT | Mod: HCNC,PO | Performed by: INTERNAL MEDICINE

## 2023-11-08 PROCEDURE — 80053 COMPREHEN METABOLIC PANEL: CPT | Mod: HCNC | Performed by: INTERNAL MEDICINE

## 2023-11-08 PROCEDURE — 83036 HEMOGLOBIN GLYCOSYLATED A1C: CPT | Mod: HCNC | Performed by: INTERNAL MEDICINE

## 2023-11-09 ENCOUNTER — TELEPHONE (OUTPATIENT)
Dept: URGENT CARE | Facility: CLINIC | Age: 68
End: 2023-11-09
Payer: MEDICARE

## 2023-11-10 ENCOUNTER — TELEPHONE (OUTPATIENT)
Dept: URGENT CARE | Facility: CLINIC | Age: 68
End: 2023-11-10
Payer: MEDICARE

## 2023-11-10 ENCOUNTER — PATIENT OUTREACH (OUTPATIENT)
Dept: ADMINISTRATIVE | Facility: HOSPITAL | Age: 68
End: 2023-11-10
Payer: MEDICARE

## 2023-11-10 NOTE — TELEPHONE ENCOUNTER
Patient notified of negative urine culture result with no growth.  Advised she can discontinue the antibiotic.  She is scheduled to see urology next week. All questions answered.    Todd Charles PA-C

## 2023-11-10 NOTE — PROGRESS NOTES
Edgardo Statin DM Non Compliant Report.  Per Dr Cooper note Feb 2023, pt is taking rosuvastatin 40 mg.  Per JORDIN Rausch note Aug 2023, pt is taking simvastatin.  Unable to find any refills done this year.   Spoke with pt and she said she is not on any statin. Said she stopped her statin due to it making her nauseated. She was unable to say when she stopped, just that it was awhile ago.  Pt has appt scheduled, 11/27/23. Note placed to discuss statin medicine.

## 2023-11-13 ENCOUNTER — OFFICE VISIT (OUTPATIENT)
Dept: DIABETES | Facility: CLINIC | Age: 68
End: 2023-11-13
Payer: MEDICARE

## 2023-11-13 VITALS
DIASTOLIC BLOOD PRESSURE: 79 MMHG | HEIGHT: 62 IN | WEIGHT: 169.75 LBS | BODY MASS INDEX: 31.24 KG/M2 | HEART RATE: 77 BPM | SYSTOLIC BLOOD PRESSURE: 131 MMHG

## 2023-11-13 DIAGNOSIS — E78.5 HYPERLIPIDEMIA LDL GOAL <70: ICD-10-CM

## 2023-11-13 DIAGNOSIS — I10 HYPERTENSION GOAL BP (BLOOD PRESSURE) < 130/80: ICD-10-CM

## 2023-11-13 DIAGNOSIS — K76.0 FATTY LIVER: ICD-10-CM

## 2023-11-13 DIAGNOSIS — E66.9 OBESITY (BMI 30-39.9): ICD-10-CM

## 2023-11-13 DIAGNOSIS — E11.9 TYPE 2 DIABETES MELLITUS WITH HEMOGLOBIN A1C GOAL OF LESS THAN 7.0%: Primary | ICD-10-CM

## 2023-11-13 LAB — GLUCOSE SERPL-MCNC: 97 MG/DL (ref 70–110)

## 2023-11-13 PROCEDURE — 99999 PR PBB SHADOW E&M-EST. PATIENT-LVL IV: CPT | Mod: PBBFAC,HCNC,, | Performed by: PHYSICIAN ASSISTANT

## 2023-11-13 PROCEDURE — 99214 PR OFFICE/OUTPT VISIT, EST, LEVL IV, 30-39 MIN: ICD-10-PCS | Mod: HCNC,S$GLB,, | Performed by: PHYSICIAN ASSISTANT

## 2023-11-13 PROCEDURE — 1126F PR PAIN SEVERITY QUANTIFIED, NO PAIN PRESENT: ICD-10-PCS | Mod: HCNC,CPTII,S$GLB, | Performed by: PHYSICIAN ASSISTANT

## 2023-11-13 PROCEDURE — 3078F DIAST BP <80 MM HG: CPT | Mod: HCNC,CPTII,S$GLB, | Performed by: PHYSICIAN ASSISTANT

## 2023-11-13 PROCEDURE — 1159F MED LIST DOCD IN RCRD: CPT | Mod: HCNC,CPTII,S$GLB, | Performed by: PHYSICIAN ASSISTANT

## 2023-11-13 PROCEDURE — 3051F HG A1C>EQUAL 7.0%<8.0%: CPT | Mod: HCNC,CPTII,S$GLB, | Performed by: PHYSICIAN ASSISTANT

## 2023-11-13 PROCEDURE — 3008F PR BODY MASS INDEX (BMI) DOCUMENTED: ICD-10-PCS | Mod: HCNC,CPTII,S$GLB, | Performed by: PHYSICIAN ASSISTANT

## 2023-11-13 PROCEDURE — 1126F AMNT PAIN NOTED NONE PRSNT: CPT | Mod: HCNC,CPTII,S$GLB, | Performed by: PHYSICIAN ASSISTANT

## 2023-11-13 PROCEDURE — 3066F PR DOCUMENTATION OF TREATMENT FOR NEPHROPATHY: ICD-10-PCS | Mod: HCNC,CPTII,S$GLB, | Performed by: PHYSICIAN ASSISTANT

## 2023-11-13 PROCEDURE — 3066F NEPHROPATHY DOC TX: CPT | Mod: HCNC,CPTII,S$GLB, | Performed by: PHYSICIAN ASSISTANT

## 2023-11-13 PROCEDURE — 3051F PR MOST RECENT HEMOGLOBIN A1C LEVEL 7.0 - < 8.0%: ICD-10-PCS | Mod: HCNC,CPTII,S$GLB, | Performed by: PHYSICIAN ASSISTANT

## 2023-11-13 PROCEDURE — 82962 GLUCOSE BLOOD TEST: CPT | Mod: HCNC,S$GLB,, | Performed by: PHYSICIAN ASSISTANT

## 2023-11-13 PROCEDURE — 4010F PR ACE/ARB THEARPY RXD/TAKEN: ICD-10-PCS | Mod: HCNC,CPTII,S$GLB, | Performed by: PHYSICIAN ASSISTANT

## 2023-11-13 PROCEDURE — 3061F PR NEG MICROALBUMINURIA RESULT DOCUMENTED/REVIEW: ICD-10-PCS | Mod: HCNC,CPTII,S$GLB, | Performed by: PHYSICIAN ASSISTANT

## 2023-11-13 PROCEDURE — 3061F NEG MICROALBUMINURIA REV: CPT | Mod: HCNC,CPTII,S$GLB, | Performed by: PHYSICIAN ASSISTANT

## 2023-11-13 PROCEDURE — 1157F ADVNC CARE PLAN IN RCRD: CPT | Mod: HCNC,CPTII,S$GLB, | Performed by: PHYSICIAN ASSISTANT

## 2023-11-13 PROCEDURE — 3008F BODY MASS INDEX DOCD: CPT | Mod: HCNC,CPTII,S$GLB, | Performed by: PHYSICIAN ASSISTANT

## 2023-11-13 PROCEDURE — 1101F PR PT FALLS ASSESS DOC 0-1 FALLS W/OUT INJ PAST YR: ICD-10-PCS | Mod: HCNC,CPTII,S$GLB, | Performed by: PHYSICIAN ASSISTANT

## 2023-11-13 PROCEDURE — 99214 OFFICE O/P EST MOD 30 MIN: CPT | Mod: HCNC,S$GLB,, | Performed by: PHYSICIAN ASSISTANT

## 2023-11-13 PROCEDURE — 1101F PT FALLS ASSESS-DOCD LE1/YR: CPT | Mod: HCNC,CPTII,S$GLB, | Performed by: PHYSICIAN ASSISTANT

## 2023-11-13 PROCEDURE — 1159F PR MEDICATION LIST DOCUMENTED IN MEDICAL RECORD: ICD-10-PCS | Mod: HCNC,CPTII,S$GLB, | Performed by: PHYSICIAN ASSISTANT

## 2023-11-13 PROCEDURE — 3075F PR MOST RECENT SYSTOLIC BLOOD PRESS GE 130-139MM HG: ICD-10-PCS | Mod: HCNC,CPTII,S$GLB, | Performed by: PHYSICIAN ASSISTANT

## 2023-11-13 PROCEDURE — 4010F ACE/ARB THERAPY RXD/TAKEN: CPT | Mod: HCNC,CPTII,S$GLB, | Performed by: PHYSICIAN ASSISTANT

## 2023-11-13 PROCEDURE — 99999 PR PBB SHADOW E&M-EST. PATIENT-LVL IV: ICD-10-PCS | Mod: PBBFAC,HCNC,, | Performed by: PHYSICIAN ASSISTANT

## 2023-11-13 PROCEDURE — 3078F PR MOST RECENT DIASTOLIC BLOOD PRESSURE < 80 MM HG: ICD-10-PCS | Mod: HCNC,CPTII,S$GLB, | Performed by: PHYSICIAN ASSISTANT

## 2023-11-13 PROCEDURE — 82962 POCT GLUCOSE, HAND-HELD DEVICE: ICD-10-PCS | Mod: HCNC,S$GLB,, | Performed by: PHYSICIAN ASSISTANT

## 2023-11-13 PROCEDURE — 1157F PR ADVANCE CARE PLAN OR EQUIV PRESENT IN MEDICAL RECORD: ICD-10-PCS | Mod: HCNC,CPTII,S$GLB, | Performed by: PHYSICIAN ASSISTANT

## 2023-11-13 PROCEDURE — 3075F SYST BP GE 130 - 139MM HG: CPT | Mod: HCNC,CPTII,S$GLB, | Performed by: PHYSICIAN ASSISTANT

## 2023-11-13 PROCEDURE — 3288F PR FALLS RISK ASSESSMENT DOCUMENTED: ICD-10-PCS | Mod: HCNC,CPTII,S$GLB, | Performed by: PHYSICIAN ASSISTANT

## 2023-11-13 PROCEDURE — 3288F FALL RISK ASSESSMENT DOCD: CPT | Mod: HCNC,CPTII,S$GLB, | Performed by: PHYSICIAN ASSISTANT

## 2023-11-13 RX ORDER — PIOGLITAZONEHYDROCHLORIDE 15 MG/1
15 TABLET ORAL DAILY
Qty: 30 TABLET | Refills: 11 | Status: SHIPPED | OUTPATIENT
Start: 2023-11-13 | End: 2024-11-12

## 2023-11-13 NOTE — PATIENT INSTRUCTIONS
CURRENT DM MEDICATIONS:   Ozempic 2 mg weekly   Amaryl 4 mg before meals twice daily  Tresiba 72 units daily  Actos 15 mg daily - watch for swelling in legs

## 2023-11-13 NOTE — PROGRESS NOTES
PCP: Sakina Cooper DO    Subjective:     Chief Complaint: Diabetes - Established Patient    HISTORY OF PRESENT ILLNESS: 67 y.o.  female presenting for diabetes management visit.   The patient's last visit with me was on 8/21/2023.   Patient has had Type II diabetes since 2005.  Pertinent to decision making is the following comorbidities: HTN, HLD, Obesity by BMI and Fatty Liver  Patient has the following Diabetes complications: without complications  She  has attended diabetes education in the past.     Patient's most recent A1c of 7.1% was completed 1 weeks ago.   Patient states since Her last A1c Her blood glucose levels have been within range in the morning / fasting.   Patient monitors blood glucose 1 times per day with Contour Next : Fasting and After Dinner.   Patient blood glucose monitoring device will not be uploaded into [employer] today secondary to unable to upload successfully.   Per meter recall, fasting blood sugar ranging 89  - 120 with one regression to 160 with heavy carb intake and after dinner 156 - 190s and up to 210 with heavy carb intake.   Patient endorses the following diabetes related symptoms:  gi side effects . These did not improve with reduction of Jardiance from 25 mg to 10 mg but did resolve with cessation.   Patient is due today for the following diabetes-related health maintenance standards:  RSV vaccine, Tetanus, and Flu Vaccine .   She denies recent hospital admissions or emergency room visits.   She denies having hypoglycemia.  Patient's concerns today include glycemic control.   Patient medication regimen is as below.     CURRENT DM MEDICATIONS:   Ozempic 2 mg weekly   Amaryl 4 mg before meals twice daily  Tresiba 72 units daily  Jardiance- stopped due to GI issues     Patient has failed the following Diabetes medications:   Metformin - allergy   Onglyza  Levemir   Victoza / Trulicity - GLP-1 escalation  Mounjaro - cost; no pharm asst available   Jardiance 10 + 25  mg - GI side effects;         Labs Reviewed.       Lab Results   Component Value Date    CPEPTIDE 1.73 08/30/2022     Lab Results   Component Value Date    GLUTAMICACID 0.00 12/14/2017          //   , There is no height or weight on file to calculate BMI.  Her blood sugar in clinic today is:          Review of Systems   Constitutional:  Negative for activity change, appetite change, chills and fever.   HENT:  Negative for dental problem, mouth sores, nosebleeds, sore throat and trouble swallowing.    Eyes:  Negative for pain and discharge.   Respiratory:  Negative for shortness of breath, wheezing and stridor.    Cardiovascular:  Negative for chest pain, palpitations and leg swelling.   Gastrointestinal:  Negative for abdominal pain, diarrhea, nausea and vomiting.   Endocrine: Negative for polydipsia, polyphagia and polyuria.   Genitourinary:  Negative for dysuria, frequency and urgency.   Musculoskeletal:  Negative for joint swelling and myalgias.   Skin:  Negative for rash and wound.   Neurological:  Negative for dizziness, syncope, weakness and headaches.   Psychiatric/Behavioral:  Negative for behavioral problems and dysphoric mood.          Diabetes Management Status  Statin: Taking  ACE/ARB: Taking    Screening or Prevention Patient's value Goal Complete/Controlled?   HgA1C Testing and Control   Lab Results   Component Value Date    HGBA1C 7.1 (H) 11/08/2023      Annually/Less than 8% Yes   Lipid profile : 11/08/2023 Annually Yes   LDL control Lab Results   Component Value Date    LDLCALC 141.2 11/08/2023    Annually/Less than 100 mg/dl  Yes   Nephropathy screening Lab Results   Component Value Date    MICALBCREAT 9.3 08/21/2023     Lab Results   Component Value Date    PROTEINUA Negative 09/08/2014    Annually Yes   Blood pressure BP Readings from Last 1 Encounters:   11/07/23 138/65    Less than 140/90 Yes   Dilated retinal exam : 08/09/2023 Annually Yes    Foot exam   : 08/21/2023 Annually No     Social  History     Socioeconomic History    Marital status:     Number of children: 3   Occupational History    Occupation: Stay at Home    Occupation:    Tobacco Use    Smoking status: Never    Smokeless tobacco: Never   Substance and Sexual Activity    Alcohol use: Yes     Comment: occasional wine     Drug use: No    Sexual activity: Yes     Partners: Male     Birth control/protection: Surgical   Social History Narrative    . Housewife.     Social Determinants of Health     Financial Resource Strain: Low Risk  (8/29/2023)    Overall Financial Resource Strain (CARDIA)     Difficulty of Paying Living Expenses: Not hard at all   Food Insecurity: No Food Insecurity (8/29/2023)    Hunger Vital Sign     Worried About Running Out of Food in the Last Year: Never true     Ran Out of Food in the Last Year: Never true   Transportation Needs: No Transportation Needs (8/29/2023)    PRAPARE - Transportation     Lack of Transportation (Medical): No     Lack of Transportation (Non-Medical): No   Physical Activity: Inactive (8/29/2023)    Exercise Vital Sign     Days of Exercise per Week: 0 days     Minutes of Exercise per Session: 0 min   Stress: Stress Concern Present (8/29/2023)    [employer] - Occupational Stress Questionnaire     Feeling of Stress : Rather much   Social Connections: Socially Integrated (8/29/2023)    Social Connection and Isolation Panel [NHANES]     Frequency of Communication with Friends and Family: More than three times a week     Frequency of Social Gatherings with Friends and Family: Once a week     Attends Baptism Services: More than 4 times per year     Active Member of Clubs or Organizations: Yes     Attends Club or Organization Meetings: More than 4 times per year     Marital Status:    Housing Stability: Low Risk  (8/29/2023)    Housing Stability Vital Sign     Unable to Pay for Housing in the Last Year: No     Number of Places Lived in  the Last Year: 1     Unstable Housing in the Last Year: No     Past Medical History:   Diagnosis Date    Arthritis     Cataract     Colon polyp     Diabetes mellitus type II 15 years     am 05/30/2022    Hyperlipidemia     Hypertension     Renal cell carcinoma 09/2018    Total knee replacement status, left 01/20/2019    Dr.Robert Cook    UTI (urinary tract infection)        Objective:        Physical Exam  Constitutional:       General: She is not in acute distress.     Appearance: She is well-developed. She is not diaphoretic.   HENT:      Head: Normocephalic and atraumatic.      Right Ear: External ear normal.      Left Ear: External ear normal.      Nose: Nose normal.   Eyes:      General:         Right eye: No discharge.         Left eye: No discharge.      Pupils: Pupils are equal, round, and reactive to light.   Cardiovascular:      Rate and Rhythm: Normal rate and regular rhythm.      Heart sounds: Normal heart sounds.   Pulmonary:      Effort: Pulmonary effort is normal.      Breath sounds: Normal breath sounds.   Abdominal:      Palpations: Abdomen is soft.   Musculoskeletal:         General: Normal range of motion.      Cervical back: Normal range of motion and neck supple.   Skin:     General: Skin is warm and dry.      Capillary Refill: Capillary refill takes less than 2 seconds.   Neurological:      Mental Status: She is alert and oriented to person, place, and time.      Motor: No abnormal muscle tone.      Coordination: Coordination normal.   Psychiatric:         Behavior: Behavior normal.         Thought Content: Thought content normal.         Judgment: Judgment normal.           Assessment / Plan:     Type 2 diabetes mellitus with hemoglobin A1c goal of less than 7.0%  -     POCT Glucose, Hand-Held Device  -     pioglitazone (ACTOS) 15 MG tablet; Take 1 tablet (15 mg total) by mouth once daily.  Dispense: 30 tablet; Refill: 11  -     Hemoglobin A1C; Standing    Fatty liver    Hyperlipidemia  LDL goal <70    Hypertension goal BP (blood pressure) < 130/80    Obesity (BMI 30-39.9)        Additional Plan Details:    - POCT Glucose  - Encouraged continuation of lifestyle changes including regular exercise and limiting carbohydrates to 30-45 grams per meal threes times daily and 15 grams per snack with a limit of two daily.   - Encouraged continued monitoring of blood glucose with maintenance of 2 times daily at Fasting and After Dinner.   - Current DM Medication Regimen: Continue Glimepiride 4 mg before meals BID - focus on 15 mins before meals. Continue Tresiba 72 units daily.  Continue Ozempic 2 mg weekly. Stop Jardiance. Start Actos 15 mg daily - discussion of possible s/e.   - Pharm Asst: Ozempic and Tresiba  - Nonfasting labs today  - Health Maintenance standards addressed today: Flu Shot - patient would like to complete outside of [place] and RSV vaccine - sched at end of month and Tetanus vaccine  - Nursing Visit: Patient is age 79 or younger with an A1c of 7.5 or greater and will not need nursing visit at this time .   - Follow up in  12  weeks with Diabetes management partner.    CURRENT DM MEDICATIONS:   Ozempic 2 mg weekly   Amaryl 4 mg before meals twice daily  Tresiba 72 units daily  Actos 15 mg daily - watch for swelling in legs       Blakeney McKnight, PA-C [facility]      A total of 30 minutes was spent in face to face time, of which over 50 % was spent in counseling patient on disease process, complications, treatment, and side effects of medications.

## 2023-11-14 ENCOUNTER — OFFICE VISIT (OUTPATIENT)
Dept: UROLOGY | Facility: CLINIC | Age: 68
End: 2023-11-14
Payer: MEDICARE

## 2023-11-14 VITALS
BODY MASS INDEX: 31.08 KG/M2 | WEIGHT: 168.88 LBS | RESPIRATION RATE: 14 BRPM | HEIGHT: 62 IN | HEART RATE: 80 BPM | DIASTOLIC BLOOD PRESSURE: 78 MMHG | TEMPERATURE: 98 F | SYSTOLIC BLOOD PRESSURE: 148 MMHG

## 2023-11-14 DIAGNOSIS — R10.2 PELVIC PAIN: Primary | ICD-10-CM

## 2023-11-14 DIAGNOSIS — N20.0 KIDNEY STONE: ICD-10-CM

## 2023-11-14 DIAGNOSIS — Z85.528 HISTORY OF RENAL CELL CANCER: ICD-10-CM

## 2023-11-14 LAB
BILIRUB UR QL STRIP: NEGATIVE
GLUCOSE UR QL STRIP: NEGATIVE
KETONES UR QL STRIP: NEGATIVE
LEUKOCYTE ESTERASE UR QL STRIP: NEGATIVE
PH, POC UA: 6.5
POC BLOOD, URINE: NEGATIVE
POC NITRATES, URINE: NEGATIVE
POC RESIDUAL URINE VOLUME: 0 ML (ref 0–100)
PROT UR QL STRIP: NEGATIVE
SP GR UR STRIP: 1.01 (ref 1–1.03)
UROBILINOGEN UR STRIP-ACNC: 0.2 (ref 0.1–1.1)

## 2023-11-14 PROCEDURE — 4010F ACE/ARB THERAPY RXD/TAKEN: CPT | Mod: HCNC,CPTII,S$GLB, | Performed by: UROLOGY

## 2023-11-14 PROCEDURE — 3077F PR MOST RECENT SYSTOLIC BLOOD PRESSURE >= 140 MM HG: ICD-10-PCS | Mod: HCNC,CPTII,S$GLB, | Performed by: UROLOGY

## 2023-11-14 PROCEDURE — 99214 OFFICE O/P EST MOD 30 MIN: CPT | Mod: HCNC,S$GLB,, | Performed by: UROLOGY

## 2023-11-14 PROCEDURE — 1125F PR PAIN SEVERITY QUANTIFIED, PAIN PRESENT: ICD-10-PCS | Mod: HCNC,CPTII,S$GLB, | Performed by: UROLOGY

## 2023-11-14 PROCEDURE — 4010F PR ACE/ARB THEARPY RXD/TAKEN: ICD-10-PCS | Mod: HCNC,CPTII,S$GLB, | Performed by: UROLOGY

## 2023-11-14 PROCEDURE — 3066F NEPHROPATHY DOC TX: CPT | Mod: HCNC,CPTII,S$GLB, | Performed by: UROLOGY

## 2023-11-14 PROCEDURE — 1159F PR MEDICATION LIST DOCUMENTED IN MEDICAL RECORD: ICD-10-PCS | Mod: HCNC,CPTII,S$GLB, | Performed by: UROLOGY

## 2023-11-14 PROCEDURE — 1160F PR REVIEW ALL MEDS BY PRESCRIBER/CLIN PHARMACIST DOCUMENTED: ICD-10-PCS | Mod: HCNC,CPTII,S$GLB, | Performed by: UROLOGY

## 2023-11-14 PROCEDURE — 1157F PR ADVANCE CARE PLAN OR EQUIV PRESENT IN MEDICAL RECORD: ICD-10-PCS | Mod: HCNC,CPTII,S$GLB, | Performed by: UROLOGY

## 2023-11-14 PROCEDURE — 81003 URINALYSIS AUTO W/O SCOPE: CPT | Mod: QW,HCNC,S$GLB, | Performed by: UROLOGY

## 2023-11-14 PROCEDURE — 1160F RVW MEDS BY RX/DR IN RCRD: CPT | Mod: HCNC,CPTII,S$GLB, | Performed by: UROLOGY

## 2023-11-14 PROCEDURE — 99999 PR PBB SHADOW E&M-EST. PATIENT-LVL V: CPT | Mod: PBBFAC,HCNC,, | Performed by: UROLOGY

## 2023-11-14 PROCEDURE — 3066F PR DOCUMENTATION OF TREATMENT FOR NEPHROPATHY: ICD-10-PCS | Mod: HCNC,CPTII,S$GLB, | Performed by: UROLOGY

## 2023-11-14 PROCEDURE — 3061F PR NEG MICROALBUMINURIA RESULT DOCUMENTED/REVIEW: ICD-10-PCS | Mod: HCNC,CPTII,S$GLB, | Performed by: UROLOGY

## 2023-11-14 PROCEDURE — 1157F ADVNC CARE PLAN IN RCRD: CPT | Mod: HCNC,CPTII,S$GLB, | Performed by: UROLOGY

## 2023-11-14 PROCEDURE — 81003 POCT URINALYSIS, DIPSTICK, AUTOMATED, W/O SCOPE: ICD-10-PCS | Mod: QW,HCNC,S$GLB, | Performed by: UROLOGY

## 2023-11-14 PROCEDURE — 3051F PR MOST RECENT HEMOGLOBIN A1C LEVEL 7.0 - < 8.0%: ICD-10-PCS | Mod: HCNC,CPTII,S$GLB, | Performed by: UROLOGY

## 2023-11-14 PROCEDURE — 3061F NEG MICROALBUMINURIA REV: CPT | Mod: HCNC,CPTII,S$GLB, | Performed by: UROLOGY

## 2023-11-14 PROCEDURE — 3077F SYST BP >= 140 MM HG: CPT | Mod: HCNC,CPTII,S$GLB, | Performed by: UROLOGY

## 2023-11-14 PROCEDURE — 51798 US URINE CAPACITY MEASURE: CPT | Mod: HCNC,S$GLB,, | Performed by: UROLOGY

## 2023-11-14 PROCEDURE — 51798 POCT BLADDER SCAN: ICD-10-PCS | Mod: HCNC,S$GLB,, | Performed by: UROLOGY

## 2023-11-14 PROCEDURE — 1125F AMNT PAIN NOTED PAIN PRSNT: CPT | Mod: HCNC,CPTII,S$GLB, | Performed by: UROLOGY

## 2023-11-14 PROCEDURE — 1159F MED LIST DOCD IN RCRD: CPT | Mod: HCNC,CPTII,S$GLB, | Performed by: UROLOGY

## 2023-11-14 PROCEDURE — 3008F BODY MASS INDEX DOCD: CPT | Mod: HCNC,CPTII,S$GLB, | Performed by: UROLOGY

## 2023-11-14 PROCEDURE — 99999 PR PBB SHADOW E&M-EST. PATIENT-LVL V: ICD-10-PCS | Mod: PBBFAC,HCNC,, | Performed by: UROLOGY

## 2023-11-14 PROCEDURE — 3078F DIAST BP <80 MM HG: CPT | Mod: HCNC,CPTII,S$GLB, | Performed by: UROLOGY

## 2023-11-14 PROCEDURE — 99214 PR OFFICE/OUTPT VISIT, EST, LEVL IV, 30-39 MIN: ICD-10-PCS | Mod: HCNC,S$GLB,, | Performed by: UROLOGY

## 2023-11-14 PROCEDURE — 3078F PR MOST RECENT DIASTOLIC BLOOD PRESSURE < 80 MM HG: ICD-10-PCS | Mod: HCNC,CPTII,S$GLB, | Performed by: UROLOGY

## 2023-11-14 PROCEDURE — 3051F HG A1C>EQUAL 7.0%<8.0%: CPT | Mod: HCNC,CPTII,S$GLB, | Performed by: UROLOGY

## 2023-11-14 PROCEDURE — 3008F PR BODY MASS INDEX (BMI) DOCUMENTED: ICD-10-PCS | Mod: HCNC,CPTII,S$GLB, | Performed by: UROLOGY

## 2023-11-14 NOTE — PROGRESS NOTES
Chief Complaint: Pelvic pain    HPI:  HPI Dary Chaney freda 67 y.o. female who presents as a follow-up to an urgent care visit.  Patient is known to have a history of right partial nephrectomy for RCC.  She also has a known history of nonobstructing small nephroliths.  She was seen in urgent care with complaints of pelvic discomfort.  She denies any dysuria frequency urgency or foul-smelling odor.  A urinalysis performed there was negative.  Urine culture was also negative.  She was started on a course antibiotics which was discontinued after culture came back.  She described intense pressure radiating into her pelvis similar to childbirth.  She states she occasionally has some hesitancy starting her urine flow.  She denies any incomplete emptying.    History:  Social History     Tobacco Use    Smoking status: Never    Smokeless tobacco: Never   Substance Use Topics    Alcohol use: Yes     Comment: occasional wine     Drug use: No     Past Medical History:   Diagnosis Date    Arthritis     Cataract     Colon polyp     Diabetes mellitus type II 15 years     am 05/30/2022    Hyperlipidemia     Hypertension     Renal cell carcinoma 09/2018    Total knee replacement status, left 01/20/2019    Dr.Robert Cook    UTI (urinary tract infection)      Past Surgical History:   Procedure Laterality Date    CHOLECYSTECTOMY      CHOLECYSTECTOMY      COLONOSCOPY  2012    COLONOSCOPY N/A 3/1/2018    Procedure: COLONOSCOPY;  Surgeon: Phillip Brower MD;  Location: [facility];  Service: Endoscopy;  Laterality: N/A;    ESOPHAGOGASTRODUODENOSCOPY N/A 8/21/2018    Procedure: ESOPHAGOGASTRODUODENOSCOPY (EGD);  Surgeon: Maco Wynn MD;  Location: [facility];  Service: Endoscopy;  Laterality: N/A;    HYSTERECTOMY  1993    uterine prolapse    JOINT REPLACEMENT      KNEE SURGERY  03/2017    left knee replacement  01/20/2019    Dr.Robert Cook    ROBOT-ASSISTED LAPAROSCOPIC PARTIAL NEPHRECTOMY USING DA DENZEL XI Right  9/13/2018    Procedure: XI ROBOTIC NEPHRECTOMY, PARTIAL;  Surgeon: Tommy Bradshaw MD;  Location: [facility] OR [address] [place] [address];  Service: Urology;  Laterality: Right;  Fortec u/s confirmed 9/13 0700 lb  conformation#[id number]    SPINE SURGERY      thumb surgery Rt- July 2018 - cyst        Family History   Problem Relation Age of Onset    Diabetes Mother     Hypertension Mother     Diabetes Brother     Heart disease Neg Hx        Current Outpatient Medications on File Prior to Visit   Medication Sig Dispense Refill    albuterol (PROVENTIL) 2.5 mg /3 mL (0.083 %) nebulizer solution Take 3 mLs (2.5 mg total) by nebulization every 6 (six) hours as needed for Wheezing. 120 mL 1    ASHWAGANDHA ROOT EXTRACT ORAL Take by mouth 3 (three) times daily as needed. Prn Anxiety      blood sugar diagnostic Strp 1 each by Misc.(Non-Drug; Combo Route) route 4 (four) times daily. 300 each 3    blood-glucose meter kit Test finger stick blood sugar once daily. 1 each 0    cephALEXin (KEFLEX) 500 MG capsule Take 1 capsule (500 mg total) by mouth 4 (four) times daily. for 7 days 14 capsule 0    empagliflozin (JARDIANCE) 10 mg tablet Take 1 tablet (10 mg total) by mouth once daily. 30 tablet 11    fluticasone (FLONASE) 50 mcg/actuation nasal spray USE TWO SPRAY(S) IN EACH NOSTRIL ONCE DAILY 16 g 1    FLUZONE HIGHDOSE QUAD 21-22  mcg/0.7 mL Syrg       glimepiride (AMARYL) 4 MG tablet Take 1 tablet (4 mg total) by mouth 2 (two) times daily with meals. 180 tablet 3    insulin degludec (TRESIBA FLEXTOUCH U-200) 200 unit/mL (3 mL) insulin pen Inject 62 Units into the skin once daily. 4 pen 9    LACTOBACILLUS RHAMNOSUS GG ORAL Take 1 capsule by mouth once daily.      lancets Misc 1 each by Misc.(Non-Drug; Combo Route) route 4 (four) times daily. 100 each 11    lancets Misc 1 each by Misc.(Non-Drug; Combo Route) route 4 (four) times daily. 300 each 3    loratadine (CLARITIN) 10 mg tablet Take 1 tablet (10 mg total) by mouth once daily. (Patient taking  "differently: Take 10 mg by mouth daily as needed.) 30 tablet 0    MAGNESIUM ORAL Take 500 mg by mouth once daily.      olmesartan (BENICAR) 20 MG tablet Take 1 tablet by mouth once daily 90 tablet 2    omeprazole (PRILOSEC) 40 MG capsule Take 1 capsule by mouth once daily 90 capsule 3    pen needle, diabetic (PEN NEEDLE) 31 gauge x 5/16" Ndle Inject 1 each into the skin once daily. 100 each 11    PFIZER COVID BIVAL,12Y UP,,PF, 30 mcg/0.3 mL injection       pioglitazone (ACTOS) 15 MG tablet Take 1 tablet (15 mg total) by mouth once daily. 30 tablet 11    PREVNAR 20, PF, 0.5 mL Syrg injection       semaglutide (OZEMPIC) 2 mg/dose (8 mg/3 mL) PnIj Inject 2 mg into the skin every 7 days. 1 pen 11    SHINGRIX, PF, 50 mcg/0.5 mL injection       simvastatin (ZOCOR) 40 MG tablet Take 40 mg by mouth every evening.       No current facility-administered medications on file prior to visit.        Objective:     Vitals:    11/14/23 0837   BP: (!) 148/78   Pulse: 80   Resp: 14   Temp: 97.8 °F (36.6 °C)   TempSrc: Oral   Weight: 76.6 kg (168 lb 14 oz)   Height: 5' 2" (1.575 m)      BMI Readings from Last 1 Encounters:   11/14/23 30.89 kg/m²          Physical Exam  No acute distress alert and oriented  Respirations even unlabored   Pelvic exam reveals a grade 2 cystocele and grade 1 rectocele.    Lab Results   Component Value Date    CREATININE 0.8 11/08/2023      Assessment:       1. Pelvic pain    2. Kidney stone    3. History of renal cell cancer        Plan:     1. Pelvic pain    2. Kidney stone    3. History of renal cell cancer          Pelvic pain and discomfort.  She states that this is 2nd time she is had this feeling.  She is not having any active discomfort currently.  I have reassured her that there was no evidence of kidney stones or urinary infection causing her discomfort.  She does have mild pelvic organ prolapse but her postvoid residual was 7 cc today.  At this point I do not recommend any surgical intervention " for this.  If she has recurrent episode she may need further evaluation to evaluate her GI tract as a source.  She was scheduled to have a CT of the abdomen for follow up of renal cell carcinoma in a couple of months.

## 2023-11-27 ENCOUNTER — OFFICE VISIT (OUTPATIENT)
Dept: INTERNAL MEDICINE | Facility: CLINIC | Age: 68
End: 2023-11-27
Payer: MEDICARE

## 2023-11-27 VITALS
HEIGHT: 62 IN | WEIGHT: 171.94 LBS | RESPIRATION RATE: 20 BRPM | DIASTOLIC BLOOD PRESSURE: 80 MMHG | HEART RATE: 78 BPM | BODY MASS INDEX: 31.64 KG/M2 | TEMPERATURE: 97 F | OXYGEN SATURATION: 96 % | SYSTOLIC BLOOD PRESSURE: 134 MMHG

## 2023-11-27 DIAGNOSIS — I70.0 ATHEROSCLEROSIS OF AORTA: ICD-10-CM

## 2023-11-27 DIAGNOSIS — E11.59 HYPERTENSION ASSOCIATED WITH DIABETES: ICD-10-CM

## 2023-11-27 DIAGNOSIS — E11.9 TYPE 2 DIABETES MELLITUS WITH HEMOGLOBIN A1C GOAL OF LESS THAN 7.0%: ICD-10-CM

## 2023-11-27 DIAGNOSIS — E11.69 HYPERLIPIDEMIA ASSOCIATED WITH TYPE 2 DIABETES MELLITUS: Primary | ICD-10-CM

## 2023-11-27 DIAGNOSIS — E78.5 HYPERLIPIDEMIA ASSOCIATED WITH TYPE 2 DIABETES MELLITUS: Primary | ICD-10-CM

## 2023-11-27 DIAGNOSIS — I15.2 HYPERTENSION ASSOCIATED WITH DIABETES: ICD-10-CM

## 2023-11-27 PROCEDURE — 3061F NEG MICROALBUMINURIA REV: CPT | Mod: HCNC,CPTII,S$GLB, | Performed by: PHYSICIAN ASSISTANT

## 2023-11-27 PROCEDURE — 1159F PR MEDICATION LIST DOCUMENTED IN MEDICAL RECORD: ICD-10-PCS | Mod: HCNC,CPTII,S$GLB, | Performed by: PHYSICIAN ASSISTANT

## 2023-11-27 PROCEDURE — 1160F PR REVIEW ALL MEDS BY PRESCRIBER/CLIN PHARMACIST DOCUMENTED: ICD-10-PCS | Mod: HCNC,CPTII,S$GLB, | Performed by: PHYSICIAN ASSISTANT

## 2023-11-27 PROCEDURE — 1101F PR PT FALLS ASSESS DOC 0-1 FALLS W/OUT INJ PAST YR: ICD-10-PCS | Mod: HCNC,CPTII,S$GLB, | Performed by: PHYSICIAN ASSISTANT

## 2023-11-27 PROCEDURE — 3051F HG A1C>EQUAL 7.0%<8.0%: CPT | Mod: HCNC,CPTII,S$GLB, | Performed by: PHYSICIAN ASSISTANT

## 2023-11-27 PROCEDURE — 3079F DIAST BP 80-89 MM HG: CPT | Mod: HCNC,CPTII,S$GLB, | Performed by: PHYSICIAN ASSISTANT

## 2023-11-27 PROCEDURE — 3061F PR NEG MICROALBUMINURIA RESULT DOCUMENTED/REVIEW: ICD-10-PCS | Mod: HCNC,CPTII,S$GLB, | Performed by: PHYSICIAN ASSISTANT

## 2023-11-27 PROCEDURE — 1157F PR ADVANCE CARE PLAN OR EQUIV PRESENT IN MEDICAL RECORD: ICD-10-PCS | Mod: HCNC,CPTII,S$GLB, | Performed by: PHYSICIAN ASSISTANT

## 2023-11-27 PROCEDURE — 3288F PR FALLS RISK ASSESSMENT DOCUMENTED: ICD-10-PCS | Mod: HCNC,CPTII,S$GLB, | Performed by: PHYSICIAN ASSISTANT

## 2023-11-27 PROCEDURE — 1157F ADVNC CARE PLAN IN RCRD: CPT | Mod: HCNC,CPTII,S$GLB, | Performed by: PHYSICIAN ASSISTANT

## 2023-11-27 PROCEDURE — 3066F PR DOCUMENTATION OF TREATMENT FOR NEPHROPATHY: ICD-10-PCS | Mod: HCNC,CPTII,S$GLB, | Performed by: PHYSICIAN ASSISTANT

## 2023-11-27 PROCEDURE — 99999 PR PBB SHADOW E&M-EST. PATIENT-LVL V: CPT | Mod: PBBFAC,HCNC,, | Performed by: PHYSICIAN ASSISTANT

## 2023-11-27 PROCEDURE — 4010F ACE/ARB THERAPY RXD/TAKEN: CPT | Mod: HCNC,CPTII,S$GLB, | Performed by: PHYSICIAN ASSISTANT

## 2023-11-27 PROCEDURE — 3079F PR MOST RECENT DIASTOLIC BLOOD PRESSURE 80-89 MM HG: ICD-10-PCS | Mod: HCNC,CPTII,S$GLB, | Performed by: PHYSICIAN ASSISTANT

## 2023-11-27 PROCEDURE — 99999 PR PBB SHADOW E&M-EST. PATIENT-LVL V: ICD-10-PCS | Mod: PBBFAC,HCNC,, | Performed by: PHYSICIAN ASSISTANT

## 2023-11-27 PROCEDURE — 3008F BODY MASS INDEX DOCD: CPT | Mod: HCNC,CPTII,S$GLB, | Performed by: PHYSICIAN ASSISTANT

## 2023-11-27 PROCEDURE — 1159F MED LIST DOCD IN RCRD: CPT | Mod: HCNC,CPTII,S$GLB, | Performed by: PHYSICIAN ASSISTANT

## 2023-11-27 PROCEDURE — 99213 OFFICE O/P EST LOW 20 MIN: CPT | Mod: HCNC,S$GLB,, | Performed by: PHYSICIAN ASSISTANT

## 2023-11-27 PROCEDURE — 4010F PR ACE/ARB THEARPY RXD/TAKEN: ICD-10-PCS | Mod: HCNC,CPTII,S$GLB, | Performed by: PHYSICIAN ASSISTANT

## 2023-11-27 PROCEDURE — 3288F FALL RISK ASSESSMENT DOCD: CPT | Mod: HCNC,CPTII,S$GLB, | Performed by: PHYSICIAN ASSISTANT

## 2023-11-27 PROCEDURE — 3051F PR MOST RECENT HEMOGLOBIN A1C LEVEL 7.0 - < 8.0%: ICD-10-PCS | Mod: HCNC,CPTII,S$GLB, | Performed by: PHYSICIAN ASSISTANT

## 2023-11-27 PROCEDURE — 1160F RVW MEDS BY RX/DR IN RCRD: CPT | Mod: HCNC,CPTII,S$GLB, | Performed by: PHYSICIAN ASSISTANT

## 2023-11-27 PROCEDURE — 3008F PR BODY MASS INDEX (BMI) DOCUMENTED: ICD-10-PCS | Mod: HCNC,CPTII,S$GLB, | Performed by: PHYSICIAN ASSISTANT

## 2023-11-27 PROCEDURE — 3075F SYST BP GE 130 - 139MM HG: CPT | Mod: HCNC,CPTII,S$GLB, | Performed by: PHYSICIAN ASSISTANT

## 2023-11-27 PROCEDURE — 3066F NEPHROPATHY DOC TX: CPT | Mod: HCNC,CPTII,S$GLB, | Performed by: PHYSICIAN ASSISTANT

## 2023-11-27 PROCEDURE — 1101F PT FALLS ASSESS-DOCD LE1/YR: CPT | Mod: HCNC,CPTII,S$GLB, | Performed by: PHYSICIAN ASSISTANT

## 2023-11-27 PROCEDURE — 3075F PR MOST RECENT SYSTOLIC BLOOD PRESS GE 130-139MM HG: ICD-10-PCS | Mod: HCNC,CPTII,S$GLB, | Performed by: PHYSICIAN ASSISTANT

## 2023-11-27 PROCEDURE — 99213 PR OFFICE/OUTPT VISIT, EST, LEVL III, 20-29 MIN: ICD-10-PCS | Mod: HCNC,S$GLB,, | Performed by: PHYSICIAN ASSISTANT

## 2023-11-27 NOTE — PROGRESS NOTES
"Subjective:      Patient ID: Dary Chaney is a 67 y.o. female.    Chief Complaint: Diabetes, Hyperlipidemia, and Hypertension    Patient is new to me, being seen today for follow up.     HTN- olmesartan 20mg, controlled   HLD- on statin therapy   DM- A1c 7.1%, actos 15mg, glimepiride 4mg bid, jardiance 10mg, ozempic 2mg, tresiba 62 units nightly, followed by Diabetes Mgmt   Blood sugar in AM 120s     Labs recently completed, cholesterol elevated but stable, TG improved, A1c improved     Last visit Feb 2023 with PCP.      Review of Systems   Constitutional:  Negative for chills, diaphoresis and fever.   HENT:  Negative for congestion, rhinorrhea and sore throat.    Respiratory:  Negative for cough, shortness of breath and wheezing.    Cardiovascular:  Negative for chest pain and leg swelling.   Gastrointestinal:  Negative for abdominal pain, constipation, diarrhea, nausea and vomiting.   Genitourinary:  Positive for pelvic pain (1mth ago, improving, saw Urology, scheduled for CT in Jan).   Skin:  Negative for rash.   Neurological:  Negative for dizziness, light-headedness and headaches.       Objective:   /80   Pulse 78   Temp 96.6 °F (35.9 °C) (Tympanic)   Resp 20   Ht 5' 2" (1.575 m)   Wt 78 kg (171 lb 15.3 oz)   SpO2 96%   BMI 31.45 kg/m²   Physical Exam  Constitutional:       General: She is not in acute distress.     Appearance: Normal appearance. She is well-developed. She is not ill-appearing.   HENT:      Head: Normocephalic and atraumatic.   Cardiovascular:      Rate and Rhythm: Normal rate and regular rhythm.      Heart sounds: Normal heart sounds. No murmur heard.  Pulmonary:      Effort: Pulmonary effort is normal. No respiratory distress.      Breath sounds: Normal breath sounds. No decreased breath sounds.   Musculoskeletal:      Right lower leg: No edema.      Left lower leg: No edema.   Skin:     General: Skin is warm and dry.      Findings: No rash.   Psychiatric:         Speech: " Speech normal.         Behavior: Behavior normal.         Thought Content: Thought content normal.       Assessment:      1. Hyperlipidemia associated with type 2 diabetes mellitus    2. Hypertension associated with diabetes    3. Atherosclerosis of aorta    4. Type 2 diabetes mellitus with hemoglobin A1c goal of less than 7.0%       Plan:   Hyperlipidemia associated with type 2 diabetes mellitus    Hypertension associated with diabetes    Atherosclerosis of aorta    Type 2 diabetes mellitus with hemoglobin A1c goal of less than 7.0%      Continue current medications, discussed lifestyle modifications    Declines flu shot today    6mth f/u w fasting labs prior     Discussed worsening signs/symptoms and when to return to clinic or go to ED.   Patient expresses understanding and agrees with treatment plan.

## 2023-11-29 ENCOUNTER — OFFICE VISIT (OUTPATIENT)
Dept: NEUROLOGY | Facility: CLINIC | Age: 68
End: 2023-11-29
Payer: MEDICARE

## 2023-11-29 VITALS
WEIGHT: 169 LBS | HEIGHT: 62 IN | SYSTOLIC BLOOD PRESSURE: 156 MMHG | DIASTOLIC BLOOD PRESSURE: 83 MMHG | HEART RATE: 84 BPM | BODY MASS INDEX: 31.1 KG/M2

## 2023-11-29 DIAGNOSIS — R51.9 NONINTRACTABLE HEADACHE, UNSPECIFIED CHRONICITY PATTERN, UNSPECIFIED HEADACHE TYPE: ICD-10-CM

## 2023-11-29 DIAGNOSIS — G43.009 MIGRAINE WITHOUT AURA AND WITHOUT STATUS MIGRAINOSUS, NOT INTRACTABLE: Primary | ICD-10-CM

## 2023-11-29 DIAGNOSIS — R29.898 DECREASED ROM OF NECK: ICD-10-CM

## 2023-11-29 PROCEDURE — 1157F ADVNC CARE PLAN IN RCRD: CPT | Mod: HCNC,CPTII,S$GLB, | Performed by: PHYSICIAN ASSISTANT

## 2023-11-29 PROCEDURE — 3066F NEPHROPATHY DOC TX: CPT | Mod: HCNC,CPTII,S$GLB, | Performed by: PHYSICIAN ASSISTANT

## 2023-11-29 PROCEDURE — 1160F RVW MEDS BY RX/DR IN RCRD: CPT | Mod: HCNC,CPTII,S$GLB, | Performed by: PHYSICIAN ASSISTANT

## 2023-11-29 PROCEDURE — 1159F MED LIST DOCD IN RCRD: CPT | Mod: HCNC,CPTII,S$GLB, | Performed by: PHYSICIAN ASSISTANT

## 2023-11-29 PROCEDURE — 99999 PR PBB SHADOW E&M-EST. PATIENT-LVL V: CPT | Mod: PBBFAC,HCNC,, | Performed by: PHYSICIAN ASSISTANT

## 2023-11-29 PROCEDURE — 3061F PR NEG MICROALBUMINURIA RESULT DOCUMENTED/REVIEW: ICD-10-PCS | Mod: HCNC,CPTII,S$GLB, | Performed by: PHYSICIAN ASSISTANT

## 2023-11-29 PROCEDURE — 3288F FALL RISK ASSESSMENT DOCD: CPT | Mod: HCNC,CPTII,S$GLB, | Performed by: PHYSICIAN ASSISTANT

## 2023-11-29 PROCEDURE — 1126F AMNT PAIN NOTED NONE PRSNT: CPT | Mod: HCNC,CPTII,S$GLB, | Performed by: PHYSICIAN ASSISTANT

## 2023-11-29 PROCEDURE — 99215 OFFICE O/P EST HI 40 MIN: CPT | Mod: HCNC,S$GLB,, | Performed by: PHYSICIAN ASSISTANT

## 2023-11-29 PROCEDURE — 1126F PR PAIN SEVERITY QUANTIFIED, NO PAIN PRESENT: ICD-10-PCS | Mod: HCNC,CPTII,S$GLB, | Performed by: PHYSICIAN ASSISTANT

## 2023-11-29 PROCEDURE — 99999 PR PBB SHADOW E&M-EST. PATIENT-LVL V: ICD-10-PCS | Mod: PBBFAC,HCNC,, | Performed by: PHYSICIAN ASSISTANT

## 2023-11-29 PROCEDURE — 3051F PR MOST RECENT HEMOGLOBIN A1C LEVEL 7.0 - < 8.0%: ICD-10-PCS | Mod: HCNC,CPTII,S$GLB, | Performed by: PHYSICIAN ASSISTANT

## 2023-11-29 PROCEDURE — 1160F PR REVIEW ALL MEDS BY PRESCRIBER/CLIN PHARMACIST DOCUMENTED: ICD-10-PCS | Mod: HCNC,CPTII,S$GLB, | Performed by: PHYSICIAN ASSISTANT

## 2023-11-29 PROCEDURE — 99215 PR OFFICE/OUTPT VISIT, EST, LEVL V, 40-54 MIN: ICD-10-PCS | Mod: HCNC,S$GLB,, | Performed by: PHYSICIAN ASSISTANT

## 2023-11-29 PROCEDURE — 3079F PR MOST RECENT DIASTOLIC BLOOD PRESSURE 80-89 MM HG: ICD-10-PCS | Mod: HCNC,CPTII,S$GLB, | Performed by: PHYSICIAN ASSISTANT

## 2023-11-29 PROCEDURE — 1101F PT FALLS ASSESS-DOCD LE1/YR: CPT | Mod: HCNC,CPTII,S$GLB, | Performed by: PHYSICIAN ASSISTANT

## 2023-11-29 PROCEDURE — 3061F NEG MICROALBUMINURIA REV: CPT | Mod: HCNC,CPTII,S$GLB, | Performed by: PHYSICIAN ASSISTANT

## 2023-11-29 PROCEDURE — 3051F HG A1C>EQUAL 7.0%<8.0%: CPT | Mod: HCNC,CPTII,S$GLB, | Performed by: PHYSICIAN ASSISTANT

## 2023-11-29 PROCEDURE — 4010F PR ACE/ARB THEARPY RXD/TAKEN: ICD-10-PCS | Mod: HCNC,CPTII,S$GLB, | Performed by: PHYSICIAN ASSISTANT

## 2023-11-29 PROCEDURE — 3008F PR BODY MASS INDEX (BMI) DOCUMENTED: ICD-10-PCS | Mod: HCNC,CPTII,S$GLB, | Performed by: PHYSICIAN ASSISTANT

## 2023-11-29 PROCEDURE — 3079F DIAST BP 80-89 MM HG: CPT | Mod: HCNC,CPTII,S$GLB, | Performed by: PHYSICIAN ASSISTANT

## 2023-11-29 PROCEDURE — 3077F PR MOST RECENT SYSTOLIC BLOOD PRESSURE >= 140 MM HG: ICD-10-PCS | Mod: HCNC,CPTII,S$GLB, | Performed by: PHYSICIAN ASSISTANT

## 2023-11-29 PROCEDURE — 4010F ACE/ARB THERAPY RXD/TAKEN: CPT | Mod: HCNC,CPTII,S$GLB, | Performed by: PHYSICIAN ASSISTANT

## 2023-11-29 PROCEDURE — 3077F SYST BP >= 140 MM HG: CPT | Mod: HCNC,CPTII,S$GLB, | Performed by: PHYSICIAN ASSISTANT

## 2023-11-29 PROCEDURE — 3008F BODY MASS INDEX DOCD: CPT | Mod: HCNC,CPTII,S$GLB, | Performed by: PHYSICIAN ASSISTANT

## 2023-11-29 PROCEDURE — 1157F PR ADVANCE CARE PLAN OR EQUIV PRESENT IN MEDICAL RECORD: ICD-10-PCS | Mod: HCNC,CPTII,S$GLB, | Performed by: PHYSICIAN ASSISTANT

## 2023-11-29 PROCEDURE — 3288F PR FALLS RISK ASSESSMENT DOCUMENTED: ICD-10-PCS | Mod: HCNC,CPTII,S$GLB, | Performed by: PHYSICIAN ASSISTANT

## 2023-11-29 PROCEDURE — 1101F PR PT FALLS ASSESS DOC 0-1 FALLS W/OUT INJ PAST YR: ICD-10-PCS | Mod: HCNC,CPTII,S$GLB, | Performed by: PHYSICIAN ASSISTANT

## 2023-11-29 PROCEDURE — 3066F PR DOCUMENTATION OF TREATMENT FOR NEPHROPATHY: ICD-10-PCS | Mod: HCNC,CPTII,S$GLB, | Performed by: PHYSICIAN ASSISTANT

## 2023-11-29 PROCEDURE — 1159F PR MEDICATION LIST DOCUMENTED IN MEDICAL RECORD: ICD-10-PCS | Mod: HCNC,CPTII,S$GLB, | Performed by: PHYSICIAN ASSISTANT

## 2023-11-29 NOTE — PROGRESS NOTES
"New Patient     SUBJECTIVE:  Patient ID: Dary Chaney   MRN: [id number]  Referred By: Aretha Duong  Chief Complaint: Headache      History of Present Illness:   67 y.o. female with renal cell carcinoma, arthritis, cataracts, DM type 2, HLD, HTN, lumbar disc disorder w/ myelopathy, NSD, kidney stone, fatty liver, h/o ulcer, obesity, peripheral vascular disease, anxiety, who presents to clinic with  Jennifer for evaluation of headaches.   PMHx negative for TBI, Meningitis, Aneurysms, asthma, GI bleed, osteoporosis, CAD/MI, CVA/TIA  Family Hx negative for Migraines     Pt endorses a h/o ha's since childhood. She feels they are so normal to her she hardly pays attention to them anymore which makes it difficult for her to recall certain aspects of her ha's. She is unsure of the frequency of her ha's. She can possibly go months without one. Or possibly have 3 HA days per month. She also endorses they may even occur more frequently than this. She is also unsure of the duration intially reporting they resolve within minutes. Upon further questioning, she endorses Ha's can occur for 1 hour to weeks typically. She is currently experiencing a HA that has lasted maybe a week. She is also unsure of aggravating factors. When a HA occurs, she typically closes her eyes and takes deep breaths. Her  states he will leave her alone. And she also endorses smelling herbal/aromatherapy scents can be very helpful. Ha's can occur anytime. She has also awoken from sleep w/ them. She further describes them as a pulsating, throbbing, or pressure like pains in her occipitalis radiating forward to her frontalis region, or in her temples. Severity ranges 4-9/10. Triggers include poor sleep or menses. She endorses blurry vision associated w/ ha's. She also endorses floaters but has been told by her ophtho that this is due to her cataracts. She also states she will have "disorientation and slurred speech" with her ha's. Upon " "further description, her speech and orientation is intact, she just has trouble getting the words out as if her cognition is slowed. Denies all other ROS including photophobia, phonophobia, osmophobia, nausea, vomiting, rhinorrhea, nasal congestion,diplopia, dizziness, vertigo, confusion, swollen/droopy eyelid, swelling around the eye, excessive eye tearing, conjunctival injection, red/flushed face, facial sweating, tinnitus, whooshing sounds, dysarthria, ataxia, paresthesias, loc, sz's, weakness, trauma, fevers, jaw claudication. Pt has previously/recently had an ESR/CRP that was wnl. She also endorses her blood sugars and bp's have been stable lately. Pt also states she has a "high tolerance" to medications. Is interested in non-medication options.     Treatments Tried   None recalled, possibly tried gabapentin but is unsure  Triptans - avoid 2/2 pvd  Nsaids - caution 2/2 h/o ulcers  Tpx/zonisamide - caution 2/2 h/o kidney stones  Steroids - caution 2/2 h/o dm    Social History  Alcohol - denies  Smoke - denies  Recreational Drug Use- denies  Occupation - not currently working    Current Medications:    Current Outpatient Medications:     albuterol (PROVENTIL) 2.5 mg /3 mL (0.083 %) nebulizer solution, Take 3 mLs (2.5 mg total) by nebulization every 6 (six) hours as needed for Wheezing., Disp: 120 mL, Rfl: 1    ASHWAGANDHA ROOT EXTRACT ORAL, Take by mouth 3 (three) times daily as needed. Prn Anxiety, Disp: , Rfl:     blood sugar diagnostic Strp, 1 each by Misc.(Non-Drug; Combo Route) route 4 (four) times daily., Disp: 300 each, Rfl: 3    blood-glucose meter kit, Test finger stick blood sugar once daily., Disp: 1 each, Rfl: 0    empagliflozin (JARDIANCE) 10 mg tablet, Take 1 tablet (10 mg total) by mouth once daily., Disp: 30 tablet, Rfl: 11    fluticasone (FLONASE) 50 mcg/actuation nasal spray, USE TWO SPRAY(S) IN EACH NOSTRIL ONCE DAILY, Disp: 16 g, Rfl: 1    FLUZONE HIGHDOSE QUAD 21-22  mcg/0.7 mL Syrg, , " "Disp: , Rfl:     glimepiride (AMARYL) 4 MG tablet, Take 1 tablet (4 mg total) by mouth 2 (two) times daily with meals., Disp: 180 tablet, Rfl: 3    insulin degludec (TRESIBA FLEXTOUCH U-200) 200 unit/mL (3 mL) insulin pen, Inject 62 Units into the skin once daily., Disp: 4 pen, Rfl: 9    LACTOBACILLUS RHAMNOSUS GG ORAL, Take 1 capsule by mouth once daily., Disp: , Rfl:     lancets Misc, 1 each by Misc.(Non-Drug; Combo Route) route 4 (four) times daily., Disp: 100 each, Rfl: 11    lancets Misc, 1 each by Misc.(Non-Drug; Combo Route) route 4 (four) times daily., Disp: 300 each, Rfl: 3    loratadine (CLARITIN) 10 mg tablet, Take 1 tablet (10 mg total) by mouth once daily. (Patient taking differently: Take 10 mg by mouth daily as needed.), Disp: 30 tablet, Rfl: 0    MAGNESIUM ORAL, Take 500 mg by mouth once daily., Disp: , Rfl:     olmesartan (BENICAR) 20 MG tablet, Take 1 tablet by mouth once daily, Disp: 90 tablet, Rfl: 2    omeprazole (PRILOSEC) 40 MG capsule, Take 1 capsule by mouth once daily, Disp: 90 capsule, Rfl: 3    pen needle, diabetic (PEN NEEDLE) 31 gauge x 5/16" Ndle, Inject 1 each into the skin once daily., Disp: 100 each, Rfl: 11    PFIZER COVID BIVAL,12Y UP,,PF, 30 mcg/0.3 mL injection, , Disp: , Rfl:     pioglitazone (ACTOS) 15 MG tablet, Take 1 tablet (15 mg total) by mouth once daily., Disp: 30 tablet, Rfl: 11    PREVNAR 20, PF, 0.5 mL Syrg injection, , Disp: , Rfl:     semaglutide (OZEMPIC) 2 mg/dose (8 mg/3 mL) PnIj, Inject 2 mg into the skin every 7 days., Disp: 1 pen, Rfl: 11    SHINGRIX, PF, 50 mcg/0.5 mL injection, , Disp: , Rfl:     simvastatin (ZOCOR) 40 MG tablet, Take 40 mg by mouth every evening., Disp: , Rfl:     Review of Systems - as per HPI, otherwise a balanced 10 systems review is negative.    OBJECTIVE:  Vitals:  BP (!) 156/83 (BP Location: Right arm, Patient Position: Sitting, BP Method: Medium (Automatic))   Pulse 84   Ht 5' 2" (1.575 m)   Wt 76.7 kg (169 lb)   BMI 30.91 kg/m² "     Physical Exam   Constitutional: she appears well-developed and well-nourished. she is well groomed. NAD  HENT:    Head: Normocephalic and atraumatic, Frontalis was NTTP, temporalis was TTP   Eyes: Conjunctivae and EOM are normal. Pupils are equal, round, and reactive to light   Neck: Neck supple. Occiput and trapezius TTP, traps tight, +tinel to b/l G&L occipital nerves  Musculoskeletal: Normal range of motion. No joint stiffness. No vertebral point tenderness.  Skin: Skin is warm and dry.  Psychiatric: Normal mood and affect.     Neuro exam:    Mental status:  The patient is alert and oriented to person, place and time.  Language is intact and fluent  Remote and recent memory are intact  Normal attention and concentration  Mood is stable    Cranial Nerves:  Pupils are equal and reactive to light.    Extraocular movements are intact and without nystagmus.    Facial movement is symmetric.  Facial sensation is intact.    Hearing is intact   Limited ROM of neck in all (6) directions without pain  Shoulder shrug symmetrical.    Coordination:     Finger to nose - normal and symmetric bilaterally     Motor:  Normal muscle bulk and symmetry. No fasciculations were noted.   Tremor not apparent   Pronator drift not apparent.    strength was strong and symmetric  Finger extension strength was strong and symmetric  RUE:appropriate against gravity and medium force as tested 5/5  LUE: appropriate against gravity and medium force as tested 5/5  RLE:appropriate against gravity and medium force as tested 5/5              LLE: appropriate against gravity and medium force as tested 5/5    Sensory:  RUE  intact light touch  LUE intact light touch  RLE intact light touch  LLE intact light touch    Gait:   Normal gait    Review of Data:   Notes from pcp reviewed   Labs:  Office Visit on 11/14/2023   Component Date Value Ref Range Status    POC Residual Urine Volume 11/14/2023 0  0 - 100 mL Final    POC Blood, Urine 11/14/2023  Negative  Negative Final    POC Bilirubin, Urine 11/14/2023 Negative  Negative Final    POC Urobilinogen, Urine 11/14/2023 0.2  0.1 - 1.1 Final    POC Ketones, Urine 11/14/2023 Negative  Negative Final    POC Protein, Urine 11/14/2023 Negative  Negative Final    POC Nitrates, Urine 11/14/2023 Negative  Negative Final    POC Glucose, Urine 11/14/2023 Negative  Negative Final    pH, UA 11/14/2023 6.5   Final    POC Specific Gravity, Urine 11/14/2023 1.015  1.003 - 1.029 Final    POC Leukocytes, Urine 11/14/2023 Negative  Negative Final   Office Visit on 11/13/2023   Component Date Value Ref Range Status    POC Glucose 11/13/2023 97  70 - 110 MG/DL Final   Lab Visit on 11/08/2023   Component Date Value Ref Range Status    Sodium 11/08/2023 142  136 - 145 mmol/L Final    Potassium 11/08/2023 4.6  3.5 - 5.1 mmol/L Final    Chloride 11/08/2023 107  95 - 110 mmol/L Final    CO2 11/08/2023 25  23 - 29 mmol/L Final    Glucose 11/08/2023 124 (H)  70 - 110 mg/dL Final    BUN 11/08/2023 26 (H)  8 - 23 mg/dL Final    Creatinine 11/08/2023 0.8  0.5 - 1.4 mg/dL Final    Calcium 11/08/2023 9.2  8.7 - 10.5 mg/dL Final    Total Protein 11/08/2023 6.6  6.0 - 8.4 g/dL Final    Albumin 11/08/2023 3.5  3.5 - 5.2 g/dL Final    Total Bilirubin 11/08/2023 0.2  0.1 - 1.0 mg/dL Final    Alkaline Phosphatase 11/08/2023 94  55 - 135 U/L Final    AST 11/08/2023 15  10 - 40 U/L Final    ALT 11/08/2023 27  10 - 44 U/L Final    eGFR 11/08/2023 >60.0  >60 mL/min/1.73 m^2 Final    Anion Gap 11/08/2023 10  8 - 16 mmol/L Final    Hemoglobin A1C 11/08/2023 7.1 (H)  4.0 - 5.6 % Final    Estimated Avg Glucose 11/08/2023 157 (H)  68 - 131 mg/dL Final    Cholesterol 11/08/2023 227 (H)  120 - 199 mg/dL Final    Triglycerides 11/08/2023 194 (H)  30 - 150 mg/dL Final    HDL 11/08/2023 47  40 - 75 mg/dL Final    LDL Cholesterol 11/08/2023 141.2  63.0 - 159.0 mg/dL Final    HDL/Cholesterol Ratio 11/08/2023 20.7  20.0 - 50.0 % Final    Total Cholesterol/HDL Ratio  11/08/2023 4.8  2.0 - 5.0 Final    Non-HDL Cholesterol 11/08/2023 180  mg/dL Final   Office Visit on 11/07/2023   Component Date Value Ref Range Status    POC Blood, Urine 11/07/2023 Negative  Negative Final    POC Bilirubin, Urine 11/07/2023 Negative  Negative Final    POC Urobilinogen, Urine 11/07/2023 normal  0.1 - 1.1 Final    POC Ketones, Urine 11/07/2023 Negative  Negative Final    POC Protein, Urine 11/07/2023 Negative  Negative Final    POC Nitrates, Urine 11/07/2023 Negative  Negative Final    POC Glucose, Urine 11/07/2023 Negative  Negative Final    pH, UA 11/07/2023 5.0   Final    POC Specific Gravity, Urine 11/07/2023 1.025  1.003 - 1.029 Final    POC Leukocytes, Urine 11/07/2023 Negative  Negative Final    Urine Culture, Routine 11/07/2023 No growth   Final     Imaging:  No results found for this or any previous visit.  Note: I have independently reviewed any/all imaging/labs/tests and agree with the report (s) as documented.  Any discrepancies will be as noted/demarcated by free text.  CIRA BEAR 11/29/2023    ASSESSMENT:  1. Migraine without aura and without status migrainosus, not intractable    2. Nonintractable headache, unspecified chronicity pattern, unspecified headache type    3. Decreased ROM of neck          PLAN:  - Discussed symptoms appear to be consistent with multifactorial ha's including migraines, ON, cervicogenic ha's, discussed treatment options and patient agreed with the following plan:  - as pt is unsure of frequency and other characteristics to ha's, pt to begin tracking and provide diary to next visit. Will determine need for ppx at that time. Discussed potential options w/ pt including elavil, cymbalta, gabapentin, lyrica, nb's.   - abortives - discussed gepants, diclofenac gel, lidocaine gel  - referral to PT for cervicogenic component  - HTN - elevated todayt, pt states bp's have been stable at home, advisd pt to monitor, mgmt per pcp  - importance of healthy diet, regular  exercise and sleep hygiene in the treatment of headaches    - RTC in 3 mo     Orders Placed This Encounter    Ambulatory referral/consult to Physical/Occupational Therapy       I have discussed realistic goals of care with patient at length as well as medication options, and need for lifestyle adjustment. I have explained that treatment will take time. We have agreed that the goal will be to reduce frequency/intensity/quantity of HA, not to be completely HA free. I have explained my non narcotic policy regarding headache treatment.    Patient agreeable to work on lifestyle adjustments.    Questions and concerns were sought and answered to the patient's stated verbal satisfaction.  The patient verbalizes understanding and agreement with the above stated treatment plan.     CC: Sakina Cooper DO Sarena Patel, PA-C  [facility]  [phone]    Dr. Klein was available during today's encounter.     I spent 61 minutes on the day of this encounter preparing for, treating and managing this patient presenting with headaches.

## 2023-11-29 NOTE — PATIENT INSTRUCTIONS
Supplements for Migraine:  1. Magnesium Oxide - 400mg by mouth daily  2. Riboflavin (Vitamin B2) - 400mg by mouth daily  3. Coenzyme Q10 - 200mg tablet by mouth daily    Sleep Hygiene:  1. Avoid watching TV, eating, and discussing emotional issues in bed. The bed should be used for sleep only. If not, we can associate the bed with other activities and it often becomes difficult to fall asleep.  2. Minimize noise, light, and temperature extremes during sleep with ear plugs, window blinds, or an electric blanket or air conditioner. Even the slightest nighttime noises or luminescent lights can disrupt the quality of your sleep. Try to keep your bedroom at a comfortable temperature -- not too hot (above 75 degrees) or too cold (below 54 degrees).  3. Try not to drink fluids after 8 p.m. This may reduce awakenings due to urination.  4. Avoid naps, but if you do nap, make it no more than about 25 minutes about eight hours after you awake. But if you have problems falling asleep, then no naps for you.  5. Do not expose your self to bright light if you need to get up at night. Use a small night-light instead.  6. Nicotine is a stimulant and should be avoided particularly near bedtime and upon night awakenings. Having a smoke before bed, although it may feel relaxing, is actually putting a stimulant into your bloodstream.  7. Caffeine is also a stimulant and is present in coffee (100-200 mg), soda (50-75 mg), tea (50-75 mg), and various over-the-counter medications. Caffeine should be discontinued at least four to six hours before bedtime. If you consume large amounts of caffeine and you cut your self off too quickly, beware; you may get headaches that could keep you awake.  8. Although alcohol is a depressant and may help you fall asleep, the subsequent metabolism that clears it from your body when you are sleeping causes a withdrawal syndrome. This withdrawal causes awakenings and is often associated with nightmares and  sweats.  9. A light snack may be sleep-inducing, but a heavy meal too close to bedtime interferes with sleep. Stay away from protein and stick to carbohydrates or dairy products. Milk contains the amino acid L-tryptophan, which has been shown in research to help people go to sleep. So milk and cookies or crackers (without chocolate) may be useful and taste good as well.

## 2023-12-07 ENCOUNTER — OFFICE VISIT (OUTPATIENT)
Dept: URGENT CARE | Facility: CLINIC | Age: 68
End: 2023-12-07
Payer: MEDICARE

## 2023-12-07 VITALS
DIASTOLIC BLOOD PRESSURE: 65 MMHG | BODY MASS INDEX: 31.1 KG/M2 | SYSTOLIC BLOOD PRESSURE: 138 MMHG | HEIGHT: 62 IN | RESPIRATION RATE: 20 BRPM | TEMPERATURE: 98 F | WEIGHT: 169 LBS | HEART RATE: 80 BPM | OXYGEN SATURATION: 96 %

## 2023-12-07 DIAGNOSIS — R53.83 FATIGUE, UNSPECIFIED TYPE: ICD-10-CM

## 2023-12-07 DIAGNOSIS — J06.9 UPPER RESPIRATORY TRACT INFECTION, UNSPECIFIED TYPE: ICD-10-CM

## 2023-12-07 DIAGNOSIS — R05.9 COUGH, UNSPECIFIED TYPE: Primary | ICD-10-CM

## 2023-12-07 LAB
CTP QC/QA: YES
CTP QC/QA: YES
POC MOLECULAR INFLUENZA A AGN: NEGATIVE
POC MOLECULAR INFLUENZA B AGN: NEGATIVE
SARS-COV-2 AG RESP QL IA.RAPID: NEGATIVE

## 2023-12-07 PROCEDURE — 99214 OFFICE O/P EST MOD 30 MIN: CPT | Mod: S$GLB,,, | Performed by: NURSE PRACTITIONER

## 2023-12-07 PROCEDURE — 87811 SARS CORONAVIRUS 2 ANTIGEN POCT, MANUAL READ: ICD-10-PCS | Mod: QW,S$GLB,, | Performed by: NURSE PRACTITIONER

## 2023-12-07 PROCEDURE — 87811 SARS-COV-2 COVID19 W/OPTIC: CPT | Mod: QW,S$GLB,, | Performed by: NURSE PRACTITIONER

## 2023-12-07 PROCEDURE — 87502 INFLUENZA DNA AMP PROBE: CPT | Mod: QW,S$GLB,, | Performed by: NURSE PRACTITIONER

## 2023-12-07 PROCEDURE — 99214 PR OFFICE/OUTPT VISIT, EST, LEVL IV, 30-39 MIN: ICD-10-PCS | Mod: S$GLB,,, | Performed by: NURSE PRACTITIONER

## 2023-12-07 PROCEDURE — 87502 POCT INFLUENZA A/B MOLECULAR: ICD-10-PCS | Mod: QW,S$GLB,, | Performed by: NURSE PRACTITIONER

## 2023-12-07 RX ORDER — BENZONATATE 200 MG/1
200 CAPSULE ORAL 3 TIMES DAILY PRN
Qty: 25 CAPSULE | Refills: 0 | Status: SHIPPED | OUTPATIENT
Start: 2023-12-07 | End: 2023-12-17

## 2023-12-07 RX ORDER — DEXTROMETHORPHAN HYDROBROMIDE AND GUAIFENESIN 10; 200 MG/1; MG/1
2 CAPSULE, GELATIN COATED ORAL
Qty: 40 EACH | Refills: 0 | Status: SHIPPED | OUTPATIENT
Start: 2023-12-07 | End: 2023-12-17

## 2023-12-07 NOTE — PROGRESS NOTES
"Subjective:      Patient ID: Dary Chaney is a 68 y.o. female.    Vitals:  height is 5' 2" (1.575 m) and weight is 76.7 kg (169 lb). Her oral temperature is 98.1 °F (36.7 °C). Her blood pressure is 138/65 and her pulse is 80. Her respiration is 20 and oxygen saturation is 96%.     Chief Complaint: No chief complaint on file.    Patient is a 68-year-old female who presents for evaluation of cough and congestion with associated fatigue and sore throat.  Onset Monday.  Not taking any medications for symptoms.  Denies associated fever, chills, ear pain, dyspnea, wheezing, nausea, vomiting, diarrhea or rash.  No recent travel or known sick contacts.  No other concerns voiced.    Sore Throat   This is a new problem. The current episode started in the past 7 days. The problem has been gradually worsening. There has been no fever. The fever has been present for 3 to 4 days. Associated symptoms include congestion, coughing, headaches, a hoarse voice and a plugged ear sensation. Pertinent negatives include no abdominal pain, diarrhea, ear pain, shortness of breath, trouble swallowing or vomiting. She has tried nothing for the symptoms. The treatment provided no relief.       Constitution: Negative for chills and fever.   HENT:  Positive for congestion, postnasal drip and sore throat. Negative for ear pain and trouble swallowing.    Respiratory:  Positive for cough. Negative for sputum production, shortness of breath and wheezing.    Gastrointestinal:  Negative for abdominal pain, nausea, vomiting and diarrhea.   Skin:  Negative for rash.   Neurological:  Positive for headaches.      Objective:     Physical Exam   Constitutional: She is oriented to person, place, and time. She appears well-developed. She is cooperative.  Non-toxic appearance. She does not appear ill. No distress.   HENT:   Head: Normocephalic and atraumatic.   Ears:   Right Ear: Hearing, tympanic membrane, external ear and ear canal normal.   Left Ear: " Hearing, tympanic membrane, external ear and ear canal normal.   Nose: Rhinorrhea present. No mucosal edema, nasal deformity or congestion. No epistaxis. Right sinus exhibits no maxillary sinus tenderness and no frontal sinus tenderness. Left sinus exhibits no maxillary sinus tenderness and no frontal sinus tenderness.   Mouth/Throat: Uvula is midline and mucous membranes are normal. No trismus in the jaw. Normal dentition. No uvula swelling. Posterior oropharyngeal erythema present. No oropharyngeal exudate or posterior oropharyngeal edema.   Eyes: Conjunctivae and lids are normal. No scleral icterus.   Neck: Trachea normal and phonation normal. Neck supple. No edema present. No erythema present. No neck rigidity present.   Cardiovascular: Normal rate, regular rhythm, normal heart sounds and normal pulses.   Pulmonary/Chest: Effort normal and breath sounds normal. No respiratory distress. She has no decreased breath sounds. She has no wheezes. She has no rhonchi. She has no rales.   Abdominal: Normal appearance.   Musculoskeletal: Normal range of motion.         General: No deformity. Normal range of motion.   Neurological: She is alert and oriented to person, place, and time. She exhibits normal muscle tone. Coordination normal.   Skin: Skin is warm, dry, intact, not diaphoretic and not pale.   Psychiatric: Her speech is normal and behavior is normal. Judgment and thought content normal.   Nursing note and vitals reviewed.      Assessment:     1. Cough, unspecified type    2. Upper respiratory tract infection, unspecified type    3. Fatigue, unspecified type        Plan:       Cough, unspecified type  -     POCT Influenza A/B MOLECULAR  -     SARS Coronavirus 2 Antigen, POCT Manual Read    Upper respiratory tract infection, unspecified type    Fatigue, unspecified type    Other orders  -     dextromethorphan-guaiFENesin (CORICIDIN HBP CHEST MARJORIE-COUGH)  mg Cap; Take 2 capsules by mouth every 6 (six) hours  while awake. for 10 days  Dispense: 40 each; Refill: 0  -     benzonatate (TESSALON) 200 MG capsule; Take 1 capsule (200 mg total) by mouth 3 (three) times daily as needed for Cough.  Dispense: 25 capsule; Refill: 0          Medical Decision Making:   Initial Assessment:   Nontoxic appearing 67 yo female c/o cough/congestion/sore throat.  After complete evaluation, including thorough history and physical exam, the patient's symptoms are most likely due to viral upper respiratory infection. There are no concerning features on physical exam to suggest bacterial otitis media/externa, sinusitis, strep pharyngitis, or peritonsillar abscess. Vital signs do not suggest sepsis. Lung sounds are clear and not consistent with pneumonia. There is no neck pain or limited ROM to suggest retropharyngeal abscess or meningitis. In clinic testing for flu/covid is negative.The patient will be treated with supportive care. Will provide RX for tessalon upon D/C. Follow up instructions and ED precautions provided.     Clinical Tests:   Lab Tests: Reviewed       <> Summary of Lab: Flu   Covid        Results for orders placed or performed in visit on 12/07/23   POCT Influenza A/B MOLECULAR   Result Value Ref Range    POC Molecular Influenza A Ag Negative Negative, Not Reported    POC Molecular Influenza B Ag Negative Negative, Not Reported     Acceptable Yes    SARS Coronavirus 2 Antigen, POCT Manual Read   Result Value Ref Range    SARS Coronavirus 2 Antigen Negative Negative     Acceptable Yes      Patient Instructions   A cold is caused by a virus that can settle in your nose, throat or lungs. This causesa runny or stuffy nose and sneezing. You may also have a sore throat, cough, headache, fever and muscle aches. Different cold viruses last different lengthsof time, but the average time is 2 to 14 days.    Seek immediate medical care if you develop fever, chest pain, or shortness of breath.     Treatment:  There is no cure for the common cold, there is only symptomatic care.     Antibiotics may be used to treat signs of a secondary infection, but they do not treat  the cold virus. Try these tips to keep yourself comfortable:                   -Get plenty of rest.                   -Drink plenty of fluids, at least 8 large glasses of fluid a day. Good fluid choices are water, fruit juices high in Vitamin C, tea, gelatin, or broths and soups. These help to keep mucus thin and ease congestion.                  -Use salt water gargle, cough drops or throat sprays to relieve throat pain. Mi ¼ to ½ teaspoon of salt in 1 cup of warm water for a salt water gargle  solution.                  -Use petroleum jelly or lip balm around lips and nose to prevent chapping.                  -Use saline nose drops or spray to help ease congestion.    Over the Counter (OTC) Medicines:  Take over the counter medicines as needed to ease your signs.  Read labels carefully.  Use a product that treats only the signs that you have. Ask your pharmacist  for recommendations. Be sure to ask about possible interactions with other  medicines you are taking.  Common medicines used to treat signs of a cold include:     -Flonase daily.  -Claritin or Zyrtec daily.  -Mucinex every 12 hours -- Drink plenty of water while taking this medication.    - Cough suppressant, also called antitussive, such as dextromethorphan.  This medicine decreases your reflex and sensitivity to cough. This  medicine may be kept behind the pharmacy counter for purchase.    Cold and cough medicines often contain more than one type of medicine.  Ask the pharmacist for help to confirm that you are not using more than one  product with the same or similar ingredient. For example, some cold and  cough medicines have acetaminophen or ibuprofen in them to help lower a  fever or ease muscle aches. Do not take extra acetaminophen (Tylenol) or  ibuprofen (Advil, Motrin) if the cold or  cough medicine has it as an  ingredient. Too much medicine could be harmful.    Take the correct dose as listed on the package. Do not take more than  recommended.    Use a Humidifier:  A cool mist humidifier can make breathing easier by thinning mucus. Do not use  a steam humidifier as hot water can cause burns if spilled.  Place the humidifier a few feet from the bed. Drain and clean each day with  soap and water to prevent bacteria and mold from growing.  Indoor humidity should not be above 50%. Stop using the humidifier if you  notice moisture on windows, walls or pictures.  You do not need to add any medicine to the humidifier.  If you cannot get a humidifier, place a pan of water next to heating vents and  refill the water level daily. The water will evaporate and add moisture to the  Room.    How to prevent the spread of colds  -Wash your hands with soap and water or use alcohol based hand   often. Dry hands wet from washing with soap on a paper towel instead of cloth towel.  -Cough or sneeze into your elbow to avoid spreading germs.  -Wipe down common surfaces, such as door knobs and faucet handles, with a disinfectant spray.  -Do not share cups or utensils.        Please follow up with your Primary care provider within 2-5 days if your signs and symptoms have not resolved or worsen.      If your condition worsens or fails to improve we recommend that you receive another evaluation at the emergency room immediately or contact your primary medical clinic to discuss your concerns.   You must understand that you have received an Urgent Care treatment only and that you may be released before all of your medical problems are known or treated. You, the patient, will arrange for follow up care as instructed.

## 2023-12-08 DIAGNOSIS — E11.9 TYPE 2 DIABETES MELLITUS WITH HEMOGLOBIN A1C GOAL OF LESS THAN 7.0%: ICD-10-CM

## 2023-12-08 RX ORDER — INSULIN DEGLUDEC 200 U/ML
62 INJECTION, SOLUTION SUBCUTANEOUS DAILY
Qty: 4 PEN | Refills: 9 | Status: SHIPPED | OUTPATIENT
Start: 2023-12-08 | End: 2024-02-14

## 2023-12-08 RX ORDER — SEMAGLUTIDE 2.68 MG/ML
2 INJECTION, SOLUTION SUBCUTANEOUS
Qty: 1 EACH | Refills: 11 | Status: SHIPPED | OUTPATIENT
Start: 2023-12-08 | End: 2024-03-04

## 2023-12-08 NOTE — TELEPHONE ENCOUNTER
----- Message from Thomas Bonilla sent at 12/8/2023 12:30 PM CST -----  Contact: self [phone]  Pt is calling requesting medication refills ,semaglutide (OZEMPIC) 2 mg/dose (8 mg/3 mL) PnIj,insulin degludec (TRESIBA FLEXTOUCH U-200) 200 unit/mL (3 mL) insulin pen . Please call back at [phone] . Girish

## 2023-12-11 DIAGNOSIS — I10 HYPERTENSION GOAL BP (BLOOD PRESSURE) < 130/80: Chronic | ICD-10-CM

## 2023-12-11 DIAGNOSIS — J20.9 ACUTE BRONCHITIS, UNSPECIFIED ORGANISM: ICD-10-CM

## 2023-12-11 NOTE — TELEPHONE ENCOUNTER
No care due was identified.  Health [facility] Embedded Care Due Messages. Reference number: [id number].   12/11/2023 7:02:35 AM CST

## 2023-12-11 NOTE — TELEPHONE ENCOUNTER
Refill Routing Note   Medication(s) are not appropriate for processing by [facility] for the following reason(s):        Allergy or intolerance    [facility] action(s):  Defer        Medication Therapy Plan: Very High Allergy/Contraindication: olmesartan      Appointments  past 12m or future 3m with PCP    Date Provider   Last Visit   10/31/2023 Sakina Cooper DO   Next Visit   5/31/2024 Sakina Cooper DO   ED visits in past 90 days: 0        Note composed:7:24 AM 12/11/2023

## 2023-12-12 RX ORDER — ALBUTEROL SULFATE 0.83 MG/ML
2.5 SOLUTION RESPIRATORY (INHALATION) EVERY 6 HOURS PRN
Qty: 360 ML | Refills: 3 | Status: SHIPPED | OUTPATIENT
Start: 2023-12-12

## 2023-12-12 RX ORDER — OLMESARTAN MEDOXOMIL 20 MG/1
20 TABLET ORAL DAILY
Qty: 90 TABLET | Refills: 0 | Status: SHIPPED | OUTPATIENT
Start: 2023-12-12 | End: 2024-03-08

## 2023-12-12 NOTE — TELEPHONE ENCOUNTER
Refill Routing Note   Medication(s) are not appropriate for processing by [facility] for the following reason(s):        Allergy or intolerance   No active prescription written by provider: Albuterol Nebulizer Solution    [facility] action(s):  Defer      Medication Therapy Plan: albuterol (PROVENTIL) 2.5 mg /3 mL (0.083 %) nebulizer solution  on med list 10/4/22      Appointments  past 12m or future 3m with PCP    Date Provider   Last Visit   10/31/2023 Sakina Cooper DO   Next Visit   2024 Sakina Cooper DO   ED visits in past 90 days: 0        Note composed:12:00 PM 2023

## 2023-12-12 NOTE — TELEPHONE ENCOUNTER
Refill Routing Note   Medication(s) are not appropriate for processing by [facility] for the following reason(s):        No active prescription written by provider    [facility] action(s):  Approve  Defer        Medication Therapy Plan: albuterol (PROVENTIL) 2.5 mg /3 mL (0.083 %) nebulizer solution  on med list 10/4/22      Appointments  past 12m or future 3m with PCP    Date Provider   Last Visit   10/31/2023 Sakina Cooper DO   Next Visit   2024 Sakina Cooper DO   ED visits in past 90 days: 0        Note composed:1:19 PM 2023

## 2023-12-12 NOTE — TELEPHONE ENCOUNTER
----- Message from Macarena Dwyer sent at 12/12/2023 11:12 AM CST -----  Contact: pt  Type:  RX Refill Request    Who Called:  pt  Refill or New Rx: refill  RX Name and Strength:albuterol (PROVENTIL) 2.5 mg /3 mL (0.083 %) nebulizer solution  How is the patient currently taking it? (ex. 1XDay):  Is this a 30 day or 90 day RX:  Preferred Pharmacy with phone number:  Local or Mail Order: local  Ordering Provider:  leighann  Would the patient rather a call back or a response via [facility]?   Best Call Back Number:  Additional Information:       [address] - [address] [facility]  [address] [facility] [address]  Phone: [phone] Fax: [phone]

## 2023-12-18 ENCOUNTER — CLINICAL SUPPORT (OUTPATIENT)
Dept: REHABILITATION | Facility: HOSPITAL | Age: 68
End: 2023-12-18
Payer: MEDICARE

## 2023-12-18 DIAGNOSIS — R68.89 DECREASED STRENGTH, ENDURANCE, AND MOBILITY: ICD-10-CM

## 2023-12-18 DIAGNOSIS — R51.9 CHRONIC INTRACTABLE HEADACHE, UNSPECIFIED HEADACHE TYPE: ICD-10-CM

## 2023-12-18 DIAGNOSIS — G89.29 CHRONIC INTRACTABLE HEADACHE, UNSPECIFIED HEADACHE TYPE: ICD-10-CM

## 2023-12-18 DIAGNOSIS — R29.898 DECREASED ROM OF NECK: Primary | ICD-10-CM

## 2023-12-18 DIAGNOSIS — Z74.09 DECREASED STRENGTH, ENDURANCE, AND MOBILITY: ICD-10-CM

## 2023-12-18 DIAGNOSIS — R53.1 DECREASED STRENGTH, ENDURANCE, AND MOBILITY: ICD-10-CM

## 2023-12-18 PROCEDURE — 97110 THERAPEUTIC EXERCISES: CPT | Performed by: PHYSICAL THERAPIST

## 2023-12-18 PROCEDURE — 97162 PT EVAL MOD COMPLEX 30 MIN: CPT | Performed by: PHYSICAL THERAPIST

## 2023-12-18 NOTE — PLAN OF CARE
"[facility]   Physical Therapy Initial Evaluation      Date: 12/18/2023   Name: Dary Chaney  Clinic Number: [id number]    Therapy Diagnosis:    Encounter Diagnoses   Name Primary?    Decreased ROM of neck Yes    Decreased strength, endurance, and mobility     Chronic intractable headache, unspecified headache type       Physician: Madeline Giron PA-C     Physician Orders: PT Eval and Treat  Medical Diagnosis from Referral: decreased ROM of neck  Evaluation Date: 12/18/2023  Authorization Period Expiration: 11/28/2024  Plan of Care Expiration: 3/17/2024  Progress Note Due: 1/17/2024  Visit # / Visits authorized: 1/1   FOTO: 1/3 (last performed on 12/18/2023)    Precautions: Standard and history or renal cell carcinoma (2018)    Time In: 1300  Time Out: 1355  Total Billable Time (timed & untimed codes): 48 minutes    Subjective     Date of onset: 11/29/2023    History of current condition - Dary reports that she has a history of neck pain and headaches since she was younger. Patient states that as she has gotten older, her headaches have worsened in intensity and frequency. Her headaches at times can last for almost a week. She thinks she has had about 8 or 9 this past month. Patient has tried a muscle relaxer and pain medication, and they have not helped. Over the counter NSAIDs will help with a light headache, but not a bad one. She has not tried botox, nor has she tried PT before. Her headaches start at the base of her neck, and moves up into her head (somewhat of a andrea horn referral pattern). She will feel foggy, see an aura or blurred vision, and her speech can even be affected. No radicular s/s reported at this time. Patient states that she does at times feel a "fullness" in her right ear and have pain in that area. No clicking or popping in jaw while chewing noted. No history of TMD diagnosis.     Falls: none    Imaging: no recent images    Pain:  Current 8/10, worst 9/10, " best 0/10   Location: [] Right   [] Left:  middle of neck with referred pain into head  Description: aching , throbbing, and sharp  Aggravating Factors: bending her head down when she has a headache, no specific triggers  Easing Factors: activity avoidance, rest, a certain spray (aromatherapy) that gives her some relief    Prior Therapy:   [x] N/A    [] Yes:   Social History: Pt lives with their spouse  Occupation: Pt is not currently working.  Prior Level of Function: Independent and pain free with all ADL, IADL, community mobility and functional activities.   Current Level of Function: Independent with all ADL, IADL, community mobility and functional activities with reports of increased pain and need for increased time and frequent breaks.      Dominant Extremity:    [x] Right    [] Left    Pts goals: Pt reported goals are to decrease overall pain levels in order to return to prior functional level.     Medical History:   Past Medical History:   Diagnosis Date    Arthritis     Cataract     Colon polyp     Diabetes mellitus type II 15 years     am 05/30/2022    Hyperlipidemia     Hypertension     Renal cell carcinoma 09/2018    Total knee replacement status, left 01/20/2019    Dr.Robert Cook    UTI (urinary tract infection)        Surgical History:   Dary Chaney  has a past surgical history that includes Cholecystectomy; Spine surgery; Hysterectomy (1993); Knee surgery (03/2017); Cholecystectomy; Colonoscopy (2012); Colonoscopy (N/A, 3/1/2018); Esophagogastroduodenoscopy (N/A, 8/21/2018); thumb surgery Rt- July 2018 - cyst ; Robot-assisted laparoscopic partial nephrectomy using da Basim Xi (Right, 9/13/2018); Joint replacement; and left knee replacement (01/20/2019).    Medications:   Dary has a current medication list which includes the following prescription(s): albuterol, ashwagandha root extract, blood sugar diagnostic, blood-glucose meter, empagliflozin, fluticasone propionate, fluzone highdose  quad 21-22 pf, glimepiride, insulin degludec, lactobacillus rhamnosus gg, lancets, lancets, loratadine, magnesium, olmesartan, omeprazole, pen needle, diabetic, pfizer covid bival(12y up)(pf), pioglitazone, prevnar 20 (pf), ozempic, shingrix (pf), and simvastatin.    Allergies:   Review of patient's allergies indicates:   Allergen Reactions    Citrus and derivatives Swelling, Other (See Comments) and Edema     Redness  Lip swelling   Itching     Other reaction(s): Edema, Other (See Comments)  Redness  Lip swelling   Itching     Latex Other (See Comments)     Other reaction(s): Rash  Other reaction(s): welts  Other reaction(s): Itching  Other reaction(s): Other (See Comments)  Other reaction(s): Rash  Other reaction(s): whelps  Other reaction(s): Itching  Other reaction(s): Rash  Other reaction(s): welts  Other reaction(s): Itching    Valsartan Other (See Comments)     Other reaction(s): disoriented  Other reaction(s): Other (See Comments)  Other reaction(s): disoriented  Other reaction(s): disoriented    Aspirin Nausea Only     Other reaction(s): Nausea Only    Metformin Anxiety     Other reaction(s): anxiety  Other reaction(s): anxiety  Other reaction(s): anxiety        Objective        RANGE OF MOTION:   Cervical Right   (spine) Left    Pain/Dysfunction with Movement Goal   Cervical Flexion (60º) 54 --- Increases pain 60   Cervical Extension (80º) 70 --- Pain where placement of inclinometer is on head 80   Cervical Side Bending (45º) 38 44 Pain with SB R 45   Cervical Rotation (75º) 76 76 Pain in R side of neck with R L 75       STRENGTH:   U/E MMT Right Left Pain/Dysfunction with Movement Goal   Shoulder Flexion 4/5 4/5 No increase in pain with MMT's 4+/5 B   Shoulder Abduction 4/5 4/5  4+/5 B   Shoulder IR 4+/5 4+/5  4+/5 B   Shoulder ER 4/5 4/5  4+/5 B   Serratus Anterior NT NT  4+/5 B   Middle Trapezius NT NT  4+/5 B   Lower Trapezius NT NT  4+/5 B   Elbow Flexion  4+/5 4+/5  5/5 B   Elbow Extension 4+/5 4+/5   5/5 B   Wrist Flexion 5/5 5/5  5/5 B   Wrist Extension 5/5 5/5  5/5 B        MUSCLE LENGTH:   Muscle Tested  Right Left  Goal   Upper Trapezius [] Normal  [x] Limited [] Normal  [x] Limited Normal B   Levator Scapular  [] Normal  [x] Limited [] Normal  [x] Limited Normal B   Scalenes [] Normal  [x] Limited [] Normal  [x] Limited Normal B   Pectoralis Minor [] Normal  [x] Limited [] Normal  [x] Limited Normal B   Pectoralis Major [] Normal  [x] Limited [] Normal  [x] Limited Normal B       JOINT MOBILITY:   Joint Motion Right Mobility  (spine) Left Mobility Goal   Subcranial:  [x] Hypo     [] Normal     [] Hyper [x] Hypo     [] Normal     [] Hyper Normal    Cervical Upglides [] Hypo     [x] Normal     [] Hyper [] Hypo     [x] Normal     [] Hyper Normal    Cervical Downglides  [] Hypo     [x] Normal     [] Hyper [] Hypo     [x] Normal     [] Hyper Normal    1st Rib  [x] Hypo     [] Normal     [] Hyper [] Hypo     [x] Normal     [] Hyper Normal    Thoracic PA Gap [x] Hypo     [] Normal     [] Hyper --- Normal        SPECIAL TESTS:    Right  (spine) Left  Goal   Cervical Distraction [x] Positive    [] Negative [x] Positive    [] Negative Negative B    Cervical Compression  [x] Positive    [] Negative [x] Positive    [] Negative Negative B        SENSATION  [x] Intact to Light Touch   [] Impaired:      PALPATION: Muscles: Increased tone and tenderness to palpation of: bilateral suboccipitals, paraspinals, upper trapezius, levator scapulae , periscapular musculature, temporalis. Structures: Increased tenderness to palpation of: right TMJ.      POSTURE:  Pt presents with postural abnormalities which include:    [x] Forward Head   [] Increased Lumbar Lordosis   [x] Rounded Shoulder   [] Genu Recurvatum   [] Increased Thoracic Kyphosis [] Genu Valgus   [] Trunk Deviated    [] Pes Planus   [] Scapular Winging    [] Other:       Function:     Intake Outcome Measure for FOTO Neck Survey    Therapist reviewed FOTO scores for  Dary on 12/18/2023.   FOTO report - see Media section or FOTO account for episode details    Intake Score: 64%         Treatment     Total Treatment time (time-based codes) separate from Evaluation: (10) minutes     Dary received the treatments listed below:        THERAPEUTIC EXERCISES to develop strength, endurance, ROM, flexibility, posture, and core stabilization for (10) minutes including:    Intervention Performed Today    HEP established and discussed x See Patient Instructions for details                                        Plan for Next Visit:        Patient Education and Home Exercises     Education provided: (included in treatment section) minutes  PURPOSE: Patient educated on the impairments noted above and the effects of physical therapy intervention to improve overall condition and QOL.   EXERCISE: Patient was educated on all the above exercise prior/during/after for proper posture, positioning, and execution for safe performance with home exercise program.   STRENGTH: Patient educated on the importance of improved core and extremity strength in order to improve alignment of the spine and extremities with static positions and dynamic movement.   POSTURE: Patient educated on postural awareness to reduce stress and maintain optimal alignment of the spine with static positions and dynamic movement     Written Home Exercises Provided: yes.  Exercises were reviewed and Dary was able to demonstrate them prior to the end of the session.  Dary demonstrated good  understanding of the education provided. See EMR under Patient Instructions for exercises provided during therapy sessions.    Assessment     Dary is a 68 y.o. female referred to outpatient Physical Therapy with a medical diagnosis of decreased ROM of neck. Pt presents with impairments in the following categories: IMPAIRMENTS: ROM, strength, joint mobility, muscle length, posture, core strength and stability, and functional movement  "patterns    Pt prognosis is Good  Pt will benefit from skilled outpatient Physical Therapy to address the deficits stated above and in the chart below, provide pt/family education, and to maximize pt's level of independence.     Plan of care discussed with patient: Yes  Pt's spiritual, cultural and educational needs considered and patient is agreeable to the plan of care and goals as stated below:     Anticipated Barriers for therapy: co-morbidities and chronicity of condition    Medical Necessity is demonstrated by the following  History  Co-morbidities and personal factors that may impact the plan of care [] LOW: no personal factors / co-morbidities  [x] MODERATE: 1-2 personal factors / co-morbidities  [] HIGH: 3+ personal factors / co-morbidities    Moderate / High Support Documentation:   Past Medical History:   Diagnosis Date    Arthritis     Cataract     Colon polyp     Diabetes mellitus type II 15 years     am 05/30/2022    Hyperlipidemia     Hypertension     Renal cell carcinoma 09/2018    Total knee replacement status, left 01/20/2019    Dr.Robert Cook    UTI (urinary tract infection)         Examination  Body Structures and Functions, activity limitations and participation restrictions that may impact the plan of care [] LOW: addressing 1-2 elements  [x] MODERATE: 3+ elements  [] HIGH: 4+ elements (please support below)    Moderate / High Support Documentation: See above in "Current Level of Function"      Clinical Presentation [] LOW: stable  [x] MODERATE: Evolving  [] HIGH: Unstable     Decision Making/ Complexity Score: moderate         Short Term Goals:  6 weeks Status  Date Met   PAIN: Pt will report worst pain of 7/10 in order to progress toward max functional ability and improve quality of life. [x] Progressing  [] Met  [] Not Met    STRENGTH: Patient will improve strength to 50% of stated goals, listed in objective measures above, in order to progress towards independence with functional " activities. [x] Progressing  [] Met  [] Not Met    POSTURE: Patient will correct postural deviations in sitting and standing, to decrease pain and promote long term stability.  [x] Progressing  [] Met  [] Not Met    HEP: Patient will demonstrate independence with HEP in order to progress toward functional independence. [x] Progressing  [] Met  [] Not Met      Long Term Goals:  12 weeks Status Date Met   PAIN: Pt will report worst pain of 5/10 in order to progress toward max functional ability and improve quality of life [x] Progressing  [] Met  [] Not Met    FUNCTION: Patient will demonstrate improved function as indicated by a score of greater than or equal to 60 out of 100 on FOTO. (PT set goal based on clinical judgement from assessment today) [x] Progressing  [] Met  [] Not Met    MOBILITY: Patient will improve AROM to stated goals, listed in objective measures above, in order to return to maximal functional potential and improve quality of life.  [x] Progressing  [] Met  [] Not Met    STRENGTH: Patient will improve strength to stated goals, listed in objective measures above, in order to improve functional independence and quality of life.  [x] Progressing  [] Met  [] Not Met    GAIT: Patient will demonstrate normalized gait mechanics with minimal compensation in order to return to PLOF. [x] Progressing  [] Met  [] Not Met    Patient will return to normal ADL's, IADL's, community involvement, recreational activities, and work-related activities with less than or equal to 3/10 pain and maximal function.  [x] Progressing  [] Met  [] Not Met      Plan     Plan of care Certification: 12/18/2023 to 3/17/2024.    Outpatient Physical Therapy 2 times weekly for 12 weeks to include any combination of the following interventions: virtual visits, dry needling, modalities, electrical stimulation (IFC, Pre-Mod, Attended with Functional Dry Needling), Aquatic Therapy, Cervical/Lumbar Traction, Gait Training, Manual Therapy,  Neuromuscular Re-ed, Patient Education, Self Care, Therapeutic Exercise, and Therapeutic Activites     Lisandra Freitas, PT, DPT

## 2023-12-20 PROBLEM — Z74.09 DECREASED STRENGTH, ENDURANCE, AND MOBILITY: Status: ACTIVE | Noted: 2023-12-20

## 2023-12-20 PROBLEM — R53.1 DECREASED STRENGTH, ENDURANCE, AND MOBILITY: Status: ACTIVE | Noted: 2023-12-20

## 2023-12-20 PROBLEM — R68.89 DECREASED STRENGTH, ENDURANCE, AND MOBILITY: Status: ACTIVE | Noted: 2023-12-20

## 2023-12-20 PROBLEM — R29.898 DECREASED ROM OF NECK: Status: ACTIVE | Noted: 2023-12-20

## 2023-12-27 ENCOUNTER — TELEPHONE (OUTPATIENT)
Dept: UROLOGY | Facility: CLINIC | Age: 68
End: 2023-12-27
Payer: MEDICARE

## 2024-01-03 ENCOUNTER — HOSPITAL ENCOUNTER (OUTPATIENT)
Dept: RADIOLOGY | Facility: HOSPITAL | Age: 69
Discharge: HOME | End: 2024-01-03
Attending: UROLOGY
Payer: MEDICARE

## 2024-01-03 DIAGNOSIS — C64.9 RENAL CELL CARCINOMA, UNSPECIFIED LATERALITY: ICD-10-CM

## 2024-01-03 PROCEDURE — 74170 CT ABD WO CNTRST FLWD CNTRST: CPT | Mod: 26,HCNC,, | Performed by: RADIOLOGY

## 2024-01-03 PROCEDURE — 74170 CT ABD WO CNTRST FLWD CNTRST: CPT | Mod: TC,HCNC

## 2024-01-03 PROCEDURE — 71045 X-RAY EXAM CHEST 1 VIEW: CPT | Mod: TC,HCNC

## 2024-01-03 PROCEDURE — 71045 X-RAY EXAM CHEST 1 VIEW: CPT | Mod: 26,HCNC,, | Performed by: RADIOLOGY

## 2024-01-03 PROCEDURE — 25500020 PHARM REV CODE 255: Mod: HCNC | Performed by: UROLOGY

## 2024-01-03 RX ADMIN — IOHEXOL 100 ML: 350 INJECTION, SOLUTION INTRAVENOUS at 10:01

## 2024-01-04 ENCOUNTER — CLINICAL SUPPORT (OUTPATIENT)
Dept: REHABILITATION | Facility: HOSPITAL | Age: 69
End: 2024-01-04
Payer: MEDICARE

## 2024-01-04 DIAGNOSIS — R53.1 DECREASED STRENGTH, ENDURANCE, AND MOBILITY: ICD-10-CM

## 2024-01-04 DIAGNOSIS — G89.29 CHRONIC INTRACTABLE HEADACHE, UNSPECIFIED HEADACHE TYPE: Primary | ICD-10-CM

## 2024-01-04 DIAGNOSIS — R29.898 DECREASED ROM OF NECK: ICD-10-CM

## 2024-01-04 DIAGNOSIS — Z74.09 DECREASED STRENGTH, ENDURANCE, AND MOBILITY: ICD-10-CM

## 2024-01-04 DIAGNOSIS — R68.89 DECREASED STRENGTH, ENDURANCE, AND MOBILITY: ICD-10-CM

## 2024-01-04 DIAGNOSIS — R51.9 CHRONIC INTRACTABLE HEADACHE, UNSPECIFIED HEADACHE TYPE: Primary | ICD-10-CM

## 2024-01-04 PROCEDURE — 97112 NEUROMUSCULAR REEDUCATION: CPT | Mod: HCNC | Performed by: PHYSICAL THERAPIST

## 2024-01-04 PROCEDURE — 97140 MANUAL THERAPY 1/> REGIONS: CPT | Mod: HCNC | Performed by: PHYSICAL THERAPIST

## 2024-01-04 PROCEDURE — 97110 THERAPEUTIC EXERCISES: CPT | Mod: HCNC | Performed by: PHYSICAL THERAPIST

## 2024-01-04 NOTE — PROGRESS NOTES
[facility]   Physical Therapy Treatment Note        Name: Dary Chaney  Clinic Number: [id number]    Therapy Diagnosis:   Encounter Diagnoses   Name Primary?    Chronic intractable headache, unspecified headache type Yes    Decreased ROM of neck     Decreased strength, endurance, and mobility      Physician: Madeline Giron PA-C    Visit Date: 1/4/2024    Physician Orders: PT Eval and Treat  Medical Diagnosis from Referral: decreased ROM of neck  Evaluation Date: 12/18/2023  Authorization Period Expiration: 11/28/2024  Plan of Care Expiration: 3/17/2024  Progress Note Due: 1/17/2024  Visit # / Visits authorized: 12/31/2024   FOTO: 1/3 (last performed on 12/18/2023)     Precautions: Standard and history or renal cell carcinoma (2018)    PTA Visit #: 0/5     Time In: 1305  Time Out: 1403  Total Billable Time: 53 minutes (Billing reflects 1 on 1 treatment time spent with patient)    Subjective     Patient reports: feeling about the same overall since initial eval.     He/She was partially compliant with home exercise program.  Response to previous treatment: no adverse reaction  Functional change: none noted yet    Pain: 4- 5/10     Location: middle of neck with referred pain into head    Objective      Objective Measures updated at progress report or POC update only unless otherwise noted.       Treatment     Dary received the treatments listed below:       MANUAL THERAPY TECHNIQUES were applied for (12) minutes, including:    Manual Intervention Performed Today    Soft Tissue Mobilization [x] bilateral suboccipitals, paraspinals, upper trapezius, levator scapulae , periscapular musculature    Joint Mobilizations []     []     []    Functional Dry Needling  []      Plan for Next Visit: Continue as needed           THERAPEUTIC EXERCISES to develop strength, endurance, ROM, flexibility, posture, and core stabilization for (16) minutes including:    Intervention Performed Today    Upper  "trap stretch x 3 x 20-30" each side   Levator scap stretch x 3 x 20-30" each side   Open Book x 10x, 5" holds each side                              Plan for Next Visit:         NEUROMUSCULAR RE-EDUCATION ACTIVITIES to improve Balance, Coordination, Kinesthetic, Sense, Proprioception, and Posture for (25) minutes.  The following were included:    Intervention Performed Today    UBE for postural strength and endurance x 2'/2'   Scapular Retractions x 2 x 10, red theraband    Shoulder Extensions x 2 x 10, red theraband    Seated Bilateral External Rotation x 2 x 10   Series 6 - on airex foam x 1' each, 2' pec stretch                    Plan for Next Visit:      Patient Education and Home Exercises       Home Exercises Provided and Patient Education Provided     Education provided: (included in treatment section) minutes  PURPOSE: Patient educated on the impairments noted above and the effects of physical therapy intervention to improve overall condition and QOL.   EXERCISE: Patient was educated on all the above exercise prior/during/after for proper posture, positioning, and execution for safe performance with home exercise program.   STRENGTH: Patient educated on the importance of improved core and extremity strength in order to improve alignment of the spine and extremities with static positions and dynamic movement.   POSTURE: Patient educated on postural awareness to reduce stress and maintain optimal alignment of the spine with static positions and dynamic movement     Written Home Exercises Provided: yes.  Exercises were reviewed and Dary was able to demonstrate them prior to the end of the session.  Dary demonstrated good  understanding of the education provided. See EMR under Patient Instructions for exercises provided during therapy sessions.    Assessment     Patient tolerated treatment well. She completed exercises with only minimal difficulty and complaint. She did well with exercises, requiring only " minimal cues initially in order to maintain proper form. Increased muscle guarding and tone noted with bilateral upper trapezius, levator scapular, paraspinals, and suboccipitals, which did improve some following manual therapy. Patient reported some relief post treatment today as well minimal soreness.      Dary is progressing well towards her goals.   Patient prognosis is Good.     Patient will continue to benefit from skilled outpatient physical therapy to address the deficits listed in the problem list box on initial evaluation, provide pt/family education and to maximize patient's level of independence in the home and community environment.     Patient's spiritual, cultural and educational needs considered and pt agreeable to plan of care and goals.     Anticipated Barriers for therapy: co-morbidities and chronicity of condition     Goals:  Short Term Goals:  6 weeks Status  Date Met   PAIN: Pt will report worst pain of 7/10 in order to progress toward max functional ability and improve quality of life. [x] Progressing  [] Met  [] Not Met     STRENGTH: Patient will improve strength to 50% of stated goals, listed in objective measures above, in order to progress towards independence with functional activities. [x] Progressing  [] Met  [] Not Met     POSTURE: Patient will correct postural deviations in sitting and standing, to decrease pain and promote long term stability.  [x] Progressing  [] Met  [] Not Met     HEP: Patient will demonstrate independence with HEP in order to progress toward functional independence. [x] Progressing  [] Met  [] Not Met        Long Term Goals:  12 weeks Status Date Met   PAIN: Pt will report worst pain of 5/10 in order to progress toward max functional ability and improve quality of life [x] Progressing  [] Met  [] Not Met     FUNCTION: Patient will demonstrate improved function as indicated by a score of greater than or equal to 60 out of 100 on FOTO. (PT set goal based on  clinical judgement from assessment today) [x] Progressing  [] Met  [] Not Met     MOBILITY: Patient will improve AROM to stated goals, listed in objective measures above, in order to return to maximal functional potential and improve quality of life.  [x] Progressing  [] Met  [] Not Met     STRENGTH: Patient will improve strength to stated goals, listed in objective measures above, in order to improve functional independence and quality of life.  [x] Progressing  [] Met  [] Not Met     GAIT: Patient will demonstrate normalized gait mechanics with minimal compensation in order to return to PLOF. [x] Progressing  [] Met  [] Not Met     Patient will return to normal ADL's, IADL's, community involvement, recreational activities, and work-related activities with less than or equal to 3/10 pain and maximal function.  [x] Progressing  [] Met  [] Not Met        Plan     Continue Plan of Care (POC) and progress per patient tolerance. See treatment section for details on planned progressions next session.    12/18/2023 (evaluation): Outpatient Physical Therapy 2 times weekly for 12 weeks to include any combination of the following interventions: virtual visits, dry needling, modalities, electrical stimulation (IFC, Pre-Mod, Attended with Functional Dry Needling), Aquatic Therapy, Cervical/Lumbar Traction, Gait Training, Manual Therapy, Neuromuscular Re-ed, Patient Education, Self Care, Therapeutic Exercise, and Therapeutic Activites      Lisandra Phillips, PT

## 2024-01-05 ENCOUNTER — TELEPHONE (OUTPATIENT)
Dept: UROLOGY | Facility: CLINIC | Age: 69
End: 2024-01-05
Payer: MEDICARE

## 2024-01-05 ENCOUNTER — CLINICAL SUPPORT (OUTPATIENT)
Dept: REHABILITATION | Facility: HOSPITAL | Age: 69
End: 2024-01-05
Payer: MEDICARE

## 2024-01-05 DIAGNOSIS — R29.898 DECREASED ROM OF NECK: ICD-10-CM

## 2024-01-05 DIAGNOSIS — Z74.09 DECREASED STRENGTH, ENDURANCE, AND MOBILITY: ICD-10-CM

## 2024-01-05 DIAGNOSIS — R53.1 DECREASED STRENGTH, ENDURANCE, AND MOBILITY: ICD-10-CM

## 2024-01-05 DIAGNOSIS — R51.9 CHRONIC INTRACTABLE HEADACHE, UNSPECIFIED HEADACHE TYPE: Primary | ICD-10-CM

## 2024-01-05 DIAGNOSIS — R68.89 DECREASED STRENGTH, ENDURANCE, AND MOBILITY: ICD-10-CM

## 2024-01-05 DIAGNOSIS — G89.29 CHRONIC INTRACTABLE HEADACHE, UNSPECIFIED HEADACHE TYPE: Primary | ICD-10-CM

## 2024-01-05 PROCEDURE — 97110 THERAPEUTIC EXERCISES: CPT | Mod: HCNC | Performed by: PHYSICAL THERAPIST

## 2024-01-05 PROCEDURE — 97112 NEUROMUSCULAR REEDUCATION: CPT | Mod: HCNC | Performed by: PHYSICAL THERAPIST

## 2024-01-05 PROCEDURE — 97140 MANUAL THERAPY 1/> REGIONS: CPT | Mod: HCNC | Performed by: PHYSICAL THERAPIST

## 2024-01-05 NOTE — PROGRESS NOTES
[facility]   Physical Therapy Treatment Note        Name: Dary Chaney  Clinic Number: [id number]    Therapy Diagnosis:   Encounter Diagnoses   Name Primary?    Chronic intractable headache, unspecified headache type Yes    Decreased ROM of neck     Decreased strength, endurance, and mobility      Physician: Madeline Giron PA-C    Visit Date: 1/5/2024    Physician Orders: PT Eval and Treat  Medical Diagnosis from Referral: decreased ROM of neck  Evaluation Date: 12/18/2023  Authorization Period Expiration: 12/31/2024   Plan of Care Expiration: 3/17/2024  Progress Note Due: 1/17/2024  Visit # / Visits authorized: 2/20 (+eval)  FOTO: 1/3 (last performed on 12/18/2023)     Precautions: Standard and history or renal cell carcinoma (2018)    PTA Visit #: 0/5     Time In: 1104  Time Out: 1200  Total Billable Time: 54 minutes (Billing reflects 1 on 1 treatment time spent with patient)    Subjective     Patient reports: feeling okay today. She felt a little better following last visit.     He/She was partially compliant with home exercise program.  Response to previous treatment: no adverse reaction  Functional change: none noted yet    Pain:  2/10     Location: middle of neck with referred pain into head    Objective      Objective Measures updated at progress report or POC update only unless otherwise noted.       Treatment     Dary received the treatments listed below:       MANUAL THERAPY TECHNIQUES were applied for (12) minutes, including:    Manual Intervention Performed Today    Soft Tissue Mobilization [x] bilateral suboccipitals, paraspinals, upper trapezius, levator scapulae , periscapular musculature    Joint Mobilizations []     []     []    Functional Dry Needling  []      Plan for Next Visit: Continue as needed           THERAPEUTIC EXERCISES to develop strength, endurance, ROM, flexibility, posture, and core stabilization for (16) minutes including:    Intervention Performed  "Today    Upper trap stretch x 3 x 20-30" each side   Levator scap stretch x 3 x 20-30" each side   Open Book x 10x, 5" holds each side                              Plan for Next Visit:         NEUROMUSCULAR RE-EDUCATION ACTIVITIES to improve Balance, Coordination, Kinesthetic, Sense, Proprioception, and Posture for (26) minutes.  The following were included:    Intervention Performed Today    UBE for postural strength and endurance x 2'/2'   Scapular Retractions x 2 x 10, red theraband    Shoulder Extensions x 2 x 10, red theraband    Seated Bilateral External Rotation x 2 x 10   Series 6 - on airex foam x 1' each, 2' pec stretch   Chin Tucks - supine x Attempted a few repetitions with tactile cues from PT but could not maintain proper form so activity was stoped               Plan for Next Visit:      Patient Education and Home Exercises       Home Exercises Provided and Patient Education Provided     Education provided: (included in treatment section) minutes  PURPOSE: Patient educated on the impairments noted above and the effects of physical therapy intervention to improve overall condition and QOL.   EXERCISE: Patient was educated on all the above exercise prior/during/after for proper posture, positioning, and execution for safe performance with home exercise program.   STRENGTH: Patient educated on the importance of improved core and extremity strength in order to improve alignment of the spine and extremities with static positions and dynamic movement.   POSTURE: Patient educated on postural awareness to reduce stress and maintain optimal alignment of the spine with static positions and dynamic movement     Written Home Exercises Provided: yes.  Exercises were reviewed and Dary was able to demonstrate them prior to the end of the session.  Dary demonstrated good  understanding of the education provided. See EMR under Patient Instructions for exercises provided during therapy sessions.    Assessment "     Patient did well with treatment again today. She completed exercises with less difficulty and without increased complaint. Less cues required with exercises this visit and less muscle guarding and tone present along upper back and cervical musculature as compared to last visit. Attempted chin tucks with heavy tactile cues from PT, but patient was still compensating so activity was held today. Good relief noted with manual therapy and overall following treatment today.     Dary is progressing well towards her goals.   Patient prognosis is Good.     Patient will continue to benefit from skilled outpatient physical therapy to address the deficits listed in the problem list box on initial evaluation, provide pt/family education and to maximize patient's level of independence in the home and community environment.     Patient's spiritual, cultural and educational needs considered and pt agreeable to plan of care and goals.     Anticipated Barriers for therapy: co-morbidities and chronicity of condition     Goals:  Short Term Goals:  6 weeks Status  Date Met   PAIN: Pt will report worst pain of 7/10 in order to progress toward max functional ability and improve quality of life. [x] Progressing  [] Met  [] Not Met     STRENGTH: Patient will improve strength to 50% of stated goals, listed in objective measures above, in order to progress towards independence with functional activities. [x] Progressing  [] Met  [] Not Met     POSTURE: Patient will correct postural deviations in sitting and standing, to decrease pain and promote long term stability.  [x] Progressing  [] Met  [] Not Met     HEP: Patient will demonstrate independence with HEP in order to progress toward functional independence. [x] Progressing  [] Met  [] Not Met        Long Term Goals:  12 weeks Status Date Met   PAIN: Pt will report worst pain of 5/10 in order to progress toward max functional ability and improve quality of life [x] Progressing  []  Met  [] Not Met     FUNCTION: Patient will demonstrate improved function as indicated by a score of greater than or equal to 60 out of 100 on FOTO. (PT set goal based on clinical judgement from assessment today) [x] Progressing  [] Met  [] Not Met     MOBILITY: Patient will improve AROM to stated goals, listed in objective measures above, in order to return to maximal functional potential and improve quality of life.  [x] Progressing  [] Met  [] Not Met     STRENGTH: Patient will improve strength to stated goals, listed in objective measures above, in order to improve functional independence and quality of life.  [x] Progressing  [] Met  [] Not Met     GAIT: Patient will demonstrate normalized gait mechanics with minimal compensation in order to return to PLOF. [x] Progressing  [] Met  [] Not Met     Patient will return to normal ADL's, IADL's, community involvement, recreational activities, and work-related activities with less than or equal to 3/10 pain and maximal function.  [x] Progressing  [] Met  [] Not Met        Plan     Continue Plan of Care (POC) and progress per patient tolerance. See treatment section for details on planned progressions next session.    12/18/2023 (evaluation): Outpatient Physical Therapy 2 times weekly for 12 weeks to include any combination of the following interventions: virtual visits, dry needling, modalities, electrical stimulation (IFC, Pre-Mod, Attended with Functional Dry Needling), Aquatic Therapy, Cervical/Lumbar Traction, Gait Training, Manual Therapy, Neuromuscular Re-ed, Patient Education, Self Care, Therapeutic Exercise, and Therapeutic Activites      Lisandra Phillips, PT

## 2024-01-05 NOTE — TELEPHONE ENCOUNTER
Called the patient twice there was no answer. LEFT DETAILED VM letting the pt know that Dr Cisse will be out on 1/12 and we were calling to r/s her appt. Pt informed in the VM that her appt will be cancelled for now and she can give a call back to r/s her appt or she can go on her portal and reschedule her appointment for a time that is convenient for her.

## 2024-01-22 ENCOUNTER — TELEPHONE (OUTPATIENT)
Dept: UROLOGY | Facility: CLINIC | Age: 69
End: 2024-01-22
Payer: MEDICARE

## 2024-01-22 ENCOUNTER — CLINICAL SUPPORT (OUTPATIENT)
Dept: REHABILITATION | Facility: HOSPITAL | Age: 69
End: 2024-01-22
Payer: MEDICARE

## 2024-01-22 DIAGNOSIS — R29.898 DECREASED ROM OF NECK: ICD-10-CM

## 2024-01-22 DIAGNOSIS — R53.1 DECREASED STRENGTH, ENDURANCE, AND MOBILITY: ICD-10-CM

## 2024-01-22 DIAGNOSIS — G89.29 CHRONIC INTRACTABLE HEADACHE, UNSPECIFIED HEADACHE TYPE: Primary | ICD-10-CM

## 2024-01-22 DIAGNOSIS — R51.9 CHRONIC INTRACTABLE HEADACHE, UNSPECIFIED HEADACHE TYPE: Primary | ICD-10-CM

## 2024-01-22 DIAGNOSIS — R68.89 DECREASED STRENGTH, ENDURANCE, AND MOBILITY: ICD-10-CM

## 2024-01-22 DIAGNOSIS — Z74.09 DECREASED STRENGTH, ENDURANCE, AND MOBILITY: ICD-10-CM

## 2024-01-22 PROCEDURE — 97140 MANUAL THERAPY 1/> REGIONS: CPT | Mod: HCNC | Performed by: PHYSICAL THERAPIST

## 2024-01-22 PROCEDURE — 97112 NEUROMUSCULAR REEDUCATION: CPT | Mod: HCNC | Performed by: PHYSICAL THERAPIST

## 2024-01-22 PROCEDURE — 97110 THERAPEUTIC EXERCISES: CPT | Mod: HCNC | Performed by: PHYSICAL THERAPIST

## 2024-01-22 NOTE — PLAN OF CARE
[facility]   Physical Therapy Treatment Note + Progress Note       Name: Dary Chaney  Clinic Number: [id number]    Therapy Diagnosis:   Encounter Diagnoses   Name Primary?    Chronic intractable headache, unspecified headache type Yes    Decreased ROM of neck     Decreased strength, endurance, and mobility      Physician: Madeline Giron PA-C    Visit Date: 1/22/2024    Physician Orders: PT Eval and Treat  Medical Diagnosis from Referral: decreased ROM of neck  Evaluation Date: 12/18/2023  Authorization Period Expiration: 12/31/2024   Plan of Care Expiration: 3/17/2024  Progress Note Due: 2/21/2024  Visit # / Visits authorized: 3/20 (+eval)  FOTO: 1/3 (last performed on 12/18/2023)     Precautions: Standard and history or renal cell carcinoma (2018)    PTA Visit #: 0/5     Time In: 0908  Time Out: 1010  Total Billable Time: 54 minutes (Billing reflects 1 on 1 treatment time spent with patient)    Subjective     Patient reports: that she has been feeling pretty good overall. Her pain still comes and goes, but today is a good day.     He/She was partially compliant with home exercise program.  Response to previous treatment: no adverse reaction  Functional change: pain still comes and goes, feels her mobility is about the same.     Pain:  0/10     Location: middle of neck with referred pain into head    Objective      Objective Measures updated at progress report or POC update only unless otherwise noted.     RANGE OF MOTION:   Cervical Right   (spine) Left     Pain/Dysfunction with Movement Goal   Cervical Flexion (60º) 56  (54) --- No pain 60   Cervical Extension (80º) 70  (70) --- No pain 80   Cervical Side Bending (45º) 40  (38) 45  (44) No pain 45   Cervical Rotation (75º) 76  (76) 76  (76) No pain 75         STRENGTH:   U/E MMT Right Left Pain/Dysfunction with Movement Goal Right   1/22/2024  Left  1/22/2024    Shoulder Flexion 4/5 4/5 No increase in pain with MMT's 4+/5 B 4+ 4+    Shoulder Abduction 4/5 4/5   4+/5 B 4 4   Shoulder IR 4+/5 4+/5   4+/5 B 4+ 4+   Shoulder ER 4/5 4/5   4+/5 B 4+ 4+   Serratus Anterior NT NT   4+/5 B NT NT   Middle Trapezius NT NT   4+/5 B NT NT   Lower Trapezius NT NT   4+/5 B NT NT   Elbow Flexion  4+/5 4+/5   5/5 B 5 5   Elbow Extension 4+/5 4+/5   5/5 B 5 5   Wrist Flexion 5/5 5/5   5/5 B 5 5   Wrist Extension 5/5 5/5   5/5 B 5 5         MUSCLE LENGTH:   Muscle Tested  Right Left  Goal   Upper Trapezius [] Normal  [x] Limited [] Normal  [x] Limited Normal B   Levator Scapular  [] Normal  [x] Limited [] Normal  [x] Limited Normal B   Scalenes [] Normal  [x] Limited [] Normal  [x] Limited Normal B   Pectoralis Minor [] Normal  [x] Limited [] Normal  [x] Limited Normal B   Pectoralis Major [] Normal  [x] Limited [] Normal  [x] Limited Normal B    **Although still decreased, muscle length has improved as compared to previous visits.      JOINT MOBILITY:   Joint Motion Right Mobility  (spine) Left Mobility Goal   Subcranial:  [x] Hypo     [] Normal     [] Hyper [x] Hypo     [] Normal     [] Hyper Normal    Cervical Upglides [] Hypo     [x] Normal     [] Hyper [] Hypo     [x] Normal     [] Hyper Normal    Cervical Downglides  [] Hypo     [x] Normal     [] Hyper [] Hypo     [x] Normal     [] Hyper Normal    1st Rib  [x] Hypo     [] Normal     [] Hyper [] Hypo     [x] Normal     [] Hyper Normal    Thoracic PA Gap [x] Hypo     [] Normal     [] Hyper --- Normal          SPECIAL TESTS:     Right  (spine) Left  Goal   Cervical Distraction [x] Positive    [] Negative [x] Positive    [] Negative Negative B    Cervical Compression  [x] Positive    [] Negative [x] Positive    [] Negative Negative B          SENSATION  [x] Intact to Light Touch                       [] Impaired:        PALPATION: Muscles: Increased tone and tenderness to palpation of: bilateral suboccipitals, paraspinals, upper trapezius, levator scapulae , periscapular musculature, temporalis, but decreased  "as compared to previous visits. Structures: Increased tenderness to palpation of: right TMJ.        POSTURE:  Pt presents with postural abnormalities which include:               [x] Forward Head                                [] Increased Lumbar Lordosis              [x] Rounded Shoulder                         [] Genu Recurvatum              [] Increased Thoracic Kyphosis        [] Genu Valgus              [] Trunk Deviated                              [] Pes Planus              [] Scapular Winging                          [] Other:         Function:       Treatment     Dary received the treatments listed below:       MANUAL THERAPY TECHNIQUES were applied for (12) minutes, including:    Manual Intervention Performed Today    Soft Tissue Mobilization [x] bilateral suboccipitals, paraspinals, upper trapezius, levator scapulae , periscapular musculature    Joint Mobilizations []     []     []    Functional Dry Needling  []      Plan for Next Visit: Continue as needed           THERAPEUTIC EXERCISES to develop strength, endurance, ROM, flexibility, posture, and core stabilization for (23) minutes including:    Intervention Performed Today    Upper trap stretch x 3 x 20-30" each side   Levator scap stretch x 3 x 20-30" each side   Open Book x 10x, 5" holds each side                       Re-assessment x Objective tests, FOTO, pt education     Plan for Next Visit:         NEUROMUSCULAR RE-EDUCATION ACTIVITIES to improve Balance, Coordination, Kinesthetic, Sense, Proprioception, and Posture for (19) minutes.  The following were included:    Intervention Performed Today    UBE for postural strength and endurance x 2'/2'   Scapular Retractions x 3 x 10, red theraband    Shoulder Extensions x 3 x 10, red theraband    Seated Bilateral External Rotation (progressed) x 2 x 10, yellow theraband    Series 6 - on airex foam time 1' each, 2' pec stretch   Chin Tucks - supine  Attempted a few repetitions with tactile cues from " PT but could not maintain proper form so activity was stoped               Plan for Next Visit:      Patient Education and Home Exercises       Home Exercises Provided and Patient Education Provided     Education provided: (included in treatment section) minutes  PURPOSE: Patient educated on the impairments noted above and the effects of physical therapy intervention to improve overall condition and QOL.   EXERCISE: Patient was educated on all the above exercise prior/during/after for proper posture, positioning, and execution for safe performance with home exercise program.   STRENGTH: Patient educated on the importance of improved core and extremity strength in order to improve alignment of the spine and extremities with static positions and dynamic movement.   POSTURE: Patient educated on postural awareness to reduce stress and maintain optimal alignment of the spine with static positions and dynamic movement     Written Home Exercises Provided: yes.  Exercises were reviewed and Dary was able to demonstrate them prior to the end of the session.  Dary demonstrated good  understanding of the education provided. See EMR under Patient Instructions for exercises provided during therapy sessions.    Assessment     Patient tolerated treatment well and has attended 4 total PT visits. She has made good overall progress with PT, demonstrating improvements in cervical spine ROM, upper extremity strength, and overall postural awareness. She reports less pain today with measurements. Patient presents today with improved tone and decreased muscle guarding of upper trapezius, levator scapula, and cervical paraspinals as compared to previous visits. She would benefit from continued work on thoracic mobilization, which is also improving. Patient would benefit from continued PT in order to address remaining limitations and to reach max functional potential. She reported good relief post treatment today.     Dary is  progressing well towards her goals.   Patient prognosis is Good.     Patient will continue to benefit from skilled outpatient physical therapy to address the deficits listed in the problem list box on initial evaluation, provide pt/family education and to maximize patient's level of independence in the home and community environment.     Patient's spiritual, cultural and educational needs considered and pt agreeable to plan of care and goals.     Anticipated Barriers for therapy: co-morbidities and chronicity of condition     Goals:  Short Term Goals:  6 weeks Status  Date Met   PAIN: Pt will report worst pain of 7/10 in order to progress toward max functional ability and improve quality of life. [] Progressing  [x] Met  [] Not Met  1/22/2024   STRENGTH: Patient will improve strength to 50% of stated goals, listed in objective measures above, in order to progress towards independence with functional activities. [] Progressing  [x] Met  [] Not Met  1/22/2024    POSTURE: Patient will correct postural deviations in sitting and standing, to decrease pain and promote long term stability.  [] Progressing  [x] Met  [] Not Met   1/22/2024   HEP: Patient will demonstrate independence with HEP in order to progress toward functional independence. [] Progressing  [x] Met  [] Not Met   1/22/2024      Long Term Goals:  12 weeks Status Date Met   PAIN: Pt will report worst pain of 5/10 in order to progress toward max functional ability and improve quality of life [x] Progressing  [] Met  [] Not Met     FUNCTION: Patient will demonstrate improved function as indicated by a score of greater than or equal to 60 out of 100 on FOTO. (PT set goal based on clinical judgement from assessment today) [x] Progressing  [] Met  [] Not Met     MOBILITY: Patient will improve AROM to stated goals, listed in objective measures above, in order to return to maximal functional potential and improve quality of life.  [x] Progressing  [] Met  [] Not  Met     STRENGTH: Patient will improve strength to stated goals, listed in objective measures above, in order to improve functional independence and quality of life.  [x] Progressing  [] Met  [] Not Met     GAIT: Patient will demonstrate normalized gait mechanics with minimal compensation in order to return to PLOF. [x] Progressing  [] Met  [] Not Met     Patient will return to normal ADL's, IADL's, community involvement, recreational activities, and work-related activities with less than or equal to 3/10 pain and maximal function.  [x] Progressing  [] Met  [] Not Met        Plan     Continue Plan of Care (POC) and progress per patient tolerance. See treatment section for details on planned progressions next session.    1/22/2024: It is my recommendation that patient continue with PT at her current frequency of 2 times per week for the remainder of her approved visits. Her treatment plan will remain the same, and she will be progressed appropriately.     12/18/2023 (evaluation): Outpatient Physical Therapy 2 times weekly for 12 weeks to include any combination of the following interventions: virtual visits, dry needling, modalities, electrical stimulation (IFC, Pre-Mod, Attended with Functional Dry Needling), Aquatic Therapy, Cervical/Lumbar Traction, Gait Training, Manual Therapy, Neuromuscular Re-ed, Patient Education, Self Care, Therapeutic Exercise, and Therapeutic Activites      Lisandra Phillips, PT

## 2024-01-22 NOTE — PROGRESS NOTES
[facility]   Physical Therapy Treatment Note + Progress Note       Name: Dary Chaney  Clinic Number: [id number]    Therapy Diagnosis:   Encounter Diagnoses   Name Primary?    Chronic intractable headache, unspecified headache type Yes    Decreased ROM of neck     Decreased strength, endurance, and mobility      Physician: Madeline Giron PA-C    Visit Date: 1/22/2024    Physician Orders: PT Eval and Treat  Medical Diagnosis from Referral: decreased ROM of neck  Evaluation Date: 12/18/2023  Authorization Period Expiration: 12/31/2024   Plan of Care Expiration: 3/17/2024  Progress Note Due: 2/21/2024  Visit # / Visits authorized: 3/20 (+eval)  FOTO: 1/3 (last performed on 12/18/2023)     Precautions: Standard and history or renal cell carcinoma (2018)    PTA Visit #: 0/5     Time In: 0908  Time Out: 1010  Total Billable Time: 54 minutes (Billing reflects 1 on 1 treatment time spent with patient)    Subjective     Patient reports: that she has been feeling pretty good overall. Her pain still comes and goes, but today is a good day.     He/She was partially compliant with home exercise program.  Response to previous treatment: no adverse reaction  Functional change: pain still comes and goes, feels her mobility is about the same.     Pain:  0/10     Location: middle of neck with referred pain into head    Objective      Objective Measures updated at progress report or POC update only unless otherwise noted.     RANGE OF MOTION:   Cervical Right   (spine) Left     Pain/Dysfunction with Movement Goal   Cervical Flexion (60º) 56  (54) --- No pain 60   Cervical Extension (80º) 70  (70) --- No pain 80   Cervical Side Bending (45º) 40  (38) 45  (44) No pain 45   Cervical Rotation (75º) 76  (76) 76  (76) No pain 75         STRENGTH:   U/E MMT Right Left Pain/Dysfunction with Movement Goal Right   1/22/2024  Left  1/22/2024    Shoulder Flexion 4/5 4/5 No increase in pain with MMT's 4+/5 B 4+ 4+    Shoulder Abduction 4/5 4/5   4+/5 B 4 4   Shoulder IR 4+/5 4+/5   4+/5 B 4+ 4+   Shoulder ER 4/5 4/5   4+/5 B 4+ 4+   Serratus Anterior NT NT   4+/5 B NT NT   Middle Trapezius NT NT   4+/5 B NT NT   Lower Trapezius NT NT   4+/5 B NT NT   Elbow Flexion  4+/5 4+/5   5/5 B 5 5   Elbow Extension 4+/5 4+/5   5/5 B 5 5   Wrist Flexion 5/5 5/5   5/5 B 5 5   Wrist Extension 5/5 5/5   5/5 B 5 5         MUSCLE LENGTH:   Muscle Tested  Right Left  Goal   Upper Trapezius [] Normal  [x] Limited [] Normal  [x] Limited Normal B   Levator Scapular  [] Normal  [x] Limited [] Normal  [x] Limited Normal B   Scalenes [] Normal  [x] Limited [] Normal  [x] Limited Normal B   Pectoralis Minor [] Normal  [x] Limited [] Normal  [x] Limited Normal B   Pectoralis Major [] Normal  [x] Limited [] Normal  [x] Limited Normal B    **Although still decreased, muscle length has improved as compared to previous visits.      JOINT MOBILITY:   Joint Motion Right Mobility  (spine) Left Mobility Goal   Subcranial:  [x] Hypo     [] Normal     [] Hyper [x] Hypo     [] Normal     [] Hyper Normal    Cervical Upglides [] Hypo     [x] Normal     [] Hyper [] Hypo     [x] Normal     [] Hyper Normal    Cervical Downglides  [] Hypo     [x] Normal     [] Hyper [] Hypo     [x] Normal     [] Hyper Normal    1st Rib  [x] Hypo     [] Normal     [] Hyper [] Hypo     [x] Normal     [] Hyper Normal    Thoracic PA Gap [x] Hypo     [] Normal     [] Hyper --- Normal          SPECIAL TESTS:     Right  (spine) Left  Goal   Cervical Distraction [x] Positive    [] Negative [x] Positive    [] Negative Negative B    Cervical Compression  [x] Positive    [] Negative [x] Positive    [] Negative Negative B          SENSATION  [x] Intact to Light Touch                       [] Impaired:        PALPATION: Muscles: Increased tone and tenderness to palpation of: bilateral suboccipitals, paraspinals, upper trapezius, levator scapulae , periscapular musculature, temporalis, but decreased  "as compared to previous visits. Structures: Increased tenderness to palpation of: right TMJ.        POSTURE:  Pt presents with postural abnormalities which include:               [x] Forward Head                                [] Increased Lumbar Lordosis              [x] Rounded Shoulder                         [] Genu Recurvatum              [] Increased Thoracic Kyphosis        [] Genu Valgus              [] Trunk Deviated                              [] Pes Planus              [] Scapular Winging                          [] Other:         Function:       Treatment     Dary received the treatments listed below:       MANUAL THERAPY TECHNIQUES were applied for (12) minutes, including:    Manual Intervention Performed Today    Soft Tissue Mobilization [x] bilateral suboccipitals, paraspinals, upper trapezius, levator scapulae , periscapular musculature    Joint Mobilizations []     []     []    Functional Dry Needling  []      Plan for Next Visit: Continue as needed           THERAPEUTIC EXERCISES to develop strength, endurance, ROM, flexibility, posture, and core stabilization for (23) minutes including:    Intervention Performed Today    Upper trap stretch x 3 x 20-30" each side   Levator scap stretch x 3 x 20-30" each side   Open Book x 10x, 5" holds each side                       Re-assessment x Objective tests, FOTO, pt education     Plan for Next Visit:         NEUROMUSCULAR RE-EDUCATION ACTIVITIES to improve Balance, Coordination, Kinesthetic, Sense, Proprioception, and Posture for (19) minutes.  The following were included:    Intervention Performed Today    UBE for postural strength and endurance x 2'/2'   Scapular Retractions x 3 x 10, red theraband    Shoulder Extensions x 3 x 10, red theraband    Seated Bilateral External Rotation (progressed) x 2 x 10, yellow theraband    Series 6 - on airex foam time 1' each, 2' pec stretch   Chin Tucks - supine  Attempted a few repetitions with tactile cues from " PT but could not maintain proper form so activity was stoped               Plan for Next Visit:      Patient Education and Home Exercises       Home Exercises Provided and Patient Education Provided     Education provided: (included in treatment section) minutes  PURPOSE: Patient educated on the impairments noted above and the effects of physical therapy intervention to improve overall condition and QOL.   EXERCISE: Patient was educated on all the above exercise prior/during/after for proper posture, positioning, and execution for safe performance with home exercise program.   STRENGTH: Patient educated on the importance of improved core and extremity strength in order to improve alignment of the spine and extremities with static positions and dynamic movement.   POSTURE: Patient educated on postural awareness to reduce stress and maintain optimal alignment of the spine with static positions and dynamic movement     Written Home Exercises Provided: yes.  Exercises were reviewed and Dary was able to demonstrate them prior to the end of the session.  Dary demonstrated good  understanding of the education provided. See EMR under Patient Instructions for exercises provided during therapy sessions.    Assessment     Patient tolerated treatment well and has attended 4 total PT visits. She has made good overall progress with PT, demonstrating improvements in cervical spine ROM, upper extremity strength, and overall postural awareness. She reports less pain today with measurements. Patient presents today with improved tone and decreased muscle guarding of upper trapezius, levator scapula, and cervical paraspinals as compared to previous visits. She would benefit from continued work on thoracic mobilization, which is also improving. Patient would benefit from continued PT in order to address remaining limitations and to reach max functional potential. She reported good relief post treatment today.     Dary is  progressing well towards her goals.   Patient prognosis is Good.     Patient will continue to benefit from skilled outpatient physical therapy to address the deficits listed in the problem list box on initial evaluation, provide pt/family education and to maximize patient's level of independence in the home and community environment.     Patient's spiritual, cultural and educational needs considered and pt agreeable to plan of care and goals.     Anticipated Barriers for therapy: co-morbidities and chronicity of condition     Goals:  Short Term Goals:  6 weeks Status  Date Met   PAIN: Pt will report worst pain of 7/10 in order to progress toward max functional ability and improve quality of life. [] Progressing  [x] Met  [] Not Met  1/22/2024   STRENGTH: Patient will improve strength to 50% of stated goals, listed in objective measures above, in order to progress towards independence with functional activities. [] Progressing  [x] Met  [] Not Met  1/22/2024    POSTURE: Patient will correct postural deviations in sitting and standing, to decrease pain and promote long term stability.  [] Progressing  [x] Met  [] Not Met   1/22/2024   HEP: Patient will demonstrate independence with HEP in order to progress toward functional independence. [] Progressing  [x] Met  [] Not Met   1/22/2024      Long Term Goals:  12 weeks Status Date Met   PAIN: Pt will report worst pain of 5/10 in order to progress toward max functional ability and improve quality of life [x] Progressing  [] Met  [] Not Met     FUNCTION: Patient will demonstrate improved function as indicated by a score of greater than or equal to 60 out of 100 on FOTO. (PT set goal based on clinical judgement from assessment today) [x] Progressing  [] Met  [] Not Met     MOBILITY: Patient will improve AROM to stated goals, listed in objective measures above, in order to return to maximal functional potential and improve quality of life.  [x] Progressing  [] Met  [] Not  Met     STRENGTH: Patient will improve strength to stated goals, listed in objective measures above, in order to improve functional independence and quality of life.  [x] Progressing  [] Met  [] Not Met     GAIT: Patient will demonstrate normalized gait mechanics with minimal compensation in order to return to PLOF. [x] Progressing  [] Met  [] Not Met     Patient will return to normal ADL's, IADL's, community involvement, recreational activities, and work-related activities with less than or equal to 3/10 pain and maximal function.  [x] Progressing  [] Met  [] Not Met        Plan     Continue Plan of Care (POC) and progress per patient tolerance. See treatment section for details on planned progressions next session.    1/22/2024: It is my recommendation that patient continue with PT at her current frequency of 2 times per week for the remainder of her approved visits. Her treatment plan will remain the same, and she will be progressed appropriately.     12/18/2023 (evaluation): Outpatient Physical Therapy 2 times weekly for 12 weeks to include any combination of the following interventions: virtual visits, dry needling, modalities, electrical stimulation (IFC, Pre-Mod, Attended with Functional Dry Needling), Aquatic Therapy, Cervical/Lumbar Traction, Gait Training, Manual Therapy, Neuromuscular Re-ed, Patient Education, Self Care, Therapeutic Exercise, and Therapeutic Activites      Lisandra Phillips, PT

## 2024-01-25 ENCOUNTER — CLINICAL SUPPORT (OUTPATIENT)
Dept: REHABILITATION | Facility: HOSPITAL | Age: 69
End: 2024-01-25
Payer: MEDICARE

## 2024-01-25 DIAGNOSIS — R68.89 DECREASED STRENGTH, ENDURANCE, AND MOBILITY: ICD-10-CM

## 2024-01-25 DIAGNOSIS — R53.1 DECREASED STRENGTH, ENDURANCE, AND MOBILITY: ICD-10-CM

## 2024-01-25 DIAGNOSIS — Z74.09 DECREASED STRENGTH, ENDURANCE, AND MOBILITY: ICD-10-CM

## 2024-01-25 DIAGNOSIS — G89.29 CHRONIC INTRACTABLE HEADACHE, UNSPECIFIED HEADACHE TYPE: Primary | ICD-10-CM

## 2024-01-25 DIAGNOSIS — R51.9 CHRONIC INTRACTABLE HEADACHE, UNSPECIFIED HEADACHE TYPE: Primary | ICD-10-CM

## 2024-01-25 DIAGNOSIS — R29.898 DECREASED ROM OF NECK: ICD-10-CM

## 2024-01-25 PROCEDURE — 97140 MANUAL THERAPY 1/> REGIONS: CPT | Mod: HCNC | Performed by: PHYSICAL THERAPIST

## 2024-01-25 PROCEDURE — 97110 THERAPEUTIC EXERCISES: CPT | Mod: HCNC | Performed by: PHYSICAL THERAPIST

## 2024-01-25 PROCEDURE — 97112 NEUROMUSCULAR REEDUCATION: CPT | Mod: HCNC | Performed by: PHYSICAL THERAPIST

## 2024-01-25 NOTE — PROGRESS NOTES
[facility]   Physical Therapy Treatment Note        Name: Dary Chaney  Clinic Number: [id number]    Therapy Diagnosis:   Encounter Diagnoses   Name Primary?    Chronic intractable headache, unspecified headache type Yes    Decreased ROM of neck     Decreased strength, endurance, and mobility      Physician: Madeline Giron PA-C    Visit Date: 1/25/2024    Physician Orders: PT Eval and Treat  Medical Diagnosis from Referral: decreased ROM of neck  Evaluation Date: 12/18/2023  Authorization Period Expiration: 12/31/2024   Plan of Care Expiration: 3/17/2024  Progress Note Due: 2/21/2024  Visit # / Visits authorized: 4/20 (+eval)  FOTO: 1/3 (last performed on 12/18/2023)     Precautions: Standard and history or renal cell carcinoma (2018)    PTA Visit #: 0/5     Time In: 1304  Time Out: 1404  Total Billable Time: 55 minutes (Billing reflects 1 on 1 treatment time spent with patient)    Subjective     Patient reports: feeling okay today. She reports no pain today and states that she does feel like she is noticing overall improvements.     He/She was partially compliant with home exercise program.  Response to previous treatment: no adverse reaction  Functional change: pain still comes and goes, feels her mobility is about the same.     Pain:  0/10     Location: middle of neck with referred pain into head    Objective      Objective Measures updated at progress report or POC update only unless otherwise noted.       Treatment     Dary received the treatments listed below:       MANUAL THERAPY TECHNIQUES were applied for (12) minutes, including:    Manual Intervention Performed Today    Soft Tissue Mobilization [x] bilateral suboccipitals, paraspinals, upper trapezius, levator scapulae , periscapular musculature    Joint Mobilizations []     []     []    Functional Dry Needling  []      Plan for Next Visit: Continue as needed           THERAPEUTIC EXERCISES to develop strength, endurance,  "ROM, flexibility, posture, and core stabilization for (16) minutes including:    Intervention Performed Today    Upper trap stretch x 3 x 20-30" each side   Levator scap stretch x 3 x 20-30" each side   Open Book x 10x, 5" holds each side                       Re-assessment  Objective tests, FOTO, pt education     Plan for Next Visit:         NEUROMUSCULAR RE-EDUCATION ACTIVITIES to improve Balance, Coordination, Kinesthetic, Sense, Proprioception, and Posture for (27) minutes.  The following were included:    Intervention Performed Today    UBE for postural strength and endurance x 2'/2'   Scapular Retractions x 3 x 10, red theraband    Shoulder Extensions x 3 x 10, red theraband    Seated Bilateral External Rotation  x 2 x 10, yellow theraband    Series 6 - on airex foam x 1' each, 2' pec stretch   Chin Tucks - supine x 2 x 10               Plan for Next Visit:      Patient Education and Home Exercises       Home Exercises Provided and Patient Education Provided     Education provided: (included in treatment section) minutes  PURPOSE: Patient educated on the impairments noted above and the effects of physical therapy intervention to improve overall condition and QOL.   EXERCISE: Patient was educated on all the above exercise prior/during/after for proper posture, positioning, and execution for safe performance with home exercise program.   STRENGTH: Patient educated on the importance of improved core and extremity strength in order to improve alignment of the spine and extremities with static positions and dynamic movement.   POSTURE: Patient educated on postural awareness to reduce stress and maintain optimal alignment of the spine with static positions and dynamic movement     Written Home Exercises Provided: yes.  Exercises were reviewed and Dary was able to demonstrate them prior to the end of the session.  Dary demonstrated good  understanding of the education provided. See EMR under Patient " Instructions for exercises provided during therapy sessions.    Assessment     Patient did well with treatment today. She was able to perform chin tucks today for the first time with proper form. Patient is presenting with improved muscle tone and guarding as compared to previous visits, and she is reporting less pain with functional activities. Improving strength and postural stability noted. Patient is appropriate to be progressed again in upcoming visits. She is progressing well towards goals.     Dayr is progressing well towards her goals.   Patient prognosis is Good.     Patient will continue to benefit from skilled outpatient physical therapy to address the deficits listed in the problem list box on initial evaluation, provide pt/family education and to maximize patient's level of independence in the home and community environment.     Patient's spiritual, cultural and educational needs considered and pt agreeable to plan of care and goals.     Anticipated Barriers for therapy: co-morbidities and chronicity of condition     Goals:  Short Term Goals:  6 weeks Status  Date Met   PAIN: Pt will report worst pain of 7/10 in order to progress toward max functional ability and improve quality of life. [] Progressing  [x] Met  [] Not Met  1/22/2024   STRENGTH: Patient will improve strength to 50% of stated goals, listed in objective measures above, in order to progress towards independence with functional activities. [] Progressing  [x] Met  [] Not Met  1/22/2024    POSTURE: Patient will correct postural deviations in sitting and standing, to decrease pain and promote long term stability.  [] Progressing  [x] Met  [] Not Met   1/22/2024   HEP: Patient will demonstrate independence with HEP in order to progress toward functional independence. [] Progressing  [x] Met  [] Not Met   1/22/2024      Long Term Goals:  12 weeks Status Date Met   PAIN: Pt will report worst pain of 5/10 in order to progress toward max  functional ability and improve quality of life [x] Progressing  [] Met  [] Not Met     FUNCTION: Patient will demonstrate improved function as indicated by a score of greater than or equal to 60 out of 100 on FOTO. (PT set goal based on clinical judgement from assessment today) [x] Progressing  [] Met  [] Not Met     MOBILITY: Patient will improve AROM to stated goals, listed in objective measures above, in order to return to maximal functional potential and improve quality of life.  [x] Progressing  [] Met  [] Not Met     STRENGTH: Patient will improve strength to stated goals, listed in objective measures above, in order to improve functional independence and quality of life.  [x] Progressing  [] Met  [] Not Met     GAIT: Patient will demonstrate normalized gait mechanics with minimal compensation in order to return to PLOF. [x] Progressing  [] Met  [] Not Met     Patient will return to normal ADL's, IADL's, community involvement, recreational activities, and work-related activities with less than or equal to 3/10 pain and maximal function.  [x] Progressing  [] Met  [] Not Met        Plan     Continue Plan of Care (POC) and progress per patient tolerance. See treatment section for details on planned progressions next session.    1/22/2024: It is my recommendation that patient continue with PT at her current frequency of 2 times per week for the remainder of her approved visits. Her treatment plan will remain the same, and she will be progressed appropriately.     12/18/2023 (evaluation): Outpatient Physical Therapy 2 times weekly for 12 weeks to include any combination of the following interventions: virtual visits, dry needling, modalities, electrical stimulation (IFC, Pre-Mod, Attended with Functional Dry Needling), Aquatic Therapy, Cervical/Lumbar Traction, Gait Training, Manual Therapy, Neuromuscular Re-ed, Patient Education, Self Care, Therapeutic Exercise, and Therapeutic Activites      Lisandra Phillips, PT

## 2024-01-29 ENCOUNTER — CLINICAL SUPPORT (OUTPATIENT)
Dept: REHABILITATION | Facility: HOSPITAL | Age: 69
End: 2024-01-29
Payer: MEDICARE

## 2024-01-29 DIAGNOSIS — R68.89 DECREASED STRENGTH, ENDURANCE, AND MOBILITY: ICD-10-CM

## 2024-01-29 DIAGNOSIS — R53.1 DECREASED STRENGTH, ENDURANCE, AND MOBILITY: ICD-10-CM

## 2024-01-29 DIAGNOSIS — R51.9 CHRONIC INTRACTABLE HEADACHE, UNSPECIFIED HEADACHE TYPE: Primary | ICD-10-CM

## 2024-01-29 DIAGNOSIS — G89.29 CHRONIC INTRACTABLE HEADACHE, UNSPECIFIED HEADACHE TYPE: Primary | ICD-10-CM

## 2024-01-29 DIAGNOSIS — R29.898 DECREASED ROM OF NECK: ICD-10-CM

## 2024-01-29 DIAGNOSIS — Z74.09 DECREASED STRENGTH, ENDURANCE, AND MOBILITY: ICD-10-CM

## 2024-01-29 PROCEDURE — 97110 THERAPEUTIC EXERCISES: CPT | Mod: HCNC | Performed by: PHYSICAL THERAPIST

## 2024-01-29 PROCEDURE — 97140 MANUAL THERAPY 1/> REGIONS: CPT | Mod: HCNC | Performed by: PHYSICAL THERAPIST

## 2024-01-29 PROCEDURE — 97112 NEUROMUSCULAR REEDUCATION: CPT | Mod: HCNC | Performed by: PHYSICAL THERAPIST

## 2024-01-29 NOTE — PROGRESS NOTES
[facility]   Physical Therapy Treatment Note        Name: Dary Chaney  Clinic Number: [id number]    Therapy Diagnosis:   Encounter Diagnoses   Name Primary?    Chronic intractable headache, unspecified headache type Yes    Decreased ROM of neck     Decreased strength, endurance, and mobility      Physician: Madeline Giron PA-C    Visit Date: 1/29/2024    Physician Orders: PT Eval and Treat  Medical Diagnosis from Referral: decreased ROM of neck  Evaluation Date: 12/18/2023  Authorization Period Expiration: 12/31/2024   Plan of Care Expiration: 3/17/2024  Progress Note Due: 2/21/2024  Visit # / Visits authorized: 5/20 (+eval)  FOTO: 1/3 (last performed on 12/18/2023)     Precautions: Standard and history or renal cell carcinoma (2018)    PTA Visit #: 0/5     Time In: 1108  Time Out: 1205  Total Billable Time: 55 minutes (Billing reflects 1 on 1 treatment time spent with patient)    Subjective     Patient reports: that her neck is stiff today. She thinks she slept wrong last night.     He/She was partially compliant with home exercise program.  Response to previous treatment: no adverse reaction  Functional change: pain still comes and goes, feels her mobility is about the same.     Pain:  3/10     Location: middle of neck with referred pain into head    Objective      Objective Measures updated at progress report or POC update only unless otherwise noted.       Treatment     Dary received the treatments listed below:       MANUAL THERAPY TECHNIQUES were applied for (12) minutes, including:    Manual Intervention Performed Today    Soft Tissue Mobilization [x] bilateral suboccipitals, paraspinals, upper trapezius, levator scapulae , periscapular musculature    Joint Mobilizations []     []     []    Functional Dry Needling  []      Plan for Next Visit: Continue as needed           THERAPEUTIC EXERCISES to develop strength, endurance, ROM, flexibility, posture, and core stabilization  "for (16) minutes including:    Intervention Performed Today    Upper trap stretch x 3 x 20-30" each side   Levator scap stretch x 3 x 20-30" each side   Open Book x 10x, 5" holds each side                       Re-assessment  Objective tests, FOTO, pt education     Plan for Next Visit:         NEUROMUSCULAR RE-EDUCATION ACTIVITIES to improve Balance, Coordination, Kinesthetic, Sense, Proprioception, and Posture for (27) minutes.  The following were included:    Intervention Performed Today    UBE for postural strength and endurance x 2'/2'   Scapular Retractions x 3 x 10, red theraband    Shoulder Extensions x 3 x 10, red theraband    Seated Bilateral External Rotation  x 2 x 10, yellow theraband    Series 6 - on airex foam x 1' each, 2' pec stretch   Chin Tucks - supine x 2 x 10               Plan for Next Visit:      Patient Education and Home Exercises       Home Exercises Provided and Patient Education Provided     Education provided: (included in treatment section) minutes  PURPOSE: Patient educated on the impairments noted above and the effects of physical therapy intervention to improve overall condition and QOL.   EXERCISE: Patient was educated on all the above exercise prior/during/after for proper posture, positioning, and execution for safe performance with home exercise program.   STRENGTH: Patient educated on the importance of improved core and extremity strength in order to improve alignment of the spine and extremities with static positions and dynamic movement.   POSTURE: Patient educated on postural awareness to reduce stress and maintain optimal alignment of the spine with static positions and dynamic movement     Written Home Exercises Provided: yes.  Exercises were reviewed and Dary was able to demonstrate them prior to the end of the session.  Dary demonstrated good  understanding of the education provided. See EMR under Patient Instructions for exercises provided during therapy " sessions.    Assessment     Patient tolerated treatment well, despite initial stiffness. She completed exercises with only minimal difficulty and without increased complaint. Slight increase in muscle guarding noted along right cervical paraspinals this visit, which did improve with manual therapy. Improving thoracic mobility noted, as well as increasing scapular activation and strength. Patient reported feeling better post treatment today and is progressing well overall towards goals.     Dary is progressing well towards her goals.   Patient prognosis is Good.     Patient will continue to benefit from skilled outpatient physical therapy to address the deficits listed in the problem list box on initial evaluation, provide pt/family education and to maximize patient's level of independence in the home and community environment.     Patient's spiritual, cultural and educational needs considered and pt agreeable to plan of care and goals.     Anticipated Barriers for therapy: co-morbidities and chronicity of condition     Goals:  Short Term Goals:  6 weeks Status  Date Met   PAIN: Pt will report worst pain of 7/10 in order to progress toward max functional ability and improve quality of life. [] Progressing  [x] Met  [] Not Met  1/22/2024   STRENGTH: Patient will improve strength to 50% of stated goals, listed in objective measures above, in order to progress towards independence with functional activities. [] Progressing  [x] Met  [] Not Met  1/22/2024    POSTURE: Patient will correct postural deviations in sitting and standing, to decrease pain and promote long term stability.  [] Progressing  [x] Met  [] Not Met   1/22/2024   HEP: Patient will demonstrate independence with HEP in order to progress toward functional independence. [] Progressing  [x] Met  [] Not Met   1/22/2024      Long Term Goals:  12 weeks Status Date Met   PAIN: Pt will report worst pain of 5/10 in order to progress toward max functional  ability and improve quality of life [x] Progressing  [] Met  [] Not Met     FUNCTION: Patient will demonstrate improved function as indicated by a score of greater than or equal to 60 out of 100 on FOTO. (PT set goal based on clinical judgement from assessment today) [x] Progressing  [] Met  [] Not Met     MOBILITY: Patient will improve AROM to stated goals, listed in objective measures above, in order to return to maximal functional potential and improve quality of life.  [x] Progressing  [] Met  [] Not Met     STRENGTH: Patient will improve strength to stated goals, listed in objective measures above, in order to improve functional independence and quality of life.  [x] Progressing  [] Met  [] Not Met     GAIT: Patient will demonstrate normalized gait mechanics with minimal compensation in order to return to PLOF. [x] Progressing  [] Met  [] Not Met     Patient will return to normal ADL's, IADL's, community involvement, recreational activities, and work-related activities with less than or equal to 3/10 pain and maximal function.  [x] Progressing  [] Met  [] Not Met        Plan     Continue Plan of Care (POC) and progress per patient tolerance. See treatment section for details on planned progressions next session.    1/22/2024: It is my recommendation that patient continue with PT at her current frequency of 2 times per week for the remainder of her approved visits. Her treatment plan will remain the same, and she will be progressed appropriately.     12/18/2023 (evaluation): Outpatient Physical Therapy 2 times weekly for 12 weeks to include any combination of the following interventions: virtual visits, dry needling, modalities, electrical stimulation (IFC, Pre-Mod, Attended with Functional Dry Needling), Aquatic Therapy, Cervical/Lumbar Traction, Gait Training, Manual Therapy, Neuromuscular Re-ed, Patient Education, Self Care, Therapeutic Exercise, and Therapeutic Activites      Lisandra Phillips, PT

## 2024-02-09 ENCOUNTER — LAB VISIT (OUTPATIENT)
Dept: LAB | Facility: HOSPITAL | Age: 69
End: 2024-02-09
Attending: PHYSICIAN ASSISTANT
Payer: MEDICARE

## 2024-02-09 ENCOUNTER — CLINICAL SUPPORT (OUTPATIENT)
Dept: REHABILITATION | Facility: HOSPITAL | Age: 69
End: 2024-02-09
Payer: MEDICARE

## 2024-02-09 DIAGNOSIS — E11.9 TYPE 2 DIABETES MELLITUS WITH HEMOGLOBIN A1C GOAL OF LESS THAN 7.0%: ICD-10-CM

## 2024-02-09 DIAGNOSIS — R51.9 CHRONIC INTRACTABLE HEADACHE, UNSPECIFIED HEADACHE TYPE: Primary | ICD-10-CM

## 2024-02-09 DIAGNOSIS — R68.89 DECREASED STRENGTH, ENDURANCE, AND MOBILITY: ICD-10-CM

## 2024-02-09 DIAGNOSIS — R53.1 DECREASED STRENGTH, ENDURANCE, AND MOBILITY: ICD-10-CM

## 2024-02-09 DIAGNOSIS — R29.898 DECREASED ROM OF NECK: ICD-10-CM

## 2024-02-09 DIAGNOSIS — G89.29 CHRONIC INTRACTABLE HEADACHE, UNSPECIFIED HEADACHE TYPE: Primary | ICD-10-CM

## 2024-02-09 DIAGNOSIS — Z74.09 DECREASED STRENGTH, ENDURANCE, AND MOBILITY: ICD-10-CM

## 2024-02-09 LAB
ESTIMATED AVG GLUCOSE: 151 MG/DL (ref 68–131)
HBA1C MFR BLD: 6.9 % (ref 4–5.6)

## 2024-02-09 PROCEDURE — 83036 HEMOGLOBIN GLYCOSYLATED A1C: CPT | Mod: HCNC | Performed by: PHYSICIAN ASSISTANT

## 2024-02-09 PROCEDURE — 97140 MANUAL THERAPY 1/> REGIONS: CPT | Mod: HCNC

## 2024-02-09 PROCEDURE — 97110 THERAPEUTIC EXERCISES: CPT | Mod: HCNC

## 2024-02-09 PROCEDURE — 97112 NEUROMUSCULAR REEDUCATION: CPT | Mod: HCNC

## 2024-02-09 PROCEDURE — 36415 COLL VENOUS BLD VENIPUNCTURE: CPT | Mod: HCNC,PO | Performed by: PHYSICIAN ASSISTANT

## 2024-02-09 NOTE — PROGRESS NOTES
[facility]   Physical Therapy Treatment Note        Name: Dary Chaney  Clinic Number: [id number]    Therapy Diagnosis:   Encounter Diagnoses   Name Primary?    Chronic intractable headache, unspecified headache type Yes    Decreased ROM of neck     Decreased strength, endurance, and mobility      Physician: Madeline Giron PA-C    Visit Date: 2/9/2024    Physician Orders: PT Eval and Treat  Medical Diagnosis from Referral: decreased ROM of neck  Evaluation Date: 12/18/2023  Authorization Period Expiration: 12/31/2024   Plan of Care Expiration: 3/17/2024  Progress Note Due: 2/21/2024  Visit # / Visits authorized: 5/20 (+eval)  FOTO: 1/3 (last performed on 12/18/2023)     Precautions: Standard and history or renal cell carcinoma (2018)    PTA Visit #: 0/5     Time In: 1108  Time Out: 1205  Total Billable Time: 56 minutes (Billing reflects 1 on 1 treatment time spent with patient)    Subjective     Patient reports: she has been feeling pretty good and is not having any pain today. Notes that she has woken up with stiffness in her neck a few times but is able to do her exercises to reduce symptoms     He/She was partially compliant with home exercise program.  Response to previous treatment: no adverse reaction  Functional change: pain still comes and goes, feels her mobility is about the same.     Pain:  3/10     Location: middle of neck with referred pain into head    Objective      Objective Measures updated at progress report or POC update only unless otherwise noted.       Treatment     Dary received the treatments listed below:       MANUAL THERAPY TECHNIQUES were applied for (12) minutes, including:    Manual Intervention Performed Today    Soft Tissue Mobilization [x] bilateral suboccipitals, paraspinals, upper trapezius, levator scapulae , periscapular musculature    Joint Mobilizations []     []     []    Functional Dry Needling  []      Plan for Next Visit: Continue as  "needed           THERAPEUTIC EXERCISES to develop strength, endurance, ROM, flexibility, posture, and core stabilization for (16) minutes including:    Intervention Performed Today    Upper trap stretch x 3 x 20-30" each side   Levator scap stretch x 3 x 20-30" each side   Open Book x 10x, 5" holds each side                       Re-assessment  Objective tests, FOTO, pt education     Plan for Next Visit:         NEUROMUSCULAR RE-EDUCATION ACTIVITIES to improve Balance, Coordination, Kinesthetic, Sense, Proprioception, and Posture for (27) minutes.  The following were included:    Intervention Performed Today    UBE for postural strength and endurance x 2'/2'   Scapular Retractions x 3 x 10, red theraband    Shoulder Extensions x 3 x 10, red theraband    Seated Bilateral External Rotation  x 2 x 10, yellow theraband    Series 6 - on airex foam x 1' each, 2' pec stretch   Chin Tucks - supine x 2 x 10               Plan for Next Visit:      Patient Education and Home Exercises       Home Exercises Provided and Patient Education Provided     Education provided: (included in treatment section) minutes  PURPOSE: Patient educated on the impairments noted above and the effects of physical therapy intervention to improve overall condition and QOL.   EXERCISE: Patient was educated on all the above exercise prior/during/after for proper posture, positioning, and execution for safe performance with home exercise program.   STRENGTH: Patient educated on the importance of improved core and extremity strength in order to improve alignment of the spine and extremities with static positions and dynamic movement.   POSTURE: Patient educated on postural awareness to reduce stress and maintain optimal alignment of the spine with static positions and dynamic movement     Written Home Exercises Provided: yes.  Exercises were reviewed and Dary was able to demonstrate them prior to the end of the session.  Dary demonstrated good  " understanding of the education provided. See EMR under Patient Instructions for exercises provided during therapy sessions.    Assessment     Patient tolerated treatment well today with no significant complaints. Continued previously performed interventions to improve mobility and postural strength with minimal cueing required for proper exercise form.significant decreased in muscle tension of cervical paraspinals and suboccipitals following manual interventions. Patient reported feeling better post treatment today and is progressing well overall towards goals.     Dary is progressing well towards her goals.   Patient prognosis is Good.     Patient will continue to benefit from skilled outpatient physical therapy to address the deficits listed in the problem list box on initial evaluation, provide pt/family education and to maximize patient's level of independence in the home and community environment.     Patient's spiritual, cultural and educational needs considered and pt agreeable to plan of care and goals.     Anticipated Barriers for therapy: co-morbidities and chronicity of condition     Goals:  Short Term Goals:  6 weeks Status  Date Met   PAIN: Pt will report worst pain of 7/10 in order to progress toward max functional ability and improve quality of life. [] Progressing  [x] Met  [] Not Met  1/22/2024   STRENGTH: Patient will improve strength to 50% of stated goals, listed in objective measures above, in order to progress towards independence with functional activities. [] Progressing  [x] Met  [] Not Met  1/22/2024    POSTURE: Patient will correct postural deviations in sitting and standing, to decrease pain and promote long term stability.  [] Progressing  [x] Met  [] Not Met   1/22/2024   HEP: Patient will demonstrate independence with HEP in order to progress toward functional independence. [] Progressing  [x] Met  [] Not Met   1/22/2024      Long Term Goals:  12 weeks Status Date Met   PAIN: Pt  will report worst pain of 5/10 in order to progress toward max functional ability and improve quality of life [x] Progressing  [] Met  [] Not Met     FUNCTION: Patient will demonstrate improved function as indicated by a score of greater than or equal to 60 out of 100 on FOTO. (PT set goal based on clinical judgement from assessment today) [x] Progressing  [] Met  [] Not Met     MOBILITY: Patient will improve AROM to stated goals, listed in objective measures above, in order to return to maximal functional potential and improve quality of life.  [x] Progressing  [] Met  [] Not Met     STRENGTH: Patient will improve strength to stated goals, listed in objective measures above, in order to improve functional independence and quality of life.  [x] Progressing  [] Met  [] Not Met     GAIT: Patient will demonstrate normalized gait mechanics with minimal compensation in order to return to PLOF. [x] Progressing  [] Met  [] Not Met     Patient will return to normal ADL's, IADL's, community involvement, recreational activities, and work-related activities with less than or equal to 3/10 pain and maximal function.  [x] Progressing  [] Met  [] Not Met        Plan     Continue Plan of Care (POC) and progress per patient tolerance. See treatment section for details on planned progressions next session.    1/22/2024: It is my recommendation that patient continue with PT at her current frequency of 2 times per week for the remainder of her approved visits. Her treatment plan will remain the same, and she will be progressed appropriately.     12/18/2023 (evaluation): Outpatient Physical Therapy 2 times weekly for 12 weeks to include any combination of the following interventions: virtual visits, dry needling, modalities, electrical stimulation (IFC, Pre-Mod, Attended with Functional Dry Needling), Aquatic Therapy, Cervical/Lumbar Traction, Gait Training, Manual Therapy, Neuromuscular Re-ed, Patient Education, Self Care, Therapeutic  Exercise, and Therapeutic Activites      Jessica Bland, PT

## 2024-02-13 ENCOUNTER — TELEPHONE (OUTPATIENT)
Dept: REHABILITATION | Facility: HOSPITAL | Age: 69
End: 2024-02-13
Payer: MEDICARE

## 2024-02-13 NOTE — TELEPHONE ENCOUNTER
Called patient about 1:00pm appointment and left message letting her know next appointment time.   Lisandra Freitas, PT, DPT

## 2024-02-14 ENCOUNTER — OFFICE VISIT (OUTPATIENT)
Dept: DIABETES | Facility: CLINIC | Age: 69
End: 2024-02-14
Payer: MEDICARE

## 2024-02-14 VITALS
HEART RATE: 86 BPM | BODY MASS INDEX: 32.49 KG/M2 | SYSTOLIC BLOOD PRESSURE: 134 MMHG | DIASTOLIC BLOOD PRESSURE: 74 MMHG | HEIGHT: 62 IN | WEIGHT: 176.56 LBS

## 2024-02-14 DIAGNOSIS — E11.9 TYPE 2 DIABETES MELLITUS WITH HEMOGLOBIN A1C GOAL OF LESS THAN 7.0%: Primary | ICD-10-CM

## 2024-02-14 LAB — GLUCOSE SERPL-MCNC: 145 MG/DL (ref 70–110)

## 2024-02-14 PROCEDURE — 3288F FALL RISK ASSESSMENT DOCD: CPT | Mod: HCNC,CPTII,S$GLB, | Performed by: NURSE PRACTITIONER

## 2024-02-14 PROCEDURE — 1159F MED LIST DOCD IN RCRD: CPT | Mod: HCNC,CPTII,S$GLB, | Performed by: NURSE PRACTITIONER

## 2024-02-14 PROCEDURE — 1157F ADVNC CARE PLAN IN RCRD: CPT | Mod: HCNC,CPTII,S$GLB, | Performed by: NURSE PRACTITIONER

## 2024-02-14 PROCEDURE — 3072F LOW RISK FOR RETINOPATHY: CPT | Mod: HCNC,CPTII,S$GLB, | Performed by: NURSE PRACTITIONER

## 2024-02-14 PROCEDURE — 3075F SYST BP GE 130 - 139MM HG: CPT | Mod: HCNC,CPTII,S$GLB, | Performed by: NURSE PRACTITIONER

## 2024-02-14 PROCEDURE — 1126F AMNT PAIN NOTED NONE PRSNT: CPT | Mod: HCNC,CPTII,S$GLB, | Performed by: NURSE PRACTITIONER

## 2024-02-14 PROCEDURE — 3008F BODY MASS INDEX DOCD: CPT | Mod: HCNC,CPTII,S$GLB, | Performed by: NURSE PRACTITIONER

## 2024-02-14 PROCEDURE — 99214 OFFICE O/P EST MOD 30 MIN: CPT | Mod: HCNC,S$GLB,, | Performed by: NURSE PRACTITIONER

## 2024-02-14 PROCEDURE — 82962 GLUCOSE BLOOD TEST: CPT | Mod: HCNC,S$GLB,, | Performed by: NURSE PRACTITIONER

## 2024-02-14 PROCEDURE — 1101F PT FALLS ASSESS-DOCD LE1/YR: CPT | Mod: HCNC,CPTII,S$GLB, | Performed by: NURSE PRACTITIONER

## 2024-02-14 PROCEDURE — 3078F DIAST BP <80 MM HG: CPT | Mod: HCNC,CPTII,S$GLB, | Performed by: NURSE PRACTITIONER

## 2024-02-14 PROCEDURE — 3044F HG A1C LEVEL LT 7.0%: CPT | Mod: HCNC,CPTII,S$GLB, | Performed by: NURSE PRACTITIONER

## 2024-02-14 PROCEDURE — 99999 PR PBB SHADOW E&M-EST. PATIENT-LVL IV: CPT | Mod: PBBFAC,HCNC,, | Performed by: NURSE PRACTITIONER

## 2024-02-14 RX ORDER — GLIMEPIRIDE 4 MG/1
4 TABLET ORAL
Qty: 90 TABLET | Refills: 3 | Status: SHIPPED | OUTPATIENT
Start: 2024-02-14 | End: 2024-05-30

## 2024-02-14 RX ORDER — INSULIN DEGLUDEC 200 U/ML
65 INJECTION, SOLUTION SUBCUTANEOUS DAILY
Qty: 4 PEN | Refills: 9 | Status: SHIPPED | OUTPATIENT
Start: 2024-02-14 | End: 2024-05-30 | Stop reason: SDUPTHER

## 2024-02-14 NOTE — PROGRESS NOTES
Dary Chaney is a 68 y.o. female who  has a past medical history of Arthritis, Cataract, Colon polyp, Diabetes mellitus type II (15 years), Hyperlipidemia, Hypertension, Renal cell carcinoma (09/2018), Total knee replacement status, left (01/20/2019), and UTI (urinary tract infection). and presents for a follow up evaluation of Type 2 diabetes mellitus.     CHIEF COMPLAINT: Diabetes Consultation    PCP: Sakina Cooper DO     Initial visit with me - Patient is followed regularly by Alecia Goodman PA-C with [facility]. I am seeing patient today and they will continue to follow with Alecia Goodman PA-C once she has returned from maternity leave.      Patient has had Type II diabetes since 2005.  Pertinent to decision making is the following comorbidities: HTN, HLD, Obesity by BMI and Fatty Liver  Patient has the following Diabetes complications: without complications    Patient has failed the following Diabetes medications:   Metformin - allergy   Onglyza  Levemir   Victoza / Trulicity - GLP-1 escalation  Mounjaro - cost; no pharm asst available   Jardiance 10 + 25 mg - GI side effects;     Diabetes Medications               empagliflozin (JARDIANCE) 10 mg tablet Take 1 tablet (10 mg total) by mouth once daily. - NOT TAKING DUE TO GI UPSET.    glimepiride (AMARYL) 4 MG tablet Take 1 tablet (4 mg total) by mouth 2 (two) times daily with meals.    insulin degludec (TRESIBA FLEXTOUCH U-200) 200 unit/mL (3 mL) insulin pen Inject 62 Units into the skin once daily.    pioglitazone (ACTOS) 15 MG tablet Take 1 tablet (15 mg total) by mouth once daily.    semaglutide (OZEMPIC) 2 mg/dose (8 mg/3 mL) PnIj Inject 2 mg into the skin every 7 days.     CURRENT DM MEDICATIONS:   Ozempic 2 mg weekly   Amaryl 4 mg before meals twice daily  Tresiba 72 units daily  Actos 15 mg daily     Current monitoring regimen: capillary blood glucose monitoring with finger sticks.    Patient currently taking insulin or  sulfonylurea?  Yes. Emergency Glucagon Prescription current? no  Recent hypoglycemic episodes: Yes. Hypoglycemia treatment reviewed with patient and education to be provided in AVS.     Patient compliant with glucose checks and medication administration? Yes    DIABETES MANAGEMENT STATUS  Statin: Taking  ACE/ARB: Taking  Screening or Prevention Patient's value Goal Complete/Controlled?   HgA1C Testing and Control   Lab Results   Component Value Date    HGBA1C 6.9 (H) 02/09/2024      Annually/Less than 8% Yes   Lipid profile : 11/08/2023 Annually Yes   LDL control Lab Results   Component Value Date    LDLCALC 141.2 11/08/2023    Annually/Less than 100 mg/dl  No   Nephropathy screening Lab Results   Component Value Date    LABMICR 5.0 08/21/2023     Lab Results   Component Value Date    PROTEINUA Negative 09/08/2014     Lab Results   Component Value Date    UTPCR 0.08 10/28/2009      Annually Yes   Blood pressure BP Readings from Last 1 Encounters:   02/14/24 134/74    Less than 140/90 Yes   Dilated retinal exam : 08/09/2023 Annually Yes   Foot exam   : 08/21/2023 Annually Yes   Patient's medications, allergies, surgical, social and family histories were reviewed and updated as appropriate.     Review of Systems   Constitutional:  Negative for weight loss.   Eyes:  Negative for blurred vision and double vision.   Cardiovascular:  Negative for chest pain.   Gastrointestinal:  Negative for nausea and vomiting.   Genitourinary:  Negative for frequency.   Musculoskeletal:  Negative for falls.   Neurological:  Negative for dizziness and weakness.   Endo/Heme/Allergies:  Negative for polydipsia.   Psychiatric/Behavioral:  Negative for depression.    All other systems reviewed and are negative.       Physical Exam  Constitutional:       Appearance: Normal appearance.   HENT:      Head: Normocephalic and atraumatic.   Eyes:      Extraocular Movements: Extraocular movements intact.      Pupils: Pupils are equal, round, and  "reactive to light.   Cardiovascular:      Rate and Rhythm: Normal rate and regular rhythm.      Heart sounds: Normal heart sounds.   Pulmonary:      Effort: Pulmonary effort is normal. No respiratory distress.      Breath sounds: Normal breath sounds.   Musculoskeletal:         General: No swelling.      Cervical back: Normal range of motion.   Skin:     General: Skin is warm and dry.   Neurological:      Mental Status: She is alert and oriented to person, place, and time.   Psychiatric:         Mood and Affect: Mood normal.         Behavior: Behavior normal.        Blood pressure 134/74, pulse 86, height 5' 2" (1.575 m), weight 80.1 kg (176 lb 9.4 oz).  Wt Readings from Last 3 Encounters:   02/14/24 80.1 kg (176 lb 9.4 oz)   12/07/23 76.7 kg (169 lb)   11/29/23 76.7 kg (169 lb)       LAB REVIEW  Lab Results   Component Value Date     01/03/2024    K 4.4 01/03/2024     01/03/2024    CO2 25 01/03/2024    BUN 29 (H) 01/03/2024    CREATININE 0.8 01/03/2024    CALCIUM 8.9 01/03/2024    ANIONGAP 12 01/03/2024    EGFRNORACEVR >60 01/03/2024     Lab Results   Component Value Date    CPEPTIDE 1.73 08/30/2022    GLUTAMICACID 0.00 12/14/2017     Hemoglobin A1C   Date Value Ref Range Status   02/09/2024 6.9 (H) 4.0 - 5.6 % Final     Comment:     ADA Screening Guidelines:  5.7-6.4%  Consistent with prediabetes  >or=6.5%  Consistent with diabetes    High levels of fetal hemoglobin interfere with the HbA1C  assay. Heterozygous hemoglobin variants (HbS, HgC, etc)do  not significantly interfere with this assay.   However, presence of multiple variants may affect accuracy.     11/08/2023 7.1 (H) 4.0 - 5.6 % Final     Comment:     ADA Screening Guidelines:  5.7-6.4%  Consistent with prediabetes  >or=6.5%  Consistent with diabetes    High levels of fetal hemoglobin interfere with the HbA1C  assay. Heterozygous hemoglobin variants (HbS, HgC, etc)do  not significantly interfere with this assay.   However, presence of " multiple variants may affect accuracy.     2023 7.5 (H) 4.0 - 5.6 % Final     Comment:     ADA Screening Guidelines:  5.7-6.4%  Consistent with prediabetes  >or=6.5%  Consistent with diabetes    High levels of fetal hemoglobin interfere with the HbA1C  assay. Heterozygous hemoglobin variants (HbS, HgC, etc)do  not significantly interfere with this assay.   However, presence of multiple variants may affect accuracy.         Lab Results   Component Value Date    POCGLU 145 (A) 2024       ASSESSMENT    ICD-10-CM ICD-9-CM   1. Type 2 diabetes mellitus with hemoglobin A1c goal of less than 7.0%  E11.9 250.00       PLAN  Diagnoses and all orders for this visit:    Type 2 diabetes mellitus with hemoglobin A1c goal of less than 7.0%  -     POCT Glucose, Hand-Held Device  -     glimepiride (AMARYL) 4 MG tablet; Take 1 tablet (4 mg total) by mouth daily with breakfast.  -     insulin degludec (TRESIBA FLEXTOUCH U-200) 200 unit/mL (3 mL) insulin pen; Inject 66 Units into the skin once daily.        Reviewed pathophysiology of diabetes, complications related to the disease, importance of annual dilated eye exam and daily foot examination. Explained MOA, SE, dosage of medications. Written instructions given and reviewed with patient and patient verbalizes understanding.     PATIENT INSTRUCTIONS    CURRENT DM MEDICATIONS:   Ozempic 2 mg weekly   Amaryl 4 mg before breakfast.   Tresiba 65 units daily   Actos 15 mg daily - watch for swelling in legs     Blood Sugar Goals:       Fastin-130.       1-2 hours after a meal: Less than 180.     Follow up for In-Person, No Device.    Portions of this note were prepared with [email or web address] voice recognition transcription software. Grammatical errors, including garbled syntax, mangle pronouns, and other bizarre constructions may be attributed to that software system.

## 2024-02-14 NOTE — PATIENT INSTRUCTIONS
PATIENT INSTRUCTIONS    CURRENT DM MEDICATIONS:   Ozempic 2 mg weekly   Amaryl 4 mg before breakfast.   Tresiba 65 units daily   Actos 15 mg daily - watch for swelling in legs     Blood Sugar Goals:       Fastin-130.       1-2 hours after a meal: Less than 180.

## 2024-02-20 ENCOUNTER — OFFICE VISIT (OUTPATIENT)
Dept: URGENT CARE | Facility: CLINIC | Age: 69
End: 2024-02-20
Payer: MEDICARE

## 2024-02-20 VITALS
RESPIRATION RATE: 17 BRPM | HEIGHT: 63 IN | TEMPERATURE: 98 F | HEART RATE: 84 BPM | BODY MASS INDEX: 30.8 KG/M2 | DIASTOLIC BLOOD PRESSURE: 60 MMHG | WEIGHT: 173.81 LBS | OXYGEN SATURATION: 94 % | SYSTOLIC BLOOD PRESSURE: 129 MMHG

## 2024-02-20 DIAGNOSIS — R05.9 COUGH, UNSPECIFIED TYPE: ICD-10-CM

## 2024-02-20 DIAGNOSIS — R52 BODY ACHES: ICD-10-CM

## 2024-02-20 DIAGNOSIS — R06.2 WHEEZING: ICD-10-CM

## 2024-02-20 DIAGNOSIS — J01.90 ACUTE BACTERIAL SINUSITIS: Primary | ICD-10-CM

## 2024-02-20 DIAGNOSIS — J34.89 SINUS PRESSURE: ICD-10-CM

## 2024-02-20 DIAGNOSIS — B96.89 ACUTE BACTERIAL SINUSITIS: Primary | ICD-10-CM

## 2024-02-20 PROBLEM — R13.10 DYSPHAGIA: Status: RESOLVED | Noted: 2018-08-21 | Resolved: 2024-02-20

## 2024-02-20 LAB
CTP QC/QA: YES
SARS-COV-2 AG RESP QL IA.RAPID: NEGATIVE

## 2024-02-20 PROCEDURE — 87811 SARS-COV-2 COVID19 W/OPTIC: CPT | Mod: QW,S$GLB,, | Performed by: PHYSICIAN ASSISTANT

## 2024-02-20 PROCEDURE — 99214 OFFICE O/P EST MOD 30 MIN: CPT | Mod: S$GLB,,, | Performed by: PHYSICIAN ASSISTANT

## 2024-02-20 RX ORDER — AMOXICILLIN AND CLAVULANATE POTASSIUM 875; 125 MG/1; MG/1
1 TABLET, FILM COATED ORAL 2 TIMES DAILY
Qty: 14 TABLET | Refills: 0 | Status: SHIPPED | OUTPATIENT
Start: 2024-02-20 | End: 2024-02-27

## 2024-02-20 RX ORDER — BENZONATATE 200 MG/1
200 CAPSULE ORAL 3 TIMES DAILY PRN
Qty: 21 CAPSULE | Refills: 0 | Status: SHIPPED | OUTPATIENT
Start: 2024-02-20 | End: 2024-02-27

## 2024-02-20 NOTE — PROGRESS NOTES
"Subjective:      Patient ID: Dary Chaney is a 68 y.o. female.    Vitals:  height is 5' 2.68" (1.592 m) and weight is 78.8 kg (173 lb 13.3 oz). Her temperature is 97.5 °F (36.4 °C). Her blood pressure is 129/60 and her pulse is 84. Her respiration is 17 and oxygen saturation is 94% (abnormal).     Chief Complaint: Sinus Problem    PT presents today with some sinus problem such as cough,congestion, headaches, and SOB. PT states these symptoms has been going since the weekend. PT has been taking breathing treatments. Her Last treatment was on last night.     Sinus Problem  This is a new problem. The current episode started in the past 7 days. The problem is unchanged. There has been no fever. Her pain is at a severity of 0/10. She is experiencing no pain. Associated symptoms include congestion, coughing, ear pain and headaches. Pertinent negatives include no chills, diaphoresis, hoarse voice, neck pain, shortness of breath, sinus pressure, sneezing, sore throat or swollen glands. Past treatments include nothing. The treatment provided no relief.       Constitution: Negative for chills and sweating.   HENT:  Positive for ear pain and congestion. Negative for sinus pressure and sore throat.    Neck: Negative for neck pain.   Respiratory:  Positive for cough. Negative for shortness of breath.    Allergic/Immunologic: Negative for sneezing.   Neurological:  Positive for headaches.      Objective:     Physical Exam   Constitutional: She is oriented to person, place, and time. She appears well-developed.   HENT:   Head: Normocephalic and atraumatic.   Ears:   Right Ear: Tympanic membrane, external ear and ear canal normal.   Left Ear: Tympanic membrane, external ear and ear canal normal.   Nose: Right sinus exhibits maxillary sinus tenderness and frontal sinus tenderness. Left sinus exhibits maxillary sinus tenderness and frontal sinus tenderness.   Mouth/Throat: Uvula is midline, oropharynx is clear and moist and mucous " membranes are normal. Mucous membranes are moist. Cobblestoning present. No tonsillar exudate.   Eyes: Conjunctivae and EOM are normal. Pupils are equal, round, and reactive to light.   Neck: Neck supple.   Cardiovascular: Normal rate, regular rhythm, normal heart sounds and normal pulses.   Pulmonary/Chest: Effort normal. She has wheezes (mild. Right side worse than left).   Musculoskeletal: Normal range of motion.         General: Normal range of motion.   Neurological: She is alert and oriented to person, place, and time.   Skin: Skin is warm and dry.   Vitals reviewed.      Assessment:     1. Acute bacterial sinusitis    2. Cough, unspecified type    3. Sinus pressure    4. Body aches    5. Wheezing        Plan:       Acute bacterial sinusitis  -     amoxicillin-clavulanate 875-125mg (AUGMENTIN) 875-125 mg per tablet; Take 1 tablet by mouth 2 (two) times daily. for 7 days  Dispense: 14 tablet; Refill: 0    Cough, unspecified type  -     SARS Coronavirus 2 Antigen, POCT Manual Read  -     benzonatate (TESSALON) 200 MG capsule; Take 1 capsule (200 mg total) by mouth 3 (three) times daily as needed for Cough.  Dispense: 21 capsule; Refill: 0    Sinus pressure    Body aches    Wheezing      Results for orders placed or performed in visit on 02/20/24   SARS Coronavirus 2 Antigen, POCT Manual Read   Result Value Ref Range    SARS Coronavirus 2 Antigen Negative Negative     Acceptable Yes      Continue with neb tx at home.    Patient Instructions     Advise increase p.o. fluids--at least 64 ounces of water/juice & rest  Meds:  Augmentin and Tessalon perles sent to pharmacy.  Advise complete antibiotics.  Normal saline nasal wash to irrigate sinuses and for congestion/runny nose.  Cool mist humidifier/vaporizer.  Practice good handwashing.  Mucinex for cough and chest congestion.  Tylenol or Ibuprofen for fever, headache and body aches.  Warm salt water gargles for throat comfort.  Chloraseptic spray or  lozenges for throat comfort.  See PCP or go to ER if symptoms worsen or fail to improve with treatment.

## 2024-02-20 NOTE — PATIENT INSTRUCTIONS
Advise increase p.o. fluids--at least 64 ounces of water/juice & rest  Meds:  Augmentin and Tessalon perles sent to pharmacy.  Advise complete antibiotics.  Normal saline nasal wash to irrigate sinuses and for congestion/runny nose.  Cool mist humidifier/vaporizer.  Practice good handwashing.  Mucinex for cough and chest congestion.  Tylenol or Ibuprofen for fever, headache and body aches.  Warm salt water gargles for throat comfort.  Chloraseptic spray or lozenges for throat comfort.  See PCP or go to ER if symptoms worsen or fail to improve with treatment.

## 2024-02-27 ENCOUNTER — CLINICAL SUPPORT (OUTPATIENT)
Dept: REHABILITATION | Facility: HOSPITAL | Age: 69
End: 2024-02-27
Payer: MEDICARE

## 2024-02-27 DIAGNOSIS — Z74.09 DECREASED STRENGTH, ENDURANCE, AND MOBILITY: ICD-10-CM

## 2024-02-27 DIAGNOSIS — G89.29 CHRONIC INTRACTABLE HEADACHE, UNSPECIFIED HEADACHE TYPE: Primary | ICD-10-CM

## 2024-02-27 DIAGNOSIS — R68.89 DECREASED STRENGTH, ENDURANCE, AND MOBILITY: ICD-10-CM

## 2024-02-27 DIAGNOSIS — R29.898 DECREASED ROM OF NECK: ICD-10-CM

## 2024-02-27 DIAGNOSIS — R53.1 DECREASED STRENGTH, ENDURANCE, AND MOBILITY: ICD-10-CM

## 2024-02-27 DIAGNOSIS — R51.9 CHRONIC INTRACTABLE HEADACHE, UNSPECIFIED HEADACHE TYPE: Primary | ICD-10-CM

## 2024-02-27 PROCEDURE — 97140 MANUAL THERAPY 1/> REGIONS: CPT | Mod: HCNC | Performed by: PHYSICAL THERAPIST

## 2024-02-27 PROCEDURE — 97110 THERAPEUTIC EXERCISES: CPT | Mod: HCNC | Performed by: PHYSICAL THERAPIST

## 2024-02-27 PROCEDURE — 97112 NEUROMUSCULAR REEDUCATION: CPT | Mod: HCNC | Performed by: PHYSICAL THERAPIST

## 2024-02-27 NOTE — PROGRESS NOTES
[facility]   Physical Therapy Treatment Note + Progress Note       Name: Dary Chaney  Clinic Number: [id number]    Therapy Diagnosis:   Encounter Diagnoses   Name Primary?    Chronic intractable headache, unspecified headache type Yes    Decreased ROM of neck     Decreased strength, endurance, and mobility      Physician: Madeline Giron PA-C    Visit Date: 2/27/2024    Physician Orders: PT Eval and Treat  Medical Diagnosis from Referral: decreased ROM of neck  Evaluation Date: 12/18/2023  Authorization Period Expiration: 12/31/2024   Plan of Care Expiration: 3/17/2024  Progress Note Due: 3/17/2024  Visit # / Visits authorized: 5/20 (+eval)  FOTO: 1/3 (last performed on 12/18/2023)     Precautions: Standard and history or renal cell carcinoma (2018)    PTA Visit #: 0/5     Time In: 1104  Time Out: 1159  Total Billable Time: 54 minutes (Billing reflects 1 on 1 treatment time spent with patient)    Subjective     Patient reports: she has been feeling better in regards to neck pain and headaches. She was sick with a sinus infection and then bronchitis, but even with coughing a lot, she has not been having headaches. Patient feels confident in making next visit her last.     He/She was partially compliant with home exercise program.  Response to previous treatment: no adverse reaction  Functional change: pain still comes and goes, feels her mobility is about the same.     Pain:  0/10     Location: middle of neck with referred pain into head    Objective      Objective Measures updated at progress report or POC update only unless otherwise noted.     RANGE OF MOTION:   Cervical Right   (spine) Left     Pain/Dysfunction with Movement Goal   Cervical Flexion (60º) 63  (54) --- No pain 60   Cervical Extension (80º) 78  (70) --- No pain 80   Cervical Side Bending (45º) 45  (38) 45  (44) No pain 45   Cervical Rotation (75º) 76  (76) 76  (76) No pain 75         STRENGTH:   U/E MMT Right Left  Pain/Dysfunction with Movement Goal Right  2/27/2024  Left  2/27/2024    Shoulder Flexion 4/5 4/5 No increase in pain with MMT's 4+/5 B 4+ 4+   Shoulder Abduction 4/5 4/5   4+/5 B 4+ 4+   Shoulder IR 4+/5 4+/5   4+/5 B 4+ 4+   Shoulder ER 4/5 4/5   4+/5 B 4+ 4+   Serratus Anterior NT NT   4+/5 B 4+ 4+   Elbow Flexion  4+/5 4+/5   5/5 B 5 5   Elbow Extension 4+/5 4+/5   5/5 B 5 5   Wrist Flexion 5/5 5/5   5/5 B 5 5   Wrist Extension 5/5 5/5   5/5 B 5 5         MUSCLE LENGTH:   Muscle Tested  Right Left  Goal   Upper Trapezius [] Normal  [x] Limited [] Normal  [x] Limited Normal B   Levator Scapular  [] Normal  [x] Limited [] Normal  [x] Limited Normal B   Scalenes [] Normal  [x] Limited [] Normal  [x] Limited Normal B   Pectoralis Minor [] Normal  [x] Limited [] Normal  [x] Limited Normal B   Pectoralis Major [] Normal  [x] Limited [] Normal  [x] Limited Normal B    **Although still decreased, muscle length has improved as compared to previous visits.      JOINT MOBILITY:   Joint Motion Right Mobility  (spine) Left Mobility Goal   Subcranial:  [] Hypo     [x] Normal     [] Hyper [] Hypo     [x] Normal     [] Hyper Normal    Cervical Upglides [] Hypo     [x] Normal     [] Hyper [] Hypo     [x] Normal     [] Hyper Normal    Cervical Downglides  [] Hypo     [x] Normal     [] Hyper [] Hypo     [x] Normal     [] Hyper Normal    1st Rib  [] Hypo     [x] Normal     [] Hyper [] Hypo     [x] Normal     [] Hyper Normal    Thoracic PA Gap [x] Hypo     [] Normal     [] Hyper --- Normal    Thoracic spine mobility still stiff but much improved.     SPECIAL TESTS:     Right  (spine) Left  Goal   Cervical Distraction [x] Positive    [] Negative [x] Positive    [] Negative Negative B    Cervical Compression  [] Positive    [x] Negative [] Positive    [x] Negative Negative B          SENSATION  [x] Intact to Light Touch                       [] Impaired:        PALPATION: Muscles: Increased tone and tenderness to palpation of:  "bilateral suboccipitals, paraspinals, upper trapezius, levator scapulae , periscapular musculature, temporalis, but decreased as compared to previous visits.         POSTURE:  Pt presents with postural abnormalities which include:               [x] Forward Head                                [] Increased Lumbar Lordosis              [x] Rounded Shoulder                         [] Genu Recurvatum              [] Increased Thoracic Kyphosis        [] Genu Valgus              [] Trunk Deviated                              [] Pes Planus              [] Scapular Winging                          [] Other:         Function:       Treatment     Dary received the treatments listed below:       MANUAL THERAPY TECHNIQUES were applied for (12) minutes, including:    Manual Intervention Performed Today    Soft Tissue Mobilization [x] bilateral suboccipitals, paraspinals, upper trapezius, levator scapulae , periscapular musculature    Joint Mobilizations []     []     []    Functional Dry Needling  []      Plan for Next Visit: Continue as needed           THERAPEUTIC EXERCISES to develop strength, endurance, ROM, flexibility, posture, and core stabilization for (23) minutes including:    Intervention Performed Today    Upper trap stretch x 3 x 20-30" each side   Levator scap stretch x 3 x 20-30" each side   Open Book x 10x, 5" holds each side                       Re-assessment x Objective tests, FOTO, pt education     Plan for Next Visit:         NEUROMUSCULAR RE-EDUCATION ACTIVITIES to improve Balance, Coordination, Kinesthetic, Sense, Proprioception, and Posture for (19) minutes.  The following were included:    Intervention Performed Today    UBE for postural strength and endurance x 2'/2'   Scapular Retractions (progressed) x 3 x 10, green theraband    Shoulder Extensions (progressed) x 3 x 10, green theraband    Seated Bilateral External Rotation (progressed) x 2 x 10, red theraband    Series 6 - on airex foam  1' each, " 2' pec stretch   Chin Tucks - seated (progressed) x 2 x 10               Plan for Next Visit:      Patient Education and Home Exercises       Home Exercises Provided and Patient Education Provided     Education provided: (included in treatment section) minutes  PURPOSE: Patient educated on the impairments noted above and the effects of physical therapy intervention to improve overall condition and QOL.   EXERCISE: Patient was educated on all the above exercise prior/during/after for proper posture, positioning, and execution for safe performance with home exercise program.   STRENGTH: Patient educated on the importance of improved core and extremity strength in order to improve alignment of the spine and extremities with static positions and dynamic movement.   POSTURE: Patient educated on postural awareness to reduce stress and maintain optimal alignment of the spine with static positions and dynamic movement     Written Home Exercises Provided: yes.  Exercises were reviewed and Dary was able to demonstrate them prior to the end of the session.  Dary demonstrated good  understanding of the education provided. See EMR under Patient Instructions for exercises provided during therapy sessions.    Assessment     Patient tolerated treatment well and has attended 6 total PT visits. She has made good overall progress with PT, demonstrating improvements in cervical spine ROM, upper extremity strength, and overall postural stability. She presents with improved muscle tone and decreased muscle guarding. Patient has returned to more functional activities with less limitation and pain, and she is satisfied with her overall progress. She is independent with exercises, but would benefit from one more visit to go over details final HEP. She is appropriate to transitions towards DC next visit.     Dary is progressing well towards her goals.   Patient prognosis is Good.     Patient will continue to benefit from skilled  outpatient physical therapy to address the deficits listed in the problem list box on initial evaluation, provide pt/family education and to maximize patient's level of independence in the home and community environment.     Patient's spiritual, cultural and educational needs considered and pt agreeable to plan of care and goals.     Anticipated Barriers for therapy: co-morbidities and chronicity of condition     Goals:  Short Term Goals:  6 weeks Status  Date Met   PAIN: Pt will report worst pain of 7/10 in order to progress toward max functional ability and improve quality of life. [] Progressing  [x] Met  [] Not Met  1/22/2024   STRENGTH: Patient will improve strength to 50% of stated goals, listed in objective measures above, in order to progress towards independence with functional activities. [] Progressing  [x] Met  [] Not Met  1/22/2024    POSTURE: Patient will correct postural deviations in sitting and standing, to decrease pain and promote long term stability.  [] Progressing  [x] Met  [] Not Met   1/22/2024   HEP: Patient will demonstrate independence with HEP in order to progress toward functional independence. [] Progressing  [x] Met  [] Not Met   1/22/2024      Long Term Goals:  12 weeks Status Date Met   PAIN: Pt will report worst pain of 5/10 in order to progress toward max functional ability and improve quality of life [] Progressing  [x] Met  [] Not Met 2/27/2024    FUNCTION: Patient will demonstrate improved function as indicated by a score of greater than or equal to 60 out of 100 on FOTO. (PT set goal based on clinical judgement from assessment today) [] Progressing  [x] Met  [] Not Met  2/27/2024   MOBILITY: Patient will improve AROM to stated goals, listed in objective measures above, in order to return to maximal functional potential and improve quality of life.  [x] Progressing  [] Met  [] Not Met Partially Met  (All but extension)  2/27/2024    STRENGTH: Patient will improve strength to  stated goals, listed in objective measures above, in order to improve functional independence and quality of life.  [] Progressing  [x] Met  [] Not Met 2/27/2024    GAIT: Patient will demonstrate normalized gait mechanics with minimal compensation in order to return to PLOF. [] Progressing  [x] Met  [] Not Met 2/27/2024   Patient will return to normal ADL's, IADL's, community involvement, recreational activities, and work-related activities with less than or equal to 3/10 pain and maximal function.  [] Progressing  [x] Met  [] Not Met  2/27/2024      Plan     Continue Plan of Care (POC) and progress per patient tolerance. See treatment section for details on planned progressions next session.    2/27/2024: Patient will finish her last approved visit, establish final HEP, and be DC with HEP at that time.     1/22/2024: It is my recommendation that patient continue with PT at her current frequency of 2 times per week for the remainder of her approved visits. Her treatment plan will remain the same, and she will be progressed appropriately.     12/18/2023 (evaluation): Outpatient Physical Therapy 2 times weekly for 12 weeks to include any combination of the following interventions: virtual visits, dry needling, modalities, electrical stimulation (IFC, Pre-Mod, Attended with Functional Dry Needling), Aquatic Therapy, Cervical/Lumbar Traction, Gait Training, Manual Therapy, Neuromuscular Re-ed, Patient Education, Self Care, Therapeutic Exercise, and Therapeutic Activites      Lisandra Phillips, PT

## 2024-02-27 NOTE — PLAN OF CARE
[facility]   Physical Therapy Treatment Note + Progress Note       Name: Dary Chaney  Clinic Number: [id number]    Therapy Diagnosis:   Encounter Diagnoses   Name Primary?    Chronic intractable headache, unspecified headache type Yes    Decreased ROM of neck     Decreased strength, endurance, and mobility      Physician: Madeline Giron PA-C    Visit Date: 2/27/2024    Physician Orders: PT Eval and Treat  Medical Diagnosis from Referral: decreased ROM of neck  Evaluation Date: 12/18/2023  Authorization Period Expiration: 12/31/2024   Plan of Care Expiration: 3/17/2024  Progress Note Due: 3/17/2024  Visit # / Visits authorized: 5/20 (+eval)  FOTO: 1/3 (last performed on 12/18/2023)     Precautions: Standard and history or renal cell carcinoma (2018)    PTA Visit #: 0/5     Time In: 1104  Time Out: 1159  Total Billable Time: 54 minutes (Billing reflects 1 on 1 treatment time spent with patient)    Subjective     Patient reports: she has been feeling better in regards to neck pain and headaches. She was sick with a sinus infection and then bronchitis, but even with coughing a lot, she has not been having headaches. Patient feels confident in making next visit her last.     He/She was partially compliant with home exercise program.  Response to previous treatment: no adverse reaction  Functional change: pain still comes and goes, feels her mobility is about the same.     Pain:  0/10     Location: middle of neck with referred pain into head    Objective      Objective Measures updated at progress report or POC update only unless otherwise noted.     RANGE OF MOTION:   Cervical Right   (spine) Left     Pain/Dysfunction with Movement Goal   Cervical Flexion (60º) 63  (54) --- No pain 60   Cervical Extension (80º) 78  (70) --- No pain 80   Cervical Side Bending (45º) 45  (38) 45  (44) No pain 45   Cervical Rotation (75º) 76  (76) 76  (76) No pain 75         STRENGTH:   U/E MMT Right Left  Pain/Dysfunction with Movement Goal Right  2/27/2024  Left  2/27/2024    Shoulder Flexion 4/5 4/5 No increase in pain with MMT's 4+/5 B 4+ 4+   Shoulder Abduction 4/5 4/5   4+/5 B 4+ 4+   Shoulder IR 4+/5 4+/5   4+/5 B 4+ 4+   Shoulder ER 4/5 4/5   4+/5 B 4+ 4+   Serratus Anterior NT NT   4+/5 B 4+ 4+   Elbow Flexion  4+/5 4+/5   5/5 B 5 5   Elbow Extension 4+/5 4+/5   5/5 B 5 5   Wrist Flexion 5/5 5/5   5/5 B 5 5   Wrist Extension 5/5 5/5   5/5 B 5 5         MUSCLE LENGTH:   Muscle Tested  Right Left  Goal   Upper Trapezius [] Normal  [x] Limited [] Normal  [x] Limited Normal B   Levator Scapular  [] Normal  [x] Limited [] Normal  [x] Limited Normal B   Scalenes [] Normal  [x] Limited [] Normal  [x] Limited Normal B   Pectoralis Minor [] Normal  [x] Limited [] Normal  [x] Limited Normal B   Pectoralis Major [] Normal  [x] Limited [] Normal  [x] Limited Normal B    **Although still decreased, muscle length has improved as compared to previous visits.      JOINT MOBILITY:   Joint Motion Right Mobility  (spine) Left Mobility Goal   Subcranial:  [] Hypo     [x] Normal     [] Hyper [] Hypo     [x] Normal     [] Hyper Normal    Cervical Upglides [] Hypo     [x] Normal     [] Hyper [] Hypo     [x] Normal     [] Hyper Normal    Cervical Downglides  [] Hypo     [x] Normal     [] Hyper [] Hypo     [x] Normal     [] Hyper Normal    1st Rib  [] Hypo     [x] Normal     [] Hyper [] Hypo     [x] Normal     [] Hyper Normal    Thoracic PA Gap [x] Hypo     [] Normal     [] Hyper --- Normal    Thoracic spine mobility still stiff but much improved.     SPECIAL TESTS:     Right  (spine) Left  Goal   Cervical Distraction [x] Positive    [] Negative [x] Positive    [] Negative Negative B    Cervical Compression  [] Positive    [x] Negative [] Positive    [x] Negative Negative B          SENSATION  [x] Intact to Light Touch                       [] Impaired:        PALPATION: Muscles: Increased tone and tenderness to palpation of:  "bilateral suboccipitals, paraspinals, upper trapezius, levator scapulae , periscapular musculature, temporalis, but decreased as compared to previous visits.         POSTURE:  Pt presents with postural abnormalities which include:               [x] Forward Head                                [] Increased Lumbar Lordosis              [x] Rounded Shoulder                         [] Genu Recurvatum              [] Increased Thoracic Kyphosis        [] Genu Valgus              [] Trunk Deviated                              [] Pes Planus              [] Scapular Winging                          [] Other:         Function:       Treatment     Dary received the treatments listed below:       MANUAL THERAPY TECHNIQUES were applied for (12) minutes, including:    Manual Intervention Performed Today    Soft Tissue Mobilization [x] bilateral suboccipitals, paraspinals, upper trapezius, levator scapulae , periscapular musculature    Joint Mobilizations []     []     []    Functional Dry Needling  []      Plan for Next Visit: Continue as needed           THERAPEUTIC EXERCISES to develop strength, endurance, ROM, flexibility, posture, and core stabilization for (23) minutes including:    Intervention Performed Today    Upper trap stretch x 3 x 20-30" each side   Levator scap stretch x 3 x 20-30" each side   Open Book x 10x, 5" holds each side                       Re-assessment x Objective tests, FOTO, pt education     Plan for Next Visit:         NEUROMUSCULAR RE-EDUCATION ACTIVITIES to improve Balance, Coordination, Kinesthetic, Sense, Proprioception, and Posture for (19) minutes.  The following were included:    Intervention Performed Today    UBE for postural strength and endurance x 2'/2'   Scapular Retractions (progressed) x 3 x 10, green theraband    Shoulder Extensions (progressed) x 3 x 10, green theraband    Seated Bilateral External Rotation (progressed) x 2 x 10, red theraband    Series 6 - on airex foam  1' each, " 2' pec stretch   Chin Tucks - seated (progressed) x 2 x 10               Plan for Next Visit:      Patient Education and Home Exercises       Home Exercises Provided and Patient Education Provided     Education provided: (included in treatment section) minutes  PURPOSE: Patient educated on the impairments noted above and the effects of physical therapy intervention to improve overall condition and QOL.   EXERCISE: Patient was educated on all the above exercise prior/during/after for proper posture, positioning, and execution for safe performance with home exercise program.   STRENGTH: Patient educated on the importance of improved core and extremity strength in order to improve alignment of the spine and extremities with static positions and dynamic movement.   POSTURE: Patient educated on postural awareness to reduce stress and maintain optimal alignment of the spine with static positions and dynamic movement     Written Home Exercises Provided: yes.  Exercises were reviewed and Dary was able to demonstrate them prior to the end of the session.  Dary demonstrated good  understanding of the education provided. See EMR under Patient Instructions for exercises provided during therapy sessions.    Assessment     Patient tolerated treatment well and has attended 6 total PT visits. She has made good overall progress with PT, demonstrating improvements in cervical spine ROM, upper extremity strength, and overall postural stability. She presents with improved muscle tone and decreased muscle guarding. Patient has returned to more functional activities with less limitation and pain, and she is satisfied with her overall progress. She is independent with exercises, but would benefit from one more visit to go over details final HEP. She is appropriate to transitions towards DC next visit.     Dary is progressing well towards her goals.   Patient prognosis is Good.     Patient will continue to benefit from skilled  outpatient physical therapy to address the deficits listed in the problem list box on initial evaluation, provide pt/family education and to maximize patient's level of independence in the home and community environment.     Patient's spiritual, cultural and educational needs considered and pt agreeable to plan of care and goals.     Anticipated Barriers for therapy: co-morbidities and chronicity of condition     Goals:  Short Term Goals:  6 weeks Status  Date Met   PAIN: Pt will report worst pain of 7/10 in order to progress toward max functional ability and improve quality of life. [] Progressing  [x] Met  [] Not Met  1/22/2024   STRENGTH: Patient will improve strength to 50% of stated goals, listed in objective measures above, in order to progress towards independence with functional activities. [] Progressing  [x] Met  [] Not Met  1/22/2024    POSTURE: Patient will correct postural deviations in sitting and standing, to decrease pain and promote long term stability.  [] Progressing  [x] Met  [] Not Met   1/22/2024   HEP: Patient will demonstrate independence with HEP in order to progress toward functional independence. [] Progressing  [x] Met  [] Not Met   1/22/2024      Long Term Goals:  12 weeks Status Date Met   PAIN: Pt will report worst pain of 5/10 in order to progress toward max functional ability and improve quality of life [] Progressing  [x] Met  [] Not Met 2/27/2024    FUNCTION: Patient will demonstrate improved function as indicated by a score of greater than or equal to 60 out of 100 on FOTO. (PT set goal based on clinical judgement from assessment today) [] Progressing  [x] Met  [] Not Met  2/27/2024   MOBILITY: Patient will improve AROM to stated goals, listed in objective measures above, in order to return to maximal functional potential and improve quality of life.  [x] Progressing  [] Met  [] Not Met Partially Met  (All but extension)  2/27/2024    STRENGTH: Patient will improve strength to  stated goals, listed in objective measures above, in order to improve functional independence and quality of life.  [] Progressing  [x] Met  [] Not Met 2/27/2024    GAIT: Patient will demonstrate normalized gait mechanics with minimal compensation in order to return to PLOF. [] Progressing  [x] Met  [] Not Met 2/27/2024   Patient will return to normal ADL's, IADL's, community involvement, recreational activities, and work-related activities with less than or equal to 3/10 pain and maximal function.  [] Progressing  [x] Met  [] Not Met  2/27/2024      Plan     Continue Plan of Care (POC) and progress per patient tolerance. See treatment section for details on planned progressions next session.    2/27/2024: Patient will finish her last approved visit, establish final HEP, and be DC with HEP at that time.     1/22/2024: It is my recommendation that patient continue with PT at her current frequency of 2 times per week for the remainder of her approved visits. Her treatment plan will remain the same, and she will be progressed appropriately.     12/18/2023 (evaluation): Outpatient Physical Therapy 2 times weekly for 12 weeks to include any combination of the following interventions: virtual visits, dry needling, modalities, electrical stimulation (IFC, Pre-Mod, Attended with Functional Dry Needling), Aquatic Therapy, Cervical/Lumbar Traction, Gait Training, Manual Therapy, Neuromuscular Re-ed, Patient Education, Self Care, Therapeutic Exercise, and Therapeutic Activites      Lisandra Phillips, PT

## 2024-02-29 ENCOUNTER — CLINICAL SUPPORT (OUTPATIENT)
Dept: REHABILITATION | Facility: HOSPITAL | Age: 69
End: 2024-02-29
Payer: MEDICARE

## 2024-02-29 DIAGNOSIS — R29.898 DECREASED ROM OF NECK: ICD-10-CM

## 2024-02-29 DIAGNOSIS — R53.1 DECREASED STRENGTH, ENDURANCE, AND MOBILITY: ICD-10-CM

## 2024-02-29 DIAGNOSIS — G89.29 CHRONIC INTRACTABLE HEADACHE, UNSPECIFIED HEADACHE TYPE: Primary | ICD-10-CM

## 2024-02-29 DIAGNOSIS — R68.89 DECREASED STRENGTH, ENDURANCE, AND MOBILITY: ICD-10-CM

## 2024-02-29 DIAGNOSIS — Z74.09 DECREASED STRENGTH, ENDURANCE, AND MOBILITY: ICD-10-CM

## 2024-02-29 DIAGNOSIS — R51.9 CHRONIC INTRACTABLE HEADACHE, UNSPECIFIED HEADACHE TYPE: Primary | ICD-10-CM

## 2024-02-29 PROCEDURE — 97140 MANUAL THERAPY 1/> REGIONS: CPT | Mod: HCNC | Performed by: PHYSICAL THERAPIST

## 2024-02-29 PROCEDURE — 97112 NEUROMUSCULAR REEDUCATION: CPT | Mod: HCNC | Performed by: PHYSICAL THERAPIST

## 2024-02-29 PROCEDURE — 97110 THERAPEUTIC EXERCISES: CPT | Mod: HCNC | Performed by: PHYSICAL THERAPIST

## 2024-02-29 NOTE — PLAN OF CARE
[facility]   Physical Therapy Treatment Note + Discharge Summary       Name: Dary Chaney  Clinic Number: [id number]    Therapy Diagnosis:   Encounter Diagnoses   Name Primary?    Chronic intractable headache, unspecified headache type Yes    Decreased ROM of neck     Decreased strength, endurance, and mobility      Physician: Madeline Giron PA-C    Visit Date: 2/29/2024    Physician Orders: PT Eval and Treat  Medical Diagnosis from Referral: decreased ROM of neck  Evaluation Date: 12/18/2023  Authorization Period Expiration: 12/31/2024   Plan of Care Expiration: 3/17/2024  Progress Note Due: 3/17/2024  Visit # / Visits authorized: 6/20 (+eval)  FOTO: 1/3 (last performed on 12/18/2023)     Precautions: Standard and history or renal cell carcinoma (2018)    PTA Visit #: 0/5     Time In: 1107  Time Out: 1201  Total Billable Time: 54 minutes (Billing reflects 1 on 1 treatment time spent with patient)    Subjective     Patient reports: that she is feeling pretty good today. She has a little stiffness with the cold weather, but no pain. She is ready to make today her last day.    He/She was partially compliant with home exercise program.  Response to previous treatment: no adverse reaction  Functional change: pain still comes and goes, feels her mobility is about the same.     Pain:  0/10     Location: middle of neck with referred pain into head    Objective      Objective Measures updated at progress report or POC update only unless otherwise noted.     RANGE OF MOTION:   Cervical Right   (spine) Left     Pain/Dysfunction with Movement Goal   Cervical Flexion (60º) 63  (54) --- No pain 60   Cervical Extension (80º) 78  (70) --- No pain 80   Cervical Side Bending (45º) 45  (38) 45  (44) No pain 45   Cervical Rotation (75º) 76  (76) 76  (76) No pain 75         STRENGTH:   U/E MMT Right Left Pain/Dysfunction with Movement Goal Right  2/27/2024  Left  2/27/2024    Shoulder Flexion 4/5 4/5 No  increase in pain with MMT's 4+/5 B 4+ 4+   Shoulder Abduction 4/5 4/5   4+/5 B 4+ 4+   Shoulder IR 4+/5 4+/5   4+/5 B 4+ 4+   Shoulder ER 4/5 4/5   4+/5 B 4+ 4+   Serratus Anterior NT NT   4+/5 B 4+ 4+   Elbow Flexion  4+/5 4+/5   5/5 B 5 5   Elbow Extension 4+/5 4+/5   5/5 B 5 5   Wrist Flexion 5/5 5/5   5/5 B 5 5   Wrist Extension 5/5 5/5   5/5 B 5 5         MUSCLE LENGTH:   Muscle Tested  Right Left  Goal   Upper Trapezius [] Normal  [x] Limited [] Normal  [x] Limited Normal B   Levator Scapular  [] Normal  [x] Limited [] Normal  [x] Limited Normal B   Scalenes [] Normal  [x] Limited [] Normal  [x] Limited Normal B   Pectoralis Minor [] Normal  [x] Limited [] Normal  [x] Limited Normal B   Pectoralis Major [] Normal  [x] Limited [] Normal  [x] Limited Normal B    **Although still decreased, muscle length has improved as compared to previous visits.      JOINT MOBILITY:   Joint Motion Right Mobility  (spine) Left Mobility Goal   Subcranial:  [] Hypo     [x] Normal     [] Hyper [] Hypo     [x] Normal     [] Hyper Normal    Cervical Upglides [] Hypo     [x] Normal     [] Hyper [] Hypo     [x] Normal     [] Hyper Normal    Cervical Downglides  [] Hypo     [x] Normal     [] Hyper [] Hypo     [x] Normal     [] Hyper Normal    1st Rib  [] Hypo     [x] Normal     [] Hyper [] Hypo     [x] Normal     [] Hyper Normal    Thoracic PA Gap [x] Hypo     [] Normal     [] Hyper --- Normal    Thoracic spine mobility still stiff but much improved.     SPECIAL TESTS:     Right  (spine) Left  Goal   Cervical Distraction [x] Positive    [] Negative [x] Positive    [] Negative Negative B    Cervical Compression  [] Positive    [x] Negative [] Positive    [x] Negative Negative B          SENSATION  [x] Intact to Light Touch                       [] Impaired:        PALPATION: Muscles: Increased tone and tenderness to palpation of: bilateral suboccipitals, paraspinals, upper trapezius, levator scapulae , periscapular musculature,  "temporalis, but decreased as compared to previous visits.         POSTURE:  Pt presents with postural abnormalities which include:               [x] Forward Head                                [] Increased Lumbar Lordosis              [x] Rounded Shoulder                         [] Genu Recurvatum              [] Increased Thoracic Kyphosis        [] Genu Valgus              [] Trunk Deviated                              [] Pes Planus              [] Scapular Winging                          [] Other:         Function:       Treatment     Dary received the treatments listed below:       MANUAL THERAPY TECHNIQUES were applied for (12) minutes, including:    Manual Intervention Performed Today    Soft Tissue Mobilization [x] bilateral suboccipitals, paraspinals, upper trapezius, levator scapulae , periscapular musculature    Joint Mobilizations []     []     []    Functional Dry Needling  []      Plan for Next Visit: Continue as needed           THERAPEUTIC EXERCISES to develop strength, endurance, ROM, flexibility, posture, and core stabilization for (19) minutes including:    Intervention Performed Today    Upper trap stretch x 3 x 20-30" each side   Levator scap stretch x 3 x 20-30" each side   Open Book x 10x, 5" holds each side   Final HEP x See Patient Instructions for details                  Re-assessment  Objective tests, FOTO, pt education     Plan for Next Visit:         NEUROMUSCULAR RE-EDUCATION ACTIVITIES to improve Balance, Coordination, Kinesthetic, Sense, Proprioception, and Posture for (23) minutes.  The following were included:    Intervention Performed Today    UBE for postural strength and endurance x 2'/2'   Scapular Retractions  x 3 x 10, green theraband    Shoulder Extensions  x 3 x 10, green theraband    Seated Bilateral External Rotation x 3 x 10, red theraband    Series 6 - no beam or foam today x 1' each, 2' pec stretch   Chin Tucks - seated  x 2 x 10               Plan for Next Visit:  "     Patient Education and Home Exercises       Home Exercises Provided and Patient Education Provided     Education provided: (included in treatment section) minutes  PURPOSE: Patient educated on the impairments noted above and the effects of physical therapy intervention to improve overall condition and QOL.   EXERCISE: Patient was educated on all the above exercise prior/during/after for proper posture, positioning, and execution for safe performance with home exercise program.   STRENGTH: Patient educated on the importance of improved core and extremity strength in order to improve alignment of the spine and extremities with static positions and dynamic movement.   POSTURE: Patient educated on postural awareness to reduce stress and maintain optimal alignment of the spine with static positions and dynamic movement     Written Home Exercises Provided: yes.  Exercises were reviewed and Dary was able to demonstrate them prior to the end of the session.  Dary demonstrated good  understanding of the education provided. See EMR under Patient Instructions for exercises provided during therapy sessions.    Assessment     Patient tolerated treatment well and has attended 7 total PT visits. She has made good overall progress with PT, demonstrating improvements in cervical spine ROM, upper extremity strength, and overall postural stability. She presents with improved muscle tone and decreased muscle guarding. Patient has returned to more functional activities with less limitation and pain, and she is satisfied with her overall progress. She is independent with exercises, and expressed understanding of finalized HEP. She is appropriate for DC with HEP at this time.      Dary is progressing well towards her goals.   Patient prognosis is Good.     Patient will continue to benefit from skilled outpatient physical therapy to address the deficits listed in the problem list box on initial evaluation, provide pt/family  education and to maximize patient's level of independence in the home and community environment.     Patient's spiritual, cultural and educational needs considered and pt agreeable to plan of care and goals.     Anticipated Barriers for therapy: co-morbidities and chronicity of condition     Goals:  Short Term Goals:  6 weeks Status  Date Met   PAIN: Pt will report worst pain of 7/10 in order to progress toward max functional ability and improve quality of life. [] Progressing  [x] Met  [] Not Met  1/22/2024   STRENGTH: Patient will improve strength to 50% of stated goals, listed in objective measures above, in order to progress towards independence with functional activities. [] Progressing  [x] Met  [] Not Met  1/22/2024    POSTURE: Patient will correct postural deviations in sitting and standing, to decrease pain and promote long term stability.  [] Progressing  [x] Met  [] Not Met   1/22/2024   HEP: Patient will demonstrate independence with HEP in order to progress toward functional independence. [] Progressing  [x] Met  [] Not Met   1/22/2024      Long Term Goals:  12 weeks Status Date Met   PAIN: Pt will report worst pain of 5/10 in order to progress toward max functional ability and improve quality of life [] Progressing  [x] Met  [] Not Met 2/27/2024    FUNCTION: Patient will demonstrate improved function as indicated by a score of greater than or equal to 60 out of 100 on FOTO. (PT set goal based on clinical judgement from assessment today) [] Progressing  [x] Met  [] Not Met  2/27/2024   MOBILITY: Patient will improve AROM to stated goals, listed in objective measures above, in order to return to maximal functional potential and improve quality of life.  [x] Progressing  [] Met  [] Not Met Partially Met  (All but extension)  2/27/2024    STRENGTH: Patient will improve strength to stated goals, listed in objective measures above, in order to improve functional independence and quality of life.  []  Progressing  [x] Met  [] Not Met 2/27/2024    GAIT: Patient will demonstrate normalized gait mechanics with minimal compensation in order to return to PLOF. [] Progressing  [x] Met  [] Not Met 2/27/2024   Patient will return to normal ADL's, IADL's, community involvement, recreational activities, and work-related activities with less than or equal to 3/10 pain and maximal function.  [] Progressing  [x] Met  [] Not Met  2/27/2024      Plan     2/29/2024: Patient is considered DC from PT with HEP at this time.     2/27/2024: Patient will finish her last approved visit, establish final HEP, and be DC with HEP at that time.     1/22/2024: It is my recommendation that patient continue with PT at her current frequency of 2 times per week for the remainder of her approved visits. Her treatment plan will remain the same, and she will be progressed appropriately.     12/18/2023 (evaluation): Outpatient Physical Therapy 2 times weekly for 12 weeks to include any combination of the following interventions: virtual visits, dry needling, modalities, electrical stimulation (IFC, Pre-Mod, Attended with Functional Dry Needling), Aquatic Therapy, Cervical/Lumbar Traction, Gait Training, Manual Therapy, Neuromuscular Re-ed, Patient Education, Self Care, Therapeutic Exercise, and Therapeutic Activites      Lisandra Phillips, PT

## 2024-02-29 NOTE — PROGRESS NOTES
[facility]   Physical Therapy Treatment Note + Discharge Summary       Name: Dary Chaney  Clinic Number: [id number]    Therapy Diagnosis:   Encounter Diagnoses   Name Primary?    Chronic intractable headache, unspecified headache type Yes    Decreased ROM of neck     Decreased strength, endurance, and mobility      Physician: Madeline Giron PA-C    Visit Date: 2/29/2024    Physician Orders: PT Eval and Treat  Medical Diagnosis from Referral: decreased ROM of neck  Evaluation Date: 12/18/2023  Authorization Period Expiration: 12/31/2024   Plan of Care Expiration: 3/17/2024  Progress Note Due: 3/17/2024  Visit # / Visits authorized: 6/20 (+eval)  FOTO: 1/3 (last performed on 12/18/2023)     Precautions: Standard and history or renal cell carcinoma (2018)    PTA Visit #: 0/5     Time In: 1107  Time Out: 1201  Total Billable Time: 54 minutes (Billing reflects 1 on 1 treatment time spent with patient)    Subjective     Patient reports: that she is feeling pretty good today. She has a little stiffness with the cold weather, but no pain. She is ready to make today her last day.    He/She was partially compliant with home exercise program.  Response to previous treatment: no adverse reaction  Functional change: pain still comes and goes, feels her mobility is about the same.     Pain:  0/10     Location: middle of neck with referred pain into head    Objective      Objective Measures updated at progress report or POC update only unless otherwise noted.     RANGE OF MOTION:   Cervical Right   (spine) Left     Pain/Dysfunction with Movement Goal   Cervical Flexion (60º) 63  (54) --- No pain 60   Cervical Extension (80º) 78  (70) --- No pain 80   Cervical Side Bending (45º) 45  (38) 45  (44) No pain 45   Cervical Rotation (75º) 76  (76) 76  (76) No pain 75         STRENGTH:   U/E MMT Right Left Pain/Dysfunction with Movement Goal Right  2/27/2024  Left  2/27/2024    Shoulder Flexion 4/5 4/5 No  increase in pain with MMT's 4+/5 B 4+ 4+   Shoulder Abduction 4/5 4/5   4+/5 B 4+ 4+   Shoulder IR 4+/5 4+/5   4+/5 B 4+ 4+   Shoulder ER 4/5 4/5   4+/5 B 4+ 4+   Serratus Anterior NT NT   4+/5 B 4+ 4+   Elbow Flexion  4+/5 4+/5   5/5 B 5 5   Elbow Extension 4+/5 4+/5   5/5 B 5 5   Wrist Flexion 5/5 5/5   5/5 B 5 5   Wrist Extension 5/5 5/5   5/5 B 5 5         MUSCLE LENGTH:   Muscle Tested  Right Left  Goal   Upper Trapezius [] Normal  [x] Limited [] Normal  [x] Limited Normal B   Levator Scapular  [] Normal  [x] Limited [] Normal  [x] Limited Normal B   Scalenes [] Normal  [x] Limited [] Normal  [x] Limited Normal B   Pectoralis Minor [] Normal  [x] Limited [] Normal  [x] Limited Normal B   Pectoralis Major [] Normal  [x] Limited [] Normal  [x] Limited Normal B    **Although still decreased, muscle length has improved as compared to previous visits.      JOINT MOBILITY:   Joint Motion Right Mobility  (spine) Left Mobility Goal   Subcranial:  [] Hypo     [x] Normal     [] Hyper [] Hypo     [x] Normal     [] Hyper Normal    Cervical Upglides [] Hypo     [x] Normal     [] Hyper [] Hypo     [x] Normal     [] Hyper Normal    Cervical Downglides  [] Hypo     [x] Normal     [] Hyper [] Hypo     [x] Normal     [] Hyper Normal    1st Rib  [] Hypo     [x] Normal     [] Hyper [] Hypo     [x] Normal     [] Hyper Normal    Thoracic PA Gap [x] Hypo     [] Normal     [] Hyper --- Normal    Thoracic spine mobility still stiff but much improved.     SPECIAL TESTS:     Right  (spine) Left  Goal   Cervical Distraction [x] Positive    [] Negative [x] Positive    [] Negative Negative B    Cervical Compression  [] Positive    [x] Negative [] Positive    [x] Negative Negative B          SENSATION  [x] Intact to Light Touch                       [] Impaired:        PALPATION: Muscles: Increased tone and tenderness to palpation of: bilateral suboccipitals, paraspinals, upper trapezius, levator scapulae , periscapular musculature,  "temporalis, but decreased as compared to previous visits.         POSTURE:  Pt presents with postural abnormalities which include:               [x] Forward Head                                [] Increased Lumbar Lordosis              [x] Rounded Shoulder                         [] Genu Recurvatum              [] Increased Thoracic Kyphosis        [] Genu Valgus              [] Trunk Deviated                              [] Pes Planus              [] Scapular Winging                          [] Other:         Function:       Treatment     Dary received the treatments listed below:       MANUAL THERAPY TECHNIQUES were applied for (12) minutes, including:    Manual Intervention Performed Today    Soft Tissue Mobilization [x] bilateral suboccipitals, paraspinals, upper trapezius, levator scapulae , periscapular musculature    Joint Mobilizations []     []     []    Functional Dry Needling  []      Plan for Next Visit: Continue as needed           THERAPEUTIC EXERCISES to develop strength, endurance, ROM, flexibility, posture, and core stabilization for (19) minutes including:    Intervention Performed Today    Upper trap stretch x 3 x 20-30" each side   Levator scap stretch x 3 x 20-30" each side   Open Book x 10x, 5" holds each side   Final HEP x See Patient Instructions for details                  Re-assessment  Objective tests, FOTO, pt education     Plan for Next Visit:         NEUROMUSCULAR RE-EDUCATION ACTIVITIES to improve Balance, Coordination, Kinesthetic, Sense, Proprioception, and Posture for (23) minutes.  The following were included:    Intervention Performed Today    UBE for postural strength and endurance x 2'/2'   Scapular Retractions  x 3 x 10, green theraband    Shoulder Extensions  x 3 x 10, green theraband    Seated Bilateral External Rotation x 3 x 10, red theraband    Series 6 - no beam or foam today x 1' each, 2' pec stretch   Chin Tucks - seated  x 2 x 10               Plan for Next Visit:  "     Patient Education and Home Exercises       Home Exercises Provided and Patient Education Provided     Education provided: (included in treatment section) minutes  PURPOSE: Patient educated on the impairments noted above and the effects of physical therapy intervention to improve overall condition and QOL.   EXERCISE: Patient was educated on all the above exercise prior/during/after for proper posture, positioning, and execution for safe performance with home exercise program.   STRENGTH: Patient educated on the importance of improved core and extremity strength in order to improve alignment of the spine and extremities with static positions and dynamic movement.   POSTURE: Patient educated on postural awareness to reduce stress and maintain optimal alignment of the spine with static positions and dynamic movement     Written Home Exercises Provided: yes.  Exercises were reviewed and Dary was able to demonstrate them prior to the end of the session.  Dary demonstrated good  understanding of the education provided. See EMR under Patient Instructions for exercises provided during therapy sessions.    Assessment     Patient tolerated treatment well and has attended 7 total PT visits. She has made good overall progress with PT, demonstrating improvements in cervical spine ROM, upper extremity strength, and overall postural stability. She presents with improved muscle tone and decreased muscle guarding. Patient has returned to more functional activities with less limitation and pain, and she is satisfied with her overall progress. She is independent with exercises, and expressed understanding of finalized HEP. She is appropriate for DC with HEP at this time.      Dary is progressing well towards her goals.   Patient prognosis is Good.     Patient will continue to benefit from skilled outpatient physical therapy to address the deficits listed in the problem list box on initial evaluation, provide pt/family  education and to maximize patient's level of independence in the home and community environment.     Patient's spiritual, cultural and educational needs considered and pt agreeable to plan of care and goals.     Anticipated Barriers for therapy: co-morbidities and chronicity of condition     Goals:  Short Term Goals:  6 weeks Status  Date Met   PAIN: Pt will report worst pain of 7/10 in order to progress toward max functional ability and improve quality of life. [] Progressing  [x] Met  [] Not Met  1/22/2024   STRENGTH: Patient will improve strength to 50% of stated goals, listed in objective measures above, in order to progress towards independence with functional activities. [] Progressing  [x] Met  [] Not Met  1/22/2024    POSTURE: Patient will correct postural deviations in sitting and standing, to decrease pain and promote long term stability.  [] Progressing  [x] Met  [] Not Met   1/22/2024   HEP: Patient will demonstrate independence with HEP in order to progress toward functional independence. [] Progressing  [x] Met  [] Not Met   1/22/2024      Long Term Goals:  12 weeks Status Date Met   PAIN: Pt will report worst pain of 5/10 in order to progress toward max functional ability and improve quality of life [] Progressing  [x] Met  [] Not Met 2/27/2024    FUNCTION: Patient will demonstrate improved function as indicated by a score of greater than or equal to 60 out of 100 on FOTO. (PT set goal based on clinical judgement from assessment today) [] Progressing  [x] Met  [] Not Met  2/27/2024   MOBILITY: Patient will improve AROM to stated goals, listed in objective measures above, in order to return to maximal functional potential and improve quality of life.  [x] Progressing  [] Met  [] Not Met Partially Met  (All but extension)  2/27/2024    STRENGTH: Patient will improve strength to stated goals, listed in objective measures above, in order to improve functional independence and quality of life.  []  Progressing  [x] Met  [] Not Met 2/27/2024    GAIT: Patient will demonstrate normalized gait mechanics with minimal compensation in order to return to PLOF. [] Progressing  [x] Met  [] Not Met 2/27/2024   Patient will return to normal ADL's, IADL's, community involvement, recreational activities, and work-related activities with less than or equal to 3/10 pain and maximal function.  [] Progressing  [x] Met  [] Not Met  2/27/2024      Plan     2/29/2024: Patient is considered DC from PT with HEP at this time.     2/27/2024: Patient will finish her last approved visit, establish final HEP, and be DC with HEP at that time.     1/22/2024: It is my recommendation that patient continue with PT at her current frequency of 2 times per week for the remainder of her approved visits. Her treatment plan will remain the same, and she will be progressed appropriately.     12/18/2023 (evaluation): Outpatient Physical Therapy 2 times weekly for 12 weeks to include any combination of the following interventions: virtual visits, dry needling, modalities, electrical stimulation (IFC, Pre-Mod, Attended with Functional Dry Needling), Aquatic Therapy, Cervical/Lumbar Traction, Gait Training, Manual Therapy, Neuromuscular Re-ed, Patient Education, Self Care, Therapeutic Exercise, and Therapeutic Activites      Lisandra Phillips, PT

## 2024-03-04 ENCOUNTER — OFFICE VISIT (OUTPATIENT)
Dept: INTERNAL MEDICINE | Facility: CLINIC | Age: 69
End: 2024-03-04
Payer: MEDICARE

## 2024-03-04 VITALS
WEIGHT: 176.56 LBS | SYSTOLIC BLOOD PRESSURE: 132 MMHG | RESPIRATION RATE: 20 BRPM | HEIGHT: 63 IN | DIASTOLIC BLOOD PRESSURE: 78 MMHG | HEART RATE: 80 BPM | BODY MASS INDEX: 31.29 KG/M2

## 2024-03-04 DIAGNOSIS — E11.36 DM CATARACT: ICD-10-CM

## 2024-03-04 DIAGNOSIS — Z00.00 ENCOUNTER FOR PREVENTATIVE ADULT HEALTH CARE EXAMINATION: Primary | ICD-10-CM

## 2024-03-04 DIAGNOSIS — E11.59 HYPERTENSION ASSOCIATED WITH DIABETES: ICD-10-CM

## 2024-03-04 DIAGNOSIS — Z86.010 HISTORY OF COLON POLYPS: ICD-10-CM

## 2024-03-04 DIAGNOSIS — C64.9 RENAL CELL CARCINOMA, UNSPECIFIED LATERALITY: ICD-10-CM

## 2024-03-04 DIAGNOSIS — Z98.890 HISTORY OF BACK SURGERY: ICD-10-CM

## 2024-03-04 DIAGNOSIS — J84.10 CALCIFIED GRANULOMA OF LUNG: ICD-10-CM

## 2024-03-04 DIAGNOSIS — N28.1 RENAL CYST: ICD-10-CM

## 2024-03-04 DIAGNOSIS — K22.2 ESOPHAGEAL RING, ACQUIRED: ICD-10-CM

## 2024-03-04 DIAGNOSIS — Z63.4 GRIEF AT LOSS OF CHILD: ICD-10-CM

## 2024-03-04 DIAGNOSIS — E11.69 HYPERLIPIDEMIA ASSOCIATED WITH TYPE 2 DIABETES MELLITUS: ICD-10-CM

## 2024-03-04 DIAGNOSIS — I10 HYPERTENSION GOAL BP (BLOOD PRESSURE) < 130/80: Chronic | ICD-10-CM

## 2024-03-04 DIAGNOSIS — Z12.31 BREAST CANCER SCREENING BY MAMMOGRAM: ICD-10-CM

## 2024-03-04 DIAGNOSIS — E66.9 TYPE 2 DIABETES MELLITUS WITH OBESITY: ICD-10-CM

## 2024-03-04 DIAGNOSIS — E11.69 TYPE 2 DIABETES MELLITUS WITH OBESITY: ICD-10-CM

## 2024-03-04 DIAGNOSIS — M51.06 INTERVERTEBRAL LUMBAR DISC DISORDER WITH MYELOPATHY, LUMBAR REGION: ICD-10-CM

## 2024-03-04 DIAGNOSIS — J30.2 SEASONAL ALLERGIC RHINITIS, UNSPECIFIED TRIGGER: ICD-10-CM

## 2024-03-04 DIAGNOSIS — I15.2 HYPERTENSION ASSOCIATED WITH DIABETES: ICD-10-CM

## 2024-03-04 DIAGNOSIS — E11.9 TYPE 2 DIABETES MELLITUS WITHOUT RETINOPATHY: ICD-10-CM

## 2024-03-04 DIAGNOSIS — K44.9 HIATAL HERNIA: ICD-10-CM

## 2024-03-04 DIAGNOSIS — E78.5 HYPERLIPIDEMIA ASSOCIATED WITH TYPE 2 DIABETES MELLITUS: ICD-10-CM

## 2024-03-04 DIAGNOSIS — E65 CENTRAL OBESITY: ICD-10-CM

## 2024-03-04 DIAGNOSIS — E11.9 TYPE 2 DIABETES MELLITUS WITH HEMOGLOBIN A1C GOAL OF LESS THAN 7.0%: ICD-10-CM

## 2024-03-04 DIAGNOSIS — I70.0 ATHEROSCLEROSIS OF AORTA: ICD-10-CM

## 2024-03-04 DIAGNOSIS — E78.1 TYPE 2 DIABETES MELLITUS WITH HYPERTRIGLYCERIDEMIA: ICD-10-CM

## 2024-03-04 DIAGNOSIS — R29.898 DECREASED ROM OF NECK: ICD-10-CM

## 2024-03-04 DIAGNOSIS — E11.69 TYPE 2 DIABETES MELLITUS WITH HYPERTRIGLYCERIDEMIA: ICD-10-CM

## 2024-03-04 DIAGNOSIS — R68.89 DECREASED STRENGTH, ENDURANCE, AND MOBILITY: ICD-10-CM

## 2024-03-04 DIAGNOSIS — J34.2 NASAL SEPTAL DEVIATION: ICD-10-CM

## 2024-03-04 DIAGNOSIS — K76.0 FATTY LIVER: ICD-10-CM

## 2024-03-04 DIAGNOSIS — I83.93 VARICOSE VEINS OF BOTH LOWER EXTREMITIES, UNSPECIFIED WHETHER COMPLICATED: ICD-10-CM

## 2024-03-04 DIAGNOSIS — G89.29 CHRONIC INTRACTABLE HEADACHE, UNSPECIFIED HEADACHE TYPE: ICD-10-CM

## 2024-03-04 DIAGNOSIS — N20.0 RENAL STONE: ICD-10-CM

## 2024-03-04 DIAGNOSIS — Z00.00 ENCOUNTER FOR MEDICARE ANNUAL WELLNESS EXAM: ICD-10-CM

## 2024-03-04 DIAGNOSIS — F43.21 GRIEF AT LOSS OF CHILD: ICD-10-CM

## 2024-03-04 DIAGNOSIS — K22.10 ULCER OF ESOPHAGUS WITHOUT BLEEDING: ICD-10-CM

## 2024-03-04 DIAGNOSIS — R51.9 CHRONIC INTRACTABLE HEADACHE, UNSPECIFIED HEADACHE TYPE: ICD-10-CM

## 2024-03-04 DIAGNOSIS — R53.1 DECREASED STRENGTH, ENDURANCE, AND MOBILITY: ICD-10-CM

## 2024-03-04 DIAGNOSIS — Z74.09 DECREASED STRENGTH, ENDURANCE, AND MOBILITY: ICD-10-CM

## 2024-03-04 DIAGNOSIS — E78.5 HYPERLIPIDEMIA LDL GOAL <70: Chronic | ICD-10-CM

## 2024-03-04 PROBLEM — R13.19 ESOPHAGEAL DYSPHAGIA: Status: RESOLVED | Noted: 2018-08-21 | Resolved: 2024-03-04

## 2024-03-04 PROCEDURE — 3044F HG A1C LEVEL LT 7.0%: CPT | Mod: HCNC,CPTII,S$GLB, | Performed by: NURSE PRACTITIONER

## 2024-03-04 PROCEDURE — 1160F RVW MEDS BY RX/DR IN RCRD: CPT | Mod: HCNC,CPTII,S$GLB, | Performed by: NURSE PRACTITIONER

## 2024-03-04 PROCEDURE — 1157F ADVNC CARE PLAN IN RCRD: CPT | Mod: HCNC,CPTII,S$GLB, | Performed by: NURSE PRACTITIONER

## 2024-03-04 PROCEDURE — 1159F MED LIST DOCD IN RCRD: CPT | Mod: HCNC,CPTII,S$GLB, | Performed by: NURSE PRACTITIONER

## 2024-03-04 PROCEDURE — 3078F DIAST BP <80 MM HG: CPT | Mod: HCNC,CPTII,S$GLB, | Performed by: NURSE PRACTITIONER

## 2024-03-04 PROCEDURE — 1101F PT FALLS ASSESS-DOCD LE1/YR: CPT | Mod: HCNC,CPTII,S$GLB, | Performed by: NURSE PRACTITIONER

## 2024-03-04 PROCEDURE — 3288F FALL RISK ASSESSMENT DOCD: CPT | Mod: HCNC,CPTII,S$GLB, | Performed by: NURSE PRACTITIONER

## 2024-03-04 PROCEDURE — 99999 PR PBB SHADOW E&M-EST. PATIENT-LVL V: CPT | Mod: PBBFAC,HCNC,, | Performed by: NURSE PRACTITIONER

## 2024-03-04 PROCEDURE — 3072F LOW RISK FOR RETINOPATHY: CPT | Mod: HCNC,CPTII,S$GLB, | Performed by: NURSE PRACTITIONER

## 2024-03-04 PROCEDURE — 3075F SYST BP GE 130 - 139MM HG: CPT | Mod: HCNC,CPTII,S$GLB, | Performed by: NURSE PRACTITIONER

## 2024-03-04 PROCEDURE — G0439 PPPS, SUBSEQ VISIT: HCPCS | Mod: HCNC,S$GLB,, | Performed by: NURSE PRACTITIONER

## 2024-03-04 PROCEDURE — 1170F FXNL STATUS ASSESSED: CPT | Mod: HCNC,CPTII,S$GLB, | Performed by: NURSE PRACTITIONER

## 2024-03-04 SDOH — SOCIAL DETERMINANTS OF HEALTH (SDOH): DISSAPEARANCE AND DEATH OF FAMILY MEMBER: Z63.4

## 2024-03-04 NOTE — PROGRESS NOTES
"  Dary Chaney presented for a  Medicare AWV and comprehensive Health Risk Assessment today. The following components were reviewed and updated:    Medical history  Family History  Social history  Allergies and Current Medications  Health Risk Assessment  Health Maintenance  Care Team         ** See Completed Assessments for Annual Wellness Visit within the encounter summary.**         The following assessments were completed:  Living Situation  CAGE  Depression Screening  Timed Get Up and Go  Whisper Test  Cognitive Function Screening  Nutrition Screening  ADL Screening  PAQ Screening      Opioid documentation:      Patient does not have a current opioid prescription.        Vitals:    03/04/24 1024   BP: 132/78   BP Location: Right arm   Patient Position: Sitting   Pulse: 80   Resp: 20   Weight: 80.1 kg (176 lb 9.4 oz)   Height:    Pain scale 5' 3" (1.6 m)    1/5 "everywhere"     Body mass index is 31.28 kg/m².  Physical Exam  Constitutional:       General: She is not in acute distress.     Appearance: She is not ill-appearing or diaphoretic.   HENT:      Mouth/Throat:      Mouth: Mucous membranes are moist.   Eyes:      General:         Right eye: No discharge.         Left eye: No discharge.      Conjunctiva/sclera: Conjunctivae normal.   Cardiovascular:      Rate and Rhythm: Normal rate and regular rhythm.   Pulmonary:      Effort: Pulmonary effort is normal. No respiratory distress.      Comments: Rare mild wheeze  Skin:     General: Skin is warm and dry.   Neurological:      Mental Status: She is alert and oriented to person, place, and time. Mental status is at baseline.   Psychiatric:         Mood and Affect: Mood normal.         Behavior: Behavior normal.         Thought Content: Thought content normal.         Judgment: Judgment normal.     Diagnoses and health risks identified today and associated recommendations/orders:    1. Encounter for preventative adult health care examination, Encounter for " Medicare annual wellness exam  - Ambulatory Referral/Consult to Enhanced Annual Wellness Visit (eAWV)  Review for opioid screening: Patient does not have Rx for Opioids  Review for substance use disorder: Patient does not use substance per chart    Patient states she drinks alcohol- she drinks about 3-4 drinks a month    I offered to discuss advanced care planning, including how to pick a person who would make decisions for you if you were unable to make them for yourself, called a health care power of , and what kind of decisions you might make such as use of life sustaining treatments such as ventilators and tube feeding when faced with a life limiting illness recorded on a living will that they will need to know. (How you want to be cared for as you near the end of your natural life)     X  Patient has advanced directives on file, which we reviewed, and they do not wish to make changes.    - Ambulatory referral/consult to Pharmacy Assistance; Future- Jardiance    2. DM cataract, Type 2 diabetes mellitus without retinopathy  Monitor  Follow up with Ophtho    3. Calcified granuloma of lung  Monitor  Follow up as directed    4. Atherosclerosis of aorta, Hyperlipidemia associated with type 2 diabetes mellitus, Hyperlipidemia LDL goal <70  Monitor  Follow up as directed  Blood pressure control  Continue home zocor    5. Hypertension associated with diabetes, Hypertension goal BP (blood pressure) < 130/80  Monitor  Stable  Continue home benicar    6. Esophageal ring, acquired, Hiatal hernia, Hx Ulcer of esophagus without bleeding, History of colon polyps, Fatty liver   Monitor  Follow up with GI as directed  Continue home prilosec  - Ambulatory referral/consult to Gastroenterology; Future- Colonoscopy    7. Renal cell carcinoma, unspecified laterality  Monitor  Follow up as directed     8.  Renal stone, Renal cyst  Monitor  Follow up as needed, directed     9.  Type 2 diabetes mellitus with  hypertriglyceridemia, Type 2 diabetes mellitus with hemoglobin A1c goal of less than 7.0%  Monitor  Stable  Follow up with PCP  Continue home actos, amaryl, tresiba    10. Seasonal allergic rhinitis, unspecified trigger, Nasal septal deviation- mild  Monitor  Stable  Continue home flonase, claritin    11. Type 2 diabetes mellitus with obesity, Central obesity  Monitor  Follow up with PCP   Increase activity as tolerated    12. Intervertebral lumbar disc disorder with myelopathy, lumbar region, Decreased ROM of neck, History of back surgery, Decreased strength, endurance, and mobility  Monitor  Follow up as needed, directed    13. Chronic intractable headache, unspecified headache type  Monitor  Follow up as needed, directed     14. Grief at loss of child- Daughter  in 2023  Monitor  Stable  Follow up with PCP    15. Varicose veins of both lower extremities, unspecified whether complicated  Monitor  Follow up as needed, directed      16. Breast cancer screening by mammogram  Health maintenance screening  - Mammo Digital Screening Bilat w/ [facility]; Future                        Provided Dary with a 5-10 year written screening schedule and personal prevention plan. Recommendations were developed using the USPSTF age appropriate recommendations. Education, counseling, and referrals were provided as needed. After Visit Summary printed and given to patient which includes a list of additional screenings\tests needed.    Follow up in about 1 year (around 3/4/2025) for Annual wellness visit.    JORDIN Andino

## 2024-03-04 NOTE — PATIENT INSTRUCTIONS
Counseling and Referral of Other Preventative  (Italic type indicates deductible and co-insurance are waived)    Patient Name: Dary Chaney  Today's Date: 3/4/2024    Health Maintenance       Date Due Completion Date    RSV Vaccine (Age 60+ and Pregnant patients) (1 - 1-dose 60+ series) Never done ---    TETANUS VACCINE 08/26/2021 8/26/2011    Colorectal Cancer Screening 03/01/2023 3/1/2018    Influenza Vaccine (1) 09/01/2023 11/12/2021    Override on 11/20/2013: Declined    Mammogram 03/14/2024 3/14/2023    Override on 11/13/2012: (N/S)    Hemoglobin A1c 08/09/2024 2/9/2024    Eye Exam 08/09/2024 8/9/2023    Diabetes Urine Screening 08/21/2024 8/21/2023    Foot Exam 08/21/2024 8/21/2023    Override on 9/17/2015: Done    Override on 5/14/2015: Done    Override on 1/14/2015: Done    Override on 9/15/2014: Done    Lipid Panel 11/08/2024 11/8/2023    DEXA Scan 12/18/2024 12/18/2019    High Dose Statin 02/20/2025 2/20/2024        Orders Placed This Encounter   Procedures    Mammo Digital Screening Bilat w/ [facility]    Ambulatory referral/consult to Pharmacy Assistance    Ambulatory referral/consult to Gastroenterology     The following information is provided to all patients.  This information is to help you find resources for any of the problems found today that may be affecting your health:                  Living healthy guide: www.[facility].[place].gov      Understanding Diabetes: www.diabetes.org      Eating healthy: www.cdc.gov/healthyweight      CDC home safety checklist: www.cdc.gov/steadi/patient.html      Agency on Aging: www.goea.[place].gov      Alcoholics anonymous (AA): www.aa.org      Physical Activity: www.yen.nih.gov/mr2rtcp      Tobacco use: www.quitwithusla.org

## 2024-03-08 ENCOUNTER — TELEPHONE (OUTPATIENT)
Dept: DIABETES | Facility: CLINIC | Age: 69
End: 2024-03-08
Payer: MEDICARE

## 2024-03-08 DIAGNOSIS — K21.9 CHRONIC GERD: ICD-10-CM

## 2024-03-08 DIAGNOSIS — E11.9 TYPE 2 DIABETES MELLITUS WITH HEMOGLOBIN A1C GOAL OF LESS THAN 7.0%: Primary | ICD-10-CM

## 2024-03-08 DIAGNOSIS — I10 HYPERTENSION GOAL BP (BLOOD PRESSURE) < 130/80: Chronic | ICD-10-CM

## 2024-03-08 RX ORDER — OMEPRAZOLE 40 MG/1
CAPSULE, DELAYED RELEASE ORAL
Qty: 90 CAPSULE | Refills: 0 | Status: SHIPPED | OUTPATIENT
Start: 2024-03-08 | End: 2024-05-31 | Stop reason: SDUPTHER

## 2024-03-08 RX ORDER — SEMAGLUTIDE 2.68 MG/ML
2 INJECTION, SOLUTION SUBCUTANEOUS
Qty: 3 ML | Refills: 5 | Status: SHIPPED | OUTPATIENT
Start: 2024-03-08 | End: 2024-05-13

## 2024-03-08 RX ORDER — OLMESARTAN MEDOXOMIL 20 MG/1
20 TABLET ORAL
Qty: 90 TABLET | Refills: 0 | Status: SHIPPED | OUTPATIENT
Start: 2024-03-08 | End: 2024-05-31 | Stop reason: SDUPTHER

## 2024-03-08 NOTE — TELEPHONE ENCOUNTER
Refill Routing Note   Medication(s) are not appropriate for processing by [facility] for the following reason(s):      Allergy or intolerance    [facility] action(s):  Approve  Defer Care Due:  None identified     Medication Therapy Plan: Allergy/Contraindication: olmesartan    Pharmacist review requested: Yes     Appointments  past 12m or future 3m with PCP    Date Provider   Last Visit   10/31/2023 Sakina Cooper DO   Next Visit   5/31/2024 Sakina Cooper DO   ED visits in past 90 days: 0        Note composed:9:03 AM 03/08/2024

## 2024-03-08 NOTE — TELEPHONE ENCOUNTER
Refill Decision Note   Dary Chaney  is requesting a refill authorization.  Brief Assessment and Rationale for Refill:  Approve     Medication Therapy Plan:         Pharmacist review requested: Yes   Comments:     Note composed:2:20 PM 03/08/2024

## 2024-03-08 NOTE — TELEPHONE ENCOUNTER
Please let patient know I authorized more refills of 2 mg of Ozempic to requested pharmacy.  Please verify that she has been taking this and just needs a refill, and okay to remove this from the allergy list if she is tolerating well.

## 2024-03-08 NOTE — TELEPHONE ENCOUNTER
Patient made aware Ozempic was sent to the pharmacy. She states she has been taking it and is tolerating it well

## 2024-03-08 NOTE — TELEPHONE ENCOUNTER
Spoke with patient regarding last office vit sit which states that pt was on ozempic 2mg pt states when she went to the pharmacy no refills where in placed pt just wants a refill on ozempic. I also see  in her allergies it say ozempic causes  pt has nausea and vomiting ----- Message from Bala Saul sent at 3/8/2024 11:14 AM CST -----  Contact: rick Foote is calling regarding a refill for her ozympic.  Please give her a call back if needed at [phone]      [address] [address] [address] [address]  Phone: [phone] Fax: [phone]

## 2024-03-08 NOTE — TELEPHONE ENCOUNTER
No care due was identified.  Health [facility] Embedded Care Due Messages. Reference number: [id number].   3/08/2024 7:02:17 AM CST

## 2024-03-26 ENCOUNTER — OFFICE VISIT (OUTPATIENT)
Dept: NEUROLOGY | Facility: CLINIC | Age: 69
End: 2024-03-26
Payer: MEDICARE

## 2024-03-26 VITALS
BODY MASS INDEX: 32.2 KG/M2 | DIASTOLIC BLOOD PRESSURE: 81 MMHG | WEIGHT: 175 LBS | SYSTOLIC BLOOD PRESSURE: 147 MMHG | HEART RATE: 81 BPM | HEIGHT: 62 IN

## 2024-03-26 DIAGNOSIS — R51.9 NONINTRACTABLE HEADACHE, UNSPECIFIED CHRONICITY PATTERN, UNSPECIFIED HEADACHE TYPE: Primary | ICD-10-CM

## 2024-03-26 PROCEDURE — 3072F LOW RISK FOR RETINOPATHY: CPT | Mod: HCNC,CPTII,S$GLB, | Performed by: PHYSICIAN ASSISTANT

## 2024-03-26 PROCEDURE — 3077F SYST BP >= 140 MM HG: CPT | Mod: HCNC,CPTII,S$GLB, | Performed by: PHYSICIAN ASSISTANT

## 2024-03-26 PROCEDURE — 3044F HG A1C LEVEL LT 7.0%: CPT | Mod: HCNC,CPTII,S$GLB, | Performed by: PHYSICIAN ASSISTANT

## 2024-03-26 PROCEDURE — 99999 PR PBB SHADOW E&M-EST. PATIENT-LVL IV: CPT | Mod: PBBFAC,HCNC,, | Performed by: PHYSICIAN ASSISTANT

## 2024-03-26 PROCEDURE — 1101F PT FALLS ASSESS-DOCD LE1/YR: CPT | Mod: HCNC,CPTII,S$GLB, | Performed by: PHYSICIAN ASSISTANT

## 2024-03-26 PROCEDURE — 1159F MED LIST DOCD IN RCRD: CPT | Mod: HCNC,CPTII,S$GLB, | Performed by: PHYSICIAN ASSISTANT

## 2024-03-26 PROCEDURE — 99214 OFFICE O/P EST MOD 30 MIN: CPT | Mod: HCNC,S$GLB,, | Performed by: PHYSICIAN ASSISTANT

## 2024-03-26 PROCEDURE — 1160F RVW MEDS BY RX/DR IN RCRD: CPT | Mod: HCNC,CPTII,S$GLB, | Performed by: PHYSICIAN ASSISTANT

## 2024-03-26 PROCEDURE — 1157F ADVNC CARE PLAN IN RCRD: CPT | Mod: HCNC,CPTII,S$GLB, | Performed by: PHYSICIAN ASSISTANT

## 2024-03-26 PROCEDURE — 1126F AMNT PAIN NOTED NONE PRSNT: CPT | Mod: HCNC,CPTII,S$GLB, | Performed by: PHYSICIAN ASSISTANT

## 2024-03-26 PROCEDURE — 3288F FALL RISK ASSESSMENT DOCD: CPT | Mod: HCNC,CPTII,S$GLB, | Performed by: PHYSICIAN ASSISTANT

## 2024-03-26 PROCEDURE — 4010F ACE/ARB THERAPY RXD/TAKEN: CPT | Mod: HCNC,CPTII,S$GLB, | Performed by: PHYSICIAN ASSISTANT

## 2024-03-26 PROCEDURE — 3008F BODY MASS INDEX DOCD: CPT | Mod: HCNC,CPTII,S$GLB, | Performed by: PHYSICIAN ASSISTANT

## 2024-03-26 PROCEDURE — 3079F DIAST BP 80-89 MM HG: CPT | Mod: HCNC,CPTII,S$GLB, | Performed by: PHYSICIAN ASSISTANT

## 2024-03-26 NOTE — PROGRESS NOTES
Established Patient     SUBJECTIVE:  Patient ID: Dary Chaney   Chief Complaint: Follow-up    History of Present Illness:  Dary Chaney is a 68 y.o. female with renal cell carcinoma, arthritis, cataracts, DM type 2, HLD, HTN, lumbar disc disorder w/ myelopathy, NSD, kidney stone, fatty liver, h/o ulcer, obesity, peripheral vascular disease, anxiety,  who presents to clinic alone for follow-up of headaches.       03/26/2024 - Interval History:  Pt did not bring [place] diary to visit today. She reports she has not had a HA in over 3 weeks. She completed PT recently and found it to have been helpful. Is continuing home PT recommendations. She is also working on trigger mgmt, lifestyle changes, and conservative measures. She defers changes to regimen or medications today. Is happy w/ current plan of working on trigger mgmt/lifestyle changes. Will rtc prn.     Recommendations made at last Office Visit on 11/29/2023:  - Discussed symptoms appear to be consistent with multifactorial ha's including migraines, ON, cervicogenic ha's, discussed treatment options and patient agreed with the following plan:  - as pt is unsure of frequency and other characteristics to ha's, pt to begin tracking and provide diary to next visit. Will determine need for ppx at that time. Discussed potential options w/ pt including elavil, cymbalta, gabapentin, lyrica, nb's.   - abortives - discussed gepants, diclofenac gel, lidocaine gel  - referral to PT for cervicogenic component  - HTN - elevated todayt, pt states bp's have been stable at home, advisd pt to monitor, mgmt per pcp  - importance of healthy diet, regular exercise and sleep hygiene in the treatment of headaches    - RTC in 3 mo     Treatments Tried:  None recalled, possibly tried gabapentin but is unsure  Triptans - avoid 2/2 pvd  Nsaids - caution 2/2 h/o ulcers  Tpx/zonisamide - caution 2/2 h/o kidney stones  Steroids - caution 2/2 h/o dm  PT - helped    Current  "Medications:    Current Outpatient Medications:     albuterol (PROVENTIL) 2.5 mg /3 mL (0.083 %) nebulizer solution, Take 3 mLs (2.5 mg total) by nebulization every 6 (six) hours as needed for Wheezing., Disp: 360 mL, Rfl: 3    ASHWAGANDHA ROOT EXTRACT ORAL, Take by mouth 3 (three) times daily as needed. Prn Anxiety, Disp: , Rfl:     blood sugar diagnostic Strp, 1 each by Misc.(Non-Drug; Combo Route) route 4 (four) times daily., Disp: 300 each, Rfl: 3    blood-glucose meter kit, Test finger stick blood sugar once daily., Disp: 1 each, Rfl: 0    fluticasone (FLONASE) 50 mcg/actuation nasal spray, USE TWO SPRAY(S) IN EACH NOSTRIL ONCE DAILY, Disp: 16 g, Rfl: 1    FLUZONE HIGHDOSE QUAD 21-22  mcg/0.7 mL Syrg, , Disp: , Rfl:     glimepiride (AMARYL) 4 MG tablet, Take 1 tablet (4 mg total) by mouth daily with breakfast., Disp: 90 tablet, Rfl: 3    insulin degludec (TRESIBA FLEXTOUCH U-200) 200 unit/mL (3 mL) insulin pen, Inject 66 Units into the skin once daily., Disp: 4 pen , Rfl: 9    LACTOBACILLUS RHAMNOSUS GG ORAL, Take 1 capsule by mouth once daily., Disp: , Rfl:     lancets Misc, 1 each by Misc.(Non-Drug; Combo Route) route 4 (four) times daily., Disp: 100 each, Rfl: 11    lancets Misc, 1 each by Misc.(Non-Drug; Combo Route) route 4 (four) times daily., Disp: 300 each, Rfl: 3    loratadine (CLARITIN) 10 mg tablet, Take 1 tablet (10 mg total) by mouth once daily. (Patient taking differently: Take 10 mg by mouth daily as needed.), Disp: 30 tablet, Rfl: 0    MAGNESIUM ORAL, Take 500 mg by mouth once daily., Disp: , Rfl:     olmesartan (BENICAR) 20 MG tablet, Take 1 tablet by mouth once daily, Disp: 90 tablet, Rfl: 0    omeprazole (PRILOSEC) 40 MG capsule, Take 1 capsule by mouth once daily, Disp: 90 capsule, Rfl: 0    pen needle, diabetic (PEN NEEDLE) 31 gauge x 5/16" Ndle, Inject 1 each into the skin once daily., Disp: 100 each, Rfl: 11    PFIZER COVID BIVAL,12Y UP,,PF, 30 mcg/0.3 mL injection, , Disp: , Rfl: " "    pioglitazone (ACTOS) 15 MG tablet, Take 1 tablet (15 mg total) by mouth once daily., Disp: 30 tablet, Rfl: 11    PREVNAR 20, PF, 0.5 mL Syrg injection, , Disp: , Rfl:     semaglutide (OZEMPIC) 2 mg/dose (8 mg/3 mL) PnIj, Inject 2 mg into the skin every 7 days., Disp: 3 mL, Rfl: 5    SHINGRIX, PF, 50 mcg/0.5 mL injection, , Disp: , Rfl:     simvastatin (ZOCOR) 40 MG tablet, Take 40 mg by mouth every evening., Disp: , Rfl:     Review of Systems - as per HPI, otherwise a balanced 10 systems review is negative.    OBJECTIVE:  Vitals:  BP (!) 147/81 (BP Location: Right arm, Patient Position: Sitting, BP Method: Medium (Automatic))   Pulse 81   Ht 5' 2" (1.575 m)   Wt 79.4 kg (175 lb)   BMI 32.01 kg/m²      Physical Exam:  Constitutional: she appears well-developed and well-nourished. she is well groomed. NAD   HENT:    Head: Normocephalic and atraumatic  Eyes: Conjunctivae and EOM are normal  Musculoskeletal: Normal range of motion. No joint stiffness.   Skin: Skin is warm and dry.  Psychiatric: Mood and affect are normal    Neuro: Patient is alert and oriented to person, place, and time. Language is intact and fluent. Speech is clear and fluent. Recent and remote memory are intact.  Normal attention and concentration.  Facial movement is symmetric. Moves all 4 extremities against gravity. Gait and station normal.  Cranial Nerves II through XII without focal deficit.     Review of Data:   Notes from PT, neuro reviewed   Labs:  Office Visit on 02/20/2024   Component Date Value Ref Range Status    SARS Coronavirus 2 Antigen 02/20/2024 Negative  Negative Final     Acceptable 02/20/2024 Yes   Final   Office Visit on 02/14/2024   Component Date Value Ref Range Status    POC Glucose 02/14/2024 145 (A)  70 - 110 MG/DL Final   Lab Visit on 02/09/2024   Component Date Value Ref Range Status    Hemoglobin A1C 02/09/2024 6.9 (H)  4.0 - 5.6 % Final    Estimated Avg Glucose 02/09/2024 151 (H)  68 - 131 mg/dL " Final   Lab Visit on 01/03/2024   Component Date Value Ref Range Status    WBC 01/03/2024 5.41  3.90 - 12.70 K/uL Final    RBC 01/03/2024 5.05  4.00 - 5.40 M/uL Final    Hemoglobin 01/03/2024 12.9  12.0 - 16.0 g/dL Final    Hematocrit 01/03/2024 40.3  37.0 - 48.5 % Final    MCV 01/03/2024 80 (L)  82 - 98 fL Final    MCH 01/03/2024 25.5 (L)  27.0 - 31.0 pg Final    MCHC 01/03/2024 32.0  32.0 - 36.0 g/dL Final    RDW 01/03/2024 15.7 (H)  11.5 - 14.5 % Final    Platelets 01/03/2024 266  150 - 450 K/uL Final    MPV 01/03/2024 9.6  9.2 - 12.9 fL Final    Immature Granulocytes 01/03/2024 0.2  0.0 - 0.5 % Final    Gran # (ANC) 01/03/2024 3.2  1.8 - 7.7 K/uL Final    Immature Grans (Abs) 01/03/2024 0.01  0.00 - 0.04 K/uL Final    Lymph # 01/03/2024 1.7  1.0 - 4.8 K/uL Final    Mono # 01/03/2024 0.4  0.3 - 1.0 K/uL Final    Eos # 01/03/2024 0.1  0.0 - 0.5 K/uL Final    Baso # 01/03/2024 0.04  0.00 - 0.20 K/uL Final    nRBC 01/03/2024 0  0 /100 WBC Final    Gran % 01/03/2024 58.9  38.0 - 73.0 % Final    Lymph % 01/03/2024 32.0  18.0 - 48.0 % Final    Mono % 01/03/2024 6.5  4.0 - 15.0 % Final    Eosinophil % 01/03/2024 1.7  0.0 - 8.0 % Final    Basophil % 01/03/2024 0.7  0.0 - 1.9 % Final    Differential Method 01/03/2024 Automated   Final    Sodium 01/03/2024 144  136 - 145 mmol/L Final    Potassium 01/03/2024 4.4  3.5 - 5.1 mmol/L Final    Chloride 01/03/2024 107  95 - 110 mmol/L Final    CO2 01/03/2024 25  23 - 29 mmol/L Final    Glucose 01/03/2024 90  70 - 110 mg/dL Final    BUN 01/03/2024 29 (H)  8 - 23 mg/dL Final    Creatinine 01/03/2024 0.8  0.5 - 1.4 mg/dL Final    Calcium 01/03/2024 8.9  8.7 - 10.5 mg/dL Final    Total Protein 01/03/2024 7.0  6.0 - 8.4 g/dL Final    Albumin 01/03/2024 3.8  3.5 - 5.2 g/dL Final    Total Bilirubin 01/03/2024 0.3  0.1 - 1.0 mg/dL Final    Alkaline Phosphatase 01/03/2024 85  55 - 135 U/L Final    AST 01/03/2024 17  10 - 40 U/L Final    ALT 01/03/2024 19  10 - 44 U/L Final    eGFR  01/03/2024 >60  >60 mL/min/1.73 m^2 Final    Anion Gap 01/03/2024 12  8 - 16 mmol/L Final     Imaging:  No results found for this or any previous visit.  Note: I have independently reviewed any/all imaging/labs/tests and agree with the report (s) as documented.  Any discrepancies will be as noted/demarcated by free text.  CIRA BEAR 3/26/2024    ASSESSMENT:  1. Nonintractable headache, unspecified chronicity pattern, unspecified headache type        PLAN:  - Discussed symptoms appear to be consistent with multifactorial ha's including migraines, ON, cervicogenic ha's, discussed treatment options and patient agreed with the following plan:  - as pt is unsure of frequency and other characteristics to ha's, pt to begin tracking and provide diary to next visit. Discussed potential options w/ pt including elavil, cymbalta, gabapentin, lyrica, nb's.   - abortives - discussed gepants, diclofenac gel, lidocaine gel  - completed PT for cervicogenic component which helped greatly, continues home exercises as recommended  - HTN - elevated todayt, pt states bp's have been stable at home, advisd pt to monitor, mgmt per pcp  - track ha's  - Discussed goals of therapy are to decrease the frequency, intensity, and duration of headaches  - RTC prn          Discussed potential for teratogenicity with treatment, patient understands if her family planning status should change she will contact office immediately and we will safely adjust medications as needed.     Questions and concerns were sought and answered to the patient's stated verbal satisfaction.  The patient verbalizes understanding and agreement with the above stated treatment plan.     CC: Sakina Cooper DO Sarena Patel, PA-C  [facility] [place]   [phone]    Dr. Klein was available during today's encounter.

## 2024-04-15 DIAGNOSIS — J20.9 ACUTE BRONCHITIS, UNSPECIFIED ORGANISM: ICD-10-CM

## 2024-04-15 RX ORDER — ALBUTEROL SULFATE 0.83 MG/ML
2.5 SOLUTION RESPIRATORY (INHALATION) EVERY 6 HOURS PRN
Qty: 1125 ML | Refills: 0 | Status: SHIPPED | OUTPATIENT
Start: 2024-04-15

## 2024-04-15 NOTE — TELEPHONE ENCOUNTER
Refill Decision Note   Dary Shiv  is requesting a refill authorization.  Brief Assessment and Rationale for Refill:  Approve     Medication Therapy Plan:        Comments:     Note composed:1:03 PM 04/15/2024

## 2024-04-15 NOTE — TELEPHONE ENCOUNTER
No care due was identified.  [facility] Embedded Care Due Messages. Reference number: [id number].   4/15/2024 7:01:36 AM CDT

## 2024-05-13 ENCOUNTER — TELEPHONE (OUTPATIENT)
Dept: DIABETES | Facility: CLINIC | Age: 69
End: 2024-05-13

## 2024-05-13 ENCOUNTER — OFFICE VISIT (OUTPATIENT)
Dept: DIABETES | Facility: CLINIC | Age: 69
End: 2024-05-13
Payer: MEDICARE

## 2024-05-13 VITALS
DIASTOLIC BLOOD PRESSURE: 76 MMHG | WEIGHT: 173.5 LBS | SYSTOLIC BLOOD PRESSURE: 140 MMHG | BODY MASS INDEX: 31.73 KG/M2 | HEART RATE: 91 BPM

## 2024-05-13 DIAGNOSIS — I10 HYPERTENSION GOAL BP (BLOOD PRESSURE) < 130/80: ICD-10-CM

## 2024-05-13 DIAGNOSIS — E11.9 TYPE 2 DIABETES MELLITUS WITH HEMOGLOBIN A1C GOAL OF LESS THAN 7.0%: Primary | ICD-10-CM

## 2024-05-13 DIAGNOSIS — K76.0 FATTY LIVER: ICD-10-CM

## 2024-05-13 DIAGNOSIS — E78.5 HYPERLIPIDEMIA LDL GOAL <70: ICD-10-CM

## 2024-05-13 DIAGNOSIS — E66.9 OBESITY (BMI 30-39.9): ICD-10-CM

## 2024-05-13 LAB — GLUCOSE SERPL-MCNC: 277 MG/DL (ref 70–110)

## 2024-05-13 PROCEDURE — 3008F BODY MASS INDEX DOCD: CPT | Mod: HCNC,CPTII,S$GLB, | Performed by: PHYSICIAN ASSISTANT

## 2024-05-13 PROCEDURE — 1101F PT FALLS ASSESS-DOCD LE1/YR: CPT | Mod: HCNC,CPTII,S$GLB, | Performed by: PHYSICIAN ASSISTANT

## 2024-05-13 PROCEDURE — 3044F HG A1C LEVEL LT 7.0%: CPT | Mod: HCNC,CPTII,S$GLB, | Performed by: PHYSICIAN ASSISTANT

## 2024-05-13 PROCEDURE — 1159F MED LIST DOCD IN RCRD: CPT | Mod: HCNC,CPTII,S$GLB, | Performed by: PHYSICIAN ASSISTANT

## 2024-05-13 PROCEDURE — 3078F DIAST BP <80 MM HG: CPT | Mod: HCNC,CPTII,S$GLB, | Performed by: PHYSICIAN ASSISTANT

## 2024-05-13 PROCEDURE — 3072F LOW RISK FOR RETINOPATHY: CPT | Mod: HCNC,CPTII,S$GLB, | Performed by: PHYSICIAN ASSISTANT

## 2024-05-13 PROCEDURE — 99214 OFFICE O/P EST MOD 30 MIN: CPT | Mod: HCNC,S$GLB,, | Performed by: PHYSICIAN ASSISTANT

## 2024-05-13 PROCEDURE — 99999 PR PBB SHADOW E&M-EST. PATIENT-LVL IV: CPT | Mod: PBBFAC,HCNC,, | Performed by: PHYSICIAN ASSISTANT

## 2024-05-13 PROCEDURE — 1126F AMNT PAIN NOTED NONE PRSNT: CPT | Mod: HCNC,CPTII,S$GLB, | Performed by: PHYSICIAN ASSISTANT

## 2024-05-13 PROCEDURE — 4010F ACE/ARB THERAPY RXD/TAKEN: CPT | Mod: HCNC,CPTII,S$GLB, | Performed by: PHYSICIAN ASSISTANT

## 2024-05-13 PROCEDURE — 1157F ADVNC CARE PLAN IN RCRD: CPT | Mod: HCNC,CPTII,S$GLB, | Performed by: PHYSICIAN ASSISTANT

## 2024-05-13 PROCEDURE — 3077F SYST BP >= 140 MM HG: CPT | Mod: HCNC,CPTII,S$GLB, | Performed by: PHYSICIAN ASSISTANT

## 2024-05-13 PROCEDURE — 3288F FALL RISK ASSESSMENT DOCD: CPT | Mod: HCNC,CPTII,S$GLB, | Performed by: PHYSICIAN ASSISTANT

## 2024-05-13 PROCEDURE — 82962 GLUCOSE BLOOD TEST: CPT | Mod: HCNC,S$GLB,, | Performed by: PHYSICIAN ASSISTANT

## 2024-05-13 RX ORDER — SEMAGLUTIDE 1.34 MG/ML
1 INJECTION, SOLUTION SUBCUTANEOUS
Qty: 9 ML | Refills: 3 | Status: SHIPPED | OUTPATIENT
Start: 2024-05-13 | End: 2025-05-13

## 2024-05-13 RX ORDER — PEN NEEDLE, DIABETIC 30 GX3/16"
1 NEEDLE, DISPOSABLE MISCELLANEOUS DAILY
Qty: 300 EACH | Refills: 3 | Status: SHIPPED | OUTPATIENT
Start: 2024-05-13

## 2024-05-13 RX ORDER — INSULIN ASPART 100 [IU]/ML
10 INJECTION, SOLUTION INTRAVENOUS; SUBCUTANEOUS
Qty: 27 ML | Refills: 3 | Status: SHIPPED | OUTPATIENT
Start: 2024-05-13 | End: 2025-05-13

## 2024-05-13 RX ORDER — BLOOD-GLUCOSE SENSOR
1 EACH MISCELLANEOUS
Qty: 9 EACH | Refills: 3 | Status: SHIPPED | OUTPATIENT
Start: 2024-05-13 | End: 2025-05-13

## 2024-05-13 NOTE — PATIENT INSTRUCTIONS
CURRENT DM MEDICATIONS:   Ozempic 1 mg weekly   Amaryl - STOP  Tresiba 72 units daily  Novolog 10 units before meals - 15 mins before eating     Novolog Correction Dosing every 3 hours as needed OUTSIDE OF EATING  If  - 250, may take 4 units of Novolog  If  - 300, may take 8 units of Novolog  If  - 350, may take 12 units of Novolog  If  - 400, may take 16 units of Novolog  If +, may take 20 units of Novolog

## 2024-05-13 NOTE — PROGRESS NOTES
PCP: Sakina Cooper DO    Subjective:     Chief Complaint: Diabetes - Established Patient    HISTORY OF PRESENT ILLNESS: 68 y.o.  female presenting for diabetes management visit.   The patient's last visit with me was on 8/21/2023.   Patient has had Type II diabetes since 2005.  Pertinent to decision making is the following comorbidities: HTN, HLD, Obesity by BMI and Fatty Liver  Patient has the following Diabetes complications: without complications  She  has attended diabetes education in the past.     Patient's most recent A1c of 7.1% was completed 1 weeks ago.   Patient states since Her last A1c Her blood glucose levels have been within range in the morning / fasting.   Patient monitors blood glucose 1 times per day with Contour Next : Fasting and After Dinner.   Patient blood glucose monitoring device will not be uploaded into Media Section today secondary to unable to upload successfully.   Per meter recall, fasting blood sugar ranging  and after meals 75 - 277.  Patient endorses the following diabetes related symptoms:  heaviness in legs . Present with Actos. Stopped with d/c. Also feeling bloated with ozempic 2 mg .  Patient is due today for the following diabetes-related health maintenance standards:  RSV vaccine, Tetanus, Colonoscopy, COVID, eye, mammo .   She denies recent hospital admissions or emergency room visits.   She denies having hypoglycemia.  Patient's concerns today include glycemic control.   Patient medication regimen is as below.     CURRENT DM MEDICATIONS:   Ozempic 2 mg weekly   Amaryl 4 mg before meals twice daily  Tresiba 72 units daily  Actos - stopped due side effects     Patient has failed the following Diabetes medications:   Metformin - allergy   Onglyza  Levemir   Victoza / Trulicity - GLP-1 escalation  Mounjaro - cost; no pharm asst available   Jardiance 10 + 25 mg - GI side effects;         Labs Reviewed.       Lab Results   Component Value Date    CPEPTIDE 1.73  08/30/2022     Lab Results   Component Value Date    GLUTAMICACID 0.00 12/14/2017          //  Weight: 78.7 kg (173 lb 8 oz), Body mass index is 31.73 kg/m².  Her blood sugar in clinic today is:          Review of Systems   Constitutional:  Negative for activity change, appetite change, chills and fever.   HENT:  Negative for dental problem, mouth sores, nosebleeds, sore throat and trouble swallowing.    Eyes:  Negative for pain and discharge.   Respiratory:  Negative for shortness of breath, wheezing and stridor.    Cardiovascular:  Negative for chest pain, palpitations and leg swelling.   Gastrointestinal:  Negative for abdominal pain, diarrhea, nausea and vomiting.   Endocrine: Negative for polydipsia, polyphagia and polyuria.   Genitourinary:  Negative for dysuria, frequency and urgency.   Musculoskeletal:  Negative for joint swelling and myalgias.   Skin:  Negative for rash and wound.   Neurological:  Negative for dizziness, syncope, weakness and headaches.   Psychiatric/Behavioral:  Negative for behavioral problems and dysphoric mood.          Diabetes Management Status  Statin: Taking  ACE/ARB: Taking    Screening or Prevention Patient's value Goal Complete/Controlled?   HgA1C Testing and Control   Lab Results   Component Value Date    HGBA1C 6.9 (H) 02/09/2024      Annually/Less than 8% Yes   Lipid profile : 11/08/2023 Annually Yes   LDL control Lab Results   Component Value Date    LDLCALC 141.2 11/08/2023    Annually/Less than 100 mg/dl  Yes   Nephropathy screening Lab Results   Component Value Date    MICALBCREAT 9.3 08/21/2023     Lab Results   Component Value Date    PROTEINUA Negative 09/08/2014    Annually Yes   Blood pressure BP Readings from Last 1 Encounters:   05/13/24 (!) 140/76    Less than 140/90 Yes   Dilated retinal exam : 08/09/2023 Annually Yes    Foot exam   : 08/21/2023 Annually No     Social History     Socioeconomic History    Marital status:     Number of children: 3    Occupational History    Occupation: Stay at Home    Occupation:    Tobacco Use    Smoking status: Never    Smokeless tobacco: Never   Substance and Sexual Activity    Alcohol use: Yes     Comment: occasional wine     Drug use: No    Sexual activity: Yes     Partners: Male     Birth control/protection: Surgical   Social History Narrative    . Housewife.     Social Determinants of Health     Financial Resource Strain: Low Risk  (3/4/2024)    Overall Financial Resource Strain (CARDIA)     Difficulty of Paying Living Expenses: Not hard at all   Food Insecurity: No Food Insecurity (3/4/2024)    Hunger Vital Sign     Worried About Running Out of Food in the Last Year: Never true     Ran Out of Food in the Last Year: Never true   Transportation Needs: No Transportation Needs (3/4/2024)    PRAPARE - Transportation     Lack of Transportation (Medical): No     Lack of Transportation (Non-Medical): No   Physical Activity: Insufficiently Active (3/4/2024)    Exercise Vital Sign     Days of Exercise per Week: 2 days     Minutes of Exercise per Session: 30 min   Stress: Stress Concern Present (3/4/2024)    [employer] [place] [employer] - Occupational Stress Questionnaire     Feeling of Stress : Rather much   Housing Stability: Low Risk  (3/4/2024)    Housing Stability Vital Sign     Unable to Pay for Housing in the Last Year: No     Number of Places Lived in the Last Year: 1     Unstable Housing in the Last Year: No     Past Medical History:   Diagnosis Date    Arthritis     Cataract     Colon polyp     Diabetes mellitus type II 15 years     am 05/30/2022    Hyperlipidemia     Hypertension     Renal cell carcinoma 09/2018    Total knee replacement status, left 01/20/2019    Dr.Robert Cook    UTI (urinary tract infection)        Objective:        Physical Exam  Constitutional:       General: She is not in acute distress.     Appearance: She is well-developed. She is not diaphoretic.  "  HENT:      Head: Normocephalic and atraumatic.      Right Ear: External ear normal.      Left Ear: External ear normal.      Nose: Nose normal.   Eyes:      General:         Right eye: No discharge.         Left eye: No discharge.      Pupils: Pupils are equal, round, and reactive to light.   Cardiovascular:      Rate and Rhythm: Normal rate and regular rhythm.      Heart sounds: Normal heart sounds.   Pulmonary:      Effort: Pulmonary effort is normal.      Breath sounds: Normal breath sounds.   Abdominal:      Palpations: Abdomen is soft.   Musculoskeletal:         General: Normal range of motion.      Cervical back: Normal range of motion and neck supple.   Skin:     General: Skin is warm and dry.      Capillary Refill: Capillary refill takes less than 2 seconds.   Neurological:      Mental Status: She is alert and oriented to person, place, and time.      Motor: No abnormal muscle tone.      Coordination: Coordination normal.   Psychiatric:         Behavior: Behavior normal.         Thought Content: Thought content normal.         Judgment: Judgment normal.         Assessment / Plan:     Type 2 diabetes mellitus with hemoglobin A1c goal of less than 7.0%  -     POCT Glucose, Hand-Held Device  -     semaglutide (OZEMPIC) 1 mg/dose (4 mg/3 mL); Inject 1 mg into the skin every 7 days.  Dispense: 9 mL; Refill: 3  -     insulin aspart U-100 (NOVOLOG) 100 unit/mL (3 mL) InPn pen; Inject 10 Units into the skin 3 (three) times daily with meals.  Dispense: 27 mL; Refill: 3  -     pen needle, diabetic (PEN NEEDLE) 31 gauge x 5/16" Ndle; Inject 1 each into the skin once daily.  Dispense: 300 each; Refill: 3  -     blood-glucose sensor (DEXCOM G7 SENSOR) [place]; 1 each by Misc.(Non-Drug; Combo Route) route every 10 days.  Dispense: 9 each; Refill: 3    Fatty liver    Hyperlipidemia LDL goal <70    Hypertension goal BP (blood pressure) < 130/80    Obesity (BMI 30-39.9)        Additional Plan Details:    - POCT " Glucose  - Encouraged continuation of lifestyle changes including regular exercise and limiting carbohydrates to 30-45 grams per meal threes times daily and 15 grams per snack with a limit of two daily.   - Encouraged continued monitoring of blood glucose with maintenance of 4 times daily at Fasting, After Dinner, and Symptomatic. Dex G7 Rx.   - Current DM Medication Regimen: Stop Glimepiride. Continue Tresiba 72 units daily.  Change Ozempic 1 mg weekly. Stop Actos due to s/e. Start Novolog 10 units TID before meals and correction dosing every 3 hours before meals.   - Pharm Asst: Ozempic and Tresiba and Novolog  - Nonfasting labs today  - Health Maintenance standards addressed today: Flu Shot - patient would like to complete outside of [facility] and RSV vaccine - sched at end of month and Tetanus vaccine and eye exam and mammo  - Nursing Visit: Patient is age 79 or younger with an A1c of 7.5 or greater and will not need nursing visit at this time .   - Follow up in  2  weeks.    CURRENT DM MEDICATIONS:   Ozempic 1 mg weekly   Amaryl - STOP  Tresiba 72 units daily  Novolog 10 units before meals - 15 mins before eating     Novolog Correction Dosing every 3 hours as needed OUTSIDE OF EATING  If  - 250, may take 4 units of Novolog  If  - 300, may take 8 units of Novolog  If  - 350, may take 12 units of Novolog  If  - 400, may take 16 units of Novolog  If +, may take 20 units of Novolog       Blakeney McKnight, PA-C [facility]      A total of 30 minutes was spent in face to face time, of which over 50 % was spent in counseling patient on disease process, complications, treatment, and side effects of medications.

## 2024-05-18 NOTE — PROGRESS NOTES
Subjective:      Dary Chaney is a 68 y.o. female who returns today regarding her     New pt to me    Robotic partial nephrectomy with Dr Bradshaw 2018  No complaints    No hematuria    Here for check up.    The following portions of the patient's history were reviewed and updated as appropriate: allergies, current medications, past family history, past medical history, past social history, past surgical history and problem list.    Review of Systems  Pertinent items are noted in HPI.  A comprehensive multipoint review of systems was negative except as otherwise stated in the HPI.    Past Medical History:   Diagnosis Date    Arthritis     Cataract     Colon polyp     Diabetes mellitus type II 15 years     am 05/30/2022    Hyperlipidemia     Hypertension     Renal cell carcinoma 09/2018    Total knee replacement status, left 01/20/2019    Dr.Robert Cook    UTI (urinary tract infection)      Past Surgical History:   Procedure Laterality Date    CHOLECYSTECTOMY      CHOLECYSTECTOMY      COLONOSCOPY  2012    COLONOSCOPY N/A 3/1/2018    Procedure: COLONOSCOPY;  Surgeon: Phillip Brower MD;  Location: [facility];  Service: Endoscopy;  Laterality: N/A;    ESOPHAGOGASTRODUODENOSCOPY N/A 8/21/2018    Procedure: ESOPHAGOGASTRODUODENOSCOPY (EGD);  Surgeon: Maco Wynn MD;  Location: [facility];  Service: Endoscopy;  Laterality: N/A;    HYSTERECTOMY  1993    uterine prolapse    JOINT REPLACEMENT      KNEE SURGERY  03/2017    left knee replacement  01/20/2019    Dr.Robert Cook    ROBOT-ASSISTED LAPAROSCOPIC PARTIAL NEPHRECTOMY USING DA DENZEL XI Right 9/13/2018    Procedure: XI ROBOTIC NEPHRECTOMY, PARTIAL;  Surgeon: Tommy Bradshaw MD;  Location: [address];  Service: Urology;  Laterality: Right;  [place]ec u/s confirmed 9/13 0700 lb  conformation#[id number]    SPINE SURGERY      thumb surgery Rt- July 2018 - cyst        Diabetes Medications               glimepiride (AMARYL) 4 MG tablet Take 1 tablet (4 mg  total) by mouth daily with breakfast.    insulin aspart U-100 (NOVOLOG) 100 unit/mL (3 mL) InPn pen Inject 10 Units into the skin 3 (three) times daily with meals.    insulin degludec (TRESIBA FLEXTOUCH U-200) 200 unit/mL (3 mL) insulin pen Inject 66 Units into the skin once daily.    pioglitazone (ACTOS) 15 MG tablet Take 1 tablet (15 mg total) by mouth once daily.    semaglutide (OZEMPIC) 1 mg/dose (4 mg/3 mL) Inject 1 mg into the skin every 7 days.          Review of patient's allergies indicates:   Allergen Reactions    Citrus and derivatives Swelling, Other (See Comments) and Edema     Redness  Lip swelling   Itching     Other reaction(s): Edema, Other (See Comments)  Redness  Lip swelling   Itching     Latex Other (See Comments)     Other reaction(s): Rash  Other reaction(s): welts  Other reaction(s): Itching  Other reaction(s): Other (See Comments)  Other reaction(s): Rash  Other reaction(s): whelps  Other reaction(s): Itching  Other reaction(s): Rash  Other reaction(s): welts  Other reaction(s): Itching    Valsartan Other (See Comments)     Other reaction(s): disoriented  Other reaction(s): Other (See Comments)  Other reaction(s): disoriented  Other reaction(s): disoriented    Aspirin Nausea Only     Other reaction(s): Nausea Only    Metformin Anxiety     Other reaction(s): anxiety  Other reaction(s): anxiety  Other reaction(s): anxiety    Ozempic [semaglutide] Nausea And Vomiting          Objective:   Vitals: There were no vitals taken for this visit.    Physical Exam   General: alert and oriented, no acute distress  Respiratory: Symmetric expansion, non-labored breathing  Cardiovascular: no peripheral edema  Abdomen: soft, non distended  Trocar sites well healed  No trocar site hernias  Skin: normal coloration and turgor, no rashes, no suspicious skin lesions noted  Neuro: no gross deficits  Psych: normal judgment and insight, normal mood/affect, and non-anxious    Physical Exam    Lab Review   Urinalysis  demonstrates negative for all components  Lab Results   Component Value Date    WBC 5.41 01/03/2024    HGB 12.9 01/03/2024    HCT 40.3 01/03/2024    HCT 35 (L) 09/13/2018    MCV 80 (L) 01/03/2024     01/03/2024     Lab Results   Component Value Date    CREATININE 0.8 01/03/2024    BUN 29 (H) 01/03/2024     SPECIMEN  1) Right renal mass.  FINAL PATHOLOGIC DIAGNOSIS  EXCISION FROM RIGHT KIDNEY:  RENAL CELL CARCINOMA WITH NO CARCINOMA IDENTIFIED IN MARGINS OR PERINEPHRIC ADIPOSE  TISSUE  Diagnosed by: Mike Montano M.D.  (Electronically Signed: 2018-09-17 15:40:27)  Microscopic Examination  2017  Kidney: Right, local excision:  Tumor site: Renal parenchyma  Tumor size: 1.5 cm.  Tumor focality: Unifocal  Anatomic extent of tumor: Limited to the kidney with no invasion identified in the perinephric adipose tissue  Histologic type: Renal cell carcinoma of the clear cell type  rdGrdrrdarddrderd:rd rd3rd of 4.  Sarcomatoid features: Not identified.  Rhabdoid Features: Not identified  Tumor Necrosis: Not identified  Adrenal gland: Absent  Margins: No carcinoma identified margins  Kidney apart from tumor: There is only a small amount of compressed renal parenchyma peripheral to the tumor.  Hilar lymph nodes: Absent  Pathologic stage (AJCC 2010): pT1a pNX pMX    Imaging  CT ABDOMEN W WO CONTRAST     CLINICAL HISTORY:  Malignant neoplasm of unspecified kidney, except renal pelvisrenal cell carcinoma;     TECHNIQUE:  Pre and postcontrast images were obtained     COMPARISON:  CT abdomen with without contrast 11/02/2022.     FINDINGS:  Lung bases are clear     Stable small hypodensity involving the left lower lobe liver likely corresponding to 8 mm cyst.  No suspicious liver lesions.  Spleen is unremarkable.  Pancreas is unremarkable.     Previous cholecystectomy.  No bile duct dilatation.     Stable appearance of the right kidney related to partial nephrectomy.  No suspicious findings involving the right kidney.  No hydronephrosis.  Stable  cysts involving the left kidney.  Larger left renal cyst is complex but stable no hydronephrosis.     The aorta and inferior vena cava are unremarkable.     There are no acute bowel abnormalities.     No evidence of appendicitis.  No evidence of diverticulitis.     Bladder is normal. No abnormal masses or fluid collections in the pelvis.     Skeletal structures are intact.  No acute skeletal findings.     Impression:     Stable exam.  No suspicious findings.     All CT scans at this facility use dose modulation, iterative reconstructions, and/or weight base dosing when appropriate to reduce radiation dose to as low as reasonably achievable        Electronically signed by:Lucia Armstrong MD  Date:                                            01/03/2024  Time:                                           11:36    CHEST 1 VIEW     CLINICAL HISTORY:  Follow-up testicular cancer     TECHNIQUE: Single frontal chest     PRIOR:  November 2022     FINDINGS:  The cardiomediastinal is normal and the lungs are clear.        Impression:   No acute cardiopulmonary disease and no evidence for metastatic disease.     Finalized on: 1/3/2024   Assessment and Plan:   History of renal cell carcinoma  Grade 2 clear cell pT1a ALEXANDRA sp partial nephrectomy 2018 Dr Bradshaw    History of Renal stones; stone free  High fluid, low salt diet  Avoid coffee, tea and cola  Drink water and diet lemonade      RTC prn  Follow up with PCP

## 2024-05-20 ENCOUNTER — TELEPHONE (OUTPATIENT)
Dept: UROLOGY | Facility: CLINIC | Age: 69
End: 2024-05-20
Payer: MEDICARE

## 2024-05-21 ENCOUNTER — OFFICE VISIT (OUTPATIENT)
Dept: UROLOGY | Facility: CLINIC | Age: 69
End: 2024-05-21
Payer: MEDICARE

## 2024-05-21 ENCOUNTER — TELEPHONE (OUTPATIENT)
Dept: PHARMACY | Facility: CLINIC | Age: 69
End: 2024-05-21
Payer: MEDICARE

## 2024-05-21 VITALS — WEIGHT: 173.5 LBS | HEIGHT: 62 IN | BODY MASS INDEX: 31.93 KG/M2

## 2024-05-21 DIAGNOSIS — N20.0 RENAL STONES: ICD-10-CM

## 2024-05-21 DIAGNOSIS — Z85.528 HISTORY OF RENAL CELL CARCINOMA: Primary | ICD-10-CM

## 2024-05-21 PROCEDURE — 3072F LOW RISK FOR RETINOPATHY: CPT | Mod: HCNC,CPTII,S$GLB, | Performed by: UROLOGY

## 2024-05-21 PROCEDURE — 1157F ADVNC CARE PLAN IN RCRD: CPT | Mod: HCNC,CPTII,S$GLB, | Performed by: UROLOGY

## 2024-05-21 PROCEDURE — 4010F ACE/ARB THERAPY RXD/TAKEN: CPT | Mod: HCNC,CPTII,S$GLB, | Performed by: UROLOGY

## 2024-05-21 PROCEDURE — 3044F HG A1C LEVEL LT 7.0%: CPT | Mod: HCNC,CPTII,S$GLB, | Performed by: UROLOGY

## 2024-05-21 PROCEDURE — 99214 OFFICE O/P EST MOD 30 MIN: CPT | Mod: HCNC,S$GLB,, | Performed by: UROLOGY

## 2024-05-21 PROCEDURE — 1126F AMNT PAIN NOTED NONE PRSNT: CPT | Mod: HCNC,CPTII,S$GLB, | Performed by: UROLOGY

## 2024-05-21 PROCEDURE — 3008F BODY MASS INDEX DOCD: CPT | Mod: HCNC,CPTII,S$GLB, | Performed by: UROLOGY

## 2024-05-21 PROCEDURE — 1101F PT FALLS ASSESS-DOCD LE1/YR: CPT | Mod: HCNC,CPTII,S$GLB, | Performed by: UROLOGY

## 2024-05-21 PROCEDURE — 3288F FALL RISK ASSESSMENT DOCD: CPT | Mod: HCNC,CPTII,S$GLB, | Performed by: UROLOGY

## 2024-05-21 PROCEDURE — 1159F MED LIST DOCD IN RCRD: CPT | Mod: HCNC,CPTII,S$GLB, | Performed by: UROLOGY

## 2024-05-21 NOTE — TELEPHONE ENCOUNTER
I have reached out to Dary hCaney to inform her of the [employer] application process for Novolog Pens, Ozempic 1mg, and Tresiba and whats required to apply.  Dary Chaney did not answer. I left a voicemail and mailed a letter introducing her to the pharmacy patient assistance program. I will follow up in 5 business days.

## 2024-05-27 ENCOUNTER — LAB VISIT (OUTPATIENT)
Dept: LAB | Facility: HOSPITAL | Age: 69
End: 2024-05-27
Attending: INTERNAL MEDICINE
Payer: MEDICARE

## 2024-05-27 DIAGNOSIS — Z79.4 UNCONTROLLED TYPE 2 DIABETES MELLITUS WITH HYPERGLYCEMIA, WITH LONG-TERM CURRENT USE OF INSULIN: ICD-10-CM

## 2024-05-27 DIAGNOSIS — E11.65 UNCONTROLLED TYPE 2 DIABETES MELLITUS WITH HYPERGLYCEMIA, WITH LONG-TERM CURRENT USE OF INSULIN: ICD-10-CM

## 2024-05-27 DIAGNOSIS — I10 HYPERTENSION GOAL BP (BLOOD PRESSURE) < 130/80: Chronic | ICD-10-CM

## 2024-05-27 DIAGNOSIS — E78.5 HYPERLIPIDEMIA LDL GOAL <70: Chronic | ICD-10-CM

## 2024-05-27 DIAGNOSIS — I70.0 ATHEROSCLEROSIS OF AORTA: ICD-10-CM

## 2024-05-27 LAB
ALBUMIN SERPL BCP-MCNC: 3.6 G/DL (ref 3.5–5.2)
ALP SERPL-CCNC: 79 U/L (ref 55–135)
ALT SERPL W/O P-5'-P-CCNC: 29 U/L (ref 10–44)
ANION GAP SERPL CALC-SCNC: 8 MMOL/L (ref 8–16)
AST SERPL-CCNC: 19 U/L (ref 10–40)
BILIRUB SERPL-MCNC: 0.3 MG/DL (ref 0.1–1)
BUN SERPL-MCNC: 29 MG/DL (ref 8–23)
CALCIUM SERPL-MCNC: 9.2 MG/DL (ref 8.7–10.5)
CHLORIDE SERPL-SCNC: 107 MMOL/L (ref 95–110)
CHOLEST SERPL-MCNC: 236 MG/DL (ref 120–199)
CHOLEST/HDLC SERPL: 4.7 {RATIO} (ref 2–5)
CO2 SERPL-SCNC: 26 MMOL/L (ref 23–29)
CREAT SERPL-MCNC: 0.9 MG/DL (ref 0.5–1.4)
EST. GFR  (NO RACE VARIABLE): >60 ML/MIN/1.73 M^2
ESTIMATED AVG GLUCOSE: 166 MG/DL (ref 68–131)
GLUCOSE SERPL-MCNC: 132 MG/DL (ref 70–110)
HBA1C MFR BLD: 7.4 % (ref 4–5.6)
HDLC SERPL-MCNC: 50 MG/DL (ref 40–75)
HDLC SERPL: 21.2 % (ref 20–50)
LDLC SERPL CALC-MCNC: 137.2 MG/DL (ref 63–159)
NONHDLC SERPL-MCNC: 186 MG/DL
POTASSIUM SERPL-SCNC: 4.6 MMOL/L (ref 3.5–5.1)
PROT SERPL-MCNC: 6.6 G/DL (ref 6–8.4)
SODIUM SERPL-SCNC: 141 MMOL/L (ref 136–145)
TRIGL SERPL-MCNC: 244 MG/DL (ref 30–150)

## 2024-05-27 PROCEDURE — 80061 LIPID PANEL: CPT | Mod: HCNC | Performed by: INTERNAL MEDICINE

## 2024-05-27 PROCEDURE — 36415 COLL VENOUS BLD VENIPUNCTURE: CPT | Mod: HCNC,PO | Performed by: INTERNAL MEDICINE

## 2024-05-27 PROCEDURE — 83036 HEMOGLOBIN GLYCOSYLATED A1C: CPT | Mod: HCNC | Performed by: INTERNAL MEDICINE

## 2024-05-27 PROCEDURE — 80053 COMPREHEN METABOLIC PANEL: CPT | Mod: HCNC | Performed by: INTERNAL MEDICINE

## 2024-05-30 ENCOUNTER — OFFICE VISIT (OUTPATIENT)
Dept: DIABETES | Facility: CLINIC | Age: 69
End: 2024-05-30
Payer: MEDICARE

## 2024-05-30 DIAGNOSIS — E78.5 HYPERLIPIDEMIA LDL GOAL <70: ICD-10-CM

## 2024-05-30 DIAGNOSIS — K76.0 FATTY LIVER: ICD-10-CM

## 2024-05-30 DIAGNOSIS — E66.9 OBESITY (BMI 30-39.9): ICD-10-CM

## 2024-05-30 DIAGNOSIS — E11.9 TYPE 2 DIABETES MELLITUS WITH HEMOGLOBIN A1C GOAL OF LESS THAN 7.0%: Primary | ICD-10-CM

## 2024-05-30 DIAGNOSIS — I10 HYPERTENSION GOAL BP (BLOOD PRESSURE) < 130/80: ICD-10-CM

## 2024-05-30 PROCEDURE — 99441 PR PHYSICIAN TELEPHONE EVALUATION 5-10 MIN: CPT | Mod: HCNC,95,, | Performed by: PHYSICIAN ASSISTANT

## 2024-05-30 PROCEDURE — 1157F ADVNC CARE PLAN IN RCRD: CPT | Mod: HCNC,CPTII,95, | Performed by: PHYSICIAN ASSISTANT

## 2024-05-30 PROCEDURE — 3051F HG A1C>EQUAL 7.0%<8.0%: CPT | Mod: HCNC,CPTII,95, | Performed by: PHYSICIAN ASSISTANT

## 2024-05-30 PROCEDURE — 4010F ACE/ARB THERAPY RXD/TAKEN: CPT | Mod: HCNC,CPTII,95, | Performed by: PHYSICIAN ASSISTANT

## 2024-05-30 PROCEDURE — 3072F LOW RISK FOR RETINOPATHY: CPT | Mod: HCNC,CPTII,95, | Performed by: PHYSICIAN ASSISTANT

## 2024-05-30 RX ORDER — INSULIN DEGLUDEC 200 U/ML
80 INJECTION, SOLUTION SUBCUTANEOUS DAILY
Qty: 12 PEN | Refills: 3 | Status: SHIPPED | OUTPATIENT
Start: 2024-05-30 | End: 2025-05-30

## 2024-05-30 NOTE — PROGRESS NOTES
PCP: Sakina Cooper DO    Subjective:     Chief Complaint: Diabetes - Established Patient    Established Patient - Audio Only Telehealth Visit     The patient location is: Home  The chief complaint leading to consultation is: Diabetes follow up  Visit type: Virtual visit with audio only (telephone)  Total Time Spent with Patient: 8 minutes     The reason for the audio only service rather than synchronous audio and video virtual visit was related to technical difficulties or patient preference/necessity.     Each patient to whom I provide medical services by telemedicine is:  (1) informed of the relationship between the physician and patient and the respective role of any other health care provider with respect to management of the patient; and (2) notified that they may decline to receive medical services by telemedicine and may withdraw from such care at any time. Patient verbally consented to receive this service via voice-only telephone call.    This service was not originating from a related E/M service provided within the previous 7 days nor will  to an E/M service or procedure within the next 24 hours or my soonest available appointment.  Prevailing standard of care was able to be met in this audio-only visit.      HISTORY OF PRESENT ILLNESS: 68 y.o.  female presenting for diabetes management visit.   The patient's last visit with me was on 5/13/2024.  Patient has had Type II diabetes since 2005.  Pertinent to decision making is the following comorbidities: HTN, HLD, Obesity by BMI and Fatty Liver  Patient has the following Diabetes complications: without complications  She  has attended diabetes education in the past.     Patient's most recent A1c of 7.4% was completed 1 weeks ago.   Patient states since Her last A1c Her blood glucose levels have been within range in the morning / fasting.   Patient monitors blood glucose 1 times per day with Contour Next : Fasting and After Dinner.   Patient  blood glucose monitoring device will not be uploaded into Media Section today secondary to unable to upload successfully.   Per meter recall, fasting blood sugar ranging 144 - 193 and after meals up to 199.  Patient endorses the following diabetes related symptoms:  none .   Patient is due today for the following diabetes-related health maintenance standards:  RSV vaccine, Tetanus, Colonoscopy, COVID, eye, mammo .   She denies recent hospital admissions or emergency room visits.   She denies having hypoglycemia.  Patient's concerns today include glycemic control. PPA pending - Novolog, Tresiba, and ozempic.   Patient medication regimen is as below.     CURRENT DM MEDICATIONS:   Ozempic 1 mg weekly - off due to cost  Tresiba 72 units daily   Novolog 10 units before meals - 15 mins before eating      Novolog Correction Dosing every 3 hours as needed OUTSIDE OF EATING  If  - 250, may take 4 units of Novolog  If  - 300, may take 8 units of Novolog  If  - 350, may take 12 units of Novolog  If  - 400, may take 16 units of Novolog  If +, may take 20 units of Novolog      Patient has failed the following Diabetes medications:   Metformin - allergy   Onglyza  Levemir   Victoza / Trulicity - GLP-1 escalation  Mounjaro - cost; no pharm asst available   Jardiance 10 + 25 mg - GI side effects;         Labs Reviewed.       Lab Results   Component Value Date    CPEPTIDE 1.73 08/30/2022     Lab Results   Component Value Date    GLUTAMICACID 0.00 12/14/2017          //   , There is no height or weight on file to calculate BMI.  Her blood sugar in clinic today is:          Review of Systems   Constitutional:  Negative for activity change, appetite change, chills and fever.   HENT:  Negative for dental problem, mouth sores, nosebleeds, sore throat and trouble swallowing.    Eyes:  Negative for pain and discharge.   Respiratory:  Negative for shortness of breath, wheezing and stridor.    Cardiovascular:   Negative for chest pain, palpitations and leg swelling.   Gastrointestinal:  Negative for abdominal pain, diarrhea, nausea and vomiting.   Endocrine: Negative for polydipsia, polyphagia and polyuria.   Genitourinary:  Negative for dysuria, frequency and urgency.   Musculoskeletal:  Negative for joint swelling and myalgias.   Skin:  Negative for rash and wound.   Neurological:  Negative for dizziness, syncope, weakness and headaches.   Psychiatric/Behavioral:  Negative for behavioral problems and dysphoric mood.          Diabetes Management Status  Statin: Taking  ACE/ARB: Taking    Screening or Prevention Patient's value Goal Complete/Controlled?   HgA1C Testing and Control   Lab Results   Component Value Date    HGBA1C 7.4 (H) 05/27/2024      Annually/Less than 8% Yes   Lipid profile : 05/27/2024 Annually Yes   LDL control Lab Results   Component Value Date    LDLCALC 137.2 05/27/2024    Annually/Less than 100 mg/dl  Yes   Nephropathy screening Lab Results   Component Value Date    MICALBCREAT 9.3 08/21/2023     Lab Results   Component Value Date    PROTEINUA Negative 09/08/2014    Annually Yes   Blood pressure BP Readings from Last 1 Encounters:   05/13/24 (!) 140/76    Less than 140/90 Yes   Dilated retinal exam : 08/09/2023 Annually Yes    Foot exam   : 08/21/2023 Annually No     Social History     Socioeconomic History    Marital status:     Number of children: 3   Occupational History    Occupation: Stay at Home    Occupation:    Tobacco Use    Smoking status: Never    Smokeless tobacco: Never   Substance and Sexual Activity    Alcohol use: Yes     Comment: occasional wine     Drug use: No    Sexual activity: Yes     Partners: Male     Birth control/protection: Surgical   Social History Narrative    . Housewife.     Social Determinants of Health     Financial Resource Strain: Low Risk  (3/4/2024)    Overall Financial Resource Strain (CARDIA)     Difficulty of Paying Living  Expenses: Not hard at all   Food Insecurity: No Food Insecurity (3/4/2024)    Hunger Vital Sign     Worried About Running Out of Food in the Last Year: Never true     Ran Out of Food in the Last Year: Never true   Transportation Needs: No Transportation Needs (3/4/2024)    PRAPARE - Transportation     Lack of Transportation (Medical): No     Lack of Transportation (Non-Medical): No   Physical Activity: Insufficiently Active (3/4/2024)    Exercise Vital Sign     Days of Exercise per Week: 2 days     Minutes of Exercise per Session: 30 min   Stress: Stress Concern Present (3/4/2024)    Citizen of the [place] [employer] - Occupational Stress Questionnaire     Feeling of Stress : Rather much   Housing Stability: Low Risk  (3/4/2024)    Housing Stability Vital Sign     Unable to Pay for Housing in the Last Year: No     Number of Places Lived in the Last Year: 1     Unstable Housing in the Last Year: No     Past Medical History:   Diagnosis Date    Arthritis     Cataract     Colon polyp     Diabetes mellitus type II 15 years     am 05/30/2022    Hyperlipidemia     Hypertension     Renal cell carcinoma 09/2018    Total knee replacement status, left 01/20/2019    Dr.Robert Cook    UTI (urinary tract infection)        Objective:        Physical Exam  Neurological:      Mental Status: She is alert and oriented to person, place, and time. Mental status is at baseline.   Psychiatric:         Mood and Affect: Mood normal.         Behavior: Behavior normal.         Thought Content: Thought content normal.         Judgment: Judgment normal.           Assessment / Plan:     Type 2 diabetes mellitus with hemoglobin A1c goal of less than 7.0%  -     insulin degludec (TRESIBA FLEXTOUCH U-200) 200 unit/mL (3 mL) insulin pen; Inject 80 Units into the skin once daily.  Dispense: 12 pen ; Refill: 3    Fatty liver    Hyperlipidemia LDL goal <70    Hypertension goal BP (blood pressure) < 130/80    Obesity (BMI  30-39.9)          Additional Plan Details:    - POCT Glucose  - Encouraged continuation of lifestyle changes including regular exercise and limiting carbohydrates to 30-45 grams per meal threes times daily and 15 grams per snack with a limit of two daily.   - Encouraged continued monitoring of blood glucose with maintenance of 4 times daily at Fasting, After Dinner, and Symptomatic. Dex G7 pending.   - Current DM Medication Regimen: Change Tresiba 80 units daily.  Hold Ozempic 1 mg weekly until able to get through ppa. Change  Novolog 12 units TID before meals and correction dosing every 3 hours before meals.   - Pharm Asst: Ozempic and Tresiba and Novolog  - Health Maintenance standards addressed today: Flu Shot - patient would like to complete outside of [facility] and RSV vaccine - sched at end of month and Tetanus vaccine and eye exam and mammo  - Nursing Visit: Patient is age 79 or younger with an A1c of 7.5 or greater and will not need nursing visit at this time .   - Follow up in  3  weeks.    CURRENT DM MEDICATIONS:   Ozempic  - call me before restart   Tresiba 80 units daily   Novolog 12 units before meals - 15 mins before eating      Novolog Correction Dosing every 3 hours as needed OUTSIDE OF EATING  If  - 250, may take 4 units of Novolog  If  - 300, may take 8 units of Novolog  If  - 350, may take 12 units of Novolog  If  - 400, may take 16 units of Novolog  If +, may take 20 units of Novolog          Blakeney McKnight, PA-C [facility]

## 2024-05-30 NOTE — PATIENT INSTRUCTIONS
CURRENT DM MEDICATIONS:   Ozempic  - call me before restart   Tresiba 80 units daily   Novolog 12 units before meals - 15 mins before eating      Novolog Correction Dosing every 3 hours as needed OUTSIDE OF EATING  If  - 250, may take 4 units of Novolog  If  - 300, may take 8 units of Novolog  If  - 350, may take 12 units of Novolog  If  - 400, may take 16 units of Novolog  If +, may take 20 units of Novolog

## 2024-05-31 ENCOUNTER — OFFICE VISIT (OUTPATIENT)
Dept: INTERNAL MEDICINE | Facility: CLINIC | Age: 69
End: 2024-05-31
Payer: MEDICARE

## 2024-05-31 VITALS
WEIGHT: 174.38 LBS | DIASTOLIC BLOOD PRESSURE: 80 MMHG | OXYGEN SATURATION: 98 % | SYSTOLIC BLOOD PRESSURE: 138 MMHG | HEIGHT: 62 IN | TEMPERATURE: 97 F | HEART RATE: 82 BPM | RESPIRATION RATE: 20 BRPM | BODY MASS INDEX: 32.09 KG/M2

## 2024-05-31 DIAGNOSIS — E78.2 MIXED HYPERLIPIDEMIA: ICD-10-CM

## 2024-05-31 DIAGNOSIS — I10 HYPERTENSION GOAL BP (BLOOD PRESSURE) < 130/80: ICD-10-CM

## 2024-05-31 DIAGNOSIS — Z12.11 COLON CANCER SCREENING: ICD-10-CM

## 2024-05-31 DIAGNOSIS — Z79.4 UNCONTROLLED TYPE 2 DIABETES MELLITUS WITH HYPERGLYCEMIA, WITH LONG-TERM CURRENT USE OF INSULIN: ICD-10-CM

## 2024-05-31 DIAGNOSIS — K21.9 CHRONIC GERD: ICD-10-CM

## 2024-05-31 DIAGNOSIS — Z00.00 ANNUAL PHYSICAL EXAM: Primary | ICD-10-CM

## 2024-05-31 DIAGNOSIS — E11.65 UNCONTROLLED TYPE 2 DIABETES MELLITUS WITH HYPERGLYCEMIA, WITH LONG-TERM CURRENT USE OF INSULIN: ICD-10-CM

## 2024-05-31 PROCEDURE — 99397 PER PM REEVAL EST PAT 65+ YR: CPT | Mod: HCNC,S$GLB,, | Performed by: INTERNAL MEDICINE

## 2024-05-31 PROCEDURE — 3072F LOW RISK FOR RETINOPATHY: CPT | Mod: HCNC,CPTII,S$GLB, | Performed by: INTERNAL MEDICINE

## 2024-05-31 PROCEDURE — 1157F ADVNC CARE PLAN IN RCRD: CPT | Mod: HCNC,CPTII,S$GLB, | Performed by: INTERNAL MEDICINE

## 2024-05-31 PROCEDURE — 3288F FALL RISK ASSESSMENT DOCD: CPT | Mod: HCNC,CPTII,S$GLB, | Performed by: INTERNAL MEDICINE

## 2024-05-31 PROCEDURE — 3079F DIAST BP 80-89 MM HG: CPT | Mod: HCNC,CPTII,S$GLB, | Performed by: INTERNAL MEDICINE

## 2024-05-31 PROCEDURE — 3075F SYST BP GE 130 - 139MM HG: CPT | Mod: HCNC,CPTII,S$GLB, | Performed by: INTERNAL MEDICINE

## 2024-05-31 PROCEDURE — 3051F HG A1C>EQUAL 7.0%<8.0%: CPT | Mod: HCNC,CPTII,S$GLB, | Performed by: INTERNAL MEDICINE

## 2024-05-31 PROCEDURE — 4010F ACE/ARB THERAPY RXD/TAKEN: CPT | Mod: HCNC,CPTII,S$GLB, | Performed by: INTERNAL MEDICINE

## 2024-05-31 PROCEDURE — 99999 PR PBB SHADOW E&M-EST. PATIENT-LVL V: CPT | Mod: PBBFAC,HCNC,, | Performed by: INTERNAL MEDICINE

## 2024-05-31 PROCEDURE — 1101F PT FALLS ASSESS-DOCD LE1/YR: CPT | Mod: HCNC,CPTII,S$GLB, | Performed by: INTERNAL MEDICINE

## 2024-05-31 PROCEDURE — 1126F AMNT PAIN NOTED NONE PRSNT: CPT | Mod: HCNC,CPTII,S$GLB, | Performed by: INTERNAL MEDICINE

## 2024-05-31 PROCEDURE — 3008F BODY MASS INDEX DOCD: CPT | Mod: HCNC,CPTII,S$GLB, | Performed by: INTERNAL MEDICINE

## 2024-05-31 PROCEDURE — 1160F RVW MEDS BY RX/DR IN RCRD: CPT | Mod: HCNC,CPTII,S$GLB, | Performed by: INTERNAL MEDICINE

## 2024-05-31 PROCEDURE — 1159F MED LIST DOCD IN RCRD: CPT | Mod: HCNC,CPTII,S$GLB, | Performed by: INTERNAL MEDICINE

## 2024-05-31 RX ORDER — SIMVASTATIN 40 MG/1
40 TABLET, FILM COATED ORAL NIGHTLY
Qty: 90 TABLET | Refills: 3 | Status: SHIPPED | OUTPATIENT
Start: 2024-05-31

## 2024-05-31 RX ORDER — OMEPRAZOLE 40 MG/1
40 CAPSULE, DELAYED RELEASE ORAL DAILY
Qty: 90 CAPSULE | Refills: 3 | Status: SHIPPED | OUTPATIENT
Start: 2024-05-31

## 2024-05-31 RX ORDER — OLMESARTAN MEDOXOMIL 20 MG/1
20 TABLET ORAL DAILY
Qty: 90 TABLET | Refills: 3 | Status: SHIPPED | OUTPATIENT
Start: 2024-05-31

## 2024-05-31 NOTE — PROGRESS NOTES
Dary Chaney  68 y.o. White female  [id number]    Chief Complaint:  Chief Complaint   Patient presents with    Annual Exam       History of Present Illness:  No significant complaints.   HTN--stable.   HLD--compliant with simvastatin.   DM--worsened. Reports compliance with medication. She is under the care of diabetes management.   GERD--taking omeprazole as prescribed but continues to have a lot of symptoms and especially bloating. She is due for a colonoscopy.     Laboratory  Lab Results   Component Value Date    WBC 5.41 01/03/2024    HGB 12.9 01/03/2024    HCT 40.3 01/03/2024     01/03/2024    CHOL 236 (H) 05/27/2024    TRIG 244 (H) 05/27/2024    HDL 50 05/27/2024    ALT 29 05/27/2024    AST 19 05/27/2024     05/27/2024    K 4.6 05/27/2024     05/27/2024    CREATININE 0.9 05/27/2024    BUN 29 (H) 05/27/2024    CO2 26 05/27/2024    TSH 0.881 08/21/2023    INR 1.0 06/21/2010    HGBA1C 7.4 (H) 05/27/2024     Review of Systems   Constitutional:  Negative for fever.   Respiratory:  Negative for shortness of breath.    Cardiovascular:  Negative for chest pain and leg swelling.   Gastrointestinal:  Positive for abdominal pain (bloating) and heartburn. Negative for blood in stool and vomiting.   Genitourinary:  Negative for dysuria.   Neurological:  Negative for dizziness and headaches.       The following were reviewed: Active problem list, medication list, allergies, family history, social history, and Health Maintenance.     History:  Past Medical History:   Diagnosis Date    Arthritis     Cataract     Colon polyp     Diabetes mellitus type II 15 years     am 05/30/2022    Hyperlipidemia     Hypertension     Renal cell carcinoma 09/2018    Total knee replacement status, left 01/20/2019    Dr.Robert Cook    UTI (urinary tract infection)      Past Surgical History:   Procedure Laterality Date    CHOLECYSTECTOMY      CHOLECYSTECTOMY      COLONOSCOPY  2012    COLONOSCOPY N/A 3/1/2018     Procedure: COLONOSCOPY;  Surgeon: Phillip Brower MD;  Location: [facility] ENDO;  Service: Endoscopy;  Laterality: N/A;    ESOPHAGOGASTRODUODENOSCOPY N/A 8/21/2018    Procedure: ESOPHAGOGASTRODUODENOSCOPY (EGD);  Surgeon: Maco Wynn MD;  Location: [facility] ENDO;  Service: Endoscopy;  Laterality: N/A;    HYSTERECTOMY  1993    uterine prolapse    JOINT REPLACEMENT      KNEE SURGERY  03/2017    left knee replacement  01/20/2019    Dr.Robert Cook    ROBOT-ASSISTED LAPAROSCOPIC PARTIAL NEPHRECTOMY USING DA DENZEL XI Right 9/13/2018    Procedure: XI ROBOTIC NEPHRECTOMY, PARTIAL;  Surgeon: Tommy Bradshaw MD;  Location: [facility] OR [address] [place] [address];  Service: Urology;  Laterality: Right;  [place] u/s confirmed 9/13 0700 lb  conformation#[id number]    SPINE SURGERY      thumb surgery Rt- July 2018 - cyst        Family History   Problem Relation Name Age of Onset    Diabetes Mother      Hypertension Mother      Diabetes Brother      Heart disease Neg Hx       Social History     Socioeconomic History    Marital status:     Number of children: 3   Occupational History    Occupation: Stay at Home    Occupation:    Tobacco Use    Smoking status: Never    Smokeless tobacco: Never   Substance and Sexual Activity    Alcohol use: Yes     Comment: occasional wine     Drug use: No    Sexual activity: Yes     Partners: Male     Birth control/protection: Surgical   Social History Narrative    . Housewife.     Social Determinants of Health     Financial Resource Strain: Low Risk  (3/4/2024)    Overall Financial Resource Strain (CARDIA)     Difficulty of Paying Living Expenses: Not hard at all   Food Insecurity: No Food Insecurity (3/4/2024)    Hunger Vital Sign     Worried About Running Out of Food in the Last Year: Never true     Ran Out of Food in the Last Year: Never true   Transportation Needs: No Transportation Needs (3/4/2024)    PRAPARE - Transportation     Lack of Transportation (Medical): No     Lack of  Transportation (Non-Medical): No   Physical Activity: Insufficiently Active (3/4/2024)    Exercise Vital Sign     Days of Exercise per Week: 2 days     Minutes of Exercise per Session: 30 min   Stress: Stress Concern Present (3/4/2024)    [employer] - Occupational Stress Questionnaire     Feeling of Stress : Rather much   Housing Stability: Low Risk  (3/4/2024)    Housing Stability Vital Sign     Unable to Pay for Housing in the Last Year: No     Number of Places Lived in the Last Year: 1     Unstable Housing in the Last Year: No     Patient Active Problem List   Diagnosis    Central obesity    Hypertension goal BP (blood pressure) < 130/80    Hyperlipidemia LDL goal <70    Seasonal allergic rhinitis    Intervertebral lumbar disc disorder with myelopathy, lumbar region    Type 2 diabetes mellitus with obesity    Type 2 diabetes mellitus without retinopathy    Hx Ulcer of esophagus without bleeding    Hiatal hernia    Esophageal ring, acquired    Renal cyst    Patient is [employer]    History of back surgery    Abnormal EKG    Fatty liver    Varicose veins of both lower extremities    Type 2 diabetes mellitus with hemoglobin A1c goal of less than 7.0%    Atherosclerosis of aorta    Calcified granuloma of lung    Refusal of blood transfusions as patient is [employer]    Renal cell carcinoma    Renal stone    DM cataract    Uncontrolled type 2 diabetes mellitus with hyperglycemia, with long-term current use of insulin    Hyperlipidemia associated with type 2 diabetes mellitus    Type 2 diabetes mellitus with hypertriglyceridemia    Chronic intractable headache    Nasal septal deviation- mild    History of colon polyps    Grief at loss of child- Daughter  in 2023    Decreased ROM of neck    Decreased strength, endurance, and mobility     Review of patient's allergies indicates:   Allergen Reactions    Citrus and derivatives Swelling, Other (See Comments) and Edema      Redness  Lip swelling   Itching     Other reaction(s): Edema, Other (See Comments)  Redness  Lip swelling   Itching     Latex Other (See Comments)     Other reaction(s): Rash  Other reaction(s): welts  Other reaction(s): Itching  Other reaction(s): Other (See Comments)  Other reaction(s): Rash  Other reaction(s): whelps  Other reaction(s): Itching  Other reaction(s): Rash  Other reaction(s): welts  Other reaction(s): Itching    Valsartan Other (See Comments)     Other reaction(s): disoriented  Other reaction(s): Other (See Comments)  Other reaction(s): disoriented  Other reaction(s): disoriented    Aspirin Nausea Only     Other reaction(s): Nausea Only    Jardiance [empagliflozin] Nausea Only    Metformin Anxiety     Other reaction(s): anxiety  Other reaction(s): anxiety  Other reaction(s): anxiety       Health Maintenance  Health Maintenance Topics with due status: Not Due       Topic Last Completion Date    DEXA Scan 12/18/2019    Influenza Vaccine 11/12/2021    Diabetes Urine Screening 08/21/2023    Foot Exam 08/21/2023    Lipid Panel 05/27/2024    Hemoglobin A1c 05/27/2024     Health Maintenance Due   Topic Date Due    RSV Vaccine (Age 60+ and Pregnant patients) (1 - 1-dose 60+ series) Never done    TETANUS VACCINE  08/26/2021    Colorectal Cancer Screening  03/01/2023    COVID-19 Vaccine (8 - 2023-24 season) 12/26/2023    Mammogram  03/14/2024    Eye Exam  08/09/2024       Medications:  Current Outpatient Medications on File Prior to Visit   Medication Sig Dispense Refill    albuterol (PROVENTIL) 2.5 mg /3 mL (0.083 %) nebulizer solution USE 1 VIAL IN NEBULIZER EVERY 6 HOURS AS NEEDED FOR WHEEZING 1125 mL 0    ASHWAGANDHA ROOT EXTRACT ORAL Take by mouth 3 (three) times daily as needed. Prn Anxiety      blood sugar diagnostic Strp 1 each by Misc.(Non-Drug; Combo Route) route 4 (four) times daily. 300 each 3    blood-glucose meter kit Test finger stick blood sugar once daily. 1 each 0    blood-glucose sensor  "(DEXCOM G7 SENSOR) [place] 1 each by Misc.(Non-Drug; Combo Route) route every 10 days. 9 each 3    fluticasone (FLONASE) 50 mcg/actuation nasal spray USE TWO SPRAY(S) IN EACH NOSTRIL ONCE DAILY 16 g 1    FLUZONE HIGHDOSE QUAD 21-22  mcg/0.7 mL Syrg       insulin aspart U-100 (NOVOLOG) 100 unit/mL (3 mL) InPn pen Inject 10 Units into the skin 3 (three) times daily with meals. 27 mL 3    insulin degludec (TRESIBA FLEXTOUCH U-200) 200 unit/mL (3 mL) insulin pen Inject 80 Units into the skin once daily. 12 pen 3    LACTOBACILLUS RHAMNOSUS GG ORAL Take 1 capsule by mouth once daily.      lancets Misc 1 each by Misc.(Non-Drug; Combo Route) route 4 (four) times daily. 100 each 11    lancets Misc 1 each by Misc.(Non-Drug; Combo Route) route 4 (four) times daily. 300 each 3    loratadine (CLARITIN) 10 mg tablet Take 1 tablet (10 mg total) by mouth once daily. (Patient taking differently: Take 10 mg by mouth daily as needed.) 30 tablet 0    MAGNESIUM ORAL Take 500 mg by mouth once daily.      pen needle, diabetic (PEN NEEDLE) 31 gauge x 5/16" Ndle Inject 1 each into the skin once daily. 300 each 3    PFIZER COVID BIVAL,12Y UP,,PF, 30 mcg/0.3 mL injection       PREVNAR 20, PF, 0.5 mL Syrg injection       semaglutide (OZEMPIC) 1 mg/dose (4 mg/3 mL) Inject 1 mg into the skin every 7 days. 9 mL 3    SHINGRIX, PF, 50 mcg/0.5 mL injection       [DISCONTINUED] olmesartan (BENICAR) 20 MG tablet Take 1 tablet by mouth once daily 90 tablet 0    [DISCONTINUED] omeprazole (PRILOSEC) 40 MG capsule Take 1 capsule by mouth once daily 90 capsule 0    [DISCONTINUED] simvastatin (ZOCOR) 40 MG tablet Take 40 mg by mouth every evening.       No current facility-administered medications on file prior to visit.       Medications have been reviewed and reconciled with patient at visit today.    Exam:  Vitals:    05/31/24 1120   BP: 138/80   Pulse: 82   Resp: 20   Temp: 97 °F (36.1 °C)     Weight: 79.1 kg (174 lb 6.1 oz)   Body mass index is 31.9 " kg/m².      Physical Exam  Vitals reviewed.   Constitutional:       General: She is not in acute distress.     Appearance: She is well-developed. She is not ill-appearing.   Eyes:      General: No scleral icterus.  Cardiovascular:      Rate and Rhythm: Normal rate and regular rhythm.      Heart sounds: Normal heart sounds.   Pulmonary:      Effort: Pulmonary effort is normal. No respiratory distress.      Breath sounds: Normal breath sounds. No wheezing or rales.   Abdominal:      General: Bowel sounds are normal.      Palpations: Abdomen is soft.   Skin:     General: Skin is warm and dry.   Neurological:      Mental Status: She is alert and oriented to person, place, and time.   Psychiatric:         Behavior: Behavior normal.       Assessment:  The primary encounter diagnosis was Annual physical exam. Diagnoses of Hypertension goal BP (blood pressure) < 130/80, Mixed hyperlipidemia, Uncontrolled type 2 diabetes mellitus with hyperglycemia, with long-term current use of insulin, Chronic GERD, and Colon cancer screening were also pertinent to this visit.    Plan:  Annual physical exam  -     Counseled regarding age appropriate screenings and immunizations  -     Counseled regarding lifestyle modifications    Hypertension goal BP (blood pressure) < 130/80  -     refill olmesartan (BENICAR) 20 MG tablet; Take 1 tablet (20 mg total) by mouth once daily.  Dispense: 90 tablet; Refill: 3    Mixed hyperlipidemia  -     refill simvastatin (ZOCOR) 40 MG tablet; Take 1 tablet (40 mg total) by mouth every evening.  Dispense: 90 tablet; Refill: 3    Uncontrolled type 2 diabetes mellitus with hyperglycemia, with long-term current use of insulin  -     Lifestyle modifications discussed  -     F/U with diabetes management    Chronic GERD  -     refill omeprazole (PRILOSEC) 40 MG capsule; Take 1 capsule (40 mg total) by mouth once daily.  Dispense: 90 capsule; Refill: 3  -     Ambulatory referral/consult to Endo Procedure ;  Future; Expected date: 05/31/2024    Colon cancer screening  -     Ambulatory referral/consult to Endo Procedure ; Future; Expected date: 05/31/2024    Follow up in about 6 months (around 11/30/2024).

## 2024-06-20 ENCOUNTER — OFFICE VISIT (OUTPATIENT)
Dept: DIABETES | Facility: CLINIC | Age: 69
End: 2024-06-20
Payer: MEDICARE

## 2024-06-20 DIAGNOSIS — E11.9 TYPE 2 DIABETES MELLITUS WITH HEMOGLOBIN A1C GOAL OF LESS THAN 7.0%: Primary | ICD-10-CM

## 2024-06-20 DIAGNOSIS — E78.5 HYPERLIPIDEMIA LDL GOAL <70: ICD-10-CM

## 2024-06-20 DIAGNOSIS — R14.0 BLOATING: ICD-10-CM

## 2024-06-20 DIAGNOSIS — E66.9 OBESITY (BMI 30-39.9): ICD-10-CM

## 2024-06-20 DIAGNOSIS — K76.0 FATTY LIVER: ICD-10-CM

## 2024-06-20 DIAGNOSIS — I10 HYPERTENSION GOAL BP (BLOOD PRESSURE) < 130/80: ICD-10-CM

## 2024-06-20 PROCEDURE — 3072F LOW RISK FOR RETINOPATHY: CPT | Mod: HCNC,CPTII,95, | Performed by: PHYSICIAN ASSISTANT

## 2024-06-20 PROCEDURE — 99442 PR PHYSICIAN TELEPHONE EVALUATION 11-20 MIN: CPT | Mod: HCNC,95,, | Performed by: PHYSICIAN ASSISTANT

## 2024-06-20 PROCEDURE — 3051F HG A1C>EQUAL 7.0%<8.0%: CPT | Mod: HCNC,CPTII,95, | Performed by: PHYSICIAN ASSISTANT

## 2024-06-20 PROCEDURE — 1157F ADVNC CARE PLAN IN RCRD: CPT | Mod: HCNC,CPTII,95, | Performed by: PHYSICIAN ASSISTANT

## 2024-06-20 PROCEDURE — 4010F ACE/ARB THERAPY RXD/TAKEN: CPT | Mod: HCNC,CPTII,95, | Performed by: PHYSICIAN ASSISTANT

## 2024-06-20 RX ORDER — INSULIN ASPART 100 [IU]/ML
INJECTION, SOLUTION INTRAVENOUS; SUBCUTANEOUS
Qty: 27 ML | Refills: 3 | Status: SHIPPED | OUTPATIENT
Start: 2024-06-20 | End: 2024-06-20

## 2024-06-20 RX ORDER — INSULIN ASPART 100 [IU]/ML
INJECTION, SOLUTION INTRAVENOUS; SUBCUTANEOUS
Qty: 90 ML | Refills: 3 | Status: SHIPPED | OUTPATIENT
Start: 2024-06-20

## 2024-06-20 NOTE — PATIENT INSTRUCTIONS
CURRENT DM MEDICATIONS:   Ozempic  - call me before restart   Tresiba 76 units daily   Novolog 16 units before meals - 15 mins before eating      Novolog Correction Dosing every 3 hours as needed OUTSIDE OF EATING  If  - 250, may take 4 units of Novolog  If  - 300, may take 8 units of Novolog  If  - 350, may take 12 units of Novolog  If  - 400, may take 16 units of Novolog  If +, may take 20 units of Novolog

## 2024-06-20 NOTE — PROGRESS NOTES
PCP: Sakina Cooper DO    Subjective:     Chief Complaint: Diabetes - Established Patient    Established Patient - Audio Only Telehealth Visit     The patient location is: Home  The chief complaint leading to consultation is: Diabetes follow up  Visit type: Virtual visit with audio only (telephone)  Total Time Spent with Patient: 18 minutes     The reason for the audio only service rather than synchronous audio and video virtual visit was related to technical difficulties or patient preference/necessity.     Each patient to whom I provide medical services by telemedicine is:  (1) informed of the relationship between the physician and patient and the respective role of any other health care provider with respect to management of the patient; and (2) notified that they may decline to receive medical services by telemedicine and may withdraw from such care at any time. Patient verbally consented to receive this service via voice-only telephone call.    This service was not originating from a related E/M service provided within the previous 7 days nor will  to an E/M service or procedure within the next 24 hours or my soonest available appointment.  Prevailing standard of care was able to be met in this audio-only visit.      HISTORY OF PRESENT ILLNESS: 68 y.o.  female presenting for diabetes management visit.   The patient's last visit with me was on 5/30/2024.  Patient has had Type II diabetes since 2005.  Pertinent to decision making is the following comorbidities: HTN, HLD, Obesity by BMI and Fatty Liver  Patient has the following Diabetes complications: without complications  She  has attended diabetes education in the past.     Patient's most recent A1c of 7.4% was completed 1 months ago.   Patient states since Her last A1c Her blood glucose levels have been within range in the morning / fasting.   Patient monitors blood glucose 4 times per day with Contour Next : Fasting, After Dinner, and  Symptomatic. Dexcom G7 pending.   Patient blood glucose monitoring device will not be uploaded into Media Section today secondary to unable to upload successfully.   Per meter recall, fasting blood sugar ranging 125 - 190 and after meals up to 220  Patient endorses the following diabetes related symptoms:  none .   Patient is due today for the following diabetes-related health maintenance standards:  RSV vaccine, Foot exam, Tetanus, Colonoscopy, COVID, eye, mammo .   She denies recent hospital admissions or emergency room visits.   She denies having hypoglycemia.  Patient's concerns today include glycemic control. PPA pending - Novolog, Tresiba, and ozempic. Of note, pt struggling with weight gain. This was prior to Ozempic d/c.  Also c/o of bloating which she states is not related to issues with GERD.    Patient medication regimen is as below.     CURRENT DM MEDICATIONS:   Ozempic  - pending with PPA   Tresiba 72 units daily   Novolog 12 units before meals - 15 mins before eating      Novolog Correction Dosing every 3 hours as needed OUTSIDE OF EATING  If  - 250, may take 4 units of Novolog  If  - 300, may take 8 units of Novolog  If  - 350, may take 12 units of Novolog  If  - 400, may take 16 units of Novolog  If +, may take 20 units of Novolog         Patient has failed the following Diabetes medications:   Metformin - allergy   Onglyza  Levemir   Victoza / Trulicity - GLP-1 escalation  Mounjaro - cost; no pharm asst available   Jardiance 10 + 25 mg - GI side effects;         Labs Reviewed.       Lab Results   Component Value Date    CPEPTIDE 1.73 08/30/2022     Lab Results   Component Value Date    GLUTAMICACID 0.00 12/14/2017          //   , There is no height or weight on file to calculate BMI.  Her blood sugar in clinic today is:          Review of Systems   Constitutional:  Negative for activity change, appetite change, chills and fever.   HENT:  Negative for dental problem,  mouth sores, nosebleeds, sore throat and trouble swallowing.    Eyes:  Negative for pain and discharge.   Respiratory:  Negative for shortness of breath, wheezing and stridor.    Cardiovascular:  Negative for chest pain, palpitations and leg swelling.   Gastrointestinal:  Negative for abdominal pain, diarrhea, nausea and vomiting.   Endocrine: Negative for polydipsia, polyphagia and polyuria.   Genitourinary:  Negative for dysuria, frequency and urgency.   Musculoskeletal:  Negative for joint swelling and myalgias.   Skin:  Negative for rash and wound.   Neurological:  Negative for dizziness, syncope, weakness and headaches.   Psychiatric/Behavioral:  Negative for behavioral problems and dysphoric mood.          Diabetes Management Status  Statin: Taking  ACE/ARB: Taking    Screening or Prevention Patient's value Goal Complete/Controlled?   HgA1C Testing and Control   Lab Results   Component Value Date    HGBA1C 7.4 (H) 05/27/2024      Annually/Less than 8% Yes   Lipid profile : 05/27/2024 Annually Yes   LDL control Lab Results   Component Value Date    LDLCALC 137.2 05/27/2024    Annually/Less than 100 mg/dl  Yes   Nephropathy screening Lab Results   Component Value Date    MICALBCREAT 9.3 08/21/2023     Lab Results   Component Value Date    PROTEINUA Negative 09/08/2014    Annually Yes   Blood pressure BP Readings from Last 1 Encounters:   05/31/24 138/80    Less than 140/90 Yes   Dilated retinal exam : 08/09/2023 Annually Yes    Foot exam   : 08/21/2023 Annually No     Social History     Socioeconomic History    Marital status:     Number of children: 3   Occupational History    Occupation: Stay at Home    Occupation:    Tobacco Use    Smoking status: Never    Smokeless tobacco: Never   Substance and Sexual Activity    Alcohol use: Yes     Comment: occasional wine     Drug use: No    Sexual activity: Yes     Partners: Male     Birth control/protection: Surgical   Social History Narrative     . Housewife.     Social Determinants of Health     Financial Resource Strain: Low Risk  (3/4/2024)    Overall Financial Resource Strain (CARDIA)     Difficulty of Paying Living Expenses: Not hard at all   Food Insecurity: No Food Insecurity (3/4/2024)    Hunger Vital Sign     Worried About Running Out of Food in the Last Year: Never true     Ran Out of Food in the Last Year: Never true   Transportation Needs: No Transportation Needs (3/4/2024)    PRAPARE - Transportation     Lack of Transportation (Medical): No     Lack of Transportation (Non-Medical): No   Physical Activity: Insufficiently Active (3/4/2024)    Exercise Vital Sign     Days of Exercise per Week: 2 days     Minutes of Exercise per Session: 30 min   Stress: Stress Concern Present (3/4/2024)    [employer] - Occupational Stress Questionnaire     Feeling of Stress : Rather much   Housing Stability: Low Risk  (3/4/2024)    Housing Stability Vital Sign     Unable to Pay for Housing in the Last Year: No     Number of Places Lived in the Last Year: 1     Unstable Housing in the Last Year: No     Past Medical History:   Diagnosis Date    Arthritis     Cataract     Colon polyp     Diabetes mellitus type II 15 years     am 05/30/2022    Hyperlipidemia     Hypertension     Renal cell carcinoma 09/2018    Total knee replacement status, left 01/20/2019    Dr.Robert Cook    UTI (urinary tract infection)        Objective:        Physical Exam  Neurological:      Mental Status: She is alert and oriented to person, place, and time. Mental status is at baseline.   Psychiatric:         Mood and Affect: Mood normal.         Behavior: Behavior normal.         Thought Content: Thought content normal.         Judgment: Judgment normal.           Assessment / Plan:     Type 2 diabetes mellitus with hemoglobin A1c goal of less than 7.0%  -     Discontinue: insulin aspart U-100 (NOVOLOG) 100 unit/mL (3 mL) InPn pen; Titrate up to  100 units daily as instucted.  Dispense: 27 mL; Refill: 3  -     blood sugar diagnostic Strp; 1 each by Misc.(Non-Drug; Combo Route) route 4 (four) times daily.  Dispense: 300 each; Refill: 3  -     insulin aspart U-100 (NOVOLOG) 100 unit/mL (3 mL) InPn pen; Titrate up to 100 units daily as instucted.  Dispense: 90 mL; Refill: 3    Fatty liver    Bloating  -     Ambulatory referral/consult to Gastroenterology; Future; Expected date: 06/27/2024    Hyperlipidemia LDL goal <70    Hypertension goal BP (blood pressure) < 130/80    Obesity (BMI 30-39.9)            Additional Plan Details:    - POCT Glucose  - Encouraged continuation of lifestyle changes including regular exercise and limiting carbohydrates to 30-45 grams per meal threes times daily and 15 grams per snack with a limit of two daily.   - Encouraged continued monitoring of blood glucose with maintenance of 4 times daily at Fasting, After Dinner, and Symptomatic. Dex G7 pending. Given callback # for [facility].   - Current DM Medication Regimen: Change Tresiba 76 units daily.  Will plan to restart Ozempic once able to get through pharm asst. Change  Novolog 12 units TID before meals and correction dosing every 3 hours before meals.   - Pharm Asst: Ozempic and Tresiba and Novolog  - Referral to GI - bloating  - Health Maintenance standards addressed today: Flu Shot - patient would like to complete outside of [facility] and RSV vaccine - sched at end of month and Tetanus vaccine and eye exam and mammo  - Nursing Visit: Patient is age 79 or younger with an A1c of 7.5 or greater and will not need nursing visit at this time .   - Follow up in  3  weeks.    CURRENT DM MEDICATIONS:   Ozempic  - call me before restart   Tresiba 76 units daily   Novolog 16 units before meals - 15 mins before eating      Novolog Correction Dosing every 3 hours as needed OUTSIDE OF EATING  If  - 250, may take 4 units of Novolog  If  - 300, may take 8 units of Novolog  If  - 350,  may take 12 units of Novolog  If  - 400, may take 16 units of Novolog  If +, may take 20 units of Novolog          Blakeney McKnight, PA-C [facility]

## 2024-06-24 NOTE — PATIENT INSTRUCTIONS
CURRENT DM MEDICATIONS:   Ozempic 2 mg weekly   Amaryl - will attempt to hold  Tresiba 56 units daily  Jardiance 25 mg daily    Last refill:5/21/24  Last appointment:4/3/24  Upcoming appointment:7/8/24

## 2024-07-09 ENCOUNTER — OFFICE VISIT (OUTPATIENT)
Dept: DIABETES | Facility: CLINIC | Age: 69
End: 2024-07-09
Payer: MEDICARE

## 2024-07-09 DIAGNOSIS — E78.5 HYPERLIPIDEMIA LDL GOAL <70: ICD-10-CM

## 2024-07-09 DIAGNOSIS — E66.9 OBESITY (BMI 30-39.9): ICD-10-CM

## 2024-07-09 DIAGNOSIS — I10 HYPERTENSION GOAL BP (BLOOD PRESSURE) < 130/80: ICD-10-CM

## 2024-07-09 DIAGNOSIS — K76.0 FATTY LIVER: ICD-10-CM

## 2024-07-09 DIAGNOSIS — E11.9 TYPE 2 DIABETES MELLITUS WITH HEMOGLOBIN A1C GOAL OF LESS THAN 7.0%: Primary | ICD-10-CM

## 2024-07-09 PROCEDURE — 4010F ACE/ARB THERAPY RXD/TAKEN: CPT | Mod: HCNC,CPTII,95, | Performed by: PHYSICIAN ASSISTANT

## 2024-07-09 PROCEDURE — 3072F LOW RISK FOR RETINOPATHY: CPT | Mod: HCNC,CPTII,95, | Performed by: PHYSICIAN ASSISTANT

## 2024-07-09 PROCEDURE — 1157F ADVNC CARE PLAN IN RCRD: CPT | Mod: HCNC,CPTII,95, | Performed by: PHYSICIAN ASSISTANT

## 2024-07-09 PROCEDURE — 3051F HG A1C>EQUAL 7.0%<8.0%: CPT | Mod: HCNC,CPTII,95, | Performed by: PHYSICIAN ASSISTANT

## 2024-07-09 PROCEDURE — 99442 PR PHYSICIAN TELEPHONE EVALUATION 11-20 MIN: CPT | Mod: HCNC,95,, | Performed by: PHYSICIAN ASSISTANT

## 2024-07-09 NOTE — PROGRESS NOTES
PCP: Sakina Cooper DO    Subjective:     Chief Complaint: Diabetes - Established Patient    Established Patient - Audio Only Telehealth Visit     The patient location is: Home  The chief complaint leading to consultation is: Diabetes follow up  Visit type: Virtual visit with audio only (telephone)  Total Time Spent with Patient: 12 minutes     The reason for the audio only service rather than synchronous audio and video virtual visit was related to technical difficulties or patient preference/necessity.     Each patient to whom I provide medical services by telemedicine is:  (1) informed of the relationship between the physician and patient and the respective role of any other health care provider with respect to management of the patient; and (2) notified that they may decline to receive medical services by telemedicine and may withdraw from such care at any time. Patient verbally consented to receive this service via voice-only telephone call.    This service was not originating from a related E/M service provided within the previous 7 days nor will  to an E/M service or procedure within the next 24 hours or my soonest available appointment.  Prevailing standard of care was able to be met in this audio-only visit.      HISTORY OF PRESENT ILLNESS: 68 y.o.  female presenting for diabetes management visit.   The patient's last visit with me was on 6/20/2024.   Patient has had Type II diabetes since 2005.  Pertinent to decision making is the following comorbidities: HTN, HLD, Obesity by BMI and Fatty Liver  Patient has the following Diabetes complications: without complications  She  has attended diabetes education in the past.     Patient's most recent A1c of 7.4% was completed 1 months ago.   Patient states since Her last A1c Her blood glucose levels have been within range in the morning / fasting.   Patient monitors blood glucose 4 times per day with Contour Next : Fasting, After Dinner, and  Symptomatic. Dexcom G7 pending - pt needs to contact [facility].   Patient blood glucose monitoring device will not be uploaded into Media Section today secondary to unable to upload successfully.   Per meter recall, fasting blood sugar ranging 120 - 167 and after meals 101 - up to 200s.   Patient endorses the following diabetes related symptoms:  none .   Patient is due today for the following diabetes-related health maintenance standards:  RSV vaccine, Foot exam, Tetanus, Colonoscopy, COVID, eye, mammo .   She denies recent hospital admissions or emergency room visits.   She denies having hypoglycemia.  Patient's concerns today include glycemic control. PPA pending - Novolog, Tresiba, and ozempic. Of note, pt struggling with weight gain. This was prior to Ozempic d/c.  Also c/o of bloating which she states is not related to issues with GERD.    Patient medication regimen is as below.     CURRENT DM MEDICATIONS:   Ozempic  - call me before restart   Tresiba 76 units daily   Novolog 16 units before meals - 15 mins before eating      Novolog Correction Dosing every 3 hours as needed OUTSIDE OF EATING  If  - 250, may take 4 units of Novolog  If  - 300, may take 8 units of Novolog  If  - 350, may take 12 units of Novolog  If  - 400, may take 16 units of Novolog  If +, may take 20 units of Novolog         Patient has failed the following Diabetes medications:   Metformin - allergy   Onglyza  Levemir   Victoza / Trulicity - GLP-1 escalation  Mounjaro - cost; no pharm asst available   Jardiance 10 + 25 mg - GI side effects;         Labs Reviewed.       Lab Results   Component Value Date    CPEPTIDE 1.73 08/30/2022     Lab Results   Component Value Date    GLUTAMICACID 0.00 12/14/2017          //   , There is no height or weight on file to calculate BMI.  Her blood sugar in clinic today is:          Review of Systems   Constitutional:  Negative for activity change, appetite change, chills and fever.    HENT:  Negative for dental problem, mouth sores, nosebleeds, sore throat and trouble swallowing.    Eyes:  Negative for pain and discharge.   Respiratory:  Negative for shortness of breath, wheezing and stridor.    Cardiovascular:  Negative for chest pain, palpitations and leg swelling.   Gastrointestinal:  Negative for abdominal pain, diarrhea, nausea and vomiting.   Endocrine: Negative for polydipsia, polyphagia and polyuria.   Genitourinary:  Negative for dysuria, frequency and urgency.   Musculoskeletal:  Negative for joint swelling and myalgias.   Skin:  Negative for rash and wound.   Neurological:  Negative for dizziness, syncope, weakness and headaches.   Psychiatric/Behavioral:  Negative for behavioral problems and dysphoric mood.          Diabetes Management Status  Statin: Taking  ACE/ARB: Taking    Screening or Prevention Patient's value Goal Complete/Controlled?   HgA1C Testing and Control   Lab Results   Component Value Date    HGBA1C 7.4 (H) 05/27/2024      Annually/Less than 8% Yes   Lipid profile : 05/27/2024 Annually Yes   LDL control Lab Results   Component Value Date    LDLCALC 137.2 05/27/2024    Annually/Less than 100 mg/dl  Yes   Nephropathy screening Lab Results   Component Value Date    MICALBCREAT 9.3 08/21/2023     Lab Results   Component Value Date    PROTEINUA Negative 09/08/2014    Annually Yes   Blood pressure BP Readings from Last 1 Encounters:   05/31/24 138/80    Less than 140/90 Yes   Dilated retinal exam : 08/09/2023 Annually Yes    Foot exam   : 08/21/2023 Annually No     Social History     Socioeconomic History    Marital status:     Number of children: 3   Occupational History    Occupation: Stay at Home    Occupation:    Tobacco Use    Smoking status: Never    Smokeless tobacco: Never   Substance and Sexual Activity    Alcohol use: Yes     Comment: occasional wine     Drug use: No    Sexual activity: Yes     Partners: Male     Birth  control/protection: Surgical   Social History Narrative    . Housewife.     Social Determinants of Health     Financial Resource Strain: Low Risk  (3/4/2024)    Overall Financial Resource Strain (CARDIA)     Difficulty of Paying Living Expenses: Not hard at all   Food Insecurity: No Food Insecurity (3/4/2024)    Hunger Vital Sign     Worried About Running Out of Food in the Last Year: Never true     Ran Out of Food in the Last Year: Never true   Transportation Needs: No Transportation Needs (3/4/2024)    PRAPARE - Transportation     Lack of Transportation (Medical): No     Lack of Transportation (Non-Medical): No   Physical Activity: Insufficiently Active (3/4/2024)    Exercise Vital Sign     Days of Exercise per Week: 2 days     Minutes of Exercise per Session: 30 min   Stress: Stress Concern Present (3/4/2024)    [employer] [place] [employer] - Occupational Stress Questionnaire     Feeling of Stress : Rather much   Housing Stability: Low Risk  (3/4/2024)    Housing Stability Vital Sign     Unable to Pay for Housing in the Last Year: No     Number of Places Lived in the Last Year: 1     Unstable Housing in the Last Year: No     Past Medical History:   Diagnosis Date    Arthritis     Cataract     Colon polyp     Diabetes mellitus type II 15 years     am 05/30/2022    Hyperlipidemia     Hypertension     Renal cell carcinoma 09/2018    Total knee replacement status, left 01/20/2019    Dr.Robert Cook    UTI (urinary tract infection)        Objective:        Physical Exam  Neurological:      Mental Status: She is alert and oriented to person, place, and time. Mental status is at baseline.   Psychiatric:         Mood and Affect: Mood normal.         Behavior: Behavior normal.         Thought Content: Thought content normal.         Judgment: Judgment normal.           Assessment / Plan:     Type 2 diabetes mellitus with hemoglobin A1c goal of less than 7.0%  -     Hemoglobin A1C;  Standing    Fatty liver    Hyperlipidemia LDL goal <70    Hypertension goal BP (blood pressure) < 130/80    Obesity (BMI 30-39.9)        Additional Plan Details:    - POCT Glucose  - Encouraged continuation of lifestyle changes including regular exercise and limiting carbohydrates to 30-45 grams per meal threes times daily and 15 grams per snack with a limit of two daily.   - Encouraged continued monitoring of blood glucose with maintenance of 4 times daily at Fasting, After Dinner, and Symptomatic. Dex G7 pending. Given callback # for [facility].   - Current DM Medication Regimen: Change Tresiba 80 units daily.  Will plan to restart Ozempic once able to get through pharm asst. Change  Novolog  16 - 20 units TID before meals and correction dosing every 3 hours before meals.   - Pharm Asst: Ozempic and Tresiba and Novolog  - Will [facility] team for follow up for bloating  - Health Maintenance standards addressed today: Foot Exam - deferred by patient today because Telemedicine or Telephone visit, Eye Exam - will be completed outside of [facility] and patient will schedule, COVID - 19 Vaccine - patient will schedule outside of [facility] , and RSV vaccine - sched at end of month and Tetanus vaccine and eye exam and mammo  - Nursing Visit: Patient is age 79 or younger with an A1c of 7.5 or greater and will not need nursing visit at this time .   - Follow up in  8  weeks.    CURRENT DM MEDICATIONS:   Ozempic  - call me before restart   Tresiba 80 units daily   Novolog 16 units before regular meals and 20 units before heavy carb meals - 15 mins before eating      Novolog Correction Dosing every 3 hours as needed OUTSIDE OF EATING  If  - 250, may take 4 units of Novolog  If  - 300, may take 8 units of Novolog  If  - 350, may take 12 units of Novolog  If  - 400, may take 16 units of Novolog  If +, may take 20 units of Novolog          Alecia Goodman PA-C  [facility]

## 2024-07-09 NOTE — PATIENT INSTRUCTIONS
CURRENT DM MEDICATIONS:   Ozempic  - call me before restart   Tresiba 80 units daily   Novolog 16 units before regular meals and 20 units before heavy carb meals - 15 mins before eating      Novolog Correction Dosing every 3 hours as needed OUTSIDE OF EATING  If  - 250, may take 4 units of Novolog  If  - 300, may take 8 units of Novolog  If  - 350, may take 12 units of Novolog  If  - 400, may take 16 units of Novolog  If +, may take 20 units of Novolog

## 2024-07-09 NOTE — Clinical Note
"A1c 8/27 at [facility]  Audio 1 weeek after 8/27 A1c "no dev"  GI Clinic - referral placed. Please call pt to sched. "

## 2024-08-06 ENCOUNTER — TELEPHONE (OUTPATIENT)
Dept: DIABETES | Facility: CLINIC | Age: 69
End: 2024-08-06
Payer: MEDICARE

## 2024-08-06 DIAGNOSIS — E11.9 TYPE 2 DIABETES MELLITUS WITH HEMOGLOBIN A1C GOAL OF LESS THAN 7.0%: ICD-10-CM

## 2024-08-06 RX ORDER — INSULIN ASPART 100 [IU]/ML
INJECTION, SOLUTION INTRAVENOUS; SUBCUTANEOUS
Qty: 60 ML | Refills: 0 | Status: SHIPPED | OUTPATIENT
Start: 2024-08-06

## 2024-08-12 ENCOUNTER — TELEPHONE (OUTPATIENT)
Dept: GASTROENTEROLOGY | Facility: CLINIC | Age: 69
End: 2024-08-12
Payer: MEDICARE

## 2024-08-12 NOTE — TELEPHONE ENCOUNTER
"Spoke with patient on [phone] in regard to scheduling an appointment. Patient has chosen the appointment with JOE Ely on 1/2/2025 at 2:30 pm. Patient appointment has also been added to the wait-list should someone cancel or reschedule to which she voices understanding..     ----- Message from Alecia Goodman PA-C sent at 7/9/2024  9:25 AM CDT -----  A1c 8/27 at [facility]   Audio 1 weeek after 8/27 A1c "no dev"    GI Clinic - referral placed. Please call pt to sched.   "

## 2024-08-23 ENCOUNTER — LAB VISIT (OUTPATIENT)
Dept: LAB | Facility: HOSPITAL | Age: 69
End: 2024-08-23
Attending: PHYSICIAN ASSISTANT
Payer: MEDICARE

## 2024-08-23 DIAGNOSIS — E11.9 TYPE 2 DIABETES MELLITUS WITH HEMOGLOBIN A1C GOAL OF LESS THAN 7.0%: ICD-10-CM

## 2024-08-23 LAB
ESTIMATED AVG GLUCOSE: 174 MG/DL (ref 68–131)
HBA1C MFR BLD: 7.7 % (ref 4–5.6)

## 2024-08-23 PROCEDURE — 83036 HEMOGLOBIN GLYCOSYLATED A1C: CPT | Mod: HCNC | Performed by: PHYSICIAN ASSISTANT

## 2024-08-23 PROCEDURE — 36415 COLL VENOUS BLD VENIPUNCTURE: CPT | Mod: HCNC,PO | Performed by: PHYSICIAN ASSISTANT

## 2024-08-27 ENCOUNTER — OFFICE VISIT (OUTPATIENT)
Dept: DIABETES | Facility: CLINIC | Age: 69
End: 2024-08-27
Payer: MEDICARE

## 2024-08-27 DIAGNOSIS — K76.0 FATTY LIVER: ICD-10-CM

## 2024-08-27 DIAGNOSIS — E11.9 TYPE 2 DIABETES MELLITUS WITH HEMOGLOBIN A1C GOAL OF LESS THAN 7.0%: Primary | ICD-10-CM

## 2024-08-27 DIAGNOSIS — E78.5 HYPERLIPIDEMIA LDL GOAL <70: ICD-10-CM

## 2024-08-27 DIAGNOSIS — I10 HYPERTENSION GOAL BP (BLOOD PRESSURE) < 130/80: ICD-10-CM

## 2024-08-27 DIAGNOSIS — E66.9 OBESITY (BMI 30-39.9): ICD-10-CM

## 2024-08-27 PROCEDURE — 3072F LOW RISK FOR RETINOPATHY: CPT | Mod: HCNC,CPTII,95, | Performed by: PHYSICIAN ASSISTANT

## 2024-08-27 PROCEDURE — 3051F HG A1C>EQUAL 7.0%<8.0%: CPT | Mod: HCNC,CPTII,95, | Performed by: PHYSICIAN ASSISTANT

## 2024-08-27 PROCEDURE — 1157F ADVNC CARE PLAN IN RCRD: CPT | Mod: HCNC,CPTII,95, | Performed by: PHYSICIAN ASSISTANT

## 2024-08-27 PROCEDURE — 99441 PR PHYSICIAN TELEPHONE EVALUATION 5-10 MIN: CPT | Mod: HCNC,95,, | Performed by: PHYSICIAN ASSISTANT

## 2024-08-27 PROCEDURE — 4010F ACE/ARB THERAPY RXD/TAKEN: CPT | Mod: HCNC,CPTII,95, | Performed by: PHYSICIAN ASSISTANT

## 2024-08-27 NOTE — PATIENT INSTRUCTIONS
CURRENT DM MEDICATIONS:   Ozempic 1 mg weekly - restarted 1 wk ago  Tresiba 72 units daily   Novolog 12 units before regular meals  - 15 mins before eating      Novolog Correction Dosing every 3 hours as needed OUTSIDE OF EATING  If  - 250, may take 4 units of Novolog  If  - 300, may take 8 units of Novolog  If  - 350, may take 12 units of Novolog  If  - 400, may take 16 units of Novolog  If +, may take 20 units of Novolog

## 2024-08-27 NOTE — PROGRESS NOTES
PCP: Sakina Cooper DO    Subjective:     Chief Complaint: Diabetes - Established Patient    Established Patient - Audio Only Telehealth Visit     The patient location is: Home  The chief complaint leading to consultation is: Diabetes follow up  Visit type: Virtual visit with audio only (telephone)  Total Time Spent with Patient: 10 minutes     The reason for the audio only service rather than synchronous audio and video virtual visit was related to technical difficulties or patient preference/necessity.     Each patient to whom I provide medical services by telemedicine is:  (1) informed of the relationship between the physician and patient and the respective role of any other health care provider with respect to management of the patient; and (2) notified that they may decline to receive medical services by telemedicine and may withdraw from such care at any time. Patient verbally consented to receive this service via voice-only telephone call.    This service was not originating from a related E/M service provided within the previous 7 days nor will  to an E/M service or procedure within the next 24 hours or my soonest available appointment.  Prevailing standard of care was able to be met in this audio-only visit.      HISTORY OF PRESENT ILLNESS: 68 y.o.  female presenting for diabetes management visit.   The patient's last visit with me was on 7/9/2024.  Patient has had Type II diabetes since 2005.  Pertinent to decision making is the following comorbidities: HTN, HLD, Obesity by BMI and Fatty Liver  Patient has the following Diabetes complications: without complications  She  has attended diabetes education in the past.     Patient's most recent A1c of 7.7% was completed 1 weeks ago.   Patient states since Her last A1c Her blood glucose levels have been within range in the morning / fasting.   Patient monitors blood glucose 4 times per day with Contour Next : Fasting, After Dinner, and  Symptomatic. Dexcom G7 pending - pt needs to contact [facility].   Patient blood glucose monitoring device will not be uploaded into Media Section today secondary to unable to upload successfully.   Per meter recall, fasting blood sugar ranging 84 - 110 and after meals 130 - 210.   Patient endorses the following diabetes related symptoms:  none .   Patient is due today for the following diabetes-related health maintenance standards:  RSV vaccine, Foot exam, Tetanus, Colonoscopy, Urine micro, COVID, eye, mammo .   She denies recent hospital admissions or emergency room visits.   She denies having hypoglycemia.  Patient's concerns today include glycemic control. PPA pending - Novolog, Tresiba, and ozempic. Of note, pt struggling with weight gain. This was prior to Ozempic d/c.  Also c/o of bloating which she states is not related to issues with GERD.  Sched with GI.   Patient medication regimen is as below.     CURRENT DM MEDICATIONS:   Ozempic 1 mg weekly - restarted 1 wk ago  Tresiba 72 units daily   Novolog 12 units before regular meals  - 15 mins before eating      Novolog Correction Dosing every 3 hours as needed OUTSIDE OF EATING  If  - 250, may take 4 units of Novolog  If  - 300, may take 8 units of Novolog  If  - 350, may take 12 units of Novolog  If  - 400, may take 16 units of Novolog  If +, may take 20 units of Novolog         Patient has failed the following Diabetes medications:   Metformin - allergy   Onglyza  Levemir   Victoza / Trulicity - GLP-1 escalation  Mounjaro - cost; no pharm asst available   Jardiance 10 + 25 mg - GI side effects;         Labs Reviewed.       Lab Results   Component Value Date    CPEPTIDE 1.73 08/30/2022     Lab Results   Component Value Date    GLUTAMICACID 0.00 12/14/2017          //   , There is no height or weight on file to calculate BMI.  Her blood sugar in clinic today is:          Review of Systems   Constitutional:  Negative for activity change,  appetite change, chills and fever.   HENT:  Negative for dental problem, mouth sores, nosebleeds, sore throat and trouble swallowing.    Eyes:  Negative for pain and discharge.   Respiratory:  Negative for shortness of breath, wheezing and stridor.    Cardiovascular:  Negative for chest pain, palpitations and leg swelling.   Gastrointestinal:  Negative for abdominal pain, diarrhea, nausea and vomiting.   Endocrine: Negative for polydipsia, polyphagia and polyuria.   Genitourinary:  Negative for dysuria, frequency and urgency.   Musculoskeletal:  Negative for joint swelling and myalgias.   Skin:  Negative for rash and wound.   Neurological:  Negative for dizziness, syncope, weakness and headaches.   Psychiatric/Behavioral:  Negative for behavioral problems and dysphoric mood.          Diabetes Management Status  Statin: Taking  ACE/ARB: Taking    Screening or Prevention Patient's value Goal Complete/Controlled?   HgA1C Testing and Control   Lab Results   Component Value Date    HGBA1C 7.7 (H) 08/23/2024      Annually/Less than 8% Yes   Lipid profile : 05/27/2024 Annually Yes   LDL control Lab Results   Component Value Date    LDLCALC 137.2 05/27/2024    Annually/Less than 100 mg/dl  Yes   Nephropathy screening Lab Results   Component Value Date    MICALBCREAT 9.3 08/21/2023     Lab Results   Component Value Date    PROTEINUA Negative 09/08/2014    Annually Yes   Blood pressure BP Readings from Last 1 Encounters:   05/31/24 138/80    Less than 140/90 Yes   Dilated retinal exam : 08/09/2023 Annually Yes    Foot exam   : 08/21/2023 Annually No     Social History     Socioeconomic History    Marital status:     Number of children: 3   Occupational History    Occupation: Stay at Home    Occupation:    Tobacco Use    Smoking status: Never    Smokeless tobacco: Never   Substance and Sexual Activity    Alcohol use: Yes     Comment: occasional wine     Drug use: No    Sexual activity: Yes      Partners: Male     Birth control/protection: Surgical   Social History Narrative    . Housewife.     Social Determinants of Health     Financial Resource Strain: Low Risk  (3/4/2024)    Overall Financial Resource Strain (CARDIA)     Difficulty of Paying Living Expenses: Not hard at all   Food Insecurity: No Food Insecurity (3/4/2024)    Hunger Vital Sign     Worried About Running Out of Food in the Last Year: Never true     Ran Out of Food in the Last Year: Never true   Transportation Needs: No Transportation Needs (3/4/2024)    PRAPARE - Transportation     Lack of Transportation (Medical): No     Lack of Transportation (Non-Medical): No   Physical Activity: Insufficiently Active (3/4/2024)    Exercise Vital Sign     Days of Exercise per Week: 2 days     Minutes of Exercise per Session: 30 min   Stress: Stress Concern Present (3/4/2024)    [employer] [place] [employer] - Occupational Stress Questionnaire     Feeling of Stress : Rather much   Housing Stability: Low Risk  (3/4/2024)    Housing Stability Vital Sign     Unable to Pay for Housing in the Last Year: No     Number of Places Lived in the Last Year: 1     Unstable Housing in the Last Year: No     Past Medical History:   Diagnosis Date    Arthritis     Cataract     Colon polyp     Diabetes mellitus type II 15 years     am 05/30/2022    Hyperlipidemia     Hypertension     Renal cell carcinoma 09/2018    Total knee replacement status, left 01/20/2019    Dr.Robert Cook    UTI (urinary tract infection)        Objective:        Physical Exam  Neurological:      Mental Status: She is alert and oriented to person, place, and time. Mental status is at baseline.   Psychiatric:         Mood and Affect: Mood normal.         Behavior: Behavior normal.         Thought Content: Thought content normal.         Judgment: Judgment normal.           Assessment / Plan:     Type 2 diabetes mellitus with hemoglobin A1c goal of less than 7.0%  -      Hemoglobin A1C; Standing    Fatty liver    Hyperlipidemia LDL goal <70    Hypertension goal BP (blood pressure) < 130/80    Obesity (BMI 30-39.9)          Additional Plan Details:    - POCT Glucose  - Encouraged continuation of lifestyle changes including regular exercise and limiting carbohydrates to 30-45 grams per meal threes times daily and 15 grams per snack with a limit of two daily.   - Encouraged continued monitoring of blood glucose with maintenance of 4 times daily at Fasting, After Dinner, and Symptomatic. Dex G7 pending. Given callback # for [facility].   - Current DM Medication Regimen: Continue Tresiba 72 units daily.  restart Ozempic 0.25 mg weekly. Change  Novolog 12 units TID before meals and correction dosing every 3 hours before meals.   - Pharm Asst: Ozempic and Tresiba and Novolog  - Health Maintenance standards addressed today: Foot Exam - deferred by patient today because Telemedicine or Telephone visit, Eye Exam - will be completed outside of [facility] and patient will schedule, Urine Microalbumin / Creatinine Ratio scheduled, COVID - 19 Vaccine - patient will schedule outside of [facility] , and RSV vaccine - sched at end of month and Tetanus vaccine and eye exam and mammo  - Nursing Visit: Patient is age 79 or younger with an A1c of 7.5 or greater and will not need nursing visit at this time .   - Follow up in  12  weeks.    CURRENT DM MEDICATIONS:   Ozempic 1 mg weekly  Tresiba 72 units daily   Novolog 12 units before regular meals  - 15 mins before eating      Novolog Correction Dosing every 3 hours as needed OUTSIDE OF EATING  If  - 250, may take 4 units of Novolog  If  - 300, may take 8 units of Novolog  If  - 350, may take 12 units of Novolog  If  - 400, may take 16 units of Novolog  If +, may take 20 units of Novolog          Blakeney McKnight, PA-C [facility]

## 2024-08-28 DIAGNOSIS — E11.9 TYPE 2 DIABETES MELLITUS WITHOUT COMPLICATION: ICD-10-CM

## 2024-09-03 ENCOUNTER — PATIENT MESSAGE (OUTPATIENT)
Dept: NEUROLOGY | Facility: CLINIC | Age: 69
End: 2024-09-03
Payer: MEDICARE

## 2024-09-25 DIAGNOSIS — E11.9 TYPE 2 DIABETES MELLITUS WITH HEMOGLOBIN A1C GOAL OF LESS THAN 7.0%: ICD-10-CM

## 2024-09-25 RX ORDER — GLIMEPIRIDE 4 MG/1
4 TABLET ORAL 2 TIMES DAILY WITH MEALS
Qty: 180 TABLET | Refills: 0 | OUTPATIENT
Start: 2024-09-25

## 2024-10-14 ENCOUNTER — OFFICE VISIT (OUTPATIENT)
Dept: OTOLARYNGOLOGY | Facility: CLINIC | Age: 69
End: 2024-10-14
Payer: MEDICARE

## 2024-10-14 VITALS — BODY MASS INDEX: 32.34 KG/M2 | WEIGHT: 176.81 LBS

## 2024-10-14 DIAGNOSIS — M26.629 TMJPDS (TEMPOROMANDIBULAR JOINT PAIN DYSFUNCTION SYNDROME): Primary | ICD-10-CM

## 2024-10-14 DIAGNOSIS — H92.02 OTALGIA OF LEFT EAR: ICD-10-CM

## 2024-10-14 PROCEDURE — 1159F MED LIST DOCD IN RCRD: CPT | Mod: HCNC,CPTII,S$GLB, | Performed by: OTOLARYNGOLOGY

## 2024-10-14 PROCEDURE — 1101F PT FALLS ASSESS-DOCD LE1/YR: CPT | Mod: HCNC,CPTII,S$GLB, | Performed by: OTOLARYNGOLOGY

## 2024-10-14 PROCEDURE — 4010F ACE/ARB THERAPY RXD/TAKEN: CPT | Mod: HCNC,CPTII,S$GLB, | Performed by: OTOLARYNGOLOGY

## 2024-10-14 PROCEDURE — 99213 OFFICE O/P EST LOW 20 MIN: CPT | Mod: HCNC,S$GLB,, | Performed by: OTOLARYNGOLOGY

## 2024-10-14 PROCEDURE — 1157F ADVNC CARE PLAN IN RCRD: CPT | Mod: HCNC,CPTII,S$GLB, | Performed by: OTOLARYNGOLOGY

## 2024-10-14 PROCEDURE — 99999 PR PBB SHADOW E&M-EST. PATIENT-LVL III: CPT | Mod: PBBFAC,HCNC,, | Performed by: OTOLARYNGOLOGY

## 2024-10-14 PROCEDURE — 3072F LOW RISK FOR RETINOPATHY: CPT | Mod: HCNC,CPTII,S$GLB, | Performed by: OTOLARYNGOLOGY

## 2024-10-14 PROCEDURE — 3051F HG A1C>EQUAL 7.0%<8.0%: CPT | Mod: HCNC,CPTII,S$GLB, | Performed by: OTOLARYNGOLOGY

## 2024-10-14 PROCEDURE — 3008F BODY MASS INDEX DOCD: CPT | Mod: HCNC,CPTII,S$GLB, | Performed by: OTOLARYNGOLOGY

## 2024-10-14 PROCEDURE — 3288F FALL RISK ASSESSMENT DOCD: CPT | Mod: HCNC,CPTII,S$GLB, | Performed by: OTOLARYNGOLOGY

## 2024-10-14 NOTE — PATIENT INSTRUCTIONS
We had a long discussion regarding the underlying pathology of temporomandibular joint dysfunction (TMD) as the cause of ear pain.  We further discussed conservative measures to treat TMD including avoiding gum and other foods that require lots of chewing, warm compresses, and scheduled antinflammatories. We also discussed bruxism (grinding of the teeth) and the role that often plays in TMJ related pain.  I explained that for patients with evidence of wear consistent with bruxism, a mouth guard to be worn while sleeping is often necessary.  If the pain persists with conservative treatment, the patient will then schedule an appointment with a dentist for further evaluation.

## 2024-10-14 NOTE — PROGRESS NOTES
Referring Provider:    Self, Aaareferral  No address on file  Subjective:   Patient: Dary Chaney [id number], :1955   Visit date:10/14/2024 10:51 AM    Chief Complaint:  Otalgia (Pt is coming in today for ear pt states that it started on Saturday and it has the whole left side of her face hurting with no drainage )    HPI:    Prior notes reviewed by myself.  Clinical documentation obtained by nursing staff reviewed.       68-year-old female presents for evaluation of left-sided ear pain.  She started noticing this more over the weekend and the pain seems to radiate from just in front of her ear down into her neck and above her ear as well.  She denies any acute changes in her hearing or tinnitus.  She has not had any otorrhea or recent ear infections.  She has been taking over-the-counter medications partial relief.      Objective:     Physical Exam:  Vitals:  Wt 80.2 kg (176 lb 12.9 oz)   BMI 32.34 kg/m²   General appearance:  Well developed, well nourished    Ears:  Otoscopy of external auditory canals and tympanic membranes was normal, clinical speech reception thresholds grossly intact, no mass/lesion of auricle.    Nose:  No masses/lesions of external nose, nasal mucosa, septum, and turbinates were within normal limits.    Mouth:  No mass/lesion of lips, teeth, gums, hard/soft palate, tongue, tonsils, or oropharynx.obvious discomfort with mouth opening    Neck & Lymphatics:  No cervical lymphadenopathy, no neck mass/crepitus/ asymmetry, trachea is midline, no thyroid enlargement/tenderness/mass. TTP over TMJ left greater than right, palpable crepitus        [x]  Data Reviewed:    Lab Results   Component Value Date    WBC 5.41 2024    HGB 12.9 2024    HCT 40.3 2024    MCV 80 (L) 2024    EOSINOPHIL 1.7 2024             Assessment & Plan:   TMJPDS (temporomandibular joint pain dysfunction syndrome)    Otalgia of left ear        We had a long discussion regarding the  underlying pathology of temporomandibular joint dysfunction (TMD) as the cause of ear pain.  We further discussed conservative measures to treat TMD including avoiding gum and other foods that require lots of chewing, warm compresses, and scheduled antinflammatories. We also discussed bruxism (grinding of the teeth) and the role that often plays in TMJ related pain.  I explained that for patients with evidence of wear consistent with bruxism, a mouth guard to be worn while sleeping is often necessary.  If the pain persists with conservative treatment, the patient will then schedule an appointment with a dentist for further evaluation.    Mayito Carlos M.D.  Department of Otolaryngology - Head & Neck Surgery  [address] [facility].  [place] [address]  P: [phone]  F: [phone]        DISCLAIMER: This note was prepared with [employer] voice recognition transcription software. Garbled syntax, mangled pronouns, and other bizarre constructions may be attributed to that software system. While efforts were made to correct any mistakes made by this voice recognition program, some errors and/or omissions may remain in the note that were missed when the note was originally created.

## 2024-10-17 ENCOUNTER — HOSPITAL ENCOUNTER (OUTPATIENT)
Dept: RADIOLOGY | Facility: HOSPITAL | Age: 69
Discharge: HOME | End: 2024-10-17
Attending: NURSE PRACTITIONER
Payer: MEDICARE

## 2024-10-17 VITALS — WEIGHT: 176.81 LBS | BODY MASS INDEX: 32.54 KG/M2 | HEIGHT: 62 IN

## 2024-10-17 DIAGNOSIS — Z00.00 ENCOUNTER FOR PREVENTATIVE ADULT HEALTH CARE EXAMINATION: ICD-10-CM

## 2024-10-17 DIAGNOSIS — Z12.31 BREAST CANCER SCREENING BY MAMMOGRAM: ICD-10-CM

## 2024-10-17 PROCEDURE — 77063 BREAST TOMOSYNTHESIS BI: CPT | Mod: TC,HCNC

## 2024-10-17 PROCEDURE — 77067 SCR MAMMO BI INCL CAD: CPT | Mod: TC,HCNC

## 2024-10-17 PROCEDURE — 77063 BREAST TOMOSYNTHESIS BI: CPT | Mod: 26,HCNC,, | Performed by: RADIOLOGY

## 2024-10-17 PROCEDURE — 77067 SCR MAMMO BI INCL CAD: CPT | Mod: 26,HCNC,, | Performed by: RADIOLOGY

## 2024-10-18 ENCOUNTER — OFFICE VISIT (OUTPATIENT)
Dept: URGENT CARE | Facility: CLINIC | Age: 69
End: 2024-10-18
Payer: MEDICARE

## 2024-10-18 VITALS
RESPIRATION RATE: 20 BRPM | SYSTOLIC BLOOD PRESSURE: 145 MMHG | HEART RATE: 86 BPM | TEMPERATURE: 98 F | WEIGHT: 176 LBS | OXYGEN SATURATION: 97 % | HEIGHT: 62 IN | DIASTOLIC BLOOD PRESSURE: 67 MMHG | BODY MASS INDEX: 32.39 KG/M2

## 2024-10-18 DIAGNOSIS — R51.9 NONINTRACTABLE HEADACHE, UNSPECIFIED CHRONICITY PATTERN, UNSPECIFIED HEADACHE TYPE: Primary | ICD-10-CM

## 2024-10-18 RX ORDER — PROMETHAZINE HYDROCHLORIDE 25 MG/ML
25 INJECTION, SOLUTION INTRAMUSCULAR; INTRAVENOUS
Status: COMPLETED | OUTPATIENT
Start: 2024-10-18 | End: 2024-10-18

## 2024-10-18 RX ORDER — AZELASTINE 1 MG/ML
1 SPRAY, METERED NASAL 2 TIMES DAILY
Qty: 30 ML | Refills: 0 | Status: SHIPPED | OUTPATIENT
Start: 2024-10-18 | End: 2025-10-18

## 2024-10-18 RX ORDER — KETOROLAC TROMETHAMINE 30 MG/ML
30 INJECTION, SOLUTION INTRAMUSCULAR; INTRAVENOUS
Status: COMPLETED | OUTPATIENT
Start: 2024-10-18 | End: 2024-10-18

## 2024-10-18 RX ADMIN — PROMETHAZINE HYDROCHLORIDE 25 MG: 25 INJECTION, SOLUTION INTRAMUSCULAR; INTRAVENOUS at 04:10

## 2024-10-18 RX ADMIN — KETOROLAC TROMETHAMINE 30 MG: 30 INJECTION, SOLUTION INTRAMUSCULAR; INTRAVENOUS at 04:10

## 2024-10-18 NOTE — PROGRESS NOTES
"Subjective:      Patient ID: Dary Chaney is a 68 y.o. female.    Vitals:  height is 5' 2" (1.575 m) and weight is 79.8 kg (176 lb). Her tympanic temperature is 97.8 °F (36.6 °C). Her blood pressure is 145/67 (abnormal) and her pulse is 86. Her respiration is 20 and oxygen saturation is 97%.     Chief Complaint: Headache    Patient presents with a terrible headache starting in the back of her head and radiating to the front on both sides.  She reports this started a few days ago and is getting more intense/  No history of migraines.  .some pressure in the frontal sinus area.  Some dizziness as well    Headache   This is a new problem. The current episode started in the past 7 days. The problem occurs constantly. The problem has been gradually worsening. The pain is located in the Occipital and bilateral region. The pain does not radiate. The quality of the pain is described as aching and sharp. The pain is at a severity of 10/10. The pain is moderate. Associated symptoms include nausea and sinus pressure. Pertinent negatives include no abdominal pain, abnormal behavior, anorexia, back pain, blurred vision, coughing, dizziness, drainage, ear pain, eye pain, eye redness, eye watering, facial sweating, fever, hearing loss, insomnia, loss of balance, muscle aches, neck pain, numbness, phonophobia, photophobia, rhinorrhea, scalp tenderness, seizures, sore throat, swollen glands, tingling, tinnitus, visual change, vomiting, weakness or weight loss. The symptoms are aggravated by activity. She has tried NSAIDs for the symptoms. The treatment provided no relief. Her past medical history is significant for cancer. There is no history of cluster headaches, hypertension, immunosuppression, migraine headaches, migraines in the family, obesity, pseudotumor cerebri, recent head traumas, sinus disease or TMJ.       Constitution: Negative for fever.   HENT:  Positive for sinus pressure. Negative for ear pain, tinnitus, hearing " loss and sore throat.    Neck: Negative for neck pain.   Eyes:  Negative for eye pain, eye redness, photophobia and blurred vision.   Respiratory:  Negative for cough.    Gastrointestinal:  Positive for nausea. Negative for abdominal pain and vomiting.   Musculoskeletal:  Negative for back pain.   Neurological:  Positive for headaches. Negative for dizziness, loss of balance, history of migraines, numbness and seizures.   Psychiatric/Behavioral:  The patient does not have insomnia.       Objective:     Physical Exam   Constitutional: She is oriented to person, place, and time. She appears well-developed. She is cooperative.  Non-toxic appearance. She does not appear ill. No distress.   HENT:   Head: Normocephalic and atraumatic.   Ears:   Right Ear: Hearing, external ear and ear canal normal. A middle ear effusion (Clear) is present.   Left Ear: Hearing, external ear and ear canal normal.   Nose: Nose normal. No mucosal edema, rhinorrhea or nasal deformity. No epistaxis. Right sinus exhibits no maxillary sinus tenderness and no frontal sinus tenderness. Left sinus exhibits no maxillary sinus tenderness and no frontal sinus tenderness.   Mouth/Throat: Uvula is midline, oropharynx is clear and moist and mucous membranes are normal. No trismus in the jaw. Normal dentition. No uvula swelling. No oropharyngeal exudate, posterior oropharyngeal edema or posterior oropharyngeal erythema.   Eyes: Conjunctivae and lids are normal. No scleral icterus.   Neck: Trachea normal and phonation normal. Neck supple. No edema present. No erythema present. No neck rigidity present.   Cardiovascular: Normal rate, regular rhythm, normal heart sounds and normal pulses.   Pulmonary/Chest: Effort normal and breath sounds normal. No respiratory distress. She has no decreased breath sounds. She has no rhonchi.   Abdominal: Normal appearance.   Musculoskeletal: Normal range of motion.         General: No deformity. Normal range of motion.    Neurological: She is alert and oriented to person, place, and time. No cranial nerve deficit. She exhibits normal muscle tone. Coordination normal.   Skin: Skin is warm, dry, intact, not diaphoretic and not pale.   Psychiatric: Her speech is normal and behavior is normal. Judgment and thought content normal.   Nursing note and vitals reviewed.      Assessment:     1. Nonintractable headache, unspecified chronicity pattern, unspecified headache type        Plan:       Nonintractable headache, unspecified chronicity pattern, unspecified headache type  -     ketorolac injection 30 mg  -     azelastine (ASTELIN) 137 mcg (0.1 %) nasal spray; 1 spray (137 mcg total) by Nasal route 2 (two) times daily.  Dispense: 30 mL; Refill: 0  -     promethazine injection 25 mg        If symptoms worsen or fail to improve, follow-up with primary care doctor or nearest ER. Red flag signs for headache discussed. After visit summary given and discussed. Patient verbalized understanding and agrees with treatment plan. Patient remained stable and was discharged in no acute distress.

## 2024-10-19 ENCOUNTER — HOSPITAL ENCOUNTER (EMERGENCY)
Facility: HOSPITAL | Age: 69
Discharge: HOME | End: 2024-10-20
Attending: EMERGENCY MEDICINE
Payer: MEDICARE

## 2024-10-19 DIAGNOSIS — I10 HYPERTENSION, UNSPECIFIED TYPE: ICD-10-CM

## 2024-10-19 DIAGNOSIS — R51.9 NONINTRACTABLE HEADACHE, UNSPECIFIED CHRONICITY PATTERN, UNSPECIFIED HEADACHE TYPE: Primary | ICD-10-CM

## 2024-10-19 LAB — ERYTHROCYTE [SEDIMENTATION RATE] IN BLOOD BY WESTERGREN METHOD: 5 MM/HR (ref 0–20)

## 2024-10-19 PROCEDURE — 99284 EMERGENCY DEPT VISIT MOD MDM: CPT | Mod: 25,HCNC

## 2024-10-19 PROCEDURE — 96372 THER/PROPH/DIAG INJ SC/IM: CPT | Performed by: EMERGENCY MEDICINE

## 2024-10-19 PROCEDURE — 85651 RBC SED RATE NONAUTOMATED: CPT | Mod: HCNC | Performed by: EMERGENCY MEDICINE

## 2024-10-19 PROCEDURE — 63600175 PHARM REV CODE 636 W HCPCS: Mod: HCNC | Performed by: EMERGENCY MEDICINE

## 2024-10-19 RX ORDER — KETOROLAC TROMETHAMINE 30 MG/ML
30 INJECTION, SOLUTION INTRAMUSCULAR; INTRAVENOUS
Status: COMPLETED | OUTPATIENT
Start: 2024-10-19 | End: 2024-10-19

## 2024-10-19 RX ORDER — BUTALBITAL, ACETAMINOPHEN AND CAFFEINE 50; 325; 40 MG/1; MG/1; MG/1
1 TABLET ORAL EVERY 6 HOURS PRN
Qty: 20 TABLET | Refills: 0 | Status: SHIPPED | OUTPATIENT
Start: 2024-10-19 | End: 2024-11-18

## 2024-10-19 RX ADMIN — KETOROLAC TROMETHAMINE 30 MG: 30 INJECTION, SOLUTION INTRAMUSCULAR at 10:10

## 2024-10-20 VITALS
DIASTOLIC BLOOD PRESSURE: 72 MMHG | SYSTOLIC BLOOD PRESSURE: 143 MMHG | RESPIRATION RATE: 18 BRPM | WEIGHT: 178.19 LBS | HEIGHT: 62 IN | TEMPERATURE: 98 F | HEART RATE: 84 BPM | OXYGEN SATURATION: 96 % | BODY MASS INDEX: 32.79 KG/M2

## 2024-10-20 NOTE — ED PROVIDER NOTES
SCRIBE #1 NOTE: I, Luz Andres, am scribing for, and in the presence of, Shirley Ramos MD. I have scribed the entire note.       History     Chief Complaint   Patient presents with    Headache     X 4 days, otc not working denies NV      Review of patient's allergies indicates:   Allergen Reactions    Citrus and derivatives Swelling, Other (See Comments) and Edema     Redness  Lip swelling   Itching     Other reaction(s): Edema, Other (See Comments)  Redness  Lip swelling   Itching     Latex Other (See Comments)     Other reaction(s): Rash  Other reaction(s): welts  Other reaction(s): Itching  Other reaction(s): Other (See Comments)  Other reaction(s): Rash  Other reaction(s): whelps  Other reaction(s): Itching  Other reaction(s): Rash  Other reaction(s): welts  Other reaction(s): Itching    Valsartan Other (See Comments)     Other reaction(s): disoriented  Other reaction(s): Other (See Comments)  Other reaction(s): disoriented  Other reaction(s): disoriented    Aspirin Nausea Only     Other reaction(s): Nausea Only    Jardiance [empagliflozin] Nausea Only    Metformin Anxiety     Other reaction(s): anxiety  Other reaction(s): anxiety  Other reaction(s): anxiety         History of Present Illness     HPI    10/19/2024, 9:36 PM  History obtained from the patient      History of Present Illness: Dary Chaney is a 68 y.o. female patient with a PMHx of HTN, cataract, HLD, arthritis, colon polyp, renal cell carcinoma, and DM Type II who presents to the Emergency Department for evaluation of HA which onset 4 days ago. Pt states that her HA is constant and is increasing in intensity. She explains that she has struggled with headaches all her life, but they have gotten worse and more frequent as she's gotten older. She has never seen a neurologist for her headaches. No mitigating or exacerbating factors reported. Associated sxs include dizziness, blurry vision, and slurred speech. Patient denies any fever, N/V/D,  numbness, rhinorrhea, cough, hip pain, shoulder pain, and all other sxs at this time. No prior Tx reported. Pt states that OTC medications don't help, so she doesn't take them. No further complaints or concerns at this time.       Arrival mode: Personal vehicle      PCP: Sakina Cooper DO        Past Medical History:  Past Medical History:   Diagnosis Date    Arthritis     Cataract     Colon polyp     Diabetes mellitus type II 15 years     am 05/30/2022    Hyperlipidemia     Hypertension     Renal cell carcinoma 09/2018    Total knee replacement status, left 01/20/2019    Dr.Robert Cook    UTI (urinary tract infection)        Past Surgical History:  Past Surgical History:   Procedure Laterality Date    CHOLECYSTECTOMY      CHOLECYSTECTOMY      COLONOSCOPY  2012    COLONOSCOPY N/A 3/1/2018    Procedure: COLONOSCOPY;  Surgeon: Phillip Brower MD;  Location[facility];  Service: Endoscopy;  Laterality: N/A;    ESOPHAGOGASTRODUODENOSCOPY N/A 8/21/2018    Procedure: ESOPHAGOGASTRODUODENOSCOPY (EGD);  Surgeon: Maco Wynn MD;  Location: [facility];  Service: Endoscopy;  Laterality: N/A;    HYSTERECTOMY  1993    uterine prolapse    JOINT REPLACEMENT      KNEE SURGERY  03/2017    left knee replacement  01/20/2019    Dr.Robert Cook    ROBOT-ASSISTED LAPAROSCOPIC PARTIAL NEPHRECTOMY USING DA DENZEL XI Right 9/13/2018    Procedure: XI ROBOTIC NEPHRECTOMY, PARTIAL;  Surgeon: Tommy Bradshaw MD;  Location: [address];  Service: Urology;  Laterality: Right;  [place] u/s confirmed 9/13 0700 lb  conformation#[id number]    SPINE SURGERY      thumb surgery Rt- July 2018 - cyst            Family History:  Family History   Problem Relation Name Age of Onset    Diabetes Mother      Hypertension Mother      Diabetes Brother      Heart disease Neg Hx         Social History:  Social History     Tobacco Use    Smoking status: Never    Smokeless tobacco: Never   Substance and Sexual Activity    Alcohol use: Yes      Comment: occasional wine     Drug use: No    Sexual activity: Yes     Partners: Male     Birth control/protection: Surgical        Review of Systems     Review of Systems   Constitutional:  Negative for fever.   HENT:  Negative for rhinorrhea and sore throat.    Eyes:  Positive for visual disturbance (blurry).   Respiratory:  Negative for cough and shortness of breath.    Cardiovascular:  Negative for chest pain.   Gastrointestinal:  Negative for diarrhea, nausea and vomiting.   Genitourinary:  Negative for dysuria.   Musculoskeletal:  Negative for back pain.        (-) hip pain  (-) shoulder pain   Skin:  Negative for rash.   Neurological:  Positive for dizziness, speech difficulty (slurred) and headaches. Negative for weakness and numbness.   Hematological:  Does not bruise/bleed easily.      Physical Exam     Initial Vitals [10/19/24 2119]   BP Pulse Resp Temp SpO2   (!) 170/81 89 16 97.7 °F (36.5 °C) 97 %      MAP       --          Physical Exam  Nursing Notes and Vital Signs Reviewed.  Constitutional: Patient is in no acute distress. Well-developed and well-nourished.  Head: Atraumatic. Normocephalic. BL temporal scalp tenderness.  Eyes: PERRL. EOM intact. Conjunctivae are not pale. No scleral icterus.  ENT: Mucous membranes are moist. Oropharynx is clear and symmetric.    Neck: Supple. Full ROM. No lymphadenopathy.  Cardiovascular: Regular rate. Regular rhythm. No murmurs, rubs, or gallops. Distal pulses are 2+ and symmetric.  Pulmonary/Chest: No respiratory distress. Clear to auscultation bilaterally. No wheezing or rales.  Abdominal: Soft and non-distended.  There is no tenderness.  No rebound, guarding, or rigidity. Good bowel sounds.  Genitourinary: No CVA tenderness  Musculoskeletal: Moves all extremities. No obvious deformities. No edema. No calf tenderness.  Skin: Warm and dry.  Neurological:  Alert, awake, and appropriate.  Normal speech.  No acute focal neurological deficits are  "appreciated.  Psychiatric: Normal affect. Good eye contact. Appropriate in content.     ED Course   Procedures  ED Vital Signs:  Vitals:    10/19/24 2119 10/19/24 2130 10/19/24 2145 10/19/24 2200   BP: (!) 170/81 (!) 153/70 (!) 149/71 (!) 194/72   Pulse: 89 89 87 86   Resp: 16 (!) 22 (!) 21 (!) 22   Temp: 97.7 °F (36.5 °C)      TempSrc: Oral      SpO2: 97% 96% 96% 99%   Weight: 80.8 kg (178 lb 3.2 oz)      Height: 5' 2" (1.575 m)       10/19/24 2215 10/19/24 2230 10/19/24 2245 10/19/24 2300   BP: (!) 169/73 (!) 173/76 (!) 150/61 (!) 143/68   Pulse: 80 81 82 78   Resp: 20 20 20 18   Temp:       TempSrc:       SpO2: 97% 96% 95% 96%   Weight:       Height:        10/19/24 2330 10/20/24 0000   BP: (!) 148/72 (!) 143/72   Pulse: 84 84   Resp: 18 18   Temp:  97.6 °F (36.4 °C)   TempSrc:  Oral   SpO2: 95% 96%   Weight:     Height:         Abnormal Lab Results:  Labs Reviewed   SEDIMENTATION RATE       Result Value    Sed Rate 5          All Lab Results:  Results for orders placed or performed during the hospital encounter of 10/19/24   Sedimentation rate    Collection Time: 10/19/24 10:29 PM   Result Value Ref Range    Sed Rate 5 0 - 20 mm/Hr     *Note: Due to a large number of results and/or encounters for the requested time period, some results have not been displayed. A complete set of results can be found in Results Review.        Imaging Results:  Imaging Results              CT Head Without Contrast (Final result)  Result time 10/19/24 22:08:00      Final result by Renata Ford MD (10/19/24 22:08:00)                   Impression:      No acute abnormality.      Electronically signed by: Renata Ford  Date:    10/19/2024  Time:    22:08               Narrative:    EXAMINATION:  CT HEAD WITHOUT CONTRAST    CLINICAL HISTORY:  Headache, new or worsening (Age >= 50y);    TECHNIQUE:  Low dose axial CT images obtained throughout the head without intravenous contrast. Sagittal and coronal reconstructions were " performed.    COMPARISON:  None.    FINDINGS:  Intracranial compartment:    Ventricles and sulci are normal in size for age without evidence of hydrocephalus. No extra-axial blood or fluid collections.    The brain parenchyma appears normal. No parenchymal mass, hemorrhage, edema or major vascular distribution infarct.    Skull/extracranial contents (limited evaluation): No fracture. Mastoid air cells and paranasal sinuses are essentially clear.                                              The Emergency Provider reviewed the vital signs and test results, which are outlined above.     ED Discussion       11:53 PM: Reassessed pt at this time. Discussed with pt all pertinent ED information and results. Discussed pt dx and plan of tx. Gave pt all f/u and return to the ED instructions. All questions and concerns were addressed at this time. Pt expresses understanding of information and instructions, and is comfortable with plan to discharge. Pt is stable for discharge.    I discussed with patient and/or family/caretaker that evaluation in the ED does not suggest any emergent or life threatening medical conditions requiring immediate intervention beyond what was provided in the ED, and I believe patient is safe for discharge.  Regardless, an unremarkable evaluation in the ED does not preclude the development or presence of a serious of life threatening condition. As such, patient was instructed to return immediately for any worsening or change in current symptoms.        Medical Decision Making  Amount and/or Complexity of Data Reviewed  Labs: ordered. Decision-making details documented in ED Course.  Radiology: ordered. Decision-making details documented in ED Course.    Risk  Prescription drug management.                ED Medication(s):  Medications   ketorolac injection 30 mg (30 mg Intramuscular Given 10/19/24 2147)       Discharge Medication List as of 10/20/2024 12:02 AM        START taking these medications     Details   butalbital-acetaminophen-caffeine -40 mg (FIORICET, ESGIC) -40 mg per tablet Take 1 tablet by mouth every 6 (six) hours as needed for Headaches., Starting Sat 10/19/2024, Until Mon 11/18/2024 at 2359, Print              Follow-up Information       Sakina Cooper DO In 2 days.    Specialty: Internal Medicine  Contact information:  [address] [place] [address]  [phone]               [facility] - Emergency Dept..    Specialty: Emergency Medicine  Why: As needed, If symptoms worsen  Contact information:  [address]  [facility] [address] [phone]                               Scribe Attestation:   Scribe #1: I performed the above scribed service and the documentation accurately describes the services I performed. I attest to the accuracy of the note.     Attending:   Physician Attestation Statement for Scribe #1: I, Shirley Ramos MD, personally performed the services described in this documentation, as scribed by Luz Andres, in my presence, and it is both accurate and complete.       Scribe Attestation:   Scribe #1: I performed the above scribed service and the documentation accurately describes the services I performed. I attest to the accuracy of the note.         Clinical Impression       ICD-10-CM ICD-9-CM   1. Nonintractable headache, unspecified chronicity pattern, unspecified headache type  R51.9 784.0   2. Hypertension, unspecified type  I10 401.9       Disposition:   Disposition: Discharged  Condition: Stable        Shirley Ramos MD  10/20/24 0551

## 2024-10-21 ENCOUNTER — PATIENT OUTREACH (OUTPATIENT)
Dept: EMERGENCY MEDICINE | Facility: HOSPITAL | Age: 69
End: 2024-10-21
Payer: MEDICARE

## 2024-10-21 NOTE — PROGRESS NOTES
Patient was seen in the ED on 10/20/24 for nonintractable headache. Patient was contacted for post ED discharge navigation. They have an appointment scheduled with JOE Ontiveros on 10/29/24 at 10:00. ED navigator will remind patient of appointment.

## 2024-10-28 ENCOUNTER — PATIENT OUTREACH (OUTPATIENT)
Dept: EMERGENCY MEDICINE | Facility: HOSPITAL | Age: 69
End: 2024-10-28
Payer: MEDICARE

## 2024-10-29 ENCOUNTER — OFFICE VISIT (OUTPATIENT)
Dept: INTERNAL MEDICINE | Facility: CLINIC | Age: 69
End: 2024-10-29
Payer: MEDICARE

## 2024-10-29 ENCOUNTER — HOSPITAL ENCOUNTER (OUTPATIENT)
Dept: RADIOLOGY | Facility: HOSPITAL | Age: 69
Discharge: HOME | End: 2024-10-29
Attending: PHYSICIAN ASSISTANT
Payer: MEDICARE

## 2024-10-29 VITALS
OXYGEN SATURATION: 98 % | BODY MASS INDEX: 32.42 KG/M2 | WEIGHT: 177.25 LBS | TEMPERATURE: 97 F | RESPIRATION RATE: 21 BRPM | DIASTOLIC BLOOD PRESSURE: 78 MMHG | HEART RATE: 80 BPM | SYSTOLIC BLOOD PRESSURE: 136 MMHG

## 2024-10-29 DIAGNOSIS — I10 HYPERTENSION GOAL BP (BLOOD PRESSURE) < 130/80: Primary | Chronic | ICD-10-CM

## 2024-10-29 DIAGNOSIS — E11.9 TYPE 2 DIABETES MELLITUS WITH HEMOGLOBIN A1C GOAL OF LESS THAN 7.0%: ICD-10-CM

## 2024-10-29 DIAGNOSIS — E11.69 HYPERLIPIDEMIA ASSOCIATED WITH TYPE 2 DIABETES MELLITUS: ICD-10-CM

## 2024-10-29 DIAGNOSIS — I70.0 ATHEROSCLEROSIS OF AORTA: ICD-10-CM

## 2024-10-29 DIAGNOSIS — M79.662 PAIN OF LEFT LOWER LEG: ICD-10-CM

## 2024-10-29 DIAGNOSIS — E78.5 HYPERLIPIDEMIA ASSOCIATED WITH TYPE 2 DIABETES MELLITUS: ICD-10-CM

## 2024-10-29 DIAGNOSIS — Z23 NEED FOR VACCINATION: ICD-10-CM

## 2024-10-29 PROCEDURE — 99999 PR PBB SHADOW E&M-EST. PATIENT-LVL V: CPT | Mod: PBBFAC,HCNC,, | Performed by: PHYSICIAN ASSISTANT

## 2024-10-29 PROCEDURE — 1160F RVW MEDS BY RX/DR IN RCRD: CPT | Mod: HCNC,CPTII,S$GLB, | Performed by: PHYSICIAN ASSISTANT

## 2024-10-29 PROCEDURE — 3078F DIAST BP <80 MM HG: CPT | Mod: HCNC,CPTII,S$GLB, | Performed by: PHYSICIAN ASSISTANT

## 2024-10-29 PROCEDURE — 99214 OFFICE O/P EST MOD 30 MIN: CPT | Mod: HCNC,S$GLB,, | Performed by: PHYSICIAN ASSISTANT

## 2024-10-29 PROCEDURE — 3075F SYST BP GE 130 - 139MM HG: CPT | Mod: HCNC,CPTII,S$GLB, | Performed by: PHYSICIAN ASSISTANT

## 2024-10-29 PROCEDURE — 3072F LOW RISK FOR RETINOPATHY: CPT | Mod: HCNC,CPTII,S$GLB, | Performed by: PHYSICIAN ASSISTANT

## 2024-10-29 PROCEDURE — 4010F ACE/ARB THERAPY RXD/TAKEN: CPT | Mod: HCNC,CPTII,S$GLB, | Performed by: PHYSICIAN ASSISTANT

## 2024-10-29 PROCEDURE — G2211 COMPLEX E/M VISIT ADD ON: HCPCS | Mod: HCNC,S$GLB,, | Performed by: PHYSICIAN ASSISTANT

## 2024-10-29 PROCEDURE — 3008F BODY MASS INDEX DOCD: CPT | Mod: HCNC,CPTII,S$GLB, | Performed by: PHYSICIAN ASSISTANT

## 2024-10-29 PROCEDURE — 90653 IIV ADJUVANT VACCINE IM: CPT | Mod: HCNC,S$GLB,, | Performed by: PHYSICIAN ASSISTANT

## 2024-10-29 PROCEDURE — 73590 X-RAY EXAM OF LOWER LEG: CPT | Mod: TC,HCNC,LT

## 2024-10-29 PROCEDURE — 1159F MED LIST DOCD IN RCRD: CPT | Mod: HCNC,CPTII,S$GLB, | Performed by: PHYSICIAN ASSISTANT

## 2024-10-29 PROCEDURE — 3051F HG A1C>EQUAL 7.0%<8.0%: CPT | Mod: HCNC,CPTII,S$GLB, | Performed by: PHYSICIAN ASSISTANT

## 2024-10-29 PROCEDURE — 1157F ADVNC CARE PLAN IN RCRD: CPT | Mod: HCNC,CPTII,S$GLB, | Performed by: PHYSICIAN ASSISTANT

## 2024-10-29 PROCEDURE — 1126F AMNT PAIN NOTED NONE PRSNT: CPT | Mod: HCNC,CPTII,S$GLB, | Performed by: PHYSICIAN ASSISTANT

## 2024-10-29 PROCEDURE — G0008 ADMIN INFLUENZA VIRUS VAC: HCPCS | Mod: HCNC,S$GLB,, | Performed by: PHYSICIAN ASSISTANT

## 2024-10-29 PROCEDURE — 1101F PT FALLS ASSESS-DOCD LE1/YR: CPT | Mod: HCNC,CPTII,S$GLB, | Performed by: PHYSICIAN ASSISTANT

## 2024-10-29 PROCEDURE — 3288F FALL RISK ASSESSMENT DOCD: CPT | Mod: HCNC,CPTII,S$GLB, | Performed by: PHYSICIAN ASSISTANT

## 2024-10-29 RX ORDER — MELOXICAM 7.5 MG/1
7.5 TABLET ORAL DAILY
Qty: 30 TABLET | Refills: 0 | Status: SHIPPED | OUTPATIENT
Start: 2024-10-29

## 2024-10-29 RX ORDER — PEN NEEDLE, DIABETIC 30 GX3/16"
1 NEEDLE, DISPOSABLE MISCELLANEOUS DAILY
Qty: 300 EACH | Refills: 3 | Status: SHIPPED | OUTPATIENT
Start: 2024-10-29

## 2024-11-01 ENCOUNTER — LAB VISIT (OUTPATIENT)
Dept: LAB | Facility: HOSPITAL | Age: 69
End: 2024-11-01
Attending: PHYSICIAN ASSISTANT
Payer: MEDICARE

## 2024-11-01 DIAGNOSIS — E11.69 HYPERLIPIDEMIA ASSOCIATED WITH TYPE 2 DIABETES MELLITUS: ICD-10-CM

## 2024-11-01 DIAGNOSIS — I10 HYPERTENSION GOAL BP (BLOOD PRESSURE) < 130/80: Chronic | ICD-10-CM

## 2024-11-01 DIAGNOSIS — E78.5 HYPERLIPIDEMIA ASSOCIATED WITH TYPE 2 DIABETES MELLITUS: ICD-10-CM

## 2024-11-01 DIAGNOSIS — E11.9 TYPE 2 DIABETES MELLITUS WITH HEMOGLOBIN A1C GOAL OF LESS THAN 7.0%: ICD-10-CM

## 2024-11-01 LAB
ALBUMIN SERPL BCP-MCNC: 3.7 G/DL (ref 3.5–5.2)
ALP SERPL-CCNC: 85 U/L (ref 40–150)
ALT SERPL W/O P-5'-P-CCNC: 26 U/L (ref 10–44)
ANION GAP SERPL CALC-SCNC: 8 MMOL/L (ref 8–16)
AST SERPL-CCNC: 17 U/L (ref 10–40)
BASOPHILS # BLD AUTO: 0.03 K/UL (ref 0–0.2)
BASOPHILS NFR BLD: 0.5 % (ref 0–1.9)
BILIRUB SERPL-MCNC: 0.2 MG/DL (ref 0.1–1)
BUN SERPL-MCNC: 21 MG/DL (ref 8–23)
CALCIUM SERPL-MCNC: 9 MG/DL (ref 8.7–10.5)
CHLORIDE SERPL-SCNC: 105 MMOL/L (ref 95–110)
CHOLEST SERPL-MCNC: 229 MG/DL (ref 120–199)
CHOLEST/HDLC SERPL: 4.8 {RATIO} (ref 2–5)
CO2 SERPL-SCNC: 27 MMOL/L (ref 23–29)
CREAT SERPL-MCNC: 0.8 MG/DL (ref 0.5–1.4)
DIFFERENTIAL METHOD BLD: ABNORMAL
EOSINOPHIL # BLD AUTO: 0.2 K/UL (ref 0–0.5)
EOSINOPHIL NFR BLD: 3.2 % (ref 0–8)
ERYTHROCYTE [DISTWIDTH] IN BLOOD BY AUTOMATED COUNT: 16.3 % (ref 11.5–14.5)
EST. GFR  (NO RACE VARIABLE): >60 ML/MIN/1.73 M^2
ESTIMATED AVG GLUCOSE: 148 MG/DL (ref 68–131)
GLUCOSE SERPL-MCNC: 135 MG/DL (ref 70–110)
HBA1C MFR BLD: 6.8 % (ref 4–5.6)
HCT VFR BLD AUTO: 39.2 % (ref 37–48.5)
HDLC SERPL-MCNC: 48 MG/DL (ref 40–75)
HDLC SERPL: 21 % (ref 20–50)
HGB BLD-MCNC: 12.3 G/DL (ref 12–16)
IMM GRANULOCYTES # BLD AUTO: 0.01 K/UL (ref 0–0.04)
IMM GRANULOCYTES NFR BLD AUTO: 0.2 % (ref 0–0.5)
LDLC SERPL CALC-MCNC: 118.4 MG/DL (ref 63–159)
LYMPHOCYTES # BLD AUTO: 1.8 K/UL (ref 1–4.8)
LYMPHOCYTES NFR BLD: 32.2 % (ref 18–48)
MCH RBC QN AUTO: 25.1 PG (ref 27–31)
MCHC RBC AUTO-ENTMCNC: 31.4 G/DL (ref 32–36)
MCV RBC AUTO: 80 FL (ref 82–98)
MONOCYTES # BLD AUTO: 0.4 K/UL (ref 0.3–1)
MONOCYTES NFR BLD: 7.4 % (ref 4–15)
NEUTROPHILS # BLD AUTO: 3.2 K/UL (ref 1.8–7.7)
NEUTROPHILS NFR BLD: 56.5 % (ref 38–73)
NONHDLC SERPL-MCNC: 181 MG/DL
NRBC BLD-RTO: 0 /100 WBC
PLATELET # BLD AUTO: 253 K/UL (ref 150–450)
PMV BLD AUTO: 10.4 FL (ref 9.2–12.9)
POTASSIUM SERPL-SCNC: 4.5 MMOL/L (ref 3.5–5.1)
PROT SERPL-MCNC: 6.6 G/DL (ref 6–8.4)
RBC # BLD AUTO: 4.91 M/UL (ref 4–5.4)
SODIUM SERPL-SCNC: 140 MMOL/L (ref 136–145)
TRIGL SERPL-MCNC: 313 MG/DL (ref 30–150)
WBC # BLD AUTO: 5.68 K/UL (ref 3.9–12.7)

## 2024-11-01 PROCEDURE — 83036 HEMOGLOBIN GLYCOSYLATED A1C: CPT | Mod: HCNC | Performed by: PHYSICIAN ASSISTANT

## 2024-11-01 PROCEDURE — 80061 LIPID PANEL: CPT | Mod: HCNC | Performed by: PHYSICIAN ASSISTANT

## 2024-11-01 PROCEDURE — 80053 COMPREHEN METABOLIC PANEL: CPT | Mod: HCNC | Performed by: PHYSICIAN ASSISTANT

## 2024-11-01 PROCEDURE — 85025 COMPLETE CBC W/AUTO DIFF WBC: CPT | Mod: HCNC | Performed by: PHYSICIAN ASSISTANT

## 2024-11-06 ENCOUNTER — TELEPHONE (OUTPATIENT)
Dept: DIABETES | Facility: CLINIC | Age: 69
End: 2024-11-06
Payer: MEDICARE

## 2024-11-06 DIAGNOSIS — E11.9 TYPE 2 DIABETES MELLITUS WITH HEMOGLOBIN A1C GOAL OF LESS THAN 7.0%: ICD-10-CM

## 2024-11-06 RX ORDER — INSULIN ASPART 100 [IU]/ML
INJECTION, SOLUTION INTRAVENOUS; SUBCUTANEOUS
Qty: 60 ML | Refills: 0 | Status: SHIPPED | OUTPATIENT
Start: 2024-11-06

## 2024-11-07 NOTE — TELEPHONE ENCOUNTER
Novolog Rx to [facility]     Alecia Goodman PA-C, BC-ADM  [facility]

## 2024-11-19 ENCOUNTER — PATIENT OUTREACH (OUTPATIENT)
Dept: ADMINISTRATIVE | Facility: HOSPITAL | Age: 69
End: 2024-11-19
Payer: MEDICARE

## 2024-11-20 ENCOUNTER — TELEPHONE (OUTPATIENT)
Dept: INTERNAL MEDICINE | Facility: CLINIC | Age: 69
End: 2024-11-20
Payer: MEDICARE

## 2024-11-20 NOTE — TELEPHONE ENCOUNTER
----- Message from Shana sent at 11/20/2024 12:19 PM CST -----  Contact: self   .Type: Patient Call Back        Who called:   Patient      What is the request in detail:    Called in concerning medication . Patient needs clarification on a medication that is prescribed .   Can the clinic reply by [facility]?           Would the patient rather a call back or a response via [facility]?      call   Best call back number:  [phone]

## 2024-12-02 ENCOUNTER — OFFICE VISIT (OUTPATIENT)
Dept: OPHTHALMOLOGY | Facility: CLINIC | Age: 69
End: 2024-12-02
Payer: MEDICARE

## 2024-12-02 DIAGNOSIS — H01.02A SQUAMOUS BLEPHARITIS OF UPPER AND LOWER EYELIDS OF BOTH EYES: ICD-10-CM

## 2024-12-02 DIAGNOSIS — E11.9 DIABETES MELLITUS WITHOUT COMPLICATION: Primary | ICD-10-CM

## 2024-12-02 DIAGNOSIS — H40.013 AT LOW RISK FOR OPEN-ANGLE GLAUCOMA IN BOTH EYES: ICD-10-CM

## 2024-12-02 DIAGNOSIS — H01.02B SQUAMOUS BLEPHARITIS OF UPPER AND LOWER EYELIDS OF BOTH EYES: ICD-10-CM

## 2024-12-02 DIAGNOSIS — H43.813 POSTERIOR VITREOUS DETACHMENT OF BOTH EYES: ICD-10-CM

## 2024-12-02 DIAGNOSIS — E11.36 DIABETIC CATARACT: ICD-10-CM

## 2024-12-02 DIAGNOSIS — H25.813 COMBINED FORM OF AGE-RELATED CATARACT, BOTH EYES: ICD-10-CM

## 2024-12-02 PROCEDURE — 92133 CPTRZD OPH DX IMG PST SGM ON: CPT | Mod: HCNC,S$GLB,, | Performed by: OPTOMETRIST

## 2024-12-02 PROCEDURE — 1159F MED LIST DOCD IN RCRD: CPT | Mod: HCNC,CPTII,S$GLB, | Performed by: OPTOMETRIST

## 2024-12-02 PROCEDURE — 3044F HG A1C LEVEL LT 7.0%: CPT | Mod: HCNC,CPTII,S$GLB, | Performed by: OPTOMETRIST

## 2024-12-02 PROCEDURE — 1157F ADVNC CARE PLAN IN RCRD: CPT | Mod: HCNC,CPTII,S$GLB, | Performed by: OPTOMETRIST

## 2024-12-02 PROCEDURE — 4010F ACE/ARB THERAPY RXD/TAKEN: CPT | Mod: HCNC,CPTII,S$GLB, | Performed by: OPTOMETRIST

## 2024-12-02 PROCEDURE — 99999 PR PBB SHADOW E&M-EST. PATIENT-LVL III: CPT | Mod: PBBFAC,HCNC,, | Performed by: OPTOMETRIST

## 2024-12-02 PROCEDURE — 1160F RVW MEDS BY RX/DR IN RCRD: CPT | Mod: HCNC,CPTII,S$GLB, | Performed by: OPTOMETRIST

## 2024-12-02 PROCEDURE — 99214 OFFICE O/P EST MOD 30 MIN: CPT | Mod: HCNC,S$GLB,, | Performed by: OPTOMETRIST

## 2024-12-02 PROCEDURE — 2023F DILAT RTA XM W/O RTNOPTHY: CPT | Mod: HCNC,CPTII,S$GLB, | Performed by: OPTOMETRIST

## 2024-12-02 RX ORDER — NEOMYCIN SULFATE, POLYMYXIN B SULFATE, AND DEXAMETHASONE 3.5; 10000; 1 MG/G; [USP'U]/G; MG/G
OINTMENT OPHTHALMIC
Qty: 3.5 G | Refills: 0 | Status: SHIPPED | OUTPATIENT
Start: 2024-12-02 | End: 2024-12-09

## 2024-12-02 NOTE — PROGRESS NOTES
HPI     Annual Exam            Comments: Vision changes since last eye exam?: yes     Any eye pain today: no    Other ocular symptoms: no    Interested in contact lens fitting today? no               Diagnosed with diabetes in 2008  Lab Results       Component                Value               Date                       HGBA1C                   6.8 (H)             11/01/2024                             Comments    Glaucoma suspect  -GCL 28/27 08/23    DM dx 2008  Episleritis OS  JOSEY OU  PVD OU             Last edited by Any Fierro on 12/2/2024  2:24 PM.            Assessment /Plan     For exam results, see Encounter Report.    1. Diabetes mellitus without complication  17 years, last A1c 6.8 There was no diabetic retinopathy present on either eye on examination today. Recommend good blood pressure control, strict blood glucose control, and good cholesterol control.  Continue close care with Dr. Cooper regarding diabetes.    2. At low risk for open-angle glaucoma in both eyes  -     OCT, Optic Nerve - OU - Both Eyes  The patient has the following Glaucoma risk factors: Age and Optic Nerve Asymmetry     Recommend patient return to clinic for HVF 24-2 testing, Pachymetry, Gonioscopy.       3. Diabetic cataract  Combined form of age-related cataract, both eyes  Cataracts are not visually significant and not affecting activities of daily living. Annual observation is recommended at this time. Patient to call or return to clinic with any significant change in vision prior to next visit.    4. Posterior vitreous detachment of both eyes  Stable, observe.     5. Presbyopia of both eyes  Doing well w/ OTC readers PRN.     RTC 6 months for full glaucoma workup (gOCT, HVF 24-2, IOP check, Pachymetry, Gonioscopy)   Discussed above and answered questions.

## 2024-12-03 ENCOUNTER — TELEPHONE (OUTPATIENT)
Dept: DIABETES | Facility: CLINIC | Age: 69
End: 2024-12-03
Payer: MEDICARE

## 2024-12-04 ENCOUNTER — LAB VISIT (OUTPATIENT)
Dept: LAB | Facility: HOSPITAL | Age: 69
End: 2024-12-04
Attending: PHYSICIAN ASSISTANT
Payer: MEDICARE

## 2024-12-04 ENCOUNTER — OFFICE VISIT (OUTPATIENT)
Dept: DIABETES | Facility: CLINIC | Age: 69
End: 2024-12-04
Payer: MEDICARE

## 2024-12-04 VITALS
BODY MASS INDEX: 33.19 KG/M2 | SYSTOLIC BLOOD PRESSURE: 159 MMHG | WEIGHT: 181.44 LBS | HEART RATE: 86 BPM | DIASTOLIC BLOOD PRESSURE: 80 MMHG

## 2024-12-04 DIAGNOSIS — L65.9 HAIR LOSS: ICD-10-CM

## 2024-12-04 DIAGNOSIS — K76.0 FATTY LIVER: ICD-10-CM

## 2024-12-04 DIAGNOSIS — Z12.11 SCREENING FOR COLON CANCER: ICD-10-CM

## 2024-12-04 DIAGNOSIS — E66.9 OBESITY (BMI 30-39.9): ICD-10-CM

## 2024-12-04 DIAGNOSIS — E78.5 HYPERLIPIDEMIA LDL GOAL <70: ICD-10-CM

## 2024-12-04 DIAGNOSIS — L65.9 HAIR THINNING: ICD-10-CM

## 2024-12-04 DIAGNOSIS — E11.9 TYPE 2 DIABETES MELLITUS WITH HEMOGLOBIN A1C GOAL OF LESS THAN 7.0%: Primary | ICD-10-CM

## 2024-12-04 DIAGNOSIS — E11.9 TYPE 2 DIABETES MELLITUS WITH HEMOGLOBIN A1C GOAL OF LESS THAN 7.0%: ICD-10-CM

## 2024-12-04 DIAGNOSIS — I10 HYPERTENSION GOAL BP (BLOOD PRESSURE) < 130/80: ICD-10-CM

## 2024-12-04 LAB
ALBUMIN/CREAT UR: 8.6 UG/MG (ref 0–30)
CREAT UR-MCNC: 70 MG/DL (ref 15–325)
GLUCOSE SERPL-MCNC: 49 MG/DL (ref 70–110)
GLUCOSE SERPL-MCNC: 90 MG/DL (ref 70–110)
MICROALBUMIN UR DL<=1MG/L-MCNC: 6 UG/ML
T4 FREE SERPL-MCNC: 0.92 NG/DL (ref 0.71–1.51)
TSH SERPL DL<=0.005 MIU/L-ACNC: 1.05 UIU/ML (ref 0.4–4)

## 2024-12-04 PROCEDURE — 36415 COLL VENOUS BLD VENIPUNCTURE: CPT | Mod: HCNC | Performed by: PHYSICIAN ASSISTANT

## 2024-12-04 PROCEDURE — 82570 ASSAY OF URINE CREATININE: CPT | Mod: HCNC | Performed by: PHYSICIAN ASSISTANT

## 2024-12-04 PROCEDURE — 99999 PR PBB SHADOW E&M-EST. PATIENT-LVL V: CPT | Mod: PBBFAC,HCNC,, | Performed by: PHYSICIAN ASSISTANT

## 2024-12-04 PROCEDURE — 84439 ASSAY OF FREE THYROXINE: CPT | Mod: HCNC | Performed by: PHYSICIAN ASSISTANT

## 2024-12-04 PROCEDURE — 84443 ASSAY THYROID STIM HORMONE: CPT | Mod: HCNC | Performed by: PHYSICIAN ASSISTANT

## 2024-12-04 NOTE — PROGRESS NOTES
PCP: Sakina Cooper DO    Subjective:     Chief Complaint: Diabetes - Established Patient    HISTORY OF PRESENT ILLNESS: 68 y.o.  female presenting for diabetes management visit.   The patient's last visit with me was on 8/27/2024.   Patient has had Type II diabetes since 2005.  Pertinent to decision making is the following comorbidities: HTN, HLD, Obesity by BMI and Fatty Liver  Patient has the following Diabetes complications: without complications  She  has attended diabetes education in the past.     Patient's most recent A1c of 6.8% was completed 1 months ago.   Patient states since Her last A1c Her blood glucose levels have been within range in the morning / fasting.   Patient monitors blood glucose 4 times per day with Contour Next : Fasting, After Dinner, and Symptomatic. Dexcom G7 pending - pt needs to contact [facility]. Still has not done so.   Patient blood glucose monitoring device will not be uploaded into Media Section today secondary to unable to upload successfully.   Per meter recall, fasting blood sugar ranging 99 - 217, before lunch 81- 282, before dinner 74 - 259, and before bed 149.  Pt presents to clinic with BG of 49 today without hypoglycemia symptoms.   Patient endorses the following diabetes related symptoms:  Hair thinning and hair loss .  Concerned this is related to med changes. Denies significant weight loss.   Patient is due today for the following diabetes-related health maintenance standards:  RSV vaccine, Foot exam, Tetanus, Colonoscopy, Urine micro, COVID .   She denies recent hospital admissions or emergency room visits.   She voices having severe hypoglycemia and hypoglycemia unawareness.   Patient's concerns today include glycemic control. PPA pending - Novolog, Tresiba, and ozempic. Of note, pt struggling with weight gain. This was prior to Ozempic d/c and still present with restart.  Also c/o of bloating which she states is not related to issues with GERD.  Sched with GI in  January. Pt has had constipation problems since resuming Ozempic.   Patient medication regimen is as below.     CURRENT DM MEDICATIONS:   Ozempic 1 mg weekly - restarted 1 wk ago  Tresiba 72 units daily   Novolog 12 units before breakfast and 16 units before dinner - 15 mins before eating      Novolog Correction Dosing every 3 hours as needed OUTSIDE OF EATING  If  - 250, may take 4 units of Novolog  If  - 300, may take 8 units of Novolog  If  - 350, may take 12 units of Novolog  If  - 400, may take 16 units of Novolog  If +, may take 20 units of Novolog         Patient has failed the following Diabetes medications:   Metformin - allergy   Onglyza  Levemir   Victoza / Trulicity - GLP-1 escalation  Mounjaro - cost; no pharm asst available   Jardiance 10 + 25 mg - GI side effects;         Labs Reviewed.       Lab Results   Component Value Date    CPEPTIDE 1.73 08/30/2022     Lab Results   Component Value Date    GLUTAMICACID 0.00 12/14/2017          //  Weight: 82.3 kg (181 lb 7 oz), Body mass index is 33.19 kg/m².  Her blood sugar in clinic today is:          Review of Systems   Constitutional:  Negative for activity change, appetite change, chills and fever.   HENT:  Negative for dental problem, mouth sores, nosebleeds, sore throat and trouble swallowing.    Eyes:  Negative for pain and discharge.   Respiratory:  Negative for shortness of breath, wheezing and stridor.    Cardiovascular:  Negative for chest pain, palpitations and leg swelling.   Gastrointestinal:  Negative for abdominal pain, diarrhea, nausea and vomiting.   Endocrine: Negative for polydipsia, polyphagia and polyuria.   Genitourinary:  Negative for dysuria, frequency and urgency.   Musculoskeletal:  Negative for joint swelling and myalgias.   Skin:  Negative for rash and wound.   Neurological:  Negative for dizziness, syncope, weakness and headaches.   Psychiatric/Behavioral:  Negative for behavioral problems and  dysphoric mood.          Diabetes Management Status  Statin: Taking  ACE/ARB: Taking    Screening or Prevention Patient's value Goal Complete/Controlled?   HgA1C Testing and Control   Lab Results   Component Value Date    HGBA1C 6.8 (H) 11/01/2024      Annually/Less than 8% Yes   Lipid profile : 11/01/2024 Annually Yes   LDL control Lab Results   Component Value Date    LDLCALC 118.4 11/01/2024    Annually/Less than 100 mg/dl  Yes   Nephropathy screening Lab Results   Component Value Date    MICALBCREAT 9.3 08/21/2023     Lab Results   Component Value Date    PROTEINUA Negative 09/08/2014    Annually Yes   Blood pressure BP Readings from Last 1 Encounters:   12/04/24 (!) 159/80    Less than 140/90 Yes   Dilated retinal exam : 12/02/2024 Annually Yes    Foot exam   : 08/21/2023 Annually No     Social History     Socioeconomic History    Marital status:     Number of children: 3   Occupational History    Occupation: Stay at Home    Occupation:    Tobacco Use    Smoking status: Never    Smokeless tobacco: Never   Substance and Sexual Activity    Alcohol use: Yes     Comment: occasional wine     Drug use: No    Sexual activity: Yes     Partners: Male     Birth control/protection: Surgical   Social History Narrative    . Housewife.     Social Drivers of Health     Financial Resource Strain: Low Risk  (3/4/2024)    Overall Financial Resource Strain (CARDIA)     Difficulty of Paying Living Expenses: Not hard at all   Food Insecurity: No Food Insecurity (3/4/2024)    Hunger Vital Sign     Worried About Running Out of Food in the Last Year: Never true     Ran Out of Food in the Last Year: Never true   Transportation Needs: No Transportation Needs (3/4/2024)    PRAPARE - Transportation     Lack of Transportation (Medical): No     Lack of Transportation (Non-Medical): No   Physical Activity: Insufficiently Active (3/4/2024)    Exercise Vital Sign     Days of Exercise per Week: 2 days     Minutes  of Exercise per Session: 30 min   Stress: Stress Concern Present (3/4/2024)    [employer] - Occupational Stress Questionnaire     Feeling of Stress : Rather much   Housing Stability: Low Risk  (3/4/2024)    Housing Stability Vital Sign     Unable to Pay for Housing in the Last Year: No     Number of Places Lived in the Last Year: 1     Unstable Housing in the Last Year: No     Past Medical History:   Diagnosis Date    Arthritis     Cataract     Colon polyp     Diabetes mellitus type II 15 years     am 05/30/2022    Hyperlipidemia     Hypertension     Renal cell carcinoma 09/2018    Total knee replacement status, left 01/20/2019    Dr.Robert Cook    UTI (urinary tract infection)        Objective:        Physical Exam  Constitutional:       General: She is not in acute distress.     Appearance: She is well-developed. She is not diaphoretic.   HENT:      Head: Normocephalic and atraumatic.      Right Ear: External ear normal.      Left Ear: External ear normal.      Nose: Nose normal.   Eyes:      General:         Right eye: No discharge.         Left eye: No discharge.      Pupils: Pupils are equal, round, and reactive to light.   Cardiovascular:      Rate and Rhythm: Normal rate and regular rhythm.      Pulses:           Dorsalis pedis pulses are 2+ on the right side and 2+ on the left side.        Posterior tibial pulses are 2+ on the right side and 2+ on the left side.      Heart sounds: Normal heart sounds.   Pulmonary:      Effort: Pulmonary effort is normal.      Breath sounds: Normal breath sounds.   Abdominal:      Palpations: Abdomen is soft.   Musculoskeletal:         General: Normal range of motion.      Cervical back: Normal range of motion and neck supple.      Right foot: Normal range of motion. No deformity.      Left foot: Normal range of motion. No deformity.   Feet:      Right foot:      Protective Sensation: 6 sites tested.  6 sites sensed.      Skin integrity:  No ulcer, blister, skin breakdown, erythema, warmth, callus or dry skin.      Left foot:      Protective Sensation: 6 sites tested.  6 sites sensed.      Skin integrity: No ulcer, blister, skin breakdown, erythema, warmth, callus or dry skin.   Skin:     General: Skin is warm and dry.      Capillary Refill: Capillary refill takes less than 2 seconds.   Neurological:      Mental Status: She is alert and oriented to person, place, and time. Mental status is at baseline.   Psychiatric:         Mood and Affect: Mood normal.         Behavior: Behavior normal.         Thought Content: Thought content normal.         Judgment: Judgment normal.           Assessment / Plan:     Type 2 diabetes mellitus with hemoglobin A1c goal of less than 7.0%  -     POCT Glucose, Hand-Held Device  -     Microalbumin/Creatinine Ratio, Urine; Future; Expected date: 12/04/2024  -     POCT Glucose, Hand-Held Device    Fatty liver    Hyperlipidemia LDL goal <70    Hypertension goal BP (blood pressure) < 130/80    Obesity (BMI 30-39.9)    Hair loss  -     TSH; Future; Expected date: 12/04/2024  -     T4, Free; Future; Expected date: 12/04/2024    Hair thinning  -     Ambulatory referral/consult to Dermatology; Future; Expected date: 12/11/2024    Screening for colon cancer  -     Ambulatory referral/consult to Endo Procedure ; Future; Expected date: 12/05/2024          Additional Plan Details:    - POCT Glucose  - Encouraged continuation of lifestyle changes including regular exercise and limiting carbohydrates to 30-45 grams per meal threes times daily and 15 grams per snack with a limit of two daily.   - Encouraged continued monitoring of blood glucose with maintenance of 4 times daily at Fasting, After Dinner, and Symptomatic. Dex G7 pending. Given callback # for [facility].   - Current DM Medication Regimen: Continue Tresiba 72 units daily.  restart Ozempic 0.25 mg weekly. Change  Novolog 12 units TID before meals and correction dosing  every 3 hours before meals.   - Pharm Asst: Tresiba and Novolog  - Hair thinning: Thyroid labs. Referral to Derm.   - Health Maintenance standards addressed today: Foot Exam - deferred by patient today because Telemedicine or Telephone visit, Eye Exam - will be completed outside of [facility] and patient will schedule, Urine Microalbumin / Creatinine Ratio scheduled, COVID - 19 Vaccine - patient will schedule outside of [facility] , and RSV vaccine - sched at end of month and Tetanus vaccine and colonoscopy - referral to endo    - Nursing Visit: Patient is age 79 or younger with an A1c of 7.5 or greater and will not need nursing visit at this time .   - Follow up in  8  weeks.    CURRENT DM MEDICATIONS:   Ozempic - Stop   Tresiba 64 units daily   Novolog 14 units before breakfast , 14 units before lunch if you eat, and 18 units before dinner      Novolog Correction Dosing every 3 hours as needed OUTSIDE OF EATING  If  - 250, may take 4 units of Novolog  If  - 300, may take 8 units of Novolog  If  - 350, may take 12 units of Novolog  If  - 400, may take 16 units of Novolog  If +, may take 20 units of Novolog       Blakeney McKnight, PA-C [facility]    A total of 30 minutes was spent in face to face time, of which over 50 % was spent in counseling patient on disease process, complications, treatment, and side effects of medications.

## 2024-12-04 NOTE — PATIENT INSTRUCTIONS
CURRENT DM MEDICATIONS:   Ozempic - Stop   Tresiba 64 units daily   Novolog 14 units before breakfast , 14 units before lunch if you eat, and 18 units before dinner      Novolog Correction Dosing every 3 hours as needed OUTSIDE OF EATING  If  - 250, may take 4 units of Novolog  If  - 300, may take 8 units of Novolog  If  - 350, may take 12 units of Novolog  If  - 400, may take 16 units of Novolog  If +, may take 20 units of Novolog

## 2024-12-05 NOTE — PROGRESS NOTES
"         Dr. Tea Morin      [address] [address]   Phone [phone]  Fax [phone]           CHIEF COMPLAINT: Pain of the Left Lower Leg and Pain of the Left Knee       HISTORY OF PRESENT ILLNESS (12/06/2024):    Dary presents with concerns about her left knee, which was replaced by Dr. Cook in 2019. She reports that the knee has been causing issues for approximately three months, describing a clicking sensation, intermittent pulling or drawing up sensations, and throbbing pain. She states, "It feels like a vein that has become swollen."    She has attempted to manage the discomfort by wearing a brace occasionally, which provides some relief while in use. She also reports using heat therapy and soaking in Epsom salt, which offer temporary relief. Dr. Cook recently took an x-ray and reported that it looked good. Concerned about potential vascular issues, she visited a vascular vein institute where she underwent a DVT ultrasound, which was negative. The vascular specialist informed her that her veins appeared healthy.    She experiences pain when pressure is applied to a specific area of the knee, noting a difference in sensation between two points on the knee. When the pain flares up, it persists for a while.    She denies experiencing fevers, chills, or swelling in the knee replacement area. She denies any history of blood clots.    SURGICAL HISTORY:  - Left knee replacement surgery: 2019, initially good outcome          PAST MEDICAL HISTORY:    Past Medical History:   Diagnosis Date    Arthritis     Cataract     Colon polyp     Diabetes mellitus type II 15 years     am 05/30/2022    Hyperlipidemia     Hypertension     Renal cell carcinoma 09/2018    Total knee replacement status, left 01/20/2019    Dr.Robert Cook    UTI (urinary tract infection)        Hemoglobin A1C   Date Value Ref Range Status   11/01/2024 6.8 (H) 4.0 - 5.6 % Final     Comment:     ADA " Screening Guidelines:  5.7-6.4%  Consistent with prediabetes  >or=6.5%  Consistent with diabetes    High levels of fetal hemoglobin interfere with the HbA1C  assay. Heterozygous hemoglobin variants (HbS, HgC, etc)do  not significantly interfere with this assay.   However, presence of multiple variants may affect accuracy.     08/23/2024 7.7 (H) 4.0 - 5.6 % Final     Comment:     ADA Screening Guidelines:  5.7-6.4%  Consistent with prediabetes  >or=6.5%  Consistent with diabetes    High levels of fetal hemoglobin interfere with the HbA1C  assay. Heterozygous hemoglobin variants (HbS, HgC, etc)do  not significantly interfere with this assay.   However, presence of multiple variants may affect accuracy.     05/27/2024 7.4 (H) 4.0 - 5.6 % Final     Comment:     ADA Screening Guidelines:  5.7-6.4%  Consistent with prediabetes  >or=6.5%  Consistent with diabetes    High levels of fetal hemoglobin interfere with the HbA1C  assay. Heterozygous hemoglobin variants (HbS, HgC, etc)do  not significantly interfere with this assay.   However, presence of multiple variants may affect accuracy.          PAST SURGICAL HISTORY:    Past Surgical History:   Procedure Laterality Date    CHOLECYSTECTOMY      CHOLECYSTECTOMY      COLONOSCOPY  2012    COLONOSCOPY N/A 3/1/2018    Procedure: COLONOSCOPY;  Surgeon: Phillip Brower MD;  Location: [facility];  Service: Endoscopy;  Laterality: N/A;    ESOPHAGOGASTRODUODENOSCOPY N/A 8/21/2018    Procedure: ESOPHAGOGASTRODUODENOSCOPY (EGD);  Surgeon: Maco Wynn MD;  Location: [facility];  Service: Endoscopy;  Laterality: N/A;    HYSTERECTOMY  1993    uterine prolapse    JOINT REPLACEMENT      KNEE SURGERY  03/2017    left knee replacement  01/20/2019    Dr.Robert Cook    ROBOT-ASSISTED LAPAROSCOPIC PARTIAL NEPHRECTOMY USING DA DENZEL XI Right 9/13/2018    Procedure: XI ROBOTIC NEPHRECTOMY, PARTIAL;  Surgeon: Tommy Bradshaw MD;  Location: [facility] OR [address] [place] [address];  Service: Urology;   Laterality: Right;  [place] u/s confirmed 9/13 0700 lb  conformation#[id number]    SPINE SURGERY      thumb surgery Rt- July 2018 - cyst           MEDICATIONS:    Current Outpatient Medications:     albuterol (PROVENTIL) 2.5 mg /3 mL (0.083 %) nebulizer solution, USE 1 VIAL IN NEBULIZER EVERY 6 HOURS AS NEEDED FOR WHEEZING, Disp: 1125 mL, Rfl: 0    ASHWAGANDHA ROOT EXTRACT ORAL, Take by mouth 3 (three) times daily as needed. Prn Anxiety, Disp: , Rfl:     azelastine (ASTELIN) 137 mcg (0.1 %) nasal spray, 1 spray (137 mcg total) by Nasal route 2 (two) times daily., Disp: 30 mL, Rfl: 0    blood sugar diagnostic Strp, 1 each by Misc.(Non-Drug; Combo Route) route 4 (four) times daily., Disp: 300 each, Rfl: 3    blood-glucose meter kit, Test finger stick blood sugar once daily., Disp: 1 each, Rfl: 0    blood-glucose sensor (DEXCOM G7 SENSOR) [place], 1 each by Misc.(Non-Drug; Combo Route) route every 10 days., Disp: 9 each, Rfl: 3    insulin aspart U-100 (NOVOLOG) 100 unit/mL (3 mL) InPn pen, Titrate up to 100 units daily as instucted., Disp: 60 mL, Rfl: 0    insulin degludec (TRESIBA FLEXTOUCH U-200) 200 unit/mL (3 mL) insulin pen, Inject 80 Units into the skin once daily., Disp: 12 pen , Rfl: 3    LACTOBACILLUS RHAMNOSUS GG ORAL, Take 1 capsule by mouth once daily., Disp: , Rfl:     lancets Misc, 1 each by Misc.(Non-Drug; Combo Route) route 4 (four) times daily., Disp: 100 each, Rfl: 11    lancets Misc, 1 each by Misc.(Non-Drug; Combo Route) route 4 (four) times daily., Disp: 300 each, Rfl: 3    loratadine (CLARITIN) 10 mg tablet, Take 1 tablet (10 mg total) by mouth once daily., Disp: 30 tablet, Rfl: 0    MAGNESIUM ORAL, Take 500 mg by mouth once daily., Disp: , Rfl:     meloxicam (MOBIC) 7.5 MG tablet, Take 1 tablet (7.5 mg total) by mouth once daily. Take with food, Disp: 30 tablet, Rfl: 0    neomycin-polymyxin-dexamethasone (MAXITROL) 3.5 mg/g-10,000 unit/g-0.1 % Oint, Apply ointment to lids and lashes on both eyelids  "for 2 times daily for 7 days., Disp: 3.5 g, Rfl: 0    olmesartan (BENICAR) 20 MG tablet, Take 1 tablet (20 mg total) by mouth once daily., Disp: 90 tablet, Rfl: 3    omeprazole (PRILOSEC) 40 MG capsule, Take 1 capsule (40 mg total) by mouth once daily., Disp: 90 capsule, Rfl: 3    pen needle, diabetic (PEN NEEDLE) 31 gauge x 5/16" Ndle, Inject 1 each into the skin once daily., Disp: 300 each, Rfl: 3    simvastatin (ZOCOR) 40 MG tablet, Take 1 tablet (40 mg total) by mouth every evening., Disp: 90 tablet, Rfl: 3    fluticasone (FLONASE) 50 mcg/actuation nasal spray, USE TWO SPRAY(S) IN EACH NOSTRIL ONCE DAILY (Patient not taking: Reported on 12/6/2024), Disp: 16 g, Rfl: 1     There are no discontinued medications.      ALLERGIES:     Review of patient's allergies indicates:   Allergen Reactions    Citrus and derivatives Swelling, Other (See Comments) and Edema     Redness  Lip swelling   Itching     Other reaction(s): Edema, Other (See Comments)  Redness  Lip swelling   Itching     Latex Other (See Comments)     Other reaction(s): Rash  Other reaction(s): welts  Other reaction(s): Itching  Other reaction(s): Other (See Comments)  Other reaction(s): Rash  Other reaction(s): whelps  Other reaction(s): Itching  Other reaction(s): Rash  Other reaction(s): welts  Other reaction(s): Itching    Valsartan Other (See Comments)     Other reaction(s): disoriented  Other reaction(s): Other (See Comments)  Other reaction(s): disoriented  Other reaction(s): disoriented    Aspirin Nausea Only     Other reaction(s): Nausea Only    Jardiance [empagliflozin] Nausea Only    Metformin Anxiety     Other reaction(s): anxiety  Other reaction(s): anxiety  Other reaction(s): anxiety            FAMILY HISTORY:   Family History   Problem Relation Name Age of Onset    Diabetes Mother      Hypertension Mother      Diabetes Brother      Heart disease Neg Hx      Thrombosis Neg Hx             SOCIAL HISTORY:    Social History     Socioeconomic " "History    Marital status:     Number of children: 3   Occupational History    Occupation: Stay at Home    Occupation:    Tobacco Use    Smoking status: Never    Smokeless tobacco: Never   Substance and Sexual Activity    Alcohol use: Yes     Comment: occasional wine     Drug use: No    Sexual activity: Yes     Partners: Male     Birth control/protection: Surgical   Social History Narrative    . Housewife.     Social Drivers of Health     Financial Resource Strain: Low Risk  (3/4/2024)    Overall Financial Resource Strain (CARDIA)     Difficulty of Paying Living Expenses: Not hard at all   Food Insecurity: No Food Insecurity (3/4/2024)    Hunger Vital Sign     Worried About Running Out of Food in the Last Year: Never true     Ran Out of Food in the Last Year: Never true   Transportation Needs: No Transportation Needs (3/4/2024)    PRAPARE - Transportation     Lack of Transportation (Medical): No     Lack of Transportation (Non-Medical): No   Physical Activity: Insufficiently Active (3/4/2024)    Exercise Vital Sign     Days of Exercise per Week: 2 days     Minutes of Exercise per Session: 30 min   Stress: Stress Concern Present (3/4/2024)    [employer] - Occupational Stress Questionnaire     Feeling of Stress : Rather much   Housing Stability: Low Risk  (3/4/2024)    Housing Stability Vital Sign     Unable to Pay for Housing in the Last Year: No     Number of Places Lived in the Last Year: 1     Unstable Housing in the Last Year: No         PHYSICAL EXAMINATION:     Estimated body mass index is 32.19 kg/m² as calculated from the following:    Height as of this encounter: 5' 2" (1.575 m).    Weight as of this encounter: 79.8 kg (176 lb).   ASSISTIVE DEVICE:  None    MUSCULOSKELETAL:    Physical Exam    MSK: Knee - Left: Pain on palpation of the left knee. Mild discomfort on calf muscle stretch.  Her pain seems to be centered over the medial gastroc.  No " pain with hamstring stretching.  Decreased sensation to medial calf.  Pain with calf squeezing.  Full range of motion of knee.  No signs of infection.  Well-healed vertical incision over anterior knee without an effusion.           IMAGING:      X-Ray Knee Complete 4 or More Views Left  Narrative: EXAMINATION:  XR KNEE COMP 4 OR MORE VIEWS LEFT    CLINICAL HISTORY:  Pain in left knee    TECHNIQUE:  AP, Lateral, Sunrise and Tunnel views of the left knee were preformed    COMPARISON:  07/13/2016    FINDINGS:  Left knee total arthroplasty changes present without evidence of hardware loosening.  No large joint effusion.  No acute fracture.    Right knee unchanged.  Impression: As above    Electronically signed by: Jaime Mckeon MD  Date:    12/06/2024  Time:    14:50           ASSESSMENT: 69 y.o. female  with:   Left medial gastrocs pain and decreased sensation after knee replacement 2019 by Dr. Cook with negative DVT ultrasound recently    PLAN:  Assessment & Plan    DME:   Recommend elevating the knee and applying a heating pad for pain relief. Suggested soaking the knee in Epsom salt when in the tub. Advised trying Voltaren gel, an anti-inflammatory cream, to rub on the affected area.  She has a brace that she can wear from home    REFERRAL   Discussed potential genicular blocks, which are injections outside the knee joint, as a future option if other treatments are not effective.  Referred to pain management doctor if physical therapy is not effective after 3-4 weeks, to evaluate for potential compressed nerve in the back versus efficacy of genicular blocks    PHYSICAL THERAPY   Referred to physical therapy for 3-4 weeks to work out muscle tension and pain.     FOLLOW UP:   Follow up in about a month with Dr. Lopez of pain management if not better after PT.            This note was generated with the assistance of ambient listening technology. Verbal consent was obtained by the patient and accompanying  visitor(s) for the recording of patient appointment to facilitate this note. I attest to having reviewed and edited the generated note for accuracy, though some syntax or spelling errors may persist. Please contact the author of this note for any clarification.              Physician Signature: Tea Morin M.D.       Official Website  Schedule An Appointment

## 2024-12-06 ENCOUNTER — OFFICE VISIT (OUTPATIENT)
Dept: ORTHOPEDICS | Facility: CLINIC | Age: 69
End: 2024-12-06
Payer: MEDICARE

## 2024-12-06 ENCOUNTER — HOSPITAL ENCOUNTER (OUTPATIENT)
Dept: RADIOLOGY | Facility: HOSPITAL | Age: 69
Discharge: HOME | End: 2024-12-06
Attending: ORTHOPAEDIC SURGERY
Payer: MEDICARE

## 2024-12-06 ENCOUNTER — TELEPHONE (OUTPATIENT)
Dept: ORTHOPEDICS | Facility: CLINIC | Age: 69
End: 2024-12-06
Payer: MEDICARE

## 2024-12-06 VITALS — BODY MASS INDEX: 32.39 KG/M2 | WEIGHT: 176 LBS | HEIGHT: 62 IN

## 2024-12-06 DIAGNOSIS — M25.562 LEFT KNEE PAIN, UNSPECIFIED CHRONICITY: Primary | ICD-10-CM

## 2024-12-06 DIAGNOSIS — M25.562 LEFT KNEE PAIN, UNSPECIFIED CHRONICITY: ICD-10-CM

## 2024-12-06 DIAGNOSIS — M79.662 PAIN OF LEFT LOWER LEG: ICD-10-CM

## 2024-12-06 DIAGNOSIS — M54.16 LUMBAR RADICULOPATHY: Primary | ICD-10-CM

## 2024-12-06 DIAGNOSIS — S86.812A STRAIN OF CALF MUSCLE, LEFT, INITIAL ENCOUNTER: ICD-10-CM

## 2024-12-06 PROCEDURE — 99999 PR PBB SHADOW E&M-EST. PATIENT-LVL V: CPT | Mod: PBBFAC,HCNC,, | Performed by: ORTHOPAEDIC SURGERY

## 2024-12-06 PROCEDURE — 73564 X-RAY EXAM KNEE 4 OR MORE: CPT | Mod: TC,HCNC,LT

## 2024-12-06 PROCEDURE — 73564 X-RAY EXAM KNEE 4 OR MORE: CPT | Mod: 26,HCNC,LT, | Performed by: RADIOLOGY

## 2024-12-06 NOTE — PATIENT INSTRUCTIONS
Follow up as needed.    Your feedback means a lot to us! If you have a moment, please leave a review to help others learn about your experience. Thank you!

## 2024-12-06 NOTE — TELEPHONE ENCOUNTER
"Patient was seen in clinic prior to message being received.    Philomena Caruso" [facility]  Orthopedics Department   "

## 2024-12-06 NOTE — TELEPHONE ENCOUNTER
----- Message from Milind sent at 12/6/2024 10:09 AM CST -----  Contact: patient  Type:  Patient Returning Call    Who Called: Dary Chaney   Who Left Message for Patient: nurse   Does the patient know what this is regarding?: her appt   Would the patient rather a call back or a response via [facility]?  Call   Best Call Back Number: [phone]  Additional Information:

## 2024-12-09 ENCOUNTER — HOSPITAL ENCOUNTER (OUTPATIENT)
Dept: PREADMISSION TESTING | Facility: HOSPITAL | Age: 69
Discharge: HOME | End: 2024-12-09
Attending: INTERNAL MEDICINE
Payer: MEDICARE

## 2024-12-09 DIAGNOSIS — Z12.11 SCREENING FOR COLON CANCER: Primary | ICD-10-CM

## 2024-12-09 RX ORDER — POLYETHYLENE GLYCOL 3350, SODIUM SULFATE ANHYDROUS, SODIUM BICARBONATE, SODIUM CHLORIDE, POTASSIUM CHLORIDE 236; 22.74; 6.74; 5.86; 2.97 G/4L; G/4L; G/4L; G/4L; G/4L
4 POWDER, FOR SOLUTION ORAL ONCE
Qty: 4000 ML | Refills: 0 | Status: SHIPPED | OUTPATIENT
Start: 2024-12-09 | End: 2024-12-09

## 2024-12-11 ENCOUNTER — TELEPHONE (OUTPATIENT)
Dept: PAIN MEDICINE | Facility: CLINIC | Age: 69
End: 2024-12-11
Payer: MEDICARE

## 2025-01-02 ENCOUNTER — OFFICE VISIT (OUTPATIENT)
Dept: GASTROENTEROLOGY | Facility: CLINIC | Age: 70
End: 2025-01-02
Payer: MEDICARE

## 2025-01-02 ENCOUNTER — HOSPITAL ENCOUNTER (OUTPATIENT)
Dept: RADIOLOGY | Facility: HOSPITAL | Age: 70
Discharge: HOME | End: 2025-01-02
Attending: PHYSICIAN ASSISTANT
Payer: MEDICARE

## 2025-01-02 VITALS
DIASTOLIC BLOOD PRESSURE: 90 MMHG | HEIGHT: 62 IN | SYSTOLIC BLOOD PRESSURE: 162 MMHG | BODY MASS INDEX: 33.04 KG/M2 | WEIGHT: 179.56 LBS | HEART RATE: 82 BPM

## 2025-01-02 DIAGNOSIS — R14.0 BLOATING: ICD-10-CM

## 2025-01-02 DIAGNOSIS — K59.09 CHRONIC CONSTIPATION: ICD-10-CM

## 2025-01-02 PROCEDURE — 3080F DIAST BP >= 90 MM HG: CPT | Mod: CPTII,S$GLB,, | Performed by: PHYSICIAN ASSISTANT

## 2025-01-02 PROCEDURE — 99213 OFFICE O/P EST LOW 20 MIN: CPT | Mod: S$GLB,,, | Performed by: PHYSICIAN ASSISTANT

## 2025-01-02 PROCEDURE — 1101F PT FALLS ASSESS-DOCD LE1/YR: CPT | Mod: CPTII,S$GLB,, | Performed by: PHYSICIAN ASSISTANT

## 2025-01-02 PROCEDURE — 3288F FALL RISK ASSESSMENT DOCD: CPT | Mod: CPTII,S$GLB,, | Performed by: PHYSICIAN ASSISTANT

## 2025-01-02 PROCEDURE — 3008F BODY MASS INDEX DOCD: CPT | Mod: CPTII,S$GLB,, | Performed by: PHYSICIAN ASSISTANT

## 2025-01-02 PROCEDURE — 1157F ADVNC CARE PLAN IN RCRD: CPT | Mod: CPTII,S$GLB,, | Performed by: PHYSICIAN ASSISTANT

## 2025-01-02 PROCEDURE — 74019 RADEX ABDOMEN 2 VIEWS: CPT | Mod: TC

## 2025-01-02 PROCEDURE — 3072F LOW RISK FOR RETINOPATHY: CPT | Mod: CPTII,S$GLB,, | Performed by: PHYSICIAN ASSISTANT

## 2025-01-02 PROCEDURE — 74019 RADEX ABDOMEN 2 VIEWS: CPT | Mod: 26,,, | Performed by: RADIOLOGY

## 2025-01-02 PROCEDURE — 3077F SYST BP >= 140 MM HG: CPT | Mod: CPTII,S$GLB,, | Performed by: PHYSICIAN ASSISTANT

## 2025-01-02 PROCEDURE — 99999 PR PBB SHADOW E&M-EST. PATIENT-LVL V: CPT | Mod: PBBFAC,,, | Performed by: PHYSICIAN ASSISTANT

## 2025-01-02 PROCEDURE — 1126F AMNT PAIN NOTED NONE PRSNT: CPT | Mod: CPTII,S$GLB,, | Performed by: PHYSICIAN ASSISTANT

## 2025-01-02 NOTE — PATIENT INSTRUCTIONS
Patient: Jennifer Steiner    Procedure: Procedure(s):  RIGHT EYE PHACOEMULSIFICATION, CATARACT, WITH TORIC  INTRAOCULAR LENS IMPLANT INSERTION       Diagnosis: Mixed type age-related cataract, both eyes [H25.813]  Diagnosis Additional Information: No value filed.    Anesthesia Type:   MAC     Note:    Oropharynx: oropharynx clear of all foreign objects and spontaneously breathing  Level of Consciousness: awake  Oxygen Supplementation: room air    Independent Airway: airway patency satisfactory and stable  Dentition: dentition unchanged  Vital Signs Stable: post-procedure vital signs reviewed and stable  Report to RN Given: handoff report given  Patient transferred to: Phase II  Comments: Report to Phase II RN. Resps easy and regular.   Handoff Report: Identifed the Patient, Identified the Reponsible Provider, Reviewed the pertinent medical history, Discussed the surgical course, Reviewed Intra-OP anesthesia mangement and issues during anesthesia, Set expectations for post-procedure period and Allowed opportunity for questions and acknowledgement of understanding      Vitals:  Vitals Value Taken Time   /58 09/12/22 1151   Temp 36.3  C (97.4  F) 09/12/22 1151   Pulse     Resp 16 09/12/22 1151   SpO2 100 % 09/12/22 1151       Electronically Signed By: CHIN BLANK CRNA  September 12, 2022  12:06 PM   Abdominal Xray today and additional recommendations after results available.

## 2025-01-02 NOTE — PROGRESS NOTES
GI OUTPATIENT NOTE    PCP: Sakina Cooper [address] [facility] / [place] [address]    Chief Complaint   Patient presents with    Bloated     Started around the time of starting Jardiance    Constipation       HISTORY OF PRESENT ILLNESS:  69 y.o. female presents to the GI clinic today for initial evaluation. The patient has been referred for bloating and constipation. She feels some of this started when she started Ozempic, but she also had nausea at that time. It was stopped and Jardiance prescribed, but bloating continued. It is worse after eating. It doesn't cause pain but she feels uncomfortable. She has had some weight fluctuations with the changes in medications.     Constipation is a new problem. She is having a BM once daily which is a decrease in frequency for her. They also don't feel complete and she is having to strain. She is treating it with apple cider vinegar gummies. She denies hematochezia or melena.     EGD 08/2018): non-bleeding esophageal ulcer, patent schatzki ring, small hiatal hernia.   Colonoscopy (03/2018): hyperplastic polyps, diverticulosis. Repeat 10 years.     Past Medical History:   Diagnosis Date    Arthritis     Cataract     Colon polyp     Diabetes mellitus type II 15 years     am 05/30/2022    Hyperlipidemia     Hypertension     Renal cell carcinoma 09/2018    Total knee replacement status, left 01/20/2019    Dr.Robert Cook    UTI (urinary tract infection)        Past Surgical History:   Procedure Laterality Date    CHOLECYSTECTOMY      CHOLECYSTECTOMY      COLONOSCOPY  2012    COLONOSCOPY N/A 03/01/2018    Procedure: COLONOSCOPY;  Surgeon: Phillip Brower MD;  Location: [facility];  Service: Endoscopy;  Laterality: N/A;    ESOPHAGOGASTRODUODENOSCOPY N/A 08/21/2018    Procedure: ESOPHAGOGASTRODUODENOSCOPY (EGD);  Surgeon: Maco Wynn MD;  Location: [facility];  Service: Endoscopy;  Laterality: N/A;    HYSTERECTOMY  1993    uterine prolapse    KNEE SURGERY  03/2017     left knee replacement  01/20/2019    Dr.Robert Cook    ROBOT-ASSISTED LAPAROSCOPIC PARTIAL NEPHRECTOMY USING DA DENZEL XI Right 09/13/2018    Procedure: XI ROBOTIC NEPHRECTOMY, PARTIAL;  Surgeon: Tommy Bradshaw MD;  Location: [facility] OR [address] [place] [address];  Service: Urology;  Laterality: Right;  Fortec u/s confirmed 9/13 0700 lb  conformation#[id number]    SPINE SURGERY      thumb surgery Rt- July 2018 - cyst          Family History   Problem Relation Name Age of Onset    Diabetes Mother      Hypertension Mother      Diabetes Brother      Heart disease Neg Hx      Thrombosis Neg Hx         MEDS/ALLERGY:  The patient's medications and allergies were reviewed and updated in the EPIC chart.     Review of Systems  As per HPI    Physical Exam  Constitutional:       Appearance: Normal appearance. She is well-developed.   HENT:      Head: Normocephalic and atraumatic.   Eyes:      Extraocular Movements: Extraocular movements intact.   Cardiovascular:      Rate and Rhythm: Normal rate and regular rhythm.      Heart sounds: No murmur heard.  Pulmonary:      Effort: Pulmonary effort is normal. No respiratory distress.      Breath sounds: Normal breath sounds. No wheezing.   Abdominal:      General: Bowel sounds are normal. There is no distension.      Palpations: Abdomen is soft. There is no mass.      Tenderness: There is no abdominal tenderness.   Musculoskeletal:      Cervical back: Normal range of motion and neck supple.   Skin:     General: Skin is warm and dry.      Findings: No rash.   Neurological:      Mental Status: She is alert and oriented to person, place, and time.      Cranial Nerves: No cranial nerve deficit.      Gait: Gait normal.   Psychiatric:         Thought Content: Thought content normal.         LABS/IMAGING:   Pertinent results were reviewed.     ASSESSMENT:  1. Bloating    2. Chronic constipation        PLAN:  Bloating likely related to her constipation. This change could be related to the diabetic  medication changes she has had recently.   Discussed factors that influence our bowel habits.   Will get an Xray today to see how much stool she has. Further recommendations about bowel regimen after that.   Given the significant discomfort from bloating, I would also recommend SIBO testing be considered.     ORDER SUMMARY:  Orders Placed This Encounter   Procedures    X-Ray Abdomen Flat And Erect        Follow up in about 6 weeks (around 2/13/2025).     Thank you for the opportunity to participate in the care of this patient. This consult was designated to me by my supervising physician.   Rajiv Humphreys PA-C.

## 2025-01-03 DIAGNOSIS — K59.09 CHRONIC CONSTIPATION: ICD-10-CM

## 2025-01-03 DIAGNOSIS — R14.0 BLOATING: Primary | ICD-10-CM

## 2025-01-06 ENCOUNTER — OFFICE VISIT (OUTPATIENT)
Dept: INTERNAL MEDICINE | Facility: CLINIC | Age: 70
End: 2025-01-06
Payer: MEDICARE

## 2025-01-06 ENCOUNTER — TELEPHONE (OUTPATIENT)
Dept: GASTROENTEROLOGY | Facility: CLINIC | Age: 70
End: 2025-01-06
Payer: MEDICARE

## 2025-01-06 VITALS
HEART RATE: 84 BPM | WEIGHT: 180.75 LBS | BODY MASS INDEX: 33.26 KG/M2 | HEIGHT: 62 IN | RESPIRATION RATE: 18 BRPM | TEMPERATURE: 97 F

## 2025-01-06 DIAGNOSIS — Z00.00 ENCOUNTER FOR PREVENTIVE HEALTH EXAMINATION: ICD-10-CM

## 2025-01-06 DIAGNOSIS — C64.9 RENAL CELL CARCINOMA, UNSPECIFIED LATERALITY: ICD-10-CM

## 2025-01-06 DIAGNOSIS — I70.0 ATHEROSCLEROSIS OF AORTA: ICD-10-CM

## 2025-01-06 DIAGNOSIS — M51.06 INTERVERTEBRAL LUMBAR DISC DISORDER WITH MYELOPATHY, LUMBAR REGION: ICD-10-CM

## 2025-01-06 DIAGNOSIS — E11.69 HYPERLIPIDEMIA ASSOCIATED WITH TYPE 2 DIABETES MELLITUS: ICD-10-CM

## 2025-01-06 DIAGNOSIS — R14.0 ABDOMINAL BLOATING: ICD-10-CM

## 2025-01-06 DIAGNOSIS — E11.69 TYPE 2 DIABETES MELLITUS WITH OBESITY: ICD-10-CM

## 2025-01-06 DIAGNOSIS — E11.9 TYPE 2 DIABETES MELLITUS WITH HEMOGLOBIN A1C GOAL OF LESS THAN 7.0%: Primary | ICD-10-CM

## 2025-01-06 DIAGNOSIS — J84.10 PULMONARY FIBROSIS, UNSPECIFIED: ICD-10-CM

## 2025-01-06 DIAGNOSIS — Z86.0100 HISTORY OF COLON POLYPS: ICD-10-CM

## 2025-01-06 DIAGNOSIS — I10 HYPERTENSION GOAL BP (BLOOD PRESSURE) < 130/80: Chronic | ICD-10-CM

## 2025-01-06 DIAGNOSIS — E11.36 DIABETIC CATARACT: ICD-10-CM

## 2025-01-06 DIAGNOSIS — E66.9 TYPE 2 DIABETES MELLITUS WITH OBESITY: ICD-10-CM

## 2025-01-06 DIAGNOSIS — E78.5 HYPERLIPIDEMIA ASSOCIATED WITH TYPE 2 DIABETES MELLITUS: ICD-10-CM

## 2025-01-06 PROBLEM — N28.1 RENAL CYST: Status: RESOLVED | Noted: 2018-09-12 | Resolved: 2025-01-06

## 2025-01-06 PROBLEM — E11.65 UNCONTROLLED TYPE 2 DIABETES MELLITUS WITH HYPERGLYCEMIA, WITH LONG-TERM CURRENT USE OF INSULIN: Status: RESOLVED | Noted: 2023-08-28 | Resolved: 2025-01-06

## 2025-01-06 PROBLEM — N20.0 RENAL STONE: Status: RESOLVED | Noted: 2023-01-04 | Resolved: 2025-01-06

## 2025-01-06 PROBLEM — Z79.4 UNCONTROLLED TYPE 2 DIABETES MELLITUS WITH HYPERGLYCEMIA, WITH LONG-TERM CURRENT USE OF INSULIN: Status: RESOLVED | Noted: 2023-08-28 | Resolved: 2025-01-06

## 2025-01-06 PROCEDURE — 1101F PT FALLS ASSESS-DOCD LE1/YR: CPT | Mod: CPTII,S$GLB,, | Performed by: NURSE PRACTITIONER

## 2025-01-06 PROCEDURE — 1157F ADVNC CARE PLAN IN RCRD: CPT | Mod: CPTII,S$GLB,, | Performed by: NURSE PRACTITIONER

## 2025-01-06 PROCEDURE — 1160F RVW MEDS BY RX/DR IN RCRD: CPT | Mod: CPTII,S$GLB,, | Performed by: NURSE PRACTITIONER

## 2025-01-06 PROCEDURE — 1126F AMNT PAIN NOTED NONE PRSNT: CPT | Mod: CPTII,S$GLB,, | Performed by: NURSE PRACTITIONER

## 2025-01-06 PROCEDURE — G0439 PPPS, SUBSEQ VISIT: HCPCS | Mod: S$GLB,,, | Performed by: NURSE PRACTITIONER

## 2025-01-06 PROCEDURE — 1170F FXNL STATUS ASSESSED: CPT | Mod: CPTII,S$GLB,, | Performed by: NURSE PRACTITIONER

## 2025-01-06 PROCEDURE — 3288F FALL RISK ASSESSMENT DOCD: CPT | Mod: CPTII,S$GLB,, | Performed by: NURSE PRACTITIONER

## 2025-01-06 PROCEDURE — 3072F LOW RISK FOR RETINOPATHY: CPT | Mod: CPTII,S$GLB,, | Performed by: NURSE PRACTITIONER

## 2025-01-06 PROCEDURE — 99999 PR PBB SHADOW E&M-EST. PATIENT-LVL IV: CPT | Mod: PBBFAC,,, | Performed by: NURSE PRACTITIONER

## 2025-01-06 PROCEDURE — 1159F MED LIST DOCD IN RCRD: CPT | Mod: CPTII,S$GLB,, | Performed by: NURSE PRACTITIONER

## 2025-01-06 NOTE — PROGRESS NOTES
"  Dary Chaney presented for a follow-up Medicare AWV today. The following components were reviewed and updated:    Medical history  Family History  Social history  Allergies and Current Medications  Health Risk Assessment  Health Maintenance  Care Team    **See Completed Assessments for Annual Wellness visit with in the encounter summary    The following assessments were completed:  Depression Screening  Cognitive function Screening  Timed Get Up Test  Whisper Test      Opioid documentation:      Patient does not have a current opioid prescription.          Vitals:    01/06/25 1308   Pulse: 84   Resp: 18   Temp: 97.4 °F (36.3 °C)   Weight: 82 kg (180 lb 12.4 oz)   Height: 5' 2" (1.575 m)     Body mass index is 33.06 kg/m².       Physical Exam  Vitals and nursing note reviewed.   Constitutional:       Appearance: Normal appearance. She is obese.   HENT:      Head: Normocephalic.   Eyes:      Pupils: Pupils are equal, round, and reactive to light.   Cardiovascular:      Rate and Rhythm: Normal rate and regular rhythm.      Pulses: Normal pulses.      Heart sounds: Normal heart sounds.   Pulmonary:      Effort: Pulmonary effort is normal.      Breath sounds: Normal breath sounds.   Musculoskeletal:         General: Normal range of motion.   Skin:     General: Skin is warm.   Neurological:      Mental Status: She is alert and oriented to person, place, and time.   Psychiatric:         Mood and Affect: Mood normal.         Behavior: Behavior normal.         Thought Content: Thought content normal.         Judgment: Judgment normal.       Current Outpatient Medications   Medication Instructions    albuterol (PROVENTIL) 2.5 mg, Nebulization, Every 6 hours PRN    ASHWAGANDHA ROOT EXTRACT ORAL 3 times daily PRN    azelastine (ASTELIN) 137 mcg, Nasal, 2 times daily    blood sugar diagnostic Strp 1 each, Misc.(Non-Drug; Combo Route), 4 times daily    blood-glucose meter kit Test finger stick blood sugar once daily.    " "blood-glucose sensor (DEXCOM G7 SENSOR) [place] 1 each, Misc.(Non-Drug; Combo Route), Every 10 days    fluticasone (FLONASE) 50 mcg/actuation nasal spray USE TWO SPRAY(S) IN EACH NOSTRIL ONCE DAILY    insulin aspart U-100 (NOVOLOG) 100 unit/mL (3 mL) InPn pen Titrate up to 100 units daily as instucted.    insulin degludec (TRESIBA FLEXTOUCH U-200) 80 Units, Subcutaneous, Daily    LACTOBACILLUS RHAMNOSUS GG ORAL 1 capsule, Daily    lancets Misc 1 each, Misc.(Non-Drug; Combo Route), 4 times daily    lancets Misc 1 each, Misc.(Non-Drug; Combo Route), 4 times daily    loratadine (CLARITIN) 10 mg, Oral, Daily    MAGNESIUM ORAL 500 mg, Daily    meloxicam (MOBIC) 7.5 mg, Oral, Daily, Take with food    olmesartan (BENICAR) 20 mg, Oral, Daily    omeprazole (PRILOSEC) 40 mg, Oral, Daily    pen needle, diabetic (PEN NEEDLE) 31 gauge x 5/16" Ndle 1 each, Subcutaneous, Daily    simvastatin (ZOCOR) 40 mg, Oral, Nightly           Diagnoses and health risks identified today and associated recommendations/orders:  1. Encounter for preventive health examination  Discussed  the need COVID and RSV    2. Type 2 diabetes mellitus with hemoglobin A1c goal of less than 7.0%  Chronic and Stable on Tresbia/.Efrain log  Discussed and recommend  low fat/carb/chol diet. Cardio exercise as tolerated. Life style modifications.  Continue current treatment plan as previously prescribed with your PCP    3. Hypertension goal BP (blood pressure) < 130/80   BP elevated -states BP up and down  Taking all meds  Decline to see PCP  Will [facility] to [facility]   Schedule to see     4. Hyperlipidemia associated with type 2 diabetes mellitus  Chronic and Stable on Zocor  Discussed and recommend  low fat/carb/chol diet. Cardio exercise as tolerated. Life style modifications.  Continue current treatment plan as previously prescribed with your PCP    5. Atherosclerosis of aorta  Chronic and Stable on Zocor  Discussed and recommend  low fat/carb/chol diet. Cardio " exercise as tolerated. Life style modifications.  Continue current treatment plan as previously prescribed with your PCP    6. Type 2 diabetes mellitus with obesity  BMI 33 kg  Discussed and recommend  low fat/carb/chol diet. Cardio exercise as tolerated. Life style modifications.  Continue current treatment plan as previously prescribed with your PCP    7. Pulmonary fibrosis, unspecified  Chronic and Stable on Albuterol neb tx as needed . Continue current treatment plan as previously prescribed with your PCP    8. Diabetic cataract  Chronic and Stable. Unsurvillance by opth md    9. Intervertebral lumbar disc disorder with myelopathy, lumbar region  Chronic and Stable. Continue current treatment plan as previously prescribed with your PCP    10. Renal cell carcinoma, unspecified laterality  Renal cancer (HCC)-right kidney (had mass removed)  2018  No longer followed by PCP    11. History of colon polyps  Repeat coloscopy 3/2028    12. Abdominal bloating  Chronic and Ongoing.   Recommend daiana lax and Align probiotic. Follow up with PCP    I offered to discuss advanced care planning, including how to pick a person who would make decisions for you if you were unable to make them for yourself, called a health care power of , and what kind of decisions you might make such as use of life sustaining treatments such as ventilators and tube feeding when faced with a life limiting illness recorded on a living will that they will need to know. (How you want to be cared for as you near the end of your natural life)     X  Patient is unwilling to engage in a discussion regarding advance directives at this time.    Provided Dary with a 5-10 year written screening schedule and personal prevention plan. Recommendations were developed using the USPSTF age appropriate recommendations. Education, counseling, and referrals were provided as needed.  After Visit Summary printed and given to patient which includes a list of  additional screenings\tests needed.    Follow up in about 1 year (around 1/6/2026).  Tessie Charles, NP

## 2025-01-06 NOTE — PATIENT INSTRUCTIONS
Counseling and Referral of Other Preventative  (Italic type indicates deductible and co-insurance are waived)    Patient Name: Dary Chaney  Today's Date: 1/6/2025    Health Maintenance       Date Due Completion Date    RSV Vaccine (Age 60+ and Pregnant patients) (1 - Risk 60-74 years 1-dose series) Never done ---    TETANUS VACCINE 08/26/2021 8/26/2011    Colorectal Cancer Screening 03/01/2023 3/1/2018    COVID-19 Vaccine (8 - 2024-25 season) 09/01/2024 10/31/2023    DEXA Scan 12/18/2024 12/18/2019    Hemoglobin A1c 05/01/2025 11/1/2024    Mammogram 10/17/2025 10/17/2024    Override on 11/13/2012: (N/S)    Lipid Panel 11/01/2025 11/1/2024    Eye Exam 12/02/2025 12/2/2024    Diabetes Urine Screening 12/04/2025 12/4/2024    Foot Exam 12/04/2025 12/4/2024    Override on 9/17/2015: Done    Override on 5/14/2015: Done    Override on 1/14/2015: Done    Override on 9/15/2014: Done    High Dose Statin 01/06/2026 1/6/2025        No orders of the defined types were placed in this encounter.    The following information is provided to all patients.  This information is to help you find resources for any of the problems found today that may be affecting your health:                  Living healthy guide: www.[facility].[place].gov      Understanding Diabetes: www.diabetes.org      Eating healthy: www.cdc.gov/healthyweight      CDC home safety checklist: www.cdc.gov/steadi/patient.html      Agency on Aging: www.goea.[place].[facility]      Alcoholics anonymous (AA): www.aa.org      Physical Activity: www.yen.nih.gov/mm5flue      Tobacco use: www.quitwithusla.org

## 2025-01-09 ENCOUNTER — PATIENT MESSAGE (OUTPATIENT)
Dept: ENDOSCOPY | Facility: HOSPITAL | Age: 70
End: 2025-01-09
Payer: MEDICARE

## 2025-01-09 ENCOUNTER — TELEPHONE (OUTPATIENT)
Dept: ENDOSCOPY | Facility: HOSPITAL | Age: 70
End: 2025-01-09
Payer: MEDICARE

## 2025-01-09 NOTE — TELEPHONE ENCOUNTER
Spoke to Ms. Chaney regarding scheduling of a hydrogen breath test. Patient is going out of town for a month. Instructed Ms. Chaney to call back when ready to schedule.

## 2025-01-29 DIAGNOSIS — Z78.0 MENOPAUSE: ICD-10-CM

## 2025-01-30 DIAGNOSIS — E11.9 TYPE 2 DIABETES MELLITUS WITH HEMOGLOBIN A1C GOAL OF LESS THAN 7.0%: ICD-10-CM

## 2025-01-30 RX ORDER — INSULIN DEGLUDEC 200 U/ML
80 INJECTION, SOLUTION SUBCUTANEOUS DAILY
Qty: 12 PEN | Refills: 3 | Status: SHIPPED | OUTPATIENT
Start: 2025-01-30 | End: 2026-01-30

## 2025-01-30 NOTE — TELEPHONE ENCOUNTER
LOV 12/04/2024  Scheduled 02/27/2025   Alert-The patient is alert, awake and responds to voice. The patient is oriented to time, place, and person. The triage nurse is able to obtain subjective information.

## 2025-01-30 NOTE — TELEPHONE ENCOUNTER
----- Message from Med Assistant Leal sent at 1/30/2025  4:35 PM CST -----  Contact: Ananya  Type:  RX Refill Request    Who Called:  Dary  Refill or New Rx: refill  RX Name and Strength: Tresiba  How is the patient currently taking it? (ex. 1XDay):  Is this a 30 day or 90 day RX:  Preferred Pharmacy with phone number: [facility] [address] [place] [address]  Local or Mail Order:Local  Ordering Provider: Dr. Goodman  Would the patient rather a call back or a response via [facility]? Call  Best Call Back Number: [phone]  Additional Information: Patient is out of town

## 2025-02-25 DIAGNOSIS — E78.2 MIXED HYPERLIPIDEMIA: ICD-10-CM

## 2025-02-25 RX ORDER — SIMVASTATIN 40 MG/1
40 TABLET, FILM COATED ORAL NIGHTLY
Qty: 90 TABLET | Refills: 1 | Status: SHIPPED | OUTPATIENT
Start: 2025-02-25

## 2025-02-25 NOTE — TELEPHONE ENCOUNTER
Refill Decision Note   Dary Shiv  is requesting a refill authorization.  Brief Assessment and Rationale for Refill:  Approve     Medication Therapy Plan: EDv reviewed, no change to therapy      Extended chart review required: Yes   Comments:     Note composed:10:48 AM 02/25/2025

## 2025-02-25 NOTE — TELEPHONE ENCOUNTER
No care due was identified.  Health [facility] Embedded Care Due Messages. Reference number: [id number].   2/25/2025 10:38:07 AM CST

## 2025-02-25 NOTE — TELEPHONE ENCOUNTER
Provider Staff:  This patient has received emergency care.     Please schedule patient for a follow up appointment.     Thanks!  [facility]     Appointments      Date Provider   Last Visit   5/31/2024 Sakina Cooper DO   Next Visit   4/30/2025 Sakina Cooper DO

## 2025-02-27 ENCOUNTER — OFFICE VISIT (OUTPATIENT)
Dept: DIABETES | Facility: CLINIC | Age: 70
End: 2025-02-27
Payer: MEDICARE

## 2025-02-27 VITALS
WEIGHT: 179 LBS | HEART RATE: 80 BPM | BODY MASS INDEX: 32.74 KG/M2 | SYSTOLIC BLOOD PRESSURE: 144 MMHG | DIASTOLIC BLOOD PRESSURE: 81 MMHG

## 2025-02-27 DIAGNOSIS — K76.0 FATTY LIVER: ICD-10-CM

## 2025-02-27 DIAGNOSIS — E66.9 OBESITY (BMI 30-39.9): ICD-10-CM

## 2025-02-27 DIAGNOSIS — E11.9 TYPE 2 DIABETES MELLITUS WITH HEMOGLOBIN A1C GOAL OF LESS THAN 7.0%: Primary | ICD-10-CM

## 2025-02-27 DIAGNOSIS — I10 HYPERTENSION GOAL BP (BLOOD PRESSURE) < 130/80: ICD-10-CM

## 2025-02-27 DIAGNOSIS — E78.5 HYPERLIPIDEMIA LDL GOAL <70: ICD-10-CM

## 2025-02-27 DIAGNOSIS — K21.9 CHRONIC GERD: ICD-10-CM

## 2025-02-27 LAB — GLUCOSE SERPL-MCNC: 202 MG/DL (ref 70–110)

## 2025-02-27 PROCEDURE — 3288F FALL RISK ASSESSMENT DOCD: CPT | Mod: HCNC,CPTII,S$GLB, | Performed by: PHYSICIAN ASSISTANT

## 2025-02-27 PROCEDURE — 99999 PR PBB SHADOW E&M-EST. PATIENT-LVL IV: CPT | Mod: PBBFAC,HCNC,, | Performed by: PHYSICIAN ASSISTANT

## 2025-02-27 PROCEDURE — 3008F BODY MASS INDEX DOCD: CPT | Mod: HCNC,CPTII,S$GLB, | Performed by: PHYSICIAN ASSISTANT

## 2025-02-27 PROCEDURE — 99214 OFFICE O/P EST MOD 30 MIN: CPT | Mod: HCNC,S$GLB,, | Performed by: PHYSICIAN ASSISTANT

## 2025-02-27 PROCEDURE — 1126F AMNT PAIN NOTED NONE PRSNT: CPT | Mod: HCNC,CPTII,S$GLB, | Performed by: PHYSICIAN ASSISTANT

## 2025-02-27 PROCEDURE — 1159F MED LIST DOCD IN RCRD: CPT | Mod: HCNC,CPTII,S$GLB, | Performed by: PHYSICIAN ASSISTANT

## 2025-02-27 PROCEDURE — 1101F PT FALLS ASSESS-DOCD LE1/YR: CPT | Mod: HCNC,CPTII,S$GLB, | Performed by: PHYSICIAN ASSISTANT

## 2025-02-27 PROCEDURE — 3072F LOW RISK FOR RETINOPATHY: CPT | Mod: HCNC,CPTII,S$GLB, | Performed by: PHYSICIAN ASSISTANT

## 2025-02-27 PROCEDURE — 1157F ADVNC CARE PLAN IN RCRD: CPT | Mod: HCNC,CPTII,S$GLB, | Performed by: PHYSICIAN ASSISTANT

## 2025-02-27 PROCEDURE — 3079F DIAST BP 80-89 MM HG: CPT | Mod: HCNC,CPTII,S$GLB, | Performed by: PHYSICIAN ASSISTANT

## 2025-02-27 PROCEDURE — G2211 COMPLEX E/M VISIT ADD ON: HCPCS | Mod: HCNC,S$GLB,, | Performed by: PHYSICIAN ASSISTANT

## 2025-02-27 PROCEDURE — 82962 GLUCOSE BLOOD TEST: CPT | Mod: HCNC,S$GLB,, | Performed by: PHYSICIAN ASSISTANT

## 2025-02-27 PROCEDURE — 3077F SYST BP >= 140 MM HG: CPT | Mod: HCNC,CPTII,S$GLB, | Performed by: PHYSICIAN ASSISTANT

## 2025-02-27 RX ORDER — OMEPRAZOLE 40 MG/1
40 CAPSULE, DELAYED RELEASE ORAL DAILY
Qty: 90 CAPSULE | Refills: 0 | Status: SHIPPED | OUTPATIENT
Start: 2025-02-27

## 2025-02-27 NOTE — PROGRESS NOTES
PCP: Sakina Cooper DO    Subjective:     Chief Complaint: Diabetes - Established Patient    HISTORY OF PRESENT ILLNESS: 69 y.o.  female presenting for diabetes management visit.   The patient's last visit with me was on 12/4/2024.   Patient has had Type II diabetes since 2005.  Pertinent to decision making is the following comorbidities: HTN, HLD, Obesity by BMI and Fatty Liver  Patient has the following Diabetes complications: without complications  She  has attended diabetes education in the past.     Patient's most recent A1c of 6.8% was completed 3 months ago.   Patient states since Her last A1c Her blood glucose levels have been within range in the morning / fasting.   Patient monitors blood glucose 4 times per day with Contour Next : Fasting, After Dinner, and Symptomatic. Dexcom G7 pending at [facility]. Previously was unsure if wanted to use.   Patient blood glucose monitoring device will not be uploaded into Media Section today secondary to unable to upload successfully.   Per meter recall, fasting blood sugar ranging 60 - 188, before lunch 82 - 235, before dinner 79 - 240, and before bed 271 - 319.   Patient endorses the following diabetes related symptoms: None.    Patient is due today for the following diabetes-related health maintenance standards: A1c and COVID-19 Vaccine .   She denies recent hospital admissions or emergency room visits.   She voices having severe hypoglycemia and hypoglycemia unawareness.   Patient's concerns today include glycemic control. PPA pending - Novolog, Tresiba,.  Patient medication regimen is as below.     CURRENT DM MEDICATIONS:   Tresiba 72 units daily   Novolog 20 units before breakfast , 12 units before lunch if you eat, and 20 units before dinner      Novolog Correction Dosing every 3 hours as needed OUTSIDE OF EATING  If  - 250, may take 4 units of Novolog  If  - 300, may take 8 units of Novolog  If  - 350, may take 12 units of Novolog  If   - 400, may take 16 units of Novolog  If +, may take 20 units of Novolog         Patient has failed the following Diabetes medications:   Metformin - allergy   Onglyza  Levemir   Victoza / Trulicity - GLP-1 escalation  Mounjaro - cost; no pharm asst available   Jardiance 10 + 25 mg - GI side effects;   Ozempic - constipation        Labs Reviewed.       Lab Results   Component Value Date    CPEPTIDE 1.73 08/30/2022     Lab Results   Component Value Date    GLUTAMICACID 0.00 12/14/2017          //  Weight: 81.2 kg (179 lb 0.2 oz), Body mass index is 32.74 kg/m².  Her blood sugar in clinic today is:          Review of Systems   Constitutional:  Negative for activity change, appetite change, chills and fever.   HENT:  Negative for dental problem, mouth sores, nosebleeds, sore throat and trouble swallowing.    Eyes:  Negative for pain and discharge.   Respiratory:  Negative for shortness of breath, wheezing and stridor.    Cardiovascular:  Negative for chest pain, palpitations and leg swelling.   Gastrointestinal:  Negative for abdominal pain, diarrhea, nausea and vomiting.   Endocrine: Negative for polydipsia, polyphagia and polyuria.   Genitourinary:  Negative for dysuria, frequency and urgency.   Musculoskeletal:  Negative for joint swelling and myalgias.   Skin:  Negative for rash and wound.   Neurological:  Negative for dizziness, syncope, weakness and headaches.   Psychiatric/Behavioral:  Negative for behavioral problems and dysphoric mood.          Diabetes Management Status  Statin: Taking  ACE/ARB: Taking    Screening or Prevention Patient's value Goal Complete/Controlled?   HgA1C Testing and Control   Lab Results   Component Value Date    HGBA1C 6.8 (H) 11/01/2024      Annually/Less than 8% Yes   Lipid profile : 11/01/2024 Annually Yes   LDL control Lab Results   Component Value Date    LDLCALC 118.4 11/01/2024    Annually/Less than 100 mg/dl  Yes   Nephropathy screening Lab Results   Component Value Date     MICALBCREAT 8.6 12/04/2024     Lab Results   Component Value Date    PROTEINUA Negative 09/08/2014    Annually Yes   Blood pressure BP Readings from Last 1 Encounters:   02/27/25 (!) 153/84    Less than 140/90 Yes   Dilated retinal exam : 12/02/2024 Annually Yes    Foot exam   : 12/04/2024 Annually No     Social History     Socioeconomic History    Marital status:     Number of children: 3   Occupational History    Occupation: Stay at Home    Occupation:    Tobacco Use    Smoking status: Never    Smokeless tobacco: Never   Substance and Sexual Activity    Alcohol use: Yes     Comment: occasional wine     Drug use: No    Sexual activity: Yes     Partners: Male     Birth control/protection: Surgical   Social History Narrative    . Housewife.     Social Drivers of Health     Financial Resource Strain: Low Risk  (1/6/2025)    Overall Financial Resource Strain (CARDIA)     Difficulty of Paying Living Expenses: Not hard at all   Food Insecurity: No Food Insecurity (1/6/2025)    Hunger Vital Sign     Worried About Running Out of Food in the Last Year: Never true     Ran Out of Food in the Last Year: Never true   Transportation Needs: No Transportation Needs (1/6/2025)    PRAPARE - Transportation     Lack of Transportation (Medical): No     Lack of Transportation (Non-Medical): No   Physical Activity: Insufficiently Active (1/6/2025)    Exercise Vital Sign     Days of Exercise per Week: 2 days     Minutes of Exercise per Session: 30 min   Stress: No Stress Concern Present (1/6/2025)    [employer] [place] [employer] - Occupational Stress Questionnaire     Feeling of Stress : Only a little   Housing Stability: Low Risk  (3/4/2024)    Housing Stability Vital Sign     Unable to Pay for Housing in the Last Year: No     Number of Places Lived in the Last Year: 1     Unstable Housing in the Last Year: No     Past Medical History:   Diagnosis Date    Arthritis     Cataract     Colon  polyp     Diabetes mellitus type II 15 years     am 05/30/2022    Hyperlipidemia     Hypertension     Renal cell carcinoma 09/2018    Total knee replacement status, left 01/20/2019    Dr.Robert Cook    UTI (urinary tract infection)        Objective:        Physical Exam  Constitutional:       General: She is not in acute distress.     Appearance: She is well-developed. She is not diaphoretic.   HENT:      Head: Normocephalic and atraumatic.      Right Ear: External ear normal.      Left Ear: External ear normal.      Nose: Nose normal.   Eyes:      General:         Right eye: No discharge.         Left eye: No discharge.      Pupils: Pupils are equal, round, and reactive to light.   Cardiovascular:      Rate and Rhythm: Normal rate and regular rhythm.      Heart sounds: Normal heart sounds.   Pulmonary:      Effort: Pulmonary effort is normal.      Breath sounds: Normal breath sounds.   Abdominal:      Palpations: Abdomen is soft.   Musculoskeletal:         General: Normal range of motion.      Cervical back: Normal range of motion and neck supple.   Skin:     General: Skin is warm and dry.      Capillary Refill: Capillary refill takes less than 2 seconds.   Neurological:      Mental Status: She is alert and oriented to person, place, and time.      Motor: No abnormal muscle tone.      Coordination: Coordination normal.   Psychiatric:         Behavior: Behavior normal.         Thought Content: Thought content normal.         Judgment: Judgment normal.           Assessment / Plan:     Type 2 diabetes mellitus with hemoglobin A1c goal of less than 7.0%  -     POCT Glucose, Hand-Held Device  -     Hemoglobin A1C; Future; Expected date: 02/27/2025  -     Hemoglobin A1C; Future; Expected date: 02/27/2025    Fatty liver    Hyperlipidemia LDL goal <70    Hypertension goal BP (blood pressure) < 130/80    Obesity (BMI 30-39.9)          Additional Plan Details:    - POCT Glucose  - Encouraged continuation of lifestyle  changes including regular exercise and limiting carbohydrates to 30-45 grams per meal threes times daily and 15 grams per snack with a limit of two daily.   - Encouraged continued monitoring of blood glucose with maintenance of 4 times daily at Fasting, After Dinner, and Symptomatic. Dex G7 pending. Given callback # for [facility].   - Current DM Medication Regimen: Change Tresiba 68 units daily. Change  Novolog 24 units TID before meals and correction dosing every 3 hours before meals.   - Pharm Asst: Tresiba and Novolog   - Health Maintenance standards addressed today: A1c to be scheduled and COVID - 19 Vaccine - patient will schedule outside of [facility]   - Nursing Visit: Patient is age 79 or younger with an A1c of 7.5 or greater and will not need nursing visit at this time .   - Follow up in  8  weeks.    CURRENT DM MEDICATIONS:   Tresiba 68 units daily   Novolog 24 units before breakfast , 12 units before lunch if you eat, and 24 units before dinner      Novolog Correction Dosing every 3 hours as needed OUTSIDE OF EATING  If  - 250, may take 4 units of Novolog  If  - 300, may take 8 units of Novolog  If  - 350, may take 12 units of Novolog  If  - 400, may take 16 units of Novolog  If +, may take 20 units of Novolog      Blakeney McKnight, PA-C [facility]    A total of 30 minutes was spent in face to face time, of which over 50 % was spent in counseling patient on disease process, complications, treatment, and side effects of medications.

## 2025-02-27 NOTE — TELEPHONE ENCOUNTER
Refill Decision Note   Dary Chaney  is requesting a refill authorization.  Brief Assessment and Rationale for Refill:  Approve     Medication Therapy Plan:  FOV 4/30/25    Medication Reconciliation Completed: No   Comments:     No Care Gaps recommended.     Note composed:12:21 PM 02/27/2025

## 2025-02-27 NOTE — PATIENT INSTRUCTIONS
CURRENT DM MEDICATIONS:   Tresiba 68 units daily   Novolog 24 units before breakfast , 12 units before lunch if you eat, and 24 units before dinner      Novolog Correction Dosing every 3 hours as needed OUTSIDE OF EATING  If  - 250, may take 4 units of Novolog  If  - 300, may take 8 units of Novolog  If  - 350, may take 12 units of Novolog  If  - 400, may take 16 units of Novolog  If +, may take 20 units of Novolog    [facility] - Dexcom supplier  [phone]

## 2025-02-27 NOTE — TELEPHONE ENCOUNTER
No care due was identified.  Health [facility] Embedded Care Due Messages. Reference number: [id number].   2/27/2025 9:55:09 AM CST

## 2025-02-28 DIAGNOSIS — K21.9 CHRONIC GERD: ICD-10-CM

## 2025-02-28 RX ORDER — OMEPRAZOLE 40 MG/1
40 CAPSULE, DELAYED RELEASE ORAL DAILY
Qty: 90 CAPSULE | Refills: 3 | OUTPATIENT
Start: 2025-02-28

## 2025-02-28 NOTE — TELEPHONE ENCOUNTER
Refill Decision Note   Dary Chaney  is requesting a refill authorization.  Brief Assessment and Rationale for Refill:  Quick Discontinue     Medication Therapy Plan:  Receipt confirmed by pharmacy (2/27/2025 12:21 PM CST)      Comments:     Note composed:11:57 AM 02/28/2025

## 2025-02-28 NOTE — TELEPHONE ENCOUNTER
No care due was identified.  Health [facility] Embedded Care Due Messages. Reference number: [id number].   2/28/2025 9:11:31 AM CST   Patient discharged in stable condition, IV removed, prescriptions, lab results, CT report, education and follow up instructions provided. Patients wife requesting this RN remain in the room while she reads the copy of the lab reports. This RN educated that at this time I can not remain with her at this time, and the doctor went over all the labs prior to me entering the room. Pts wife \"Don't rush me, don't you get time to be here\" This RN explained that I have gone over all of the discharge paper work but copies of labs can be brought to the PCP for further interpretation.

## 2025-03-03 ENCOUNTER — TELEPHONE (OUTPATIENT)
Dept: OPHTHALMOLOGY | Facility: CLINIC | Age: 70
End: 2025-03-03
Payer: MEDICARE

## 2025-03-03 NOTE — TELEPHONE ENCOUNTER
Returned call to patient explained that Dr Kapadia is fully booked but she is on the cancellation list and will contacted if something is becomes available sooner.      ----- Message from Charlee sent at 3/3/2025 11:32 AM CST -----  Contact: [phone]  Type:  Sooner Apoointment RequestCaller is requesting a sooner appointment.  Caller declined first available appointment listed below.  Caller will not accept being placed on the waitlist and is requesting a message be sent to doctor.Name of Caller:KYLE DEWEY [[id number]]When is the first available appointment?need something sooner than JUNESymptoms: REV HVF 6m COAGWould the patient rather a call back or a response [facility]? Call [facility] Call Back Number: [phone] Information: [id number]

## 2025-03-10 ENCOUNTER — TELEPHONE (OUTPATIENT)
Dept: DIABETES | Facility: CLINIC | Age: 70
End: 2025-03-10
Payer: MEDICARE

## 2025-03-10 NOTE — TELEPHONE ENCOUNTER
----- Message from Alecia Goodman PA-C sent at 3/9/2025 11:06 AM CDT -----  Please make sure G7 order sent to [facility]

## 2025-04-14 ENCOUNTER — OFFICE VISIT (OUTPATIENT)
Dept: INTERNAL MEDICINE | Facility: CLINIC | Age: 70
End: 2025-04-14
Payer: MEDICARE

## 2025-04-14 VITALS
RESPIRATION RATE: 18 BRPM | HEART RATE: 91 BPM | DIASTOLIC BLOOD PRESSURE: 90 MMHG | WEIGHT: 181.44 LBS | TEMPERATURE: 99 F | OXYGEN SATURATION: 98 % | BODY MASS INDEX: 33.19 KG/M2 | SYSTOLIC BLOOD PRESSURE: 142 MMHG

## 2025-04-14 DIAGNOSIS — I10 HYPERTENSION GOAL BP (BLOOD PRESSURE) < 130/80: Chronic | ICD-10-CM

## 2025-04-14 DIAGNOSIS — J84.10 PULMONARY FIBROSIS, UNSPECIFIED: ICD-10-CM

## 2025-04-14 DIAGNOSIS — E66.9 TYPE 2 DIABETES MELLITUS WITH OBESITY: ICD-10-CM

## 2025-04-14 DIAGNOSIS — E11.69 TYPE 2 DIABETES MELLITUS WITH OBESITY: ICD-10-CM

## 2025-04-14 DIAGNOSIS — J30.2 SEASONAL ALLERGIC RHINITIS, UNSPECIFIED TRIGGER: Primary | ICD-10-CM

## 2025-04-14 PROCEDURE — 3008F BODY MASS INDEX DOCD: CPT | Mod: HCNC,CPTII,S$GLB, | Performed by: NURSE PRACTITIONER

## 2025-04-14 PROCEDURE — 4010F ACE/ARB THERAPY RXD/TAKEN: CPT | Mod: HCNC,CPTII,S$GLB, | Performed by: NURSE PRACTITIONER

## 2025-04-14 PROCEDURE — 3080F DIAST BP >= 90 MM HG: CPT | Mod: HCNC,CPTII,S$GLB, | Performed by: NURSE PRACTITIONER

## 2025-04-14 PROCEDURE — 1125F AMNT PAIN NOTED PAIN PRSNT: CPT | Mod: HCNC,CPTII,S$GLB, | Performed by: NURSE PRACTITIONER

## 2025-04-14 PROCEDURE — 1157F ADVNC CARE PLAN IN RCRD: CPT | Mod: HCNC,CPTII,S$GLB, | Performed by: NURSE PRACTITIONER

## 2025-04-14 PROCEDURE — G2211 COMPLEX E/M VISIT ADD ON: HCPCS | Mod: HCNC,S$GLB,, | Performed by: NURSE PRACTITIONER

## 2025-04-14 PROCEDURE — 99999 PR PBB SHADOW E&M-EST. PATIENT-LVL IV: CPT | Mod: PBBFAC,HCNC,, | Performed by: NURSE PRACTITIONER

## 2025-04-14 PROCEDURE — 1159F MED LIST DOCD IN RCRD: CPT | Mod: HCNC,CPTII,S$GLB, | Performed by: NURSE PRACTITIONER

## 2025-04-14 PROCEDURE — 99214 OFFICE O/P EST MOD 30 MIN: CPT | Mod: HCNC,S$GLB,, | Performed by: NURSE PRACTITIONER

## 2025-04-14 PROCEDURE — 3072F LOW RISK FOR RETINOPATHY: CPT | Mod: HCNC,CPTII,S$GLB, | Performed by: NURSE PRACTITIONER

## 2025-04-14 PROCEDURE — 3077F SYST BP >= 140 MM HG: CPT | Mod: HCNC,CPTII,S$GLB, | Performed by: NURSE PRACTITIONER

## 2025-04-14 PROCEDURE — 3288F FALL RISK ASSESSMENT DOCD: CPT | Mod: HCNC,CPTII,S$GLB, | Performed by: NURSE PRACTITIONER

## 2025-04-14 PROCEDURE — 1101F PT FALLS ASSESS-DOCD LE1/YR: CPT | Mod: HCNC,CPTII,S$GLB, | Performed by: NURSE PRACTITIONER

## 2025-04-14 RX ORDER — AMOXICILLIN AND CLAVULANATE POTASSIUM 875; 125 MG/1; MG/1
1 TABLET, FILM COATED ORAL EVERY 12 HOURS
Qty: 14 TABLET | Refills: 0 | Status: SHIPPED | OUTPATIENT
Start: 2025-04-14

## 2025-04-14 RX ORDER — PROMETHAZINE HYDROCHLORIDE AND DEXTROMETHORPHAN HYDROBROMIDE 6.25; 15 MG/5ML; MG/5ML
5 SYRUP ORAL EVERY 8 HOURS PRN
Qty: 118 ML | Refills: 0 | Status: SHIPPED | OUTPATIENT
Start: 2025-04-14 | End: 2025-04-24

## 2025-04-14 RX ORDER — METHYLPREDNISOLONE 4 MG/1
TABLET ORAL
Qty: 1 EACH | Refills: 0 | Status: SHIPPED | OUTPATIENT
Start: 2025-04-14 | End: 2025-05-05

## 2025-04-14 NOTE — PROGRESS NOTES
Subjective:       Patient ID: Dary Chaney is a 69 y.o. female.    Chief Complaint: Nasal Congestion (Pt presents to clinic with nasal congestion since last Thursday.)    HPI        Past Medical History:   Diagnosis Date    Arthritis     Cataract     Colon polyp     Diabetes mellitus type II 15 years     am 05/30/2022    Hyperlipidemia     Hypertension     Renal cell carcinoma 09/2018    Total knee replacement status, left 01/20/2019    Dr.Robert Cook    UTI (urinary tract infection)      Past Surgical History:   Procedure Laterality Date    CHOLECYSTECTOMY      CHOLECYSTECTOMY      COLONOSCOPY  2012    COLONOSCOPY N/A 03/01/2018    Procedure: COLONOSCOPY;  Surgeon: Phillip Brower MD;  Location: [facility] ENDO;  Service: Endoscopy;  Laterality: N/A;    ESOPHAGOGASTRODUODENOSCOPY N/A 08/21/2018    Procedure: ESOPHAGOGASTRODUODENOSCOPY (EGD);  Surgeon: Maco Wynn MD;  Location: [facility] ENDO;  Service: Endoscopy;  Laterality: N/A;    HYSTERECTOMY  1993    uterine prolapse    KNEE SURGERY  03/2017    left knee replacement  01/20/2019    Dr.Robert Cook    ROBOT-ASSISTED LAPAROSCOPIC PARTIAL NEPHRECTOMY USING DA DENZEL XI Right 09/13/2018    Procedure: XI ROBOTIC NEPHRECTOMY, PARTIAL;  Surgeon: Tommy Bradshaw MD;  Location: [address];  Service: Urology;  Laterality: Right;  [place] u/s confirmed 9/13 0700 lb  conformation#[id number]    SPINE SURGERY      thumb surgery Rt- July 2018 - cyst        Social History[1]  Review of patient's allergies indicates:   Allergen Reactions    Citrus and derivatives Swelling, Other (See Comments) and Edema     Redness  Lip swelling   Itching     Other reaction(s): Edema, Other (See Comments)  Redness  Lip swelling   Itching     Latex Other (See Comments)     Other reaction(s): Rash  Other reaction(s): welts  Other reaction(s): Itching  Other reaction(s): Other (See Comments)  Other reaction(s): Rash  Other reaction(s): whelps  Other reaction(s): Itching  Other  reaction(s): Rash  Other reaction(s): welts  Other reaction(s): Itching    Valsartan Other (See Comments)     Other reaction(s): disoriented  Other reaction(s): Other (See Comments)  Other reaction(s): disoriented  Other reaction(s): disoriented    Aspirin Nausea Only     Other reaction(s): Nausea Only    Jardiance [empagliflozin] Nausea Only    Metformin Anxiety     Other reaction(s): anxiety  Other reaction(s): anxiety  Other reaction(s): anxiety     Current Outpatient Medications   Medication Sig    albuterol (PROVENTIL) 2.5 mg /3 mL (0.083 %) nebulizer solution USE 1 VIAL IN NEBULIZER EVERY 6 HOURS AS NEEDED FOR WHEEZING    ASHWAGANDHA ROOT EXTRACT ORAL Take by mouth 3 (three) times daily as needed. Prn Anxiety    azelastine (ASTELIN) 137 mcg (0.1 %) nasal spray 1 spray (137 mcg total) by Nasal route 2 (two) times daily.    blood sugar diagnostic Strp 1 each by Misc.(Non-Drug; Combo Route) route 4 (four) times daily.    blood-glucose meter kit Test finger stick blood sugar once daily.    blood-glucose sensor (DEXCOM G7 SENSOR) [place] 1 each by Misc.(Non-Drug; Combo Route) route every 10 days.    fluticasone (FLONASE) 50 mcg/actuation nasal spray USE TWO SPRAY(S) IN EACH NOSTRIL ONCE DAILY    insulin aspart U-100 (NOVOLOG) 100 unit/mL (3 mL) InPn pen Titrate up to 100 units daily as instucted.    insulin degludec (TRESIBA FLEXTOUCH U-200) 200 unit/mL (3 mL) insulin pen Inject 80 Units into the skin once daily.    LACTOBACILLUS RHAMNOSUS GG ORAL Take 1 capsule by mouth once daily.    lancets Misc 1 each by Misc.(Non-Drug; Combo Route) route 4 (four) times daily.    lancets Misc 1 each by Misc.(Non-Drug; Combo Route) route 4 (four) times daily.    loratadine (CLARITIN) 10 mg tablet Take 1 tablet (10 mg total) by mouth once daily.    MAGNESIUM ORAL Take 500 mg by mouth once daily.    olmesartan (BENICAR) 20 MG tablet Take 1 tablet (20 mg total) by mouth once daily.    omeprazole (PRILOSEC) 40 MG capsule Take 1 capsule  "(40 mg total) by mouth once daily.    pen needle, diabetic (PEN NEEDLE) 31 gauge x 5/16" Ndle Inject 1 each into the skin once daily.    simvastatin (ZOCOR) 40 MG tablet Take 1 tablet (40 mg total) by mouth every evening.    amoxicillin-clavulanate 875-125mg (AUGMENTIN) 875-125 mg per tablet Take 1 tablet by mouth every 12 (twelve) hours.    methylPREDNISolone (MEDROL DOSEPACK) 4 mg tablet use as directed    promethazine-dextromethorphan (PROMETHAZINE-DM) 6.25-15 mg/5 mL Syrp Take 5 mLs by mouth every 8 (eight) hours as needed (cough).     No current facility-administered medications for this visit.           Review of Systems   Constitutional:  Negative for activity change, appetite change, chills, diaphoresis, fatigue, fever and unexpected weight change.   HENT:  Positive for congestion, postnasal drip and rhinorrhea. Negative for ear pain, sinus pressure, sinus pain, sneezing, sore throat, tinnitus, trouble swallowing and voice change.    Eyes:  Negative for photophobia, pain and visual disturbance.   Respiratory:  Positive for cough. Negative for chest tightness, shortness of breath and wheezing.    Cardiovascular:  Negative for chest pain, palpitations and leg swelling.   Gastrointestinal:  Negative for abdominal distention, abdominal pain, constipation, diarrhea, nausea and vomiting.   Genitourinary:  Negative for decreased urine volume, difficulty urinating, dysuria, flank pain, frequency, hematuria and urgency.   Musculoskeletal:  Negative for arthralgias, back pain, joint swelling, neck pain and neck stiffness.   Allergic/Immunologic: Negative for immunocompromised state.   Neurological:  Negative for dizziness, tremors, seizures, syncope, facial asymmetry, speech difficulty, weakness, light-headedness, numbness and headaches.   Hematological:  Negative for adenopathy. Does not bruise/bleed easily.   Psychiatric/Behavioral:  Negative for confusion and sleep disturbance.        Objective:      Physical " Exam  Constitutional:       General: She is not in acute distress.  HENT:      Right Ear: Tympanic membrane is erythematous.      Left Ear: Tympanic membrane is erythematous.      Nose: Mucosal edema, congestion and rhinorrhea present.      Mouth/Throat:      Mouth: No oral lesions.      Pharynx: Posterior oropharyngeal erythema present. No oropharyngeal exudate or uvula swelling.   Eyes:      Conjunctiva/sclera: Conjunctivae normal.      Pupils: Pupils are equal, round, and reactive to light.   Cardiovascular:      Rate and Rhythm: Normal rate and regular rhythm.      Heart sounds: Normal heart sounds. No murmur heard.     No friction rub. No gallop.   Pulmonary:      Effort: Pulmonary effort is normal. No respiratory distress.      Breath sounds: Normal breath sounds. No wheezing or rales.   Skin:     General: Skin is warm and dry.   Neurological:      Mental Status: She is alert and oriented to person, place, and time.         Assessment:     Vitals:    04/14/25 1136   BP: (!) 142/90   Pulse: 91   Resp: 18   Temp: 99 °F (37.2 °C)         1. Seasonal allergic rhinitis, unspecified trigger    2. Pulmonary fibrosis, unspecified    3. Type 2 diabetes mellitus with obesity    4. Hypertension goal BP (blood pressure) < 130/80        Plan:   Seasonal allergic rhinitis, unspecified trigger    Pulmonary fibrosis, unspecified    Type 2 diabetes mellitus with obesity    Hypertension goal BP (blood pressure) < 130/80    Other orders  -     amoxicillin-clavulanate 875-125mg (AUGMENTIN) 875-125 mg per tablet; Take 1 tablet by mouth every 12 (twelve) hours.  Dispense: 14 tablet; Refill: 0  -     methylPREDNISolone (MEDROL DOSEPACK) 4 mg tablet; use as directed  Dispense: 1 each; Refill: 0  -     promethazine-dextromethorphan (PROMETHAZINE-DM) 6.25-15 mg/5 mL Syrp; Take 5 mLs by mouth every 8 (eight) hours as needed (cough).  Dispense: 118 mL; Refill: 0    Monitor blood sugar blood pressure   Follow-up PCP specialists as  needed  Chronic conditions managed in addition to acute issues today         [1]   Social History  Socioeconomic History    Marital status:     Number of children: 3   Occupational History    Occupation: Stay at Home    Occupation:    Tobacco Use    Smoking status: Never    Smokeless tobacco: Never   Substance and Sexual Activity    Alcohol use: Yes     Comment: occasional wine     Drug use: No    Sexual activity: Yes     Partners: Male     Birth control/protection: Surgical   Social History Narrative    . Housewife.     Social Drivers of Health     Financial Resource Strain: Low Risk  (1/6/2025)    Overall Financial Resource Strain (CARDIA)     Difficulty of Paying Living Expenses: Not hard at all   Food Insecurity: No Food Insecurity (1/6/2025)    Hunger Vital Sign     Worried About Running Out of Food in the Last Year: Never true     Ran Out of Food in the Last Year: Never true   Transportation Needs: No Transportation Needs (1/6/2025)    PRAPARE - Transportation     Lack of Transportation (Medical): No     Lack of Transportation (Non-Medical): No   Physical Activity: Insufficiently Active (1/6/2025)    Exercise Vital Sign     Days of Exercise per Week: 2 days     Minutes of Exercise per Session: 30 min   Stress: No Stress Concern Present (1/6/2025)    [employer] [place] [employer] - Occupational Stress Questionnaire     Feeling of Stress : Only a little   Housing Stability: Low Risk  (3/4/2024)    Housing Stability Vital Sign     Unable to Pay for Housing in the Last Year: No     Number of Places Lived in the Last Year: 1     Unstable Housing in the Last Year: No

## 2025-04-23 ENCOUNTER — OFFICE VISIT (OUTPATIENT)
Dept: NEUROLOGY | Facility: CLINIC | Age: 70
End: 2025-04-23
Payer: MEDICARE

## 2025-04-23 VITALS
RESPIRATION RATE: 16 BRPM | HEART RATE: 88 BPM | BODY MASS INDEX: 32.46 KG/M2 | HEIGHT: 62 IN | WEIGHT: 176.38 LBS | DIASTOLIC BLOOD PRESSURE: 81 MMHG | SYSTOLIC BLOOD PRESSURE: 136 MMHG

## 2025-04-23 DIAGNOSIS — J34.2 NASAL SEPTAL DEVIATION: ICD-10-CM

## 2025-04-23 DIAGNOSIS — E78.5 HYPERLIPIDEMIA ASSOCIATED WITH TYPE 2 DIABETES MELLITUS: ICD-10-CM

## 2025-04-23 DIAGNOSIS — I83.93 VARICOSE VEINS OF BOTH LOWER EXTREMITIES, UNSPECIFIED WHETHER COMPLICATED: ICD-10-CM

## 2025-04-23 DIAGNOSIS — M54.81 BILATERAL OCCIPITAL NEURALGIA: Primary | ICD-10-CM

## 2025-04-23 DIAGNOSIS — K76.0 FATTY LIVER: ICD-10-CM

## 2025-04-23 DIAGNOSIS — E11.36 DM CATARACT: ICD-10-CM

## 2025-04-23 DIAGNOSIS — K22.2 ESOPHAGEAL RING, ACQUIRED: ICD-10-CM

## 2025-04-23 DIAGNOSIS — J98.4 CALCIFIED GRANULOMA OF LUNG: ICD-10-CM

## 2025-04-23 DIAGNOSIS — I10 HYPERTENSION GOAL BP (BLOOD PRESSURE) < 130/80: Chronic | ICD-10-CM

## 2025-04-23 DIAGNOSIS — R51.9 CHRONIC INTRACTABLE HEADACHE, UNSPECIFIED HEADACHE TYPE: ICD-10-CM

## 2025-04-23 DIAGNOSIS — E78.1 TYPE 2 DIABETES MELLITUS WITH HYPERTRIGLYCERIDEMIA: ICD-10-CM

## 2025-04-23 DIAGNOSIS — E11.69 TYPE 2 DIABETES MELLITUS WITH OBESITY: ICD-10-CM

## 2025-04-23 DIAGNOSIS — E78.5 HYPERLIPIDEMIA LDL GOAL <70: Chronic | ICD-10-CM

## 2025-04-23 DIAGNOSIS — Z86.0100 HISTORY OF COLON POLYPS: ICD-10-CM

## 2025-04-23 DIAGNOSIS — E11.69 TYPE 2 DIABETES MELLITUS WITH HYPERTRIGLYCERIDEMIA: ICD-10-CM

## 2025-04-23 DIAGNOSIS — E11.9 TYPE 2 DIABETES MELLITUS WITHOUT RETINOPATHY: ICD-10-CM

## 2025-04-23 DIAGNOSIS — I70.0 ATHEROSCLEROSIS OF AORTA: ICD-10-CM

## 2025-04-23 DIAGNOSIS — Z53.1 PROCEDURE AND TREATMENT NOT CARRIED OUT BECAUSE OF PATIENT'S DECISION FOR REASONS OF BELIEF AND GROUP PRESSURE: ICD-10-CM

## 2025-04-23 DIAGNOSIS — J84.10 PULMONARY FIBROSIS, UNSPECIFIED: ICD-10-CM

## 2025-04-23 DIAGNOSIS — F43.21 GRIEF AT LOSS OF CHILD: ICD-10-CM

## 2025-04-23 DIAGNOSIS — R29.898 DECREASED ROM OF NECK: ICD-10-CM

## 2025-04-23 DIAGNOSIS — E66.9 TYPE 2 DIABETES MELLITUS WITH OBESITY: ICD-10-CM

## 2025-04-23 DIAGNOSIS — K44.9 HIATAL HERNIA: ICD-10-CM

## 2025-04-23 DIAGNOSIS — M51.06 INTERVERTEBRAL LUMBAR DISC DISORDER WITH MYELOPATHY, LUMBAR REGION: ICD-10-CM

## 2025-04-23 DIAGNOSIS — M17.12 PRIMARY OSTEOARTHRITIS OF LEFT KNEE: ICD-10-CM

## 2025-04-23 DIAGNOSIS — E11.69 HYPERLIPIDEMIA ASSOCIATED WITH TYPE 2 DIABETES MELLITUS: ICD-10-CM

## 2025-04-23 DIAGNOSIS — C64.9 RENAL CELL CARCINOMA, UNSPECIFIED LATERALITY: ICD-10-CM

## 2025-04-23 DIAGNOSIS — K22.10 ULCER OF ESOPHAGUS WITHOUT BLEEDING: ICD-10-CM

## 2025-04-23 DIAGNOSIS — Z63.4 GRIEF AT LOSS OF CHILD: ICD-10-CM

## 2025-04-23 DIAGNOSIS — J30.2 SEASONAL ALLERGIC RHINITIS, UNSPECIFIED TRIGGER: ICD-10-CM

## 2025-04-23 DIAGNOSIS — Z98.890 HISTORY OF BACK SURGERY: ICD-10-CM

## 2025-04-23 DIAGNOSIS — E11.9 TYPE 2 DIABETES MELLITUS WITH HEMOGLOBIN A1C GOAL OF LESS THAN 7.0%: ICD-10-CM

## 2025-04-23 DIAGNOSIS — G89.29 CHRONIC INTRACTABLE HEADACHE, UNSPECIFIED HEADACHE TYPE: ICD-10-CM

## 2025-04-23 DIAGNOSIS — E65 CENTRAL OBESITY: ICD-10-CM

## 2025-04-23 PROBLEM — R68.89 DECREASED STRENGTH, ENDURANCE, AND MOBILITY: Status: RESOLVED | Noted: 2023-12-20 | Resolved: 2025-04-23

## 2025-04-23 PROBLEM — R94.31 ABNORMAL EKG: Status: RESOLVED | Noted: 2018-09-12 | Resolved: 2025-04-23

## 2025-04-23 PROBLEM — R53.1 DECREASED STRENGTH, ENDURANCE, AND MOBILITY: Status: RESOLVED | Noted: 2023-12-20 | Resolved: 2025-04-23

## 2025-04-23 PROBLEM — Z74.09 DECREASED STRENGTH, ENDURANCE, AND MOBILITY: Status: RESOLVED | Noted: 2023-12-20 | Resolved: 2025-04-23

## 2025-04-23 PROCEDURE — 1157F ADVNC CARE PLAN IN RCRD: CPT | Mod: HCNC,CPTII,S$GLB, | Performed by: PSYCHIATRY & NEUROLOGY

## 2025-04-23 PROCEDURE — 4010F ACE/ARB THERAPY RXD/TAKEN: CPT | Mod: HCNC,CPTII,S$GLB, | Performed by: PSYCHIATRY & NEUROLOGY

## 2025-04-23 PROCEDURE — 99999 PR PBB SHADOW E&M-EST. PATIENT-LVL V: CPT | Mod: PBBFAC,HCNC,, | Performed by: PSYCHIATRY & NEUROLOGY

## 2025-04-23 PROCEDURE — 99215 OFFICE O/P EST HI 40 MIN: CPT | Mod: HCNC,S$GLB,, | Performed by: PSYCHIATRY & NEUROLOGY

## 2025-04-23 PROCEDURE — 3079F DIAST BP 80-89 MM HG: CPT | Mod: HCNC,CPTII,S$GLB, | Performed by: PSYCHIATRY & NEUROLOGY

## 2025-04-23 PROCEDURE — 1125F AMNT PAIN NOTED PAIN PRSNT: CPT | Mod: HCNC,CPTII,S$GLB, | Performed by: PSYCHIATRY & NEUROLOGY

## 2025-04-23 PROCEDURE — 3075F SYST BP GE 130 - 139MM HG: CPT | Mod: HCNC,CPTII,S$GLB, | Performed by: PSYCHIATRY & NEUROLOGY

## 2025-04-23 PROCEDURE — 3072F LOW RISK FOR RETINOPATHY: CPT | Mod: HCNC,CPTII,S$GLB, | Performed by: PSYCHIATRY & NEUROLOGY

## 2025-04-23 PROCEDURE — 3288F FALL RISK ASSESSMENT DOCD: CPT | Mod: HCNC,CPTII,S$GLB, | Performed by: PSYCHIATRY & NEUROLOGY

## 2025-04-23 PROCEDURE — G2211 COMPLEX E/M VISIT ADD ON: HCPCS | Mod: HCNC,S$GLB,, | Performed by: PSYCHIATRY & NEUROLOGY

## 2025-04-23 PROCEDURE — 1159F MED LIST DOCD IN RCRD: CPT | Mod: HCNC,CPTII,S$GLB, | Performed by: PSYCHIATRY & NEUROLOGY

## 2025-04-23 PROCEDURE — 1101F PT FALLS ASSESS-DOCD LE1/YR: CPT | Mod: HCNC,CPTII,S$GLB, | Performed by: PSYCHIATRY & NEUROLOGY

## 2025-04-23 PROCEDURE — 3008F BODY MASS INDEX DOCD: CPT | Mod: HCNC,CPTII,S$GLB, | Performed by: PSYCHIATRY & NEUROLOGY

## 2025-04-23 RX ORDER — AMITRIPTYLINE HYDROCHLORIDE 25 MG/1
25 TABLET, FILM COATED ORAL NIGHTLY
Qty: 90 TABLET | Refills: 3 | Status: SHIPPED | OUTPATIENT
Start: 2025-04-23 | End: 2026-04-23

## 2025-04-23 SDOH — SOCIAL DETERMINANTS OF HEALTH (SDOH): DISSAPEARANCE AND DEATH OF FAMILY MEMBER: Z63.4

## 2025-04-23 NOTE — PATIENT INSTRUCTIONS
What is occipital neuralgia?    Occipital (back of the head) neuralgia (nerve pain) is a type of head and neck pain that originates from injury to the occipital nerves. Occipital nerves provide sensation to the back of the head.  Although medical literature describes occipital neuralgia as a rare condition, it is one of the most common conditions we encounter in our neurology practice at [facility].    Symptoms of occipital neuralgia    The pain is sharp, shooting, throbbing, burning and/or stabbing. It typically radiates from the back of the head upward to the scalp and occasionally to the temple and eyes. The condition may cause the involved area(s) to be tender to touch. The pain varies in severity, frequency and duration. The pain can affect one side of the head or both sides.    Causes of occipital neuralgia    The causes of the occipital nerve(s) injury include:  poor posture  trauma  inflammation  compression (pinching).  In most cases, the pain develops with no identifiable cause or preceding event.    Treatment for occipital neuralgia    The initial treatment of choice for occipital neuralgia, according to medical literature, is occipital nerve block, using anesthetic agent with or without steroids. However, in our clinic, we have achieved remarkable success with anti-neuropathic pain medications, specifically amitriptyline.  We rarely utilize occipital nerve block and other interventions.  Amitriptyline is an old antidepressant with nerve pain-controlling properties. We rarely use amitriptyline for depression nowadays and it is utilized for nerve pain, migraine, tension headaches, insomnia and irritable bowel syndrome. In our own experience, it is the most effective treatment for occipital neuralgia with response rates exceeding 90%. Conservative complementary treatments that help manage the pain include:  heating pads  massage therapy  physical therapy.  Our next treatment of choice is typically  botulinum toxin injections. It provided excellent pain relief in cases that do not respond to or unable tolerate amitriptylines side effects.  When none of the abovementioned therapeutic options help our patients, we refer them to a pain management specialist to consider interventional options. Various procedures can be utilized, including:  occipital nerve block  pulsed radiofrequency, which works by delivering bursts of radiation that interrupts the nerve signals and consequently reducing the pain  cryotherapy (freezing), which deactivates the nerve utilizing extremely low temperatures and occipital nerve stimulation utilizing neurostimulator in severe intractable cases.  Occipital nerve surgical decompression may benefit very few selected patients in extreme cases when all other treatments fail to control the pain and the pain is severe and disabling.    Prognosis    Occipital neuralgia relief varies among patients depending on the cause and the extent of nerve injury. It can be short-lived but can also turn into a problem that comes and goes or that is an ongoing problem.

## 2025-04-23 NOTE — PROGRESS NOTES
Subjective:       Patient ID: Dary Chaney is a 69 y.o. female.    Chief Complaint: Headache          HPI    The patient presented on 04- for evaluation of headaches.        The patient has has headaches since childhood but the headaches that emerged in 2023 have been different. The pain starts in the neck and radiates upwards to the scalp and occasionally to the temple and eyes. The pain sometimes wakes the patient up from sleep with 07-10/10 severity, almost on monthly basis. The pain is sharp, shooting, throbbing, burning, and stabbing.  The scalp is rendered extremely tender to touch.  The pain varies in duration from hours to days. No associated light-noise sensitivity. No associated nausea, vomiting. No recent trauma. No recent falls. No recent infection, inflammation, or fever/chills. Tried Amitriptyline on PRN basis.           Review of Systems   Constitutional:  Negative for appetite change and fatigue.   HENT:  Negative for hearing loss and tinnitus.    Eyes:  Negative for photophobia and visual disturbance.   Respiratory:  Negative for apnea and shortness of breath.    Cardiovascular:  Negative for chest pain and palpitations.   Gastrointestinal:  Negative for nausea and vomiting.   Endocrine: Negative for cold intolerance and heat intolerance.   Genitourinary:  Negative for difficulty urinating and urgency.   Musculoskeletal:  Positive for arthralgias. Negative for back pain, gait problem, joint swelling, myalgias, neck pain and neck stiffness.   Skin:  Negative for color change and rash.   Allergic/Immunologic: Negative for environmental allergies and immunocompromised state.   Neurological:  Positive for headaches. Negative for dizziness, tremors, seizures, syncope, facial asymmetry, speech difficulty, weakness, light-headedness and numbness.   Hematological:  Negative for adenopathy. Does not bruise/bleed easily.   Psychiatric/Behavioral:  Negative for agitation, behavioral problems,  confusion, decreased concentration, dysphoric mood, hallucinations, self-injury, sleep disturbance and suicidal ideas. The patient is not hyperactive.              Current Medications[1]    Past Medical History:   Diagnosis Date    Arthritis     Cataract     Colon polyp     Diabetes mellitus type II 15 years     am 05/30/2022    Hyperlipidemia     Hypertension     Renal cell carcinoma 09/2018    Total knee replacement status, left 01/20/2019    Dr.Robert Cook    UTI (urinary tract infection)        Past Surgical History:   Procedure Laterality Date    CHOLECYSTECTOMY      CHOLECYSTECTOMY      COLONOSCOPY  2012    COLONOSCOPY N/A 03/01/2018    Procedure: COLONOSCOPY;  Surgeon: Phillip Brower MD;  Location: [facility];  Service: Endoscopy;  Laterality: N/A;    ESOPHAGOGASTRODUODENOSCOPY N/A 08/21/2018    Procedure: ESOPHAGOGASTRODUODENOSCOPY (EGD);  Surgeon: Maco Wynn MD;  Location: [facility];  Service: Endoscopy;  Laterality: N/A;    HYSTERECTOMY  1993    uterine prolapse    KNEE SURGERY  03/2017    left knee replacement  01/20/2019    Dr.Robert Cook    ROBOT-ASSISTED LAPAROSCOPIC PARTIAL NEPHRECTOMY USING DA DENZEL XI Right 09/13/2018    Procedure: XI ROBOTIC NEPHRECTOMY, PARTIAL;  Surgeon: Tommy Bradshaw MD;  Location: [address];  Service: Urology;  Laterality: Right;  [place] u/s confirmed 9/13 0700 lb  conformation#[id number]    SPINE SURGERY      thumb surgery Rt- July 2018 - cyst          Social History[2]          Past/Current Medical/Surgical History, Past/Current Social History, Past/Current Family History and Past/Current Medications were reviewed in detail.        Objective:           VITAL SIGNS WERE REVIEWED      GENERAL APPEARANCE:     The patient looks comfortable.    BMI 32.26    No signs of respiratory distress.    Normal breathing pattern.    No dysmorphic features    Normal eye contact.       GENERAL MEDICAL EXAM:    HEENT:  Head is atraumatic normocephalic.      FUNDUSCOPIC (OPHTHALMOSCOPIC) EXAMINATION showed no disc edema (papilledema).      NECK: No JVD. No visible lesions or goiters.     CHEST-CARDIOPULMONARY: No cyanosis. No tachypnea. Normal respiratory effort.    [id number]: No jaundice. No stomas or lesions. No visible hernias. No catheters.     SKIN, HAIR, NAILS: No pathognomonic skin rash.No neurofibromatosis. No visible lesions.No stigmata of autoimmune disease. No clubbing.    LIMBS: No varicose veins. No visible swelling.    MUSCULOSKELETAL: No visible deformities.No visible lesions.             Neurological Exam  Mental Status  Awake and alert. Oriented to person, place, time and situation. Recent and remote memory are intact. Speech is normal. Language is fluent with no aphasia. Attention and concentration are normal. Fund of knowledge is appropriate for level of education. Apraxia absent.    Cranial Nerves  CN II: Tested with correction. Visual acuity is normal. Visual fields full to confrontation. Right funduscopic exam: disc intact. Left funduscopic exam: disc intact.  CN III, IV, VI: Extraocular movements intact bilaterally. Normal lids and orbits bilaterally. Pupils equal round and reactive to light bilaterally.  CN V: Facial sensation is normal.  CN VII: Full and symmetric facial movement.  CN VIII: Hearing is normal.  CN IX, X: Palate elevates symmetrically. Normal gag reflex.  CN XI: Shoulder shrug strength is normal.  CN XII: Tongue midline without atrophy or fasciculations.    Motor  Normal muscle bulk throughout. No fasciculations present. Normal muscle tone. No abnormal involuntary movements. No pronator drift.                                             Right                     Left  Neck flexion                           5                          5  Neck extension                      5                          5   Shoulder abduction               5                          5   Shoulder adduction               5                           5   Shoulder internal rotation      5                          5  Shoulder external rotation     5                          5  Elbow flexion                         5                          5  Elbow extension                    5                          5  Wrist flexion                           5                          5  Wrist extension                      5                          5  Supination                             5                          5  Pronation                               5                          5  Finger flexion                         5                          5  Finger extension                    5                          5  Finger abduction                    5                          5  Thumb flexion                        5                          5  Thumb extension                   5                          5  Thumb abduction                   5                          5  Hip flexion                              5                          5  Hip extension                         5                          5  Hip abduction                         5                          5  Hip adduction                         5                          5  Knee flexion                           5                          5  Knee extension                      5                          5  Ankle inversion                      5                          5  Ankle eversion                       5                          5  Plantarflexion                         5                          5  Dorsiflexion                            5                          5  Toe flexion                             5                          5  Toe extension                        5                          5    Sensory  Light touch is normal in upper and lower extremities. Pinprick is normal in upper and lower extremities. Temperature is normal in upper and lower extremities.  Vibration is normal in upper and lower extremities. Proprioception is normal in upper and lower extremities.  No right-sided hemispatial neglect. No left-sided hemispatial neglect. Right agraphesthesia absent. Left agraphesthesia absent. Right astereognosis absent. Left astereognosis absent.    Reflexes                                            Right                      Left  Brachioradialis                    2+                         2+  Biceps                                 2+                         2+  Triceps                                2+                         2+  Patellar                                2+                         2+  Achilles                                2+                         2+  Right Plantar: normal  Left Plantar: normal  Jaw jerk absent.  Right pathological reflexes: Pasha's absent. Crossed adductor absent. Ankle clonus absent.  Left pathological reflexes: Pasha's absent. Crossed adductor absent. Ankle clonus absent.    Coordination  Right: Finger-to-nose normal. Rapid alternating movement normal. Heel-to-shin normal.Left: Finger-to-nose normal. Rapid alternating movement normal. Heel-to-shin normal.    Gait  Casual gait is normal including stance, stride, and arm swing. Able to rise from chair without using arms.      Lab Results   Component Value Date    WBC 5.68 11/01/2024    HGB 12.3 11/01/2024    HCT 39.2 11/01/2024    MCV 80 (L) 11/01/2024     11/01/2024       Sodium   Date Value Ref Range Status   11/01/2024 140 136 - 145 mmol/L Final     Potassium   Date Value Ref Range Status   11/01/2024 4.5 3.5 - 5.1 mmol/L Final     Chloride   Date Value Ref Range Status   11/01/2024 105 95 - 110 mmol/L Final     CO2   Date Value Ref Range Status   11/01/2024 27 23 - 29 mmol/L Final     Glucose   Date Value Ref Range Status   11/01/2024 135 (H) 70 - 110 mg/dL Final     BUN   Date Value Ref Range Status   11/01/2024 21 8 - 23 mg/dL Final     Creatinine   Date Value  Ref Range Status   11/01/2024 0.8 0.5 - 1.4 mg/dL Final     Calcium   Date Value Ref Range Status   11/01/2024 9.0 8.7 - 10.5 mg/dL Final     Total Protein   Date Value Ref Range Status   11/01/2024 6.6 6.0 - 8.4 g/dL Final     Albumin   Date Value Ref Range Status   11/01/2024 3.7 3.5 - 5.2 g/dL Final     Total Bilirubin   Date Value Ref Range Status   11/01/2024 0.2 0.1 - 1.0 mg/dL Final     Comment:     For infants and newborns, interpretation of results should be based  on gestational age, weight and in agreement with clinical  observations.    Premature Infant recommended reference ranges:  Up to 24 hours.............<8.0 mg/dL  Up to 48 hours............<12.0 mg/dL  3-5 days..................<15.0 mg/dL  6-29 days.................<15.0 mg/dL       Alkaline Phosphatase   Date Value Ref Range Status   11/01/2024 85 40 - 150 U/L Final     AST   Date Value Ref Range Status   11/01/2024 17 10 - 40 U/L Final     ALT   Date Value Ref Range Status   11/01/2024 26 10 - 44 U/L Final     Anion Gap   Date Value Ref Range Status   11/01/2024 8 8 - 16 mmol/L Final     eGFR if    Date Value Ref Range Status   05/30/2022 >60 >60 mL/min/1.73 m^2 Final     eGFR if non    Date Value Ref Range Status   05/30/2022 >60 >60 mL/min/1.73 m^2 Final     Comment:     Calculation used to obtain the estimated glomerular filtration  rate (eGFR) is the CKD-EPI equation.          Lab Results   Component Value Date    TSH 1.046 12/04/2024    FREET4 0.92 12/04/2024       LABORATORY EVALUATION      [id number]      CBC, CMP, TFT, HCV Unremarkable    HA1C 6.8-7.9 (6.8).        RADIOLOGY EVALUATION       [facility]      NEUROPHYSIOLOGY EVALUATION       PATHOLOGY EVALUATION        NEUROCOGNITIVE AND NEUROPSYCHOLOGY EVALUATION           Reviewed the neuroimaging independently       Assessment:           1. Atherosclerosis of aorta    2. Calcified granuloma of lung    3. Central obesity    4. Chronic  intractable headache, unspecified headache type    5. Decreased ROM of neck    6. DM cataract    7. Esophageal ring, acquired    8. Fatty liver    9. Grief at loss of child- Daughter  in 2023    10. Hiatal hernia    11. History of back surgery    12. History of colon polyps    13. Hx Ulcer of esophagus without bleeding    14. Hyperlipidemia associated with type 2 diabetes mellitus    15. Hyperlipidemia LDL goal <70    16. Hypertension goal BP (blood pressure) < 130/80    17. Intervertebral lumbar disc disorder with myelopathy, lumbar region    18. Nasal septal deviation- mild    19. Primary osteoarthritis of left knee    20. Pulmonary fibrosis, unspecified    21. Refusal of blood transfusions as patient is Jehovah's witness    22. Renal cell carcinoma, unspecified laterality    23. Seasonal allergic rhinitis, unspecified trigger    24. Type 2 diabetes mellitus with hemoglobin A1c goal of less than 7.0%    25. Type 2 diabetes mellitus with hypertriglyceridemia    26. Type 2 diabetes mellitus with obesity    27. Type 2 diabetes mellitus without retinopathy    28. Varicose veins of both lower extremities, unspecified whether complicated          Plan:             OCCIPITAL, CERVICO-OCCIPITAL NEURALGIA            EVALUATION       Evaluate Brain WWO.        MANAGEMENT       Restart Amitriptyline/Elavil 25 mg PO QHS consistently and not PRN.The patient verbalized understanding.      Heating pads PRN    Improve posture.    Avoid heavy lifting or sudden spine movements.         NEXT OPTIONS     Botox injections.      INTERVENTIONAL PAIN MANAGEMENT     Occipital Nerve Block (ONB).    Pulsed Radiofrequency (PRF).    Cryotherapy.    Occipital neuromodulation (neuro stimulator).      SURGICAL TREATMENT    Occipital nerve surgical decompression may benefit selected patients but should be reserved in extreme cases when all other modalities have failed to control the pain.                MEDICAL/SURGICAL COMORBIDITIES      All relevant medical comorbidities noted and managed by primary care physician and medical care team.          HEALTHY LIFESTYLE AND PREVENTATIVE CARE    The patient to adhere to the age-appropriate health maintenance guidelines including screening tests and vaccinations. The patient to adhere to  healthy lifestyle, optimal weight, exercise, healthy diet, good sleep hygiene and avoiding drugs including smoking, alcohol and recreational drugs.          RTC 6 MONTHS           Abi Buck MD, FAAN    Attending Neurologist/Epileptologist         Diplomate, American Board of Psychiatry and Neurology    Diplomate, American Board of Clinical Neurophysiology     Fellow, American Academy of Neurology                 This is a patient with a serious and complex neurologic diagnosis whose overall, ongoing care is being managed and monitored by me and our Neurology clinic.   As such,  is the appropriate add-on code to accompany the other E/M billing for this visit.         I spent a total of 56 minutes on the day of the visit.  This includes face to face time and non-face to face time preparing to see the patient (eg, review of tests), obtaining and/or reviewing separately obtained history, documenting clinical information in the electronic or other health record, independently interpreting results and communicating results to the patient/family/caregiver, or care coordinator.           [1]   Current Outpatient Medications:     albuterol (PROVENTIL) 2.5 mg /3 mL (0.083 %) nebulizer solution, USE 1 VIAL IN NEBULIZER EVERY 6 HOURS AS NEEDED FOR WHEEZING, Disp: 1125 mL, Rfl: 0    amoxicillin-clavulanate 875-125mg (AUGMENTIN) 875-125 mg per tablet, Take 1 tablet by mouth every 12 (twelve) hours., Disp: 14 tablet, Rfl: 0    ASHWAGANDHA ROOT EXTRACT ORAL, Take by mouth 3 (three) times daily as needed. Prn Anxiety, Disp: , Rfl:     azelastine (ASTELIN) 137 mcg (0.1 %) nasal spray, 1 spray (137 mcg total) by Nasal route 2 (two)  "times daily., Disp: 30 mL, Rfl: 0    blood sugar diagnostic Strp, 1 each by Misc.(Non-Drug; Combo Route) route 4 (four) times daily., Disp: 300 each, Rfl: 3    blood-glucose meter kit, Test finger stick blood sugar once daily., Disp: 1 each, Rfl: 0    blood-glucose sensor (DEXCOM G7 SENSOR) [place], 1 each by Misc.(Non-Drug; Combo Route) route every 10 days., Disp: 9 each, Rfl: 3    fluticasone (FLONASE) 50 mcg/actuation nasal spray, USE TWO SPRAY(S) IN EACH NOSTRIL ONCE DAILY, Disp: 16 g, Rfl: 1    insulin aspart U-100 (NOVOLOG) 100 unit/mL (3 mL) InPn pen, Titrate up to 100 units daily as instucted., Disp: 60 mL, Rfl: 0    insulin degludec (TRESIBA FLEXTOUCH U-200) 200 unit/mL (3 mL) insulin pen, Inject 80 Units into the skin once daily., Disp: 12 Pen, Rfl: 3    LACTOBACILLUS RHAMNOSUS GG ORAL, Take 1 capsule by mouth once daily., Disp: , Rfl:     lancets Misc, 1 each by Misc.(Non-Drug; Combo Route) route 4 (four) times daily., Disp: 100 each, Rfl: 11    lancets Misc, 1 each by Misc.(Non-Drug; Combo Route) route 4 (four) times daily., Disp: 300 each, Rfl: 3    loratadine (CLARITIN) 10 mg tablet, Take 1 tablet (10 mg total) by mouth once daily., Disp: 30 tablet, Rfl: 0    MAGNESIUM ORAL, Take 500 mg by mouth once daily., Disp: , Rfl:     methylPREDNISolone (MEDROL DOSEPACK) 4 mg tablet, use as directed, Disp: 1 each, Rfl: 0    olmesartan (BENICAR) 20 MG tablet, Take 1 tablet (20 mg total) by mouth once daily., Disp: 90 tablet, Rfl: 3    omeprazole (PRILOSEC) 40 MG capsule, Take 1 capsule (40 mg total) by mouth once daily., Disp: 90 capsule, Rfl: 0    pen needle, diabetic (PEN NEEDLE) 31 gauge x 5/16" Ndle, Inject 1 each into the skin once daily., Disp: 300 each, Rfl: 3    promethazine-dextromethorphan (PROMETHAZINE-DM) 6.25-15 mg/5 mL Syrp, Take 5 mLs by mouth every 8 (eight) hours as needed (cough)., Disp: 118 mL, Rfl: 0    simvastatin (ZOCOR) 40 MG tablet, Take 1 tablet (40 mg total) by mouth every evening., Disp: " 90 tablet, Rfl: 1  [2]   Social History  Socioeconomic History    Marital status:     Number of children: 3   Occupational History    Occupation: Stay at Home    Occupation:    Tobacco Use    Smoking status: Never    Smokeless tobacco: Never   Substance and Sexual Activity    Alcohol use: Yes     Comment: occasional wine     Drug use: No    Sexual activity: Yes     Partners: Male     Birth control/protection: Surgical   Social History Narrative    . Housewife.     Social Drivers of Health     Financial Resource Strain: Low Risk  (1/6/2025)    Overall Financial Resource Strain (CARDIA)     Difficulty of Paying Living Expenses: Not hard at all   Food Insecurity: No Food Insecurity (1/6/2025)    Hunger Vital Sign     Worried About Running Out of Food in the Last Year: Never true     Ran Out of Food in the Last Year: Never true   Transportation Needs: No Transportation Needs (1/6/2025)    PRAPARE - Transportation     Lack of Transportation (Medical): No     Lack of Transportation (Non-Medical): No   Physical Activity: Insufficiently Active (1/6/2025)    Exercise Vital Sign     Days of Exercise per Week: 2 days     Minutes of Exercise per Session: 30 min   Stress: No Stress Concern Present (1/6/2025)    [place] [employer] - Occupational Stress Questionnaire     Feeling of Stress : Only a little   Housing Stability: Low Risk  (3/4/2024)    Housing Stability Vital Sign     Unable to Pay for Housing in the Last Year: No     Number of Places Lived in the Last Year: 1     Unstable Housing in the Last Year: No

## 2025-04-25 ENCOUNTER — LAB VISIT (OUTPATIENT)
Dept: LAB | Facility: HOSPITAL | Age: 70
End: 2025-04-25
Attending: PHYSICIAN ASSISTANT
Payer: MEDICARE

## 2025-04-25 DIAGNOSIS — E11.9 TYPE 2 DIABETES MELLITUS WITH HEMOGLOBIN A1C GOAL OF LESS THAN 7.0%: ICD-10-CM

## 2025-04-25 LAB
EAG (OHS): 189 MG/DL (ref 68–131)
HBA1C MFR BLD: 8.2 % (ref 4–5.6)

## 2025-04-25 PROCEDURE — 83036 HEMOGLOBIN GLYCOSYLATED A1C: CPT | Mod: HCNC

## 2025-04-25 PROCEDURE — 36415 COLL VENOUS BLD VENIPUNCTURE: CPT | Mod: HCNC,PO

## 2025-04-28 ENCOUNTER — RESULTS FOLLOW-UP (OUTPATIENT)
Dept: DIABETES | Facility: CLINIC | Age: 70
End: 2025-04-28

## 2025-04-28 ENCOUNTER — OFFICE VISIT (OUTPATIENT)
Dept: DIABETES | Facility: CLINIC | Age: 70
End: 2025-04-28
Payer: MEDICARE

## 2025-04-28 VITALS
WEIGHT: 177.94 LBS | BODY MASS INDEX: 32.74 KG/M2 | DIASTOLIC BLOOD PRESSURE: 69 MMHG | HEIGHT: 62 IN | SYSTOLIC BLOOD PRESSURE: 126 MMHG | HEART RATE: 88 BPM

## 2025-04-28 DIAGNOSIS — E78.5 HYPERLIPIDEMIA LDL GOAL <70: ICD-10-CM

## 2025-04-28 DIAGNOSIS — E11.9 TYPE 2 DIABETES MELLITUS WITH HEMOGLOBIN A1C GOAL OF LESS THAN 7.0%: Primary | ICD-10-CM

## 2025-04-28 DIAGNOSIS — E66.9 OBESITY (BMI 30-39.9): ICD-10-CM

## 2025-04-28 DIAGNOSIS — I10 HYPERTENSION GOAL BP (BLOOD PRESSURE) < 130/80: ICD-10-CM

## 2025-04-28 DIAGNOSIS — K76.0 FATTY LIVER: ICD-10-CM

## 2025-04-28 LAB — GLUCOSE SERPL-MCNC: 166 MG/DL (ref 70–110)

## 2025-04-28 PROCEDURE — 99214 OFFICE O/P EST MOD 30 MIN: CPT | Mod: HCNC,S$GLB,, | Performed by: PHYSICIAN ASSISTANT

## 2025-04-28 PROCEDURE — 4010F ACE/ARB THERAPY RXD/TAKEN: CPT | Mod: CPTII,HCNC,S$GLB, | Performed by: PHYSICIAN ASSISTANT

## 2025-04-28 PROCEDURE — 99999 PR PBB SHADOW E&M-EST. PATIENT-LVL IV: CPT | Mod: PBBFAC,HCNC,, | Performed by: PHYSICIAN ASSISTANT

## 2025-04-28 PROCEDURE — 3072F LOW RISK FOR RETINOPATHY: CPT | Mod: CPTII,HCNC,S$GLB, | Performed by: PHYSICIAN ASSISTANT

## 2025-04-28 PROCEDURE — 1101F PT FALLS ASSESS-DOCD LE1/YR: CPT | Mod: CPTII,HCNC,S$GLB, | Performed by: PHYSICIAN ASSISTANT

## 2025-04-28 PROCEDURE — G2211 COMPLEX E/M VISIT ADD ON: HCPCS | Mod: HCNC,S$GLB,, | Performed by: PHYSICIAN ASSISTANT

## 2025-04-28 PROCEDURE — 82962 GLUCOSE BLOOD TEST: CPT | Mod: HCNC,S$GLB,, | Performed by: PHYSICIAN ASSISTANT

## 2025-04-28 PROCEDURE — 3074F SYST BP LT 130 MM HG: CPT | Mod: CPTII,HCNC,S$GLB, | Performed by: PHYSICIAN ASSISTANT

## 2025-04-28 PROCEDURE — 1157F ADVNC CARE PLAN IN RCRD: CPT | Mod: CPTII,HCNC,S$GLB, | Performed by: PHYSICIAN ASSISTANT

## 2025-04-28 PROCEDURE — 3052F HG A1C>EQUAL 8.0%<EQUAL 9.0%: CPT | Mod: CPTII,HCNC,S$GLB, | Performed by: PHYSICIAN ASSISTANT

## 2025-04-28 PROCEDURE — 3078F DIAST BP <80 MM HG: CPT | Mod: CPTII,HCNC,S$GLB, | Performed by: PHYSICIAN ASSISTANT

## 2025-04-28 PROCEDURE — 1159F MED LIST DOCD IN RCRD: CPT | Mod: CPTII,HCNC,S$GLB, | Performed by: PHYSICIAN ASSISTANT

## 2025-04-28 PROCEDURE — 3288F FALL RISK ASSESSMENT DOCD: CPT | Mod: CPTII,HCNC,S$GLB, | Performed by: PHYSICIAN ASSISTANT

## 2025-04-28 PROCEDURE — 3008F BODY MASS INDEX DOCD: CPT | Mod: CPTII,HCNC,S$GLB, | Performed by: PHYSICIAN ASSISTANT

## 2025-04-28 NOTE — PATIENT INSTRUCTIONS
CURRENT DM MEDICATIONS:   Tresiba 70 units daily   Novolog 20-25 units before breakfast, 20-25  units before lunch if you eat,and 25 units before dinner   Novolog correction dosing every 3 hours as below -  CF 15      Correction Factor of 15 with Goal of 120   If  - 149, may take 1 units of Novolog   If  - 164, may take 2 units of Novolog   If  - 179, may take 3 units of Novolog   If  - 194, may take 4 units of Novolog   If  - 209, may take 5 units of Novolog   If  - 224, may take 6 units of Novolog   If  - 239, may take 7 units of Novolog  If  - 254, may take 8 units of Novolog  If  - 269, may take 9 units of Novolog  If  - 284, may take 10 units of Novolog    If  - 299, may take 11 units of Novolog    If  - 314, may take 12 units of Novolog    If  - 329, may take 13 units of Novolog    If  - 344, may take 14 units of Novolog    If  - 359, may take 15 units of Novolog    If  - 374, may take 16 units of Novolog    If  - 389, may take 17 units of Novolog    If  - 404, may take 18 units of Novolog  If  - 419, may take 19 units of Novolog    If +, may take 20 units of Novolog - MAX     [employer] - Dexcom supplier  [phone]      [employer] - supplier for Tresiba / Novolog assistance   [phone].     PLANT BASED PROTEIN SHAKE:   TL Protein shake   [id number]

## 2025-04-28 NOTE — PROGRESS NOTES
PCP: Sakina Cooper DO    Subjective:     Chief Complaint: Diabetes - Established Patient    HISTORY OF PRESENT ILLNESS: 69 y.o.  female presenting for diabetes management visit.   The patient's last visit with me was on 2/27/2025.   Patient has had Type II diabetes since 2005.  Pertinent to decision making is the following comorbidities: HTN, HLD, Obesity by BMI and Fatty Liver  Patient has the following Diabetes complications: without complications  She  has attended diabetes education in the past.     Patient's most recent A1c of 8.2% was completed 1 weeks ago.   Patient states since Her last A1c Her blood glucose levels have been within range in the morning / fasting.   Patient monitors blood glucose 4 times per day with Contour Next : Fasting, After Dinner, and Symptomatic. Dexcom G7 pending at [facility] - has been sent multiple times and patient has been provided number to call but has never made contact with company.   Patient blood glucose monitoring device will not be uploaded into Media Section today secondary to unable to upload successfully.         Of note, reporting correction dosing is not effective.     Patient endorses the following diabetes related symptoms: None.    Patient is due today for the following diabetes-related health maintenance standards: COVID-19 Vaccine  and tetanus vaccine and DEXA .   She denies recent hospital admissions or emergency room visits.   She voices having severe hypoglycemia and hypoglycemia unawareness.   Patient's concerns today include glycemic control. PPA pending - Novolog, Tresiba,.  Patient medication regimen is as below.     CURRENT DM MEDICATIONS:   Tresiba 72 units daily   Novolog 20 -25 units before breakfast ,20-25  units before lunch if you eat, and 20-25 units before dinner      Novolog Correction Dosing every 3 hours as needed OUTSIDE OF EATING  If  - 250, may take 4 units of Novolog  If  - 300, may take 8 units of Novolog  If  - 350,  may take 12 units of Novolog  If  - 400, may take 16 units of Novolog  If +, may take 20 units of Novolog        Patient has failed the following Diabetes medications:   Metformin - allergy   Onglyza  Levemir   Victoza / Trulicity - GLP-1 escalation  Mounjaro - cost; no pharm asst available   Jardiance 10 + 25 mg - GI side effects;   Ozempic - constipation  Amaryl   Actos         Labs Reviewed.       Lab Results   Component Value Date    CPEPTIDE 1.73 08/30/2022     Lab Results   Component Value Date    GLUTAMICACID 0.00 12/14/2017          //   , There is no height or weight on file to calculate BMI.  Her blood sugar in clinic today is:          Review of Systems   Constitutional:  Negative for activity change, appetite change, chills and fever.   HENT:  Negative for dental problem, mouth sores, nosebleeds, sore throat and trouble swallowing.    Eyes:  Negative for pain and discharge.   Respiratory:  Negative for shortness of breath, wheezing and stridor.    Cardiovascular:  Negative for chest pain, palpitations and leg swelling.   Gastrointestinal:  Negative for abdominal pain, diarrhea, nausea and vomiting.   Endocrine: Negative for polydipsia, polyphagia and polyuria.   Genitourinary:  Negative for dysuria, frequency and urgency.   Musculoskeletal:  Negative for joint swelling and myalgias.   Skin:  Negative for rash and wound.   Neurological:  Negative for dizziness, syncope, weakness and headaches.   Psychiatric/Behavioral:  Negative for behavioral problems and dysphoric mood.          Diabetes Management Status  Statin: Taking  ACE/ARB: Taking    Screening or Prevention Patient's value Goal Complete/Controlled?   HgA1C Testing and Control   Lab Results   Component Value Date    HGBA1C 8.2 (H) 04/25/2025      Annually/Less than 8% Yes   Lipid profile : 11/01/2024 Annually Yes   LDL control Lab Results   Component Value Date    LDLCALC 118.4 11/01/2024    Annually/Less than 100 mg/dl  Yes    Nephropathy screening Lab Results   Component Value Date    MICALBCREAT 8.6 12/04/2024     Lab Results   Component Value Date    PROTEINUA Negative 09/08/2014    Annually Yes   Blood pressure BP Readings from Last 1 Encounters:   04/23/25 136/81    Less than 140/90 Yes   Dilated retinal exam : 12/02/2024 Annually Yes    Foot exam   : 02/27/2025 Annually No     Social History     Socioeconomic History    Marital status:     Number of children: 3   Occupational History    Occupation: Stay at Home    Occupation:    Tobacco Use    Smoking status: Never    Smokeless tobacco: Never   Substance and Sexual Activity    Alcohol use: Yes     Comment: occasional wine     Drug use: No    Sexual activity: Yes     Partners: Male     Birth control/protection: Surgical   Social History Narrative    . Housewife.     Social Drivers of Health     Financial Resource Strain: Low Risk  (1/6/2025)    Overall Financial Resource Strain (CARDIA)     Difficulty of Paying Living Expenses: Not hard at all   Food Insecurity: No Food Insecurity (1/6/2025)    Hunger Vital Sign     Worried About Running Out of Food in the Last Year: Never true     Ran Out of Food in the Last Year: Never true   Transportation Needs: No Transportation Needs (1/6/2025)    PRAPARE - Transportation     Lack of Transportation (Medical): No     Lack of Transportation (Non-Medical): No   Physical Activity: Insufficiently Active (1/6/2025)    Exercise Vital Sign     Days of Exercise per Week: 2 days     Minutes of Exercise per Session: 30 min   Stress: No Stress Concern Present (1/6/2025)    [employer] [place] [employer] - Occupational Stress Questionnaire     Feeling of Stress : Only a little   Housing Stability: Low Risk  (3/4/2024)    Housing Stability Vital Sign     Unable to Pay for Housing in the Last Year: No     Number of Places Lived in the Last Year: 1     Unstable Housing in the Last Year: No     Past Medical History:    Diagnosis Date    Arthritis     Cataract     Colon polyp     Diabetes mellitus type II 15 years     am 05/30/2022    Hyperlipidemia     Hypertension     Renal cell carcinoma 09/2018    Total knee replacement status, left 01/20/2019    Dr.Robert Cook    UTI (urinary tract infection)        Objective:        Physical Exam  Constitutional:       General: She is not in acute distress.     Appearance: She is well-developed. She is not diaphoretic.   HENT:      Head: Normocephalic and atraumatic.      Right Ear: External ear normal.      Left Ear: External ear normal.      Nose: Nose normal.   Eyes:      General:         Right eye: No discharge.         Left eye: No discharge.      Pupils: Pupils are equal, round, and reactive to light.   Cardiovascular:      Rate and Rhythm: Normal rate and regular rhythm.      Heart sounds: Normal heart sounds.   Pulmonary:      Effort: Pulmonary effort is normal.      Breath sounds: Normal breath sounds.   Abdominal:      Palpations: Abdomen is soft.   Musculoskeletal:         General: Normal range of motion.      Cervical back: Normal range of motion and neck supple.   Skin:     General: Skin is warm and dry.      Capillary Refill: Capillary refill takes less than 2 seconds.   Neurological:      Mental Status: She is alert and oriented to person, place, and time.      Motor: No abnormal muscle tone.      Coordination: Coordination normal.   Psychiatric:         Behavior: Behavior normal.         Thought Content: Thought content normal.         Judgment: Judgment normal.           Assessment / Plan:     Type 2 diabetes mellitus with hemoglobin A1c goal of less than 7.0%  -     POCT Glucose, Hand-Held Device  -     Hemoglobin A1C; Future; Expected date: 04/28/2025    Fatty liver    Hyperlipidemia LDL goal <70    Hypertension goal BP (blood pressure) < 130/80    Obesity (BMI 30-39.9)          Additional Plan Details:    - POCT Glucose  - Encouraged continuation of lifestyle  changes including regular exercise and limiting carbohydrates to 30-45 grams per meal threes times daily and 15 grams per snack with a limit of two daily.   - Encouraged continued monitoring of blood glucose with maintenance of 4 times daily at Fasting, After Dinner, and Symptomatic. Dex G7 at [facility].  - Current DM Medication Regimen: Change Tresiba 70 units daily. Change Novolog  20-25 units before breakfast, 20-25  units before lunch if you eat,and 25 units before dinner and correction dosing every 3 hours before meals - CF 15 .   - Pharm Asst: Tresiba and Novolog   - Recommend plant based protein shakes.   - Health Maintenance standards addressed today: COVID - 19 Vaccine - patient will schedule outside of [facility]  and tetanus vaccine and DEXA scan  - Nursing Visit: Patient is age 79 or younger with an A1c of 7.5 or greater and will not need nursing visit at this time .   - Follow up in 8 weeks.    Correction Factor Calculation:   Rule of 1800: 1800 / TDD (115) = CF of 15 or 1 unit for every 15 pts above goal blood sugar of 120 mg/dL.   Will use a Correction Factor of CF of 15.      CURRENT DM MEDICATIONS:   Tresiba 70 units daily   Novolog 20-25 units before breakfast, 20-25  units before lunch if you eat,and 25 units before dinner   Novolog correction dosing every 3 hours as below -  CF 15      Correction Factor of 15 with Goal of 120   If  - 149, may take 1 units of Novolog   If  - 164, may take 2 units of Novolog   If  - 179, may take 3 units of Novolog   If  - 194, may take 4 units of Novolog   If  - 209, may take 5 units of Novolog   If  - 224, may take 6 units of Novolog   If  - 239, may take 7 units of Novolog  If  - 254, may take 8 units of Novolog  If  - 269, may take 9 units of Novolog  If  - 284, may take 10 units of Novolog    If  - 299, may take 11 units of Novolog    If  - 314, may take 12 units of Novolog    If BG  315 - 329, may take 13 units of Novolog    If  - 344, may take 14 units of Novolog    If  - 359, may take 15 units of Novolog    If  - 374, may take 16 units of Novolog    If  - 389, may take 17 units of Novolog    If  - 404, may take 18 units of Novolog  If  - 419, may take 19 units of Novolog    If +, may take 20 units of Novolog - MAX Blakeney McKnight, PA-C [facility]    A total of 30 minutes was spent in face to face time, of which over 50 % was spent in counseling patient on disease process, complications, treatment, and side effects of medications.

## 2025-04-29 ENCOUNTER — TELEPHONE (OUTPATIENT)
Dept: DIABETES | Facility: CLINIC | Age: 70
End: 2025-04-29
Payer: MEDICARE

## 2025-04-29 NOTE — TELEPHONE ENCOUNTER
----- Message from Deneen sent at 4/29/2025  1:26 PM CDT -----  Contact: Dary  Type:  Patient Requesting a call back Who Called:Dary What is the call back request regarding?:requesting a number for her medications Would the patient rather a call back or a response via [facility] Call Back Number:[phone] Information:

## 2025-05-07 DIAGNOSIS — E11.9 TYPE 2 DIABETES MELLITUS WITH HEMOGLOBIN A1C GOAL OF LESS THAN 7.0%: ICD-10-CM

## 2025-05-07 RX ORDER — INSULIN ASPART 100 [IU]/ML
INJECTION, SOLUTION INTRAVENOUS; SUBCUTANEOUS
Qty: 60 ML | Refills: 0 | Status: SHIPPED | OUTPATIENT
Start: 2025-05-07

## 2025-05-07 NOTE — TELEPHONE ENCOUNTER
----- Message from Deneen sent at 5/7/2025 12:59 PM CDT -----  Contact: Dary  Type:  Patient Requesting a call back Who Called: Dary What is the call back request regarding?:caller is trying to get a refill for insulin aspart U-100 (NOVOLOG) 100 unit/mL (3 mL) InPn pen And states she's trying to get it from another source Would the patient rather a call back or a response via [facility] Call Back Number:[phone] Additional Information:

## 2025-06-27 DIAGNOSIS — I10 HYPERTENSION GOAL BP (BLOOD PRESSURE) < 130/80: ICD-10-CM

## 2025-06-27 RX ORDER — OLMESARTAN MEDOXOMIL 20 MG/1
20 TABLET ORAL
Qty: 90 TABLET | Refills: 0 | Status: SHIPPED | OUTPATIENT
Start: 2025-06-27

## 2025-06-27 NOTE — TELEPHONE ENCOUNTER
Refill Routing Note   Medication(s) are not appropriate for processing by [facility] for the following reason(s):        ED/Hospital Visit since last OV with provider    [facility] action(s):  Defer      Medication Therapy Plan: 12/26/24 ED VISIT - Nonintractable headache, unspecified chronicity pattern, unspecified headache type / HYPERTENTION    Extended chart review required: Yes     Appointments  past 12m or future 3m with PCP    Date Provider   Last Visit   5/31/2024 Sakina Cooper DO   Next Visit   Visit date not found Sakina Cooper DO   ED visits in past 90 days: 0        Note composed:10:10 AM 06/27/2025

## 2025-06-27 NOTE — TELEPHONE ENCOUNTER
Care Due:                  Date            Visit Type   Department     Provider  --------------------------------------------------------------------------------                                EP -                              PRIMARY      [facility] INTER[facility]  Last Visit: 05-      CARE ([facility])   MEDICINE       Sakina Cooper  Next Visit: None Scheduled  None         None Found                                                            Last  Test          Frequency    Reason                     Performed    Due Date  --------------------------------------------------------------------------------    Office Visit  15 months..  olmesartan, omeprazole,    05- 08-                             simvastatin..............    [employer] Embedded Care Due Messages. Reference number: [id number].   6/27/2025 9:45:05 AM CDT

## 2025-07-03 ENCOUNTER — HOSPITAL ENCOUNTER (OUTPATIENT)
Dept: RADIOLOGY | Facility: HOSPITAL | Age: 70
Discharge: HOME | End: 2025-07-03
Attending: PSYCHIATRY & NEUROLOGY
Payer: MEDICARE

## 2025-07-03 ENCOUNTER — RESULTS FOLLOW-UP (OUTPATIENT)
Dept: NEUROLOGY | Facility: CLINIC | Age: 70
End: 2025-07-03

## 2025-07-03 ENCOUNTER — OFFICE VISIT (OUTPATIENT)
Dept: INTERNAL MEDICINE | Facility: CLINIC | Age: 70
End: 2025-07-03
Payer: MEDICARE

## 2025-07-03 VITALS
SYSTOLIC BLOOD PRESSURE: 124 MMHG | RESPIRATION RATE: 21 BRPM | HEIGHT: 62 IN | DIASTOLIC BLOOD PRESSURE: 76 MMHG | HEART RATE: 78 BPM | TEMPERATURE: 97 F | BODY MASS INDEX: 32.41 KG/M2 | OXYGEN SATURATION: 98 % | WEIGHT: 176.13 LBS

## 2025-07-03 DIAGNOSIS — J30.9 ALLERGIC RHINITIS, UNSPECIFIED SEASONALITY, UNSPECIFIED TRIGGER: ICD-10-CM

## 2025-07-03 DIAGNOSIS — E78.2 MIXED HYPERLIPIDEMIA: ICD-10-CM

## 2025-07-03 DIAGNOSIS — K21.9 CHRONIC GERD: ICD-10-CM

## 2025-07-03 DIAGNOSIS — R51.9 NONINTRACTABLE HEADACHE, UNSPECIFIED CHRONICITY PATTERN, UNSPECIFIED HEADACHE TYPE: ICD-10-CM

## 2025-07-03 DIAGNOSIS — M54.81 BILATERAL OCCIPITAL NEURALGIA: ICD-10-CM

## 2025-07-03 DIAGNOSIS — E11.9 TYPE 2 DIABETES MELLITUS WITHOUT RETINOPATHY: ICD-10-CM

## 2025-07-03 DIAGNOSIS — E78.5 HYPERLIPIDEMIA LDL GOAL <70: Chronic | ICD-10-CM

## 2025-07-03 DIAGNOSIS — M25.561 ACUTE PAIN OF RIGHT KNEE: ICD-10-CM

## 2025-07-03 DIAGNOSIS — I10 HYPERTENSION GOAL BP (BLOOD PRESSURE) < 130/80: Chronic | ICD-10-CM

## 2025-07-03 DIAGNOSIS — Z00.00 ANNUAL PHYSICAL EXAM: Primary | ICD-10-CM

## 2025-07-03 PROCEDURE — 70551 MRI BRAIN STEM W/O DYE: CPT | Mod: TC,HCNC

## 2025-07-03 PROCEDURE — 70551 MRI BRAIN STEM W/O DYE: CPT | Mod: 26,HCNC,, | Performed by: STUDENT IN AN ORGANIZED HEALTH CARE EDUCATION/TRAINING PROGRAM

## 2025-07-03 PROCEDURE — 99999 PR PBB SHADOW E&M-EST. PATIENT-LVL IV: CPT | Mod: PBBFAC,HCNC,, | Performed by: PHYSICIAN ASSISTANT

## 2025-07-03 RX ORDER — OMEPRAZOLE 40 MG/1
40 CAPSULE, DELAYED RELEASE ORAL DAILY
Qty: 90 CAPSULE | Refills: 1 | Status: SHIPPED | OUTPATIENT
Start: 2025-07-03

## 2025-07-03 RX ORDER — DICLOFENAC SODIUM 10 MG/G
2 GEL TOPICAL DAILY
Qty: 100 G | Refills: 0 | Status: SHIPPED | OUTPATIENT
Start: 2025-07-03

## 2025-07-03 RX ORDER — FLUTICASONE PROPIONATE 50 MCG
1 SPRAY, SUSPENSION (ML) NASAL DAILY
Qty: 16 G | Refills: 1 | Status: SHIPPED | OUTPATIENT
Start: 2025-07-03 | End: 2025-07-03

## 2025-07-03 RX ORDER — SIMVASTATIN 40 MG/1
40 TABLET, FILM COATED ORAL NIGHTLY
Qty: 90 TABLET | Refills: 3 | Status: SHIPPED | OUTPATIENT
Start: 2025-07-03

## 2025-07-03 RX ORDER — AZELASTINE 1 MG/ML
1 SPRAY, METERED NASAL 2 TIMES DAILY
Qty: 30 ML | Refills: 0 | Status: SHIPPED | OUTPATIENT
Start: 2025-07-03 | End: 2026-07-03

## 2025-07-03 RX ORDER — OLMESARTAN MEDOXOMIL 20 MG/1
20 TABLET ORAL DAILY
Qty: 90 TABLET | Refills: 3 | Status: SHIPPED | OUTPATIENT
Start: 2025-07-03

## 2025-07-03 NOTE — PROGRESS NOTES
"Subjective:      Patient ID: Dary Chaney is a 69 y.o. female.    Chief Complaint: Annual Exam (She is here for an annual exam. Has been having issues with right knee/leg. Also states BP has been running high at previous appointments and was suggested to discuss at PCP visit. )    Patient is known to me, being seen today for annual.     HTN- olmesartan 20mg  Reports BP high at several visits earlier this year, controlled at April visit and today   BP at home 130/60s   HLD- on statin therapy   DM- A1c 8.2%, tresiba 80 units AM, novolog SS followed by Diabetes Mgmt (visit later this month)  Previously on ozempic but d/c d/t constipation/GERD/bloating   Blood sugar running higher in AM than previous, 120-130s  Denies diet changes     Due for labs     Complains of R knee pain x4mths   Denies fall/injury   Followed by external Ortho     Last visit April 2025 w Khalida Samano NP.       Review of Systems   Constitutional:  Negative for chills, diaphoresis and fever.   HENT:  Negative for congestion, rhinorrhea and sore throat.    Respiratory:  Negative for cough, shortness of breath and wheezing.    Gastrointestinal:  Negative for abdominal pain, constipation, diarrhea, nausea and vomiting.   Endocrine:        Hair loss, thyroid normal, has appt with Derm   Musculoskeletal:  Positive for arthralgias.   Skin:  Negative for rash.   Neurological:  Negative for dizziness, light-headedness and headaches.       Objective:   /76 (BP Location: Left arm, Patient Position: Sitting)   Pulse 78   Temp 96.7 °F (35.9 °C) (Tympanic)   Resp (!) 21   Ht 5' 2" (1.575 m)   Wt 79.9 kg (176 lb 2.4 oz)   SpO2 98%   BMI 32.22 kg/m²   Physical Exam  Constitutional:       General: She is not in acute distress.     Appearance: Normal appearance. She is well-developed. She is not ill-appearing.   HENT:      Head: Normocephalic and atraumatic.      Right Ear: Hearing, tympanic membrane, ear canal and external ear normal.      Left " Ear: Hearing, tympanic membrane, ear canal and external ear normal.      Nose: Nose normal.      Mouth/Throat:      Mouth: Mucous membranes are moist.      Pharynx: Oropharynx is clear.   Cardiovascular:      Rate and Rhythm: Normal rate and regular rhythm.      Heart sounds: Normal heart sounds. No murmur heard.  Pulmonary:      Effort: Pulmonary effort is normal. No respiratory distress.      Breath sounds: Normal breath sounds. No decreased breath sounds.   Abdominal:      General: Bowel sounds are normal.      Palpations: Abdomen is soft.      Tenderness: There is no abdominal tenderness.   Musculoskeletal:      Right lower leg: No edema.      Left lower leg: No edema.   Skin:     General: Skin is warm and dry.      Findings: No rash.   Psychiatric:         Speech: Speech normal.         Behavior: Behavior normal.         Thought Content: Thought content normal.       Assessment:      1. Annual physical exam    2. Hyperlipidemia LDL goal <70    3. Hypertension goal BP (blood pressure) < 130/80    4. Type 2 diabetes mellitus without retinopathy    5. Acute pain of right knee    6. Mixed hyperlipidemia    7. Chronic GERD    8. Allergic rhinitis, unspecified seasonality, unspecified trigger    9. Nonintractable headache, unspecified chronicity pattern, unspecified headache type       Plan:   Annual physical exam    Hyperlipidemia LDL goal <70  -     Lipid Panel; Future; Expected date: 07/03/2025    Hypertension goal BP (blood pressure) < 130/80  -     CBC Auto Differential; Future; Expected date: 07/03/2025  -     Comprehensive Metabolic Panel; Future; Expected date: 07/03/2025  -     olmesartan (BENICAR) 20 MG tablet; Take 1 tablet (20 mg total) by mouth once daily.  Dispense: 90 tablet; Refill: 3    Type 2 diabetes mellitus without retinopathy  -     Hemoglobin A1C; Future; Expected date: 07/03/2025    Acute pain of right knee  -     diclofenac sodium (VOLTAREN ARTHRITIS PAIN) 1 % Gel; Apply 2 g topically once  daily.  Dispense: 100 g; Refill: 0    Mixed hyperlipidemia  -     simvastatin (ZOCOR) 40 MG tablet; Take 1 tablet (40 mg total) by mouth every evening.  Dispense: 90 tablet; Refill: 3    Chronic GERD  -     omeprazole (PRILOSEC) 40 MG capsule; Take 1 capsule (40 mg total) by mouth once daily.  Dispense: 90 capsule; Refill: 1    Allergic rhinitis, unspecified seasonality, unspecified trigger  -     azelastine (ASTELIN) 137 mcg (0.1 %) nasal spray; 1 spray (137 mcg total) by Nasal route 2 (two) times daily.  Dispense: 30 mL; Refill: 0    Discussed trial taper of PPI     She will f/u w external ortho, recommend RICE and topical NSAIDs     Keep upcoming appt w Diabetes    Monitor blood sugar and blood pressure at home     Fasting labs     DEXA to be scheduled     6mth f/u PCP

## 2025-07-09 ENCOUNTER — LAB VISIT (OUTPATIENT)
Dept: LAB | Facility: HOSPITAL | Age: 70
End: 2025-07-09
Attending: INTERNAL MEDICINE
Payer: MEDICARE

## 2025-07-09 DIAGNOSIS — I10 HYPERTENSION GOAL BP (BLOOD PRESSURE) < 130/80: ICD-10-CM

## 2025-07-09 DIAGNOSIS — E78.5 HYPERLIPIDEMIA LDL GOAL <70: Chronic | ICD-10-CM

## 2025-07-09 DIAGNOSIS — E11.9 TYPE 2 DIABETES MELLITUS WITHOUT RETINOPATHY: ICD-10-CM

## 2025-07-09 LAB
ABSOLUTE EOSINOPHIL (OHS): 0.16 K/UL
ABSOLUTE MONOCYTE (OHS): 0.39 K/UL (ref 0.3–1)
ABSOLUTE NEUTROPHIL COUNT (OHS): 3.01 K/UL (ref 1.8–7.7)
ALBUMIN SERPL BCP-MCNC: 4 G/DL (ref 3.5–5.2)
ALP SERPL-CCNC: 84 UNIT/L (ref 40–150)
ALT SERPL W/O P-5'-P-CCNC: 39 UNIT/L (ref 10–44)
ANION GAP (OHS): 8 MMOL/L (ref 8–16)
AST SERPL-CCNC: 19 UNIT/L (ref 11–45)
BASOPHILS # BLD AUTO: 0.02 K/UL
BASOPHILS NFR BLD AUTO: 0.4 %
BILIRUB SERPL-MCNC: 0.3 MG/DL (ref 0.1–1)
BUN SERPL-MCNC: 31 MG/DL (ref 8–23)
CALCIUM SERPL-MCNC: 9.4 MG/DL (ref 8.7–10.5)
CHLORIDE SERPL-SCNC: 104 MMOL/L (ref 95–110)
CHOLEST SERPL-MCNC: 228 MG/DL (ref 120–199)
CHOLEST/HDLC SERPL: 5 {RATIO} (ref 2–5)
CO2 SERPL-SCNC: 28 MMOL/L (ref 23–29)
CREAT SERPL-MCNC: 0.8 MG/DL (ref 0.5–1.4)
EAG (OHS): 192 MG/DL (ref 68–131)
ERYTHROCYTE [DISTWIDTH] IN BLOOD BY AUTOMATED COUNT: 15.9 % (ref 11.5–14.5)
GFR SERPLBLD CREATININE-BSD FMLA CKD-EPI: >60 ML/MIN/1.73/M2
GLUCOSE SERPL-MCNC: 138 MG/DL (ref 70–110)
HBA1C MFR BLD: 8.3 % (ref 4–5.6)
HCT VFR BLD AUTO: 41.5 % (ref 37–48.5)
HDLC SERPL-MCNC: 46 MG/DL (ref 40–75)
HDLC SERPL: 20.2 % (ref 20–50)
HGB BLD-MCNC: 13.1 GM/DL (ref 12–16)
IMM GRANULOCYTES # BLD AUTO: 0.01 K/UL (ref 0–0.04)
IMM GRANULOCYTES NFR BLD AUTO: 0.2 % (ref 0–0.5)
LDLC SERPL CALC-MCNC: 139.6 MG/DL (ref 63–159)
LYMPHOCYTES # BLD AUTO: 1.9 K/UL (ref 1–4.8)
MCH RBC QN AUTO: 25.5 PG (ref 27–31)
MCHC RBC AUTO-ENTMCNC: 31.6 G/DL (ref 32–36)
MCV RBC AUTO: 81 FL (ref 82–98)
NONHDLC SERPL-MCNC: 182 MG/DL
NUCLEATED RBC (/100WBC) (OHS): 0 /100 WBC
PLATELET # BLD AUTO: 268 K/UL (ref 150–450)
PMV BLD AUTO: 11.2 FL (ref 9.2–12.9)
POTASSIUM SERPL-SCNC: 4.5 MMOL/L (ref 3.5–5.1)
PROT SERPL-MCNC: 6.9 GM/DL (ref 6–8.4)
RBC # BLD AUTO: 5.14 M/UL (ref 4–5.4)
RELATIVE EOSINOPHIL (OHS): 2.9 %
RELATIVE LYMPHOCYTE (OHS): 34.6 % (ref 18–48)
RELATIVE MONOCYTE (OHS): 7.1 % (ref 4–15)
RELATIVE NEUTROPHIL (OHS): 54.8 % (ref 38–73)
SODIUM SERPL-SCNC: 140 MMOL/L (ref 136–145)
TRIGL SERPL-MCNC: 212 MG/DL (ref 30–150)
WBC # BLD AUTO: 5.49 K/UL (ref 3.9–12.7)

## 2025-07-09 PROCEDURE — 83036 HEMOGLOBIN GLYCOSYLATED A1C: CPT | Mod: HCNC

## 2025-07-09 PROCEDURE — 80053 COMPREHEN METABOLIC PANEL: CPT | Mod: HCNC

## 2025-07-09 PROCEDURE — 36415 COLL VENOUS BLD VENIPUNCTURE: CPT | Mod: HCNC,PO

## 2025-07-09 PROCEDURE — 85025 COMPLETE CBC W/AUTO DIFF WBC: CPT | Mod: HCNC

## 2025-07-09 PROCEDURE — 80061 LIPID PANEL: CPT | Mod: HCNC

## 2025-07-17 ENCOUNTER — APPOINTMENT (OUTPATIENT)
Dept: RADIOLOGY | Facility: HOSPITAL | Age: 70
End: 2025-07-17
Attending: INTERNAL MEDICINE
Payer: MEDICARE

## 2025-07-17 DIAGNOSIS — Z78.0 MENOPAUSE: ICD-10-CM

## 2025-07-17 PROCEDURE — 77080 DXA BONE DENSITY AXIAL: CPT | Mod: 26,HCNC,, | Performed by: RADIOLOGY

## 2025-07-17 PROCEDURE — 77080 DXA BONE DENSITY AXIAL: CPT | Mod: TC,HCNC

## 2025-07-25 ENCOUNTER — OFFICE VISIT (OUTPATIENT)
Dept: DIABETES | Facility: CLINIC | Age: 70
End: 2025-07-25
Payer: MEDICARE

## 2025-07-25 ENCOUNTER — TELEPHONE (OUTPATIENT)
Dept: PHARMACY | Facility: CLINIC | Age: 70
End: 2025-07-25
Payer: MEDICARE

## 2025-07-25 VITALS
SYSTOLIC BLOOD PRESSURE: 122 MMHG | OXYGEN SATURATION: 99 % | HEIGHT: 62 IN | HEART RATE: 79 BPM | DIASTOLIC BLOOD PRESSURE: 74 MMHG | BODY MASS INDEX: 32.66 KG/M2 | WEIGHT: 177.5 LBS

## 2025-07-25 DIAGNOSIS — E11.9 TYPE 2 DIABETES MELLITUS WITH HEMOGLOBIN A1C GOAL OF LESS THAN 7.0%: Primary | ICD-10-CM

## 2025-07-25 DIAGNOSIS — K76.0 FATTY LIVER: ICD-10-CM

## 2025-07-25 DIAGNOSIS — I10 HYPERTENSION GOAL BP (BLOOD PRESSURE) < 130/80: ICD-10-CM

## 2025-07-25 DIAGNOSIS — E78.5 HYPERLIPIDEMIA LDL GOAL <70: ICD-10-CM

## 2025-07-25 DIAGNOSIS — E66.9 OBESITY (BMI 30-39.9): ICD-10-CM

## 2025-07-25 LAB — GLUCOSE SERPL-MCNC: 146 MG/DL (ref 70–110)

## 2025-07-25 PROCEDURE — 3052F HG A1C>EQUAL 8.0%<EQUAL 9.0%: CPT | Mod: CPTII,HCNC,S$GLB, | Performed by: PHYSICIAN ASSISTANT

## 2025-07-25 PROCEDURE — 3078F DIAST BP <80 MM HG: CPT | Mod: CPTII,HCNC,S$GLB, | Performed by: PHYSICIAN ASSISTANT

## 2025-07-25 PROCEDURE — 1101F PT FALLS ASSESS-DOCD LE1/YR: CPT | Mod: CPTII,HCNC,S$GLB, | Performed by: PHYSICIAN ASSISTANT

## 2025-07-25 PROCEDURE — 99999 PR PBB SHADOW E&M-EST. PATIENT-LVL V: CPT | Mod: PBBFAC,HCNC,, | Performed by: PHYSICIAN ASSISTANT

## 2025-07-25 PROCEDURE — G2211 COMPLEX E/M VISIT ADD ON: HCPCS | Mod: HCNC,S$GLB,, | Performed by: PHYSICIAN ASSISTANT

## 2025-07-25 PROCEDURE — 99214 OFFICE O/P EST MOD 30 MIN: CPT | Mod: HCNC,S$GLB,, | Performed by: PHYSICIAN ASSISTANT

## 2025-07-25 PROCEDURE — 82962 GLUCOSE BLOOD TEST: CPT | Mod: HCNC,S$GLB,, | Performed by: PHYSICIAN ASSISTANT

## 2025-07-25 PROCEDURE — 4010F ACE/ARB THERAPY RXD/TAKEN: CPT | Mod: CPTII,HCNC,S$GLB, | Performed by: PHYSICIAN ASSISTANT

## 2025-07-25 PROCEDURE — 3008F BODY MASS INDEX DOCD: CPT | Mod: CPTII,HCNC,S$GLB, | Performed by: PHYSICIAN ASSISTANT

## 2025-07-25 PROCEDURE — 1157F ADVNC CARE PLAN IN RCRD: CPT | Mod: CPTII,HCNC,S$GLB, | Performed by: PHYSICIAN ASSISTANT

## 2025-07-25 PROCEDURE — 3288F FALL RISK ASSESSMENT DOCD: CPT | Mod: CPTII,HCNC,S$GLB, | Performed by: PHYSICIAN ASSISTANT

## 2025-07-25 PROCEDURE — 3074F SYST BP LT 130 MM HG: CPT | Mod: CPTII,HCNC,S$GLB, | Performed by: PHYSICIAN ASSISTANT

## 2025-07-25 PROCEDURE — 1159F MED LIST DOCD IN RCRD: CPT | Mod: CPTII,HCNC,S$GLB, | Performed by: PHYSICIAN ASSISTANT

## 2025-07-25 PROCEDURE — 3072F LOW RISK FOR RETINOPATHY: CPT | Mod: CPTII,HCNC,S$GLB, | Performed by: PHYSICIAN ASSISTANT

## 2025-07-25 NOTE — PATIENT INSTRUCTIONS
CURRENT DM MEDICATIONS:   Tresiba 74 units daily   Novolog 15-20 units before breakfast, 25 units before lunch if you eat,and 25 units before dinner   Novolog correction dosing every 3 hours as below -  CF 15      Correction Factor of 15 with Goal of 120   If  - 149, may take 1 units of Novolog   If  - 164, may take 2 units of Novolog   If  - 179, may take 3 units of Novolog   If  - 194, may take 4 units of Novolog   If  - 209, may take 5 units of Novolog   If  - 224, may take 6 units of Novolog   If  - 239, may take 7 units of Novolog  If  - 254, may take 8 units of Novolog  If  - 269, may take 9 units of Novolog  If  - 284, may take 10 units of Novolog    If  - 299, may take 11 units of Novolog    If  - 314, may take 12 units of Novolog    If  - 329, may take 13 units of Novolog    If  - 344, may take 14 units of Novolog    If  - 359, may take 15 units of Novolog    If  - 374, may take 16 units of Novolog    If  - 389, may take 17 units of Novolog    If  - 404, may take 18 units of Novolog  If  - 419, may take 19 units of Novolog    If +, may take 20 units of Novolog - MAX

## 2025-07-25 NOTE — Clinical Note
She is scheduled for pro cgm on 8/4. Her link is attached. Can we make sure approved? She was asking about cost too - not sure if they provide that

## 2025-07-25 NOTE — PROGRESS NOTES
PCP: Sakina Cooper DO    Subjective:     Chief Complaint: Diabetes - Established Patient    HISTORY OF PRESENT ILLNESS: 69 y.o.  female presenting for diabetes management visit.   The patient's last visit with me was on 2/27/2025.   Patient has had Type II diabetes since 2005.  Pertinent to decision making is the following comorbidities: HTN, HLD, Obesity by BMI and Fatty Liver  Patient has the following Diabetes complications: without complications  She  has attended diabetes education in the past.     Patient's most recent A1c of 8.2% was completed 1 weeks ago.   Patient states since Her last A1c Her blood glucose levels have been within range in the morning / fasting.   Patient monitors blood glucose 4 times per day with Contour Next : Fasting, After Dinner, and Symptomatic. Dexcom G7 pending at [facility] - she reports 2 different people asking her questions she had already answered so she decided not to move forward.   Patient blood glucose monitoring device will not be uploaded into Media Section today secondary to unable to upload successfully.            Patient endorses the following diabetes related symptoms: None.    Patient is due today for the following diabetes-related health maintenance standards: COVID-19 Vaccine  and tetanus vaccine and mammo .   She denies recent hospital admissions or emergency room visits.   She voices having severe hypoglycemia and hypoglycemia unawareness.   Patient's concerns today include glycemic control. PPA pending - Novolog, Tresiba,.  Patient medication regimen is as below.     CURRENT DM MEDICATIONS:   Tresiba 74 units daily   Novolog 15-20 units before breakfast, 20  units before lunch if you eat,and 20 units before dinner   Novolog correction dosing every 3 hours as below -  CF 15      Correction Factor of 15 with Goal of 120   If  - 149, may take 1 units of Novolog   If  - 164, may take 2 units of Novolog   If  - 179, may take 3 units of  "Novolog   If  - 194, may take 4 units of Novolog   If  - 209, may take 5 units of Novolog   If  - 224, may take 6 units of Novolog   If  - 239, may take 7 units of Novolog  If  - 254, may take 8 units of Novolog  If  - 269, may take 9 units of Novolog  If  - 284, may take 10 units of Novolog    If  - 299, may take 11 units of Novolog    If  - 314, may take 12 units of Novolog    If  - 329, may take 13 units of Novolog    If  - 344, may take 14 units of Novolog    If  - 359, may take 15 units of Novolog    If  - 374, may take 16 units of Novolog    If  - 389, may take 17 units of Novolog    If  - 404, may take 18 units of Novolog  If  - 419, may take 19 units of Novolog    If +, may take 20 units of Novolog - MAX      Patient has failed the following Diabetes medications:   Metformin - allergy   Onglyza  Levemir   Victoza / Trulicity - GLP-1 escalation  Mounjaro - cost; no pharm asst available   Jardiance 10 + 25 mg - GI side effects;   Ozempic - constipation  Amaryl   Actos         Labs Reviewed.       Lab Results   Component Value Date    CPEPTIDE 1.73 08/30/2022     Lab Results   Component Value Date    GLUTAMICACID 0.00 12/14/2017       Height: 5' 2" (157.5 cm)  //  Weight: 80.5 kg (177 lb 7.5 oz), Body mass index is 32.46 kg/m².  Her blood sugar in clinic today is:          Review of Systems   Constitutional:  Negative for activity change, appetite change, chills and fever.   HENT:  Negative for dental problem, mouth sores, nosebleeds, sore throat and trouble swallowing.    Eyes:  Negative for pain and discharge.   Respiratory:  Negative for shortness of breath, wheezing and stridor.    Cardiovascular:  Negative for chest pain, palpitations and leg swelling.   Gastrointestinal:  Negative for abdominal pain, diarrhea, nausea and vomiting.   Endocrine: Negative for polydipsia, polyphagia and polyuria. "   Genitourinary:  Negative for dysuria, frequency and urgency.   Musculoskeletal:  Negative for joint swelling and myalgias.   Skin:  Negative for rash and wound.   Neurological:  Negative for dizziness, syncope, weakness and headaches.   Psychiatric/Behavioral:  Negative for behavioral problems and dysphoric mood.          Diabetes Management Status  Statin: Taking  ACE/ARB: Taking    Screening or Prevention Patient's value Goal Complete/Controlled?   HgA1C Testing and Control   Lab Results   Component Value Date    HGBA1C 8.3 (H) 07/09/2025      Annually/Less than 8% Yes   Lipid profile : 07/09/2025 Annually Yes   LDL control Lab Results   Component Value Date    LDLCALC 139.6 07/09/2025    Annually/Less than 100 mg/dl  Yes   Nephropathy screening Lab Results   Component Value Date    MICALBCREAT 8.6 12/04/2024     Lab Results   Component Value Date    PROTEINUA Negative 09/08/2014    Annually Yes   Blood pressure BP Readings from Last 1 Encounters:   07/25/25 122/74    Less than 140/90 Yes   Dilated retinal exam : 12/02/2024 Annually Yes    Foot exam   : 02/27/2025 Annually No     Social History     Socioeconomic History    Marital status:     Number of children: 3   Occupational History    Occupation: Stay at Home    Occupation:    Tobacco Use    Smoking status: Never    Smokeless tobacco: Never   Substance and Sexual Activity    Alcohol use: Yes     Comment: occasional wine     Drug use: No    Sexual activity: Yes     Partners: Male     Birth control/protection: Surgical   Social History Narrative    . Housewife.     Social Drivers of Health     Financial Resource Strain: Low Risk  (1/6/2025)    Overall Financial Resource Strain (CARDIA)     Difficulty of Paying Living Expenses: Not hard at all   Food Insecurity: No Food Insecurity (1/6/2025)    Hunger Vital Sign     Worried About Running Out of Food in the Last Year: Never true     Ran Out of Food in the Last Year: Never true    Transportation Needs: No Transportation Needs (1/6/2025)    PRAPARE - Transportation     Lack of Transportation (Medical): No     Lack of Transportation (Non-Medical): No   Physical Activity: Insufficiently Active (1/6/2025)    Exercise Vital Sign     Days of Exercise per Week: 2 days     Minutes of Exercise per Session: 30 min   Stress: No Stress Concern Present (1/6/2025)    [employer] - Occupational Stress Questionnaire     Feeling of Stress : Only a little   Housing Stability: Low Risk  (3/4/2024)    Housing Stability Vital Sign     Unable to Pay for Housing in the Last Year: No     Number of Places Lived in the Last Year: 1     Unstable Housing in the Last Year: No     Past Medical History:   Diagnosis Date    Arthritis     Cataract     Colon polyp     Diabetes mellitus type II 15 years     am 05/30/2022    Hyperlipidemia     Hypertension     Renal cell carcinoma 09/2018    Total knee replacement status, left 01/20/2019    Dr.Robert Cook    UTI (urinary tract infection)        Objective:        Physical Exam  Constitutional:       General: She is not in acute distress.     Appearance: She is well-developed. She is not diaphoretic.   HENT:      Head: Normocephalic and atraumatic.      Right Ear: External ear normal.      Left Ear: External ear normal.      Nose: Nose normal.   Eyes:      General:         Right eye: No discharge.         Left eye: No discharge.      Pupils: Pupils are equal, round, and reactive to light.   Cardiovascular:      Rate and Rhythm: Normal rate and regular rhythm.      Heart sounds: Normal heart sounds.   Pulmonary:      Effort: Pulmonary effort is normal.      Breath sounds: Normal breath sounds.   Abdominal:      Palpations: Abdomen is soft.   Musculoskeletal:         General: Normal range of motion.      Cervical back: Normal range of motion and neck supple.   Skin:     General: Skin is warm and dry.      Capillary Refill: Capillary refill  takes less than 2 seconds.   Neurological:      Mental Status: She is alert and oriented to person, place, and time.      Motor: No abnormal muscle tone.      Coordination: Coordination normal.   Psychiatric:         Behavior: Behavior normal.         Thought Content: Thought content normal.         Judgment: Judgment normal.           Assessment / Plan:     Type 2 diabetes mellitus with hemoglobin A1c goal of less than 7.0%  -     POCT Glucose, Hand-Held Device  -     Glucose Monitoring Continuous Min 72 Hours; Future  -     Ambulatory referral/consult to Pharmacy Assistance; Future; Expected date: 08/01/2025    Fatty liver    Hyperlipidemia LDL goal <70    Hypertension goal BP (blood pressure) < 130/80    Obesity (BMI 30-39.9)          Additional Plan Details:    - POCT Glucose  - Encouraged continuation of lifestyle changes including regular exercise and limiting carbohydrates to 30-45 grams per meal threes times daily and 15 grams per snack with a limit of two daily.   - Encouraged continued monitoring of blood glucose with maintenance of 4 times daily at Fasting, After Dinner, and Symptomatic. Dex G7 deferred. PRO CGM order.   - Current DM Medication Regimen: Continue Tresiba 74 units daily. Change Novolog  15-20 units before breakfast, 25  units before lunch if you eat,and 25 units before dinner and correction dosing every 3 hours before meals - CF 15 .   - Pharm Asst: Tresiba and Novolog   - Recommend plant based protein shakes.   - Health Maintenance standards addressed today: COVID - 19 Vaccine - patient will schedule outside of [facility]  and tetanus vaccine and DEXA scan  - Nursing Visit: Patient is age 79 or younger with an A1c of 7.5 or greater and will not need nursing visit at this time .   - Follow up in 8 weeks.    CURRENT DM MEDICATIONS:   Tresiba 74 units daily   Novolog 15-20 units before breakfast, 25 units before lunch if you eat,and 25 units before dinner   Novolog correction dosing every  3 hours as below -  CF 15      Correction Factor of 15 with Goal of 120   If  - 149, may take 1 units of Novolog   If  - 164, may take 2 units of Novolog   If  - 179, may take 3 units of Novolog   If  - 194, may take 4 units of Novolog   If  - 209, may take 5 units of Novolog   If  - 224, may take 6 units of Novolog   If  - 239, may take 7 units of Novolog  If  - 254, may take 8 units of Novolog  If  - 269, may take 9 units of Novolog  If  - 284, may take 10 units of Novolog    If  - 299, may take 11 units of Novolog    If  - 314, may take 12 units of Novolog    If  - 329, may take 13 units of Novolog    If  - 344, may take 14 units of Novolog    If  - 359, may take 15 units of Novolog    If  - 374, may take 16 units of Novolog    If  - 389, may take 17 units of Novolog    If  - 404, may take 18 units of Novolog  If  - 419, may take 19 units of Novolog    If +, may take 20 units of Novolog - MAX Blakeney McKnight, PA-C [facility]    A total of 30 minutes was spent in face to face time, of which over 50 % was spent in counseling patient on disease process, complications, treatment, and side effects of medications.

## 2025-07-25 NOTE — LETTER
July 25, 2025    Dary Chaney  [address] [place] [address]             Dear Ms. Chaney,    My name is Karl Hinkle, and I am reaching out on behalf of [facility]s Pharmacy Patient Assistance Team regarding your request for medication assistance. Our goal is to help qualified [facility] patients obtain financial assistance for prescribed medications.    Please note that enrollment into available support may require the following documents:    Completed Medication Access [facility] authorization forms, Copy of all insurance cards (front and back), Copy of the first 2 pages of your most recent tax return (eg, 1040) , and Proof of household income (such as social security award letter, pension statement or 3 consecutive pay stubs)    If you still need assistance with your medications, please reach out to the phone number listed below. If we do not hear back from you, a second contact attempt will be made via mail or your [facility] portal in 5 to 10 business days.    Thank you for giving us the opportunity to assist you with your healthcare needs. We look forward to working with you.      Sincerely  Karl Hinkle @[phone]  Pharmacy Patient Assistance Team  [address] [place] [address]  [id number]  [place] [address]  Fax: [phone]  Email: [email or web address]

## 2025-07-25 NOTE — TELEPHONE ENCOUNTER
First contact attempt has been made via phone, letter, and portal message  . Welcome letter and [facility] Enrollment Packet has been sent via letter and portal message . Follow-up will be made in approximately 5 to 10 business days.      Karl Hinkle  Pharmacy Patient Assistance Team

## 2025-08-01 ENCOUNTER — TELEPHONE (OUTPATIENT)
Facility: CLINIC | Age: 70
End: 2025-08-01
Payer: MEDICARE

## 2025-08-04 ENCOUNTER — CLINICAL SUPPORT (OUTPATIENT)
Dept: DIABETES | Facility: CLINIC | Age: 70
End: 2025-08-04
Payer: MEDICARE

## 2025-08-04 DIAGNOSIS — E11.9 TYPE 2 DIABETES MELLITUS WITH HEMOGLOBIN A1C GOAL OF LESS THAN 7.0%: ICD-10-CM

## 2025-08-04 PROCEDURE — 99999 PR PBB SHADOW E&M-EST. PATIENT-LVL III: CPT | Mod: PBBFAC,,,

## 2025-08-04 NOTE — PROGRESS NOTES
"Diabetes Care Specialist Progress Note  Author: Serina Mckeon RN  Date: 8/4/2025    Intake    Program Intake  Reason for Diabetes Program Visit:: Intervention  Type of Intervention:: Individual  Individual: Device Training  Device Training: Professional CGM  In the last month, have you used the ER or been admitted to the hospital: No  Permission to speak with others about care:: no    Current Diabetes Treatment: Insulin  Method of insulin delivery?: Injections  Injection Type: Pens  Pen Type/Dose: Tresiba 74 units daily. Novolog B: 15-20 units; L: 25 units; D: 25 units. Novolog CD Q3H.    Continuous Glucose Monitoring  Patient has CGM: No    Lab Results   Component Value Date    HGBA1C 8.3 (H) 07/09/2025     PLACEMENT OF DEXCOM G6 PRO SENSOR  CONTINOUS GLUCOSE MONITORING SYSTEM (CGMS)    Patient is here in clinic today for placement of continuous glucose monitoring sensor.                 Patient verified that they were here for CGMS procedure ordered by their provider and that they have a working glucose meter and supplies at home.     Patient provided with a Dexcom G6 Pro Sensor and Transmitter and a copy of the Continuous Glucose Monitoring Patient Food Log to fill out during the study.  A detailed explanation of Continuous Glucose Monitoring was provided. Patient informed that this is an unblinded procedure and that they will not actually see the blood sugar tracing in real time.  Reviewed with patient the  patient education handout called "Dexcom Unblinded CGM Patient Handout" to review self-care during the study to avoid sensor loosening or removal i.e., bathing, swimming, dressing, and exercising. Patient encouraged to contact provider if Dexcom G6 Pro falls off within 72 hours (3 days) of placement.  However, if Dexcom G6 Pro falls off after 72 hours (3 days) of placement, simply place entire Dexcom device in an envelope/bag and bring to scheduled removal appointment.    Glucose readings will " be obtained at Dexcom OFF visit and report sent to ordering provider at that time.    Patient was brought to a private location.  Abdomen area for insertion was selected and prepared and allowed to dry. Single use Dexcom G6 sensor placed in patient's left-side of abdomen.  Dexcom Sensor Lot # [id number] with expiration of 04/28/2026.  Dexcom G6 Transmitter Serial Number [id number] was inserted into sensor and paired with Dexcom G6 Pro Jamie.  Patient reminded of time/date of CGM off appointment.                  The following forms were given and reviewed in detail with patient and all questions answered:  Dexcom Unblinded CGM Patient Handout  Dexcom Daily Log Sheet         Follow Up Plan     Follow up in about 1 week (around 8/11/2025) for Dexcom Pro OFF.    Today's care plan and follow up schedule was discussed with patient.  Dary verbalized understanding of the care plan, goals, and agrees to follow up plan.        The patient was encouraged to communicate with his/her health care provider/physician and care team regarding his/her condition(s) and treatment.  I provided the patient with my contact information today and encouraged to contact me via phone or [facility] Patient Portal as needed.     Length of Visit   Total Time: 30 Minutes

## 2025-08-15 ENCOUNTER — CLINICAL SUPPORT (OUTPATIENT)
Dept: DIABETES | Facility: CLINIC | Age: 70
End: 2025-08-15
Payer: MEDICARE

## 2025-08-15 ENCOUNTER — OFFICE VISIT (OUTPATIENT)
Dept: DIABETES | Facility: CLINIC | Age: 70
End: 2025-08-15
Payer: MEDICARE

## 2025-08-15 VITALS
HEIGHT: 62 IN | SYSTOLIC BLOOD PRESSURE: 146 MMHG | HEART RATE: 75 BPM | OXYGEN SATURATION: 95 % | WEIGHT: 177.25 LBS | BODY MASS INDEX: 32.62 KG/M2 | DIASTOLIC BLOOD PRESSURE: 80 MMHG

## 2025-08-15 DIAGNOSIS — Z12.31 SCREENING MAMMOGRAM FOR BREAST CANCER: ICD-10-CM

## 2025-08-15 DIAGNOSIS — E11.9 TYPE 2 DIABETES MELLITUS WITH HEMOGLOBIN A1C GOAL OF LESS THAN 7.0%: Primary | ICD-10-CM

## 2025-08-15 DIAGNOSIS — K76.0 FATTY LIVER: ICD-10-CM

## 2025-08-15 DIAGNOSIS — E11.9 TYPE 2 DIABETES MELLITUS WITH HEMOGLOBIN A1C GOAL OF LESS THAN 7.0%: ICD-10-CM

## 2025-08-15 DIAGNOSIS — E78.5 HYPERLIPIDEMIA LDL GOAL <70: ICD-10-CM

## 2025-08-15 DIAGNOSIS — E66.9 OBESITY (BMI 30-39.9): ICD-10-CM

## 2025-08-15 DIAGNOSIS — E11.649 HYPOGLYCEMIA UNAWARENESS ASSOCIATED WITH TYPE 2 DIABETES MELLITUS: ICD-10-CM

## 2025-08-15 DIAGNOSIS — I10 HYPERTENSION GOAL BP (BLOOD PRESSURE) < 130/80: ICD-10-CM

## 2025-08-15 LAB
GLUCOSE SERPL-MCNC: 106 MG/DL (ref 70–110)
GLUCOSE SERPL-MCNC: 64 MG/DL (ref 70–110)

## 2025-08-15 PROCEDURE — 99999 PR PBB SHADOW E&M-EST. PATIENT-LVL II: CPT | Mod: PBBFAC,HCNC,, | Performed by: DIETITIAN, REGISTERED

## 2025-08-15 PROCEDURE — 95250 CONT GLUC MNTR PHYS/QHP EQP: CPT | Mod: HCNC,S$GLB,, | Performed by: DIETITIAN, REGISTERED

## 2025-08-15 PROCEDURE — 99999 PR PBB SHADOW E&M-EST. PATIENT-LVL III: CPT | Mod: PBBFAC,HCNC,, | Performed by: PHYSICIAN ASSISTANT

## 2025-08-18 DIAGNOSIS — E11.9 TYPE 2 DIABETES MELLITUS WITH HEMOGLOBIN A1C GOAL OF LESS THAN 7.0%: ICD-10-CM

## 2025-08-18 RX ORDER — BLOOD SUGAR DIAGNOSTIC
STRIP MISCELLANEOUS
Qty: 300 EACH | Refills: 0 | Status: SHIPPED | OUTPATIENT
Start: 2025-08-18

## 2025-08-19 VITALS — HEIGHT: 62 IN | WEIGHT: 177.5 LBS | BODY MASS INDEX: 32.66 KG/M2

## (undated) DEVICE — SPONGE LAP 4X18 PREWASHED

## (undated) DEVICE — SUT 1 48IN PDS II VIO MONO

## (undated) DEVICE — COVER LIGHT HANDLE

## (undated) DEVICE — DRAPE ARM DAVINCI XI

## (undated) DEVICE — SET TRI-LUMEN FILTERED TUBE

## (undated) DEVICE — GOWN SURGICAL X-LARGE

## (undated) DEVICE — SUT VLOC 90 3-0 V-20 NDL 6

## (undated) DEVICE — TRAY FOLEY 16FR INFECTION CONT

## (undated) DEVICE — CLOSURE SKIN STERI STRIP 1/2X4

## (undated) DEVICE — CLIP HEMO-LOK MLX LARGE LF

## (undated) DEVICE — SEE MEDLINE ITEM 152622

## (undated) DEVICE — IRRIGATOR ENDOSCOPY DISP.

## (undated) DEVICE — SUT SILK 0 STRANDS 30IN BLK

## (undated) DEVICE — ELECTRODE REM PLYHSV RETURN 9

## (undated) DEVICE — SEE MEDLINE ITEM 146417

## (undated) DEVICE — ADHESIVE MASTISOL VIAL 48/BX

## (undated) DEVICE — DRAPE COLUMN DAVINCI XI

## (undated) DEVICE — EVACUATOR WOUND BULB 100CC

## (undated) DEVICE — SOL ELECTROLUBE ANTI-STIC

## (undated) DEVICE — SEAL UNIVERSAL 5MM-8MM XI

## (undated) DEVICE — SOL NS 1000CC

## (undated) DEVICE — SUT V-LOC 2.0 GS-21 90 DAY

## (undated) DEVICE — NDL INSUF ULTRA VERESS 120MM

## (undated) DEVICE — PORT AIRSEAL 12/120MM LPI

## (undated) DEVICE — STAPLER SKIN PROXIMATE WIDE

## (undated) DEVICE — SUT 2/0 27IN PDS II VIO MO

## (undated) DEVICE — SCISSOR 5MMX35CM DIRECT DRIVE

## (undated) DEVICE — TAPE SILK 3IN

## (undated) DEVICE — COVER TIP CURVED SCISSORS XI

## (undated) DEVICE — HEMOSTAT SURGICEL 4X8IN

## (undated) DEVICE — DRAPE ABDOMINAL TIBURON 14X11

## (undated) DEVICE — TUBING ANTICOAGULANT

## (undated) DEVICE — TRAY MINOR GEN SURG

## (undated) DEVICE — SUT MONOCYRL 4-0 PS2 UND

## (undated) DEVICE — SUT ABS CLIP LAPRA-TY CTD

## (undated) DEVICE — NDL 22GA X1 1/2 REG BEVEL

## (undated) DEVICE — SYR 30CC LUER LOCK

## (undated) DEVICE — ADHESIVE DERMABOND ADVANCED